# Patient Record
Sex: FEMALE | Race: WHITE | NOT HISPANIC OR LATINO | Employment: UNEMPLOYED | URBAN - METROPOLITAN AREA
[De-identification: names, ages, dates, MRNs, and addresses within clinical notes are randomized per-mention and may not be internally consistent; named-entity substitution may affect disease eponyms.]

---

## 2017-01-13 RX ORDER — PROMETHAZINE HYDROCHLORIDE 25 MG/1
25 TABLET ORAL 2 TIMES DAILY PRN
Qty: 20 TABLET | Refills: 1 | Status: SHIPPED | OUTPATIENT
Start: 2017-01-13 | End: 2017-09-06 | Stop reason: SDUPTHER

## 2017-02-09 RX ORDER — TOPIRAMATE 50 MG/1
50 TABLET, FILM COATED ORAL
Qty: 30 TABLET | Refills: 1 | Status: SHIPPED | OUTPATIENT
Start: 2017-02-09 | End: 2017-04-10 | Stop reason: SDUPTHER

## 2017-02-09 RX ORDER — BUTALBITAL, ACETAMINOPHEN AND CAFFEINE 50; 325; 40 MG/1; MG/1; MG/1
1 TABLET ORAL EVERY 6 HOURS PRN
Qty: 30 TABLET | Refills: 0 | Status: SHIPPED | OUTPATIENT
Start: 2017-02-09 | End: 2017-03-13 | Stop reason: SDUPTHER

## 2017-02-09 RX ORDER — LISINOPRIL AND HYDROCHLOROTHIAZIDE 20; 12.5 MG/1; MG/1
1 TABLET ORAL DAILY
Qty: 30 TABLET | Refills: 3 | Status: SHIPPED | OUTPATIENT
Start: 2017-02-09 | End: 2017-10-28 | Stop reason: SDUPTHER

## 2017-03-13 ENCOUNTER — TELEPHONE (OUTPATIENT)
Dept: INTERNAL MEDICINE | Facility: CLINIC | Age: 42
End: 2017-03-13

## 2017-03-13 RX ORDER — BUTALBITAL, ACETAMINOPHEN AND CAFFEINE 50; 325; 40 MG/1; MG/1; MG/1
1 TABLET ORAL EVERY 6 HOURS PRN
Qty: 20 TABLET | Refills: 0 | OUTPATIENT
Start: 2017-03-13 | End: 2017-05-15 | Stop reason: SDUPTHER

## 2017-03-13 NOTE — TELEPHONE ENCOUNTER
Per Dr. Riddle, we can draw Rheumatoid lab work while she is here with her mother.  Does not need an appt with Dr. Riddle.  Depending on lab results, we may refer to specialist.  Valeria advised and voiced understanding. Lab orders put in.    ----- Message from Linn Ellis MA sent at 3/13/2017  4:18 PM EDT -----  Contact: [phone]      ----- Message -----     From: Linn Ellis MA     Sent: 3/13/2017  11:35 AM       To: Teresa Farrar MA        ----- Message -----     From: Linn Daniels     Sent: 3/13/2017  11:14 AM       To: [facility] Clinical Pool    Patient;s mother is scheduled tomorrow with Dr. Riddle.  She is wanting to know if she can be seen as well.  She said that Dr. Riddle has her coming back every 6 months but her mom is every 3 months but it is easier for her to be seen with her mother due to transportation.

## 2017-03-14 ENCOUNTER — TELEPHONE (OUTPATIENT)
Dept: INTERNAL MEDICINE | Facility: CLINIC | Age: 42
End: 2017-03-14

## 2017-03-14 DIAGNOSIS — M79.641 PAIN IN BOTH HANDS: Primary | ICD-10-CM

## 2017-03-14 DIAGNOSIS — M79.642 PAIN IN BOTH HANDS: Primary | ICD-10-CM

## 2017-03-14 DIAGNOSIS — M25.541 ARTHRALGIA OF BOTH HANDS: ICD-10-CM

## 2017-03-14 DIAGNOSIS — M25.649 JOINT STIFFNESS OF HAND, UNSPECIFIED LATERALITY: ICD-10-CM

## 2017-03-14 DIAGNOSIS — R60.0 HAND EDEMA: ICD-10-CM

## 2017-03-14 DIAGNOSIS — M25.542 ARTHRALGIA OF BOTH HANDS: ICD-10-CM

## 2017-03-15 LAB
ERYTHROCYTE [SEDIMENTATION RATE] IN BLOOD BY WESTERGREN METHOD: 7 MM/HR (ref 0–20)
WRITTEN AUTHORIZATION: NORMAL

## 2017-03-16 ENCOUNTER — TELEPHONE (OUTPATIENT)
Dept: INTERNAL MEDICINE | Facility: CLINIC | Age: 42
End: 2017-03-16

## 2017-03-16 NOTE — TELEPHONE ENCOUNTER
----- Message from Linn Ellis MA sent at 3/16/2017  7:23 PM EDT -----  Patient states that [facility] [place] did not get [facility]/13/17 refill on Fioricet.  I spoke with Riaz @ [facility].  He stated that they did not get the message to refill on 3/13.  I advised this was OK to refill #20 per cosign by Dr. Riddle on 3/15/17.

## 2017-03-17 LAB
ALBUMIN SERPL-MCNC: 4.5 G/DL (ref 3.5–5.2)
ALBUMIN/GLOB SERPL: 1.7 G/DL
ALP SERPL-CCNC: 91 U/L (ref 40–129)
ALT SERPL-CCNC: 7 U/L (ref 5–33)
ANA SER QL: NEGATIVE
AST SERPL-CCNC: 11 U/L (ref 5–32)
BASOPHILS # BLD AUTO: 0.07 10*3/MM3 (ref 0–0.2)
BASOPHILS NFR BLD AUTO: 0.9 % (ref 0–2)
BILIRUB SERPL-MCNC: 0.3 MG/DL (ref 0.2–1.2)
BUN SERPL-MCNC: 8 MG/DL (ref 6–20)
BUN/CREAT SERPL: 8.2 (ref 7–25)
CALCIUM SERPL-MCNC: 9.9 MG/DL (ref 8.6–10.5)
CCP IGA+IGG SERPL IA-ACNC: 2 UNITS (ref 0–19)
CHLORIDE SERPL-SCNC: 98 MMOL/L (ref 98–107)
CO2 SERPL-SCNC: 25.2 MMOL/L (ref 22–29)
CREAT SERPL-MCNC: 0.97 MG/DL (ref 0.57–1)
CRP SERPL-MCNC: 1.4 MG/L (ref 0–4.9)
EOSINOPHIL # BLD AUTO: 0.12 10*3/MM3 (ref 0.1–0.3)
EOSINOPHIL NFR BLD AUTO: 1.6 % (ref 0–4)
ERYTHROCYTE [DISTWIDTH] IN BLOOD BY AUTOMATED COUNT: 13.9 % (ref 11.5–14.5)
ERYTHROCYTE [SEDIMENTATION RATE] IN BLOOD BY WESTERGREN METHOD: NORMAL MM/HR
GLOBULIN SER CALC-MCNC: 2.7 GM/DL
GLUCOSE SERPL-MCNC: 107 MG/DL (ref 65–99)
HCT VFR BLD AUTO: 46.6 % (ref 37–47)
HGB BLD-MCNC: 15.2 G/DL (ref 12–16)
IMM GRANULOCYTES # BLD: 0.03 10*3/MM3 (ref 0–0.03)
IMM GRANULOCYTES NFR BLD: 0.4 % (ref 0–0.5)
LYMPHOCYTES # BLD AUTO: 2.42 10*3/MM3 (ref 0.6–4.8)
LYMPHOCYTES NFR BLD AUTO: 32.6 % (ref 20–45)
Lab: NORMAL
MCH RBC QN AUTO: 32.4 PG (ref 27–31)
MCHC RBC AUTO-ENTMCNC: 32.6 G/DL (ref 31–37)
MCV RBC AUTO: 99.4 FL (ref 81–99)
MONOCYTES # BLD AUTO: 0.37 10*3/MM3 (ref 0–1)
MONOCYTES NFR BLD AUTO: 5 % (ref 3–8)
NEUTROPHILS # BLD AUTO: 4.41 10*3/MM3 (ref 1.5–8.3)
NEUTROPHILS NFR BLD AUTO: 59.5 % (ref 45–70)
NRBC BLD AUTO-RTO: 0 /100 WBC (ref 0–0)
PLATELET # BLD AUTO: 315 10*3/MM3 (ref 140–500)
POTASSIUM SERPL-SCNC: 4.7 MMOL/L (ref 3.5–5.2)
PROT SERPL-MCNC: 7.2 G/DL (ref 6–8.5)
RBC # BLD AUTO: 4.69 10*6/MM3 (ref 4.2–5.4)
REQUEST PROBLEM: NORMAL
RHEUMATOID FACT SERPL-ACNC: <10 IU/ML (ref 0–13.9)
SODIUM SERPL-SCNC: 137 MMOL/L (ref 136–145)
WBC # BLD AUTO: 7.42 10*3/MM3 (ref 4.8–10.8)

## 2017-03-20 ENCOUNTER — TELEPHONE (OUTPATIENT)
Dept: INTERNAL MEDICINE | Facility: CLINIC | Age: 42
End: 2017-03-20

## 2017-03-20 NOTE — TELEPHONE ENCOUNTER
Meds were called in on Thursday night, when patient was notified on Friday, she was concerned that the quantity was changed from #30 to #20. I did explain to patient that this medication was to only be taken on a as needed basis, not every day. She is frustrated and wants to know why it was decreased. I spoke with Dr. Riddle who reiterated that this was a PRN drug and that #20 should be enough to suffice when she needs it. I called patient back today, 3/20/17, she is still very frustrated and doesn't understand why she cannot get #30, she explained that when she was first given this medication by her prior physician, the RX was always for #60, when she started seeing Dr. Riddle, she was decreased down to #30, and now it has been decreased again. I explained to patient that this was a controlled substance, patient understands but is still very frustrated about the whole situation all around, she says this medication works for her and just wants more pills. I told patient that she may make an appointment to be seen to talk to Dr. Riddle about this. She says she has an appointment in April and will wait to talk to Dr. Riddle about it then.   ----- Message from Linn Ellis MA sent at 3/16/2017  2:19 PM EDT -----  Regarding: FW: REFROMIE  Contact: [phone]  Please check with Venkatesh on this tomorrow.  ----- Message -----     From: Ashley Garcia     Sent: 3/16/2017   1:54 PM       To: [facility] Clinical Pool  Subject: REFILL                                           :  71  VENKATESH PATIENT    PATIENT SAID [facility] [place] [facility] NOT [facility] ON REFILL ON FIORCET, -40 MG. COULD IT BE RESENT?

## 2017-03-21 ENCOUNTER — TELEPHONE (OUTPATIENT)
Dept: INTERNAL MEDICINE | Facility: CLINIC | Age: 42
End: 2017-03-21

## 2017-03-21 NOTE — TELEPHONE ENCOUNTER
----- Message from Adrianne Riddle MD sent at 3/17/2017  5:25 PM EDT -----  Call pt about labs.  Rheumatology screening all negative

## 2017-03-21 NOTE — TELEPHONE ENCOUNTER
Pt notified of labs and she wanted to ask what to do about her right hand. The hand locks up and has trouble holding on to things.dg

## 2017-04-10 RX ORDER — TOPIRAMATE 50 MG/1
50 TABLET, FILM COATED ORAL
Qty: 30 TABLET | Refills: 3 | Status: SHIPPED | OUTPATIENT
Start: 2017-04-10 | End: 2017-08-06 | Stop reason: SDUPTHER

## 2017-04-10 RX ORDER — VENLAFAXINE 75 MG/1
75 TABLET ORAL DAILY
Qty: 30 TABLET | Refills: 6 | Status: SHIPPED | OUTPATIENT
Start: 2017-04-10 | End: 2017-10-03

## 2017-04-14 ENCOUNTER — TELEPHONE (OUTPATIENT)
Dept: INTERNAL MEDICINE | Facility: CLINIC | Age: 42
End: 2017-04-14

## 2017-04-14 NOTE — TELEPHONE ENCOUNTER
----- Message from Catherine Medina sent at 4/14/2017 10:41 AM EDT -----  NEEDS REFILL ON FIORCET -40 MG SENT TO [facility] IN [place].  SHE IS OUT OF THIS NOW    [phone]      This is being taken care of and will be faxed to pharmacy today

## 2017-04-25 ENCOUNTER — OFFICE VISIT (OUTPATIENT)
Dept: INTERNAL MEDICINE | Facility: CLINIC | Age: 42
End: 2017-04-25

## 2017-04-25 VITALS
OXYGEN SATURATION: 98 % | DIASTOLIC BLOOD PRESSURE: 90 MMHG | BODY MASS INDEX: 23.11 KG/M2 | HEART RATE: 78 BPM | SYSTOLIC BLOOD PRESSURE: 132 MMHG | HEIGHT: 62 IN | WEIGHT: 125.6 LBS

## 2017-04-25 DIAGNOSIS — Z30.42 SURVEILLANCE FOR DEPO-PROVERA CONTRACEPTION: ICD-10-CM

## 2017-04-25 DIAGNOSIS — I73.9 PERIPHERAL ARTERY DISEASE (HCC): ICD-10-CM

## 2017-04-25 DIAGNOSIS — J43.1 PANLOBULAR EMPHYSEMA (HCC): ICD-10-CM

## 2017-04-25 DIAGNOSIS — R73.9 HYPERGLYCEMIA: ICD-10-CM

## 2017-04-25 DIAGNOSIS — R53.82 CHRONIC FATIGUE: ICD-10-CM

## 2017-04-25 DIAGNOSIS — I10 ESSENTIAL HYPERTENSION: Primary | ICD-10-CM

## 2017-04-25 DIAGNOSIS — M79.641 PAIN OF RIGHT HAND: ICD-10-CM

## 2017-04-25 DIAGNOSIS — R51.9 CHRONIC NONINTRACTABLE HEADACHE, UNSPECIFIED HEADACHE TYPE: ICD-10-CM

## 2017-04-25 DIAGNOSIS — N95.1 MENOPAUSAL SYMPTOMS: ICD-10-CM

## 2017-04-25 DIAGNOSIS — G89.29 CHRONIC NONINTRACTABLE HEADACHE, UNSPECIFIED HEADACHE TYPE: ICD-10-CM

## 2017-04-25 DIAGNOSIS — E78.2 MIXED HYPERLIPIDEMIA: ICD-10-CM

## 2017-04-25 DIAGNOSIS — Z72.0 TOBACCO USE: ICD-10-CM

## 2017-04-25 PROCEDURE — 99214 OFFICE O/P EST MOD 30 MIN: CPT | Performed by: INTERNAL MEDICINE

## 2017-04-25 RX ORDER — RIZATRIPTAN BENZOATE 10 MG/1
10 TABLET ORAL ONCE AS NEEDED
Qty: 9 TABLET | Refills: 0 | Status: SHIPPED | OUTPATIENT
Start: 2017-04-25 | End: 2017-10-03

## 2017-04-25 RX ORDER — ACETAMINOPHEN AND CODEINE PHOSPHATE 300; 30 MG/1; MG/1
TABLET ORAL
Refills: 0 | COMMUNITY
Start: 2017-01-17 | End: 2018-06-07

## 2017-04-25 NOTE — PROGRESS NOTES
"Subjective     Valeria Rubi is a 41 y.o. female, who presents with a chief complaint of   Chief Complaint   Patient presents with   • Follow-up     Would like to talk about medications   • Hyperlipidemia       HPI   The pt is here for follow up    She has copd.  Her insurance switched her from ventolin to proair and she feels like Proair doesn't work as well.  She recently had a URI.  She says she had some leftover \"infection medication\" at home and took this.  She has seen pulmonary and she has poor function on her PFT's.  She says that she had chantix called in but her insurance didn't want to cover the med.      She c/o right finger pain, carpal tunnel (bilat R>L), and left knee pain.  She is taking lots of tylenol.  We discussed a limit of 2-3000mg /day.  She is on plavix bc of an iliac stent so nsaids are not an option.  Long term opiods are not a good option either.  She says a wrist injection from shellie/kleinert didn't help either.  She has had screening labs for rheumatologic disease that are all normal.  She doesn't exercise and doesn't eat a healthy diet.     She takes fioricet for her migraines.  She had been taking 60 per month.  We have had repeated discussions about how fioricet is not a daily medication and is not good for chronic use.  She is now getting 20 pills/mo and is not happy about this.  I have offered her a trial of maxalt.  She thinks imitrex failed in the past.  I have also offered her a neurology consultation.    She has intermittent depression and is on venlafaxine.  She sleeps a lot and doesn't have a lot of motivation.  She is having lots of hot flashes and is menopausal.  She has no sex drive and has some vaginal dryness.  She is not a candidate for hrt bc she is a smoker and has had PAD s/p stenting and on plavix.      The following portions of the patient's history were reviewed and updated as appropriate: allergies, current medications, past family history, past medical history, " past social history, past surgical history and problem list.    Allergies: Review of patient's allergies indicates no known allergies.    Review of Systems   Constitutional: Positive for fatigue.   HENT: Negative.    Eyes: Negative.    Respiratory: Negative.    Cardiovascular: Negative.    Gastrointestinal: Negative.    Endocrine: Negative.    Genitourinary: Negative.    Musculoskeletal: Positive for arthralgias.   Skin: Negative.    Allergic/Immunologic: Negative.    Neurological: Negative.    Hematological: Negative.    Psychiatric/Behavioral: Positive for dysphoric mood.   All other systems reviewed and are negative.      Objective     Wt Readings from Last 3 Encounters:   04/25/17 125 lb 9.6 oz (57 kg)   12/16/16 120 lb (54.4 kg)   09/13/16 124 lb (56.2 kg)     Temp Readings from Last 3 Encounters:   06/24/16 97.3 °F (36.3 °C)   06/17/16 97.6 °F (36.4 °C) (Oral)   06/14/16 98.1 °F (36.7 °C)     BP Readings from Last 3 Encounters:   04/25/17 132/90   12/16/16 138/76   09/13/16 110/80     Pulse Readings from Last 3 Encounters:   04/25/17 78   12/16/16 72   09/13/16 82     Body mass index is 22.97 kg/(m^2).  SpO2 Readings from Last 3 Encounters:   04/25/17 98%   12/16/16 98%   09/13/16 98%       Physical Exam   Constitutional: She is oriented to person, place, and time. She appears well-developed and well-nourished. No distress.   HENT:   Head: Normocephalic and atraumatic.   Right Ear: External ear normal.   Left Ear: External ear normal.   Nose: Nose normal.   Mouth/Throat: Oropharynx is clear and moist.   Eyes: Conjunctivae and EOM are normal. Pupils are equal, round, and reactive to light.   Neck: Normal range of motion. Neck supple.   Cardiovascular: Normal rate, regular rhythm, normal heart sounds and intact distal pulses.    Pulmonary/Chest: Effort normal and breath sounds normal. No respiratory distress. She has no wheezes.   Musculoskeletal: Normal range of motion.   Normal gait   Neurological: She is  alert and oriented to person, place, and time.   Skin: Skin is warm and dry.   Psychiatric: She has a normal mood and affect. Her behavior is normal. Judgment and thought content normal.   Nursing note and vitals reviewed.      Results for orders placed or performed in visit on 03/14/17   Rheumatoid Arthritis Expanded Panel   Result Value Ref Range    RA Latex Turbid <10.0 0.0 - 13.9 IU/mL    C-Reactive Protein 1.4 0.0 - 4.9 mg/L    CCP Antibodies IgG/IgA 2 0 - 19 units    Sed Rate CANCELED mm/hr   Antinuclear Antibody With Reflex Cascade   Result Value Ref Range    COLLEEN Direct Negative Negative    See below: Comment    Comprehensive metabolic panel   Result Value Ref Range    Glucose 107 (H) 65 - 99 mg/dL    BUN 8 6 - 20 mg/dL    Creatinine 0.97 0.57 - 1.00 mg/dL    eGFR Non African Am 63 >60 mL/min/1.73    eGFR African Am 76 >60 mL/min/1.73    BUN/Creatinine Ratio 8.2 7.0 - 25.0    Sodium 137 136 - 145 mmol/L    Potassium 4.7 3.5 - 5.2 mmol/L    Chloride 98 98 - 107 mmol/L    Total CO2 25.2 22.0 - 29.0 mmol/L    Calcium 9.9 8.6 - 10.5 mg/dL    Total Protein 7.2 6.0 - 8.5 g/dL    Albumin 4.50 3.50 - 5.20 g/dL    Globulin 2.7 gm/dL    A/G Ratio 1.7 g/dL    Total Bilirubin 0.3 0.2 - 1.2 mg/dL    Alkaline Phosphatase 91 40 - 129 U/L    AST (SGOT) 11 5 - 32 U/L    ALT (SGPT) 7 5 - 33 U/L   Sedimentation rate, automated   Result Value Ref Range    Sed Rate 7 0 - 20 mm/hr   Written Authorization   Result Value Ref Range    Written Authorization Comment    Request Problem   Result Value Ref Range    Request Problem CANCELED    CBC w AUTO Differential   Result Value Ref Range    WBC 7.42 4.80 - 10.80 10*3/mm3    RBC 4.69 4.20 - 5.40 10*6/mm3    Hemoglobin 15.2 12.0 - 16.0 g/dL    Hematocrit 46.6 37.0 - 47.0 %    MCV 99.4 (H) 81.0 - 99.0 fL    MCH 32.4 (H) 27.0 - 31.0 pg    MCHC 32.6 31.0 - 37.0 g/dL    RDW 13.9 11.5 - 14.5 %    Platelets 315 140 - 500 10*3/mm3    Neutrophil Rel % 59.5 45.0 - 70.0 %    Lymphocyte Rel % 32.6  20.0 - 45.0 %    Monocyte Rel % 5.0 3.0 - 8.0 %    Eosinophil Rel % 1.6 0.0 - 4.0 %    Basophil Rel % 0.9 0.0 - 2.0 %    Neutrophils Absolute 4.41 1.50 - 8.30 10*3/mm3    Lymphocytes Absolute 2.42 0.60 - 4.80 10*3/mm3    Monocytes Absolute 0.37 0.00 - 1.00 10*3/mm3    Eosinophils Absolute 0.12 0.10 - 0.30 10*3/mm3    Basophils Absolute 0.07 0.00 - 0.20 10*3/mm3    Immature Granulocyte Rel % 0.4 0.0 - 0.5 %    Immature Grans Absolute 0.03 0.00 - 0.03 10*3/mm3    nRBC 0.0 0.0 - 0.0 /100 WBC       Assessment/Plan   Valeria was seen today for follow-up and hyperlipidemia.    Diagnoses and all orders for this visit:    Essential hypertension  -     Comprehensive Metabolic Panel; Future  -     CBC & Differential; Future  -     NMR LipoProfile; Future    Peripheral artery disease  -     CBC & Differential; Future  -     NMR LipoProfile; Future    Panlobular emphysema    Surveillance for Depo-Provera contraception  -     DEXA Bone Density Axial; Future    Menopausal symptoms  -     DEXA Bone Density Axial; Future    Pain of right hand    Chronic fatigue  -     Comprehensive Metabolic Panel; Future  -     CBC & Differential; Future  -     TSH; Future  -     T4, Free; Future  -     Vitamin B12; Future  -     Vitamin D 25 Hydroxy; Future    Tobacco use  -     DEXA Bone Density Axial; Future    Hyperglycemia  -     Comprehensive Metabolic Panel; Future  -     Hemoglobin A1c; Future  -     TSH; Future  -     T4, Free; Future    Mixed hyperlipidemia  -     Comprehensive Metabolic Panel; Future  -     NMR LipoProfile; Future    Chronic nonintractable headache, unspecified headache type  -     rizatriptan (MAXALT) 10 MG tablet; Take 1 tablet by mouth 1 (One) Time As Needed for migraine for up to 1 dose. May repeat in 2 hours if needed          Outpatient Medications Prior to Visit   Medication Sig Dispense Refill   • albuterol (PROVENTIL HFA;VENTOLIN HFA) 108 (90 BASE) MCG/ACT inhaler INHALE 2 PUFFS PO EVERY 4 HOURS PRN 18 g 3   •  albuterol (PROVENTIL) (2.5 MG/3ML) 0.083% nebulizer solution 1 unit dose neb. Every 4 hrs. prn 225 mL 3   • aspirin 81 MG EC tablet Take 81 mg by mouth daily.     • benzonatate (TESSALON PERLES) 100 MG capsule Take 1 capsule by mouth 3 (Three) Times a Day As Needed for cough. 20 capsule 0   • butalbital-acetaminophen-caffeine (FIORICET) -40 MG per tablet Take 1 tablet by mouth Every 6 (Six) Hours As Needed for headaches (this is not a daily medication, use only as needed for headaches). 20 tablet 0   • CHANTIX CONTINUING MONTH CIPRIANO 1 MG tablet Take 1 tablet by mouth daily.  4   • CHANTIX STARTING MONTH CIPRIANO 0.5 MG X 11 & 1 MG X 42 tablet TAKE AS DIRECTED  3   • clopidogrel (PLAVIX) 75 MG tablet 150 mg po X1 in RR 30 tablet 0   • DICLOFENAC POTASSIUM PO Take 50 mg by mouth 2 (two) times a day.     • fluticasone (FLONASE) 50 MCG/ACT nasal spray 1 spray into each nostril as needed.     • lisinopril-hydrochlorothiazide (PRINZIDE,ZESTORETIC) 20-12.5 MG per tablet Take 1 tablet by mouth Daily. 30 tablet 3   • medroxyPROGESTERone (DEPO-PROVERA) 150 MG/ML injection Apply  to cheek. Pt takes every 3 months.     • nicotine (EQL NICOTINE) 14 MG/24HR patch Place 1 patch on the skin Daily. 30 patch 0   • promethazine (PHENERGAN) 25 MG tablet Take 1 tablet by mouth 2 (Two) Times a Day As Needed for nausea or vomiting. 20 tablet 1   • simvastatin (ZOCOR) 40 MG tablet Take 40 mg by mouth daily.     • Tiotropium Bromide-Olodaterol (STIOLTO RESPIMAT) 2.5-2.5 MCG/ACT aerosol solution Inhale 2 puffs daily. 4 g 5   • topiramate (TOPAMAX) 50 MG tablet Take 1 tablet by mouth every night at bedtime. 30 tablet 3   • venlafaxine (EFFEXOR) 75 MG tablet Take 1 tablet by mouth Daily. 30 tablet 6     No facility-administered medications prior to visit.      New Medications Ordered This Visit   Medications   • rizatriptan (MAXALT) 10 MG tablet     Sig: Take 1 tablet by mouth 1 (One) Time As Needed for migraine for up to 1 dose. May repeat in  2 hours if needed     Dispense:  9 tablet     Refill:  0     [unfilled]  There are no discontinued medications.      Return in about 5 months (around 9/25/2017) for Recheck.

## 2017-04-26 LAB
25(OH)D3+25(OH)D2 SERPL-MCNC: 20.7 NG/ML
ALBUMIN SERPL-MCNC: 4.4 G/DL (ref 3.5–5.2)
ALBUMIN/GLOB SERPL: 1.5 G/DL
ALP SERPL-CCNC: 92 U/L (ref 40–129)
ALT SERPL-CCNC: 20 U/L (ref 5–33)
AST SERPL-CCNC: 19 U/L (ref 5–32)
BASOPHILS # BLD AUTO: 0.03 10*3/MM3 (ref 0–0.2)
BASOPHILS NFR BLD AUTO: 0.4 % (ref 0–2)
BILIRUB SERPL-MCNC: 0.2 MG/DL (ref 0.2–1.2)
BUN SERPL-MCNC: 8 MG/DL (ref 6–20)
BUN/CREAT SERPL: 9.2 (ref 7–25)
CALCIUM SERPL-MCNC: 9.6 MG/DL (ref 8.6–10.5)
CHLORIDE SERPL-SCNC: 99 MMOL/L (ref 98–107)
CHOLEST SERPL-MCNC: 209 MG/DL (ref 100–199)
CO2 SERPL-SCNC: 24.8 MMOL/L (ref 22–29)
CREAT SERPL-MCNC: 0.87 MG/DL (ref 0.57–1)
EOSINOPHIL # BLD AUTO: 0.1 10*3/MM3 (ref 0.1–0.3)
EOSINOPHIL NFR BLD AUTO: 1.5 % (ref 0–4)
ERYTHROCYTE [DISTWIDTH] IN BLOOD BY AUTOMATED COUNT: 12.2 % (ref 11.5–14.5)
GLOBULIN SER CALC-MCNC: 2.9 GM/DL
GLUCOSE SERPL-MCNC: 90 MG/DL (ref 65–99)
HBA1C MFR BLD: 5.4 % (ref 4.8–5.6)
HCT VFR BLD AUTO: 46.5 % (ref 37–47)
HDL SERPL-SCNC: 39.5 UMOL/L
HDLC SERPL-MCNC: 45 MG/DL
HGB BLD-MCNC: 15.9 G/DL (ref 12–16)
IMM GRANULOCYTES # BLD: 0.05 10*3/MM3 (ref 0–0.03)
IMM GRANULOCYTES NFR BLD: 0.7 % (ref 0–0.5)
LDL SERPL QN: 20 NM
LDL SERPL-SCNC: 2031 NMOL/L
LDL SMALL SERPL-SCNC: 1414 NMOL/L
LDLC SERPL CALC-MCNC: 108 MG/DL (ref 0–99)
LYMPHOCYTES # BLD AUTO: 3.2 10*3/MM3 (ref 0.6–4.8)
LYMPHOCYTES NFR BLD AUTO: 47.3 % (ref 20–45)
MCH RBC QN AUTO: 33 PG (ref 27–31)
MCHC RBC AUTO-ENTMCNC: 34.2 G/DL (ref 31–37)
MCV RBC AUTO: 96.5 FL (ref 81–99)
MONOCYTES # BLD AUTO: 0.38 10*3/MM3 (ref 0–1)
MONOCYTES NFR BLD AUTO: 5.6 % (ref 3–8)
NEUTROPHILS # BLD AUTO: 3 10*3/MM3 (ref 1.5–8.3)
NEUTROPHILS NFR BLD AUTO: 44.5 % (ref 45–70)
NRBC BLD AUTO-RTO: 0 /100 WBC (ref 0–0)
PLATELET # BLD AUTO: 311 10*3/MM3 (ref 140–500)
POTASSIUM SERPL-SCNC: 4.2 MMOL/L (ref 3.5–5.2)
PROT SERPL-MCNC: 7.3 G/DL (ref 6–8.5)
RBC # BLD AUTO: 4.82 10*6/MM3 (ref 4.2–5.4)
SODIUM SERPL-SCNC: 141 MMOL/L (ref 136–145)
T4 FREE SERPL-MCNC: 1.13 NG/DL (ref 0.93–1.7)
TRIGL SERPL-MCNC: 281 MG/DL (ref 0–149)
TSH SERPL DL<=0.005 MIU/L-ACNC: 0.76 MIU/ML (ref 0.27–4.2)
VIT B12 SERPL-MCNC: 277 PG/ML (ref 211–946)
WBC # BLD AUTO: 6.76 10*3/MM3 (ref 4.8–10.8)

## 2017-05-03 ENCOUNTER — TELEPHONE (OUTPATIENT)
Dept: INTERNAL MEDICINE | Facility: CLINIC | Age: 42
End: 2017-05-03

## 2017-05-04 ENCOUNTER — TELEPHONE (OUTPATIENT)
Dept: INTERNAL MEDICINE | Facility: CLINIC | Age: 42
End: 2017-05-04

## 2017-05-15 RX ORDER — BUTALBITAL, ACETAMINOPHEN AND CAFFEINE 50; 325; 40 MG/1; MG/1; MG/1
1 TABLET ORAL EVERY 6 HOURS PRN
Qty: 20 TABLET | Refills: 0 | OUTPATIENT
Start: 2017-05-15 | End: 2017-07-12 | Stop reason: SDUPTHER

## 2017-06-06 RX ORDER — ALBUTEROL SULFATE 2.5 MG/3ML
SOLUTION RESPIRATORY (INHALATION)
Qty: 225 ML | Refills: 3 | Status: SHIPPED | OUTPATIENT
Start: 2017-06-06 | End: 2017-10-03 | Stop reason: SDUPTHER

## 2017-06-13 ENCOUNTER — TELEPHONE (OUTPATIENT)
Dept: INTERNAL MEDICINE | Facility: CLINIC | Age: 42
End: 2017-06-13

## 2017-06-20 RX ORDER — ALBUTEROL SULFATE 90 UG/1
AEROSOL, METERED RESPIRATORY (INHALATION)
Qty: 18 G | Refills: 3 | Status: SHIPPED | OUTPATIENT
Start: 2017-06-20 | End: 2017-10-03 | Stop reason: SDUPTHER

## 2017-07-12 RX ORDER — BUTALBITAL, ACETAMINOPHEN AND CAFFEINE 50; 325; 40 MG/1; MG/1; MG/1
1 TABLET ORAL EVERY 6 HOURS PRN
Qty: 10 TABLET | Refills: 0 | OUTPATIENT
Start: 2017-07-12 | End: 2017-07-30 | Stop reason: SDUPTHER

## 2017-07-14 ENCOUNTER — TELEPHONE (OUTPATIENT)
Dept: INTERNAL MEDICINE | Facility: CLINIC | Age: 42
End: 2017-07-14

## 2017-07-14 NOTE — TELEPHONE ENCOUNTER
----- Message from Catherinepeggy Medina sent at 7/14/2017  9:34 AM EDT -----  PATIENT WENT TO  HER FIORICET -40 MG AND HAS ABOUT 16-18 MIGRAINES A MONTH AND THE DOCTOR ONLY ORDERED 10 PILLS.  SHE WAS SAYING SHE WOKE UP WITH ONE TODAY.  SHE SAID SHE DON'T UNDERSTAND WHY ONLY 10 PILLS TO LAST A WHOLE MONTH OF MIGRAINES.  S[facility] USES  [facility] [place]     [phone]

## 2017-07-31 RX ORDER — BUTALBITAL, ACETAMINOPHEN AND CAFFEINE 50; 325; 40 MG/1; MG/1; MG/1
TABLET ORAL
Qty: 10 TABLET | Refills: 0 | OUTPATIENT
Start: 2017-07-31 | End: 2017-09-08 | Stop reason: SDUPTHER

## 2017-08-07 RX ORDER — TOPIRAMATE 50 MG/1
TABLET, FILM COATED ORAL
Qty: 30 TABLET | Refills: 6 | Status: SHIPPED | OUTPATIENT
Start: 2017-08-07 | End: 2017-10-03 | Stop reason: SDUPTHER

## 2017-09-06 RX ORDER — PROMETHAZINE HYDROCHLORIDE 25 MG/1
TABLET ORAL
Qty: 20 TABLET | Refills: 1 | Status: SHIPPED | OUTPATIENT
Start: 2017-09-06 | End: 2017-11-08 | Stop reason: SDUPTHER

## 2017-09-08 RX ORDER — BUTALBITAL, ACETAMINOPHEN AND CAFFEINE 50; 325; 40 MG/1; MG/1; MG/1
TABLET ORAL
Qty: 10 TABLET | Refills: 0 | OUTPATIENT
Start: 2017-09-08 | End: 2017-10-06 | Stop reason: SDUPTHER

## 2017-09-18 DIAGNOSIS — J43.1 PANLOBULAR EMPHYSEMA (HCC): ICD-10-CM

## 2017-09-18 RX ORDER — TIOTROPIUM BROMIDE AND OLODATEROL 3.124; 2.736 UG/1; UG/1
SPRAY, METERED RESPIRATORY (INHALATION)
Qty: 4 G | Refills: 5 | Status: SHIPPED | OUTPATIENT
Start: 2017-09-18 | End: 2017-10-03 | Stop reason: SDUPTHER

## 2017-10-03 ENCOUNTER — OFFICE VISIT (OUTPATIENT)
Dept: INTERNAL MEDICINE | Facility: CLINIC | Age: 42
End: 2017-10-03

## 2017-10-03 VITALS
WEIGHT: 124.8 LBS | SYSTOLIC BLOOD PRESSURE: 110 MMHG | OXYGEN SATURATION: 98 % | DIASTOLIC BLOOD PRESSURE: 78 MMHG | HEIGHT: 62 IN | BODY MASS INDEX: 22.97 KG/M2 | HEART RATE: 88 BPM

## 2017-10-03 DIAGNOSIS — I73.9 PERIPHERAL ARTERY DISEASE (HCC): ICD-10-CM

## 2017-10-03 DIAGNOSIS — I10 ESSENTIAL HYPERTENSION: ICD-10-CM

## 2017-10-03 DIAGNOSIS — G89.29 CHRONIC NONINTRACTABLE HEADACHE, UNSPECIFIED HEADACHE TYPE: ICD-10-CM

## 2017-10-03 DIAGNOSIS — J43.1 PANLOBULAR EMPHYSEMA (HCC): ICD-10-CM

## 2017-10-03 DIAGNOSIS — R51.9 CHRONIC NONINTRACTABLE HEADACHE, UNSPECIFIED HEADACHE TYPE: ICD-10-CM

## 2017-10-03 DIAGNOSIS — E78.2 MIXED HYPERLIPIDEMIA: Primary | ICD-10-CM

## 2017-10-03 DIAGNOSIS — F41.9 ANXIETY AND DEPRESSION: ICD-10-CM

## 2017-10-03 DIAGNOSIS — F32.A ANXIETY AND DEPRESSION: ICD-10-CM

## 2017-10-03 PROCEDURE — 99214 OFFICE O/P EST MOD 30 MIN: CPT | Performed by: INTERNAL MEDICINE

## 2017-10-03 RX ORDER — ALBUTEROL SULFATE 90 UG/1
AEROSOL, METERED RESPIRATORY (INHALATION)
Qty: 18 G | Refills: 3 | Status: SHIPPED | OUTPATIENT
Start: 2017-10-03 | End: 2018-01-03 | Stop reason: SDUPTHER

## 2017-10-03 RX ORDER — VENLAFAXINE HYDROCHLORIDE 150 MG/1
150 CAPSULE, EXTENDED RELEASE ORAL DAILY
Qty: 90 CAPSULE | Refills: 1 | Status: SHIPPED | OUTPATIENT
Start: 2017-10-03 | End: 2017-10-10 | Stop reason: SDUPTHER

## 2017-10-03 RX ORDER — TOPIRAMATE 50 MG/1
50 TABLET, FILM COATED ORAL 2 TIMES DAILY
Qty: 180 TABLET | Refills: 1 | Status: SHIPPED | OUTPATIENT
Start: 2017-10-03 | End: 2018-10-06 | Stop reason: SDUPTHER

## 2017-10-03 RX ORDER — ALBUTEROL SULFATE 2.5 MG/3ML
SOLUTION RESPIRATORY (INHALATION)
Qty: 225 ML | Refills: 3 | Status: SHIPPED | OUTPATIENT
Start: 2017-10-03 | End: 2018-07-01 | Stop reason: SDUPTHER

## 2017-10-03 NOTE — PROGRESS NOTES
Subjective     Valeria Rubi is a 42 y.o. female, who presents with a chief complaint of   Chief Complaint   Patient presents with   • Follow-up     Needs labs, doesnt feel like the Proair is working for her. She would like to talk to Dr. Riddle about this.   • Hypertension       HPI   The pt I here for follow up.  Her mom  recently.  She has struggled because her mom was her best friend. She isnt sleeping very well.  She is trying to eat a descent diet.  She is on effexor for anxiety and depression.  She feels like this dose could be increased some.  The pt has recently moved.  She had been living with her mom to take care of her.  The pt has now moved back to her own house.    Lately she has had more issues with her breathing.  She likes the stiolto and she feels like it opens her up more.  + wheezing recently.  No fever.  No increased sputum production.  Pt has had her flu shot    Migraines - increased frequency of headaches recently.  Pt thinks its bc of increased stress.    The following portions of the patient's history were reviewed and updated as appropriate: allergies, current medications, past family history, past medical history, past social history, past surgical history and problem list.    Allergies: Review of patient's allergies indicates no known allergies.    Review of Systems   Constitutional: Negative.    HENT: Negative.    Eyes: Negative.    Respiratory: Positive for cough and wheezing.    Cardiovascular: Negative.    Gastrointestinal: Negative.    Endocrine: Negative.    Genitourinary: Negative.    Musculoskeletal: Negative.    Skin: Negative.    Allergic/Immunologic: Negative.    Neurological: Negative.    Hematological: Negative.    Psychiatric/Behavioral: The patient is nervous/anxious.    All other systems reviewed and are negative.      Objective     Wt Readings from Last 3 Encounters:   10/03/17 124 lb 12.8 oz (56.6 kg)   17 125 lb 9.6 oz (57 kg)   16 120 lb (54.4 kg)      Temp Readings from Last 3 Encounters:   06/24/16 97.3 °F (36.3 °C)   06/17/16 97.6 °F (36.4 °C) (Oral)   06/14/16 98.1 °F (36.7 °C)     BP Readings from Last 3 Encounters:   10/03/17 110/78   04/25/17 132/90   12/16/16 138/76     Pulse Readings from Last 3 Encounters:   10/03/17 88   04/25/17 78   12/16/16 72     Body mass index is 22.83 kg/(m^2).  SpO2 Readings from Last 3 Encounters:   10/03/17 98%   04/25/17 98%   12/16/16 98%       Physical Exam   Constitutional: She is oriented to person, place, and time. She appears well-developed and well-nourished. No distress.   HENT:   Head: Normocephalic and atraumatic.   Right Ear: External ear normal.   Left Ear: External ear normal.   Nose: Nose normal.   Mouth/Throat: Oropharynx is clear and moist.   Eyes: Conjunctivae and EOM are normal. Pupils are equal, round, and reactive to light.   Neck: Normal range of motion. Neck supple.   Cardiovascular: Normal rate, regular rhythm, normal heart sounds and intact distal pulses.    Pulmonary/Chest: Effort normal and breath sounds normal. No respiratory distress. She has no wheezes.   Musculoskeletal: Normal range of motion.   Normal gait   Neurological: She is alert and oriented to person, place, and time.   Skin: Skin is warm and dry.   Psychiatric: She has a normal mood and affect. Her behavior is normal. Judgment and thought content normal.   Nursing note and vitals reviewed.      Results for orders placed or performed in visit on 04/25/17   Comprehensive Metabolic Panel   Result Value Ref Range    Glucose 90 65 - 99 mg/dL    BUN 8 6 - 20 mg/dL    Creatinine 0.87 0.57 - 1.00 mg/dL    eGFR Non African Am 71 >60 mL/min/1.73    eGFR African Am 86 >60 mL/min/1.73    BUN/Creatinine Ratio 9.2 7.0 - 25.0    Sodium 141 136 - 145 mmol/L    Potassium 4.2 3.5 - 5.2 mmol/L    Chloride 99 98 - 107 mmol/L    Total CO2 24.8 22.0 - 29.0 mmol/L    Calcium 9.6 8.6 - 10.5 mg/dL    Total Protein 7.3 6.0 - 8.5 g/dL    Albumin 4.40 3.50 -  5.20 g/dL    Globulin 2.9 gm/dL    A/G Ratio 1.5 g/dL    Total Bilirubin 0.2 0.2 - 1.2 mg/dL    Alkaline Phosphatase 92 40 - 129 U/L    AST (SGOT) 19 5 - 32 U/L    ALT (SGPT) 20 5 - 33 U/L   Hemoglobin A1c   Result Value Ref Range    Hemoglobin A1C 5.40 4.80 - 5.60 %   TSH   Result Value Ref Range    TSH 0.758 0.270 - 4.200 mIU/mL   T4, Free   Result Value Ref Range    Free T4 1.13 0.93 - 1.70 ng/dL   Vitamin B12   Result Value Ref Range    Vitamin B-12 277 211 - 946 pg/mL   Vitamin D 25 Hydroxy   Result Value Ref Range    25 Hydroxy, Vitamin D 20.7 ng/mL   NMR LipoProfile   Result Value Ref Range    LDL-P 2031 (H) <1000 nmol/L    LDL-C 108 (H) 0 - 99 mg/dL    HDL-C 45 >39 mg/dL    Triglycerides 281 (H) 0 - 149 mg/dL    Total Cholesterol 209 (H) 100 - 199 mg/dL    HDL-P (Total) 39.5 >=30.5 umol/L    Small LDL-P 1414 (H) <=527 nmol/L    LDL Size 20.0 >20.5 nm   CBC & Differential   Result Value Ref Range    WBC 6.76 4.80 - 10.80 10*3/mm3    RBC 4.82 4.20 - 5.40 10*6/mm3    Hemoglobin 15.9 12.0 - 16.0 g/dL    Hematocrit 46.5 37.0 - 47.0 %    MCV 96.5 81.0 - 99.0 fL    MCH 33.0 (H) 27.0 - 31.0 pg    MCHC 34.2 31.0 - 37.0 g/dL    RDW 12.2 11.5 - 14.5 %    Platelets 311 140 - 500 10*3/mm3    Neutrophil Rel % 44.5 (L) 45.0 - 70.0 %    Lymphocyte Rel % 47.3 (H) 20.0 - 45.0 %    Monocyte Rel % 5.6 3.0 - 8.0 %    Eosinophil Rel % 1.5 0.0 - 4.0 %    Basophil Rel % 0.4 0.0 - 2.0 %    Neutrophils Absolute 3.00 1.50 - 8.30 10*3/mm3    Lymphocytes Absolute 3.20 0.60 - 4.80 10*3/mm3    Monocytes Absolute 0.38 0.00 - 1.00 10*3/mm3    Eosinophils Absolute 0.10 0.10 - 0.30 10*3/mm3    Basophils Absolute 0.03 0.00 - 0.20 10*3/mm3    Immature Granulocyte Rel % 0.7 (H) 0.0 - 0.5 %    Immature Grans Absolute 0.05 (H) 0.00 - 0.03 10*3/mm3    nRBC 0.0 0.0 - 0.0 /100 WBC       Assessment/Plan   Valeria was seen today for follow-up and hypertension.    Diagnoses and all orders for this visit:    Mixed hyperlipidemia    Essential  hypertension    Peripheral artery disease    Panlobular emphysema  -     albuterol (PROVENTIL HFA;VENTOLIN HFA) 108 (90 Base) MCG/ACT inhaler; INHALE 2 PUFFS PO EVERY 4 HOURS PRN  -     albuterol (PROVENTIL) (2.5 MG/3ML) 0.083% nebulizer solution; 1 unit dose neb. Every 4 hrs. prn  -     tiotropium bromide-olodaterol (STIOLTO RESPIMAT) 2.5-2.5 MCG/ACT aerosol solution inhaler; Inhale 2 puffs Daily.    Anxiety and depression  -     venlafaxine XR (EFFEXOR-XR) 150 MG 24 hr capsule; Take 1 capsule by mouth Daily.    Chronic nonintractable headache, unspecified headache type  -     topiramate (TOPAMAX) 50 MG tablet; Take 1 tablet by mouth 2 (Two) Times a Day.          Outpatient Medications Prior to Visit   Medication Sig Dispense Refill   • acetaminophen-codeine (TYLENOL #3) 300-30 MG per tablet TAKE 1 TABLET EVERY 6 HOURS FOR AS NEEDED PAIN  0   • aspirin 81 MG EC tablet Take 81 mg by mouth daily.     • benzonatate (TESSALON PERLES) 100 MG capsule Take 1 capsule by mouth 3 (Three) Times a Day As Needed for cough. 20 capsule 0   • butalbital-acetaminophen-caffeine (FIORICET, ESGIC) -40 MG per tablet TAKE ONE TABLET BY MOUTH EVERY 6 HOURS AS NEEDED. MUST LAST 30 DAYS 10 tablet 0   • clopidogrel (PLAVIX) 75 MG tablet 150 mg po X1 in RR 30 tablet 0   • [facility] NTS STEP 1 21 MG/24HR patch APPLY 1 PATCH ON SKIN DAILY AS NEEDED.  1   • DICLOFENAC POTASSIUM PO Take 50 mg by mouth 2 (two) times a day.     • fluticasone (FLONASE) 50 MCG/ACT nasal spray 1 spray into each nostril as needed.     • lisinopril-hydrochlorothiazide (PRINZIDE,ZESTORETIC) 20-12.5 MG per tablet Take 1 tablet by mouth Daily. 30 tablet 3   • medroxyPROGESTERone (DEPO-PROVERA) 150 MG/ML injection Apply  to cheek. Pt takes every 3 months.     • nicotine (EQL NICOTINE) 14 MG/24HR patch Place 1 patch on the skin Daily. 30 patch 0   • promethazine (PHENERGAN) 25 MG tablet TAKE 1 TABLET BY MOUTH 2 (TWO) TIMES A DAY AS NEEDED FOR NAUSEA OR VOMITING. 20 tablet 1    • simvastatin (ZOCOR) 40 MG tablet Take 40 mg by mouth daily.     • albuterol (PROVENTIL HFA;VENTOLIN HFA) 108 (90 BASE) MCG/ACT inhaler INHALE 2 PUFFS PO EVERY 4 HOURS PRN 18 g 3   • albuterol (PROVENTIL) (2.5 MG/3ML) 0.083% nebulizer solution 1 unit dose neb. Every 4 hrs. prn 225 mL 3   • CHANTIX CONTINUING MONTH CIPRIANO 1 MG tablet Take 1 tablet by mouth daily.  4   • CHANTIX STARTING MONTH CIPRIANO 0.5 MG X 11 & 1 MG X 42 tablet TAKE AS DIRECTED  3   • rizatriptan (MAXALT) 10 MG tablet Take 1 tablet by mouth 1 (One) Time As Needed for migraine for up to 1 dose. May repeat in 2 hours if needed 9 tablet 0   • STIOLTO RESPIMAT 2.5-2.5 MCG/ACT aerosol solution inhaler INHALE 2 PUFFS DAILY. 4 g 5   • topiramate (TOPAMAX) 50 MG tablet TAKE 1 TABLET BY MOUTH EVERY NIGHT AT BEDTIME. 30 tablet 6   • venlafaxine (EFFEXOR) 75 MG tablet Take 1 tablet by mouth Daily. 30 tablet 6     No facility-administered medications prior to visit.      New Medications Ordered This Visit   Medications   • venlafaxine XR (EFFEXOR-XR) 150 MG 24 hr capsule     Sig: Take 1 capsule by mouth Daily.     Dispense:  90 capsule     Refill:  1   • albuterol (PROVENTIL HFA;VENTOLIN HFA) 108 (90 Base) MCG/ACT inhaler     Sig: INHALE 2 PUFFS PO EVERY 4 HOURS PRN     Dispense:  18 g     Refill:  3     Fill proventil   • albuterol (PROVENTIL) (2.5 MG/3ML) 0.083% nebulizer solution     Si unit dose neb. Every 4 hrs. prn     Dispense:  225 mL     Refill:  3   • tiotropium bromide-olodaterol (STIOLTO RESPIMAT) 2.5-2.5 MCG/ACT aerosol solution inhaler     Sig: Inhale 2 puffs Daily.     Dispense:  4 g     Refill:  5   • topiramate (TOPAMAX) 50 MG tablet     Sig: Take 1 tablet by mouth 2 (Two) Times a Day.     Dispense:  180 tablet     Refill:  1     [unfilled]  Medications Discontinued During This Encounter   Medication Reason   • venlafaxine (EFFEXOR) 75 MG tablet    • CHANTIX CONTINUING MONTH CIPRIANO 1 MG tablet    • CHANTIX STARTING MONTH CIPRIANO 0.5 MG X 11 & 1  MG X 42 tablet    • albuterol (PROVENTIL HFA;VENTOLIN HFA) 108 (90 BASE) MCG/ACT inhaler Reorder   • albuterol (PROVENTIL) (2.5 MG/3ML) 0.083% nebulizer solution Reorder   • STIOLTO RESPIMAT 2.5-2.5 MCG/ACT aerosol solution inhaler Reorder   • rizatriptan (MAXALT) 10 MG tablet    • topiramate (TOPAMAX) 50 MG tablet Reorder         Return in about 3 months (around 1/3/2018) for Recheck.

## 2017-10-06 RX ORDER — BUTALBITAL, ACETAMINOPHEN AND CAFFEINE 50; 325; 40 MG/1; MG/1; MG/1
TABLET ORAL
Qty: 10 TABLET | Refills: 0 | OUTPATIENT
Start: 2017-10-06 | End: 2017-11-07 | Stop reason: SDUPTHER

## 2017-10-09 ENCOUNTER — TELEPHONE (OUTPATIENT)
Dept: INTERNAL MEDICINE | Facility: CLINIC | Age: 42
End: 2017-10-09

## 2017-10-09 NOTE — TELEPHONE ENCOUNTER
----- Message from Sushma Bell sent at 10/9/2017 12:18 PM EDT -----  Regarding: REQUESTING SCRIPT  TWYLA    STARTED Friday 10/6 DEEP COUGH IN CHEST, CHEST HURTING ESPECIALLY WHEN COUGHING    PHARMACY [facility] [place]    DRUG ALLERGIES - NONE    Asking if something can be phoned in for URI    [phone]      PER DR WAKEFIELD PT NEEDS  APPT TO BE SEEN, PER FAYE  SCHEDULE FOR  10/10/17

## 2017-10-10 ENCOUNTER — OFFICE VISIT (OUTPATIENT)
Dept: INTERNAL MEDICINE | Facility: CLINIC | Age: 42
End: 2017-10-10

## 2017-10-10 VITALS
BODY MASS INDEX: 22.82 KG/M2 | SYSTOLIC BLOOD PRESSURE: 114 MMHG | WEIGHT: 124 LBS | HEART RATE: 71 BPM | HEIGHT: 62 IN | OXYGEN SATURATION: 97 % | DIASTOLIC BLOOD PRESSURE: 72 MMHG

## 2017-10-10 DIAGNOSIS — I10 ESSENTIAL HYPERTENSION: ICD-10-CM

## 2017-10-10 DIAGNOSIS — F32.A ANXIETY AND DEPRESSION: ICD-10-CM

## 2017-10-10 DIAGNOSIS — R73.9 HYPERGLYCEMIA: ICD-10-CM

## 2017-10-10 DIAGNOSIS — F41.9 ANXIETY AND DEPRESSION: ICD-10-CM

## 2017-10-10 DIAGNOSIS — J44.1 COPD EXACERBATION (HCC): Primary | ICD-10-CM

## 2017-10-10 DIAGNOSIS — E78.2 MIXED HYPERLIPIDEMIA: ICD-10-CM

## 2017-10-10 PROCEDURE — 99214 OFFICE O/P EST MOD 30 MIN: CPT | Performed by: INTERNAL MEDICINE

## 2017-10-10 RX ORDER — CEFUROXIME AXETIL 250 MG/1
250 TABLET ORAL 2 TIMES DAILY
Qty: 20 TABLET | Refills: 0 | Status: SHIPPED | OUTPATIENT
Start: 2017-10-10 | End: 2018-01-03

## 2017-10-10 RX ORDER — VENLAFAXINE HYDROCHLORIDE 150 MG/1
150 CAPSULE, EXTENDED RELEASE ORAL DAILY
Qty: 90 CAPSULE | Refills: 1 | Status: SHIPPED | OUTPATIENT
Start: 2017-10-10 | End: 2018-11-03 | Stop reason: SDUPTHER

## 2017-10-10 RX ORDER — PREDNISONE 20 MG/1
60 TABLET ORAL DAILY
Qty: 15 TABLET | Refills: 0 | Status: SHIPPED | OUTPATIENT
Start: 2017-10-10 | End: 2017-10-15

## 2017-10-10 NOTE — PROGRESS NOTES
"Subjective     Valeria Rubi is a 42 y.o. female, who presents with a chief complaint of   Chief Complaint   Patient presents with   • URI     Coughing up mucus, cough, congestion       HPI   The pt has had cough and congestion for 4-5 days.  Her \"insides hurt\" bc she is coughing so much.  No fever.  + wheezing.  She is still smoking.  + productive cough.  She is using her nebulizer at least 4-6 times per day.  She is still smoking.      Depression - sx increased.  Her effexor was increased but she said she didn't get the increased dose.  She is tired al the time and doesn't have a lot of motivation to get things done.   She has taken OTC energy pills - I discouraged this given her HTN and vascular disease.  bp well controlled at this time.     She cont to have dry mouth.  Trial of anoro to see if this is better than stiolto with dry mouth side effects.          The following portions of the patient's history were reviewed and updated as appropriate: allergies, current medications, past family history, past medical history, past social history, past surgical history and problem list.    Allergies: Review of patient's allergies indicates no known allergies.    Review of Systems   Constitutional: Positive for fatigue. Negative for fever.   HENT: Positive for congestion.    Eyes: Negative.    Respiratory: Positive for cough, shortness of breath and wheezing.    Cardiovascular: Negative.    Gastrointestinal: Negative.    Endocrine: Negative.    Genitourinary: Negative.    Musculoskeletal: Negative.    Skin: Negative.    Allergic/Immunologic: Negative.    Neurological: Negative.    Hematological: Negative.    Psychiatric/Behavioral: Negative.    All other systems reviewed and are negative.      Objective     Wt Readings from Last 3 Encounters:   10/10/17 124 lb (56.2 kg)   10/03/17 124 lb 12.8 oz (56.6 kg)   04/25/17 125 lb 9.6 oz (57 kg)     Temp Readings from Last 3 Encounters:   06/24/16 97.3 °F (36.3 °C)   06/17/16 " 97.6 °F (36.4 °C) (Oral)   06/14/16 98.1 °F (36.7 °C)     BP Readings from Last 3 Encounters:   10/10/17 114/72   10/03/17 110/78   04/25/17 132/90     Pulse Readings from Last 3 Encounters:   10/10/17 71   10/03/17 88   04/25/17 78     Body mass index is 22.68 kg/(m^2).  SpO2 Readings from Last 3 Encounters:   10/10/17 97%   10/03/17 98%   04/25/17 98%       Physical Exam   Constitutional: She is oriented to person, place, and time. She appears well-developed and well-nourished. No distress.   HENT:   Head: Normocephalic and atraumatic.   Right Ear: External ear normal.   Left Ear: External ear normal.   Nose: Nose normal.   Mouth/Throat: Oropharynx is clear and moist.   Eyes: Conjunctivae and EOM are normal. Pupils are equal, round, and reactive to light.   Neck: Normal range of motion. Neck supple.   Cardiovascular: Normal rate, regular rhythm, normal heart sounds and intact distal pulses.    Pulmonary/Chest: Effort normal and breath sounds normal. No respiratory distress. She has no wheezes.   Musculoskeletal: Normal range of motion.   Normal gait   Neurological: She is alert and oriented to person, place, and time.   Skin: Skin is warm and dry.   Psychiatric: She has a normal mood and affect. Her behavior is normal. Judgment and thought content normal.   Nursing note and vitals reviewed.      Results for orders placed or performed in visit on 04/25/17   Comprehensive Metabolic Panel   Result Value Ref Range    Glucose 90 65 - 99 mg/dL    BUN 8 6 - 20 mg/dL    Creatinine 0.87 0.57 - 1.00 mg/dL    eGFR Non African Am 71 >60 mL/min/1.73    eGFR African Am 86 >60 mL/min/1.73    BUN/Creatinine Ratio 9.2 7.0 - 25.0    Sodium 141 136 - 145 mmol/L    Potassium 4.2 3.5 - 5.2 mmol/L    Chloride 99 98 - 107 mmol/L    Total CO2 24.8 22.0 - 29.0 mmol/L    Calcium 9.6 8.6 - 10.5 mg/dL    Total Protein 7.3 6.0 - 8.5 g/dL    Albumin 4.40 3.50 - 5.20 g/dL    Globulin 2.9 gm/dL    A/G Ratio 1.5 g/dL    Total Bilirubin 0.2 0.2 -  1.2 mg/dL    Alkaline Phosphatase 92 40 - 129 U/L    AST (SGOT) 19 5 - 32 U/L    ALT (SGPT) 20 5 - 33 U/L   Hemoglobin A1c   Result Value Ref Range    Hemoglobin A1C 5.40 4.80 - 5.60 %   TSH   Result Value Ref Range    TSH 0.758 0.270 - 4.200 mIU/mL   T4, Free   Result Value Ref Range    Free T4 1.13 0.93 - 1.70 ng/dL   Vitamin B12   Result Value Ref Range    Vitamin B-12 277 211 - 946 pg/mL   Vitamin D 25 Hydroxy   Result Value Ref Range    25 Hydroxy, Vitamin D 20.7 ng/mL   NMR LipoProfile   Result Value Ref Range    LDL-P 2031 (H) <1000 nmol/L    LDL-C 108 (H) 0 - 99 mg/dL    HDL-C 45 >39 mg/dL    Triglycerides 281 (H) 0 - 149 mg/dL    Total Cholesterol 209 (H) 100 - 199 mg/dL    HDL-P (Total) 39.5 >=30.5 umol/L    Small LDL-P 1414 (H) <=527 nmol/L    LDL Size 20.0 >20.5 nm   CBC & Differential   Result Value Ref Range    WBC 6.76 4.80 - 10.80 10*3/mm3    RBC 4.82 4.20 - 5.40 10*6/mm3    Hemoglobin 15.9 12.0 - 16.0 g/dL    Hematocrit 46.5 37.0 - 47.0 %    MCV 96.5 81.0 - 99.0 fL    MCH 33.0 (H) 27.0 - 31.0 pg    MCHC 34.2 31.0 - 37.0 g/dL    RDW 12.2 11.5 - 14.5 %    Platelets 311 140 - 500 10*3/mm3    Neutrophil Rel % 44.5 (L) 45.0 - 70.0 %    Lymphocyte Rel % 47.3 (H) 20.0 - 45.0 %    Monocyte Rel % 5.6 3.0 - 8.0 %    Eosinophil Rel % 1.5 0.0 - 4.0 %    Basophil Rel % 0.4 0.0 - 2.0 %    Neutrophils Absolute 3.00 1.50 - 8.30 10*3/mm3    Lymphocytes Absolute 3.20 0.60 - 4.80 10*3/mm3    Monocytes Absolute 0.38 0.00 - 1.00 10*3/mm3    Eosinophils Absolute 0.10 0.10 - 0.30 10*3/mm3    Basophils Absolute 0.03 0.00 - 0.20 10*3/mm3    Immature Granulocyte Rel % 0.7 (H) 0.0 - 0.5 %    Immature Grans Absolute 0.05 (H) 0.00 - 0.03 10*3/mm3    nRBC 0.0 0.0 - 0.0 /100 WBC       Assessment/Plan   Valeria was seen today for uri.    Diagnoses and all orders for this visit:    COPD exacerbation  -     predniSONE (DELTASONE) 20 MG tablet; Take 3 tablets by mouth Daily for 5 days.  -     cefuroxime (CEFTIN) 250 MG tablet; Take 1  tablet by mouth 2 (Two) Times a Day.    Essential hypertension    Mixed hyperlipidemia    Hyperglycemia    Anxiety and depression  -     venlafaxine XR (EFFEXOR-XR) 150 MG 24 hr capsule; Take 1 capsule by mouth Daily.      Cont inhalers.  Trial of anoro instead of stiolto .      Outpatient Medications Prior to Visit   Medication Sig Dispense Refill   • acetaminophen-codeine (TYLENOL #3) 300-30 MG per tablet TAKE 1 TABLET EVERY 6 HOURS FOR AS NEEDED PAIN  0   • albuterol (PROVENTIL HFA;VENTOLIN HFA) 108 (90 Base) MCG/ACT inhaler INHALE 2 PUFFS PO EVERY 4 HOURS PRN 18 g 3   • albuterol (PROVENTIL) (2.5 MG/3ML) 0.083% nebulizer solution 1 unit dose neb. Every 4 hrs. prn 225 mL 3   • aspirin 81 MG EC tablet Take 81 mg by mouth daily.     • benzonatate (TESSALON PERLES) 100 MG capsule Take 1 capsule by mouth 3 (Three) Times a Day As Needed for cough. 20 capsule 0   • butalbital-acetaminophen-caffeine (FIORICET, ESGIC) -40 MG per tablet TAKE 1 TABLET EVERY 6 HOURS 10 tablet 0   • clopidogrel (PLAVIX) 75 MG tablet 150 mg po X1 in RR 30 tablet 0   • [facility] NTS STEP 1 21 MG/24HR patch APPLY 1 PATCH ON SKIN DAILY AS NEEDED.  1   • DICLOFENAC POTASSIUM PO Take 50 mg by mouth 2 (two) times a day.     • fluticasone (FLONASE) 50 MCG/ACT nasal spray 1 spray into each nostril as needed.     • lisinopril-hydrochlorothiazide (PRINZIDE,ZESTORETIC) 20-12.5 MG per tablet Take 1 tablet by mouth Daily. 30 tablet 3   • medroxyPROGESTERone (DEPO-PROVERA) 150 MG/ML injection Apply  to cheek. Pt takes every 3 months.     • nicotine (EQL NICOTINE) 14 MG/24HR patch Place 1 patch on the skin Daily. 30 patch 0   • promethazine (PHENERGAN) 25 MG tablet TAKE 1 TABLET BY MOUTH 2 (TWO) TIMES A DAY AS NEEDED FOR NAUSEA OR VOMITING. 20 tablet 1   • simvastatin (ZOCOR) 40 MG tablet Take 40 mg by mouth daily.     • tiotropium bromide-olodaterol (STIOLTO RESPIMAT) 2.5-2.5 MCG/ACT aerosol solution inhaler Inhale 2 puffs Daily. 4 g 5   • topiramate  (TOPAMAX) 50 MG tablet Take 1 tablet by mouth 2 (Two) Times a Day. 180 tablet 1   • venlafaxine XR (EFFEXOR-XR) 150 MG 24 hr capsule Take 1 capsule by mouth Daily. 90 capsule 1     No facility-administered medications prior to visit.      New Medications Ordered This Visit   Medications   • venlafaxine XR (EFFEXOR-XR) 150 MG 24 hr capsule     Sig: Take 1 capsule by mouth Daily.     Dispense:  90 capsule     Refill:  1   • predniSONE (DELTASONE) 20 MG tablet     Sig: Take 3 tablets by mouth Daily for 5 days.     Dispense:  15 tablet     Refill:  0   • cefuroxime (CEFTIN) 250 MG tablet     Sig: Take 1 tablet by mouth 2 (Two) Times a Day.     Dispense:  20 tablet     Refill:  0     Medications Discontinued During This Encounter   Medication Reason   • venlafaxine XR (EFFEXOR-XR) 150 MG 24 hr capsule Reorder         Return if symptoms worsen or fail to improve.

## 2017-10-30 RX ORDER — LISINOPRIL AND HYDROCHLOROTHIAZIDE 20; 12.5 MG/1; MG/1
TABLET ORAL
Qty: 30 TABLET | Refills: 3 | Status: SHIPPED | OUTPATIENT
Start: 2017-10-30 | End: 2019-10-02 | Stop reason: SDUPTHER

## 2017-11-07 RX ORDER — PROMETHAZINE HYDROCHLORIDE 25 MG/1
TABLET ORAL
Qty: 20 TABLET | Refills: 1 | Status: CANCELLED | OUTPATIENT
Start: 2017-11-07

## 2017-11-08 RX ORDER — PROMETHAZINE HYDROCHLORIDE 25 MG/1
25 TABLET ORAL 2 TIMES DAILY PRN
Qty: 20 TABLET | Refills: 0 | Status: SHIPPED | OUTPATIENT
Start: 2017-11-08 | End: 2017-12-07 | Stop reason: SDUPTHER

## 2017-11-08 RX ORDER — BUTALBITAL, ACETAMINOPHEN AND CAFFEINE 50; 325; 40 MG/1; MG/1; MG/1
TABLET ORAL
Qty: 10 TABLET | Refills: 2 | OUTPATIENT
Start: 2017-11-08 | End: 2018-01-08 | Stop reason: SDUPTHER

## 2017-12-07 RX ORDER — PROMETHAZINE HYDROCHLORIDE 25 MG/1
TABLET ORAL
Qty: 20 TABLET | Refills: 0 | Status: SHIPPED | OUTPATIENT
Start: 2017-12-07 | End: 2018-01-08 | Stop reason: SDUPTHER

## 2018-01-03 ENCOUNTER — OFFICE VISIT (OUTPATIENT)
Dept: INTERNAL MEDICINE | Facility: CLINIC | Age: 43
End: 2018-01-03

## 2018-01-03 VITALS
DIASTOLIC BLOOD PRESSURE: 84 MMHG | BODY MASS INDEX: 23.92 KG/M2 | OXYGEN SATURATION: 98 % | SYSTOLIC BLOOD PRESSURE: 128 MMHG | HEART RATE: 84 BPM | WEIGHT: 130 LBS | HEIGHT: 62 IN

## 2018-01-03 DIAGNOSIS — I10 ESSENTIAL HYPERTENSION: ICD-10-CM

## 2018-01-03 DIAGNOSIS — F41.9 ANXIETY AND DEPRESSION: ICD-10-CM

## 2018-01-03 DIAGNOSIS — E78.2 MIXED HYPERLIPIDEMIA: Primary | ICD-10-CM

## 2018-01-03 DIAGNOSIS — F32.A ANXIETY AND DEPRESSION: ICD-10-CM

## 2018-01-03 DIAGNOSIS — G47.9 SLEEP DIFFICULTIES: ICD-10-CM

## 2018-01-03 DIAGNOSIS — J43.1 PANLOBULAR EMPHYSEMA (HCC): ICD-10-CM

## 2018-01-03 PROCEDURE — 99214 OFFICE O/P EST MOD 30 MIN: CPT | Performed by: INTERNAL MEDICINE

## 2018-01-03 RX ORDER — TRAZODONE HYDROCHLORIDE 50 MG/1
50 TABLET ORAL NIGHTLY
Qty: 30 TABLET | Refills: 0 | Status: SHIPPED | OUTPATIENT
Start: 2018-01-03 | End: 2018-02-04 | Stop reason: SDUPTHER

## 2018-01-03 RX ORDER — ALBUTEROL SULFATE 90 UG/1
AEROSOL, METERED RESPIRATORY (INHALATION)
Qty: 18 G | Refills: 3 | Status: SHIPPED | OUTPATIENT
Start: 2018-01-03 | End: 2018-01-10 | Stop reason: SDUPTHER

## 2018-01-03 NOTE — PROGRESS NOTES
Subjective     Valeria Rubi is a 42 y.o. female, who presents with a chief complaint of   Chief Complaint   Patient presents with   • Follow-up     Patient is not fasting this AM   • Hyperlipidemia       HPI   The pt is here for follow up    HLD - pt on statin.  No cramps or myalgias.      The pt has COPD.  She has been trying to stay inside.  She was exposed to a niece with URI sx.  She has been coughing a little.  She continues to use her inhalers.    She has been having more sleep issues.  She keeps waking up around 3 am.  She has dreams of her mom, dad, and uncle came back from the dead.  She also had a dream her son .  He dad passed away about 8 years ago but she had a much harder time when her mom passed away 5 months ago. The holidays were hard but things went better than expected.  Her mom was her best friend and she doesn't feel like she has anyone to talk to.  Her family is far away.  She has looked into support groups but hasnt gone to anything.  Headaches have been worse.  She knows she is depressed.      The following portions of the patient's history were reviewed and updated as appropriate: allergies, current medications, past family history, past medical history, past social history, past surgical history and problem list.    Allergies: Review of patient's allergies indicates no known allergies.    Review of Systems   Constitutional: Negative.    HENT: Negative.    Eyes: Negative.    Respiratory: Negative.    Cardiovascular: Negative.    Gastrointestinal: Negative.    Endocrine: Negative.    Genitourinary: Negative.    Musculoskeletal: Negative.    Skin: Negative.    Allergic/Immunologic: Negative.    Neurological: Negative.    Hematological: Negative.    Psychiatric/Behavioral: Negative.    All other systems reviewed and are negative.      Objective     Wt Readings from Last 3 Encounters:   18 59 kg (130 lb)   10/10/17 56.2 kg (124 lb)   10/03/17 56.6 kg (124 lb 12.8 oz)     Temp Readings  from Last 3 Encounters:   06/24/16 97.3 °F (36.3 °C)   06/17/16 97.6 °F (36.4 °C) (Oral)   06/14/16 98.1 °F (36.7 °C)     BP Readings from Last 3 Encounters:   01/03/18 128/84   10/10/17 114/72   10/03/17 110/78     Pulse Readings from Last 3 Encounters:   01/03/18 84   10/10/17 71   10/03/17 88     Body mass index is 23.77 kg/(m^2).  SpO2 Readings from Last 3 Encounters:   01/03/18 98%   10/10/17 97%   10/03/17 98%       Physical Exam   Constitutional: She is oriented to person, place, and time. She appears well-developed and well-nourished. No distress.   HENT:   Head: Normocephalic and atraumatic.   Right Ear: External ear normal.   Left Ear: External ear normal.   Nose: Nose normal.   Mouth/Throat: Oropharynx is clear and moist.   Eyes: Conjunctivae and EOM are normal. Pupils are equal, round, and reactive to light.   Neck: Normal range of motion. Neck supple.   Cardiovascular: Normal rate, regular rhythm, normal heart sounds and intact distal pulses.    Pulmonary/Chest: Effort normal and breath sounds normal. No respiratory distress. She has no wheezes.   Musculoskeletal: Normal range of motion.   Normal gait   Neurological: She is alert and oriented to person, place, and time.   Skin: Skin is warm and dry.   Psychiatric: She has a normal mood and affect. Her behavior is normal. Judgment and thought content normal.   Nursing note and vitals reviewed.      Results for orders placed or performed in visit on 04/25/17   Comprehensive Metabolic Panel   Result Value Ref Range    Glucose 90 65 - 99 mg/dL    BUN 8 6 - 20 mg/dL    Creatinine 0.87 0.57 - 1.00 mg/dL    eGFR Non African Am 71 >60 mL/min/1.73    eGFR African Am 86 >60 mL/min/1.73    BUN/Creatinine Ratio 9.2 7.0 - 25.0    Sodium 141 136 - 145 mmol/L    Potassium 4.2 3.5 - 5.2 mmol/L    Chloride 99 98 - 107 mmol/L    Total CO2 24.8 22.0 - 29.0 mmol/L    Calcium 9.6 8.6 - 10.5 mg/dL    Total Protein 7.3 6.0 - 8.5 g/dL    Albumin 4.40 3.50 - 5.20 g/dL     Globulin 2.9 gm/dL    A/G Ratio 1.5 g/dL    Total Bilirubin 0.2 0.2 - 1.2 mg/dL    Alkaline Phosphatase 92 40 - 129 U/L    AST (SGOT) 19 5 - 32 U/L    ALT (SGPT) 20 5 - 33 U/L   Hemoglobin A1c   Result Value Ref Range    Hemoglobin A1C 5.40 4.80 - 5.60 %   TSH   Result Value Ref Range    TSH 0.758 0.270 - 4.200 mIU/mL   T4, Free   Result Value Ref Range    Free T4 1.13 0.93 - 1.70 ng/dL   Vitamin B12   Result Value Ref Range    Vitamin B-12 277 211 - 946 pg/mL   Vitamin D 25 Hydroxy   Result Value Ref Range    25 Hydroxy, Vitamin D 20.7 ng/mL   NMR LipoProfile   Result Value Ref Range    LDL-P 2031 (H) <1000 nmol/L    LDL-C 108 (H) 0 - 99 mg/dL    HDL-C 45 >39 mg/dL    Triglycerides 281 (H) 0 - 149 mg/dL    Total Cholesterol 209 (H) 100 - 199 mg/dL    HDL-P (Total) 39.5 >=30.5 umol/L    Small LDL-P 1414 (H) <=527 nmol/L    LDL Size 20.0 >20.5 nm   CBC & Differential   Result Value Ref Range    WBC 6.76 4.80 - 10.80 10*3/mm3    RBC 4.82 4.20 - 5.40 10*6/mm3    Hemoglobin 15.9 12.0 - 16.0 g/dL    Hematocrit 46.5 37.0 - 47.0 %    MCV 96.5 81.0 - 99.0 fL    MCH 33.0 (H) 27.0 - 31.0 pg    MCHC 34.2 31.0 - 37.0 g/dL    RDW 12.2 11.5 - 14.5 %    Platelets 311 140 - 500 10*3/mm3    Neutrophil Rel % 44.5 (L) 45.0 - 70.0 %    Lymphocyte Rel % 47.3 (H) 20.0 - 45.0 %    Monocyte Rel % 5.6 3.0 - 8.0 %    Eosinophil Rel % 1.5 0.0 - 4.0 %    Basophil Rel % 0.4 0.0 - 2.0 %    Neutrophils Absolute 3.00 1.50 - 8.30 10*3/mm3    Lymphocytes Absolute 3.20 0.60 - 4.80 10*3/mm3    Monocytes Absolute 0.38 0.00 - 1.00 10*3/mm3    Eosinophils Absolute 0.10 0.10 - 0.30 10*3/mm3    Basophils Absolute 0.03 0.00 - 0.20 10*3/mm3    Immature Granulocyte Rel % 0.7 (H) 0.0 - 0.5 %    Immature Grans Absolute 0.05 (H) 0.00 - 0.03 10*3/mm3    nRBC 0.0 0.0 - 0.0 /100 WBC       Assessment/Plan   Valeria was seen today for follow-up and hyperlipidemia.    Diagnoses and all orders for this visit:    Mixed hyperlipidemia  -     Comprehensive Metabolic  Panel; Future  -     CBC & Differential; Future  -     Lipid Panel With LDL / HDL Ratio; Future    Essential hypertension  -     Comprehensive Metabolic Panel; Future  -     CBC & Differential; Future  -     Lipid Panel With LDL / HDL Ratio; Future    Anxiety and depression - Encouraged pt to follow up with grief support group.    Sleep difficulties  -     traZODone (DESYREL) 50 MG tablet; Take 1 tablet by mouth Every Night.    Panlobular emphysema  -     albuterol (PROVENTIL HFA;VENTOLIN HFA) 108 (90 Base) MCG/ACT inhaler; INHALE 2 PUFFS PO EVERY 4 HOURS PRN      Cont current meds.   recheck in 3mo.        Outpatient Medications Prior to Visit   Medication Sig Dispense Refill   • acetaminophen-codeine (TYLENOL #3) 300-30 MG per tablet TAKE 1 TABLET EVERY 6 HOURS FOR AS NEEDED PAIN  0   • albuterol (PROVENTIL) (2.5 MG/3ML) 0.083% nebulizer solution 1 unit dose neb. Every 4 hrs. prn 225 mL 3   • aspirin 81 MG EC tablet Take 81 mg by mouth daily.     • butalbital-acetaminophen-caffeine (FIORICET, ESGIC) -40 MG per tablet TAKE 1 TABLET EVERY 6 HOURS. MUST LAST 30 DAYS. 10 tablet 2   • clopidogrel (PLAVIX) 75 MG tablet 150 mg po X1 in RR 30 tablet 0   • [facility] NTS STEP 1 21 MG/24HR patch APPLY 1 PATCH ON SKIN DAILY AS NEEDED.  1   • DICLOFENAC POTASSIUM PO Take 50 mg by mouth 2 (two) times a day.     • fluticasone (FLONASE) 50 MCG/ACT nasal spray 1 spray into each nostril as needed.     • lisinopril-hydrochlorothiazide (PRINZIDE,ZESTORETIC) 20-12.5 MG per tablet TAKE 1 TABLET BY MOUTH DAILY. 30 tablet 3   • medroxyPROGESTERone (DEPO-PROVERA) 150 MG/ML injection Apply  to cheek. Pt takes every 3 months.     • nicotine (EQL NICOTINE) 14 MG/24HR patch Place 1 patch on the skin Daily. 30 patch 0   • promethazine (PHENERGAN) 25 MG tablet TAKE 1 TABLET BY MOUTH 2 (TWO) TIMES A DAY AS NEEDED FOR NAUSEA OR VOMITING. 20 tablet 0   • simvastatin (ZOCOR) 40 MG tablet Take 40 mg by mouth daily.     • tiotropium  bromide-olodaterol (STIOLTO RESPIMAT) 2.5-2.5 MCG/ACT aerosol solution inhaler Inhale 2 puffs Daily. 4 g 5   • topiramate (TOPAMAX) 50 MG tablet Take 1 tablet by mouth 2 (Two) Times a Day. 180 tablet 1   • venlafaxine XR (EFFEXOR-XR) 150 MG 24 hr capsule Take 1 capsule by mouth Daily. 90 capsule 1   • albuterol (PROVENTIL HFA;VENTOLIN HFA) 108 (90 Base) MCG/ACT inhaler INHALE 2 PUFFS PO EVERY 4 HOURS PRN 18 g 3   • benzonatate (TESSALON PERLES) 100 MG capsule Take 1 capsule by mouth 3 (Three) Times a Day As Needed for cough. 20 capsule 0   • cefuroxime (CEFTIN) 250 MG tablet Take 1 tablet by mouth 2 (Two) Times a Day. 20 tablet 0     No facility-administered medications prior to visit.      New Medications Ordered This Visit   Medications   • albuterol (PROVENTIL HFA;VENTOLIN HFA) 108 (90 Base) MCG/ACT inhaler     Sig: INHALE 2 PUFFS PO EVERY 4 HOURS PRN     Dispense:  18 g     Refill:  3     Fill ventolin.  No substitutions   • traZODone (DESYREL) 50 MG tablet     Sig: Take 1 tablet by mouth Every Night.     Dispense:  30 tablet     Refill:  0       Medications Discontinued During This Encounter   Medication Reason   • benzonatate (TESSALON PERLES) 100 MG capsule Therapy completed   • cefuroxime (CEFTIN) 250 MG tablet Therapy completed   • albuterol (PROVENTIL HFA;VENTOLIN HFA) 108 (90 Base) MCG/ACT inhaler Reorder         Return in about 3 months (around 4/3/2018) for Recheck, labs.

## 2018-01-08 ENCOUNTER — RESULTS ENCOUNTER (OUTPATIENT)
Dept: INTERNAL MEDICINE | Facility: CLINIC | Age: 43
End: 2018-01-08

## 2018-01-08 DIAGNOSIS — E78.2 MIXED HYPERLIPIDEMIA: ICD-10-CM

## 2018-01-08 DIAGNOSIS — I10 ESSENTIAL HYPERTENSION: ICD-10-CM

## 2018-01-08 RX ORDER — PROMETHAZINE HYDROCHLORIDE 25 MG/1
TABLET ORAL
Qty: 20 TABLET | Refills: 0 | OUTPATIENT
Start: 2018-01-08 | End: 2018-02-07 | Stop reason: SDUPTHER

## 2018-01-08 RX ORDER — BUTALBITAL, ACETAMINOPHEN AND CAFFEINE 50; 325; 40 MG/1; MG/1; MG/1
TABLET ORAL
Qty: 10 TABLET | Refills: 0 | OUTPATIENT
Start: 2018-01-08 | End: 2018-02-08 | Stop reason: SDUPTHER

## 2018-01-10 DIAGNOSIS — J43.1 PANLOBULAR EMPHYSEMA (HCC): ICD-10-CM

## 2018-01-10 RX ORDER — ALBUTEROL SULFATE 90 UG/1
AEROSOL, METERED RESPIRATORY (INHALATION)
Qty: 18 G | Refills: 3 | Status: SHIPPED | OUTPATIENT
Start: 2018-01-10 | End: 2018-11-15 | Stop reason: SDUPTHER

## 2018-02-04 DIAGNOSIS — G47.9 SLEEP DIFFICULTIES: ICD-10-CM

## 2018-02-05 RX ORDER — TRAZODONE HYDROCHLORIDE 50 MG/1
TABLET ORAL
Qty: 30 TABLET | Refills: 5 | Status: SHIPPED | OUTPATIENT
Start: 2018-02-05 | End: 2018-04-10

## 2018-02-07 RX ORDER — PROMETHAZINE HYDROCHLORIDE 25 MG/1
TABLET ORAL
Qty: 20 TABLET | Refills: 0 | Status: SHIPPED | OUTPATIENT
Start: 2018-02-07 | End: 2018-03-08 | Stop reason: SDUPTHER

## 2018-02-09 RX ORDER — BUTALBITAL, ACETAMINOPHEN AND CAFFEINE 50; 325; 40 MG/1; MG/1; MG/1
TABLET ORAL
Qty: 10 TABLET | Refills: 0 | OUTPATIENT
Start: 2018-02-09 | End: 2018-03-08 | Stop reason: SDUPTHER

## 2018-03-08 RX ORDER — PROMETHAZINE HYDROCHLORIDE 25 MG/1
TABLET ORAL
Qty: 20 TABLET | Refills: 0 | Status: SHIPPED | OUTPATIENT
Start: 2018-03-08 | End: 2018-05-04 | Stop reason: SDUPTHER

## 2018-03-09 RX ORDER — BUTALBITAL, ACETAMINOPHEN AND CAFFEINE 50; 325; 40 MG/1; MG/1; MG/1
TABLET ORAL
Qty: 10 TABLET | Refills: 0 | OUTPATIENT
Start: 2018-03-09 | End: 2018-04-10 | Stop reason: SDUPTHER

## 2018-03-30 DIAGNOSIS — R73.9 HYPERGLYCEMIA: ICD-10-CM

## 2018-03-30 DIAGNOSIS — I10 ESSENTIAL HYPERTENSION: ICD-10-CM

## 2018-03-30 DIAGNOSIS — E78.2 MIXED HYPERLIPIDEMIA: Primary | ICD-10-CM

## 2018-04-04 ENCOUNTER — RESULTS ENCOUNTER (OUTPATIENT)
Dept: INTERNAL MEDICINE | Facility: CLINIC | Age: 43
End: 2018-04-04

## 2018-04-04 DIAGNOSIS — E78.2 MIXED HYPERLIPIDEMIA: ICD-10-CM

## 2018-04-04 DIAGNOSIS — I10 ESSENTIAL HYPERTENSION: ICD-10-CM

## 2018-04-04 DIAGNOSIS — R73.9 HYPERGLYCEMIA: ICD-10-CM

## 2018-04-10 ENCOUNTER — OFFICE VISIT (OUTPATIENT)
Dept: INTERNAL MEDICINE | Facility: CLINIC | Age: 43
End: 2018-04-10

## 2018-04-10 VITALS
HEIGHT: 62 IN | HEART RATE: 92 BPM | BODY MASS INDEX: 24.07 KG/M2 | SYSTOLIC BLOOD PRESSURE: 116 MMHG | WEIGHT: 130.8 LBS | DIASTOLIC BLOOD PRESSURE: 82 MMHG | OXYGEN SATURATION: 96 %

## 2018-04-10 DIAGNOSIS — J43.1 PANLOBULAR EMPHYSEMA (HCC): ICD-10-CM

## 2018-04-10 DIAGNOSIS — E78.2 MIXED HYPERLIPIDEMIA: Primary | ICD-10-CM

## 2018-04-10 DIAGNOSIS — F41.9 ANXIETY AND DEPRESSION: ICD-10-CM

## 2018-04-10 DIAGNOSIS — I73.9 PERIPHERAL ARTERY DISEASE (HCC): ICD-10-CM

## 2018-04-10 DIAGNOSIS — I10 ESSENTIAL HYPERTENSION: ICD-10-CM

## 2018-04-10 DIAGNOSIS — Z72.0 TOBACCO USE: ICD-10-CM

## 2018-04-10 DIAGNOSIS — F32.A ANXIETY AND DEPRESSION: ICD-10-CM

## 2018-04-10 DIAGNOSIS — R79.89 LOW VITAMIN D LEVEL: ICD-10-CM

## 2018-04-10 DIAGNOSIS — R20.2 NUMBNESS AND TINGLING OF BOTH FEET: ICD-10-CM

## 2018-04-10 DIAGNOSIS — E53.8 LOW VITAMIN B12 LEVEL: ICD-10-CM

## 2018-04-10 DIAGNOSIS — R73.9 HYPERGLYCEMIA: ICD-10-CM

## 2018-04-10 DIAGNOSIS — R20.0 NUMBNESS AND TINGLING OF BOTH FEET: ICD-10-CM

## 2018-04-10 PROCEDURE — 99214 OFFICE O/P EST MOD 30 MIN: CPT | Performed by: INTERNAL MEDICINE

## 2018-04-10 RX ORDER — BUTALBITAL, ACETAMINOPHEN AND CAFFEINE 50; 325; 40 MG/1; MG/1; MG/1
1 TABLET ORAL EVERY 6 HOURS PRN
Qty: 10 TABLET | Refills: 0 | Status: CANCELLED | OUTPATIENT
Start: 2018-04-10

## 2018-04-10 RX ORDER — BUTALBITAL, ACETAMINOPHEN AND CAFFEINE 50; 325; 40 MG/1; MG/1; MG/1
1 TABLET ORAL EVERY 6 HOURS PRN
Qty: 10 TABLET | Refills: 0 | OUTPATIENT
Start: 2018-04-10 | End: 2018-05-04 | Stop reason: SDUPTHER

## 2018-04-10 NOTE — PROGRESS NOTES
Valeria Rubi is a 42 y.o. female, who presents with a chief complaint of   Chief Complaint   Patient presents with   • Follow-up     Has not had any labs recently, she is not fasting this AM       HPI   The pt is here for follow up.  She has had depression issues since her mom .  She started working again for the first time in 4 years.  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      She has had a burning/needle sensation in both of her feet.  They feel hot but when she touches them they aren't.  She has had peripheral vascular issues and iliac stenting in past.  She hasnt followed up with dr. Flores in a long time.  Her biological mom had diabetes and the pt has had hyperglycemia in the past.     Sleep issues - no real help with trazodone.  She feels tired during the day and doesn't have much energy.  She has been told she talks in her sleep and snores.  She hasnt tried melatonin to help with sleep.      hld - on statin.  No cramps or myalgias.     htn - well controlled.  No ha/dizziness.     She is smoking less bc she is at work and can't smoke there.  Before she went back to work she was smoking about 2 ppd and is now back about 1ppd.  She would like to follow up with pulm.    The following portions of the patient's history were reviewed and updated as appropriate: allergies, current medications, past family history, past medical history, past social history, past surgical history and problem list.    Allergies: Review of patient's allergies indicates no known allergies.    Review of Systems   Constitutional: Negative.    HENT: Negative.    Eyes: Negative.    Respiratory: Negative.    Cardiovascular: Negative.    Gastrointestinal: Negative.    Endocrine: Negative.    Genitourinary: Negative.    Musculoskeletal: Negative.    Skin: Negative.    Allergic/Immunologic: Negative.    Neurological: Negative.         Tingling in bilat feet     Hematological:  Negative.    Psychiatric/Behavioral: Positive for dysphoric mood and sleep disturbance. The patient is nervous/anxious.    All other systems reviewed and are negative.            Wt Readings from Last 3 Encounters:   04/10/18 59.3 kg (130 lb 12.8 oz)   01/03/18 59 kg (130 lb)   10/10/17 56.2 kg (124 lb)     Temp Readings from Last 3 Encounters:   06/24/16 97.3 °F (36.3 °C)   06/17/16 97.6 °F (36.4 °C) (Oral)   06/14/16 98.1 °F (36.7 °C)     BP Readings from Last 3 Encounters:   04/10/18 116/82   01/03/18 128/84   10/10/17 114/72     Pulse Readings from Last 3 Encounters:   04/10/18 92   01/03/18 84   10/10/17 71     Body mass index is 23.92 kg/m².  @LASTSAO2(3)@    Physical Exam   Constitutional: She is oriented to person, place, and time. She appears well-developed and well-nourished. No distress.   HENT:   Head: Normocephalic and atraumatic.   Right Ear: External ear normal.   Left Ear: External ear normal.   Nose: Nose normal.   Mouth/Throat: Oropharynx is clear and moist.   Eyes: Conjunctivae and EOM are normal. Pupils are equal, round, and reactive to light.   Neck: Normal range of motion. Neck supple.   Cardiovascular: Normal rate, regular rhythm, normal heart sounds and intact distal pulses.    Pulmonary/Chest: Effort normal. No respiratory distress. She has no rales. She exhibits no tenderness.   occ scattered wheeze   Musculoskeletal: Normal range of motion.   Normal gait   Neurological: She is alert and oriented to person, place, and time.   Skin: Skin is warm and dry.   Psychiatric: She has a normal mood and affect. Her behavior is normal. Judgment and thought content normal.   Nursing note and vitals reviewed.      Results for orders placed or performed in visit on 04/25/17   Comprehensive Metabolic Panel   Result Value Ref Range    Glucose 90 65 - 99 mg/dL    BUN 8 6 - 20 mg/dL    Creatinine 0.87 0.57 - 1.00 mg/dL    eGFR Non African Am 71 >60 mL/min/1.73    eGFR African Am 86 >60 mL/min/1.73     BUN/Creatinine Ratio 9.2 7.0 - 25.0    Sodium 141 136 - 145 mmol/L    Potassium 4.2 3.5 - 5.2 mmol/L    Chloride 99 98 - 107 mmol/L    Total CO2 24.8 22.0 - 29.0 mmol/L    Calcium 9.6 8.6 - 10.5 mg/dL    Total Protein 7.3 6.0 - 8.5 g/dL    Albumin 4.40 3.50 - 5.20 g/dL    Globulin 2.9 gm/dL    A/G Ratio 1.5 g/dL    Total Bilirubin 0.2 0.2 - 1.2 mg/dL    Alkaline Phosphatase 92 40 - 129 U/L    AST (SGOT) 19 5 - 32 U/L    ALT (SGPT) 20 5 - 33 U/L   Hemoglobin A1c   Result Value Ref Range    Hemoglobin A1C 5.40 4.80 - 5.60 %   TSH   Result Value Ref Range    TSH 0.758 0.270 - 4.200 mIU/mL   T4, Free   Result Value Ref Range    Free T4 1.13 0.93 - 1.70 ng/dL   Vitamin B12   Result Value Ref Range    Vitamin B-12 277 211 - 946 pg/mL   Vitamin D 25 Hydroxy   Result Value Ref Range    25 Hydroxy, Vitamin D 20.7 ng/mL   NMR LipoProfile   Result Value Ref Range    LDL-P 2,031 (H) <1,000 nmol/L    LDL-C 108 (H) 0 - 99 mg/dL    HDL-C 45 >39 mg/dL    Triglycerides 281 (H) 0 - 149 mg/dL    Total Cholesterol 209 (H) 100 - 199 mg/dL    HDL-P (Total) 39.5 >=30.5 umol/L    Small LDL-P 1,414 (H) <=527 nmol/L    LDL Size 20.0 >20.5 nm   CBC & Differential   Result Value Ref Range    WBC 6.76 4.80 - 10.80 10*3/mm3    RBC 4.82 4.20 - 5.40 10*6/mm3    Hemoglobin 15.9 12.0 - 16.0 g/dL    Hematocrit 46.5 37.0 - 47.0 %    MCV 96.5 81.0 - 99.0 fL    MCH 33.0 (H) 27.0 - 31.0 pg    MCHC 34.2 31.0 - 37.0 g/dL    RDW 12.2 11.5 - 14.5 %    Platelets 311 140 - 500 10*3/mm3    Neutrophil Rel % 44.5 (L) 45.0 - 70.0 %    Lymphocyte Rel % 47.3 (H) 20.0 - 45.0 %    Monocyte Rel % 5.6 3.0 - 8.0 %    Eosinophil Rel % 1.5 0.0 - 4.0 %    Basophil Rel % 0.4 0.0 - 2.0 %    Neutrophils Absolute 3.00 1.50 - 8.30 10*3/mm3    Lymphocytes Absolute 3.20 0.60 - 4.80 10*3/mm3    Monocytes Absolute 0.38 0.00 - 1.00 10*3/mm3    Eosinophils Absolute 0.10 0.10 - 0.30 10*3/mm3    Basophils Absolute 0.03 0.00 - 0.20 10*3/mm3    Immature Granulocyte Rel % 0.7 (H) 0.0 - 0.5  %    Immature Grans Absolute 0.05 (H) 0.00 - 0.03 10*3/mm3    nRBC 0.0 0.0 - 0.0 /100 WBC           Valeria was seen today for follow-up.    Diagnoses and all orders for this visit:    Mixed hyperlipidemia  -     Comprehensive Metabolic Panel; Future  -     NMR LipoProfile; Future    Essential hypertension  -     Comprehensive Metabolic Panel; Future  -     CBC & Differential; Future  -     NMR LipoProfile; Future  -     T4, Free; Future  -     TSH; Future    Anxiety and depression  -     Comprehensive Metabolic Panel; Future  -     CBC & Differential; Future  -     T4, Free; Future  -     TSH; Future    Panlobular emphysema  -     Ambulatory Referral to Pulmonology    Peripheral artery disease - needs f/u with dr. Flores.    -     NMR LipoProfile; Future    Numbness and tingling of both feet - needs vascular f/u.  Encouraged good footwear.  Check a1c.  sd  -     Vitamin B12; Future    Low vitamin B12 level  -     Vitamin B12; Future    Low vitamin D level  -     Vitamin D 25 Hydroxy; Future    Tobacco use - encouraged cessation    Sleep difficulties - stop trazodone.  Trial of melatonin.  Try to avoid benzo's bc of lung issues and try to avoid doxepin with pt on effexor.     Hyperglycemia  -     Hemoglobin A1c; Future          Outpatient Medications Prior to Visit   Medication Sig Dispense Refill   • acetaminophen-codeine (TYLENOL #3) 300-30 MG per tablet TAKE 1 TABLET EVERY 6 HOURS FOR AS NEEDED PAIN  0   • albuterol (PROVENTIL HFA;VENTOLIN HFA) 108 (90 Base) MCG/ACT inhaler INHALE 2 PUFFS PO EVERY 4 HOURS PRN 18 g 3   • albuterol (PROVENTIL) (2.5 MG/3ML) 0.083% nebulizer solution 1 unit dose neb. Every 4 hrs. prn 225 mL 3   • aspirin 81 MG EC tablet Take 81 mg by mouth daily.     • butalbital-acetaminophen-caffeine (FIORICET, ESGIC) -40 MG per tablet TAKE 1 TABLET BY MOUTH EVERY 6 HOURS AS NEEDED 10 tablet 0   • clopidogrel (PLAVIX) 75 MG tablet 150 mg po X1 in RR 30 tablet 0   • [facility] NTS STEP 1 21 MG/24HR  patch APPLY 1 PATCH ON SKIN DAILY AS NEEDED.  1   • DICLOFENAC POTASSIUM PO Take 50 mg by mouth 2 (two) times a day.     • fluticasone (FLONASE) 50 MCG/ACT nasal spray 1 spray into each nostril as needed.     • lisinopril-hydrochlorothiazide (PRINZIDE,ZESTORETIC) 20-12.5 MG per tablet TAKE 1 TABLET BY MOUTH DAILY. 30 tablet 3   • medroxyPROGESTERone (DEPO-PROVERA) 150 MG/ML injection Apply  to cheek. Pt takes every 3 months.     • nicotine (EQL NICOTINE) 14 MG/24HR patch Place 1 patch on the skin Daily. 30 patch 0   • promethazine (PHENERGAN) 25 MG tablet TAKE 1 TABLET BY MOUTH TWICE A DAY AS NEEDED FOR NAUSEA AND VOMITING 20 tablet 0   • simvastatin (ZOCOR) 40 MG tablet Take 40 mg by mouth daily.     • tiotropium bromide-olodaterol (STIOLTO RESPIMAT) 2.5-2.5 MCG/ACT aerosol solution inhaler Inhale 2 puffs Daily. 4 g 5   • topiramate (TOPAMAX) 50 MG tablet Take 1 tablet by mouth 2 (Two) Times a Day. 180 tablet 1   • venlafaxine XR (EFFEXOR-XR) 150 MG 24 hr capsule Take 1 capsule by mouth Daily. 90 capsule 1   • traZODone (DESYREL) 50 MG tablet TAKE 1 TABLET BY MOUTH EVERY NIGHT. 30 tablet 5     No facility-administered medications prior to visit.      No orders of the defined types were placed in this encounter.    [unfilled]  Medications Discontinued During This Encounter   Medication Reason   • traZODone (DESYREL) 50 MG tablet          Return in about 3 months (around 7/10/2018) for Recheck, labs.

## 2018-04-12 LAB
25(OH)D3+25(OH)D2 SERPL-MCNC: 11.8 NG/ML
ALBUMIN SERPL-MCNC: 4.5 G/DL (ref 3.5–5.2)
ALBUMIN/GLOB SERPL: 1.8 G/DL
ALP SERPL-CCNC: 84 U/L (ref 40–129)
ALT SERPL-CCNC: 10 U/L (ref 5–33)
AST SERPL-CCNC: 13 U/L (ref 5–32)
BASOPHILS # BLD AUTO: 0.06 10*3/MM3 (ref 0–0.2)
BASOPHILS NFR BLD AUTO: 0.8 % (ref 0–2)
BILIRUB SERPL-MCNC: 0.3 MG/DL (ref 0.2–1.2)
BUN SERPL-MCNC: 9 MG/DL (ref 6–20)
BUN/CREAT SERPL: 10.8 (ref 7–25)
CALCIUM SERPL-MCNC: 9.7 MG/DL (ref 8.6–10.5)
CHLORIDE SERPL-SCNC: 103 MMOL/L (ref 98–107)
CHOLEST SERPL-MCNC: 214 MG/DL (ref 100–199)
CO2 SERPL-SCNC: 28.2 MMOL/L (ref 22–29)
CREAT SERPL-MCNC: 0.83 MG/DL (ref 0.57–1)
EOSINOPHIL # BLD AUTO: 0.09 10*3/MM3 (ref 0.1–0.3)
EOSINOPHIL NFR BLD AUTO: 1.2 % (ref 0–4)
ERYTHROCYTE [DISTWIDTH] IN BLOOD BY AUTOMATED COUNT: 12.1 % (ref 11.5–14.5)
GFR SERPLBLD CREATININE-BSD FMLA CKD-EPI: 75 ML/MIN/1.73
GFR SERPLBLD CREATININE-BSD FMLA CKD-EPI: 91 ML/MIN/1.73
GLOBULIN SER CALC-MCNC: 2.5 GM/DL
GLUCOSE SERPL-MCNC: 111 MG/DL (ref 65–99)
HBA1C MFR BLD: 5.3 % (ref 4.8–5.6)
HCT VFR BLD AUTO: 49.2 % (ref 37–47)
HDL SERPL-SCNC: 35.8 UMOL/L
HDLC SERPL-MCNC: 44 MG/DL
HGB BLD-MCNC: 16.5 G/DL (ref 12–16)
IMM GRANULOCYTES # BLD: 0.03 10*3/MM3 (ref 0–0.03)
IMM GRANULOCYTES NFR BLD: 0.4 % (ref 0–0.5)
LDL SERPL QN: 20.4 NM
LDL SERPL-SCNC: 1951 NMOL/L
LDL SMALL SERPL-SCNC: 1139 NMOL/L
LDLC SERPL CALC-MCNC: 141 MG/DL (ref 0–99)
LYMPHOCYTES # BLD AUTO: 2.19 10*3/MM3 (ref 0.6–4.8)
LYMPHOCYTES NFR BLD AUTO: 29.5 % (ref 20–45)
MCH RBC QN AUTO: 34.8 PG (ref 27–31)
MCHC RBC AUTO-ENTMCNC: 33.5 G/DL (ref 31–37)
MCV RBC AUTO: 103.8 FL (ref 81–99)
MONOCYTES # BLD AUTO: 0.51 10*3/MM3 (ref 0–1)
MONOCYTES NFR BLD AUTO: 6.9 % (ref 3–8)
NEUTROPHILS # BLD AUTO: 4.54 10*3/MM3 (ref 1.5–8.3)
NEUTROPHILS NFR BLD AUTO: 61.2 % (ref 45–70)
NRBC BLD AUTO-RTO: 0 /100 WBC (ref 0–0)
PLATELET # BLD AUTO: 300 10*3/MM3 (ref 140–500)
POTASSIUM SERPL-SCNC: 4.5 MMOL/L (ref 3.5–5.2)
PROT SERPL-MCNC: 7 G/DL (ref 6–8.5)
RBC # BLD AUTO: 4.74 10*6/MM3 (ref 4.2–5.4)
SODIUM SERPL-SCNC: 142 MMOL/L (ref 136–145)
T4 FREE SERPL-MCNC: 1.11 NG/DL (ref 0.93–1.7)
TRIGL SERPL-MCNC: 144 MG/DL (ref 0–149)
TSH SERPL DL<=0.005 MIU/L-ACNC: 0.93 MIU/ML (ref 0.27–4.2)
VIT B12 SERPL-MCNC: 433 PG/ML (ref 232–1245)
WBC # BLD AUTO: 7.42 10*3/MM3 (ref 4.8–10.8)

## 2018-04-16 ENCOUNTER — TELEPHONE (OUTPATIENT)
Dept: INTERNAL MEDICINE | Facility: CLINIC | Age: 43
End: 2018-04-16

## 2018-04-16 RX ORDER — ERGOCALCIFEROL 1.25 MG/1
50000 CAPSULE ORAL
Qty: 6 CAPSULE | Refills: 5 | Status: SHIPPED | OUTPATIENT
Start: 2018-04-16

## 2018-04-16 NOTE — TELEPHONE ENCOUNTER
Patient notified, meds sent to pharmacy.  ----- Message from Adrianne Riddle MD sent at 4/13/2018 11:11 AM EDT -----  Call pt about labs.  Cholesterol still somewhat high. Glucoses ok.  Vit d very low.  rec vit d 50,000 units po q week.  Ov/labs in 6mo

## 2018-05-04 DIAGNOSIS — J43.1 PANLOBULAR EMPHYSEMA (HCC): ICD-10-CM

## 2018-05-07 RX ORDER — SIMVASTATIN 40 MG
TABLET ORAL
Qty: 30 TABLET | Refills: 6 | Status: SHIPPED | OUTPATIENT
Start: 2018-05-07 | End: 2018-12-19 | Stop reason: SDUPTHER

## 2018-05-07 RX ORDER — BUTALBITAL, ACETAMINOPHEN AND CAFFEINE 50; 325; 40 MG/1; MG/1; MG/1
TABLET ORAL
Qty: 10 TABLET | Refills: 2 | Status: SHIPPED | OUTPATIENT
Start: 2018-05-07 | End: 2018-08-08 | Stop reason: SDUPTHER

## 2018-05-07 RX ORDER — TIOTROPIUM BROMIDE AND OLODATEROL 3.124; 2.736 UG/1; UG/1
2 SPRAY, METERED RESPIRATORY (INHALATION) DAILY
Qty: 4 G | Refills: 6 | Status: SHIPPED | OUTPATIENT
Start: 2018-05-07 | End: 2020-01-16

## 2018-05-07 RX ORDER — PROMETHAZINE HYDROCHLORIDE 25 MG/1
TABLET ORAL
Qty: 20 TABLET | Refills: 2 | Status: SHIPPED | OUTPATIENT
Start: 2018-05-07 | End: 2018-08-03 | Stop reason: SDUPTHER

## 2018-06-05 ENCOUNTER — TRANSCRIBE ORDERS (OUTPATIENT)
Dept: ADMINISTRATIVE | Facility: HOSPITAL | Age: 43
End: 2018-06-05

## 2018-06-05 DIAGNOSIS — R06.00 DYSPNEA, UNSPECIFIED TYPE: Primary | ICD-10-CM

## 2018-06-07 ENCOUNTER — APPOINTMENT (OUTPATIENT)
Dept: GENERAL RADIOLOGY | Facility: HOSPITAL | Age: 43
End: 2018-06-07

## 2018-06-07 ENCOUNTER — HOSPITAL ENCOUNTER (OUTPATIENT)
Facility: HOSPITAL | Age: 43
Setting detail: OBSERVATION
Discharge: HOME | End: 2018-06-08
Attending: EMERGENCY MEDICINE | Admitting: INTERNAL MEDICINE

## 2018-06-07 DIAGNOSIS — J18.9 PNEUMONIA OF LEFT LOWER LOBE DUE TO INFECTIOUS ORGANISM: Primary | ICD-10-CM

## 2018-06-07 LAB
ALBUMIN SERPL-MCNC: 4.2 G/DL (ref 3.5–5.2)
ALBUMIN/GLOB SERPL: 1.3 G/DL
ALP SERPL-CCNC: 85 U/L (ref 40–129)
ALT SERPL W P-5'-P-CCNC: 8 U/L (ref 5–33)
ANION GAP SERPL CALCULATED.3IONS-SCNC: 14.6 MMOL/L
AST SERPL-CCNC: 11 U/L (ref 5–32)
BASOPHILS # BLD AUTO: 0.07 10*3/MM3 (ref 0–0.2)
BASOPHILS NFR BLD AUTO: 0.4 % (ref 0–2)
BILIRUB SERPL-MCNC: 0.4 MG/DL (ref 0.2–1.2)
BUN BLD-MCNC: 6 MG/DL (ref 6–20)
BUN/CREAT SERPL: 8.5 (ref 7–25)
CALCIUM SPEC-SCNC: 9.6 MG/DL (ref 8.6–10.5)
CHLORIDE SERPL-SCNC: 104 MMOL/L (ref 98–107)
CO2 SERPL-SCNC: 24.4 MMOL/L (ref 22–29)
CREAT BLD-MCNC: 0.71 MG/DL (ref 0.57–1)
D-LACTATE SERPL-SCNC: 1.8 MMOL/L (ref 0.5–2)
DEPRECATED RDW RBC AUTO: 45.6 FL (ref 37–54)
EOSINOPHIL # BLD AUTO: 0 10*3/MM3 (ref 0.1–0.3)
EOSINOPHIL NFR BLD AUTO: 0 % (ref 0–4)
ERYTHROCYTE [DISTWIDTH] IN BLOOD BY AUTOMATED COUNT: 12.1 % (ref 11.5–14.5)
GFR SERPL CREATININE-BSD FRML MDRD: 89 ML/MIN/1.73
GLOBULIN UR ELPH-MCNC: 3.2 GM/DL
GLUCOSE BLD-MCNC: 149 MG/DL (ref 65–99)
HCT VFR BLD AUTO: 46.2 % (ref 37–47)
HGB BLD-MCNC: 15.1 G/DL (ref 12–16)
IMM GRANULOCYTES # BLD: 0.2 10*3/MM3 (ref 0–0.03)
IMM GRANULOCYTES NFR BLD: 1 % (ref 0–0.5)
LYMPHOCYTES # BLD AUTO: 0.66 10*3/MM3 (ref 0.6–4.8)
LYMPHOCYTES NFR BLD AUTO: 3.4 % (ref 20–45)
MCH RBC QN AUTO: 33.6 PG (ref 27–31)
MCHC RBC AUTO-ENTMCNC: 32.7 G/DL (ref 31–37)
MCV RBC AUTO: 102.7 FL (ref 81–99)
MONOCYTES # BLD AUTO: 1 10*3/MM3 (ref 0–1)
MONOCYTES NFR BLD AUTO: 5.2 % (ref 3–8)
NEUTROPHILS # BLD AUTO: 17.35 10*3/MM3 (ref 1.5–8.3)
NEUTROPHILS NFR BLD AUTO: 90 % (ref 45–70)
NRBC BLD MANUAL-RTO: 0 /100 WBC (ref 0–0)
PLATELET # BLD AUTO: 277 10*3/MM3 (ref 140–500)
PMV BLD AUTO: 9 FL (ref 7.4–10.4)
POTASSIUM BLD-SCNC: 4.2 MMOL/L (ref 3.5–5.2)
PROT SERPL-MCNC: 7.4 G/DL (ref 6–8.5)
RBC # BLD AUTO: 4.5 10*6/MM3 (ref 4.2–5.4)
SODIUM BLD-SCNC: 143 MMOL/L (ref 136–145)
WBC NRBC COR # BLD: 19.28 10*3/MM3 (ref 4.8–10.8)

## 2018-06-07 PROCEDURE — 87040 BLOOD CULTURE FOR BACTERIA: CPT | Performed by: EMERGENCY MEDICINE

## 2018-06-07 PROCEDURE — 25010000002 AZITHROMYCIN: Performed by: EMERGENCY MEDICINE

## 2018-06-07 PROCEDURE — G0378 HOSPITAL OBSERVATION PER HR: HCPCS

## 2018-06-07 PROCEDURE — 94799 UNLISTED PULMONARY SVC/PX: CPT

## 2018-06-07 PROCEDURE — 25010000002 CEFTRIAXONE PER 250 MG: Performed by: EMERGENCY MEDICINE

## 2018-06-07 PROCEDURE — 71046 X-RAY EXAM CHEST 2 VIEWS: CPT

## 2018-06-07 PROCEDURE — 99220 PR INITIAL OBSERVATION CARE/DAY 70 MINUTES: CPT | Performed by: INTERNAL MEDICINE

## 2018-06-07 PROCEDURE — 25010000002 METHYLPREDNISOLONE PER 40 MG: Performed by: EMERGENCY MEDICINE

## 2018-06-07 PROCEDURE — 80053 COMPREHEN METABOLIC PANEL: CPT | Performed by: EMERGENCY MEDICINE

## 2018-06-07 PROCEDURE — 94640 AIRWAY INHALATION TREATMENT: CPT

## 2018-06-07 PROCEDURE — 85025 COMPLETE CBC W/AUTO DIFF WBC: CPT | Performed by: EMERGENCY MEDICINE

## 2018-06-07 PROCEDURE — 96375 TX/PRO/DX INJ NEW DRUG ADDON: CPT

## 2018-06-07 PROCEDURE — 83605 ASSAY OF LACTIC ACID: CPT | Performed by: EMERGENCY MEDICINE

## 2018-06-07 PROCEDURE — 99291 CRITICAL CARE FIRST HOUR: CPT | Performed by: EMERGENCY MEDICINE

## 2018-06-07 PROCEDURE — 99284 EMERGENCY DEPT VISIT MOD MDM: CPT

## 2018-06-07 PROCEDURE — 96365 THER/PROPH/DIAG IV INF INIT: CPT

## 2018-06-07 RX ORDER — SODIUM CHLORIDE 9 MG/ML
125 INJECTION, SOLUTION INTRAVENOUS CONTINUOUS
Status: DISCONTINUED | OUTPATIENT
Start: 2018-06-07 | End: 2018-06-08 | Stop reason: HOSPADM

## 2018-06-07 RX ORDER — IPRATROPIUM BROMIDE AND ALBUTEROL SULFATE 2.5; .5 MG/3ML; MG/3ML
3 SOLUTION RESPIRATORY (INHALATION)
Status: DISCONTINUED | OUTPATIENT
Start: 2018-06-08 | End: 2018-06-08 | Stop reason: HOSPADM

## 2018-06-07 RX ORDER — IPRATROPIUM BROMIDE AND ALBUTEROL SULFATE 2.5; .5 MG/3ML; MG/3ML
3 SOLUTION RESPIRATORY (INHALATION) ONCE
Status: COMPLETED | OUTPATIENT
Start: 2018-06-07 | End: 2018-06-07

## 2018-06-07 RX ORDER — METHYLPREDNISOLONE SODIUM SUCCINATE 40 MG/ML
80 INJECTION, POWDER, LYOPHILIZED, FOR SOLUTION INTRAMUSCULAR; INTRAVENOUS ONCE
Status: COMPLETED | OUTPATIENT
Start: 2018-06-07 | End: 2018-06-07

## 2018-06-07 RX ORDER — IPRATROPIUM BROMIDE AND ALBUTEROL SULFATE 2.5; .5 MG/3ML; MG/3ML
3 SOLUTION RESPIRATORY (INHALATION)
Status: DISCONTINUED | OUTPATIENT
Start: 2018-06-07 | End: 2018-06-07

## 2018-06-07 RX ADMIN — IPRATROPIUM BROMIDE AND ALBUTEROL SULFATE 3 ML: .5; 3 SOLUTION RESPIRATORY (INHALATION) at 16:40

## 2018-06-07 RX ADMIN — METHYLPREDNISOLONE SODIUM SUCCINATE 80 MG: 40 INJECTION, POWDER, FOR SOLUTION INTRAMUSCULAR; INTRAVENOUS at 16:45

## 2018-06-07 RX ADMIN — IPRATROPIUM BROMIDE AND ALBUTEROL SULFATE 3 ML: .5; 3 SOLUTION RESPIRATORY (INHALATION) at 18:14

## 2018-06-07 RX ADMIN — CEFTRIAXONE 1 G: 1 INJECTION, SOLUTION INTRAVENOUS at 19:19

## 2018-06-07 RX ADMIN — IPRATROPIUM BROMIDE AND ALBUTEROL SULFATE 3 ML: .5; 3 SOLUTION RESPIRATORY (INHALATION) at 23:28

## 2018-06-07 NOTE — ED PROVIDER NOTES
Subjective   History of Present Illness  History of Present Illness    Chief complaint: Shortness of breath    Location: Not applicable    Quality/Severity:  Moderate    Timing/Duration: Symptoms began 3 days ago    Modifying Factors: Minimal relief with home nebulized treatments    Associated Symptoms: Cough productive of scant sputum and wheezing    Narrative: The patient is a 42-year-old white female with a known history of COPD who presents as noted above.  The patient does continue to smoke and she does use a home nebulizer.  The patient relates that 3 days ago she inadvertently breathed some weed killer and the next day she began to have shortness of breath and wheezing.  The symptoms have progressed.    Review of Systems   Constitutional: Positive for activity change, fatigue and fever (subjective-patient  States she feels hot at times). Negative for appetite change.   HENT: Negative for congestion.    Respiratory: Positive for cough, chest tightness, shortness of breath and wheezing. Negative for stridor.    Cardiovascular: Negative for chest pain, palpitations and leg swelling.   Gastrointestinal: Negative for abdominal pain, diarrhea, nausea and vomiting.   Endocrine: Negative for polydipsia.   Genitourinary: Negative for difficulty urinating, dysuria, flank pain, frequency and urgency.   Musculoskeletal: Negative for back pain.   Skin: Negative for rash.   Neurological: Negative for dizziness, weakness and headaches.   Psychiatric/Behavioral: Negative for confusion.       Past Medical History:   Diagnosis Date   • Arthritis    • COPD (chronic obstructive pulmonary disease)    • Coronary stent occlusion    • Depression    • Headache    • Hyperlipidemia    • Hypertension    • Migraine        No Known Allergies    Past Surgical History:   Procedure Laterality Date   • ANGIOPLASTY ILIAC ARTERY Left 6/17/2016    Procedure: BILATERAL DUPLEX DIRECTED ACCESS, AIF BILATERAL RUNOFF, SELECTIVE CATHETERIZATION OF  RIGHT EXTERNAL ILIAC WITH ANGIOPLASTY, LEFT COMMON ILIAC STENT PLACEMENT;  Surgeon: Jose Carlos Plascencia MD;  Location: [facility] OR 18/19;  Service:    • BREAST SURGERY      BILAT IMPLANTS   • CAROTID ARTERY ANGIOPLASTY         Family History   Problem Relation Age of Onset   • Heart disease Father    • Hypertension Father    • Diabetes Father    • Stroke Father    • Heart failure Paternal Grandmother    • Heart failure Paternal Grandfather        Social History     Social History   • Marital status:      Social History Main Topics   • Smoking status: Current Every Day Smoker     Packs/day: 1.00     Years: 30.00     Types: Cigarettes   • Smokeless tobacco: Never Used   • Alcohol use No   • Drug use: No   • Sexual activity: Defer     Other Topics Concern   • Not on file           Objective   Physical Exam   Constitutional: She is oriented to person, place, and time.   The patient is a thin, 42-year-old, white female in mild respiratory distress.   HENT:   Head: Normocephalic and atraumatic.   Mouth/Throat: Oropharynx is clear and moist.   Eyes: Conjunctivae and EOM are normal. Pupils are equal, round, and reactive to light.   Neck: Normal range of motion. Neck supple. No thyromegaly present.   Cardiovascular: Regular rhythm and normal heart sounds.    No murmur heard.  Moderate tachycardia.   Pulmonary/Chest:   Patient noted to be breathing heavily and with tachypnea.  Auscultation of the lungs reveals poor exchange of air and mild wheezes in all lung fields.  Patient only able to speak in very short sentences.   Abdominal: Soft. Bowel sounds are normal. She exhibits no distension. There is no tenderness.   Musculoskeletal: Normal range of motion. She exhibits no edema or tenderness.   Lymphadenopathy:     She has no cervical adenopathy.   Neurological: She is alert and oriented to person, place, and time.   Skin: Skin is warm and dry. No rash noted.   Psychiatric: She has a normal mood and affect. Her  behavior is normal.   Nursing note and vitals reviewed.      Procedures    Final diagnoses:   Pneumonia of left lower lobe due to infectious organism              ED Course  ED Course as of Jun 07 1855   Thu Jun 07, 2018   1848 All findings discussed with patient and she is agreeable to admission.  The hospitalist has been paged.  [ML]   1855 Case and findings discussed with the hospitalist, Dr. Henson, who agreed that the patient's condition warranted admission to the hospital for further evaluation/treatment.  [ML]      ED Course User Index  [ML] Facundo Monroy MD                  MDM  Number of Diagnoses or Management Options  Pneumonia of left lower lobe due to infectious organism: new and requires workup     Amount and/or Complexity of Data Reviewed  Clinical lab tests: ordered and reviewed  Tests in the radiology section of CPT®: ordered and reviewed  Review and summarize past medical records: yes  Independent visualization of images, tracings, or specimens: yes    Risk of Complications, Morbidity, and/or Mortality  Presenting problems: high  Diagnostic procedures: high  Management options: high    Critical Care  Total time providing critical care: 30-74 minutes    Patient Progress  Patient progress: stable  My differential diagnosis for dyspnea includes but is not limited to:  Asthma, COPD, pneumonia, pulmonary embolus, acute respiratory distress syndrome, pneumothorax, pleural effusion, pulmonary fibrosis, congestive heart failure, myocardial infarction, DKA, uremia, acidosis, sepsis, anemia, drug related, hyperventilation, CNS disease    Labs this visit  Lab Results (last 24 hours)     Procedure Component Value Units Date/Time    CBC & Differential [[id number]] Collected:  06/07/18 1643    Specimen:  Blood Updated:  06/07/18 1650    Narrative:       The following orders were created for panel order CBC & Differential.  Procedure                               Abnormality         Status                      ---------                               -----------         ------                     CBC Auto Differential[[id number]]        Abnormal            Final result                 Please view results for these tests on the individual orders.    Comprehensive Metabolic Panel [[id number]]  (Abnormal) Collected:  06/07/18 1643    Specimen:  Blood Updated:  06/07/18 1711     Glucose 149 (H) mg/dL      BUN 6 mg/dL      Creatinine 0.71 mg/dL      Sodium 143 mmol/L      Potassium 4.2 mmol/L      Chloride 104 mmol/L      CO2 24.4 mmol/L      Calcium 9.6 mg/dL      Total Protein 7.4 g/dL      Albumin 4.20 g/dL      ALT (SGPT) 8 U/L      AST (SGOT) 11 U/L      Alkaline Phosphatase 85 U/L      Total Bilirubin 0.4 mg/dL      eGFR Non African Amer 89 mL/min/1.73      Globulin 3.2 gm/dL      A/G Ratio 1.3 g/dL      BUN/Creatinine Ratio 8.5     Anion Gap 14.6 mmol/L     Lactic Acid, Plasma [[id number]]  (Normal) Collected:  06/07/18 1643    Specimen:  Blood Updated:  06/07/18 1708     Lactate 1.8 mmol/L     CBC Auto Differential [[id number]]  (Abnormal) Collected:  06/07/18 1643    Specimen:  Blood Updated:  06/07/18 1650     WBC 19.28 (H) 10*3/mm3      RBC 4.50 10*6/mm3      Hemoglobin 15.1 g/dL      Hematocrit 46.2 %      .7 (H) fL      MCH 33.6 (H) pg      MCHC 32.7 g/dL      RDW 12.1 %      RDW-SD 45.6 fl      MPV 9.0 fL      Platelets 277 10*3/mm3      Neutrophil % 90.0 (H) %      Lymphocyte % 3.4 (L) %      Monocyte % 5.2 %      Eosinophil % 0.0 %      Basophil % 0.4 %      Immature Grans % 1.0 (H) %      Neutrophils, Absolute 17.35 (H) 10*3/mm3      Lymphocytes, Absolute 0.66 10*3/mm3      Monocytes, Absolute 1.00 10*3/mm3      Eosinophils, Absolute 0.00 (L) 10*3/mm3      Basophils, Absolute 0.07 10*3/mm3      Immature Grans, Absolute 0.20 (H) 10*3/mm3      nRBC 0.0 /100 WBC         Prescribed on discharge             Medication List      No changes were made to your prescriptions during this visit.       All lab results,  imaging results and other tests were reviewed by Facundo Monroy MD and unless otherwise specified were found to be unremarkable.        Final diagnoses:   Pneumonia of left lower lobe due to infectious organism            Facundo Monroy MD  06/07/18 9170

## 2018-06-07 NOTE — ED NOTES
Report has been called and will send pt once the second abx is started.     Sergio Leon RN  06/07/18 1924

## 2018-06-08 VITALS
SYSTOLIC BLOOD PRESSURE: 119 MMHG | TEMPERATURE: 99 F | RESPIRATION RATE: 16 BRPM | OXYGEN SATURATION: 96 % | HEIGHT: 62 IN | BODY MASS INDEX: 23.39 KG/M2 | HEART RATE: 92 BPM | WEIGHT: 127.1 LBS | DIASTOLIC BLOOD PRESSURE: 70 MMHG

## 2018-06-08 LAB
ANISOCYTOSIS BLD QL: NORMAL
B PERT DNA SPEC QL NAA+PROBE: NOT DETECTED
BASOPHILS # BLD AUTO: 0.03 10*3/MM3 (ref 0–0.2)
BASOPHILS NFR BLD AUTO: 0.2 % (ref 0–2)
C PNEUM DNA NPH QL NAA+NON-PROBE: NOT DETECTED
DEPRECATED RDW RBC AUTO: 46.6 FL (ref 37–54)
EOSINOPHIL # BLD AUTO: 0.01 10*3/MM3 (ref 0.1–0.3)
EOSINOPHIL NFR BLD AUTO: 0.1 % (ref 0–4)
ERYTHROCYTE [DISTWIDTH] IN BLOOD BY AUTOMATED COUNT: 11.9 % (ref 11.5–14.5)
FLUAV H1 2009 PAND RNA NPH QL NAA+PROBE: NOT DETECTED
FLUAV H1 HA GENE NPH QL NAA+PROBE: NOT DETECTED
FLUAV H3 RNA NPH QL NAA+PROBE: NOT DETECTED
FLUAV SUBTYP SPEC NAA+PROBE: NOT DETECTED
FLUBV RNA ISLT QL NAA+PROBE: NOT DETECTED
HADV DNA SPEC NAA+PROBE: NOT DETECTED
HCOV 229E RNA SPEC QL NAA+PROBE: NOT DETECTED
HCOV HKU1 RNA SPEC QL NAA+PROBE: NOT DETECTED
HCOV NL63 RNA SPEC QL NAA+PROBE: NOT DETECTED
HCOV OC43 RNA SPEC QL NAA+PROBE: NOT DETECTED
HCT VFR BLD AUTO: 41.9 % (ref 37–47)
HGB BLD-MCNC: 13.3 G/DL (ref 12–16)
HMPV RNA NPH QL NAA+NON-PROBE: NOT DETECTED
HPIV1 RNA SPEC QL NAA+PROBE: NOT DETECTED
HPIV2 RNA SPEC QL NAA+PROBE: NOT DETECTED
HPIV3 RNA NPH QL NAA+PROBE: NOT DETECTED
HPIV4 P GENE NPH QL NAA+PROBE: NOT DETECTED
IMM GRANULOCYTES # BLD: 0.1 10*3/MM3 (ref 0–0.03)
IMM GRANULOCYTES NFR BLD: 0.7 % (ref 0–0.5)
LYMPHOCYTES # BLD AUTO: 1.76 10*3/MM3 (ref 0.6–4.8)
LYMPHOCYTES NFR BLD AUTO: 12.4 % (ref 20–45)
M PNEUMO IGG SER IA-ACNC: NOT DETECTED
MACROCYTES BLD QL SMEAR: NORMAL
MCH RBC QN AUTO: 33.5 PG (ref 27–31)
MCHC RBC AUTO-ENTMCNC: 31.7 G/DL (ref 31–37)
MCV RBC AUTO: 105.5 FL (ref 81–99)
MONOCYTES # BLD AUTO: 0.94 10*3/MM3 (ref 0–1)
MONOCYTES NFR BLD AUTO: 6.6 % (ref 3–8)
NEUTROPHILS # BLD AUTO: 11.38 10*3/MM3 (ref 1.5–8.3)
NEUTROPHILS NFR BLD AUTO: 80 % (ref 45–70)
NRBC BLD MANUAL-RTO: 0 /100 WBC (ref 0–0)
PLAT MORPH BLD: NORMAL
PLATELET # BLD AUTO: 251 10*3/MM3 (ref 140–500)
PMV BLD AUTO: 9.5 FL (ref 7.4–10.4)
RBC # BLD AUTO: 3.97 10*6/MM3 (ref 4.2–5.4)
RHINOVIRUS RNA SPEC NAA+PROBE: NOT DETECTED
RSV RNA NPH QL NAA+NON-PROBE: NOT DETECTED
S PNEUM AG SPEC QL LA: NEGATIVE
WBC MORPH BLD: NORMAL
WBC NRBC COR # BLD: 14.22 10*3/MM3 (ref 4.8–10.8)

## 2018-06-08 PROCEDURE — 96372 THER/PROPH/DIAG INJ SC/IM: CPT

## 2018-06-08 PROCEDURE — 94618 PULMONARY STRESS TESTING: CPT

## 2018-06-08 PROCEDURE — 85025 COMPLETE CBC W/AUTO DIFF WBC: CPT | Performed by: NURSE PRACTITIONER

## 2018-06-08 PROCEDURE — 87798 DETECT AGENT NOS DNA AMP: CPT | Performed by: INTERNAL MEDICINE

## 2018-06-08 PROCEDURE — 87581 M.PNEUMON DNA AMP PROBE: CPT | Performed by: INTERNAL MEDICINE

## 2018-06-08 PROCEDURE — 63710000001 PREDNISONE PER 1 MG: Performed by: INTERNAL MEDICINE

## 2018-06-08 PROCEDURE — 25010000002 KETOROLAC TROMETHAMINE PER 15 MG: Performed by: INTERNAL MEDICINE

## 2018-06-08 PROCEDURE — 25010000002 ENOXAPARIN PER 10 MG: Performed by: INTERNAL MEDICINE

## 2018-06-08 PROCEDURE — 87899 AGENT NOS ASSAY W/OPTIC: CPT | Performed by: INTERNAL MEDICINE

## 2018-06-08 PROCEDURE — 87633 RESP VIRUS 12-25 TARGETS: CPT | Performed by: INTERNAL MEDICINE

## 2018-06-08 PROCEDURE — G0378 HOSPITAL OBSERVATION PER HR: HCPCS

## 2018-06-08 PROCEDURE — 85007 BL SMEAR W/DIFF WBC COUNT: CPT | Performed by: NURSE PRACTITIONER

## 2018-06-08 PROCEDURE — 96375 TX/PRO/DX INJ NEW DRUG ADDON: CPT

## 2018-06-08 PROCEDURE — 99217 PR OBSERVATION CARE DISCHARGE MANAGEMENT: CPT | Performed by: NURSE PRACTITIONER

## 2018-06-08 PROCEDURE — 94799 UNLISTED PULMONARY SVC/PX: CPT

## 2018-06-08 PROCEDURE — 87486 CHLMYD PNEUM DNA AMP PROBE: CPT | Performed by: INTERNAL MEDICINE

## 2018-06-08 RX ORDER — TOPIRAMATE 25 MG/1
50 TABLET ORAL EVERY 12 HOURS SCHEDULED
Status: DISCONTINUED | OUTPATIENT
Start: 2018-06-08 | End: 2018-06-08 | Stop reason: HOSPADM

## 2018-06-08 RX ORDER — ALBUTEROL SULFATE 2.5 MG/3ML
2.5 SOLUTION RESPIRATORY (INHALATION) EVERY 6 HOURS PRN
Status: DISCONTINUED | OUTPATIENT
Start: 2018-06-08 | End: 2018-06-08 | Stop reason: HOSPADM

## 2018-06-08 RX ORDER — VENLAFAXINE HYDROCHLORIDE 150 MG/1
150 CAPSULE, EXTENDED RELEASE ORAL DAILY
Status: DISCONTINUED | OUTPATIENT
Start: 2018-06-08 | End: 2018-06-08 | Stop reason: HOSPADM

## 2018-06-08 RX ORDER — BENZONATATE 100 MG/1
200 CAPSULE ORAL 3 TIMES DAILY PRN
Status: DISCONTINUED | OUTPATIENT
Start: 2018-06-08 | End: 2018-06-08 | Stop reason: HOSPADM

## 2018-06-08 RX ORDER — SODIUM CHLORIDE 0.9 % (FLUSH) 0.9 %
1-10 SYRINGE (ML) INJECTION AS NEEDED
Status: DISCONTINUED | OUTPATIENT
Start: 2018-06-08 | End: 2018-06-08 | Stop reason: HOSPADM

## 2018-06-08 RX ORDER — CHOLECALCIFEROL (VITAMIN D3) 125 MCG
10 CAPSULE ORAL NIGHTLY PRN
Start: 2018-06-08 | End: 2019-12-27

## 2018-06-08 RX ORDER — BUPROPION HYDROCHLORIDE 150 MG/1
150 TABLET, EXTENDED RELEASE ORAL DAILY
Status: DISCONTINUED | OUTPATIENT
Start: 2018-06-08 | End: 2018-06-08

## 2018-06-08 RX ORDER — ATORVASTATIN CALCIUM 20 MG/1
20 TABLET, FILM COATED ORAL DAILY
Status: DISCONTINUED | OUTPATIENT
Start: 2018-06-08 | End: 2018-06-08 | Stop reason: HOSPADM

## 2018-06-08 RX ORDER — PROMETHAZINE HYDROCHLORIDE 12.5 MG/1
12.5 TABLET ORAL EVERY 6 HOURS PRN
Status: DISCONTINUED | OUTPATIENT
Start: 2018-06-08 | End: 2018-06-08 | Stop reason: HOSPADM

## 2018-06-08 RX ORDER — CHOLECALCIFEROL (VITAMIN D3) 125 MCG
10 CAPSULE ORAL NIGHTLY PRN
Status: DISCONTINUED | OUTPATIENT
Start: 2018-06-08 | End: 2018-06-08 | Stop reason: HOSPADM

## 2018-06-08 RX ORDER — GUAIFENESIN 600 MG/1
1200 TABLET, EXTENDED RELEASE ORAL EVERY 12 HOURS
Start: 2018-06-08 | End: 2019-02-27

## 2018-06-08 RX ORDER — CLOPIDOGREL BISULFATE 75 MG/1
75 TABLET ORAL DAILY
Status: DISCONTINUED | OUTPATIENT
Start: 2018-06-08 | End: 2018-06-08 | Stop reason: HOSPADM

## 2018-06-08 RX ORDER — BUPROPION HYDROCHLORIDE 150 MG/1
150 TABLET, EXTENDED RELEASE ORAL EVERY 12 HOURS SCHEDULED
Status: DISCONTINUED | OUTPATIENT
Start: 2018-06-11 | End: 2018-06-08

## 2018-06-08 RX ORDER — TRAMADOL HYDROCHLORIDE 50 MG/1
50 TABLET ORAL EVERY 6 HOURS PRN
Status: DISCONTINUED | OUTPATIENT
Start: 2018-06-08 | End: 2018-06-08 | Stop reason: HOSPADM

## 2018-06-08 RX ORDER — CLOPIDOGREL BISULFATE 75 MG/1
75 TABLET ORAL DAILY
Start: 2018-06-08 | End: 2021-01-15 | Stop reason: SDUPTHER

## 2018-06-08 RX ORDER — GUAIFENESIN 600 MG/1
1200 TABLET, EXTENDED RELEASE ORAL EVERY 12 HOURS
Status: DISCONTINUED | OUTPATIENT
Start: 2018-06-08 | End: 2018-06-08 | Stop reason: HOSPADM

## 2018-06-08 RX ORDER — KETOROLAC TROMETHAMINE 30 MG/ML
30 INJECTION, SOLUTION INTRAMUSCULAR; INTRAVENOUS ONCE
Status: COMPLETED | OUTPATIENT
Start: 2018-06-08 | End: 2018-06-08

## 2018-06-08 RX ORDER — PREDNISONE 10 MG/1
TABLET ORAL
Qty: 10 TABLET | Refills: 0 | Status: SHIPPED | OUTPATIENT
Start: 2018-06-08 | End: 2018-06-15

## 2018-06-08 RX ORDER — BENZONATATE 100 MG/1
CAPSULE ORAL
Qty: 42 CAPSULE | Refills: 0 | Status: SHIPPED | OUTPATIENT
Start: 2018-06-08 | End: 2018-06-15

## 2018-06-08 RX ORDER — SODIUM CHLORIDE 9 MG/ML
40 INJECTION, SOLUTION INTRAVENOUS AS NEEDED
Status: DISCONTINUED | OUTPATIENT
Start: 2018-06-08 | End: 2018-06-08 | Stop reason: HOSPADM

## 2018-06-08 RX ORDER — ASPIRIN 81 MG/1
81 TABLET ORAL DAILY
Status: DISCONTINUED | OUTPATIENT
Start: 2018-06-08 | End: 2018-06-08 | Stop reason: HOSPADM

## 2018-06-08 RX ORDER — ACETAMINOPHEN 325 MG/1
650 TABLET ORAL EVERY 4 HOURS PRN
Status: DISCONTINUED | OUTPATIENT
Start: 2018-06-08 | End: 2018-06-08 | Stop reason: HOSPADM

## 2018-06-08 RX ORDER — PREDNISONE 20 MG/1
40 TABLET ORAL
Status: DISCONTINUED | OUTPATIENT
Start: 2018-06-08 | End: 2018-06-08 | Stop reason: HOSPADM

## 2018-06-08 RX ORDER — SENNA AND DOCUSATE SODIUM 50; 8.6 MG/1; MG/1
2 TABLET, FILM COATED ORAL NIGHTLY PRN
Status: DISCONTINUED | OUTPATIENT
Start: 2018-06-08 | End: 2018-06-08 | Stop reason: HOSPADM

## 2018-06-08 RX ORDER — CALCIUM CARBONATE 200(500)MG
1 TABLET,CHEWABLE ORAL 2 TIMES DAILY PRN
Status: DISCONTINUED | OUTPATIENT
Start: 2018-06-08 | End: 2018-06-08 | Stop reason: HOSPADM

## 2018-06-08 RX ORDER — ACETAMINOPHEN 325 MG/1
650 TABLET ORAL EVERY 4 HOURS PRN
Start: 2018-06-08 | End: 2021-03-11

## 2018-06-08 RX ORDER — AZITHROMYCIN 250 MG/1
TABLET, FILM COATED ORAL
Qty: 6 TABLET | Refills: 0 | Status: SHIPPED | OUTPATIENT
Start: 2018-06-08 | End: 2018-06-15

## 2018-06-08 RX ADMIN — KETOROLAC TROMETHAMINE 30 MG: 30 INJECTION, SOLUTION INTRAMUSCULAR at 01:28

## 2018-06-08 RX ADMIN — CLOPIDOGREL 75 MG: 75 TABLET, FILM COATED ORAL at 10:45

## 2018-06-08 RX ADMIN — TOPIRAMATE 50 MG: 25 TABLET, FILM COATED ORAL at 10:46

## 2018-06-08 RX ADMIN — VENLAFAXINE HYDROCHLORIDE 150 MG: 150 CAPSULE, EXTENDED RELEASE ORAL at 10:46

## 2018-06-08 RX ADMIN — PREDNISONE 40 MG: 20 TABLET ORAL at 10:45

## 2018-06-08 RX ADMIN — GUAIFENESIN 1200 MG: 600 TABLET, EXTENDED RELEASE ORAL at 01:30

## 2018-06-08 RX ADMIN — BENZONATATE 200 MG: 100 CAPSULE ORAL at 01:47

## 2018-06-08 RX ADMIN — IPRATROPIUM BROMIDE AND ALBUTEROL SULFATE 3 ML: .5; 3 SOLUTION RESPIRATORY (INHALATION) at 03:09

## 2018-06-08 RX ADMIN — ENOXAPARIN SODIUM 40 MG: 100 INJECTION SUBCUTANEOUS at 10:46

## 2018-06-08 RX ADMIN — ASPIRIN 81 MG: 81 TABLET, COATED ORAL at 10:45

## 2018-06-08 RX ADMIN — TOPIRAMATE 50 MG: 25 TABLET, FILM COATED ORAL at 01:30

## 2018-06-08 RX ADMIN — ATORVASTATIN CALCIUM 20 MG: 20 TABLET, FILM COATED ORAL at 10:45

## 2018-06-08 RX ADMIN — IPRATROPIUM BROMIDE AND ALBUTEROL SULFATE 3 ML: .5; 3 SOLUTION RESPIRATORY (INHALATION) at 12:20

## 2018-06-08 RX ADMIN — SODIUM CHLORIDE 125 ML/HR: 9 INJECTION, SOLUTION INTRAVENOUS at 03:27

## 2018-06-08 RX ADMIN — IPRATROPIUM BROMIDE AND ALBUTEROL SULFATE 3 ML: .5; 3 SOLUTION RESPIRATORY (INHALATION) at 06:44

## 2018-06-08 RX ADMIN — LISINOPRIL: 10 TABLET ORAL at 10:44

## 2018-06-08 NOTE — PROGRESS NOTES
Exercise Oximetry    Patient Name:Valeria Rubi   MRN: [id number]   Date: 06/08/18             ROOM AIR BASELINE   SpO2%          97   Heart Rate    104     Blood Pressure     EXERCISE ON ROOM AIR SpO2% EXERCISE ON O2 @       LPM SpO2%   1 MINUTE     98 1 MINUTE    2 MINUTES     95 2 MINUTES    3 MINUTES     97 3 MINUTES    4 MINUTES     95  4 MINUTES    5 MINUTES     96 5 MINUTES    6 MINUTES    96 6 MINUTES               Distance Walked      1500 ft Distance Walked   Dyspnea (Nette Scale)  Pre 2 pos t4 Dyspnea (Nette Scale)   Fatigue (Nette Scale)     Pre  7 post 7 Fatigue (Nette Scale)   SpO2% Post Exercise     97 SpO2% Post Exercise   HR Post Exercise  104 HR Post Exercise   Time to Recovery     1 min. Time to Recovery     Comments:

## 2018-06-08 NOTE — DISCHARGE INSTR - APPOINTMENTS
You have an appointment with Dr. LOJA at [facility] on June 18, 2018 at 8:00 am at the [facility]. [phone]

## 2018-06-08 NOTE — DISCHARGE SUMMARY
Valeria Rubi  1975  [id number]    Hospitalists Discharge Summary    Date of Admission: 6/7/2018  Date of Discharge:  6/8/2018    Primary Discharge Diagnoses:   1.  AECOPD, r/o CAP  Secondary Discharge Diagnoses:   2.  Leukocytosis  3.  Tachycardia  4.  Hyperlipidemia  5.  PAD with h/o iliac stent  6.  Hypertension  7.  Depression  8.  Migraines  9.  Tobacco abuse  10. Macrocytosis   PCP  Patient Care Team:  Adrianne Riddle MD as PCP - General  Adrianne Riddle MD as PCP - Family Medicine    Consults:   Consults     Date and Time Order Name Status Description    6/8/2018 0058 Inpatient Pulmonology Consult          Operations and Procedures Performed:     Xr Chest 2 View    Result Date: 6/8/2018  Narrative: INDICATION: Shortness of air with a chronic cough, worse over the last 3 days.  COMPARISON: 5/10/2016, 5/2/2016.  FINDINGS: PA and lateral views of the chest were obtained. Lungs are emphysematous but clear. Cardiac and mediastinal contours are normal. No pneumothorax is seen. Bilateral breast implants are present. Opacity at the left base is felt to represent the patient's epicardial fat pad, similar to 5/2/2016.      Impression: COPD. No active disease.  This report was finalized on 6/8/2018 8:09 AM by Dr. Galo Jeffery MD.      Allergies:  has No Known Allergies.    Marcelo  Fioricet 5/2018 per report of 6/7/18    Discharge Medications:   GreysonCassidyValeria   Home Medication Instructions MARIANN:[id number]    Printed on:06/08/18 0022   Medication Information                      acetaminophen (TYLENOL) 325 MG tablet  Take 2 tablets by mouth Every 4 (Four) Hours As Needed for Mild Pain , Headache or Fever.             albuterol (PROVENTIL HFA;VENTOLIN HFA) 108 (90 Base) MCG/ACT inhaler  INHALE 2 PUFFS PO EVERY 4 HOURS PRN             albuterol (PROVENTIL) (2.5 MG/3ML) 0.083% nebulizer solution  1 unit dose neb. Every 4 hrs. prn             aspirin 81 MG EC tablet  Take 81 mg by mouth daily.              azithromycin (ZITHROMAX Z-CIPRIANO) 250 MG tablet  Take 2 tablets the first day, then 1 tablet daily for 4 days.             benzonatate (TESSALON PERLES) 100 MG capsule  Take 1-2 caps po every 8 hours as needed for cough             butalbital-acetaminophen-caffeine (FIORICET, ESGIC) -40 MG per tablet  TAKE 1 TABLET BY MOUTH EVERY 6 HOURS AS NEEDED             clopidogrel (PLAVIX) 75 MG tablet  Take 1 tablet by mouth Daily. 150 mg po X1 in RR             fluticasone (FLONASE) 50 MCG/ACT nasal spray  1 spray into each nostril as needed.             guaiFENesin (MUCINEX) 600 MG 12 hr tablet  Take 2 tablets by mouth Every 12 (Twelve) Hours.             lisinopril-hydrochlorothiazide (PRINZIDE,ZESTORETIC) 20-12.5 MG per tablet  TAKE 1 TABLET BY MOUTH DAILY.             medroxyPROGESTERone (DEPO-PROVERA) 150 MG/ML injection  Apply 150 mg to cheek Every 3 (Three) Months. Pt takes every 3 months.march or April 2018 last dose             melatonin 5 MG tablet tablet  Take 2 tablets by mouth At Night As Needed (sleep).             predniSONE (DELTASONE) 10 MG tablet  40 mg x 1 days, 30 mg x 1 days, 20 mg x 1 days, 10 mg x 1 days             promethazine (PHENERGAN) 25 MG tablet  TAKE 1 TABLET BY MOUTH TWICE A DAY AS NEEDED FOR NAUSEA AND VOMITING             simvastatin (ZOCOR) 40 MG tablet  TAKE 1 TABLET BY MOUTH EVERY EVENING             STIOLTO RESPIMAT 2.5-2.5 MCG/ACT aerosol solution inhaler  INHALE 2 PUFFS DAILY.             topiramate (TOPAMAX) 50 MG tablet  Take 1 tablet by mouth 2 (Two) Times a Day.             venlafaxine XR (EFFEXOR-XR) 150 MG 24 hr capsule  Take 1 capsule by mouth Daily.             vitamin D (ERGOCALCIFEROL) 53937 units capsule capsule  Take 1 capsule by mouth Every 7 (Seven) Days.               History of Present Illness: Taken from [facility] on admit:   41 yo WF w/ PMH of COPD w/ panlobular emphysema, Atopic rhinitis, PAD w/ iliac stent, HTN, tobacco abuse, pharyngeal dysphagia,, migraines,   p/w a 4 day worsening of severe cough and sputum. Feels it began with inhaling Mena on 6/4. Had cough and nausea the next day. She developed subjective fevers and chills the day prior to admission, and attempted to call off work today, but was forced to go in by her employer. States a nurse saw her coughing and asked to take her vitals and 'almost called an ambulance to take her in right then.'      She states her ribs and back are 'on fire' and are hurting severely worse every time she coughs. Coughing is almost constant. And has been interfering with sleep.     Reports multiple family members with COPD, but unclear if any developed at an early outside of the context of being a heavy smoker. States saw a pulmonologist last week, but notes not available in the system.      Hospital Course  1.  AECOPD, r/o CAP:   Initially received IV Azithromycin/Rocephin/steroid/duonebs  Home today on quick prednisone taper, home Stiolto with home albuterol neb as needed  Continue Mucinex, acappella, Tessalon Perles, azithromycin ( for antitussive properties)  Walking oximetry showed no need for oxygen at rest or with exertion  Respiratory viral panel negative, strep pneumo antigen negative, blood cultures no growth less than 24 hours, CXR without pneumonia  Had PFTs and alpha 1 testing that was negative in 2/2018  Follow-up pulmonary 2 weeks  Follow up Adrianne Riddle MD 1 week    2.  Leukocytosis: Initially suspected sepsis secondary to pneumonia, however chest x-ray demonstrates no pneumonia at this time  White blood cell count initially 19.28 but had received IV Solu-Medrol at 1645  Afebrile, f/u Adrianne Riddle MD next week    3.  Tachycardia: Sinus rhythm, secondary to illness, resolved    4.  Hyperlipidemia: No acute issues on Lipitor 20 mg daily    5.  PAD with h/o iliac stent: No acute issues on Plavix 75 mg daily    6.  Hypertension: Blood pressure at goal on lisinopril 10 mg/hydrochlorothiazide 12.5 mg  daily    7.  Depression: No acute issues on Effexor  mg daily    8.  Migraines: No acute issues on home Topamax 50 mg every 12 hours    9.  Tobacco abuse: Counseled regarding cessation    10. Macrocytosis: worked up per Adrianne Riddle MD  No acute issues here    Last Lab Results:   Lab Results (most recent)     Procedure Component Value Units Date/Time    Respiratory Panel, PCR - Swab, Nasopharynx [[id number]]  (Normal) Collected:  06/08/18 0134    Specimen:  Swab from Nasopharynx Updated:  06/08/18 0840     ADENOVIRUS, PCR Not Detected     Coronavirus 229E Not Detected     Coronavirus HKU1 Not Detected     Coronavirus NL63 Not Detected     Coronavirus OC43 Not Detected     Human Metapneumovirus Not Detected     Human Rhinovirus/Enterovirus Not Detected     Influenza B PCR Not Detected     Parainfluenza Virus 1 Not Detected     Parainfluenza Virus 2 Not Detected     Parainfluenza Virus 3 Not Detected     Parainfluenza Virus 4 Not Detected     Bordetella pertussis pcr Not Detected     Influenza A H1 2009 PCR Not Detected     Chlamydophila pneumoniae PCR Not Detected     Mycoplasma pneumo by PCR Not Detected     Influenza A PCR Not Detected     Influenza A H3 Not Detected     Influenza A H1 Not Detected     RSV, PCR Not Detected    Blood Culture - Blood, [[id number]]  (Normal) Collected:  06/07/18 1914    Specimen:  Blood from Hand, Right Updated:  06/08/18 0800     Blood Culture No growth at less than 24 hours    Blood Culture - Blood, [[id number]]  (Normal) Collected:  06/07/18 1914    Specimen:  Blood from Arm, Right Updated:  06/08/18 0800     Blood Culture No growth at less than 24 hours    S. Pneumo Ag Urine or CSF - Urine, Urine, Clean Catch [[id number]]  (Normal) Collected:  06/08/18 0328    Specimen:  Urine from Urine, Clean Catch Updated:  06/08/18 0400     Strep Pneumo Ag Negative    Comprehensive Metabolic Panel [[id number]]  (Abnormal) Collected:  06/07/18 1643    Specimen:  Blood Updated:  06/07/18  1711     Glucose 149 (H) mg/dL      BUN 6 mg/dL      Creatinine 0.71 mg/dL      Sodium 143 mmol/L      Potassium 4.2 mmol/L      Chloride 104 mmol/L      CO2 24.4 mmol/L      Calcium 9.6 mg/dL      Total Protein 7.4 g/dL      Albumin 4.20 g/dL      ALT (SGPT) 8 U/L      AST (SGOT) 11 U/L      Alkaline Phosphatase 85 U/L      Total Bilirubin 0.4 mg/dL      eGFR Non African Amer 89 mL/min/1.73      Globulin 3.2 gm/dL      A/G Ratio 1.3 g/dL      BUN/Creatinine Ratio 8.5     Anion Gap 14.6 mmol/L     Lactic Acid, Plasma [[id number]]  (Normal) Collected:  06/07/18 1643    Specimen:  Blood Updated:  06/07/18 1708     Lactate 1.8 mmol/L     CBC & Differential [[id number]] Collected:  06/07/18 1643    Specimen:  Blood Updated:  06/07/18 1650    Narrative:       The following orders were created for panel order CBC & Differential.  Procedure                               Abnormality         Status                     ---------                               -----------         ------                     CBC Auto Differential[[id number]]        Abnormal            Final result                 Please view results for these tests on the individual orders.    CBC Auto Differential [[id number]]  (Abnormal) Collected:  06/07/18 1643    Specimen:  Blood Updated:  06/07/18 1650     WBC 19.28 (H) 10*3/mm3      RBC 4.50 10*6/mm3      Hemoglobin 15.1 g/dL      Hematocrit 46.2 %      .7 (H) fL      MCH 33.6 (H) pg      MCHC 32.7 g/dL      RDW 12.1 %      RDW-SD 45.6 fl      MPV 9.0 fL      Platelets 277 10*3/mm3      Neutrophil % 90.0 (H) %      Lymphocyte % 3.4 (L) %      Monocyte % 5.2 %      Eosinophil % 0.0 %      Basophil % 0.4 %      Immature Grans % 1.0 (H) %      Neutrophils, Absolute 17.35 (H) 10*3/mm3      Lymphocytes, Absolute 0.66 10*3/mm3      Monocytes, Absolute 1.00 10*3/mm3      Eosinophils, Absolute 0.00 (L) 10*3/mm3      Basophils, Absolute 0.07 10*3/mm3      Immature Grans, Absolute 0.20 (H) 10*3/mm3      nRBC 0.0  /100 WBC         Imaging Results (most recent)     Procedure Component Value Units Date/Time    XR Chest 2 View [[id number]] Collected:  06/08/18 0806     Updated:  06/08/18 0811    Narrative:       INDICATION: Shortness of air with a chronic cough, worse over the last 3  days.     COMPARISON: 5/10/2016, 5/2/2016.     FINDINGS: PA and lateral views of the chest were obtained. Lungs are  emphysematous but clear. Cardiac and mediastinal contours are normal. No  pneumothorax is seen. Bilateral breast implants are present. Opacity at  the left base is felt to represent the patient's epicardial fat pad,  similar to 5/2/2016.       Impression:       COPD. No active disease.     This report was finalized on 6/8/2018 8:09 AM by Dr. Galo Jeffery MD.           PROCEDURES: NONE    Condition on Discharge:  stable    Physical Exam at Discharge  Vital Signs  Temp:  [97.5 °F (36.4 °C)-98.1 °F (36.7 °C)] 98.1 °F (36.7 °C)  Heart Rate:  [] 96  Resp:  [16-24] 16  BP: (118-140)/(74-89) 123/77    Physical Exam:  Physical Exam   Constitutional: Patient appears well-developed and well-nourished and in no acute distress   HEENT:   Head: Normocephalic and atraumatic.   Eyes:  Pupils are equal, round, and reactive to light. EOM are intact. Sclera are anicteric and non-injected.  Mouth and Throat: Patient has moist mucous membranes. Oropharynx is clear of any erythema or exudate.     Neck: Neck supple. No JVD present. No thyromegaly present. No lymphadenopathy present.  Cardiovascular: Regular rate, regular rhythm, S1 normal and S2 normal.  Exam reveals no gallop and no friction rub.  No murmur heard.  Pulmonary/Chest: Lungs are diminished with expiratory wheezes scattered   Abdominal: Soft. Bowel sounds are normal. No distension and no mass. There is no hepatosplenomegaly. There is no tenderness.   Musculoskeletal: Normal Muscle tone  Extremities: No edema. Pulses are palpable in all 4 extremities.  Neurological: Patient is alert  and oriented to person, place, and time. Cranial nerves II-XII are grossly intact with no focal deficits.  Skin: Skin is warm. No rash noted. Nails show no clubbing.  No cyanosis or erythema.    Discharge Disposition  Home    Visiting Nurse:    No     Home PT/OT:  No     Home Safety Evaluation:  No     DME  None new    Discharge Diet:    Dietary Orders     Start     Ordered    06/08/18 0054  Diet Regular  Diet Effective Now     Question:  Diet Texture / Consistency  Answer:  Regular    06/08/18 0058        Activity at Discharge:  As tolerated    Pre-discharge education  Smoking, medications, follow up    Follow-up Appointments  Future Appointments  Date Time Provider Department Center   7/10/2018 1:00 PM Adrianne Riddle MD [facility] None   8/13/2018 11:00 AM  [facility] PFT/EEG ROOM  [facility]       Test Results Pending at Discharge: will need f/u Adrianne Riddle MD     Order Current Status    CBC & Differential In process    Scan Slide In process    Blood Culture - Blood, Preliminary result    Blood Culture - Blood, Preliminary result           CATIE Avalos  06/08/18  11:54 AM    Time: Discharge 30 min (if over 30 minutes give explanation as to why it took greater than 30 minutes)

## 2018-06-08 NOTE — H&P
[facility] HOSPITALIST     Adrianne Riddle MD    CHIEF COMPLAINT: SOA    HISTORY OF PRESENT ILLNESS:    43 yo WF w/ PMH of COPD w/ panlobular emphysema, Atopic rhinitis, PAD w/ iliac stent, HTN, tobacco abuse, pharyngeal dysphagia,, migraines,  p/w a 4 day worsening of severe cough and sputum. Feels it began with inhaling [place] on 6/4. Had cough and nausea the next day. She developed subjective fevers and chills the day prior to admission, and attempted to call off work today, but was forced to go in by her employer. States a nurse saw her coughing and asked to take her vitals and 'almost called an ambulance to take her in right then.'     She states her ribs and back are 'on fire' and are hurting severely worse every time she coughs. Coughing is almost constant. And has been interfering with sleep.    Reports multiple family members with COPD, but unclear if any developed at an early outside of the context of being a heavy smoker. States saw a pulmonologist last week, but notes not available in the system.       Past Medical History:   Diagnosis Date   • Arthritis    • COPD (chronic obstructive pulmonary disease)    • Coronary stent occlusion    • Depression    • Headache    • Hyperlipidemia    • Hypertension    • Migraine      Past Surgical History:   Procedure Laterality Date   • ANGIOPLASTY ILIAC ARTERY Left 6/17/2016    Procedure: BILATERAL DUPLEX DIRECTED ACCESS, AIF BILATERAL RUNOFF, SELECTIVE CATHETERIZATION OF RIGHT EXTERNAL ILIAC WITH ANGIOPLASTY, LEFT COMMON ILIAC STENT PLACEMENT;  Surgeon: Jose Carlos Plascencia MD;  Location: [facility] 18/19;  Service:    • BREAST SURGERY      BILAT IMPLANTS   • CAROTID ARTERY ANGIOPLASTY       Family History   Problem Relation Age of Onset   • Heart disease Father    • Hypertension Father    • Diabetes Father    • Stroke Father    • COPD Father    • Heart failure Paternal Grandmother    • Heart failure Paternal Grandfather    • COPD Mother    •  COPD Maternal Aunt    • COPD Maternal Uncle    • COPD Paternal Uncle      Social History   Substance Use Topics   • Smoking status: Current Every Day Smoker     Packs/day: 1.00     Years: 30.00     Types: Cigarettes   • Smokeless tobacco: Never Used   • Alcohol use No     Prescriptions Prior to Admission   Medication Sig Dispense Refill Last Dose   • albuterol (PROVENTIL HFA;VENTOLIN HFA) 108 (90 Base) MCG/ACT inhaler INHALE 2 PUFFS PO EVERY 4 HOURS PRN (Patient taking differently: Inhale 2 puffs Every 4 (Four) Hours As Needed. INHALE 2 PUFFS PO EVERY 4 HOURS PRN) 18 g 3 6/7/2018 at Unknown time   • albuterol (PROVENTIL) (2.5 MG/3ML) 0.083% nebulizer solution 1 unit dose neb. Every 4 hrs. prn 225 mL 3 Taking   • butalbital-acetaminophen-caffeine (FIORICET, ESGIC) -40 MG per tablet TAKE 1 TABLET BY MOUTH EVERY 6 HOURS AS NEEDED 10 tablet 2 6/6/2018 at Unknown time   • promethazine (PHENERGAN) 25 MG tablet TAKE 1 TABLET BY MOUTH TWICE A DAY AS NEEDED FOR NAUSEA AND VOMITING 20 tablet 2 6/6/2018 at Unknown time   • STIOLTO RESPIMAT 2.5-2.5 MCG/ACT aerosol solution inhaler INHALE 2 PUFFS DAILY. 4 g 6 6/7/2018 at Unknown time   • topiramate (TOPAMAX) 50 MG tablet Take 1 tablet by mouth 2 (Two) Times a Day. 180 tablet 1 6/7/2018 at Unknown time   • aspirin 81 MG EC tablet Take 81 mg by mouth daily.   More than a month at Unknown time   • clopidogrel (PLAVIX) 75 MG tablet 150 mg po X1 in RR (Patient taking differently: Take 75 mg by mouth Daily. 150 mg po X1 in RR) 30 tablet 0 More than a month at Unknown time   • DICLOFENAC POTASSIUM PO Take 50 mg by mouth 2 (two) times a day.   More than a month at Unknown time   • fluticasone (FLONASE) 50 MCG/ACT nasal spray 1 spray into each nostril as needed.   Unknown at Unknown time   • lisinopril-hydrochlorothiazide (PRINZIDE,ZESTORETIC) 20-12.5 MG per tablet TAKE 1 TABLET BY MOUTH DAILY. 30 tablet 3 More than a month at Unknown time   • medroxyPROGESTERone (DEPO-PROVERA) 150  "MG/ML injection Apply 150 mg to cheek Every 3 (Three) Months. Pt takes every 3 months.march or April 2018 last dose   More than a month at Unknown time   • simvastatin (ZOCOR) 40 MG tablet TAKE 1 TABLET BY MOUTH EVERY EVENING 30 tablet 6 More than a month at Unknown time   • venlafaxine XR (EFFEXOR-XR) 150 MG 24 hr capsule Take 1 capsule by mouth Daily. 90 capsule 1 More than a month at Unknown time   • vitamin D (ERGOCALCIFEROL) 23728 units capsule capsule Take 1 capsule by mouth Every 7 (Seven) Days. 6 capsule 5 Unknown at Unknown time     Allergies:  Patient has no known allergies.    REVIEW OF SYSTEMS:  Please see the above history of present illness for pertinent positives and negatives.  The remainder of the patient's systems have been reviewed and are negative.     Vital Signs  Temp:  [97.5 °F (36.4 °C)-98 °F (36.7 °C)] 97.5 °F (36.4 °C)  Heart Rate:  [111-128] 111  Resp:  [20-24] 22  BP: (137-140)/(85-89) 137/88    Flowsheet Rows      First Filed Value   Admission Height  157.5 cm (62\") Documented at 06/07/2018 1553   Admission Weight  58.5 kg (129 lb) Documented at 06/07/2018 1553           Physical Exam:  Physical Exam   Constitutional: She is oriented to person, place, and time. She appears well-developed and well-nourished. No distress.   Acutely ill appearing, older than biological age    HENT:   Head: Normocephalic and atraumatic.   Right Ear: External ear normal.   Left Ear: External ear normal.   Nose: Nose normal.   Mouth/Throat: Oropharynx is clear and moist. No oropharyngeal exudate.   Eyes: Conjunctivae and EOM are normal. Pupils are equal, round, and reactive to light. No scleral icterus.   Neck: Normal range of motion. Neck supple. No JVD present. No tracheal deviation present.   Cardiovascular: Normal rate, regular rhythm and normal heart sounds.    No murmur heard.  Pulmonary/Chest: Effort normal. No stridor. No respiratory distress. She has wheezes.   Diminished bs in all fields   Abdominal: " Soft. Bowel sounds are normal. She exhibits no distension. There is no hepatomegaly. There is no tenderness.   Umbilical piercing   Musculoskeletal: Normal range of motion. She exhibits no edema, tenderness or deformity.   Lymphadenopathy:     She has no cervical adenopathy.   Neurological: She is alert and oriented to person, place, and time. No cranial nerve deficit. She exhibits normal muscle tone.   Skin: Skin is warm and dry. No rash noted. She is not diaphoretic. No erythema.   Birthmark on RUQ abdomen   Psychiatric:   Anxious mood, full affect        Results Review:    I reviewed the patient's new clinical results.  Lab Results (most recent)     Procedure Component Value Units Date/Time    Blood Culture - Blood, [[id number]] Collected:  06/07/18 1914    Specimen:  Blood from Hand, Right Updated:  06/07/18 1956    Blood Culture - Blood, [[id number]] Collected:  06/07/18 1914    Specimen:  Blood from Arm, Right Updated:  06/07/18 1956    Comprehensive Metabolic Panel [[id number]]  (Abnormal) Collected:  06/07/18 1643    Specimen:  Blood Updated:  06/07/18 1711     Glucose 149 (H) mg/dL      BUN 6 mg/dL      Creatinine 0.71 mg/dL      Sodium 143 mmol/L      Potassium 4.2 mmol/L      Chloride 104 mmol/L      CO2 24.4 mmol/L      Calcium 9.6 mg/dL      Total Protein 7.4 g/dL      Albumin 4.20 g/dL      ALT (SGPT) 8 U/L      AST (SGOT) 11 U/L      Alkaline Phosphatase 85 U/L      Total Bilirubin 0.4 mg/dL      eGFR Non African Amer 89 mL/min/1.73      Globulin 3.2 gm/dL      A/G Ratio 1.3 g/dL      BUN/Creatinine Ratio 8.5     Anion Gap 14.6 mmol/L     Lactic Acid, Plasma [[id number]]  (Normal) Collected:  06/07/18 1643    Specimen:  Blood Updated:  06/07/18 1708     Lactate 1.8 mmol/L     CBC & Differential [[id number]] Collected:  06/07/18 1643    Specimen:  Blood Updated:  06/07/18 1650    Narrative:       The following orders were created for panel order CBC & Differential.  Procedure                                Abnormality         Status                     ---------                               -----------         ------                     CBC Auto Differential[[id number]]        Abnormal            Final result                 Please view results for these tests on the individual orders.    CBC Auto Differential [[id number]]  (Abnormal) Collected:  06/07/18 1643    Specimen:  Blood Updated:  06/07/18 1650     WBC 19.28 (H) 10*3/mm3      RBC 4.50 10*6/mm3      Hemoglobin 15.1 g/dL      Hematocrit 46.2 %      .7 (H) fL      MCH 33.6 (H) pg      MCHC 32.7 g/dL      RDW 12.1 %      RDW-SD 45.6 fl      MPV 9.0 fL      Platelets 277 10*3/mm3      Neutrophil % 90.0 (H) %      Lymphocyte % 3.4 (L) %      Monocyte % 5.2 %      Eosinophil % 0.0 %      Basophil % 0.4 %      Immature Grans % 1.0 (H) %      Neutrophils, Absolute 17.35 (H) 10*3/mm3      Lymphocytes, Absolute 0.66 10*3/mm3      Monocytes, Absolute 1.00 10*3/mm3      Eosinophils, Absolute 0.00 (L) 10*3/mm3      Basophils, Absolute 0.07 10*3/mm3      Immature Grans, Absolute 0.20 (H) 10*3/mm3      nRBC 0.0 /100 WBC           Imaging Results (most recent)     Procedure Component Value Units Date/Time    XR Chest 2 View [[id number]] Resulted:  06/07/18 1752     Updated:  06/07/18 1753        reviewed    ECG/EMG Results (most recent)     None        reviewed    Assessment/Plan     Sepsis from CAP with COPD in pt with chronic Tobacco Abuse  - WBC 19, RR 24,   - Lactate 1.8, blood pressures 137/88  - Blood cultures gather in ER, getting sputum and respiratory virus panel  - For COPD, dounebs, steroids  - Pulm consult, ?need for work up for early severe COPD despite smoke history, A1AT?  - No Pft's in system  - Smoking cessation, pt stated pulm doc promised her nicotine gum   - Sats 99% on 2l NC, can wean as tolerated for sats 88-92%, doubt need o2 if titrated to those sats    [place] exposure?  - Would be cholinergic toxidrome, and would expect more lacrimation,  and nausea  - Spoke with Poison control, they agree, could have triggered her or made her more susceptible to respiratory infection or copd exacerbation, but her clinical presentation is not consistent with a cholinergic toxidrome, especially from an inhalation and skin exposure, and it being 4 days from exposure.     Sinus Tach  - treat infection    I discussed the patients findings and my recommendations with patient.     Gerardo Henson MD  06/08/18  1:09 AM

## 2018-06-08 NOTE — PLAN OF CARE
Problem: Patient Care Overview  Goal: Plan of Care Review  Outcome: Ongoing (interventions implemented as appropriate)   06/08/18 0400   Coping/Psychosocial   Plan of Care Reviewed With patient   Plan of Care Review   Progress no change   OTHER   Outcome Summary ER admit, continues on 2 liters n/c, receiving duo nebs, awaiting respiratory panel in droplet iso     Goal: Individualization and Mutuality  Outcome: Ongoing (interventions implemented as appropriate)   06/08/18 0400   Individualization   Patient Specific Preferences none voiced       Problem: Pneumonia (Adult)  Goal: Signs and Symptoms of Listed Potential Problems Will be Absent, Minimized or Managed (Pneumonia)  Outcome: Ongoing (interventions implemented as appropriate)   06/08/18 0400   Goal/Outcome Evaluation   Problems Assessed (Pneumonia) all   Problems Present (Pneumonia) respiratory compromise

## 2018-06-08 NOTE — DISCHARGE SUMMARY
Addendum:   D/C f/u with pulmonary 2 weeks, pcp 1 week,  Appointments made, not noted on discharge summary

## 2018-06-08 NOTE — NURSING NOTE
Pt reports that my old man and son should be here soon to take me home, pt teaching to call out when her ride arrives pt voices no concerns related to this

## 2018-06-11 ENCOUNTER — TELEPHONE (OUTPATIENT)
Dept: SOCIAL WORK | Facility: HOSPITAL | Age: 43
End: 2018-06-11

## 2018-06-12 LAB
BACTERIA SPEC AEROBE CULT: NORMAL
BACTERIA SPEC AEROBE CULT: NORMAL

## 2018-06-13 ENCOUNTER — TELEPHONE (OUTPATIENT)
Dept: INTERNAL MEDICINE | Facility: CLINIC | Age: 43
End: 2018-06-13

## 2018-06-13 NOTE — TELEPHONE ENCOUNTER
"Per Dr. Riddle, patient may have Nystatin liquid, swish and swallow 5mL QID for two weeks. Meds sent to pharmacy, I spoke with patient and asked her why she has not been taking her Plavix. Patient states that \"her mouth has been raw from the Prednisone.\" She started taking it on Friday when she was discharged from the hospital. She also says that she has not been able to eat since she has been home, she can only drink fluids. Explained to patient the importance of taking all of her medication that has been prescribed. She has a follow up appt with Dr. Riddle on Friday. She will start taking the Nystatin. Asked patient to start taking her medications including her Plavix. Patient understands, she will be here on Friday to further discuss with Dr. Riddle.   ----- Message from Joaquina Alexander CMA sent at 6/13/2018 11:01 AM EDT -----  Spoke to Emilee (d/c [facility]). Day 10, today is first day patient spoke with RN. Patient d/c 6/8/18. Patient states thursh, asking for nystatin. [facility] [place]. Emilee states patient is not taking RX Plavix. Patient has f/u scheduled with Dr. Riddle on 6/15/18, this Friday.     "

## 2018-06-13 NOTE — TELEPHONE ENCOUNTER
See telephone note from 6/13/18.  ----- Message from Joaquina Alexander CMA sent at 6/13/2018 11:01 AM EDT -----  Spoke to Emilee (d/c RN [facility]). Day 10, today is first day patient spoke with RN. Patient d/c 6/8/18. Patient states thursh, asking for nystatin. [facility] [place]. Emilee states patient is not taking RX Plavix. Patient has f/u scheduled with Dr. Riddle on 6/15/18, this Friday.

## 2018-06-15 ENCOUNTER — OFFICE VISIT (OUTPATIENT)
Dept: INTERNAL MEDICINE | Facility: CLINIC | Age: 43
End: 2018-06-15

## 2018-06-15 VITALS
TEMPERATURE: 97.2 F | SYSTOLIC BLOOD PRESSURE: 148 MMHG | HEIGHT: 62 IN | OXYGEN SATURATION: 98 % | HEART RATE: 98 BPM | DIASTOLIC BLOOD PRESSURE: 80 MMHG | RESPIRATION RATE: 20 BRPM | BODY MASS INDEX: 23.92 KG/M2 | WEIGHT: 130 LBS

## 2018-06-15 DIAGNOSIS — B37.0 THRUSH: ICD-10-CM

## 2018-06-15 DIAGNOSIS — Z09 HOSPITAL DISCHARGE FOLLOW-UP: Primary | ICD-10-CM

## 2018-06-15 DIAGNOSIS — J18.9 PNEUMONIA OF LEFT LOWER LOBE DUE TO INFECTIOUS ORGANISM: ICD-10-CM

## 2018-06-15 DIAGNOSIS — J44.1 COPD EXACERBATION (HCC): ICD-10-CM

## 2018-06-15 DIAGNOSIS — L73.9 FOLLICULITIS OF PERINEUM: ICD-10-CM

## 2018-06-15 PROCEDURE — 99215 OFFICE O/P EST HI 40 MIN: CPT | Performed by: INTERNAL MEDICINE

## 2018-06-15 RX ORDER — SULFAMETHOXAZOLE AND TRIMETHOPRIM 800; 160 MG/1; MG/1
1 TABLET ORAL 2 TIMES DAILY
Qty: 20 TABLET | Refills: 0 | Status: SHIPPED | OUTPATIENT
Start: 2018-06-15 | End: 2019-02-27

## 2018-06-15 NOTE — PROGRESS NOTES
Valeria Rubi is a 42 y.o. female, who presents with a chief complaint of   Chief Complaint   Patient presents with   • Follow-up     Hospital       HPI   The pt is here for f/u from the hospital.  She was admitted with a copd exacerbation on 6/7 and d/c on 6/8.  He is still coughing some.  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  Pt was treated with iv abx, steroids, and nebs.  She follows with pulmonology.  She thinks there trigger was being outside/yard chemicals.  She also had a coworker that had pneumonia taht she was around before she got sick.    After d/c she called bc of pain in her mouth.  It was hard for her to even swallow.  We called in nystatin swish and swallow and her sx are starting to improve.     She has an infected hair on her panty line.        The following portions of the patient's history were reviewed and updated as appropriate: allergies, current medications, past family history, past medical history, past social history, past surgical history and problem list.    Allergies: Patient has no known allergies.    Review of Systems   Constitutional: Positive for fatigue.   HENT: Positive for mouth sores.    Eyes: Negative.    Respiratory: Positive for cough and wheezing.    Cardiovascular: Negative.    Gastrointestinal: Negative.    Endocrine: Negative.    Genitourinary: Negative.    Musculoskeletal: Negative.    Skin: Negative.    Allergic/Immunologic: Negative.    Neurological: Negative.    Hematological: Negative.    Psychiatric/Behavioral: Negative.    All other systems reviewed and are negative.            Wt Readings from Last 3 Encounters:   06/15/18 59 kg (130 lb)   06/07/18 57.7 kg (127 lb 1.6 oz)   04/10/18 59.3 kg (130 lb 12.8 oz)     Temp Readings from Last 3 Encounters:   06/15/18 97.2 °F (36.2 °C)   06/08/18 99 °F (37.2 °C) (Oral)   06/24/16 97.3 °F (36.3 °C)     BP Readings from Last 3 Encounters:   06/15/18 148/80   06/08/18  119/70   04/10/18 116/82     Pulse Readings from Last 3 Encounters:   06/15/18 98   06/08/18 92   04/10/18 92     Body mass index is 23.78 kg/m².  @LASTSAO2(3)@    Physical Exam   Constitutional: She is oriented to person, place, and time. She appears well-developed and well-nourished. No distress.   HENT:   Head: Normocephalic and atraumatic.   Right Ear: External ear normal.   Left Ear: External ear normal.   Nose: Nose normal.   Mouth/Throat: Oropharynx is clear and moist.   Eyes: Conjunctivae and EOM are normal. Pupils are equal, round, and reactive to light.   Neck: Normal range of motion. Neck supple.   Cardiovascular: Normal rate, regular rhythm, normal heart sounds and intact distal pulses.    Pulmonary/Chest: Effort normal and breath sounds normal. No respiratory distress.   Few scattered exp wheezes   Musculoskeletal: Normal range of motion.   Normal gait   Neurological: She is alert and oriented to person, place, and time.   Skin: Skin is warm and dry.   Small pimple in left perineal area    Psychiatric: She has a normal mood and affect. Her behavior is normal. Judgment and thought content normal.   Nursing note and vitals reviewed.      Results for orders placed or performed during the hospital encounter of 06/07/18   Blood Culture - Blood,   Result Value Ref Range    Blood Culture No growth at 5 days    Blood Culture - Blood,   Result Value Ref Range    Blood Culture No growth at 5 days    S. Pneumo Ag Urine or CSF - Urine, Urine, Clean Catch   Result Value Ref Range    Strep Pneumo Ag Negative Negative   Respiratory Panel, PCR - Swab, Nasopharynx   Result Value Ref Range    ADENOVIRUS, PCR Not Detected Not Detected    Coronavirus 229E Not Detected Not Detected    Coronavirus HKU1 Not Detected Not Detected    Coronavirus NL63 Not Detected Not Detected    Coronavirus OC43 Not Detected Not Detected    Human Metapneumovirus Not Detected Not Detected    Human Rhinovirus/Enterovirus Not Detected Not Detected     Influenza B PCR Not Detected Not Detected    Parainfluenza Virus 1 Not Detected Not Detected    Parainfluenza Virus 2 Not Detected Not Detected    Parainfluenza Virus 3 Not Detected Not Detected    Parainfluenza Virus 4 Not Detected Not Detected    Bordetella pertussis pcr Not Detected Not Detected    Influenza A H1 2009 PCR Not Detected Not Detected    Chlamydophila pneumoniae PCR Not Detected Not Detected    Mycoplasma pneumo by PCR Not Detected Not Detected    Influenza A PCR Not Detected Not Detected    Influenza A H3 Not Detected Not Detected    Influenza A H1 Not Detected Not Detected    RSV, PCR Not Detected Not Detected   Comprehensive Metabolic Panel   Result Value Ref Range    Glucose 149 (H) 65 - 99 mg/dL    BUN 6 6 - 20 mg/dL    Creatinine 0.71 0.57 - 1.00 mg/dL    Sodium 143 136 - 145 mmol/L    Potassium 4.2 3.5 - 5.2 mmol/L    Chloride 104 98 - 107 mmol/L    CO2 24.4 22.0 - 29.0 mmol/L    Calcium 9.6 8.6 - 10.5 mg/dL    Total Protein 7.4 6.0 - 8.5 g/dL    Albumin 4.20 3.50 - 5.20 g/dL    ALT (SGPT) 8 5 - 33 U/L    AST (SGOT) 11 5 - 32 U/L    Alkaline Phosphatase 85 40 - 129 U/L    Total Bilirubin 0.4 0.2 - 1.2 mg/dL    eGFR Non African Amer 89 >60 mL/min/1.73    Globulin 3.2 gm/dL    A/G Ratio 1.3 g/dL    BUN/Creatinine Ratio 8.5 7.0 - 25.0    Anion Gap 14.6 mmol/L   Lactic Acid, Plasma   Result Value Ref Range    Lactate 1.8 0.5 - 2.0 mmol/L   CBC Auto Differential   Result Value Ref Range    WBC 19.28 (H) 4.80 - 10.80 10*3/mm3    RBC 4.50 4.20 - 5.40 10*6/mm3    Hemoglobin 15.1 12.0 - 16.0 g/dL    Hematocrit 46.2 37.0 - 47.0 %    .7 (H) 81.0 - 99.0 fL    MCH 33.6 (H) 27.0 - 31.0 pg    MCHC 32.7 31.0 - 37.0 g/dL    RDW 12.1 11.5 - 14.5 %    RDW-SD 45.6 37.0 - 54.0 fl    MPV 9.0 7.4 - 10.4 fL    Platelets 277 140 - 500 10*3/mm3    Neutrophil % 90.0 (H) 45.0 - 70.0 %    Lymphocyte % 3.4 (L) 20.0 - 45.0 %    Monocyte % 5.2 3.0 - 8.0 %    Eosinophil % 0.0 0.0 - 4.0 %    Basophil % 0.4 0.0 - 2.0  %    Immature Grans % 1.0 (H) 0.0 - 0.5 %    Neutrophils, Absolute 17.35 (H) 1.50 - 8.30 10*3/mm3    Lymphocytes, Absolute 0.66 0.60 - 4.80 10*3/mm3    Monocytes, Absolute 1.00 0.00 - 1.00 10*3/mm3    Eosinophils, Absolute 0.00 (L) 0.10 - 0.30 10*3/mm3    Basophils, Absolute 0.07 0.00 - 0.20 10*3/mm3    Immature Grans, Absolute 0.20 (H) 0.00 - 0.03 10*3/mm3    nRBC 0.0 0.0 - 0.0 /100 WBC   CBC Auto Differential   Result Value Ref Range    WBC 14.22 (H) 4.80 - 10.80 10*3/mm3    RBC 3.97 (L) 4.20 - 5.40 10*6/mm3    Hemoglobin 13.3 12.0 - 16.0 g/dL    Hematocrit 41.9 37.0 - 47.0 %    .5 (H) 81.0 - 99.0 fL    MCH 33.5 (H) 27.0 - 31.0 pg    MCHC 31.7 31.0 - 37.0 g/dL    RDW 11.9 11.5 - 14.5 %    RDW-SD 46.6 37.0 - 54.0 fl    MPV 9.5 7.4 - 10.4 fL    Platelets 251 140 - 500 10*3/mm3    Neutrophil % 80.0 (H) 45.0 - 70.0 %    Lymphocyte % 12.4 (L) 20.0 - 45.0 %    Monocyte % 6.6 3.0 - 8.0 %    Eosinophil % 0.1 0.0 - 4.0 %    Basophil % 0.2 0.0 - 2.0 %    Immature Grans % 0.7 (H) 0.0 - 0.5 %    Neutrophils, Absolute 11.38 (H) 1.50 - 8.30 10*3/mm3    Lymphocytes, Absolute 1.76 0.60 - 4.80 10*3/mm3    Monocytes, Absolute 0.94 0.00 - 1.00 10*3/mm3    Eosinophils, Absolute 0.01 (L) 0.10 - 0.30 10*3/mm3    Basophils, Absolute 0.03 0.00 - 0.20 10*3/mm3    Immature Grans, Absolute 0.10 (H) 0.00 - 0.03 10*3/mm3    nRBC 0.0 0.0 - 0.0 /100 WBC   Scan Slide   Result Value Ref Range    Anisocytosis Slight/1+ None Seen    Macrocytes Slight/1+ None Seen    WBC Morphology Normal Normal    Platelet Morphology Normal Normal           Valeria was seen today for follow-up.    Diagnoses and all orders for this visit:    Hospital discharge follow-up    Folliculitis of perineum  -     sulfamethoxazole-trimethoprim (BACTRIM DS,SEPTRA DS) 800-160 MG per tablet; Take 1 tablet by mouth 2 (Two) Times a Day.    Pneumonia of left lower lobe due to infectious organism    COPD exacerbation - cont inhalers.  Discussed smoking cessation.    Thrush -  cont nystatin.    Current outpatient and discharge medications have been reconciled for the patient.  Reviewed by: Adrianne Riddle MD    40 min spent in patient care with >50% spent in counseling about copd, thrush, and tobacco cessation.     Outpatient Medications Prior to Visit   Medication Sig Dispense Refill   • acetaminophen (TYLENOL) 325 MG tablet Take 2 tablets by mouth Every 4 (Four) Hours As Needed for Mild Pain , Headache or Fever.     • albuterol (PROVENTIL HFA;VENTOLIN HFA) 108 (90 Base) MCG/ACT inhaler INHALE 2 PUFFS PO EVERY 4 HOURS PRN (Patient taking differently: Inhale 2 puffs Every 4 (Four) Hours As Needed. INHALE 2 PUFFS PO EVERY 4 HOURS PRN) 18 g 3   • albuterol (PROVENTIL) (2.5 MG/3ML) 0.083% nebulizer solution 1 unit dose neb. Every 4 hrs. prn 225 mL 3   • aspirin 81 MG EC tablet Take 81 mg by mouth daily.     • butalbital-acetaminophen-caffeine (FIORICET, ESGIC) -40 MG per tablet TAKE 1 TABLET BY MOUTH EVERY 6 HOURS AS NEEDED 10 tablet 2   • clopidogrel (PLAVIX) 75 MG tablet Take 1 tablet by mouth Daily. 150 mg po X1 in RR     • fluticasone (FLONASE) 50 MCG/ACT nasal spray 1 spray into each nostril as needed.     • guaiFENesin (MUCINEX) 600 MG 12 hr tablet Take 2 tablets by mouth Every 12 (Twelve) Hours.     • lisinopril-hydrochlorothiazide (PRINZIDE,ZESTORETIC) 20-12.5 MG per tablet TAKE 1 TABLET BY MOUTH DAILY. 30 tablet 3   • medroxyPROGESTERone (DEPO-PROVERA) 150 MG/ML injection Apply 150 mg to cheek Every 3 (Three) Months. Pt takes every 3 months.march or April 2018 last dose     • melatonin 5 MG tablet tablet Take 2 tablets by mouth At Night As Needed (sleep).     • nystatin (MYCOSTATIN) 739697 UNIT/ML suspension Swish and swallow 5 mL 4 (Four) Times a Day. Swish and swallow 5mL four times a day for two weeks. 280 mL 0   • promethazine (PHENERGAN) 25 MG tablet TAKE 1 TABLET BY MOUTH TWICE A DAY AS NEEDED FOR NAUSEA AND VOMITING 20 tablet 2   • simvastatin (ZOCOR) 40 MG  tablet TAKE 1 TABLET BY MOUTH EVERY EVENING 30 tablet 6   • STIOLTO RESPIMAT 2.5-2.5 MCG/ACT aerosol solution inhaler INHALE 2 PUFFS DAILY. 4 g 6   • topiramate (TOPAMAX) 50 MG tablet Take 1 tablet by mouth 2 (Two) Times a Day. 180 tablet 1   • venlafaxine XR (EFFEXOR-XR) 150 MG 24 hr capsule Take 1 capsule by mouth Daily. 90 capsule 1   • vitamin D (ERGOCALCIFEROL) 42446 units capsule capsule Take 1 capsule by mouth Every 7 (Seven) Days. 6 capsule 5   • azithromycin (ZITHROMAX Z-CIPRIANO) 250 MG tablet Take 2 tablets the first day, then 1 tablet daily for 4 days. 6 tablet 0   • benzonatate (TESSALON PERLES) 100 MG capsule Take 1-2 caps po every 8 hours as needed for cough 42 capsule 0   • predniSONE (DELTASONE) 10 MG tablet 40 mg x 1 days, 30 mg x 1 days, 20 mg x 1 days, 10 mg x 1 days 10 tablet 0     No facility-administered medications prior to visit.      New Medications Ordered This Visit   Medications   • sulfamethoxazole-trimethoprim (BACTRIM DS,SEPTRA DS) 800-160 MG per tablet     Sig: Take 1 tablet by mouth 2 (Two) Times a Day.     Dispense:  20 tablet     Refill:  0     [unfilled]  Medications Discontinued During This Encounter   Medication Reason   • azithromycin (ZITHROMAX Z-CIPRIANO) 250 MG tablet *Therapy completed   • benzonatate (TESSALON PERLES) 100 MG capsule *Therapy completed   • predniSONE (DELTASONE) 10 MG tablet *Therapy completed         Return in about 2 months (around 8/15/2018) for Recheck.

## 2018-07-01 DIAGNOSIS — J43.1 PANLOBULAR EMPHYSEMA (HCC): ICD-10-CM

## 2018-07-02 RX ORDER — ALBUTEROL SULFATE 2.5 MG/3ML
SOLUTION RESPIRATORY (INHALATION)
Qty: 225 ML | Refills: 3 | Status: SHIPPED | OUTPATIENT
Start: 2018-07-02 | End: 2018-09-27 | Stop reason: SDUPTHER

## 2018-08-03 RX ORDER — PROMETHAZINE HYDROCHLORIDE 25 MG/1
TABLET ORAL
Qty: 20 TABLET | Refills: 2 | Status: SHIPPED | OUTPATIENT
Start: 2018-08-03 | End: 2019-02-08 | Stop reason: SDUPTHER

## 2018-08-08 RX ORDER — BUTALBITAL, ACETAMINOPHEN AND CAFFEINE 50; 325; 40 MG/1; MG/1; MG/1
TABLET ORAL
Qty: 10 TABLET | Refills: 0 | Status: SHIPPED | OUTPATIENT
Start: 2018-08-08 | End: 2018-09-08 | Stop reason: SDUPTHER

## 2018-08-13 ENCOUNTER — HOSPITAL ENCOUNTER (OUTPATIENT)
Dept: PULMONOLOGY | Facility: HOSPITAL | Age: 43
Discharge: HOME | End: 2018-08-13
Attending: INTERNAL MEDICINE | Admitting: INTERNAL MEDICINE

## 2018-08-13 DIAGNOSIS — R06.00 DYSPNEA, UNSPECIFIED TYPE: ICD-10-CM

## 2018-08-13 PROCEDURE — 94726 PLETHYSMOGRAPHY LUNG VOLUMES: CPT

## 2018-08-13 PROCEDURE — 94060 EVALUATION OF WHEEZING: CPT

## 2018-08-13 PROCEDURE — 94729 DIFFUSING CAPACITY: CPT

## 2018-08-13 RX ORDER — ALBUTEROL SULFATE 2.5 MG/3ML
2.5 SOLUTION RESPIRATORY (INHALATION) ONCE
Status: COMPLETED | OUTPATIENT
Start: 2018-08-13 | End: 2018-08-13

## 2018-08-13 RX ADMIN — ALBUTEROL SULFATE 2.5 MG: 2.5 SOLUTION RESPIRATORY (INHALATION) at 11:47

## 2018-09-10 RX ORDER — BUTALBITAL, ACETAMINOPHEN AND CAFFEINE 50; 325; 40 MG/1; MG/1; MG/1
TABLET ORAL
Qty: 10 TABLET | Refills: 0 | Status: SHIPPED | OUTPATIENT
Start: 2018-09-10 | End: 2018-10-07 | Stop reason: SDUPTHER

## 2018-09-27 DIAGNOSIS — J43.1 PANLOBULAR EMPHYSEMA (HCC): ICD-10-CM

## 2018-09-27 RX ORDER — ALBUTEROL SULFATE 2.5 MG/3ML
SOLUTION RESPIRATORY (INHALATION)
Qty: 225 ML | Refills: 3 | Status: SHIPPED | OUTPATIENT
Start: 2018-09-27 | End: 2023-03-28 | Stop reason: HOSPADM

## 2018-10-06 DIAGNOSIS — G89.29 CHRONIC NONINTRACTABLE HEADACHE, UNSPECIFIED HEADACHE TYPE: ICD-10-CM

## 2018-10-06 DIAGNOSIS — R51.9 CHRONIC NONINTRACTABLE HEADACHE, UNSPECIFIED HEADACHE TYPE: ICD-10-CM

## 2018-10-08 RX ORDER — TOPIRAMATE 50 MG/1
TABLET, FILM COATED ORAL
Qty: 180 TABLET | Refills: 1 | Status: SHIPPED | OUTPATIENT
Start: 2018-10-08 | End: 2019-02-27 | Stop reason: SDUPTHER

## 2018-10-08 RX ORDER — BUTALBITAL, ACETAMINOPHEN AND CAFFEINE 50; 325; 40 MG/1; MG/1; MG/1
TABLET ORAL
Qty: 10 TABLET | Refills: 0 | Status: SHIPPED | OUTPATIENT
Start: 2018-10-08 | End: 2018-11-05 | Stop reason: SDUPTHER

## 2018-11-03 DIAGNOSIS — F41.9 ANXIETY AND DEPRESSION: ICD-10-CM

## 2018-11-03 DIAGNOSIS — F32.A ANXIETY AND DEPRESSION: ICD-10-CM

## 2018-11-05 RX ORDER — VENLAFAXINE HYDROCHLORIDE 150 MG/1
150 CAPSULE, EXTENDED RELEASE ORAL DAILY
Qty: 90 CAPSULE | Refills: 1 | Status: SHIPPED | OUTPATIENT
Start: 2018-11-05 | End: 2021-01-15

## 2018-11-06 RX ORDER — BUTALBITAL, ACETAMINOPHEN AND CAFFEINE 50; 325; 40 MG/1; MG/1; MG/1
TABLET ORAL
Qty: 10 TABLET | Refills: 0 | Status: SHIPPED | OUTPATIENT
Start: 2018-11-06 | End: 2018-12-07 | Stop reason: SDUPTHER

## 2018-11-15 DIAGNOSIS — J43.1 PANLOBULAR EMPHYSEMA (HCC): ICD-10-CM

## 2018-11-16 RX ORDER — ALBUTEROL SULFATE 90 UG/1
AEROSOL, METERED RESPIRATORY (INHALATION)
Qty: 18 INHALER | Refills: 3 | Status: SHIPPED | OUTPATIENT
Start: 2018-11-16 | End: 2019-08-31 | Stop reason: SDUPTHER

## 2018-12-07 RX ORDER — BUTALBITAL, ACETAMINOPHEN AND CAFFEINE 50; 325; 40 MG/1; MG/1; MG/1
TABLET ORAL
Qty: 10 TABLET | Refills: 0 | Status: SHIPPED | OUTPATIENT
Start: 2018-12-07 | End: 2019-01-10 | Stop reason: SDUPTHER

## 2018-12-19 RX ORDER — SIMVASTATIN 40 MG
TABLET ORAL
Qty: 30 TABLET | Refills: 6 | Status: SHIPPED | OUTPATIENT
Start: 2018-12-19 | End: 2021-01-15 | Stop reason: SDUPTHER

## 2019-01-11 RX ORDER — BUTALBITAL, ACETAMINOPHEN AND CAFFEINE 50; 325; 40 MG/1; MG/1; MG/1
TABLET ORAL
Qty: 10 TABLET | Refills: 0 | Status: SHIPPED | OUTPATIENT
Start: 2019-01-11 | End: 2019-02-08 | Stop reason: SDUPTHER

## 2019-02-07 RX ORDER — BUTALBITAL, ACETAMINOPHEN AND CAFFEINE 50; 325; 40 MG/1; MG/1; MG/1
TABLET ORAL
Qty: 10 TABLET | Refills: 0 | OUTPATIENT
Start: 2019-02-07

## 2019-02-08 ENCOUNTER — TELEPHONE (OUTPATIENT)
Dept: INTERNAL MEDICINE | Facility: CLINIC | Age: 44
End: 2019-02-08

## 2019-02-08 LAB
ALBUMIN SERPL-MCNC: 3.8 G/DL (ref 3.5–5.2)
ALBUMIN/GLOB SERPL: 1.6 G/DL
ALP SERPL-CCNC: 76 U/L (ref 40–129)
ALT SERPL-CCNC: 8 U/L (ref 5–33)
AST SERPL-CCNC: 11 U/L (ref 5–32)
BASOPHILS # BLD AUTO: 0.06 10*3/MM3 (ref 0–0.2)
BASOPHILS NFR BLD AUTO: 0.7 % (ref 0–2)
BILIRUB SERPL-MCNC: 0.3 MG/DL (ref 0.2–1.2)
BUN SERPL-MCNC: 12 MG/DL (ref 6–20)
BUN/CREAT SERPL: 16 (ref 7–25)
CALCIUM SERPL-MCNC: 8.6 MG/DL (ref 8.6–10.5)
CHLORIDE SERPL-SCNC: 104 MMOL/L (ref 98–107)
CHOLEST SERPL-MCNC: 178 MG/DL (ref 0–200)
CO2 SERPL-SCNC: 25.8 MMOL/L (ref 22–29)
CREAT SERPL-MCNC: 0.75 MG/DL (ref 0.57–1)
EOSINOPHIL # BLD AUTO: 0.16 10*3/MM3 (ref 0.1–0.3)
EOSINOPHIL NFR BLD AUTO: 1.9 % (ref 0–4)
ERYTHROCYTE [DISTWIDTH] IN BLOOD BY AUTOMATED COUNT: 12.9 % (ref 11.5–14.5)
GLOBULIN SER CALC-MCNC: 2.4 GM/DL
GLUCOSE SERPL-MCNC: 94 MG/DL (ref 65–99)
HBA1C MFR BLD: 5 % (ref 4.8–5.6)
HCT VFR BLD AUTO: 44.1 % (ref 37–47)
HDLC SERPL-MCNC: 43 MG/DL (ref 40–60)
HGB BLD-MCNC: 14.3 G/DL (ref 12–16)
IMM GRANULOCYTES # BLD AUTO: 0.04 10*3/MM3 (ref 0–0.03)
IMM GRANULOCYTES NFR BLD AUTO: 0.5 % (ref 0–0.5)
LDLC SERPL CALC-MCNC: 106 MG/DL (ref 0–100)
LDLC/HDLC SERPL: 2.46 {RATIO}
LYMPHOCYTES # BLD AUTO: 2.96 10*3/MM3 (ref 0.6–4.8)
LYMPHOCYTES NFR BLD AUTO: 34.8 % (ref 20–45)
MCH RBC QN AUTO: 33.3 PG (ref 27–31)
MCHC RBC AUTO-ENTMCNC: 32.4 G/DL (ref 31–37)
MCV RBC AUTO: 102.8 FL (ref 81–99)
MONOCYTES # BLD AUTO: 0.63 10*3/MM3 (ref 0–1)
MONOCYTES NFR BLD AUTO: 7.4 % (ref 3–8)
NEUTROPHILS # BLD AUTO: 4.65 10*3/MM3 (ref 1.5–8.3)
NEUTROPHILS NFR BLD AUTO: 54.7 % (ref 45–70)
NRBC BLD AUTO-RTO: 0 /100 WBC (ref 0–0)
PLATELET # BLD AUTO: 348 10*3/MM3 (ref 140–500)
POTASSIUM SERPL-SCNC: 3.7 MMOL/L (ref 3.5–5.2)
PROT SERPL-MCNC: 6.2 G/DL (ref 6–8.5)
RBC # BLD AUTO: 4.29 10*6/MM3 (ref 4.2–5.4)
SODIUM SERPL-SCNC: 141 MMOL/L (ref 136–145)
TRIGL SERPL-MCNC: 147 MG/DL (ref 0–150)
VLDLC SERPL CALC-MCNC: 29.4 MG/DL (ref 7–27)
WBC # BLD AUTO: 8.5 10*3/MM3 (ref 4.8–10.8)

## 2019-02-08 RX ORDER — BUTALBITAL, ACETAMINOPHEN AND CAFFEINE 50; 325; 40 MG/1; MG/1; MG/1
TABLET ORAL
Qty: 10 TABLET | Refills: 0 | Status: SHIPPED | OUTPATIENT
Start: 2019-02-08 | End: 2019-03-13 | Stop reason: SDUPTHER

## 2019-02-08 RX ORDER — PROMETHAZINE HYDROCHLORIDE 25 MG/1
TABLET ORAL
Qty: 10 TABLET | Refills: 0 | Status: SHIPPED | OUTPATIENT
Start: 2019-02-08 | End: 2019-05-15 | Stop reason: SDUPTHER

## 2019-02-08 NOTE — TELEPHONE ENCOUNTER
----- Message from Adrianne Riddle MD sent at 2/8/2019  3:48 PM EST -----  Labs ok. Will discuss at upcoming appt.

## 2019-02-08 NOTE — TELEPHONE ENCOUNTER
PT  AWARE   SENT TO PHARMACY        ----- Message from Adrianne Foley MD sent at 2/8/2019  1:59 PM EST -----  Regarding: RE: MEDICATION REQUEST  Contact: [phone]  Ok for # 10 of each.      ----- Message -----  From: Toshia Godoy MA  Sent: 2/8/2019  11:14 AM  To: Adrianne Foley MD  Subject: FW: MEDICATION REQUEST                               ----- Message -----  From: Deo Madden  Sent: 2/8/2019  10:51 AM  To: [facility]  Subject: MEDICATION REQUEST                               TW[facility] PT    Patient is out of meds, was in today for labs, and has appointment scheduled to see dr foley on 02-27.  She is asking that we fill     butalbital-acetaminophen-caffeine (FIORICET, ESGIC) -40 MG per tablet    And     promethazine (PHENERGAN) 25 MG tablet    Send to [facility] please.    Thanks!  deo

## 2019-02-27 ENCOUNTER — OFFICE VISIT (OUTPATIENT)
Dept: INTERNAL MEDICINE | Facility: CLINIC | Age: 44
End: 2019-02-27

## 2019-02-27 VITALS
BODY MASS INDEX: 24.11 KG/M2 | SYSTOLIC BLOOD PRESSURE: 140 MMHG | TEMPERATURE: 98.2 F | HEART RATE: 83 BPM | OXYGEN SATURATION: 98 % | HEIGHT: 62 IN | RESPIRATION RATE: 16 BRPM | WEIGHT: 131 LBS | DIASTOLIC BLOOD PRESSURE: 74 MMHG

## 2019-02-27 DIAGNOSIS — G47.9 SLEEP DIFFICULTIES: ICD-10-CM

## 2019-02-27 DIAGNOSIS — J44.1 COPD EXACERBATION (HCC): ICD-10-CM

## 2019-02-27 DIAGNOSIS — R51.9 CHRONIC INTRACTABLE HEADACHE, UNSPECIFIED HEADACHE TYPE: ICD-10-CM

## 2019-02-27 DIAGNOSIS — Z72.0 TOBACCO USE: ICD-10-CM

## 2019-02-27 DIAGNOSIS — R51.9 CHRONIC NONINTRACTABLE HEADACHE, UNSPECIFIED HEADACHE TYPE: Primary | ICD-10-CM

## 2019-02-27 DIAGNOSIS — F41.9 ANXIETY AND DEPRESSION: ICD-10-CM

## 2019-02-27 DIAGNOSIS — G89.29 CHRONIC INTRACTABLE HEADACHE, UNSPECIFIED HEADACHE TYPE: ICD-10-CM

## 2019-02-27 DIAGNOSIS — E78.2 MIXED HYPERLIPIDEMIA: ICD-10-CM

## 2019-02-27 DIAGNOSIS — F32.A ANXIETY AND DEPRESSION: ICD-10-CM

## 2019-02-27 DIAGNOSIS — I73.9 PERIPHERAL ARTERY DISEASE (HCC): ICD-10-CM

## 2019-02-27 DIAGNOSIS — I10 ESSENTIAL HYPERTENSION: ICD-10-CM

## 2019-02-27 DIAGNOSIS — G89.29 CHRONIC NONINTRACTABLE HEADACHE, UNSPECIFIED HEADACHE TYPE: Primary | ICD-10-CM

## 2019-02-27 PROCEDURE — 99214 OFFICE O/P EST MOD 30 MIN: CPT | Performed by: INTERNAL MEDICINE

## 2019-02-27 RX ORDER — TOPIRAMATE 100 MG/1
100 TABLET, FILM COATED ORAL 2 TIMES DAILY
Qty: 180 TABLET | Refills: 1 | Status: SHIPPED | OUTPATIENT
Start: 2019-02-27 | End: 2021-01-15

## 2019-02-27 NOTE — PROGRESS NOTES
"      Valeria Rubi is a 43 y.o. female, who presents with a chief complaint of   Chief Complaint   Patient presents with   • Hyperlipidemia       HPI   Pt here for follow up    She has had hyperglycemia.  She says she lives on [facility].  Suprisingly her fasting glucoses and a1c are normal.    Sleep difficulties - she has tried melatonin and otc meds.  She has been very tired.  She has been sleeping on the couch for almost 3 years.  Her dogs also sleep on the couch with her.      Chronic headaches - she says her headaches have been worse.  She thinks a lot of her headaches are related to stress.    She is breaking up with her boyfriend of many years.  She says he doesn't treat her with respect.  After her mom passed away things have been worse.  She says fioricet helps the most for her headaches.  She says imitrex didn't help.  She has not been to neurology for her headaches. She is on topamax.     Anxiety/depression - she has struggled with depression.  She is already on effexor    Copd - she is still smoking.  She gets short of breath easily and this may have gotten a little worse.  She has not had to use her inhalre more lately.  She is on stiolto.  she denies chest pain, arm pain, or jaw pain.     PAD - arms and legs will \"go to sleep\" at times. On plavix and asa.      HTN- well controlled.      HLD - on statin    The following portions of the patient's history were reviewed and updated as appropriate: allergies, current medications, past family history, past medical history, past social history, past surgical history and problem list.    Allergies: Patient has no known allergies.    Review of Systems   Constitutional: Negative.    Eyes: Negative.    Respiratory: Positive for shortness of breath.    Cardiovascular: Negative.    Gastrointestinal: Negative.    Endocrine: Negative.    Genitourinary: Negative.    Musculoskeletal: Negative.    Skin: Negative.    Allergic/Immunologic: Negative.    Neurological: " Positive for headaches. Negative for dizziness, tremors, syncope, facial asymmetry, speech difficulty, weakness and light-headedness.   Hematological: Negative.    Psychiatric/Behavioral: Negative.    All other systems reviewed and are negative.            Wt Readings from Last 3 Encounters:   02/27/19 59.4 kg (131 lb)   06/15/18 59 kg (130 lb)   06/07/18 57.7 kg (127 lb 1.6 oz)     Temp Readings from Last 3 Encounters:   02/27/19 98.2 °F (36.8 °C)   06/15/18 97.2 °F (36.2 °C)   06/08/18 99 °F (37.2 °C) (Oral)     BP Readings from Last 3 Encounters:   02/27/19 140/74   06/15/18 148/80   06/08/18 119/70     Pulse Readings from Last 3 Encounters:   02/27/19 83   06/15/18 98   06/08/18 92     Body mass index is 23.96 kg/m².  @LASTSAO2(3)@    Physical Exam    Results for orders placed or performed during the hospital encounter of 06/07/18   Blood Culture - Blood,   Result Value Ref Range    Blood Culture No growth at 5 days    Blood Culture - Blood,   Result Value Ref Range    Blood Culture No growth at 5 days    S. Pneumo Ag Urine or CSF - Urine, Urine, Clean Catch   Result Value Ref Range    Strep Pneumo Ag Negative Negative   Respiratory Panel, PCR - Swab, Nasopharynx   Result Value Ref Range    ADENOVIRUS, PCR Not Detected Not Detected    Coronavirus 229E Not Detected Not Detected    Coronavirus HKU1 Not Detected Not Detected    Coronavirus NL63 Not Detected Not Detected    Coronavirus OC43 Not Detected Not Detected    Human Metapneumovirus Not Detected Not Detected    Human Rhinovirus/Enterovirus Not Detected Not Detected    Influenza B PCR Not Detected Not Detected    Parainfluenza Virus 1 Not Detected Not Detected    Parainfluenza Virus 2 Not Detected Not Detected    Parainfluenza Virus 3 Not Detected Not Detected    Parainfluenza Virus 4 Not Detected Not Detected    Bordetella pertussis pcr Not Detected Not Detected    Influenza A H1 2009 PCR Not Detected Not Detected    Chlamydophila pneumoniae PCR Not Detected  Not Detected    Mycoplasma pneumo by PCR Not Detected Not Detected    Influenza A PCR Not Detected Not Detected    Influenza A H3 Not Detected Not Detected    Influenza A H1 Not Detected Not Detected    RSV, PCR Not Detected Not Detected   Comprehensive Metabolic Panel   Result Value Ref Range    Glucose 149 (H) 65 - 99 mg/dL    BUN 6 6 - 20 mg/dL    Creatinine 0.71 0.57 - 1.00 mg/dL    Sodium 143 136 - 145 mmol/L    Potassium 4.2 3.5 - 5.2 mmol/L    Chloride 104 98 - 107 mmol/L    CO2 24.4 22.0 - 29.0 mmol/L    Calcium 9.6 8.6 - 10.5 mg/dL    Total Protein 7.4 6.0 - 8.5 g/dL    Albumin 4.20 3.50 - 5.20 g/dL    ALT (SGPT) 8 5 - 33 U/L    AST (SGOT) 11 5 - 32 U/L    Alkaline Phosphatase 85 40 - 129 U/L    Total Bilirubin 0.4 0.2 - 1.2 mg/dL    eGFR Non African Amer 89 >60 mL/min/1.73    Globulin 3.2 gm/dL    A/G Ratio 1.3 g/dL    BUN/Creatinine Ratio 8.5 7.0 - 25.0    Anion Gap 14.6 mmol/L   Lactic Acid, Plasma   Result Value Ref Range    Lactate 1.8 0.5 - 2.0 mmol/L   CBC Auto Differential   Result Value Ref Range    WBC 19.28 (H) 4.80 - 10.80 10*3/mm3    RBC 4.50 4.20 - 5.40 10*6/mm3    Hemoglobin 15.1 12.0 - 16.0 g/dL    Hematocrit 46.2 37.0 - 47.0 %    .7 (H) 81.0 - 99.0 fL    MCH 33.6 (H) 27.0 - 31.0 pg    MCHC 32.7 31.0 - 37.0 g/dL    RDW 12.1 11.5 - 14.5 %    RDW-SD 45.6 37.0 - 54.0 fl    MPV 9.0 7.4 - 10.4 fL    Platelets 277 140 - 500 10*3/mm3    Neutrophil % 90.0 (H) 45.0 - 70.0 %    Lymphocyte % 3.4 (L) 20.0 - 45.0 %    Monocyte % 5.2 3.0 - 8.0 %    Eosinophil % 0.0 0.0 - 4.0 %    Basophil % 0.4 0.0 - 2.0 %    Immature Grans % 1.0 (H) 0.0 - 0.5 %    Neutrophils, Absolute 17.35 (H) 1.50 - 8.30 10*3/mm3    Lymphocytes, Absolute 0.66 0.60 - 4.80 10*3/mm3    Monocytes, Absolute 1.00 0.00 - 1.00 10*3/mm3    Eosinophils, Absolute 0.00 (L) 0.10 - 0.30 10*3/mm3    Basophils, Absolute 0.07 0.00 - 0.20 10*3/mm3    Immature Grans, Absolute 0.20 (H) 0.00 - 0.03 10*3/mm3    nRBC 0.0 0.0 - 0.0 /100 WBC   CBC Auto  Differential   Result Value Ref Range    WBC 14.22 (H) 4.80 - 10.80 10*3/mm3    RBC 3.97 (L) 4.20 - 5.40 10*6/mm3    Hemoglobin 13.3 12.0 - 16.0 g/dL    Hematocrit 41.9 37.0 - 47.0 %    .5 (H) 81.0 - 99.0 fL    MCH 33.5 (H) 27.0 - 31.0 pg    MCHC 31.7 31.0 - 37.0 g/dL    RDW 11.9 11.5 - 14.5 %    RDW-SD 46.6 37.0 - 54.0 fl    MPV 9.5 7.4 - 10.4 fL    Platelets 251 140 - 500 10*3/mm3    Neutrophil % 80.0 (H) 45.0 - 70.0 %    Lymphocyte % 12.4 (L) 20.0 - 45.0 %    Monocyte % 6.6 3.0 - 8.0 %    Eosinophil % 0.1 0.0 - 4.0 %    Basophil % 0.2 0.0 - 2.0 %    Immature Grans % 0.7 (H) 0.0 - 0.5 %    Neutrophils, Absolute 11.38 (H) 1.50 - 8.30 10*3/mm3    Lymphocytes, Absolute 1.76 0.60 - 4.80 10*3/mm3    Monocytes, Absolute 0.94 0.00 - 1.00 10*3/mm3    Eosinophils, Absolute 0.01 (L) 0.10 - 0.30 10*3/mm3    Basophils, Absolute 0.03 0.00 - 0.20 10*3/mm3    Immature Grans, Absolute 0.10 (H) 0.00 - 0.03 10*3/mm3    nRBC 0.0 0.0 - 0.0 /100 WBC   Scan Slide   Result Value Ref Range    Anisocytosis Slight/1+ None Seen    Macrocytes Slight/1+ None Seen    WBC Morphology Normal Normal    Platelet Morphology Normal Normal           Valeria was seen today for hyperlipidemia.    Diagnoses and all orders for this visit:    Chronic nonintractable headache, unspecified headache type  -     topiramate (TOPAMAX) 100 MG tablet; Take 1 tablet by mouth 2 (Two) Times a Day.    Sleep difficulties    Anxiety and depression    Chronic intractable headache, unspecified headache type  -     Ambulatory Referral to Neurology  -     MRI Brain With & Without Contrast; Future    COPD exacerbation (CMS/HCC)    Mixed hyperlipidemia    Essential hypertension    Peripheral artery disease (CMS/HCC)    Tobacco use      Increase topamax.  Cont other meds.  Refer to neurology.  Will try to check MRI.  Pt has had femoral artery stenting.  If stents not compatible with MRI will check ct head.      Encouraged tobacco cessation.   Cont other chronic meds.      Outpatient Medications Prior to Visit   Medication Sig Dispense Refill   • acetaminophen (TYLENOL) 325 MG tablet Take 2 tablets by mouth Every 4 (Four) Hours As Needed for Mild Pain , Headache or Fever.     • albuterol (PROVENTIL HFA;VENTOLIN HFA) 108 (90 Base) MCG/ACT inhaler INHALE 2 PUFFS BY MOUTH EVERY 4 HOURS AS NEEDED 18 inhaler 3   • albuterol (PROVENTIL) (2.5 MG/3ML) 0.083% nebulizer solution USE 1 VIAL VIA NEBULIZER EVERY 4 HOURS AS NEEDED 225 mL 3   • aspirin 81 MG EC tablet Take 81 mg by mouth daily.     • butalbital-acetaminophen-caffeine (FIORICET, ESGIC) -40 MG per tablet Take  One po every   6 hours prn 10 tablet 0   • clopidogrel (PLAVIX) 75 MG tablet Take 1 tablet by mouth Daily. 150 mg po X1 in RR     • fluticasone (FLONASE) 50 MCG/ACT nasal spray 1 spray into each nostril as needed.     • lisinopril-hydrochlorothiazide (PRINZIDE,ZESTORETIC) 20-12.5 MG per tablet TAKE 1 TABLET BY MOUTH DAILY. 30 tablet 3   • medroxyPROGESTERone (DEPO-PROVERA) 150 MG/ML injection Apply 150 mg to cheek Every 3 (Three) Months. Pt takes every 3 months.march or April 2018 last dose     • melatonin 5 MG tablet tablet Take 2 tablets by mouth At Night As Needed (sleep).     • nystatin (MYCOSTATIN) 883232 UNIT/ML suspension Swish and swallow 5 mL 4 (Four) Times a Day. Swish and swallow 5mL four times a day for two weeks. 280 mL 0   • promethazine (PHENERGAN) 25 MG tablet Take one   Po   Bid   Prn  N/v 10 tablet 0   • simvastatin (ZOCOR) 40 MG tablet TAKE 1 TABLET BY MOUTH EVERY EVENING 30 tablet 6   • STIOLTO RESPIMAT 2.5-2.5 MCG/ACT aerosol solution inhaler INHALE 2 PUFFS DAILY. 4 g 6   • venlafaxine XR (EFFEXOR-XR) 150 MG 24 hr capsule TAKE 1 CAPSULE BY MOUTH DAILY. 90 capsule 1   • vitamin D (ERGOCALCIFEROL) 47725 units capsule capsule Take 1 capsule by mouth Every 7 (Seven) Days. 6 capsule 5   • topiramate (TOPAMAX) 50 MG tablet TAKE 1 TABLET BY MOUTH 2 TIMES A DAY. 180 tablet 1   • guaiFENesin (MUCINEX) 600  MG 12 hr tablet Take 2 tablets by mouth Every 12 (Twelve) Hours.     • sulfamethoxazole-trimethoprim (BACTRIM DS,SEPTRA DS) 800-160 MG per tablet Take 1 tablet by mouth 2 (Two) Times a Day. 20 tablet 0     No facility-administered medications prior to visit.      New Medications Ordered This Visit   Medications   • topiramate (TOPAMAX) 100 MG tablet     Sig: Take 1 tablet by mouth 2 (Two) Times a Day.     Dispense:  180 tablet     Refill:  1     [unfilled]  Medications Discontinued During This Encounter   Medication Reason   • guaiFENesin (MUCINEX) 600 MG 12 hr tablet *Therapy completed   • sulfamethoxazole-trimethoprim (BACTRIM DS,SEPTRA DS) 800-160 MG per tablet *Therapy completed   • topiramate (TOPAMAX) 50 MG tablet Reorder         Return in about 1 month (around 3/27/2019) for Recheck.

## 2019-03-06 ENCOUNTER — HOSPITAL ENCOUNTER (OUTPATIENT)
Dept: MRI IMAGING | Facility: HOSPITAL | Age: 44
Discharge: HOME | End: 2019-03-06
Admitting: INTERNAL MEDICINE

## 2019-03-06 DIAGNOSIS — G89.29 CHRONIC INTRACTABLE HEADACHE, UNSPECIFIED HEADACHE TYPE: ICD-10-CM

## 2019-03-06 DIAGNOSIS — R51.9 CHRONIC INTRACTABLE HEADACHE, UNSPECIFIED HEADACHE TYPE: ICD-10-CM

## 2019-03-06 PROCEDURE — 0 GADOBENATE DIMEGLUMINE 529 MG/ML SOLUTION: Performed by: INTERNAL MEDICINE

## 2019-03-06 PROCEDURE — 70553 MRI BRAIN STEM W/O & W/DYE: CPT

## 2019-03-06 PROCEDURE — A9577 INJ MULTIHANCE: HCPCS | Performed by: INTERNAL MEDICINE

## 2019-03-06 RX ADMIN — GADOBENATE DIMEGLUMINE 12 ML: 529 INJECTION, SOLUTION INTRAVENOUS at 13:43

## 2019-03-13 RX ORDER — BUTALBITAL, ACETAMINOPHEN AND CAFFEINE 50; 325; 40 MG/1; MG/1; MG/1
TABLET ORAL
Qty: 10 TABLET | Refills: 0 | Status: SHIPPED | OUTPATIENT
Start: 2019-03-13 | End: 2019-04-11 | Stop reason: SDUPTHER

## 2019-03-27 ENCOUNTER — OFFICE VISIT (OUTPATIENT)
Dept: INTERNAL MEDICINE | Facility: CLINIC | Age: 44
End: 2019-03-27

## 2019-03-27 VITALS
SYSTOLIC BLOOD PRESSURE: 142 MMHG | BODY MASS INDEX: 24.4 KG/M2 | HEART RATE: 108 BPM | HEIGHT: 62 IN | WEIGHT: 132.6 LBS | OXYGEN SATURATION: 95 % | TEMPERATURE: 97.6 F | RESPIRATION RATE: 16 BRPM | DIASTOLIC BLOOD PRESSURE: 90 MMHG

## 2019-03-27 DIAGNOSIS — F41.9 ANXIETY AND DEPRESSION: ICD-10-CM

## 2019-03-27 DIAGNOSIS — G47.9 SLEEP DIFFICULTIES: ICD-10-CM

## 2019-03-27 DIAGNOSIS — F32.A ANXIETY AND DEPRESSION: ICD-10-CM

## 2019-03-27 DIAGNOSIS — R51.9 CHRONIC NONINTRACTABLE HEADACHE, UNSPECIFIED HEADACHE TYPE: Primary | ICD-10-CM

## 2019-03-27 DIAGNOSIS — Z72.0 TOBACCO USE: ICD-10-CM

## 2019-03-27 DIAGNOSIS — G89.29 CHRONIC NONINTRACTABLE HEADACHE, UNSPECIFIED HEADACHE TYPE: Primary | ICD-10-CM

## 2019-03-27 DIAGNOSIS — J43.1 PANLOBULAR EMPHYSEMA (HCC): ICD-10-CM

## 2019-03-27 PROCEDURE — 99214 OFFICE O/P EST MOD 30 MIN: CPT | Performed by: INTERNAL MEDICINE
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
## 2019-04-12 RX ORDER — BUTALBITAL, ACETAMINOPHEN AND CAFFEINE 50; 325; 40 MG/1; MG/1; MG/1
TABLET ORAL
Qty: 10 TABLET | Refills: 0 | Status: SHIPPED | OUTPATIENT
Start: 2019-04-12 | End: 2019-05-10 | Stop reason: SDUPTHER

## 2019-05-10 RX ORDER — BUTALBITAL, ACETAMINOPHEN AND CAFFEINE 50; 325; 40 MG/1; MG/1; MG/1
TABLET ORAL
Qty: 10 TABLET | Refills: 0 | Status: SHIPPED | OUTPATIENT
Start: 2019-05-10 | End: 2019-06-07 | Stop reason: SDUPTHER

## 2019-05-15 RX ORDER — PROMETHAZINE HYDROCHLORIDE 25 MG/1
TABLET ORAL
Qty: 10 TABLET | Refills: 0 | Status: SHIPPED | OUTPATIENT
Start: 2019-05-15 | End: 2019-06-07 | Stop reason: SDUPTHER

## 2019-06-07 RX ORDER — PROMETHAZINE HYDROCHLORIDE 25 MG/1
TABLET ORAL
Qty: 30 TABLET | Refills: 2 | Status: SHIPPED | OUTPATIENT
Start: 2019-06-07 | End: 2019-09-04 | Stop reason: SDUPTHER

## 2019-06-07 RX ORDER — BUTALBITAL, ACETAMINOPHEN AND CAFFEINE 50; 325; 40 MG/1; MG/1; MG/1
TABLET ORAL
Qty: 10 TABLET | Refills: 0 | Status: SHIPPED | OUTPATIENT
Start: 2019-06-07 | End: 2019-07-09 | Stop reason: SDUPTHER

## 2019-07-09 RX ORDER — BUTALBITAL, ACETAMINOPHEN AND CAFFEINE 50; 325; 40 MG/1; MG/1; MG/1
TABLET ORAL
Qty: 10 TABLET | Refills: 0 | Status: SHIPPED | OUTPATIENT
Start: 2019-07-09 | End: 2019-08-06 | Stop reason: SDUPTHER

## 2019-08-06 RX ORDER — BUTALBITAL, ACETAMINOPHEN AND CAFFEINE 50; 325; 40 MG/1; MG/1; MG/1
TABLET ORAL
Qty: 10 TABLET | Refills: 0 | Status: SHIPPED | OUTPATIENT
Start: 2019-08-06 | End: 2019-09-04 | Stop reason: SDUPTHER

## 2019-08-31 DIAGNOSIS — J43.1 PANLOBULAR EMPHYSEMA (HCC): ICD-10-CM

## 2019-09-03 RX ORDER — ALBUTEROL SULFATE 90 UG/1
AEROSOL, METERED RESPIRATORY (INHALATION)
Qty: 18 INHALER | Refills: 3 | Status: SHIPPED | OUTPATIENT
Start: 2019-09-03 | End: 2020-01-13

## 2019-09-04 RX ORDER — BUTALBITAL, ACETAMINOPHEN AND CAFFEINE 50; 325; 40 MG/1; MG/1; MG/1
TABLET ORAL
Qty: 10 TABLET | Refills: 0 | Status: SHIPPED | OUTPATIENT
Start: 2019-09-04 | End: 2019-10-02 | Stop reason: SDUPTHER

## 2019-09-04 RX ORDER — PROMETHAZINE HYDROCHLORIDE 25 MG/1
TABLET ORAL
Qty: 30 TABLET | Refills: 2 | Status: SHIPPED | OUTPATIENT
Start: 2019-09-04 | End: 2019-12-02 | Stop reason: SDUPTHER

## 2019-09-12 DIAGNOSIS — R73.9 HYPERGLYCEMIA: ICD-10-CM

## 2019-09-12 DIAGNOSIS — E78.2 MIXED HYPERLIPIDEMIA: Primary | ICD-10-CM

## 2019-09-12 DIAGNOSIS — I10 ESSENTIAL HYPERTENSION: ICD-10-CM

## 2019-09-12 DIAGNOSIS — R79.89 LOW VITAMIN D LEVEL: ICD-10-CM

## 2019-09-17 ENCOUNTER — RESULTS ENCOUNTER (OUTPATIENT)
Dept: INTERNAL MEDICINE | Facility: CLINIC | Age: 44
End: 2019-09-17

## 2019-09-17 DIAGNOSIS — R73.9 HYPERGLYCEMIA: ICD-10-CM

## 2019-09-17 DIAGNOSIS — I10 ESSENTIAL HYPERTENSION: ICD-10-CM

## 2019-09-17 DIAGNOSIS — R79.89 LOW VITAMIN D LEVEL: ICD-10-CM

## 2019-09-17 DIAGNOSIS — E78.2 MIXED HYPERLIPIDEMIA: ICD-10-CM

## 2019-09-21 LAB
25(OH)D3+25(OH)D2 SERPL-MCNC: 18.3 NG/ML (ref 30–100)
ALBUMIN SERPL-MCNC: 4.2 G/DL (ref 3.5–5.2)
ALBUMIN/GLOB SERPL: 1.6 G/DL
ALP SERPL-CCNC: 87 U/L (ref 39–117)
ALT SERPL-CCNC: 7 U/L (ref 1–33)
AST SERPL-CCNC: 11 U/L (ref 1–32)
BASOPHILS # BLD AUTO: 0.08 10*3/MM3 (ref 0–0.2)
BASOPHILS NFR BLD AUTO: 1.1 % (ref 0–1.5)
BILIRUB SERPL-MCNC: 0.2 MG/DL (ref 0.2–1.2)
BUN SERPL-MCNC: 6 MG/DL (ref 6–20)
BUN/CREAT SERPL: 7.8 (ref 7–25)
CALCIUM SERPL-MCNC: 9.3 MG/DL (ref 8.6–10.5)
CHLORIDE SERPL-SCNC: 103 MMOL/L (ref 98–107)
CHOLEST SERPL-MCNC: 198 MG/DL (ref 0–200)
CO2 SERPL-SCNC: 22.3 MMOL/L (ref 22–29)
CREAT SERPL-MCNC: 0.77 MG/DL (ref 0.57–1)
EOSINOPHIL # BLD AUTO: 0.17 10*3/MM3 (ref 0–0.4)
EOSINOPHIL NFR BLD AUTO: 2.3 % (ref 0.3–6.2)
ERYTHROCYTE [DISTWIDTH] IN BLOOD BY AUTOMATED COUNT: 11.2 % (ref 12.3–15.4)
GLOBULIN SER CALC-MCNC: 2.6 GM/DL
GLUCOSE SERPL-MCNC: 121 MG/DL (ref 65–99)
HBA1C MFR BLD: 5.3 % (ref 4.8–5.6)
HCT VFR BLD AUTO: 46.7 % (ref 34–46.6)
HDLC SERPL-MCNC: 40 MG/DL (ref 40–60)
HGB BLD-MCNC: 15.2 G/DL (ref 12–15.9)
IMM GRANULOCYTES # BLD AUTO: 0.02 10*3/MM3 (ref 0–0.05)
IMM GRANULOCYTES NFR BLD AUTO: 0.3 % (ref 0–0.5)
LDLC SERPL CALC-MCNC: 116 MG/DL (ref 0–100)
LDLC/HDLC SERPL: 2.91 {RATIO}
LYMPHOCYTES # BLD AUTO: 3.1 10*3/MM3 (ref 0.7–3.1)
LYMPHOCYTES NFR BLD AUTO: 41.3 % (ref 19.6–45.3)
MCH RBC QN AUTO: 32.6 PG (ref 26.6–33)
MCHC RBC AUTO-ENTMCNC: 32.5 G/DL (ref 31.5–35.7)
MCV RBC AUTO: 100.2 FL (ref 79–97)
MONOCYTES # BLD AUTO: 0.46 10*3/MM3 (ref 0.1–0.9)
MONOCYTES NFR BLD AUTO: 6.1 % (ref 5–12)
NEUTROPHILS # BLD AUTO: 3.68 10*3/MM3 (ref 1.7–7)
NEUTROPHILS NFR BLD AUTO: 48.9 % (ref 42.7–76)
NRBC BLD AUTO-RTO: 0 /100 WBC (ref 0–0.2)
PLATELET # BLD AUTO: 397 10*3/MM3 (ref 140–450)
POTASSIUM SERPL-SCNC: 4 MMOL/L (ref 3.5–5.2)
PROT SERPL-MCNC: 6.8 G/DL (ref 6–8.5)
RBC # BLD AUTO: 4.66 10*6/MM3 (ref 3.77–5.28)
SODIUM SERPL-SCNC: 144 MMOL/L (ref 136–145)
TRIGL SERPL-MCNC: 208 MG/DL (ref 0–150)
TSH SERPL DL<=0.005 MIU/L-ACNC: 1.57 UIU/ML (ref 0.27–4.2)
VLDLC SERPL CALC-MCNC: 41.6 MG/DL
WBC # BLD AUTO: 7.51 10*3/MM3 (ref 3.4–10.8)

## 2019-10-02 ENCOUNTER — OFFICE VISIT (OUTPATIENT)
Dept: INTERNAL MEDICINE | Facility: CLINIC | Age: 44
End: 2019-10-02

## 2019-10-02 VITALS
DIASTOLIC BLOOD PRESSURE: 100 MMHG | HEART RATE: 101 BPM | OXYGEN SATURATION: 98 % | WEIGHT: 126 LBS | SYSTOLIC BLOOD PRESSURE: 168 MMHG | HEIGHT: 62 IN | RESPIRATION RATE: 18 BRPM | BODY MASS INDEX: 23.19 KG/M2

## 2019-10-02 DIAGNOSIS — Z23 FLU VACCINE NEED: ICD-10-CM

## 2019-10-02 DIAGNOSIS — J43.1 PANLOBULAR EMPHYSEMA (HCC): ICD-10-CM

## 2019-10-02 DIAGNOSIS — M70.62 GREATER TROCHANTERIC BURSITIS OF LEFT HIP: ICD-10-CM

## 2019-10-02 DIAGNOSIS — I10 ESSENTIAL HYPERTENSION: ICD-10-CM

## 2019-10-02 DIAGNOSIS — G47.9 SLEEP DIFFICULTIES: ICD-10-CM

## 2019-10-02 DIAGNOSIS — I73.9 PERIPHERAL ARTERY DISEASE (HCC): ICD-10-CM

## 2019-10-02 DIAGNOSIS — R79.89 LOW VITAMIN D LEVEL: ICD-10-CM

## 2019-10-02 DIAGNOSIS — F32.A ANXIETY AND DEPRESSION: ICD-10-CM

## 2019-10-02 DIAGNOSIS — F41.9 ANXIETY AND DEPRESSION: ICD-10-CM

## 2019-10-02 DIAGNOSIS — E78.2 MIXED HYPERLIPIDEMIA: Primary | ICD-10-CM

## 2019-10-02 DIAGNOSIS — Z72.0 TOBACCO USE: ICD-10-CM

## 2019-10-02 PROCEDURE — 99214 OFFICE O/P EST MOD 30 MIN: CPT | Performed by: INTERNAL MEDICINE

## 2019-10-02 PROCEDURE — 90471 IMMUNIZATION ADMIN: CPT | Performed by: INTERNAL MEDICINE

## 2019-10-02 PROCEDURE — 90674 CCIIV4 VAC NO PRSV 0.5 ML IM: CPT | Performed by: INTERNAL MEDICINE

## 2019-10-02 RX ORDER — LISINOPRIL AND HYDROCHLOROTHIAZIDE 20; 12.5 MG/1; MG/1
2 TABLET ORAL DAILY
Qty: 60 TABLET | Refills: 2 | Status: SHIPPED | OUTPATIENT
Start: 2019-10-02 | End: 2021-01-15 | Stop reason: SDUPTHER

## 2019-10-02 RX ORDER — BUTALBITAL, ACETAMINOPHEN AND CAFFEINE 50; 325; 40 MG/1; MG/1; MG/1
TABLET ORAL
Qty: 10 TABLET | Refills: 0 | Status: SHIPPED | OUTPATIENT
Start: 2019-10-02 | End: 2019-11-01 | Stop reason: SDUPTHER

## 2019-10-02 NOTE — PROGRESS NOTES
Valeria Rubi is a 44 y.o. female, who presents with a chief complaint of   Chief Complaint   Patient presents with   • Hyperlipidemia   • Migraine       HPI   Pt here for follow up    She feels like she has been bruising more easily.  hgb and platelets were normal on recent cbc.  She is on aspirin and plaix bc of PAD.      COPD - she feels like her breathing is getting worse.  She has more congestion and seems to get choked up more easily.  She is on stiolto daily and this does help her breathing.  Since it has been so hot she is using her albuterol daily.  She tries to wait until evening to go out bc of the heat.        Depression - she cont to have issues with this.  She says she is at her wits end with her significant other and he has agreed to go to counseling.  She would like some name of counselors to call.   She feels like her issues are currently more situational and the med can't fix it.      Impaired fasting glucose - fasting glucose high but a1c normal.     HLD - on statin.  No myalgias    Vit d deficiency - levels still low on supplementation.  She is already on vit d 50,000units weekly.    htn- on lisinopril-hctz and bp very high today.  She doesn't check bp at home.     Left hip pain - she had a bad cramp on the left side the other night.     The following portions of the patient's history were reviewed and updated as appropriate: allergies, current medications, past family history, past medical history, past social history, past surgical history and problem list.    Allergies: Patient has no known allergies.    Review of Systems   Constitutional: Negative.    HENT: Negative.    Eyes: Negative.    Respiratory: Negative.    Cardiovascular: Negative.    Gastrointestinal: Negative.    Endocrine: Negative.    Genitourinary: Negative.    Musculoskeletal: Negative.    Skin: Negative.    Allergic/Immunologic: Negative.    Neurological: Negative.    Hematological: Negative.    Psychiatric/Behavioral:  Positive for sleep disturbance. Negative for self-injury and suicidal ideas. The patient is nervous/anxious.    All other systems reviewed and are negative.            Wt Readings from Last 3 Encounters:   10/02/19 57.2 kg (126 lb)   03/27/19 60.1 kg (132 lb 9.6 oz)   03/06/19 59 kg (130 lb)     Temp Readings from Last 3 Encounters:   03/27/19 97.6 °F (36.4 °C) (Oral)   02/27/19 98.2 °F (36.8 °C)   06/15/18 97.2 °F (36.2 °C)     BP Readings from Last 3 Encounters:   10/02/19 168/100   03/27/19 142/90   02/27/19 140/74     Pulse Readings from Last 3 Encounters:   10/02/19 101   03/27/19 108   02/27/19 83     Body mass index is 23.05 kg/m².  @LASTSAO2(3)@    Physical Exam   Constitutional: She is oriented to person, place, and time. She appears well-developed and well-nourished. No distress.   HENT:   Head: Normocephalic and atraumatic.   Right Ear: External ear normal.   Left Ear: External ear normal.   Nose: Nose normal.   Mouth/Throat: Oropharynx is clear and moist.   Eyes: Conjunctivae and EOM are normal. Pupils are equal, round, and reactive to light.   Neck: Normal range of motion. Neck supple.   Cardiovascular: Normal rate, regular rhythm, normal heart sounds and intact distal pulses.   Pulmonary/Chest: Effort normal and breath sounds normal. No respiratory distress. She has no wheezes.   Musculoskeletal: Normal range of motion.   Normal gait  ttp over left greater trochanteric bursa.     Neurological: She is alert and oriented to person, place, and time.   Skin: Skin is warm and dry.   Psychiatric: She has a normal mood and affect. Her behavior is normal. Judgment and thought content normal.   Nursing note and vitals reviewed.      Results for orders placed or performed in visit on 09/17/19   Lipid Panel With LDL / HDL Ratio   Result Value Ref Range    Total Cholesterol 198 0 - 200 mg/dL    Triglycerides 208 (H) 0 - 150 mg/dL    HDL Cholesterol 40 40 - 60 mg/dL    VLDL Cholesterol 41.6 mg/dL    LDL Cholesterol   116 (H) 0 - 100 mg/dL    LDL/HDL Ratio 2.91    Hemoglobin A1c   Result Value Ref Range    Hemoglobin A1C 5.30 4.80 - 5.60 %   Comprehensive Metabolic Panel   Result Value Ref Range    Glucose 121 (H) 65 - 99 mg/dL    BUN 6 6 - 20 mg/dL    Creatinine 0.77 0.57 - 1.00 mg/dL    eGFR Non African Am 81 >60 mL/min/1.73    eGFR African Am 98 >60 mL/min/1.73    BUN/Creatinine Ratio 7.8 7.0 - 25.0    Sodium 144 136 - 145 mmol/L    Potassium 4.0 3.5 - 5.2 mmol/L    Chloride 103 98 - 107 mmol/L    Total CO2 22.3 22.0 - 29.0 mmol/L    Calcium 9.3 8.6 - 10.5 mg/dL    Total Protein 6.8 6.0 - 8.5 g/dL    Albumin 4.20 3.50 - 5.20 g/dL    Globulin 2.6 gm/dL    A/G Ratio 1.6 g/dL    Total Bilirubin 0.2 0.2 - 1.2 mg/dL    Alkaline Phosphatase 87 39 - 117 U/L    AST (SGOT) 11 1 - 32 U/L    ALT (SGPT) 7 1 - 33 U/L   TSH Rfx On Abnormal To Free T4   Result Value Ref Range    TSH 1.570 0.270 - 4.200 uIU/mL   Vitamin D 25 Hydroxy   Result Value Ref Range    25 Hydroxy, Vitamin D 18.3 (L) 30.0 - 100.0 ng/ml   CBC & Differential   Result Value Ref Range    WBC 7.51 3.40 - 10.80 10*3/mm3    RBC 4.66 3.77 - 5.28 10*6/mm3    Hemoglobin 15.2 12.0 - 15.9 g/dL    Hematocrit 46.7 (H) 34.0 - 46.6 %    .2 (H) 79.0 - 97.0 fL    MCH 32.6 26.6 - 33.0 pg    MCHC 32.5 31.5 - 35.7 g/dL    RDW 11.2 (L) 12.3 - 15.4 %    Platelets 397 140 - 450 10*3/mm3    Neutrophil Rel % 48.9 42.7 - 76.0 %    Lymphocyte Rel % 41.3 19.6 - 45.3 %    Monocyte Rel % 6.1 5.0 - 12.0 %    Eosinophil Rel % 2.3 0.3 - 6.2 %    Basophil Rel % 1.1 0.0 - 1.5 %    Neutrophils Absolute 3.68 1.70 - 7.00 10*3/mm3    Lymphocytes Absolute 3.10 0.70 - 3.10 10*3/mm3    Monocytes Absolute 0.46 0.10 - 0.90 10*3/mm3    Eosinophils Absolute 0.17 0.00 - 0.40 10*3/mm3    Basophils Absolute 0.08 0.00 - 0.20 10*3/mm3    Immature Granulocyte Rel % 0.3 0.0 - 0.5 %    Immature Grans Absolute 0.02 0.00 - 0.05 10*3/mm3    nRBC 0.0 0.0 - 0.2 /100 WBC           Valeria was seen today for  hyperlipidemia and migraine.    Diagnoses and all orders for this visit:    Mixed hyperlipidemia    Essential hypertension - increase lisinopril to 2 tabs daily    Low vitamin D level    Panlobular emphysema (CMS/HCC)    Anxiety and depression - Encouraged counseling to help with relationship issues    Sleep difficulties    Tobacco use - Encouraged tobacco cessation    Peripheral artery disease (CMS/HCC)    Greater trochanteric bursitis of left hip  Encouraged good footwear, tylenol, ice/heat/rest    Recheck in 3 mo.     Outpatient Medications Prior to Visit   Medication Sig Dispense Refill   • acetaminophen (TYLENOL) 325 MG tablet Take 2 tablets by mouth Every 4 (Four) Hours As Needed for Mild Pain , Headache or Fever.     • albuterol (PROVENTIL) (2.5 MG/3ML) 0.083% nebulizer solution USE 1 VIAL VIA NEBULIZER EVERY 4 HOURS AS NEEDED 225 mL 3   • albuterol sulfate  (90 Base) MCG/ACT inhaler TAKE 2 PUFFS BY MOUTH EVERY 4 HOURS AS NEEDED 18 inhaler 3   • aspirin 81 MG EC tablet Take 81 mg by mouth daily.     • butalbital-acetaminophen-caffeine (FIORICET, ESGIC) -40 MG per tablet TAKE 1 TABLET BY MOUTH EVERY 6 HOURS AS NEEDED 10 tablet 0   • clopidogrel (PLAVIX) 75 MG tablet Take 1 tablet by mouth Daily. 150 mg po X1 in RR     • fluticasone (FLONASE) 50 MCG/ACT nasal spray 1 spray into each nostril as needed.     • medroxyPROGESTERone (DEPO-PROVERA) 150 MG/ML injection Apply 150 mg to cheek Every 3 (Three) Months. Pt takes every 3 months.march or April 2018 last dose     • melatonin 5 MG tablet tablet Take 2 tablets by mouth At Night As Needed (sleep).     • nystatin (MYCOSTATIN) 152144 UNIT/ML suspension Swish and swallow 5 mL 4 (Four) Times a Day. Swish and swallow 5mL four times a day for two weeks. 280 mL 0   • promethazine (PHENERGAN) 25 MG tablet TAKE 1 TABLET BY MOUTH EVERY 4 HOURS AS NEEDED 30 tablet 2   • simvastatin (ZOCOR) 40 MG tablet TAKE 1 TABLET BY MOUTH EVERY EVENING 30 tablet 6   • STIOLTO  RESPIMAT 2.5-2.5 MCG/ACT aerosol solution inhaler INHALE 2 PUFFS DAILY. 4 g 6   • topiramate (TOPAMAX) 100 MG tablet Take 1 tablet by mouth 2 (Two) Times a Day. 180 tablet 1   • venlafaxine XR (EFFEXOR-XR) 150 MG 24 hr capsule TAKE 1 CAPSULE BY MOUTH DAILY. 90 capsule 1   • vitamin D (ERGOCALCIFEROL) 90770 units capsule capsule Take 1 capsule by mouth Every 7 (Seven) Days. 6 capsule 5   • lisinopril-hydrochlorothiazide (PRINZIDE,ZESTORETIC) 20-12.5 MG per tablet TAKE 1 TABLET BY MOUTH DAILY. 30 tablet 3     No facility-administered medications prior to visit.      New Medications Ordered This Visit   Medications   • lisinopril-hydrochlorothiazide (PRINZIDE,ZESTORETIC) 20-12.5 MG per tablet     Sig: Take 2 tablets by mouth Daily.     Dispense:  60 tablet     Refill:  2     [unfilled]  Medications Discontinued During This Encounter   Medication Reason   • lisinopril-hydrochlorothiazide (PRINZIDE,ZESTORETIC) 20-12.5 MG per tablet Reorder         Return in about 3 months (around 1/2/2020) for Recheck.

## 2019-11-01 RX ORDER — BUTALBITAL, ACETAMINOPHEN AND CAFFEINE 50; 325; 40 MG/1; MG/1; MG/1
TABLET ORAL
Qty: 10 TABLET | Refills: 0 | Status: SHIPPED | OUTPATIENT
Start: 2019-11-01 | End: 2019-12-02 | Stop reason: SDUPTHER

## 2019-12-03 RX ORDER — BUTALBITAL, ACETAMINOPHEN AND CAFFEINE 50; 325; 40 MG/1; MG/1; MG/1
TABLET ORAL
Qty: 10 TABLET | Refills: 0 | OUTPATIENT
Start: 2019-12-03

## 2019-12-03 RX ORDER — PROMETHAZINE HYDROCHLORIDE 25 MG/1
TABLET ORAL
Qty: 30 TABLET | Refills: 2 | OUTPATIENT
Start: 2019-12-03

## 2019-12-03 RX ORDER — BUTALBITAL, ACETAMINOPHEN AND CAFFEINE 50; 325; 40 MG/1; MG/1; MG/1
TABLET ORAL
Qty: 10 TABLET | Refills: 0 | Status: SHIPPED | OUTPATIENT
Start: 2019-12-03 | End: 2020-01-13

## 2019-12-03 RX ORDER — PROMETHAZINE HYDROCHLORIDE 25 MG/1
TABLET ORAL
Qty: 30 TABLET | Refills: 2 | Status: SHIPPED | OUTPATIENT
Start: 2019-12-03 | End: 2020-05-12

## 2019-12-17 DIAGNOSIS — J43.1 PANLOBULAR EMPHYSEMA (HCC): ICD-10-CM

## 2019-12-17 RX ORDER — ALBUTEROL SULFATE 90 UG/1
AEROSOL, METERED RESPIRATORY (INHALATION)
Qty: 18 INHALER | Refills: 3 | OUTPATIENT
Start: 2019-12-17

## 2019-12-17 RX ORDER — LISINOPRIL AND HYDROCHLOROTHIAZIDE 20; 12.5 MG/1; MG/1
TABLET ORAL
Qty: 60 TABLET | Refills: 2 | OUTPATIENT
Start: 2019-12-17

## 2020-01-13 DIAGNOSIS — J43.1 PANLOBULAR EMPHYSEMA (HCC): ICD-10-CM

## 2020-01-13 DIAGNOSIS — R51.9 CHRONIC NONINTRACTABLE HEADACHE, UNSPECIFIED HEADACHE TYPE: Primary | ICD-10-CM

## 2020-01-13 DIAGNOSIS — G89.29 CHRONIC NONINTRACTABLE HEADACHE, UNSPECIFIED HEADACHE TYPE: Primary | ICD-10-CM

## 2020-01-13 RX ORDER — BUTALBITAL, ACETAMINOPHEN AND CAFFEINE 50; 325; 40 MG/1; MG/1; MG/1
TABLET ORAL
Qty: 10 TABLET | Refills: 0 | Status: SHIPPED | OUTPATIENT
Start: 2020-01-13 | End: 2020-02-11

## 2020-01-13 RX ORDER — ALBUTEROL SULFATE 90 UG/1
AEROSOL, METERED RESPIRATORY (INHALATION)
Qty: 18 INHALER | Refills: 3 | Status: ON HOLD | OUTPATIENT
Start: 2020-01-13 | End: 2023-03-27

## 2020-01-13 NOTE — TELEPHONE ENCOUNTER
PATIENT SAYS SHE IS COMPLETELY OUT OF HER INHALER. PHARMACY SAID IT WOULD NOT BE REFILLED UNTIL SHE SEES THE DOCTOR. SHE HAS AN APPOINTMENT SET UP ON 1/22 WITH DR. WAKEFIELD. CAN THIS BE CALLED IN FOR HER?

## 2020-01-16 DIAGNOSIS — J43.1 PANLOBULAR EMPHYSEMA (HCC): ICD-10-CM

## 2020-01-16 RX ORDER — TIOTROPIUM BROMIDE AND OLODATEROL 3.124; 2.736 UG/1; UG/1
2 SPRAY, METERED RESPIRATORY (INHALATION) DAILY
Qty: 1 INHALER | Refills: 0 | Status: SHIPPED | OUTPATIENT
Start: 2020-01-16 | End: 2020-12-30 | Stop reason: SDUPTHER

## 2020-01-22 ENCOUNTER — OFFICE VISIT (OUTPATIENT)
Dept: INTERNAL MEDICINE | Facility: CLINIC | Age: 45
End: 2020-01-22

## 2020-01-22 VITALS
TEMPERATURE: 97.7 F | DIASTOLIC BLOOD PRESSURE: 82 MMHG | OXYGEN SATURATION: 96 % | SYSTOLIC BLOOD PRESSURE: 130 MMHG | BODY MASS INDEX: 23.77 KG/M2 | HEIGHT: 62 IN | HEART RATE: 117 BPM | RESPIRATION RATE: 16 BRPM | WEIGHT: 129.2 LBS

## 2020-01-22 DIAGNOSIS — I73.9 PERIPHERAL ARTERY DISEASE (HCC): ICD-10-CM

## 2020-01-22 DIAGNOSIS — G89.29 CHRONIC NONINTRACTABLE HEADACHE, UNSPECIFIED HEADACHE TYPE: ICD-10-CM

## 2020-01-22 DIAGNOSIS — F41.9 ANXIETY AND DEPRESSION: ICD-10-CM

## 2020-01-22 DIAGNOSIS — E78.2 MIXED HYPERLIPIDEMIA: ICD-10-CM

## 2020-01-22 DIAGNOSIS — F32.A ANXIETY AND DEPRESSION: ICD-10-CM

## 2020-01-22 DIAGNOSIS — N76.4 VULVAR ABSCESS: ICD-10-CM

## 2020-01-22 DIAGNOSIS — G47.9 SLEEP DIFFICULTIES: ICD-10-CM

## 2020-01-22 DIAGNOSIS — R79.89 LOW VITAMIN D LEVEL: ICD-10-CM

## 2020-01-22 DIAGNOSIS — Z72.0 TOBACCO USE: ICD-10-CM

## 2020-01-22 DIAGNOSIS — R51.9 CHRONIC NONINTRACTABLE HEADACHE, UNSPECIFIED HEADACHE TYPE: ICD-10-CM

## 2020-01-22 DIAGNOSIS — Z30.42 SURVEILLANCE FOR DEPO-PROVERA CONTRACEPTION: ICD-10-CM

## 2020-01-22 DIAGNOSIS — I10 ESSENTIAL HYPERTENSION: Primary | ICD-10-CM

## 2020-01-22 DIAGNOSIS — J43.1 PANLOBULAR EMPHYSEMA (HCC): ICD-10-CM

## 2020-01-22 PROCEDURE — 99214 OFFICE O/P EST MOD 30 MIN: CPT | Performed by: INTERNAL MEDICINE

## 2020-01-22 NOTE — PROGRESS NOTES
Valeria Rubi is a 44 y.o. female, who presents with a chief complaint of   Chief Complaint   Patient presents with   • Follow-up   copd, depression    HPI   Pt here for follow up     She is on aspirin and plaix bc of PAD.  no claudication.      COPD - she feels like her breathing is up and down.  She tries to stay inside a lot during the cold weather.  Lots of activity makes her soa.  she thinks the stiolto helps her quite a bit.  She thinks the albuterol doesn't help as much as it did in the past.  She is still smoking.       Depression - she cont to have issues with this.  She says things at home are fair.  Her significant other didn't end up going to counseling.  She feels like her issues are currently more situational and the med can't fix it.  she only gets out about 1 time per week.      Sleep issues - she is tired often.  She sleeps during the day and is still tired at night.       Impaired fasting glucose - fasting glucose high but a1c normal.      HLD - on statin for quite some time.  she has recently started having cramps.  She has been inactive during the winter.      Vit d deficiency - levels still low on supplementation.  She is already on vit d 50,000units weekly.     htn- on lisinopril-hctz.  Fairly well controlled.      Pt with vulvar abscess in December.  She was treated with bactrim.  2 lesions went away.  One spot is still there but is just a bump.  [facility]  Due for pap in March.  She has been on depo x 26 years per the health department.  She has never had a dexa scan.      The following portions of the patient's history were reviewed and updated as appropriate: allergies, current medications, past family history, past medical history, past social history, past surgical history and problem list.    Allergies: Patient has no known allergies.    Review of Systems   Constitutional: Negative.    HENT: Negative.    Eyes: Negative.    Respiratory: Negative.     Cardiovascular: Negative.    Gastrointestinal: Negative.    Endocrine: Negative.    Genitourinary: Negative.    Musculoskeletal: Negative.    Skin: Negative.    Allergic/Immunologic: Negative.    Neurological: Negative.    Hematological: Negative.    Psychiatric/Behavioral: Negative.    All other systems reviewed and are negative.            Wt Readings from Last 3 Encounters:   01/22/20 58.6 kg (129 lb 3.2 oz)   12/27/19 56.7 kg (125 lb)   10/02/19 57.2 kg (126 lb)     Temp Readings from Last 3 Encounters:   01/22/20 97.7 °F (36.5 °C) (Oral)   12/27/19 97.5 °F (36.4 °C) (Temporal)   03/27/19 97.6 °F (36.4 °C) (Oral)     BP Readings from Last 3 Encounters:   01/22/20 130/82   12/27/19 130/84   10/02/19 168/100     Pulse Readings from Last 3 Encounters:   01/22/20 117   12/27/19 80   10/02/19 101     Body mass index is 23.63 kg/m².  @LASTSAO2(3)@    Physical Exam   Constitutional: She is oriented to person, place, and time. She appears well-developed and well-nourished. No distress.   HENT:   Head: Normocephalic and atraumatic.   Right Ear: External ear normal.   Left Ear: External ear normal.   Nose: Nose normal.   Mouth/Throat: Oropharynx is clear and moist.   Eyes: Pupils are equal, round, and reactive to light. Conjunctivae and EOM are normal.   Neck: Normal range of motion. Neck supple.   Cardiovascular: Normal rate, regular rhythm, normal heart sounds and intact distal pulses.   Pulmonary/Chest: Effort normal and breath sounds normal. No respiratory distress. She has no wheezes.   Musculoskeletal: Normal range of motion.   Normal gait   Neurological: She is alert and oriented to person, place, and time.   Skin: Skin is warm and dry.   Psychiatric: She has a normal mood and affect. Her behavior is normal. Judgment and thought content normal.   Nursing note and vitals reviewed.      Results for orders placed or performed in visit on 09/17/19   Lipid Panel With LDL / HDL Ratio   Result Value Ref Range    Total  Cholesterol 198 0 - 200 mg/dL    Triglycerides 208 (H) 0 - 150 mg/dL    HDL Cholesterol 40 40 - 60 mg/dL    VLDL Cholesterol 41.6 mg/dL    LDL Cholesterol  116 (H) 0 - 100 mg/dL    LDL/HDL Ratio 2.91    Hemoglobin A1c   Result Value Ref Range    Hemoglobin A1C 5.30 4.80 - 5.60 %   Comprehensive Metabolic Panel   Result Value Ref Range    Glucose 121 (H) 65 - 99 mg/dL    BUN 6 6 - 20 mg/dL    Creatinine 0.77 0.57 - 1.00 mg/dL    eGFR Non African Am 81 >60 mL/min/1.73    eGFR African Am 98 >60 mL/min/1.73    BUN/Creatinine Ratio 7.8 7.0 - 25.0    Sodium 144 136 - 145 mmol/L    Potassium 4.0 3.5 - 5.2 mmol/L    Chloride 103 98 - 107 mmol/L    Total CO2 22.3 22.0 - 29.0 mmol/L    Calcium 9.3 8.6 - 10.5 mg/dL    Total Protein 6.8 6.0 - 8.5 g/dL    Albumin 4.20 3.50 - 5.20 g/dL    Globulin 2.6 gm/dL    A/G Ratio 1.6 g/dL    Total Bilirubin 0.2 0.2 - 1.2 mg/dL    Alkaline Phosphatase 87 39 - 117 U/L    AST (SGOT) 11 1 - 32 U/L    ALT (SGPT) 7 1 - 33 U/L   TSH Rfx On Abnormal To Free T4   Result Value Ref Range    TSH 1.570 0.270 - 4.200 uIU/mL   Vitamin D 25 Hydroxy   Result Value Ref Range    25 Hydroxy, Vitamin D 18.3 (L) 30.0 - 100.0 ng/ml   CBC & Differential   Result Value Ref Range    WBC 7.51 3.40 - 10.80 10*3/mm3    RBC 4.66 3.77 - 5.28 10*6/mm3    Hemoglobin 15.2 12.0 - 15.9 g/dL    Hematocrit 46.7 (H) 34.0 - 46.6 %    .2 (H) 79.0 - 97.0 fL    MCH 32.6 26.6 - 33.0 pg    MCHC 32.5 31.5 - 35.7 g/dL    RDW 11.2 (L) 12.3 - 15.4 %    Platelets 397 140 - 450 10*3/mm3    Neutrophil Rel % 48.9 42.7 - 76.0 %    Lymphocyte Rel % 41.3 19.6 - 45.3 %    Monocyte Rel % 6.1 5.0 - 12.0 %    Eosinophil Rel % 2.3 0.3 - 6.2 %    Basophil Rel % 1.1 0.0 - 1.5 %    Neutrophils Absolute 3.68 1.70 - 7.00 10*3/mm3    Lymphocytes Absolute 3.10 0.70 - 3.10 10*3/mm3    Monocytes Absolute 0.46 0.10 - 0.90 10*3/mm3    Eosinophils Absolute 0.17 0.00 - 0.40 10*3/mm3    Basophils Absolute 0.08 0.00 - 0.20 10*3/mm3    Immature Granulocyte  Rel % 0.3 0.0 - 0.5 %    Immature Grans Absolute 0.02 0.00 - 0.05 10*3/mm3    nRBC 0.0 0.0 - 0.2 /100 WBC           Valeria was seen today for follow-up.    Diagnoses and all orders for this visit:    Essential hypertension    Surveillance for Depo-Provera contraception  -     Ambulatory Referral to Gynecology  -     DEXA Bone Density Axial; Future    Vulvar abscess  -     Ambulatory Referral to Gynecology    Panlobular emphysema (CMS/HCC)    Chronic nonintractable headache, unspecified headache type    Mixed hyperlipidemia    Low vitamin D level    Anxiety and depression    Sleep difficulties    Tobacco use    Peripheral artery disease (CMS/HCC)      Cont current meds. Encouraged pt to go see gyn and get a dexa scan bc she has been on prolonged depo-provera.      Encouraged pt to get out more and encouraged her to see counselor/psychiatry for her depression.     Ov/labs in 3 mo.     Outpatient Medications Prior to Visit   Medication Sig Dispense Refill   • acetaminophen (TYLENOL) 325 MG tablet Take 2 tablets by mouth Every 4 (Four) Hours As Needed for Mild Pain , Headache or Fever.     • albuterol (PROVENTIL) (2.5 MG/3ML) 0.083% nebulizer solution USE 1 VIAL VIA NEBULIZER EVERY 4 HOURS AS NEEDED 225 mL 3   • ALBUTEROL SULFATE  (90 Base) MCG/ACT inhaler TAKE 2 PUFFS BY MOUTH EVERY 4 HOURS AS NEEDED 18 inhaler 3   • aspirin 81 MG EC tablet Take 81 mg by mouth daily.     • butalbital-acetaminophen-caffeine (FIORICET, ESGIC) -40 MG per tablet TAKE 1 TABLET BY MOUTH EVERY 6 HOURS AS NEEDED 10 tablet 0   • clopidogrel (PLAVIX) 75 MG tablet Take 1 tablet by mouth Daily. 150 mg po X1 in RR     • fluticasone (FLONASE) 50 MCG/ACT nasal spray 1 spray into each nostril as needed.     • lisinopril-hydrochlorothiazide (PRINZIDE,ZESTORETIC) 20-12.5 MG per tablet Take 2 tablets by mouth Daily. 60 tablet 2   • medroxyPROGESTERone (DEPO-PROVERA) 150 MG/ML injection Apply 150 mg to cheek Every 3 (Three) Months. Pt takes  every 3 months.march or April 2018 last dose     • nystatin (MYCOSTATIN) 711379 UNIT/ML suspension Swish and swallow 5 mL 4 (Four) Times a Day. Swish and swallow 5mL four times a day for two weeks. 280 mL 0   • promethazine (PHENERGAN) 25 MG tablet TAKE 1 TABLET BY MOUTH EVERY 4 HOURS AS NEEDED 30 tablet 2   • simvastatin (ZOCOR) 40 MG tablet TAKE 1 TABLET BY MOUTH EVERY EVENING 30 tablet 6   • STIOLTO RESPIMAT 2.5-2.5 MCG/ACT aerosol solution inhaler INHALE 2 PUFFS DAILY 1 inhaler 0   • topiramate (TOPAMAX) 100 MG tablet Take 1 tablet by mouth 2 (Two) Times a Day. 180 tablet 1   • venlafaxine XR (EFFEXOR-XR) 150 MG 24 hr capsule TAKE 1 CAPSULE BY MOUTH DAILY. 90 capsule 1   • vitamin D (ERGOCALCIFEROL) 27007 units capsule capsule Take 1 capsule by mouth Every 7 (Seven) Days. 6 capsule 5     No facility-administered medications prior to visit.      No orders of the defined types were placed in this encounter.    [unfilled]  There are no discontinued medications.      Return in about 6 months (around 7/22/2020) for Recheck, labs.

## 2020-01-30 ENCOUNTER — APPOINTMENT (OUTPATIENT)
Dept: BONE DENSITY | Facility: HOSPITAL | Age: 45
End: 2020-01-30

## 2020-01-30 DIAGNOSIS — Z30.42 SURVEILLANCE FOR DEPO-PROVERA CONTRACEPTION: ICD-10-CM

## 2020-01-30 PROCEDURE — 77080 DXA BONE DENSITY AXIAL: CPT

## 2020-01-31 ENCOUNTER — HOSPITAL ENCOUNTER (OUTPATIENT)
Dept: GENERAL RADIOLOGY | Facility: HOSPITAL | Age: 45
Discharge: HOME | End: 2020-01-31
Admitting: NURSE PRACTITIONER

## 2020-01-31 ENCOUNTER — TRANSCRIBE ORDERS (OUTPATIENT)
Dept: ADMINISTRATIVE | Facility: HOSPITAL | Age: 45
End: 2020-01-31

## 2020-01-31 DIAGNOSIS — J20.9 ACUTE BRONCHITIS, UNSPECIFIED ORGANISM: ICD-10-CM

## 2020-01-31 DIAGNOSIS — J20.9 ACUTE BRONCHITIS, UNSPECIFIED ORGANISM: Primary | ICD-10-CM

## 2020-01-31 PROCEDURE — 71046 X-RAY EXAM CHEST 2 VIEWS: CPT

## 2020-02-07 DIAGNOSIS — G89.29 CHRONIC NONINTRACTABLE HEADACHE, UNSPECIFIED HEADACHE TYPE: ICD-10-CM

## 2020-02-07 DIAGNOSIS — R51.9 CHRONIC NONINTRACTABLE HEADACHE, UNSPECIFIED HEADACHE TYPE: ICD-10-CM

## 2020-02-07 RX ORDER — BUTALBITAL, ACETAMINOPHEN AND CAFFEINE 50; 325; 40 MG/1; MG/1; MG/1
TABLET ORAL
Qty: 10 TABLET | Refills: 0 | OUTPATIENT
Start: 2020-02-07

## 2020-02-11 DIAGNOSIS — R51.9 CHRONIC NONINTRACTABLE HEADACHE, UNSPECIFIED HEADACHE TYPE: ICD-10-CM

## 2020-02-11 DIAGNOSIS — G89.29 CHRONIC NONINTRACTABLE HEADACHE, UNSPECIFIED HEADACHE TYPE: ICD-10-CM

## 2020-02-11 RX ORDER — BUTALBITAL, ACETAMINOPHEN AND CAFFEINE 50; 325; 40 MG/1; MG/1; MG/1
TABLET ORAL
Qty: 10 TABLET | Refills: 0 | Status: SHIPPED | OUTPATIENT
Start: 2020-02-11 | End: 2020-03-17

## 2020-03-13 DIAGNOSIS — R51.9 CHRONIC NONINTRACTABLE HEADACHE, UNSPECIFIED HEADACHE TYPE: ICD-10-CM

## 2020-03-13 DIAGNOSIS — G89.29 CHRONIC NONINTRACTABLE HEADACHE, UNSPECIFIED HEADACHE TYPE: ICD-10-CM

## 2020-03-17 RX ORDER — BUTALBITAL, ACETAMINOPHEN AND CAFFEINE 50; 325; 40 MG/1; MG/1; MG/1
TABLET ORAL
Qty: 10 TABLET | Refills: 0 | Status: SHIPPED | OUTPATIENT
Start: 2020-03-17 | End: 2020-04-20

## 2020-04-17 DIAGNOSIS — R51.9 CHRONIC NONINTRACTABLE HEADACHE, UNSPECIFIED HEADACHE TYPE: ICD-10-CM

## 2020-04-17 DIAGNOSIS — G89.29 CHRONIC NONINTRACTABLE HEADACHE, UNSPECIFIED HEADACHE TYPE: ICD-10-CM

## 2020-04-20 RX ORDER — BUTALBITAL, ACETAMINOPHEN AND CAFFEINE 50; 325; 40 MG/1; MG/1; MG/1
TABLET ORAL
Qty: 10 TABLET | Refills: 0 | Status: SHIPPED | OUTPATIENT
Start: 2020-04-20 | End: 2020-05-12

## 2020-05-12 DIAGNOSIS — R51.9 CHRONIC NONINTRACTABLE HEADACHE, UNSPECIFIED HEADACHE TYPE: ICD-10-CM

## 2020-05-12 DIAGNOSIS — G89.29 CHRONIC NONINTRACTABLE HEADACHE, UNSPECIFIED HEADACHE TYPE: ICD-10-CM

## 2020-05-12 RX ORDER — BUTALBITAL, ACETAMINOPHEN AND CAFFEINE 50; 325; 40 MG/1; MG/1; MG/1
TABLET ORAL
Qty: 10 TABLET | Refills: 0 | Status: SHIPPED | OUTPATIENT
Start: 2020-05-12 | End: 2020-06-16

## 2020-05-12 RX ORDER — IPRATROPIUM BROMIDE AND ALBUTEROL SULFATE 2.5; .5 MG/3ML; MG/3ML
SOLUTION RESPIRATORY (INHALATION)
Qty: 270 ML | Refills: 0 | Status: SHIPPED | OUTPATIENT
Start: 2020-05-12 | End: 2020-08-14 | Stop reason: SDUPTHER

## 2020-05-12 RX ORDER — PROMETHAZINE HYDROCHLORIDE 25 MG/1
TABLET ORAL
Qty: 20 TABLET | Refills: 1 | Status: SHIPPED | OUTPATIENT
Start: 2020-05-12 | End: 2020-07-22 | Stop reason: SDUPTHER

## 2020-05-14 DIAGNOSIS — R51.9 CHRONIC NONINTRACTABLE HEADACHE, UNSPECIFIED HEADACHE TYPE: ICD-10-CM

## 2020-05-14 DIAGNOSIS — G89.29 CHRONIC NONINTRACTABLE HEADACHE, UNSPECIFIED HEADACHE TYPE: ICD-10-CM

## 2020-05-15 RX ORDER — BUTALBITAL, ACETAMINOPHEN AND CAFFEINE 50; 325; 40 MG/1; MG/1; MG/1
TABLET ORAL
Qty: 10 TABLET | Refills: 0 | OUTPATIENT
Start: 2020-05-15

## 2020-05-15 NOTE — TELEPHONE ENCOUNTER
PATIENT WAS CALLING TO SEE WHY THE DOCTOR DENIED FILLING HER  butalbital-acetaminophen-caffeine (FIORICET, ESGIC) -40 MG per tablet     PATIENT C/B # [phone] SHE WOULD LIKE AN EXPLANATION AND A CALL BACK

## 2020-05-20 DIAGNOSIS — G89.29 CHRONIC NONINTRACTABLE HEADACHE, UNSPECIFIED HEADACHE TYPE: ICD-10-CM

## 2020-05-20 DIAGNOSIS — R51.9 CHRONIC NONINTRACTABLE HEADACHE, UNSPECIFIED HEADACHE TYPE: ICD-10-CM

## 2020-05-20 RX ORDER — BUTALBITAL, ACETAMINOPHEN AND CAFFEINE 50; 325; 40 MG/1; MG/1; MG/1
1 TABLET ORAL EVERY 6 HOURS PRN
Qty: 10 TABLET | Refills: 0 | OUTPATIENT
Start: 2020-05-20

## 2020-05-20 NOTE — TELEPHONE ENCOUNTER
Pt completely out of Fioricet.  Pharmacy tells pt it was denied.  However, script was filled, but sent to [facility] rather than electronic.  Please send script to patient's pharmacy on file.

## 2020-05-21 DIAGNOSIS — G89.29 CHRONIC NONINTRACTABLE HEADACHE, UNSPECIFIED HEADACHE TYPE: ICD-10-CM

## 2020-05-21 DIAGNOSIS — R51.9 CHRONIC NONINTRACTABLE HEADACHE, UNSPECIFIED HEADACHE TYPE: ICD-10-CM

## 2020-05-21 NOTE — TELEPHONE ENCOUNTER
Pharmacy states they still have not received medication request. Patient is upset that she has been waiting for a few days. Can someone call this in for her?

## 2020-06-16 DIAGNOSIS — G89.29 CHRONIC NONINTRACTABLE HEADACHE, UNSPECIFIED HEADACHE TYPE: ICD-10-CM

## 2020-06-16 DIAGNOSIS — R51.9 CHRONIC NONINTRACTABLE HEADACHE, UNSPECIFIED HEADACHE TYPE: ICD-10-CM

## 2020-06-16 RX ORDER — BUTALBITAL, ACETAMINOPHEN AND CAFFEINE 50; 325; 40 MG/1; MG/1; MG/1
TABLET ORAL
Qty: 10 TABLET | Refills: 0 | Status: SHIPPED | OUTPATIENT
Start: 2020-06-16 | End: 2020-07-14

## 2020-07-13 DIAGNOSIS — R51.9 CHRONIC NONINTRACTABLE HEADACHE, UNSPECIFIED HEADACHE TYPE: ICD-10-CM

## 2020-07-13 DIAGNOSIS — G89.29 CHRONIC NONINTRACTABLE HEADACHE, UNSPECIFIED HEADACHE TYPE: ICD-10-CM

## 2020-07-13 RX ORDER — PROMETHAZINE HYDROCHLORIDE 25 MG/1
TABLET ORAL
Qty: 20 TABLET | Refills: 1 | OUTPATIENT
Start: 2020-07-13

## 2020-07-14 RX ORDER — BUTALBITAL, ACETAMINOPHEN AND CAFFEINE 50; 325; 40 MG/1; MG/1; MG/1
TABLET ORAL
Qty: 10 TABLET | Refills: 0 | Status: SHIPPED | OUTPATIENT
Start: 2020-07-14 | End: 2020-08-21

## 2020-07-22 ENCOUNTER — OFFICE VISIT (OUTPATIENT)
Dept: INTERNAL MEDICINE | Facility: CLINIC | Age: 45
End: 2020-07-22

## 2020-07-22 VITALS
DIASTOLIC BLOOD PRESSURE: 80 MMHG | BODY MASS INDEX: 22.23 KG/M2 | RESPIRATION RATE: 16 BRPM | SYSTOLIC BLOOD PRESSURE: 138 MMHG | HEIGHT: 62 IN | OXYGEN SATURATION: 95 % | TEMPERATURE: 97.8 F | WEIGHT: 120.8 LBS | HEART RATE: 108 BPM

## 2020-07-22 DIAGNOSIS — G89.29 CHRONIC NONINTRACTABLE HEADACHE, UNSPECIFIED HEADACHE TYPE: Primary | ICD-10-CM

## 2020-07-22 DIAGNOSIS — F32.A ANXIETY AND DEPRESSION: ICD-10-CM

## 2020-07-22 DIAGNOSIS — F41.9 ANXIETY AND DEPRESSION: ICD-10-CM

## 2020-07-22 DIAGNOSIS — M79.644 PAIN OF RIGHT THUMB: ICD-10-CM

## 2020-07-22 DIAGNOSIS — R51.9 CHRONIC NONINTRACTABLE HEADACHE, UNSPECIFIED HEADACHE TYPE: Primary | ICD-10-CM

## 2020-07-22 DIAGNOSIS — I10 ESSENTIAL HYPERTENSION: ICD-10-CM

## 2020-07-22 DIAGNOSIS — E78.2 MIXED HYPERLIPIDEMIA: ICD-10-CM

## 2020-07-22 DIAGNOSIS — R73.01 IFG (IMPAIRED FASTING GLUCOSE): ICD-10-CM

## 2020-07-22 DIAGNOSIS — I73.9 PERIPHERAL ARTERY DISEASE (HCC): ICD-10-CM

## 2020-07-22 DIAGNOSIS — J43.1 PANLOBULAR EMPHYSEMA (HCC): ICD-10-CM

## 2020-07-22 PROCEDURE — 99214 OFFICE O/P EST MOD 30 MIN: CPT | Performed by: INTERNAL MEDICINE

## 2020-07-22 RX ORDER — PROMETHAZINE HYDROCHLORIDE 25 MG/1
25 TABLET ORAL EVERY 8 HOURS PRN
Qty: 20 TABLET | Refills: 1 | Status: SHIPPED | OUTPATIENT
Start: 2020-07-22 | End: 2020-10-16

## 2020-07-22 RX ORDER — BUTALBITAL, ACETAMINOPHEN AND CAFFEINE 50; 325; 40 MG/1; MG/1; MG/1
1 TABLET ORAL EVERY 6 HOURS PRN
Qty: 10 TABLET | Refills: 0 | Status: CANCELLED | OUTPATIENT
Start: 2020-07-22

## 2020-07-22 NOTE — PROGRESS NOTES
Valeria Rubi is a 44 y.o. female, who presents with a chief complaint of   Chief Complaint   Patient presents with   • Hypertension   • Hyperlipidemia   • Follow-up       HPI   Pt here for follow up     She is on aspirin and plaix bc of PAD.  no claudication.      COPD - she feels like her breathing is up and down.  She tries to stay inside a lot during the hot weather.  Lots of activity makes her soa.  she thinks the stiolto helps her quite a bit.  She thinks the albuterol doesn't help as much as it did in the past.  She is still smoking.  she says she wheezes all the time.     Depression - she feels like she is doing better from this stand point. On effexor.     Sleep issues - she is tired often.  She sleeps during the day and is still tired at night.       Impaired fasting glucose - fasting glucose high but a1c normal.      HLD - on statin for quite some time.  she has recently started having cramps.  She has been inactive during the winter.      Vit d deficiency - levels still low on supplementation.  She is already on vit d 50,000units weekly.     htn- on lisinopril-hctz.  Fairly well controlled.       Chronic headaches - pt takes fioricet and phenergran with headaches.  We have discussed that these meds are PRN and not to be taken on a regular basis.  She used to take the fioricet almost every day.  She takes tons of tylenol.  She is on topamax for prophylaxis.  She doesn't know if this helps a lot.  She says she has 15-20 headaches per month.      He had her teeth pulled in Feb.  She has lost some weight.     Pt with vulvar abscess in December.  She was treated with bactrim.  2 lesions went away.  One spot is still there but is just a bump.  [facility]  Due for pap in March.  She has been on depo x 26 years per the health department. Dexa scan Jan 2020 normal.    The following portions of the patient's history were reviewed and updated as appropriate: allergies, current  medications, past family history, past medical history, past social history, past surgical history and problem list.    Allergies: Patient has no known allergies.    Review of Systems   Constitutional: Negative.    Eyes: Negative.    Respiratory: Negative.    Cardiovascular: Negative.    Gastrointestinal: Negative.    Endocrine: Negative.    Genitourinary: Negative.    Musculoskeletal:        Thumb pain   Skin: Negative.    Allergic/Immunologic: Negative.    Neurological: Positive for headaches.   Hematological: Negative.    Psychiatric/Behavioral: Negative.    All other systems reviewed and are negative.            Wt Readings from Last 3 Encounters:   07/22/20 54.8 kg (120 lb 12.8 oz)   01/22/20 58.6 kg (129 lb 3.2 oz)   12/27/19 56.7 kg (125 lb)     Temp Readings from Last 3 Encounters:   07/22/20 97.8 °F (36.6 °C) (Temporal)   01/22/20 97.7 °F (36.5 °C) (Oral)   12/27/19 97.5 °F (36.4 °C) (Temporal)     BP Readings from Last 3 Encounters:   07/22/20 138/80   01/22/20 130/82   12/27/19 130/84     Pulse Readings from Last 3 Encounters:   07/22/20 108   01/22/20 117   12/27/19 80     Body mass index is 22.09 kg/m².  @LASTSAO2(3)@    Physical Exam   Constitutional: She is oriented to person, place, and time. She appears well-developed and well-nourished. No distress.   HENT:   Head: Normocephalic and atraumatic.   Right Ear: External ear normal.   Left Ear: External ear normal.   Nose: Nose normal.   Mouth/Throat: Oropharynx is clear and moist.   Eyes: Pupils are equal, round, and reactive to light. Conjunctivae and EOM are normal.   Neck: Normal range of motion. Neck supple.   Cardiovascular: Normal rate, regular rhythm, normal heart sounds and intact distal pulses.   Pulmonary/Chest: Effort normal. No respiratory distress. She has wheezes.   Musculoskeletal: Normal range of motion.   Normal gait   Neurological: She is alert and oriented to person, place, and time.   Skin: Skin is warm and dry.   Psychiatric: She  has a normal mood and affect. Her behavior is normal. Judgment and thought content normal.   Nursing note and vitals reviewed.      Results for orders placed or performed in visit on 09/17/19   Lipid Panel With LDL / HDL Ratio   Result Value Ref Range    Total Cholesterol 198 0 - 200 mg/dL    Triglycerides 208 (H) 0 - 150 mg/dL    HDL Cholesterol 40 40 - 60 mg/dL    VLDL Cholesterol 41.6 mg/dL    LDL Cholesterol  116 (H) 0 - 100 mg/dL    LDL/HDL Ratio 2.91    Hemoglobin A1c   Result Value Ref Range    Hemoglobin A1C 5.30 4.80 - 5.60 %   Comprehensive Metabolic Panel   Result Value Ref Range    Glucose 121 (H) 65 - 99 mg/dL    BUN 6 6 - 20 mg/dL    Creatinine 0.77 0.57 - 1.00 mg/dL    eGFR Non African Am 81 >60 mL/min/1.73    eGFR African Am 98 >60 mL/min/1.73    BUN/Creatinine Ratio 7.8 7.0 - 25.0    Sodium 144 136 - 145 mmol/L    Potassium 4.0 3.5 - 5.2 mmol/L    Chloride 103 98 - 107 mmol/L    Total CO2 22.3 22.0 - 29.0 mmol/L    Calcium 9.3 8.6 - 10.5 mg/dL    Total Protein 6.8 6.0 - 8.5 g/dL    Albumin 4.20 3.50 - 5.20 g/dL    Globulin 2.6 gm/dL    A/G Ratio 1.6 g/dL    Total Bilirubin 0.2 0.2 - 1.2 mg/dL    Alkaline Phosphatase 87 39 - 117 U/L    AST (SGOT) 11 1 - 32 U/L    ALT (SGPT) 7 1 - 33 U/L   TSH Rfx On Abnormal To Free T4   Result Value Ref Range    TSH 1.570 0.270 - 4.200 uIU/mL   Vitamin D 25 Hydroxy   Result Value Ref Range    25 Hydroxy, Vitamin D 18.3 (L) 30.0 - 100.0 ng/ml   CBC & Differential   Result Value Ref Range    WBC 7.51 3.40 - 10.80 10*3/mm3    RBC 4.66 3.77 - 5.28 10*6/mm3    Hemoglobin 15.2 12.0 - 15.9 g/dL    Hematocrit 46.7 (H) 34.0 - 46.6 %    .2 (H) 79.0 - 97.0 fL    MCH 32.6 26.6 - 33.0 pg    MCHC 32.5 31.5 - 35.7 g/dL    RDW 11.2 (L) 12.3 - 15.4 %    Platelets 397 140 - 450 10*3/mm3    Neutrophil Rel % 48.9 42.7 - 76.0 %    Lymphocyte Rel % 41.3 19.6 - 45.3 %    Monocyte Rel % 6.1 5.0 - 12.0 %    Eosinophil Rel % 2.3 0.3 - 6.2 %    Basophil Rel % 1.1 0.0 - 1.5 %     Neutrophils Absolute 3.68 1.70 - 7.00 10*3/mm3    Lymphocytes Absolute 3.10 0.70 - 3.10 10*3/mm3    Monocytes Absolute 0.46 0.10 - 0.90 10*3/mm3    Eosinophils Absolute 0.17 0.00 - 0.40 10*3/mm3    Basophils Absolute 0.08 0.00 - 0.20 10*3/mm3    Immature Granulocyte Rel % 0.3 0.0 - 0.5 %    Immature Grans Absolute 0.02 0.00 - 0.05 10*3/mm3    nRBC 0.0 0.0 - 0.2 /100 WBC           Valeria was seen today for hypertension, hyperlipidemia and follow-up.    Diagnoses and all orders for this visit:    Chronic nonintractable headache, unspecified headache type  -     Ambulatory Referral to Neurology    Pain of right thumb  -     Ambulatory Referral to Hand Surgery    Panlobular emphysema (CMS/HCC)    Mixed hyperlipidemia  -     Comprehensive Metabolic Panel  -     Lipid Panel With LDL / HDL Ratio    Anxiety and depression  -     Comprehensive Metabolic Panel  -     CBC & Differential  -     T4, Free  -     TSH    Essential hypertension  -     Comprehensive Metabolic Panel  -     CBC & Differential  -     T4, Free  -     TSH    Peripheral artery disease (CMS/HCC)  -     Comprehensive Metabolic Panel  -     CBC & Differential  -     Lipid Panel With LDL / HDL Ratio    IFG (impaired fasting glucose)  -     Comprehensive Metabolic Panel  -     CBC & Differential  -     T4, Free  -     TSH  -     Hemoglobin A1c    Other orders  -     promethazine (PHENERGAN) 25 MG tablet; Take 1 tablet by mouth Every 8 (Eight) Hours As Needed for Nausea (headache).          Outpatient Medications Prior to Visit   Medication Sig Dispense Refill   • acetaminophen (TYLENOL) 325 MG tablet Take 2 tablets by mouth Every 4 (Four) Hours As Needed for Mild Pain , Headache or Fever.     • albuterol (PROVENTIL) (2.5 MG/3ML) 0.083% nebulizer solution USE 1 VIAL VIA NEBULIZER EVERY 4 HOURS AS NEEDED 225 mL 3   • ALBUTEROL SULFATE  (90 Base) MCG/ACT inhaler TAKE 2 PUFFS BY MOUTH EVERY 4 HOURS AS NEEDED 18 inhaler 3   • aspirin 81 MG EC tablet Take 81  mg by mouth daily.     • butalbital-acetaminophen-caffeine (FIORICET, ESGIC) -40 MG per tablet TAKE 1 TABLET BY MOUTH EVERY 6 HOURS AS NEEDED 10 tablet 0   • clopidogrel (PLAVIX) 75 MG tablet Take 1 tablet by mouth Daily. 150 mg po X1 in RR     • fluticasone (FLONASE) 50 MCG/ACT nasal spray 1 spray into each nostril as needed.     • ipratropium-albuterol (DUO-NEB) 0.5-2.5 mg/3 ml nebulizer USE 3 ML (1VIAL) IN NEBULIZER THREE TIMES DAILY 270 mL 0   • lisinopril-hydrochlorothiazide (PRINZIDE,ZESTORETIC) 20-12.5 MG per tablet Take 2 tablets by mouth Daily. 60 tablet 2   • medroxyPROGESTERone (DEPO-PROVERA) 150 MG/ML injection Apply 150 mg to cheek Every 3 (Three) Months. Pt takes every 3 months.march or April 2018 last dose     • nystatin (MYCOSTATIN) 296475 UNIT/ML suspension Swish and swallow 5 mL 4 (Four) Times a Day. Swish and swallow 5mL four times a day for two weeks. 280 mL 0   • simvastatin (ZOCOR) 40 MG tablet TAKE 1 TABLET BY MOUTH EVERY EVENING 30 tablet 6   • STIOLTO RESPIMAT 2.5-2.5 MCG/ACT aerosol solution inhaler INHALE 2 PUFFS DAILY 1 inhaler 0   • topiramate (TOPAMAX) 100 MG tablet Take 1 tablet by mouth 2 (Two) Times a Day. 180 tablet 1   • venlafaxine XR (EFFEXOR-XR) 150 MG 24 hr capsule TAKE 1 CAPSULE BY MOUTH DAILY. 90 capsule 1   • vitamin D (ERGOCALCIFEROL) 91231 units capsule capsule Take 1 capsule by mouth Every 7 (Seven) Days. 6 capsule 5   • promethazine (PHENERGAN) 25 MG tablet TAKE 1 TABLET BY MOUTH EVERY 4 HOURS AS NEEDED 20 tablet 1     No facility-administered medications prior to visit.      New Medications Ordered This Visit   Medications   • promethazine (PHENERGAN) 25 MG tablet     Sig: Take 1 tablet by mouth Every 8 (Eight) Hours As Needed for Nausea (headache).     Dispense:  20 tablet     Refill:  1     [unfilled]  Medications Discontinued During This Encounter   Medication Reason   • promethazine (PHENERGAN) 25 MG tablet Reorder         Return in about 6 months (around  1/22/2021) for Recheck, labs.

## 2020-07-23 LAB
ALBUMIN SERPL-MCNC: 4.4 G/DL (ref 3.5–5.2)
ALBUMIN/GLOB SERPL: 1.5 G/DL
ALP SERPL-CCNC: 96 U/L (ref 39–117)
ALT SERPL-CCNC: 12 U/L (ref 1–33)
AST SERPL-CCNC: 12 U/L (ref 1–32)
BASOPHILS # BLD AUTO: 0.05 10*3/MM3 (ref 0–0.2)
BASOPHILS NFR BLD AUTO: 0.6 % (ref 0–1.5)
BILIRUB SERPL-MCNC: 0.3 MG/DL (ref 0–1.2)
BUN SERPL-MCNC: 10 MG/DL (ref 6–20)
BUN/CREAT SERPL: 12.8 (ref 7–25)
CALCIUM SERPL-MCNC: 10 MG/DL (ref 8.6–10.5)
CHLORIDE SERPL-SCNC: 101 MMOL/L (ref 98–107)
CHOLEST SERPL-MCNC: 188 MG/DL (ref 0–200)
CO2 SERPL-SCNC: 28 MMOL/L (ref 22–29)
CREAT SERPL-MCNC: 0.78 MG/DL (ref 0.57–1)
EOSINOPHIL # BLD AUTO: 0.1 10*3/MM3 (ref 0–0.4)
EOSINOPHIL NFR BLD AUTO: 1.1 % (ref 0.3–6.2)
ERYTHROCYTE [DISTWIDTH] IN BLOOD BY AUTOMATED COUNT: 11.9 % (ref 12.3–15.4)
GLOBULIN SER CALC-MCNC: 3 GM/DL
GLUCOSE SERPL-MCNC: 98 MG/DL (ref 65–99)
HBA1C MFR BLD: 5.6 % (ref 4.8–5.6)
HCT VFR BLD AUTO: 46.5 % (ref 34–46.6)
HDLC SERPL-MCNC: 46 MG/DL (ref 40–60)
HGB BLD-MCNC: 15.4 G/DL (ref 12–15.9)
IMM GRANULOCYTES # BLD AUTO: 0.04 10*3/MM3 (ref 0–0.05)
IMM GRANULOCYTES NFR BLD AUTO: 0.5 % (ref 0–0.5)
LDLC SERPL CALC-MCNC: 102 MG/DL (ref 0–100)
LDLC/HDLC SERPL: 2.22 {RATIO}
LYMPHOCYTES # BLD AUTO: 2.63 10*3/MM3 (ref 0.7–3.1)
LYMPHOCYTES NFR BLD AUTO: 29.7 % (ref 19.6–45.3)
MCH RBC QN AUTO: 33.8 PG (ref 26.6–33)
MCHC RBC AUTO-ENTMCNC: 33.1 G/DL (ref 31.5–35.7)
MCV RBC AUTO: 102.2 FL (ref 79–97)
MONOCYTES # BLD AUTO: 0.51 10*3/MM3 (ref 0.1–0.9)
MONOCYTES NFR BLD AUTO: 5.8 % (ref 5–12)
NEUTROPHILS # BLD AUTO: 5.53 10*3/MM3 (ref 1.7–7)
NEUTROPHILS NFR BLD AUTO: 62.3 % (ref 42.7–76)
NRBC BLD AUTO-RTO: 0 /100 WBC (ref 0–0.2)
PLATELET # BLD AUTO: 360 10*3/MM3 (ref 140–450)
POTASSIUM SERPL-SCNC: 5 MMOL/L (ref 3.5–5.2)
PROT SERPL-MCNC: 7.4 G/DL (ref 6–8.5)
RBC # BLD AUTO: 4.55 10*6/MM3 (ref 3.77–5.28)
SODIUM SERPL-SCNC: 142 MMOL/L (ref 136–145)
T4 FREE SERPL-MCNC: 1.05 NG/DL (ref 0.93–1.7)
TRIGL SERPL-MCNC: 200 MG/DL (ref 0–150)
TSH SERPL DL<=0.005 MIU/L-ACNC: 0.88 UIU/ML (ref 0.27–4.2)
VLDLC SERPL CALC-MCNC: 40 MG/DL
WBC # BLD AUTO: 8.86 10*3/MM3 (ref 3.4–10.8)

## 2020-08-13 DIAGNOSIS — R51.9 CHRONIC NONINTRACTABLE HEADACHE, UNSPECIFIED HEADACHE TYPE: ICD-10-CM

## 2020-08-13 DIAGNOSIS — G89.29 CHRONIC NONINTRACTABLE HEADACHE, UNSPECIFIED HEADACHE TYPE: ICD-10-CM

## 2020-08-14 RX ORDER — IPRATROPIUM BROMIDE AND ALBUTEROL SULFATE 2.5; .5 MG/3ML; MG/3ML
SOLUTION RESPIRATORY (INHALATION)
Qty: 270 ML | Refills: 0 | Status: SHIPPED | OUTPATIENT
Start: 2020-08-14 | End: 2020-09-09

## 2020-08-20 NOTE — TELEPHONE ENCOUNTER
Patient called for status of butalbital-acetaminophen-caffeine (FIORICET, ESGIC) -40 MG per tablet    Patient is out of meds now and has been trying to get refilled for a couple days. Please call back and advise at [phone]    Verified pharmacy-[facility]/pharmacy #[id number] - [place], [place] - [address] - [phone]  - [phone]   [phone]

## 2020-08-21 RX ORDER — BUTALBITAL, ACETAMINOPHEN AND CAFFEINE 50; 325; 40 MG/1; MG/1; MG/1
TABLET ORAL
Qty: 10 TABLET | Refills: 2 | Status: SHIPPED | OUTPATIENT
Start: 2020-08-21 | End: 2020-11-13

## 2020-09-09 RX ORDER — IPRATROPIUM BROMIDE AND ALBUTEROL SULFATE 2.5; .5 MG/3ML; MG/3ML
SOLUTION RESPIRATORY (INHALATION)
Qty: 270 ML | Refills: 0 | Status: SHIPPED | OUTPATIENT
Start: 2020-09-09 | End: 2020-10-12

## 2020-09-21 ENCOUNTER — TRANSCRIBE ORDERS (OUTPATIENT)
Dept: ADMINISTRATIVE | Facility: HOSPITAL | Age: 45
End: 2020-09-21

## 2020-09-21 DIAGNOSIS — Z12.31 VISIT FOR SCREENING MAMMOGRAM: Primary | ICD-10-CM

## 2020-10-12 ENCOUNTER — HOSPITAL ENCOUNTER (OUTPATIENT)
Dept: MAMMOGRAPHY | Facility: HOSPITAL | Age: 45
Discharge: HOME | End: 2020-10-12
Admitting: NURSE PRACTITIONER

## 2020-10-12 DIAGNOSIS — Z12.31 VISIT FOR SCREENING MAMMOGRAM: ICD-10-CM

## 2020-10-12 PROCEDURE — 77063 BREAST TOMOSYNTHESIS BI: CPT

## 2020-10-12 PROCEDURE — 77067 SCR MAMMO BI INCL CAD: CPT

## 2020-10-12 RX ORDER — IPRATROPIUM BROMIDE AND ALBUTEROL SULFATE 2.5; .5 MG/3ML; MG/3ML
SOLUTION RESPIRATORY (INHALATION)
Qty: 270 ML | Refills: 0 | Status: SHIPPED | OUTPATIENT
Start: 2020-10-12 | End: 2020-11-09

## 2020-10-16 RX ORDER — PROMETHAZINE HYDROCHLORIDE 25 MG/1
25 TABLET ORAL EVERY 8 HOURS PRN
Qty: 20 TABLET | Refills: 1 | Status: SHIPPED | OUTPATIENT
Start: 2020-10-16 | End: 2020-12-14

## 2020-11-09 RX ORDER — IPRATROPIUM BROMIDE AND ALBUTEROL SULFATE 2.5; .5 MG/3ML; MG/3ML
SOLUTION RESPIRATORY (INHALATION)
Qty: 270 ML | Refills: 0 | Status: ON HOLD | OUTPATIENT
Start: 2020-11-09 | End: 2022-07-22

## 2020-11-13 DIAGNOSIS — G89.29 CHRONIC NONINTRACTABLE HEADACHE, UNSPECIFIED HEADACHE TYPE: ICD-10-CM

## 2020-11-13 DIAGNOSIS — R51.9 CHRONIC NONINTRACTABLE HEADACHE, UNSPECIFIED HEADACHE TYPE: ICD-10-CM

## 2020-11-13 RX ORDER — BUTALBITAL, ACETAMINOPHEN AND CAFFEINE 50; 325; 40 MG/1; MG/1; MG/1
TABLET ORAL
Qty: 10 TABLET | Refills: 2 | Status: SHIPPED | OUTPATIENT
Start: 2020-11-13 | End: 2021-02-17 | Stop reason: SDUPTHER

## 2020-12-14 RX ORDER — PROMETHAZINE HYDROCHLORIDE 25 MG/1
25 TABLET ORAL EVERY 8 HOURS PRN
Qty: 10 TABLET | Refills: 0 | Status: SHIPPED | OUTPATIENT
Start: 2020-12-14 | End: 2021-03-08

## 2020-12-30 DIAGNOSIS — J43.1 PANLOBULAR EMPHYSEMA (HCC): ICD-10-CM

## 2020-12-30 RX ORDER — TIOTROPIUM BROMIDE AND OLODATEROL 3.124; 2.736 UG/1; UG/1
2 SPRAY, METERED RESPIRATORY (INHALATION) DAILY
Qty: 1 INHALER | Refills: 0 | Status: SHIPPED | OUTPATIENT
Start: 2020-12-30 | End: 2021-01-15 | Stop reason: SDUPTHER

## 2020-12-30 NOTE — TELEPHONE ENCOUNTER
Patient called in stating the pharmacy has requested a re-fill for STIOLTO RESPIMAT 2.5-2.5 MCG/ACT aerosol solution inhaler multiple times, they have not heard back from the office. Patient said she only has two puffs left, she would like a re-fill today.    [facility] [address] [facility] call back # [phone]

## 2021-01-15 ENCOUNTER — OFFICE VISIT (OUTPATIENT)
Dept: INTERNAL MEDICINE | Facility: CLINIC | Age: 46
End: 2021-01-15

## 2021-01-15 VITALS
DIASTOLIC BLOOD PRESSURE: 74 MMHG | TEMPERATURE: 97.8 F | BODY MASS INDEX: 22.89 KG/M2 | HEIGHT: 62 IN | RESPIRATION RATE: 16 BRPM | WEIGHT: 124.4 LBS | SYSTOLIC BLOOD PRESSURE: 132 MMHG | HEART RATE: 108 BPM | OXYGEN SATURATION: 96 %

## 2021-01-15 DIAGNOSIS — I10 ESSENTIAL HYPERTENSION: ICD-10-CM

## 2021-01-15 DIAGNOSIS — I73.9 PERIPHERAL ARTERY DISEASE (HCC): ICD-10-CM

## 2021-01-15 DIAGNOSIS — E53.8 LOW VITAMIN B12 LEVEL: ICD-10-CM

## 2021-01-15 DIAGNOSIS — F41.9 ANXIETY AND DEPRESSION: ICD-10-CM

## 2021-01-15 DIAGNOSIS — R20.2 NUMBNESS AND TINGLING OF BOTH FEET: Primary | ICD-10-CM

## 2021-01-15 DIAGNOSIS — R73.9 HYPERGLYCEMIA: ICD-10-CM

## 2021-01-15 DIAGNOSIS — F33.1 MODERATE EPISODE OF RECURRENT MAJOR DEPRESSIVE DISORDER (HCC): ICD-10-CM

## 2021-01-15 DIAGNOSIS — F32.A ANXIETY AND DEPRESSION: ICD-10-CM

## 2021-01-15 DIAGNOSIS — E78.2 MIXED HYPERLIPIDEMIA: ICD-10-CM

## 2021-01-15 DIAGNOSIS — M79.641 PAIN OF RIGHT HAND: ICD-10-CM

## 2021-01-15 DIAGNOSIS — J43.1 PANLOBULAR EMPHYSEMA (HCC): ICD-10-CM

## 2021-01-15 DIAGNOSIS — R79.89 LOW VITAMIN D LEVEL: ICD-10-CM

## 2021-01-15 DIAGNOSIS — R20.0 NUMBNESS AND TINGLING OF BOTH FEET: Primary | ICD-10-CM

## 2021-01-15 PROCEDURE — 99215 OFFICE O/P EST HI 40 MIN: CPT | Performed by: INTERNAL MEDICINE

## 2021-01-15 RX ORDER — ASPIRIN 81 MG/1
81 TABLET ORAL DAILY
Qty: 90 TABLET | Refills: 3 | Status: SHIPPED | OUTPATIENT
Start: 2021-01-15

## 2021-01-15 RX ORDER — TIOTROPIUM BROMIDE AND OLODATEROL 3.124; 2.736 UG/1; UG/1
2 SPRAY, METERED RESPIRATORY (INHALATION) DAILY
Qty: 1 INHALER | Refills: 5 | Status: ON HOLD | OUTPATIENT
Start: 2021-01-15 | End: 2022-12-05

## 2021-01-15 RX ORDER — SIMVASTATIN 40 MG
40 TABLET ORAL EVERY EVENING
Qty: 90 TABLET | Refills: 1 | Status: SHIPPED | OUTPATIENT
Start: 2021-01-15

## 2021-01-15 RX ORDER — CLOPIDOGREL BISULFATE 75 MG/1
75 TABLET ORAL DAILY
Qty: 90 TABLET | Refills: 1 | Status: SHIPPED | OUTPATIENT
Start: 2021-01-15

## 2021-01-15 RX ORDER — LISINOPRIL AND HYDROCHLOROTHIAZIDE 20; 12.5 MG/1; MG/1
1 TABLET ORAL DAILY
Qty: 90 TABLET | Refills: 0 | Status: SHIPPED | OUTPATIENT
Start: 2021-01-15 | End: 2021-02-19

## 2021-01-15 RX ORDER — VENLAFAXINE HYDROCHLORIDE 75 MG/1
75 CAPSULE, EXTENDED RELEASE ORAL DAILY
Qty: 90 CAPSULE | Refills: 0 | Status: SHIPPED | OUTPATIENT
Start: 2021-01-15 | End: 2021-02-19
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
## 2021-02-17 ENCOUNTER — OFFICE VISIT (OUTPATIENT)
Dept: INTERNAL MEDICINE | Facility: CLINIC | Age: 46
End: 2021-02-17

## 2021-02-17 ENCOUNTER — HOSPITAL ENCOUNTER (OUTPATIENT)
Dept: GENERAL RADIOLOGY | Facility: HOSPITAL | Age: 46
Discharge: HOME | End: 2021-02-17
Admitting: INTERNAL MEDICINE

## 2021-02-17 VITALS
BODY MASS INDEX: 23.04 KG/M2 | OXYGEN SATURATION: 100 % | DIASTOLIC BLOOD PRESSURE: 80 MMHG | HEIGHT: 62 IN | TEMPERATURE: 97.1 F | RESPIRATION RATE: 16 BRPM | WEIGHT: 125.2 LBS | SYSTOLIC BLOOD PRESSURE: 140 MMHG | HEART RATE: 97 BPM

## 2021-02-17 DIAGNOSIS — F32.A ANXIETY AND DEPRESSION: Primary | ICD-10-CM

## 2021-02-17 DIAGNOSIS — R07.81 RIB PAIN ON RIGHT SIDE: ICD-10-CM

## 2021-02-17 DIAGNOSIS — R51.9 CHRONIC NONINTRACTABLE HEADACHE, UNSPECIFIED HEADACHE TYPE: ICD-10-CM

## 2021-02-17 DIAGNOSIS — J43.1 PANLOBULAR EMPHYSEMA (HCC): ICD-10-CM

## 2021-02-17 DIAGNOSIS — I10 ESSENTIAL HYPERTENSION: ICD-10-CM

## 2021-02-17 DIAGNOSIS — I73.9 PERIPHERAL ARTERY DISEASE (HCC): ICD-10-CM

## 2021-02-17 DIAGNOSIS — Z72.0 TOBACCO USE: ICD-10-CM

## 2021-02-17 DIAGNOSIS — R20.0 NUMBNESS AND TINGLING OF BOTH FEET: ICD-10-CM

## 2021-02-17 DIAGNOSIS — G89.29 CHRONIC NONINTRACTABLE HEADACHE, UNSPECIFIED HEADACHE TYPE: ICD-10-CM

## 2021-02-17 DIAGNOSIS — R73.01 IFG (IMPAIRED FASTING GLUCOSE): ICD-10-CM

## 2021-02-17 DIAGNOSIS — F41.9 ANXIETY AND DEPRESSION: Primary | ICD-10-CM

## 2021-02-17 DIAGNOSIS — R20.2 NUMBNESS AND TINGLING OF BOTH FEET: ICD-10-CM

## 2021-02-17 DIAGNOSIS — E78.2 MIXED HYPERLIPIDEMIA: ICD-10-CM

## 2021-02-17 PROCEDURE — 71101 X-RAY EXAM UNILAT RIBS/CHEST: CPT

## 2021-02-17 PROCEDURE — 99214 OFFICE O/P EST MOD 30 MIN: CPT | Performed by: INTERNAL MEDICINE

## 2021-02-17 RX ORDER — VARENICLINE TARTRATE 1 MG/1
1 TABLET, FILM COATED ORAL 2 TIMES DAILY
Qty: 56 TABLET | Refills: 4 | Status: SHIPPED | OUTPATIENT
Start: 2021-03-17 | End: 2021-08-04

## 2021-02-17 RX ORDER — BUTALBITAL, ACETAMINOPHEN AND CAFFEINE 50; 325; 40 MG/1; MG/1; MG/1
1 TABLET ORAL EVERY 6 HOURS PRN
Qty: 10 TABLET | Refills: 2 | OUTPATIENT
Start: 2021-02-17 | End: 2021-03-11
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
## 2021-02-19 DIAGNOSIS — F41.9 ANXIETY AND DEPRESSION: ICD-10-CM

## 2021-02-19 DIAGNOSIS — I10 ESSENTIAL HYPERTENSION: ICD-10-CM

## 2021-02-19 DIAGNOSIS — F32.A ANXIETY AND DEPRESSION: ICD-10-CM

## 2021-02-19 DIAGNOSIS — F33.1 MODERATE EPISODE OF RECURRENT MAJOR DEPRESSIVE DISORDER (HCC): ICD-10-CM

## 2021-02-19 RX ORDER — VENLAFAXINE HYDROCHLORIDE 75 MG/1
CAPSULE, EXTENDED RELEASE ORAL
Qty: 90 CAPSULE | Refills: 0 | Status: SHIPPED | OUTPATIENT
Start: 2021-02-19 | End: 2021-06-25

## 2021-02-19 RX ORDER — LISINOPRIL AND HYDROCHLOROTHIAZIDE 20; 12.5 MG/1; MG/1
TABLET ORAL
Qty: 90 TABLET | Refills: 0 | Status: SHIPPED | OUTPATIENT
Start: 2021-02-19 | End: 2021-06-25

## 2021-03-08 RX ORDER — PROMETHAZINE HYDROCHLORIDE 25 MG/1
25 TABLET ORAL EVERY 8 HOURS PRN
Qty: 10 TABLET | Refills: 0 | Status: SHIPPED | OUTPATIENT
Start: 2021-03-08 | End: 2021-05-12

## 2021-03-09 ENCOUNTER — OFFICE VISIT (OUTPATIENT)
Dept: NEUROLOGY | Facility: CLINIC | Age: 46
End: 2021-03-09

## 2021-03-09 VITALS
BODY MASS INDEX: 22.63 KG/M2 | HEART RATE: 101 BPM | OXYGEN SATURATION: 96 % | SYSTOLIC BLOOD PRESSURE: 140 MMHG | HEIGHT: 62 IN | DIASTOLIC BLOOD PRESSURE: 80 MMHG | WEIGHT: 123 LBS

## 2021-03-09 DIAGNOSIS — R20.2 NUMBNESS AND TINGLING OF BOTH FEET: ICD-10-CM

## 2021-03-09 DIAGNOSIS — R20.0 NUMBNESS AND TINGLING OF BOTH FEET: ICD-10-CM

## 2021-03-09 DIAGNOSIS — G43.719 INTRACTABLE CHRONIC MIGRAINE WITHOUT AURA AND WITHOUT STATUS MIGRAINOSUS: Primary | ICD-10-CM

## 2021-03-09 PROBLEM — G43.909 HEADACHE, MIGRAINE: Status: ACTIVE | Noted: 2021-03-09

## 2021-03-09 PROCEDURE — 99204 OFFICE O/P NEW MOD 45 MIN: CPT | Performed by: PSYCHIATRY & NEUROLOGY

## 2021-03-09 RX ORDER — GALCANEZUMAB 120 MG/ML
240 INJECTION, SOLUTION SUBCUTANEOUS ONCE
Qty: 2 ML | Refills: 0 | COMMUNITY
Start: 2021-03-09 | End: 2021-03-09

## 2021-03-09 RX ORDER — RIMEGEPANT SULFATE 75 MG/75MG
75 TABLET, ORALLY DISINTEGRATING ORAL ONCE AS NEEDED
Qty: 4 TABLET | Refills: 0 | COMMUNITY
Start: 2021-03-09 | End: 2021-06-17

## 2021-03-09 NOTE — ASSESSMENT & PLAN NOTE
She also has numbness and tingling in both feet.  She has had lab work including normal HgbA1c, TSH and  serum protein ELP.  I will also check Vit b12, folate and thiamine levels today.  I will also order an EMG/NCS of bilateral lower extremities for further evaluation.

## 2021-03-09 NOTE — PROGRESS NOTES
Chief Complaint  Migraine (having migraines since age 12, stress induced migraines, light sensitivity, on fioricet, MRI 2019, never seen a neurologist) and Numbness (began 2mo ago, had stents placed and ever since )    Subjective          Valeria Rubi presents to [facility] for   HISTORY OF PRESENT ILLNESS:    Valeria Rubi is a 45 year old right handed woman who presents to neurology clinic for initial evaluation and treatment of migraines and numbness in both feet.  She reports migraines starting when she was 12 years old.  The headaches are located in the back of her head and at times in the front of her head.  She describes a throbbing pain which she rates as 10/10 on pain scale 1-10 when most severe with associated light and sound sensitivity with some nausea and vomiting.  She has noticed stress triggers them and she has been having increased stress.  She is getting the mores ever migraines at least 5 times per month and they can wake her up from sleep.  They last 24 hours up to 4 days in duration.  She has tried ibuprofen, Fioricet which works if she feels it coming on and topiramate which she is not sure if it was helpful or not. She has not tried triptans due to history of HTN and she has had stents placed in her legs for which she is on aspirin and Plavix along with statin.  She is also on Venlafaxine which she reports taking for several years.  She has COPD/emphysema from smoking.  She is smoking 1 pack per day.  She reports lack of sleep.  She reports the numbness in her feet started about 2 months ago and she tells me her feet are always freezing.  She has had lab work including TSH, HgbA1c and serum protein ELP which were normal.  She has had a MRI scan of her lumbar spine which demonstrated DDD without neural foraminal or central canal stenosis.  She denies family history of neuropathy.  She does not drink alcohol and she denies ever being a heavy alcohol drinker.  She  has had a brain MRI scan in 2019 which did not demonstrate any acute intracranial findings.      Past Medical History:   Diagnosis Date   • Anxiety    • Arthritis    • COPD (chronic obstructive pulmonary disease) (CMS/HCC)    • Coronary stent occlusion    • CTS (carpal tunnel syndrome)    • Depression    • Dizziness    • Headache    • Hyperlipidemia    • Hypertension    • Migraine    • Numbness and tingling    • Pneumonia         Family History   Problem Relation Age of Onset   • Heart disease Father    • Hypertension Father    • Diabetes Father    • Stroke Father    • COPD Father    • Heart failure Paternal Grandmother    • Heart failure Paternal Grandfather    • COPD Mother    • COPD Maternal Aunt    • COPD Maternal Uncle    • COPD Paternal Uncle         Social History     Socioeconomic History   • Marital status:      Spouse name: Not on file   • Number of children: Not on file   • Years of education: Not on file   • Highest education level: Not on file   Tobacco Use   • Smoking status: Current Every Day Smoker     Packs/day: 1.00     Years: 30.00     Pack years: 30.00     Types: Cigarettes   • Smokeless tobacco: Never Used   Substance and Sexual Activity   • Alcohol use: No   • Drug use: No   • Sexual activity: Defer        I have personally reviewed the ROS as stated below.     Review of Systems   Constitutional: Positive for activity change. Negative for appetite change and fatigue.   HENT: Negative for mouth sores, sinus pressure, sore throat and tinnitus.    Eyes: Positive for pain. Negative for blurred vision, double vision and discharge.   Respiratory: Positive for cough, choking, chest tightness and wheezing.    Cardiovascular: Negative for chest pain, palpitations and leg swelling.   Gastrointestinal: Negative for abdominal pain, nausea and vomiting.   Endocrine: Negative for cold intolerance and heat intolerance.   Genitourinary: Negative for decreased urine volume, urgency and urinary  "incontinence.   Musculoskeletal: Positive for back pain, neck pain and neck stiffness.   Skin: Negative for dry skin, pallor and skin lesions.   Allergic/Immunologic: Positive for environmental allergies. Negative for food allergies.   Neurological: Positive for dizziness, weakness, light-headedness and headache. Negative for tremors, seizures, syncope, facial asymmetry, speech difficulty, numbness, memory problem and confusion.   Hematological: Bruises/bleeds easily.   Psychiatric/Behavioral: Positive for sleep disturbance. Negative for agitation, behavioral problems, decreased concentration, dysphoric mood, hallucinations, self-injury, suicidal ideas, negative for hyperactivity, depressed mood and stress. The patient is not nervous/anxious.         Objective   Vital Signs:   /80 (BP Location: Right arm, Patient Position: Sitting)   Pulse 101   Ht 157.5 cm (62\")   Wt 55.8 kg (123 lb)   SpO2 96%   BMI 22.50 kg/m²       PHYSICAL EXAM:    General   Mental Status - Alert. General Appearance - Well developed, Well groomed, Oriented and Cooperative. Orientation - Oriented X3.       Head and Neck  Head - normocephalic, atraumatic with no lesions or palpable masses.  Neck    Global Assessment - supple.       Eye   Sclera/Conjunctiva - Bilateral - Normal.    ENMT  Mouth and Throat   Oral Cavity/Oropharynx: Oropharynx - the soft palate,uvula and tongue are normal in appearance.    Chest and Lung Exam   Chest - lung clear to auscultation bilaterally.    Cardiovascular   Cardiovascular examination reveals  - normal heart sounds, regular rate and rhythm.    Neurologic   Mental Status: Speech - Normal. Cognitive function - appropriate fund of knowledge. No impairment of attention, Impairment of concentration, impairment of long term memory or impairment of short term memory.  Cranial Nerves:   II Optic: Visual acuity - Left - Normal. Right - Normal. Visual fields - Normal (to confrontation).  III Oculomotor: Pupillary " constriction - Left - Normal. Right - Normal.  VII Facial: - Normal Bilaterally.  VIII Acoustic - Bilateral - Hearing normal and (Hearing tested by finger rub).   IX Glossopharyngeal / X Vagus - Normal.  XI Accessory: Trapezius - Bilateral - Normal. Sternocleidomastoid - Bilateral - Normal.  XII Hypoglossal - Bilateral - Normal.  Eye Movements: - Normal Bilaterally.  Sensory:   Light Touch: Intact - Globally.  Motor:   Bulk and Contour: - Normal.  Tone: - Normal.  Tremor: Not present.  Strength: 5/5 normal muscle strength - All Muscles.   General Assessment of Reflexes: - deep tendon reflexes are normal. Coordination - No Impairment of finger-to-nose or Impairment of rapid alternating movements. Gait - Normal.       Result Review :                 Assessment and Plan    Problem List Items Addressed This Visit        Neuro    Headache, migraine - Primary    Current Assessment & Plan     45 year old woman with long history of migraine headaches.  She is getting the more severe migraines 5 times per month which can last from 24 hours up to 4 days in duration with 15+ migraine days per month.  Normal neurological exam today other than numbness in both feet, I reviewed her brain MRI scan images independently today.  She has been tried on topiramate which she did not think was helpful.  She is already on Venlafaxine and has been on this medicine for sometime and this is not helping with her migraines.  She cannot be tried on propranolol/beta blockers due to COPD, I would not try amitriptyline as she is on Venlafaxine and increased risk for serotonin syndrome and cannot use triptans due to vascular disease and HTN.  I will start her on Emgality today which I gave her samples to start for migraine prevention and also use Nurtec ODT which I provided samples for as well for acute treatment of her migraines, as this is safe given her multitude of comorbidities.  Discussed potential side effects.  Discussed migraine triggers  and lifestyle modifications and provided patient education information today.  I advised her to quit smoking.             Relevant Medications    Rimegepant Sulfate (Nurtec) 75 MG tablet dispersible tablet    galcanezumab-gnlm (Emgality) 120 MG/ML prefilled syringe    galcanezumab-gnlm (EMGALITY) 120 MG/ML prefilled syringe       Symptoms and Signs    Numbness and tingling of both feet    Current Assessment & Plan     She also has numbness and tingling in both feet.  She has had lab work including normal HgbA1c, TSH and  serum protein ELP.  I will also check Vit b12, folate and thiamine levels today.  I will also order an EMG/NCS of bilateral lower extremities for further evaluation.           Relevant Orders    EMG & Nerve Conduction Test          I spent 52 minutes caring for Valeria on this date of service. This time includes time spent by me in the following activities:preparing for the visit, reviewing tests, obtaining and/or reviewing a separately obtained history, performing a medically appropriate examination and/or evaluation , counseling and educating the patient/family/caregiver, ordering medications, tests, or procedures, documenting information in the medical record, independently interpreting results and communicating that information with the patient/family/caregiver and care coordination    Follow Up   Return in about 3 months (around 6/9/2021).  Patient was given instructions and counseling regarding her condition or for health maintenance advice. Please see specific information pulled into the AVS if appropriate.

## 2021-03-09 NOTE — ASSESSMENT & PLAN NOTE
45 year old woman with long history of migraine headaches.  She is getting the more severe migraines 5 times per month which can last from 24 hours up to 4 days in duration with 15+ migraine days per month.  Normal neurological exam today other than numbness in both feet, I reviewed her brain MRI scan images independently today.  She has been tried on topiramate which she did not think was helpful.  She is already on Venlafaxine and has been on this medicine for sometime and this is not helping with her migraines.  She cannot be tried on propranolol/beta blockers due to COPD, I would not try amitriptyline as she is on Venlafaxine and increased risk for serotonin syndrome and cannot use triptans due to vascular disease and HTN.  I will start her on Emgality today which I gave her samples to start for migraine prevention and also use Nurtec ODT which I provided samples for as well for acute treatment of her migraines, as this is safe given her multitude of comorbidities.  Discussed potential side effects.  Discussed migraine triggers and lifestyle modifications and provided patient education information today.  I advised her to quit smoking.

## 2021-03-09 NOTE — PATIENT INSTRUCTIONS
Migraine Headache  A migraine headache is an intense, throbbing pain on one side or both sides of the head. Migraine headaches may also cause other symptoms, such as nausea, vomiting, and sensitivity to light and noise. A migraine headache can last from 4 hours to 3 days. Talk with your doctor about what things may bring on (trigger) your migraine headaches.  What are the causes?  The exact cause of this condition is not known. However, a migraine may be caused when nerves in the brain become irritated and release chemicals that cause inflammation of blood vessels. This inflammation causes pain. This condition may be triggered or caused by:  · Drinking alcohol.  · Smoking.  · Taking medicines, such as:  ? Medicine used to treat chest pain (nitroglycerin).  ? Birth control pills.  ? Estrogen.  ? Certain blood pressure medicines.  · Eating or drinking products that contain nitrates, glutamate, aspartame, or tyramine. Aged cheeses, chocolate, or caffeine may also be triggers.  · Doing physical activity.  Other things that may trigger a migraine headache include:  · Menstruation.  · Pregnancy.  · Hunger.  · Stress.  · Lack of sleep or too much sleep.  · Weather changes.  · Fatigue.  What increases the risk?  The following factors may make you more likely to experience migraine headaches:  · Being a certain age. This condition is more common in people who are 25-55 years old.  · Being female.  · Having a family history of migraine headaches.  · Being .  · Having a mental health condition, such as depression or anxiety.  · Being obese.  What are the signs or symptoms?  The main symptom of this condition is pulsating or throbbing pain. This pain may:  · Happen in any area of the head, such as on one side or both sides.  · Interfere with daily activities.  · Get worse with physical activity.  · Get worse with exposure to bright lights or loud noises.  Other symptoms may  include:  · Nausea.  · Vomiting.  · Dizziness.  · General sensitivity to bright lights, loud noises, or smells.  Before you get a migraine headache, you may get warning signs (an aura). An aura may include:  · Seeing flashing lights or having blind spots.  · Seeing bright spots, halos, or zigzag lines.  · Having tunnel vision or blurred vision.  · Having numbness or a tingling feeling.  · Having trouble talking.  · Having muscle weakness.  Some people have symptoms after a migraine headache (postdromal phase), such as:  · Feeling tired.  · Difficulty concentrating.  How is this diagnosed?  A migraine headache can be diagnosed based on:  · Your symptoms.  · A physical exam.  · Tests, such as:  ? CT scan or an MRI of the head. These imaging tests can help rule out other causes of headaches.  ? Taking fluid from the spine (lumbar puncture) and analyzing it (cerebrospinal fluid analysis, or CSF analysis).  How is this treated?  This condition may be treated with medicines that:  · Relieve pain.  · Relieve nausea.  · Prevent migraine headaches.  Treatment for this condition may also include:  · Acupuncture.  · Lifestyle changes like avoiding foods that trigger migraine headaches.  · Biofeedback.  · Cognitive behavioral therapy.  Follow these instructions at home:  Medicines  · Take over-the-counter and prescription medicines only as told by your health care provider.  · Ask your health care provider if the medicine prescribed to you:  ? Requires you to avoid driving or using heavy machinery.  ? Can cause constipation. You may need to take these actions to prevent or treat constipation:  § Drink enough fluid to keep your urine pale yellow.  § Take over-the-counter or prescription medicines.  § Eat foods that are high in fiber, such as beans, whole grains, and fresh fruits and vegetables.  § Limit foods that are high in fat and processed sugars, such as fried or sweet foods.  Lifestyle  · Do not drink alcohol.  · Do not  use any products that contain nicotine or tobacco, such as cigarettes, e-cigarettes, and chewing tobacco. If you need help quitting, ask your health care provider.  · Get at least 8 hours of sleep every night.  · Find ways to manage stress, such as meditation, deep breathing, or yoga.  General instructions               · Keep a journal to find out what may trigger your migraine headaches. For example, write down:  ? What you eat and drink.  ? How much sleep you get.  ? Any change to your diet or medicines.  · If you have a migraine headache:  ? Avoid things that make your symptoms worse, such as bright lights.  ? It may help to lie down in a dark, quiet room.  ? Do not drive or use heavy machinery.  ? Ask your health care provider what activities are safe for you while you are experiencing symptoms.  · Keep all follow-up visits as told by your health care provider. This is important.  Contact a health care provider if:  · You develop symptoms that are different or more severe than your usual migraine headache symptoms.  · You have more than 15 headache days in one month.  Get help right away if:  · Your migraine headache becomes severe.  · Your migraine headache lasts longer than 72 hours.  · You have a fever.  · You have a stiff neck.  · You have vision loss.  · Your muscles feel weak or like you cannot control them.  · You start to lose your balance often.  · You have trouble walking.  · You faint.  · You have a seizure.  Summary  · A migraine headache is an intense, throbbing pain on one side or both sides of the head. Migraines may also cause other symptoms, such as nausea, vomiting, and sensitivity to light and noise.  · This condition may be treated with medicines and lifestyle changes. You may also need to avoid certain things that trigger a migraine headache.  · Keep a journal to find out what may trigger your migraine headaches.  · Contact your health care provider if you have more than 15 headache days in  a month or you develop symptoms that are different or more severe than your usual migraine headache symptoms.  This information is not intended to replace advice given to you by your health care provider. Make sure you discuss any questions you have with your health care provider.  Document Revised: 04/10/2020 Document Reviewed: 01/30/2020  Elsevier Patient Education © 2020 [employer]       Paresthesia  Paresthesia is an abnormal burning or prickling sensation. It is usually felt in the hands, arms, legs, or feet. However, it may occur in any part of the body. Usually, paresthesia is not painful. It may feel like:  · Tingling or numbness.  · Buzzing.  · Itching.  Paresthesia may occur without any clear cause, or it may be caused by:  · Breathing too quickly (hyperventilation).  · Pressure on a nerve.  · An underlying medical condition.  · Side effects of a medication.  · Nutritional deficiencies.  · Exposure to toxic chemicals.  Most people experience temporary (transient) paresthesia at some time in their lives. For some people, it may be long-lasting (chronic) because of an underlying medical condition. If you have paresthesia that lasts a long time, you may need to be evaluated by your health care provider.  Follow these instructions at home:  Alcohol use    · Do not drink alcohol if:  ? Your health care provider tells you not to drink.  ? You are pregnant, may be pregnant, or are planning to become pregnant.  · If you drink alcohol:  ? Limit how much you use to:  § 0-1 drink a day for women.  § 0-2 drinks a day for men.  ? Be aware of how much alcohol is in your drink. In the [place] one drink equals one 12 oz bottle of beer (355 mL), one 5 oz glass of wine (148 mL), or one 1½ oz glass of hard liquor (44 mL).  Nutrition    · Eat a healthy diet. This includes:  ? Eating foods that are high in fiber, such as fresh fruits and vegetables, whole grains, and beans.  ? Limiting foods that are high in fat and processed  sugars, such as fried or sweet foods.  General instructions  · Take over-the-counter and prescription medicines only as told by your health care provider.  · Do not use any products that contain nicotine or tobacco, such as cigarettes and e-cigarettes. These can keep blood from reaching damaged nerves. If you need help quitting, ask your health care provider.  · If you have diabetes, work closely with your health care provider to keep your blood sugar under control.  · If you have numbness in your feet:  ? Check every day for signs of injury or infection. Watch for redness, warmth, and swelling.  ? Wear padded socks and comfortable shoes. These help protect your feet.  · Keep all follow-up visits as told by your health care provider. This is important.  Contact a health care provider if you:  · Have paresthesia that gets worse or does not go away.  · Have a burning or prickling feeling that gets worse when you walk.  · Have pain, cramps, or dizziness.  · Develop a rash.  Get help right away if you:  · Feel weak.  · Have trouble walking or moving.  · Have problems with speech, understanding, or vision.  · Feel confused.  · Cannot control your bladder or bowel movements.  · Have numbness after an injury.  · Develop new weakness in an arm or leg.  · Faint.  Summary  · Paresthesia is an abnormal burning or prickling sensation that is usually felt in the hands, arms, legs, or feet. It may also occur in other parts of the body.  · Paresthesia may occur without any clear cause, or it may be caused by breathing too quickly (hyperventilation), pressure on a nerve, an underlying medical condition, side effects of a medication, nutritional deficiencies, or exposure to toxic chemicals.  · If you have paresthesia that lasts a long time, you may need to be evaluated by your health care provider.  This information is not intended to replace advice given to you by your health care provider. Make sure you discuss any questions you  have with your health care provider.  Document Revised: 01/13/2020 Document Reviewed: 12/27/2018  Elsevier Patient Education © 2020 [employer]

## 2021-03-10 ENCOUNTER — TELEPHONE (OUTPATIENT)
Dept: NEUROLOGY | Facility: CLINIC | Age: 46
End: 2021-03-10

## 2021-03-11 ENCOUNTER — HOSPITAL ENCOUNTER (EMERGENCY)
Facility: HOSPITAL | Age: 46
Discharge: HOME | End: 2021-03-11
Attending: EMERGENCY MEDICINE | Admitting: EMERGENCY MEDICINE

## 2021-03-11 ENCOUNTER — APPOINTMENT (OUTPATIENT)
Dept: GENERAL RADIOLOGY | Facility: HOSPITAL | Age: 46
End: 2021-03-11

## 2021-03-11 VITALS
WEIGHT: 123 LBS | OXYGEN SATURATION: 96 % | RESPIRATION RATE: 20 BRPM | HEART RATE: 81 BPM | TEMPERATURE: 97.8 F | SYSTOLIC BLOOD PRESSURE: 153 MMHG | BODY MASS INDEX: 22.63 KG/M2 | HEIGHT: 62 IN | DIASTOLIC BLOOD PRESSURE: 98 MMHG

## 2021-03-11 DIAGNOSIS — S22.41XA CLOSED FRACTURE OF MULTIPLE RIBS OF RIGHT SIDE, INITIAL ENCOUNTER: Primary | ICD-10-CM

## 2021-03-11 PROCEDURE — 99282 EMERGENCY DEPT VISIT SF MDM: CPT

## 2021-03-11 PROCEDURE — 99283 EMERGENCY DEPT VISIT LOW MDM: CPT | Performed by: EMERGENCY MEDICINE

## 2021-03-11 PROCEDURE — 71101 X-RAY EXAM UNILAT RIBS/CHEST: CPT

## 2021-03-11 RX ORDER — OXYCODONE HYDROCHLORIDE AND ACETAMINOPHEN 5; 325 MG/1; MG/1
1 TABLET ORAL EVERY 6 HOURS PRN
Qty: 25 TABLET | Refills: 0 | Status: SHIPPED | OUTPATIENT
Start: 2021-03-11 | End: 2022-07-20

## 2021-03-11 NOTE — ED NOTES
Pt c/o rib pain from old injury states its not any better, states hard time to sleep without being in pain.      Lupe Lora, RN  03/11/21 0505

## 2021-03-11 NOTE — DISCHARGE INSTRUCTIONS
Medication as directed.  Do not take any Tylenol-containing products while taking Percocet.  You may take ibuprofen or naproxen if additional pain relief is needed.  Apply ice as needed for pain.  Follow-up with your PCP as above.  Return to ED for worsening symptoms, medical emergencies.

## 2021-03-11 NOTE — ED PROVIDER NOTES
Subjective   Valeria Rubi is a 45-year-old white female who presents secondary to right rib pain.  Patient has a chronic cough secondary to COPD.  On February 16 patient was coughing and felt a rib pop.  She was seen by her PCP on 2/17/2021.  X-ray showed a single rib fracture.  Patient reports her symptoms had been improving until 1 week ago.  The pain has worsened since that time.  Symptoms are worsened by deep breath and movement of the torso.  Overlying skin is mottled.  Patient elected to come to the ER for evaluation.      History provided by:  Patient      Review of Systems   Constitutional: Negative.  Negative for fever.   HENT: Negative.  Negative for rhinorrhea.    Eyes: Negative.  Negative for redness.   Respiratory: Positive for cough (Chronic secondary to COPD).    Cardiovascular: Positive for chest pain (Secondary to known rib fracture.).   Gastrointestinal: Negative for abdominal pain.   Endocrine: Negative.    Genitourinary: Negative.  Negative for difficulty urinating.   Musculoskeletal: Negative.  Negative for back pain.   Skin: Negative.  Negative for color change.   Neurological: Negative.  Negative for syncope.   Hematological: Negative.    Psychiatric/Behavioral: Negative.    All other systems reviewed and are negative.      Past Medical History:   Diagnosis Date   • Anxiety    • Arthritis    • COPD (chronic obstructive pulmonary disease) (CMS/HCC)    • Coronary stent occlusion    • CTS (carpal tunnel syndrome)    • Depression    • Dizziness    • Headache    • Hyperlipidemia    • Hypertension    • Migraine    • Numbness and tingling    • Pneumonia        Allergies   Allergen Reactions   • Flonase [Fluticasone] Unknown - Low Severity       Past Surgical History:   Procedure Laterality Date   • ANGIOPLASTY ILIAC ARTERY Left 6/17/2016    Procedure: BILATERAL DUPLEX DIRECTED ACCESS, AIF BILATERAL RUNOFF, SELECTIVE CATHETERIZATION OF RIGHT EXTERNAL ILIAC WITH ANGIOPLASTY, LEFT COMMON ILIAC STENT  PLACEMENT;  Surgeon: Jose Carlos Plascencia MD;  [facility]  Service:    • BREAST SURGERY      BILAT IMPLANTS   • CAROTID ARTERY ANGIOPLASTY         Family History   Problem Relation Age of Onset   • Heart disease Father    • Hypertension Father    • Diabetes Father    • Stroke Father    • COPD Father    • Heart failure Paternal Grandmother    • Heart failure Paternal Grandfather    • COPD Mother    • COPD Maternal Aunt    • COPD Maternal Uncle    • COPD Paternal Uncle        Social History     Socioeconomic History   • Marital status: Single     Spouse name: Not on file   • Number of children: Not on file   • Years of education: Not on file   • Highest education level: Not on file   Tobacco Use   • Smoking status: Current Every Day Smoker     Packs/day: 1.00     Years: 30.00     Pack years: 30.00     Types: Cigarettes   • Smokeless tobacco: Never Used   Substance and Sexual Activity   • Alcohol use: No   • Drug use: No   • Sexual activity: Defer           Objective   Physical Exam  Vitals and nursing note reviewed.   Constitutional:       General: She is not in acute distress.     Appearance: Normal appearance. She is well-developed and normal weight. She is not diaphoretic.      Comments: 45-year-old white female sitting in bed.  Patient appears in good overall health.  Vital signs only notable for BP of 148/67.  Otherwise unremarkable.  Patient is friendly and cooperative.   HENT:      Head: Normocephalic and atraumatic.      Right Ear: Tympanic membrane, ear canal and external ear normal.      Left Ear: Tympanic membrane, ear canal and external ear normal.      Nose: Nose normal.      Mouth/Throat:      Mouth: Mucous membranes are moist.      Pharynx: Oropharynx is clear.   Eyes:      Extraocular Movements: Extraocular movements intact.      Conjunctiva/sclera: Conjunctivae normal.      Pupils: Pupils are equal, round, and reactive to light.   Cardiovascular:      Rate and Rhythm: Normal rate and  regular rhythm.      Pulses: Normal pulses.      Heart sounds: Normal heart sounds. No murmur. No friction rub. No gallop.    Pulmonary:      Effort: Pulmonary effort is normal.      Breath sounds: Normal breath sounds.       Chest:      Chest wall: Tenderness present.       Abdominal:      General: Bowel sounds are normal. There is no distension.      Palpations: Abdomen is soft.      Tenderness: There is no abdominal tenderness.   Musculoskeletal:         General: Normal range of motion.      Cervical back: Normal range of motion and neck supple.   Skin:     General: Skin is warm and dry.      Coloration: Skin is mottled.          Neurological:      General: No focal deficit present.      Mental Status: She is alert and oriented to person, place, and time.      Deep Tendon Reflexes: Reflexes are normal and symmetric.   Psychiatric:         Mood and Affect: Mood normal.         Behavior: Behavior normal.         Procedures           ED Course  ED Course as of Mar 11 1622   Thu Mar 11, 2021   1513 No new injury.  Obtaining rib series.    [SS]   1552 XR Ribs Right With PA Chest [SS]   1617 X-rays show rib fractures x2.  A slightly displaced sixth rib fracture.  Nondisplaced seventh rib fracture.  No callus formation present.  No evidence of pneumothorax or pulmonary contusion.  Will prescribe pain medicine for home.I have discussed at length with patient (including family if appropriate) all results, diagnoses, treatment, indications to return to emergency room and follow-up.  Will d/c home.    Prescriptions1-Percocet 5/325    [SS]      ED Course User Index  [SS] Byron Ceron MD      XR Ribs Right With PA Chest    Result Date: 3/11/2021  Narrative: CHEST AND RIGHT RIB SERIES, 3/11/2021  HISTORY:  45-year-old female in the ED noting persistent right side pain from rib fractures 2/17/2021. Coughing.  TECHNIQUE: PA chest and four view right rib series.  FINDINGS: Slightly displaced right 6th rib fracture and  adjacent nondisplaced right 7th rib fracture. The 6th rib fracture was not visible on the previous study. Pulmonary hyperinflation. No visible pneumothorax or pleural effusion. Heart size and pulmonary vascularity are normal.     Impression: 1. Fractures of the right 6th and 7th ribs. 2. No pneumothorax. Signer Name: Danny Davis MD  Signed: 3/11/2021 4:08 PM  Workstation Name: [id number]  [facility]    XR Ribs Right With PA Chest    Result Date: 2/17/2021  Narrative: XR RIBS RIGHT W PA CHEST-: 2/17/2021 2:51 PM  INDICATION: 45-year-old female who has right rib pain. Felt a pop in the right lower rib area while coughing 10 days ago. Pain when taking a deep breath. Streaky of emphysema and tobacco abuse.  COMPARISON: 01/31/2020.  FINDINGS: PA view of the chest and oblique views of the right ribs. There is a subtle nondisplaced fracture of the posterior lateral right seventh rib. No associated pneumothorax. The lungs are emphysematous and otherwise clear. Old healed granulomatous disease.      Impression:  1. Subtle nondisplaced posterior lateral right seventh rib fracture. No pneumothorax. Background emphysema and old healed granulomatous disease.  Stat final copy of this report was sent to the ordering physician's office immediately following this dictation with telephone notification and documentation.  This report was finalized on 2/17/2021 3:15 PM by Dr. Huy Hilario MD.      My differential diagnosis for chest pain includes but is not limited to:  Muscle strain, costochondritis, myositis, pleurisy, rib fracture, intercostal neuritis, herpes zoster, tumor, myocardial infarction, coronary syndrome, unstable angina, angina, aortic dissection, mitral valve prolapse, pericarditis, palpitations, pulmonary embolus, pneumonia, pneumothorax, lung cancer, GERD, esophagitis, esophageal spasm                                       MDM    Final diagnoses:   Closed fracture of multiple ribs of right  side, initial encounter            Byron Ceron MD  03/11/21 6820

## 2021-03-29 ENCOUNTER — APPOINTMENT (OUTPATIENT)
Dept: GENERAL RADIOLOGY | Facility: HOSPITAL | Age: 46
End: 2021-03-29

## 2021-03-29 ENCOUNTER — APPOINTMENT (OUTPATIENT)
Dept: CT IMAGING | Facility: HOSPITAL | Age: 46
End: 2021-03-29

## 2021-03-29 ENCOUNTER — HOSPITAL ENCOUNTER (INPATIENT)
Facility: HOSPITAL | Age: 46
LOS: 5 days | Discharge: HOME | End: 2021-04-03
Attending: EMERGENCY MEDICINE | Admitting: FAMILY MEDICINE

## 2021-03-29 DIAGNOSIS — G43.809 OTHER MIGRAINE WITHOUT STATUS MIGRAINOSUS, NOT INTRACTABLE: ICD-10-CM

## 2021-03-29 DIAGNOSIS — J18.9 PNEUMONIA OF BOTH LOWER LOBES DUE TO INFECTIOUS ORGANISM: Primary | ICD-10-CM

## 2021-03-29 PROBLEM — J96.21 ACUTE ON CHRONIC RESPIRATORY FAILURE WITH HYPOXIA (HCC): Status: ACTIVE | Noted: 2021-03-29

## 2021-03-29 LAB
ALBUMIN SERPL-MCNC: 3.6 G/DL (ref 3.5–5.2)
ALBUMIN/GLOB SERPL: 1.2 G/DL
ALP SERPL-CCNC: 72 U/L (ref 39–117)
ALT SERPL W P-5'-P-CCNC: 12 U/L (ref 1–33)
AMORPH URATE CRY URNS QL MICRO: ABNORMAL /HPF
AMPHET+METHAMPHET UR QL: NEGATIVE
AMPHETAMINES UR QL: NEGATIVE
ANION GAP SERPL CALCULATED.3IONS-SCNC: 21.2 MMOL/L (ref 5–15)
AST SERPL-CCNC: 16 U/L (ref 1–32)
BACTERIA UR QL AUTO: ABNORMAL /HPF
BARBITURATES UR QL SCN: POSITIVE
BASOPHILS # BLD AUTO: 0.05 10*3/MM3 (ref 0–0.2)
BASOPHILS NFR BLD AUTO: 0.5 % (ref 0–1.5)
BENZODIAZ UR QL SCN: POSITIVE
BILIRUB SERPL-MCNC: 0.4 MG/DL (ref 0–1.2)
BILIRUB UR QL STRIP: NEGATIVE
BUN SERPL-MCNC: 26 MG/DL (ref 6–20)
BUN/CREAT SERPL: 15.4 (ref 7–25)
BUPRENORPHINE SERPL-MCNC: NEGATIVE NG/ML
CALCIUM SPEC-SCNC: 9.2 MG/DL (ref 8.6–10.5)
CANNABINOIDS SERPL QL: NEGATIVE
CHLORIDE SERPL-SCNC: 96 MMOL/L (ref 98–107)
CLARITY UR: ABNORMAL
CO2 SERPL-SCNC: 14.8 MMOL/L (ref 22–29)
COCAINE UR QL: NEGATIVE
COLOR UR: YELLOW
CREAT SERPL-MCNC: 1.69 MG/DL (ref 0.57–1)
DEPRECATED RDW RBC AUTO: 47.6 FL (ref 37–54)
EOSINOPHIL # BLD AUTO: 0.01 10*3/MM3 (ref 0–0.4)
EOSINOPHIL NFR BLD AUTO: 0.1 % (ref 0.3–6.2)
ERYTHROCYTE [DISTWIDTH] IN BLOOD BY AUTOMATED COUNT: 11.9 % (ref 12.3–15.4)
FLUAV RNA RESP QL NAA+PROBE: NOT DETECTED
FLUBV RNA RESP QL NAA+PROBE: NOT DETECTED
GFR SERPL CREATININE-BSD FRML MDRD: 32 ML/MIN/1.73
GLOBULIN UR ELPH-MCNC: 3.1 GM/DL
GLUCOSE SERPL-MCNC: 120 MG/DL (ref 65–99)
GLUCOSE UR STRIP-MCNC: NEGATIVE MG/DL
HCT VFR BLD AUTO: 44.2 % (ref 34–46.6)
HGB BLD-MCNC: 14.2 G/DL (ref 12–15.9)
HGB UR QL STRIP.AUTO: NEGATIVE
HYALINE CASTS UR QL AUTO: ABNORMAL /LPF
IMM GRANULOCYTES # BLD AUTO: 0.09 10*3/MM3 (ref 0–0.05)
IMM GRANULOCYTES NFR BLD AUTO: 1 % (ref 0–0.5)
KETONES UR QL STRIP: NEGATIVE
L PNEUMO1 AG UR QL IA: NEGATIVE
LEUKOCYTE ESTERASE UR QL STRIP.AUTO: NEGATIVE
LIPASE SERPL-CCNC: 9 U/L (ref 13–60)
LYMPHOCYTES # BLD AUTO: 0.66 10*3/MM3 (ref 0.7–3.1)
LYMPHOCYTES # BLD MANUAL: 0.74 10*3/MM3 (ref 0.7–3.1)
LYMPHOCYTES NFR BLD AUTO: 7.1 % (ref 19.6–45.3)
LYMPHOCYTES NFR BLD MANUAL: 5 % (ref 5–12)
LYMPHOCYTES NFR BLD MANUAL: 8 % (ref 19.6–45.3)
MACROCYTES BLD QL SMEAR: ABNORMAL
MCH RBC QN AUTO: 34.7 PG (ref 26.6–33)
MCHC RBC AUTO-ENTMCNC: 32.1 G/DL (ref 31.5–35.7)
MCV RBC AUTO: 108.1 FL (ref 79–97)
METAMYELOCYTES NFR BLD MANUAL: 3 % (ref 0–0)
METHADONE UR QL SCN: NEGATIVE
MONOCYTES # BLD AUTO: 0.46 10*3/MM3 (ref 0.1–0.9)
MONOCYTES # BLD AUTO: 0.51 10*3/MM3 (ref 0.1–0.9)
MONOCYTES NFR BLD AUTO: 5.5 % (ref 5–12)
NEUTROPHILS # BLD AUTO: 7.76 10*3/MM3 (ref 1.7–7)
NEUTROPHILS NFR BLD AUTO: 7.92 10*3/MM3 (ref 1.7–7)
NEUTROPHILS NFR BLD AUTO: 85.8 % (ref 42.7–76)
NEUTROPHILS NFR BLD MANUAL: 72 % (ref 42.7–76)
NEUTS BAND NFR BLD MANUAL: 12 % (ref 0–5)
NITRITE UR QL STRIP: NEGATIVE
NRBC BLD AUTO-RTO: 0 /100 WBC (ref 0–0.2)
OPIATES UR QL: POSITIVE
OXYCODONE UR QL SCN: POSITIVE
PCP UR QL SCN: NEGATIVE
PH UR STRIP.AUTO: <=5 [PH] (ref 4.5–8)
PLAT MORPH BLD: NORMAL
PLATELET # BLD AUTO: 242 10*3/MM3 (ref 140–450)
PMV BLD AUTO: 9.2 FL (ref 6–12)
POTASSIUM SERPL-SCNC: 4.9 MMOL/L (ref 3.5–5.2)
PROCALCITONIN SERPL-MCNC: 38.18 NG/ML (ref 0–0.25)
PROCALCITONIN SERPL-MCNC: 44.7 NG/ML (ref 0–0.25)
PROPOXYPH UR QL: NEGATIVE
PROT SERPL-MCNC: 6.7 G/DL (ref 6–8.5)
PROT UR QL STRIP: ABNORMAL
QT INTERVAL: 288 MS
RBC # BLD AUTO: 4.09 10*6/MM3 (ref 3.77–5.28)
RBC # UR: ABNORMAL /HPF
REF LAB TEST METHOD: ABNORMAL
S PNEUM AG SPEC QL LA: POSITIVE
SARS-COV-2 RNA RESP QL NAA+PROBE: NOT DETECTED
SODIUM SERPL-SCNC: 132 MMOL/L (ref 136–145)
SP GR UR STRIP: 1.02 (ref 1–1.03)
SQUAMOUS #/AREA URNS HPF: ABNORMAL /HPF
TRICYCLICS UR QL SCN: NEGATIVE
TROPONIN T SERPL-MCNC: <0.01 NG/ML (ref 0–0.03)
UROBILINOGEN UR QL STRIP: ABNORMAL
WBC # BLD AUTO: 9.24 10*3/MM3 (ref 3.4–10.8)
WBC MORPH BLD: NORMAL
WBC UR QL AUTO: ABNORMAL /HPF
YEAST URNS QL MICRO: ABNORMAL /HPF

## 2021-03-29 PROCEDURE — 84145 PROCALCITONIN (PCT): CPT | Performed by: FAMILY MEDICINE

## 2021-03-29 PROCEDURE — 99222 1ST HOSP IP/OBS MODERATE 55: CPT | Performed by: FAMILY MEDICINE

## 2021-03-29 PROCEDURE — 87899 AGENT NOS ASSAY W/OPTIC: CPT | Performed by: HOSPITALIST

## 2021-03-29 PROCEDURE — 25010000002 CEFEPIME-DEXTROSE 2-5 GM-%(50ML) RECONSTITUTED SOLUTION: Performed by: FAMILY MEDICINE

## 2021-03-29 PROCEDURE — 94760 N-INVAS EAR/PLS OXIMETRY 1: CPT

## 2021-03-29 PROCEDURE — 25010000002 DIPHENHYDRAMINE PER 50 MG: Performed by: EMERGENCY MEDICINE

## 2021-03-29 PROCEDURE — 25010000002 CEFEPIME-DEXTROSE 2-5 GM-%(50ML) RECONSTITUTED SOLUTION: Performed by: HOSPITALIST

## 2021-03-29 PROCEDURE — 25010000002 VANCOMYCIN 1 G RECONSTITUTED SOLUTION: Performed by: HOSPITALIST

## 2021-03-29 PROCEDURE — 87636 SARSCOV2 & INF A&B AMP PRB: CPT | Performed by: EMERGENCY MEDICINE

## 2021-03-29 PROCEDURE — 25010000002 KETOROLAC TROMETHAMINE PER 15 MG: Performed by: EMERGENCY MEDICINE

## 2021-03-29 PROCEDURE — 87040 BLOOD CULTURE FOR BACTERIA: CPT | Performed by: EMERGENCY MEDICINE

## 2021-03-29 PROCEDURE — 94799 UNLISTED PULMONARY SVC/PX: CPT

## 2021-03-29 PROCEDURE — 83690 ASSAY OF LIPASE: CPT | Performed by: EMERGENCY MEDICINE

## 2021-03-29 PROCEDURE — 99284 EMERGENCY DEPT VISIT MOD MDM: CPT

## 2021-03-29 PROCEDURE — 25010000002 PROCHLORPERAZINE 10 MG/2ML SOLUTION: Performed by: EMERGENCY MEDICINE

## 2021-03-29 PROCEDURE — 25010000002 ENOXAPARIN PER 10 MG: Performed by: FAMILY MEDICINE

## 2021-03-29 PROCEDURE — 84484 ASSAY OF TROPONIN QUANT: CPT | Performed by: EMERGENCY MEDICINE

## 2021-03-29 PROCEDURE — 99283 EMERGENCY DEPT VISIT LOW MDM: CPT | Performed by: EMERGENCY MEDICINE

## 2021-03-29 PROCEDURE — 25010000002 METHYLPREDNISOLONE PER 125 MG: Performed by: HOSPITALIST

## 2021-03-29 PROCEDURE — 70450 CT HEAD/BRAIN W/O DYE: CPT

## 2021-03-29 PROCEDURE — 93005 ELECTROCARDIOGRAM TRACING: CPT | Performed by: EMERGENCY MEDICINE

## 2021-03-29 PROCEDURE — 71045 X-RAY EXAM CHEST 1 VIEW: CPT

## 2021-03-29 PROCEDURE — 80306 DRUG TEST PRSMV INSTRMNT: CPT | Performed by: EMERGENCY MEDICINE

## 2021-03-29 PROCEDURE — 84145 PROCALCITONIN (PCT): CPT | Performed by: EMERGENCY MEDICINE

## 2021-03-29 PROCEDURE — 25010000002 METHYLPREDNISOLONE PER 40 MG: Performed by: FAMILY MEDICINE

## 2021-03-29 PROCEDURE — 94640 AIRWAY INHALATION TREATMENT: CPT

## 2021-03-29 PROCEDURE — 25010000002 CEFTRIAXONE SODIUM-DEXTROSE 1-3.74 GM-%(50ML) RECONSTITUTED SOLUTION: Performed by: EMERGENCY MEDICINE

## 2021-03-29 PROCEDURE — 85025 COMPLETE CBC W/AUTO DIFF WBC: CPT | Performed by: EMERGENCY MEDICINE

## 2021-03-29 PROCEDURE — 25010000002 DEXAMETHASONE PER 1 MG: Performed by: EMERGENCY MEDICINE

## 2021-03-29 PROCEDURE — 81001 URINALYSIS AUTO W/SCOPE: CPT | Performed by: EMERGENCY MEDICINE

## 2021-03-29 PROCEDURE — 25010000002 AZITHROMYCIN PER 500 MG: Performed by: EMERGENCY MEDICINE

## 2021-03-29 PROCEDURE — 80053 COMPREHEN METABOLIC PANEL: CPT | Performed by: EMERGENCY MEDICINE

## 2021-03-29 PROCEDURE — 85007 BL SMEAR W/DIFF WBC COUNT: CPT | Performed by: EMERGENCY MEDICINE

## 2021-03-29 PROCEDURE — 93010 ELECTROCARDIOGRAM REPORT: CPT | Performed by: INTERNAL MEDICINE

## 2021-03-29 RX ORDER — PROCHLORPERAZINE EDISYLATE 5 MG/ML
10 INJECTION INTRAMUSCULAR; INTRAVENOUS ONCE
Status: COMPLETED | OUTPATIENT
Start: 2021-03-29 | End: 2021-03-29

## 2021-03-29 RX ORDER — SODIUM CHLORIDE 9 MG/ML
100 INJECTION, SOLUTION INTRAVENOUS CONTINUOUS
Status: DISCONTINUED | OUTPATIENT
Start: 2021-03-29 | End: 2021-03-31

## 2021-03-29 RX ORDER — FAMOTIDINE 10 MG/ML
20 INJECTION, SOLUTION INTRAVENOUS ONCE
Status: COMPLETED | OUTPATIENT
Start: 2021-03-29 | End: 2021-03-29

## 2021-03-29 RX ORDER — CEFTRIAXONE 1 G/50ML
1 INJECTION, SOLUTION INTRAVENOUS DAILY
Status: DISCONTINUED | OUTPATIENT
Start: 2021-03-29 | End: 2021-03-29

## 2021-03-29 RX ORDER — CEFTRIAXONE 1 G/50ML
1 INJECTION, SOLUTION INTRAVENOUS DAILY
Status: DISCONTINUED | OUTPATIENT
Start: 2021-03-30 | End: 2021-03-29

## 2021-03-29 RX ORDER — SODIUM CHLORIDE 0.9 % (FLUSH) 0.9 %
10 SYRINGE (ML) INJECTION EVERY 12 HOURS SCHEDULED
Status: DISCONTINUED | OUTPATIENT
Start: 2021-03-29 | End: 2021-04-03 | Stop reason: HOSPADM

## 2021-03-29 RX ORDER — PROMETHAZINE HYDROCHLORIDE 25 MG/1
25 TABLET ORAL EVERY 8 HOURS PRN
Status: DISCONTINUED | OUTPATIENT
Start: 2021-03-29 | End: 2021-04-03 | Stop reason: HOSPADM

## 2021-03-29 RX ORDER — METHYLPREDNISOLONE SODIUM SUCCINATE 40 MG/ML
40 INJECTION, POWDER, LYOPHILIZED, FOR SOLUTION INTRAMUSCULAR; INTRAVENOUS EVERY 8 HOURS
Status: DISCONTINUED | OUTPATIENT
Start: 2021-03-29 | End: 2021-03-29

## 2021-03-29 RX ORDER — IPRATROPIUM BROMIDE AND ALBUTEROL SULFATE 2.5; .5 MG/3ML; MG/3ML
3 SOLUTION RESPIRATORY (INHALATION)
Status: DISCONTINUED | OUTPATIENT
Start: 2021-03-29 | End: 2021-04-03 | Stop reason: HOSPADM

## 2021-03-29 RX ORDER — OXYCODONE HYDROCHLORIDE AND ACETAMINOPHEN 5; 325 MG/1; MG/1
1 TABLET ORAL EVERY 6 HOURS PRN
Status: DISCONTINUED | OUTPATIENT
Start: 2021-03-29 | End: 2021-03-31

## 2021-03-29 RX ORDER — KETOROLAC TROMETHAMINE 30 MG/ML
15 INJECTION, SOLUTION INTRAMUSCULAR; INTRAVENOUS ONCE
Status: COMPLETED | OUTPATIENT
Start: 2021-03-29 | End: 2021-03-29

## 2021-03-29 RX ORDER — NITROGLYCERIN 0.4 MG/1
0.4 TABLET SUBLINGUAL
Status: DISCONTINUED | OUTPATIENT
Start: 2021-03-29 | End: 2021-04-03 | Stop reason: HOSPADM

## 2021-03-29 RX ORDER — GUAIFENESIN 600 MG/1
600 TABLET, EXTENDED RELEASE ORAL EVERY 12 HOURS SCHEDULED
Status: DISCONTINUED | OUTPATIENT
Start: 2021-03-29 | End: 2021-04-03 | Stop reason: HOSPADM

## 2021-03-29 RX ORDER — CEFEPIME HYDROCHLORIDE 2 G/50ML
2 INJECTION, SOLUTION INTRAVENOUS EVERY 12 HOURS SCHEDULED
Status: DISCONTINUED | OUTPATIENT
Start: 2021-03-29 | End: 2021-03-31

## 2021-03-29 RX ORDER — GUAIFENESIN/DEXTROMETHORPHAN 100-10MG/5
10 SYRUP ORAL EVERY 6 HOURS PRN
Status: DISCONTINUED | OUTPATIENT
Start: 2021-03-29 | End: 2021-04-03 | Stop reason: HOSPADM

## 2021-03-29 RX ORDER — IPRATROPIUM BROMIDE AND ALBUTEROL SULFATE 2.5; .5 MG/3ML; MG/3ML
3 SOLUTION RESPIRATORY (INHALATION) ONCE
Status: COMPLETED | OUTPATIENT
Start: 2021-03-29 | End: 2021-03-29

## 2021-03-29 RX ORDER — SODIUM CHLORIDE 9 MG/ML
40 INJECTION, SOLUTION INTRAVENOUS AS NEEDED
Status: DISCONTINUED | OUTPATIENT
Start: 2021-03-29 | End: 2021-04-03 | Stop reason: HOSPADM

## 2021-03-29 RX ORDER — CEFTRIAXONE 1 G/50ML
1 INJECTION, SOLUTION INTRAVENOUS ONCE
Status: COMPLETED | OUTPATIENT
Start: 2021-03-29 | End: 2021-03-29

## 2021-03-29 RX ORDER — CHOLECALCIFEROL (VITAMIN D3) 125 MCG
5 CAPSULE ORAL NIGHTLY PRN
Status: DISCONTINUED | OUTPATIENT
Start: 2021-03-29 | End: 2021-04-03 | Stop reason: HOSPADM

## 2021-03-29 RX ORDER — METHYLPREDNISOLONE SODIUM SUCCINATE 125 MG/2ML
60 INJECTION, POWDER, LYOPHILIZED, FOR SOLUTION INTRAMUSCULAR; INTRAVENOUS EVERY 8 HOURS
Status: DISCONTINUED | OUTPATIENT
Start: 2021-03-29 | End: 2021-03-30

## 2021-03-29 RX ORDER — DEXAMETHASONE SODIUM PHOSPHATE 10 MG/ML
10 INJECTION INTRAMUSCULAR; INTRAVENOUS ONCE
Status: COMPLETED | OUTPATIENT
Start: 2021-03-29 | End: 2021-03-29

## 2021-03-29 RX ORDER — CLOPIDOGREL BISULFATE 75 MG/1
75 TABLET ORAL DAILY
Status: DISCONTINUED | OUTPATIENT
Start: 2021-03-29 | End: 2021-04-03 | Stop reason: HOSPADM

## 2021-03-29 RX ORDER — SODIUM CHLORIDE 0.9 % (FLUSH) 0.9 %
10 SYRINGE (ML) INJECTION AS NEEDED
Status: DISCONTINUED | OUTPATIENT
Start: 2021-03-29 | End: 2021-04-03 | Stop reason: HOSPADM

## 2021-03-29 RX ORDER — DIPHENHYDRAMINE HYDROCHLORIDE 50 MG/ML
25 INJECTION INTRAMUSCULAR; INTRAVENOUS ONCE
Status: COMPLETED | OUTPATIENT
Start: 2021-03-29 | End: 2021-03-29

## 2021-03-29 RX ORDER — ALBUTEROL SULFATE 2.5 MG/3ML
2.5 SOLUTION RESPIRATORY (INHALATION) EVERY 6 HOURS PRN
Status: DISCONTINUED | OUTPATIENT
Start: 2021-03-29 | End: 2021-04-03 | Stop reason: HOSPADM

## 2021-03-29 RX ORDER — ATORVASTATIN CALCIUM 20 MG/1
20 TABLET, FILM COATED ORAL DAILY
Status: DISCONTINUED | OUTPATIENT
Start: 2021-03-29 | End: 2021-04-03 | Stop reason: HOSPADM

## 2021-03-29 RX ORDER — VENLAFAXINE HYDROCHLORIDE 37.5 MG/1
75 CAPSULE, EXTENDED RELEASE ORAL DAILY
Status: DISCONTINUED | OUTPATIENT
Start: 2021-03-29 | End: 2021-04-03 | Stop reason: HOSPADM

## 2021-03-29 RX ORDER — ASPIRIN 81 MG/1
81 TABLET ORAL DAILY
Status: DISCONTINUED | OUTPATIENT
Start: 2021-03-29 | End: 2021-04-03 | Stop reason: HOSPADM

## 2021-03-29 RX ADMIN — GUAIFENESIN AND DEXTROMETHORPHAN 10 ML: 100; 10 SYRUP ORAL at 23:22

## 2021-03-29 RX ADMIN — ASPIRIN 81 MG: 81 TABLET, COATED ORAL at 08:36

## 2021-03-29 RX ADMIN — IPRATROPIUM BROMIDE AND ALBUTEROL SULFATE 3 ML: .5; 3 SOLUTION RESPIRATORY (INHALATION) at 15:46

## 2021-03-29 RX ADMIN — GUAIFENESIN 600 MG: 600 TABLET, EXTENDED RELEASE ORAL at 08:36

## 2021-03-29 RX ADMIN — CEFTRIAXONE 1 G: 1 INJECTION, SOLUTION INTRAVENOUS at 03:37

## 2021-03-29 RX ADMIN — GUAIFENESIN 600 MG: 600 TABLET, EXTENDED RELEASE ORAL at 20:49

## 2021-03-29 RX ADMIN — METHYLPREDNISOLONE SODIUM SUCCINATE 40 MG: 40 INJECTION, POWDER, FOR SOLUTION INTRAMUSCULAR; INTRAVENOUS at 05:09

## 2021-03-29 RX ADMIN — ATORVASTATIN CALCIUM 20 MG: 20 TABLET, FILM COATED ORAL at 08:36

## 2021-03-29 RX ADMIN — METHYLPREDNISOLONE SODIUM SUCCINATE 60 MG: 125 INJECTION, POWDER, FOR SOLUTION INTRAMUSCULAR; INTRAVENOUS at 20:48

## 2021-03-29 RX ADMIN — SODIUM CHLORIDE 100 ML/HR: 9 INJECTION, SOLUTION INTRAVENOUS at 05:09

## 2021-03-29 RX ADMIN — SODIUM CHLORIDE 1000 ML: 9 INJECTION, SOLUTION INTRAVENOUS at 03:10

## 2021-03-29 RX ADMIN — FAMOTIDINE 20 MG: 10 INJECTION INTRAVENOUS at 01:27

## 2021-03-29 RX ADMIN — SODIUM CHLORIDE 500 MG: 900 INJECTION, SOLUTION INTRAVENOUS at 09:54

## 2021-03-29 RX ADMIN — CEFEPIME HYDROCHLORIDE 2 G: 2 INJECTION, SOLUTION INTRAVENOUS at 20:48

## 2021-03-29 RX ADMIN — DEXAMETHASONE SODIUM PHOSPHATE 10 MG: 10 INJECTION INTRAMUSCULAR; INTRAVENOUS at 01:33

## 2021-03-29 RX ADMIN — METHYLPREDNISOLONE SODIUM SUCCINATE 40 MG: 40 INJECTION, POWDER, FOR SOLUTION INTRAMUSCULAR; INTRAVENOUS at 16:01

## 2021-03-29 RX ADMIN — IPRATROPIUM BROMIDE AND ALBUTEROL SULFATE 3 ML: .5; 3 SOLUTION RESPIRATORY (INHALATION) at 07:20

## 2021-03-29 RX ADMIN — IPRATROPIUM BROMIDE AND ALBUTEROL SULFATE 3 ML: .5; 3 SOLUTION RESPIRATORY (INHALATION) at 11:25

## 2021-03-29 RX ADMIN — VENLAFAXINE HYDROCHLORIDE 75 MG: 37.5 CAPSULE, EXTENDED RELEASE ORAL at 08:36

## 2021-03-29 RX ADMIN — SODIUM CHLORIDE, PRESERVATIVE FREE 10 ML: 5 INJECTION INTRAVENOUS at 20:58

## 2021-03-29 RX ADMIN — Medication 5 MG: at 23:22

## 2021-03-29 RX ADMIN — OXYCODONE HYDROCHLORIDE AND ACETAMINOPHEN 1 TABLET: 5; 325 TABLET ORAL at 16:50

## 2021-03-29 RX ADMIN — KETOROLAC TROMETHAMINE 15 MG: 30 INJECTION, SOLUTION INTRAMUSCULAR; INTRAVENOUS at 01:35

## 2021-03-29 RX ADMIN — CLOPIDOGREL BISULFATE 75 MG: 75 TABLET ORAL at 08:37

## 2021-03-29 RX ADMIN — ENOXAPARIN SODIUM 40 MG: 40 INJECTION SUBCUTANEOUS at 08:36

## 2021-03-29 RX ADMIN — IPRATROPIUM BROMIDE AND ALBUTEROL SULFATE 3 ML: .5; 3 SOLUTION RESPIRATORY (INHALATION) at 19:49

## 2021-03-29 RX ADMIN — PROCHLORPERAZINE EDISYLATE 10 MG: 5 INJECTION INTRAMUSCULAR; INTRAVENOUS at 01:32

## 2021-03-29 RX ADMIN — SODIUM CHLORIDE, PRESERVATIVE FREE 10 ML: 5 INJECTION INTRAVENOUS at 08:41

## 2021-03-29 RX ADMIN — DIPHENHYDRAMINE HYDROCHLORIDE 25 MG: 50 INJECTION INTRAMUSCULAR; INTRAVENOUS at 01:35

## 2021-03-29 RX ADMIN — SODIUM CHLORIDE 1000 ML: 9 INJECTION, SOLUTION INTRAVENOUS at 01:39

## 2021-03-29 RX ADMIN — IPRATROPIUM BROMIDE AND ALBUTEROL SULFATE 3 ML: .5; 3 SOLUTION RESPIRATORY (INHALATION) at 04:15

## 2021-03-29 RX ADMIN — SODIUM CHLORIDE 1000 MG: 900 INJECTION, SOLUTION INTRAVENOUS at 19:05

## 2021-03-29 RX ADMIN — CEFEPIME HYDROCHLORIDE 2 G: 2 INJECTION, SOLUTION INTRAVENOUS at 09:54

## 2021-03-30 ENCOUNTER — APPOINTMENT (OUTPATIENT)
Dept: GENERAL RADIOLOGY | Facility: HOSPITAL | Age: 46
End: 2021-03-30

## 2021-03-30 LAB
ALBUMIN SERPL-MCNC: 2.8 G/DL (ref 3.5–5.2)
ALBUMIN/GLOB SERPL: 0.8 G/DL
ALP SERPL-CCNC: 60 U/L (ref 39–117)
ALT SERPL W P-5'-P-CCNC: 49 U/L (ref 1–33)
ANION GAP SERPL CALCULATED.3IONS-SCNC: 13.1 MMOL/L (ref 5–15)
AST SERPL-CCNC: 68 U/L (ref 1–32)
BASOPHILS # BLD AUTO: 0 10*3/MM3 (ref 0–0.2)
BASOPHILS NFR BLD AUTO: 0 % (ref 0–1.5)
BILIRUB SERPL-MCNC: 0.2 MG/DL (ref 0–1.2)
BUN SERPL-MCNC: 29 MG/DL (ref 6–20)
BUN/CREAT SERPL: 32.2 (ref 7–25)
CALCIUM SPEC-SCNC: 8 MG/DL (ref 8.6–10.5)
CHLORIDE SERPL-SCNC: 109 MMOL/L (ref 98–107)
CO2 SERPL-SCNC: 16.9 MMOL/L (ref 22–29)
CREAT SERPL-MCNC: 0.9 MG/DL (ref 0.57–1)
DEPRECATED RDW RBC AUTO: 50.1 FL (ref 37–54)
EOSINOPHIL # BLD AUTO: 0 10*3/MM3 (ref 0–0.4)
EOSINOPHIL NFR BLD AUTO: 0 % (ref 0.3–6.2)
ERYTHROCYTE [DISTWIDTH] IN BLOOD BY AUTOMATED COUNT: 12.3 % (ref 12.3–15.4)
GFR SERPL CREATININE-BSD FRML MDRD: 67 ML/MIN/1.73
GLOBULIN UR ELPH-MCNC: 3.3 GM/DL
GLUCOSE SERPL-MCNC: 154 MG/DL (ref 65–99)
HCT VFR BLD AUTO: 34 % (ref 34–46.6)
HGB BLD-MCNC: 10.6 G/DL (ref 12–15.9)
IMM GRANULOCYTES # BLD AUTO: 0.05 10*3/MM3 (ref 0–0.05)
IMM GRANULOCYTES NFR BLD AUTO: 0.3 % (ref 0–0.5)
LYMPHOCYTES # BLD AUTO: 0.46 10*3/MM3 (ref 0.7–3.1)
LYMPHOCYTES NFR BLD AUTO: 2.8 % (ref 19.6–45.3)
MCH RBC QN AUTO: 34.5 PG (ref 26.6–33)
MCHC RBC AUTO-ENTMCNC: 31.2 G/DL (ref 31.5–35.7)
MCV RBC AUTO: 110.7 FL (ref 79–97)
MONOCYTES # BLD AUTO: 0.7 10*3/MM3 (ref 0.1–0.9)
MONOCYTES NFR BLD AUTO: 4.3 % (ref 5–12)
NEUTROPHILS NFR BLD AUTO: 15 10*3/MM3 (ref 1.7–7)
NEUTROPHILS NFR BLD AUTO: 92.6 % (ref 42.7–76)
PLATELET # BLD AUTO: 203 10*3/MM3 (ref 140–450)
PMV BLD AUTO: 9.3 FL (ref 6–12)
POTASSIUM SERPL-SCNC: 4.6 MMOL/L (ref 3.5–5.2)
PROCALCITONIN SERPL-MCNC: 34.17 NG/ML (ref 0–0.25)
PROT SERPL-MCNC: 6.1 G/DL (ref 6–8.5)
RBC # BLD AUTO: 3.07 10*6/MM3 (ref 3.77–5.28)
SODIUM SERPL-SCNC: 139 MMOL/L (ref 136–145)
WBC # BLD AUTO: 16.21 10*3/MM3 (ref 3.4–10.8)

## 2021-03-30 PROCEDURE — 25010000002 METHYLPREDNISOLONE PER 125 MG: Performed by: HOSPITALIST

## 2021-03-30 PROCEDURE — 94799 UNLISTED PULMONARY SVC/PX: CPT

## 2021-03-30 PROCEDURE — 74018 RADEX ABDOMEN 1 VIEW: CPT

## 2021-03-30 PROCEDURE — 25010000002 CEFEPIME-DEXTROSE 2-5 GM-%(50ML) RECONSTITUTED SOLUTION: Performed by: HOSPITALIST

## 2021-03-30 PROCEDURE — 80053 COMPREHEN METABOLIC PANEL: CPT | Performed by: FAMILY MEDICINE

## 2021-03-30 PROCEDURE — 99232 SBSQ HOSP IP/OBS MODERATE 35: CPT | Performed by: FAMILY MEDICINE

## 2021-03-30 PROCEDURE — 25010000002 VANCOMYCIN 1 G RECONSTITUTED SOLUTION: Performed by: HOSPITALIST

## 2021-03-30 PROCEDURE — 84145 PROCALCITONIN (PCT): CPT | Performed by: HOSPITALIST

## 2021-03-30 PROCEDURE — 85025 COMPLETE CBC W/AUTO DIFF WBC: CPT | Performed by: FAMILY MEDICINE

## 2021-03-30 PROCEDURE — 63710000001 PREDNISONE PER 1 MG: Performed by: HOSPITALIST

## 2021-03-30 PROCEDURE — 25010000002 ENOXAPARIN PER 10 MG: Performed by: FAMILY MEDICINE

## 2021-03-30 PROCEDURE — 94640 AIRWAY INHALATION TREATMENT: CPT

## 2021-03-30 RX ORDER — PREDNISONE 20 MG/1
40 TABLET ORAL 2 TIMES DAILY WITH MEALS
Status: DISCONTINUED | OUTPATIENT
Start: 2021-03-30 | End: 2021-04-02

## 2021-03-30 RX ADMIN — IPRATROPIUM BROMIDE AND ALBUTEROL SULFATE 3 ML: .5; 3 SOLUTION RESPIRATORY (INHALATION) at 19:37

## 2021-03-30 RX ADMIN — METHYLPREDNISOLONE SODIUM SUCCINATE 60 MG: 125 INJECTION, POWDER, FOR SOLUTION INTRAMUSCULAR; INTRAVENOUS at 04:26

## 2021-03-30 RX ADMIN — Medication 5 MG: at 20:16

## 2021-03-30 RX ADMIN — CEFEPIME HYDROCHLORIDE 2 G: 2 INJECTION, SOLUTION INTRAVENOUS at 20:15

## 2021-03-30 RX ADMIN — ALBUTEROL SULFATE 2.5 MG: 2.5 SOLUTION RESPIRATORY (INHALATION) at 07:32

## 2021-03-30 RX ADMIN — CEFEPIME HYDROCHLORIDE 2 G: 2 INJECTION, SOLUTION INTRAVENOUS at 08:37

## 2021-03-30 RX ADMIN — IPRATROPIUM BROMIDE AND ALBUTEROL SULFATE 3 ML: .5; 3 SOLUTION RESPIRATORY (INHALATION) at 14:43

## 2021-03-30 RX ADMIN — ENOXAPARIN SODIUM 40 MG: 40 INJECTION SUBCUTANEOUS at 08:39

## 2021-03-30 RX ADMIN — OXYCODONE HYDROCHLORIDE AND ACETAMINOPHEN 1 TABLET: 5; 325 TABLET ORAL at 11:09

## 2021-03-30 RX ADMIN — PREDNISONE 40 MG: 20 TABLET ORAL at 18:30

## 2021-03-30 RX ADMIN — ATORVASTATIN CALCIUM 20 MG: 20 TABLET, FILM COATED ORAL at 08:39

## 2021-03-30 RX ADMIN — SODIUM CHLORIDE 100 ML/HR: 9 INJECTION, SOLUTION INTRAVENOUS at 00:51

## 2021-03-30 RX ADMIN — SODIUM CHLORIDE, PRESERVATIVE FREE 10 ML: 5 INJECTION INTRAVENOUS at 20:15

## 2021-03-30 RX ADMIN — ASPIRIN 81 MG: 81 TABLET, COATED ORAL at 08:39

## 2021-03-30 RX ADMIN — SODIUM CHLORIDE 100 ML/HR: 9 INJECTION, SOLUTION INTRAVENOUS at 14:48

## 2021-03-30 RX ADMIN — IPRATROPIUM BROMIDE AND ALBUTEROL SULFATE 3 ML: .5; 3 SOLUTION RESPIRATORY (INHALATION) at 17:00

## 2021-03-30 RX ADMIN — VENLAFAXINE HYDROCHLORIDE 75 MG: 37.5 CAPSULE, EXTENDED RELEASE ORAL at 08:39

## 2021-03-30 RX ADMIN — SODIUM CHLORIDE 1000 MG: 900 INJECTION, SOLUTION INTRAVENOUS at 18:30

## 2021-03-30 RX ADMIN — GUAIFENESIN 600 MG: 600 TABLET, EXTENDED RELEASE ORAL at 20:16

## 2021-03-30 RX ADMIN — GUAIFENESIN 600 MG: 600 TABLET, EXTENDED RELEASE ORAL at 08:39

## 2021-03-30 RX ADMIN — SODIUM CHLORIDE, PRESERVATIVE FREE 10 ML: 5 INJECTION INTRAVENOUS at 08:39

## 2021-03-30 RX ADMIN — CLOPIDOGREL BISULFATE 75 MG: 75 TABLET ORAL at 08:39

## 2021-03-30 RX ADMIN — OXYCODONE HYDROCHLORIDE AND ACETAMINOPHEN 1 TABLET: 5; 325 TABLET ORAL at 18:36

## 2021-03-31 ENCOUNTER — APPOINTMENT (OUTPATIENT)
Dept: CT IMAGING | Facility: HOSPITAL | Age: 46
End: 2021-03-31

## 2021-03-31 LAB
ALBUMIN SERPL-MCNC: 2.8 G/DL (ref 3.5–5.2)
ALBUMIN SERPL-MCNC: 2.8 G/DL (ref 3.5–5.2)
ALP SERPL-CCNC: 85 U/L (ref 39–117)
ALT SERPL W P-5'-P-CCNC: 66 U/L (ref 1–33)
ANION GAP SERPL CALCULATED.3IONS-SCNC: 11.1 MMOL/L (ref 5–15)
AST SERPL-CCNC: 65 U/L (ref 1–32)
BASOPHILS # BLD AUTO: 0.08 10*3/MM3 (ref 0–0.2)
BASOPHILS NFR BLD AUTO: 0.3 % (ref 0–1.5)
BILIRUB CONJ SERPL-MCNC: <0.2 MG/DL (ref 0–0.3)
BILIRUB INDIRECT SERPL-MCNC: ABNORMAL MG/DL
BILIRUB SERPL-MCNC: <0.2 MG/DL (ref 0–1.2)
BUN SERPL-MCNC: 25 MG/DL (ref 6–20)
BUN/CREAT SERPL: 35.2 (ref 7–25)
CALCIUM SPEC-SCNC: 7.9 MG/DL (ref 8.6–10.5)
CHLORIDE SERPL-SCNC: 108 MMOL/L (ref 98–107)
CO2 SERPL-SCNC: 17.9 MMOL/L (ref 22–29)
CREAT SERPL-MCNC: 0.71 MG/DL (ref 0.57–1)
DEPRECATED RDW RBC AUTO: 50.5 FL (ref 37–54)
EOSINOPHIL # BLD AUTO: 0 10*3/MM3 (ref 0–0.4)
EOSINOPHIL NFR BLD AUTO: 0 % (ref 0.3–6.2)
ERYTHROCYTE [DISTWIDTH] IN BLOOD BY AUTOMATED COUNT: 12.6 % (ref 12.3–15.4)
GFR SERPL CREATININE-BSD FRML MDRD: 88 ML/MIN/1.73
GLUCOSE SERPL-MCNC: 163 MG/DL (ref 65–99)
HAV IGM SERPL QL IA: NORMAL
HBV CORE IGM SERPL QL IA: NORMAL
HBV SURFACE AG SERPL QL IA: NORMAL
HCT VFR BLD AUTO: 33.3 % (ref 34–46.6)
HCV AB SER DONR QL: NORMAL
HGB BLD-MCNC: 10.5 G/DL (ref 12–15.9)
IMM GRANULOCYTES # BLD AUTO: 0.61 10*3/MM3 (ref 0–0.05)
IMM GRANULOCYTES NFR BLD AUTO: 2.6 % (ref 0–0.5)
LIPASE SERPL-CCNC: 17 U/L (ref 13–60)
LYMPHOCYTES # BLD AUTO: 0.9 10*3/MM3 (ref 0.7–3.1)
LYMPHOCYTES NFR BLD AUTO: 3.8 % (ref 19.6–45.3)
MAGNESIUM SERPL-MCNC: 2.2 MG/DL (ref 1.6–2.6)
MCH RBC QN AUTO: 34.5 PG (ref 26.6–33)
MCHC RBC AUTO-ENTMCNC: 31.5 G/DL (ref 31.5–35.7)
MCV RBC AUTO: 109.5 FL (ref 79–97)
MONOCYTES # BLD AUTO: 1.45 10*3/MM3 (ref 0.1–0.9)
MONOCYTES NFR BLD AUTO: 6.2 % (ref 5–12)
NEUTROPHILS NFR BLD AUTO: 20.39 10*3/MM3 (ref 1.7–7)
NEUTROPHILS NFR BLD AUTO: 87.1 % (ref 42.7–76)
NRBC BLD AUTO-RTO: 0.5 /100 WBC (ref 0–0.2)
PHOSPHATE SERPL-MCNC: 1.7 MG/DL (ref 2.5–4.5)
PLATELET # BLD AUTO: 187 10*3/MM3 (ref 140–450)
PMV BLD AUTO: 9.9 FL (ref 6–12)
POTASSIUM SERPL-SCNC: 4.4 MMOL/L (ref 3.5–5.2)
PROCALCITONIN SERPL-MCNC: 21.1 NG/ML (ref 0–0.25)
PROT SERPL-MCNC: 6.4 G/DL (ref 6–8.5)
RBC # BLD AUTO: 3.04 10*6/MM3 (ref 3.77–5.28)
SODIUM SERPL-SCNC: 137 MMOL/L (ref 136–145)
WBC # BLD AUTO: 23.43 10*3/MM3 (ref 3.4–10.8)

## 2021-03-31 PROCEDURE — 63710000001 PREDNISONE PER 1 MG: Performed by: HOSPITALIST

## 2021-03-31 PROCEDURE — 84484 ASSAY OF TROPONIN QUANT: CPT | Performed by: NURSE PRACTITIONER

## 2021-03-31 PROCEDURE — 0 IOPAMIDOL PER 1 ML: Performed by: FAMILY MEDICINE

## 2021-03-31 PROCEDURE — 83690 ASSAY OF LIPASE: CPT | Performed by: HOSPITALIST

## 2021-03-31 PROCEDURE — 94799 UNLISTED PULMONARY SVC/PX: CPT

## 2021-03-31 PROCEDURE — 80074 ACUTE HEPATITIS PANEL: CPT | Performed by: HOSPITALIST

## 2021-03-31 PROCEDURE — 80069 RENAL FUNCTION PANEL: CPT | Performed by: HOSPITALIST

## 2021-03-31 PROCEDURE — 0 DIATRIZOATE MEGLUMINE & SODIUM PER 1 ML: Performed by: FAMILY MEDICINE

## 2021-03-31 PROCEDURE — 83615 LACTATE (LD) (LDH) ENZYME: CPT | Performed by: NURSE PRACTITIONER

## 2021-03-31 PROCEDURE — 25010000002 ENOXAPARIN PER 10 MG: Performed by: FAMILY MEDICINE

## 2021-03-31 PROCEDURE — 25010000002 CEFEPIME-DEXTROSE 2-5 GM-%(50ML) RECONSTITUTED SOLUTION: Performed by: HOSPITALIST

## 2021-03-31 PROCEDURE — 85025 COMPLETE CBC W/AUTO DIFF WBC: CPT | Performed by: HOSPITALIST

## 2021-03-31 PROCEDURE — 84145 PROCALCITONIN (PCT): CPT | Performed by: HOSPITALIST

## 2021-03-31 PROCEDURE — 74177 CT ABD & PELVIS W/CONTRAST: CPT

## 2021-03-31 PROCEDURE — 99232 SBSQ HOSP IP/OBS MODERATE 35: CPT | Performed by: FAMILY MEDICINE

## 2021-03-31 PROCEDURE — 83735 ASSAY OF MAGNESIUM: CPT | Performed by: HOSPITALIST

## 2021-03-31 PROCEDURE — 80076 HEPATIC FUNCTION PANEL: CPT | Performed by: HOSPITALIST

## 2021-03-31 PROCEDURE — 82550 ASSAY OF CK (CPK): CPT | Performed by: NURSE PRACTITIONER

## 2021-03-31 RX ORDER — CEFDINIR 300 MG/1
300 CAPSULE ORAL EVERY 12 HOURS SCHEDULED
Status: DISCONTINUED | OUTPATIENT
Start: 2021-03-31 | End: 2021-04-03

## 2021-03-31 RX ORDER — SODIUM CHLORIDE, SODIUM LACTATE, POTASSIUM CHLORIDE, CALCIUM CHLORIDE 600; 310; 30; 20 MG/100ML; MG/100ML; MG/100ML; MG/100ML
125 INJECTION, SOLUTION INTRAVENOUS CONTINUOUS
Status: DISCONTINUED | OUTPATIENT
Start: 2021-03-31 | End: 2021-04-02

## 2021-03-31 RX ORDER — OXYCODONE HYDROCHLORIDE AND ACETAMINOPHEN 5; 325 MG/1; MG/1
1 TABLET ORAL EVERY 8 HOURS PRN
Status: DISCONTINUED | OUTPATIENT
Start: 2021-03-31 | End: 2021-04-01

## 2021-03-31 RX ADMIN — GUAIFENESIN 600 MG: 600 TABLET, EXTENDED RELEASE ORAL at 08:21

## 2021-03-31 RX ADMIN — OXYCODONE HYDROCHLORIDE AND ACETAMINOPHEN 1 TABLET: 5; 325 TABLET ORAL at 08:31

## 2021-03-31 RX ADMIN — GUAIFENESIN 600 MG: 600 TABLET, EXTENDED RELEASE ORAL at 21:25

## 2021-03-31 RX ADMIN — IPRATROPIUM BROMIDE AND ALBUTEROL SULFATE 3 ML: .5; 3 SOLUTION RESPIRATORY (INHALATION) at 07:21

## 2021-03-31 RX ADMIN — OXYCODONE HYDROCHLORIDE AND ACETAMINOPHEN 1 TABLET: 5; 325 TABLET ORAL at 02:13

## 2021-03-31 RX ADMIN — CEFDINIR 300 MG: 300 CAPSULE ORAL at 21:25

## 2021-03-31 RX ADMIN — POTASSIUM PHOSPHATE, MONOBASIC 1000 MG: 500 TABLET, SOLUBLE ORAL at 08:21

## 2021-03-31 RX ADMIN — OXYCODONE HYDROCHLORIDE AND ACETAMINOPHEN 1 TABLET: 5; 325 TABLET ORAL at 16:07

## 2021-03-31 RX ADMIN — ATORVASTATIN CALCIUM 20 MG: 20 TABLET, FILM COATED ORAL at 08:21

## 2021-03-31 RX ADMIN — DIATRIZOATE MEGLUMINE AND DIATRIZOATE SODIUM 30 ML: 600; 100 SOLUTION ORAL; RECTAL at 16:06

## 2021-03-31 RX ADMIN — IOPAMIDOL 100 ML: 755 INJECTION, SOLUTION INTRAVENOUS at 16:06

## 2021-03-31 RX ADMIN — VENLAFAXINE HYDROCHLORIDE 75 MG: 37.5 CAPSULE, EXTENDED RELEASE ORAL at 08:21

## 2021-03-31 RX ADMIN — IPRATROPIUM BROMIDE AND ALBUTEROL SULFATE 3 ML: .5; 3 SOLUTION RESPIRATORY (INHALATION) at 11:49

## 2021-03-31 RX ADMIN — SODIUM CHLORIDE 100 ML/HR: 9 INJECTION, SOLUTION INTRAVENOUS at 02:52

## 2021-03-31 RX ADMIN — IPRATROPIUM BROMIDE AND ALBUTEROL SULFATE 3 ML: .5; 3 SOLUTION RESPIRATORY (INHALATION) at 15:07

## 2021-03-31 RX ADMIN — CLOPIDOGREL BISULFATE 75 MG: 75 TABLET ORAL at 08:21

## 2021-03-31 RX ADMIN — PREDNISONE 40 MG: 20 TABLET ORAL at 14:17

## 2021-03-31 RX ADMIN — Medication 5 MG: at 21:31

## 2021-03-31 RX ADMIN — SODIUM CHLORIDE, POTASSIUM CHLORIDE, SODIUM LACTATE AND CALCIUM CHLORIDE 125 ML/HR: 600; 310; 30; 20 INJECTION, SOLUTION INTRAVENOUS at 23:07

## 2021-03-31 RX ADMIN — NYSTATIN 500000 UNITS: 500000 SUSPENSION ORAL at 14:17

## 2021-03-31 RX ADMIN — ENOXAPARIN SODIUM 40 MG: 40 INJECTION SUBCUTANEOUS at 08:21

## 2021-03-31 RX ADMIN — POTASSIUM PHOSPHATE, MONOBASIC 1000 MG: 500 TABLET, SOLUBLE ORAL at 14:17

## 2021-03-31 RX ADMIN — NYSTATIN 500000 UNITS: 500000 SUSPENSION ORAL at 18:09

## 2021-03-31 RX ADMIN — CEFEPIME HYDROCHLORIDE 2 G: 2 INJECTION, SOLUTION INTRAVENOUS at 08:23

## 2021-03-31 RX ADMIN — SODIUM CHLORIDE, PRESERVATIVE FREE 10 ML: 5 INJECTION INTRAVENOUS at 21:26

## 2021-03-31 RX ADMIN — ASPIRIN 81 MG: 81 TABLET, COATED ORAL at 08:21

## 2021-03-31 RX ADMIN — POTASSIUM PHOSPHATE, MONOBASIC 1000 MG: 500 TABLET, SOLUBLE ORAL at 21:31

## 2021-03-31 RX ADMIN — SODIUM CHLORIDE, PRESERVATIVE FREE 10 ML: 5 INJECTION INTRAVENOUS at 08:21

## 2021-03-31 RX ADMIN — POTASSIUM PHOSPHATE, MONOBASIC 1000 MG: 500 TABLET, SOLUBLE ORAL at 18:09

## 2021-03-31 RX ADMIN — CEFDINIR 300 MG: 300 CAPSULE ORAL at 14:17

## 2021-03-31 RX ADMIN — SODIUM CHLORIDE, POTASSIUM CHLORIDE, SODIUM LACTATE AND CALCIUM CHLORIDE 125 ML/HR: 600; 310; 30; 20 INJECTION, SOLUTION INTRAVENOUS at 14:16

## 2021-03-31 RX ADMIN — PREDNISONE 40 MG: 20 TABLET ORAL at 18:09

## 2021-03-31 RX ADMIN — PREDNISONE 40 MG: 20 TABLET ORAL at 08:21

## 2021-03-31 RX ADMIN — IPRATROPIUM BROMIDE AND ALBUTEROL SULFATE 3 ML: .5; 3 SOLUTION RESPIRATORY (INHALATION) at 19:50

## 2021-03-31 RX ADMIN — NYSTATIN 500000 UNITS: 500000 SUSPENSION ORAL at 21:31

## 2021-04-01 ENCOUNTER — APPOINTMENT (OUTPATIENT)
Dept: ULTRASOUND IMAGING | Facility: HOSPITAL | Age: 46
End: 2021-04-01

## 2021-04-01 LAB
ADV 40+41 DNA STL QL NAA+NON-PROBE: NOT DETECTED
ALBUMIN SERPL-MCNC: 2.9 G/DL (ref 3.5–5.2)
ALBUMIN SERPL-MCNC: 3.1 G/DL (ref 3.5–5.2)
ALBUMIN/GLOB SERPL: 0.9 G/DL
ALBUMIN/GLOB SERPL: 0.9 G/DL
ALP SERPL-CCNC: 122 U/L (ref 39–117)
ALP SERPL-CCNC: 93 U/L (ref 39–117)
ALT SERPL W P-5'-P-CCNC: 119 U/L (ref 1–33)
ALT SERPL W P-5'-P-CCNC: 145 U/L (ref 1–33)
ANION GAP SERPL CALCULATED.3IONS-SCNC: 10.7 MMOL/L (ref 5–15)
ANION GAP SERPL CALCULATED.3IONS-SCNC: 14.9 MMOL/L (ref 5–15)
AST SERPL-CCNC: 101 U/L (ref 1–32)
AST SERPL-CCNC: 101 U/L (ref 1–32)
ASTRO TYP 1-8 RNA STL QL NAA+NON-PROBE: NOT DETECTED
BASOPHILS # BLD AUTO: 0.16 10*3/MM3 (ref 0–0.2)
BASOPHILS NFR BLD AUTO: 0.8 % (ref 0–1.5)
BILIRUB SERPL-MCNC: 0.2 MG/DL (ref 0–1.2)
BILIRUB SERPL-MCNC: 0.3 MG/DL (ref 0–1.2)
BUN SERPL-MCNC: 16 MG/DL (ref 6–20)
BUN SERPL-MCNC: 18 MG/DL (ref 6–20)
BUN/CREAT SERPL: 21.9 (ref 7–25)
BUN/CREAT SERPL: 28.6 (ref 7–25)
C CAYETANENSIS DNA STL QL NAA+NON-PROBE: NOT DETECTED
C COLI+JEJ+UPSA DNA STL QL NAA+NON-PROBE: NOT DETECTED
C DIFF GDH STL QL: NEGATIVE
CALCIUM SPEC-SCNC: 8.3 MG/DL (ref 8.6–10.5)
CALCIUM SPEC-SCNC: 9 MG/DL (ref 8.6–10.5)
CHLORIDE SERPL-SCNC: 104 MMOL/L (ref 98–107)
CHLORIDE SERPL-SCNC: 104 MMOL/L (ref 98–107)
CK SERPL-CCNC: 497 U/L (ref 20–180)
CO2 SERPL-SCNC: 16.1 MMOL/L (ref 22–29)
CO2 SERPL-SCNC: 22.3 MMOL/L (ref 22–29)
CREAT SERPL-MCNC: 0.63 MG/DL (ref 0.57–1)
CREAT SERPL-MCNC: 0.73 MG/DL (ref 0.57–1)
CRYPTOSP DNA STL QL NAA+NON-PROBE: NOT DETECTED
D DIMER PPP FEU-MCNC: 3.37 MCGFEU/ML (ref 0–0.46)
D-LACTATE SERPL-SCNC: 1.7 MMOL/L (ref 0.5–2)
D-LACTATE SERPL-SCNC: 2.2 MMOL/L (ref 0.5–2)
DEPRECATED RDW RBC AUTO: 51.8 FL (ref 37–54)
E HISTOLYT DNA STL QL NAA+NON-PROBE: NOT DETECTED
EAEC PAA PLAS AGGR+AATA ST NAA+NON-PRB: NOT DETECTED
EC STX1+STX2 GENES STL QL NAA+NON-PROBE: NOT DETECTED
EOSINOPHIL # BLD AUTO: 0.02 10*3/MM3 (ref 0–0.4)
EOSINOPHIL NFR BLD AUTO: 0.1 % (ref 0.3–6.2)
EPEC EAE GENE STL QL NAA+NON-PROBE: NOT DETECTED
ERYTHROCYTE [DISTWIDTH] IN BLOOD BY AUTOMATED COUNT: 12.8 % (ref 12.3–15.4)
ETEC LTA+ST1A+ST1B TOX ST NAA+NON-PROBE: NOT DETECTED
G LAMBLIA DNA STL QL NAA+NON-PROBE: NOT DETECTED
GFR SERPL CREATININE-BSD FRML MDRD: 101 ML/MIN/1.73
GFR SERPL CREATININE-BSD FRML MDRD: 85 ML/MIN/1.73
GLOBULIN UR ELPH-MCNC: 3.3 GM/DL
GLOBULIN UR ELPH-MCNC: 3.6 GM/DL
GLUCOSE SERPL-MCNC: 126 MG/DL (ref 65–99)
GLUCOSE SERPL-MCNC: 156 MG/DL (ref 65–99)
HCT VFR BLD AUTO: 35.2 % (ref 34–46.6)
HGB BLD-MCNC: 11 G/DL (ref 12–15.9)
IMM GRANULOCYTES # BLD AUTO: 1.23 10*3/MM3 (ref 0–0.05)
IMM GRANULOCYTES NFR BLD AUTO: 6.4 % (ref 0–0.5)
INR PPP: 1.25 (ref 0.9–1.1)
LDH SERPL-CCNC: 353 U/L (ref 135–214)
LIPASE SERPL-CCNC: 25 U/L (ref 13–60)
LYMPHOCYTES # BLD AUTO: 1.19 10*3/MM3 (ref 0.7–3.1)
LYMPHOCYTES NFR BLD AUTO: 6.2 % (ref 19.6–45.3)
MCH RBC QN AUTO: 34.1 PG (ref 26.6–33)
MCHC RBC AUTO-ENTMCNC: 31.3 G/DL (ref 31.5–35.7)
MCV RBC AUTO: 109 FL (ref 79–97)
MONOCYTES # BLD AUTO: 2.06 10*3/MM3 (ref 0.1–0.9)
MONOCYTES NFR BLD AUTO: 10.7 % (ref 5–12)
NEUTROPHILS NFR BLD AUTO: 14.53 10*3/MM3 (ref 1.7–7)
NEUTROPHILS NFR BLD AUTO: 75.8 % (ref 42.7–76)
NOROVIRUS GI+II RNA STL QL NAA+NON-PROBE: NOT DETECTED
NRBC BLD AUTO-RTO: 0.8 /100 WBC (ref 0–0.2)
P SHIGELLOIDES DNA STL QL NAA+NON-PROBE: NOT DETECTED
PLATELET # BLD AUTO: 191 10*3/MM3 (ref 140–450)
PMV BLD AUTO: 10.2 FL (ref 6–12)
POTASSIUM SERPL-SCNC: 4.7 MMOL/L (ref 3.5–5.2)
POTASSIUM SERPL-SCNC: 4.7 MMOL/L (ref 3.5–5.2)
PROCALCITONIN SERPL-MCNC: 9.37 NG/ML (ref 0–0.25)
PROT SERPL-MCNC: 6.2 G/DL (ref 6–8.5)
PROT SERPL-MCNC: 6.7 G/DL (ref 6–8.5)
PROTHROMBIN TIME: 15.3 SECONDS (ref 12.1–15)
RBC # BLD AUTO: 3.23 10*6/MM3 (ref 3.77–5.28)
RVA RNA STL QL NAA+NON-PROBE: NOT DETECTED
S ENT+BONG DNA STL QL NAA+NON-PROBE: NOT DETECTED
SAPO I+II+IV+V RNA STL QL NAA+NON-PROBE: NOT DETECTED
SHIGELLA SP+EIEC IPAH ST NAA+NON-PROBE: NOT DETECTED
SODIUM SERPL-SCNC: 135 MMOL/L (ref 136–145)
SODIUM SERPL-SCNC: 137 MMOL/L (ref 136–145)
TROPONIN T SERPL-MCNC: <0.01 NG/ML (ref 0–0.03)
V CHOL+PARA+VUL DNA STL QL NAA+NON-PROBE: NOT DETECTED
V CHOLERAE DNA STL QL NAA+NON-PROBE: NOT DETECTED
WBC # BLD AUTO: 19.19 10*3/MM3 (ref 3.4–10.8)
Y ENTEROCOL DNA STL QL NAA+NON-PROBE: NOT DETECTED

## 2021-04-01 PROCEDURE — 85610 PROTHROMBIN TIME: CPT | Performed by: NURSE PRACTITIONER

## 2021-04-01 PROCEDURE — 83605 ASSAY OF LACTIC ACID: CPT | Performed by: NURSE PRACTITIONER

## 2021-04-01 PROCEDURE — 85025 COMPLETE CBC W/AUTO DIFF WBC: CPT | Performed by: HOSPITALIST

## 2021-04-01 PROCEDURE — 83690 ASSAY OF LIPASE: CPT | Performed by: HOSPITALIST

## 2021-04-01 PROCEDURE — 0097U HC BIOFIRE FILMARRAY GI PANEL: CPT | Performed by: NURSE PRACTITIONER

## 2021-04-01 PROCEDURE — 80053 COMPREHEN METABOLIC PANEL: CPT | Performed by: HOSPITALIST

## 2021-04-01 PROCEDURE — 25010000002 FUROSEMIDE PER 20 MG: Performed by: HOSPITALIST

## 2021-04-01 PROCEDURE — 94799 UNLISTED PULMONARY SVC/PX: CPT

## 2021-04-01 PROCEDURE — 85379 FIBRIN DEGRADATION QUANT: CPT | Performed by: NURSE PRACTITIONER

## 2021-04-01 PROCEDURE — 99232 SBSQ HOSP IP/OBS MODERATE 35: CPT | Performed by: FAMILY MEDICINE

## 2021-04-01 PROCEDURE — 94664 DEMO&/EVAL PT USE INHALER: CPT

## 2021-04-01 PROCEDURE — 87324 CLOSTRIDIUM AG IA: CPT | Performed by: NURSE PRACTITIONER

## 2021-04-01 PROCEDURE — 63710000001 PREDNISONE PER 1 MG: Performed by: HOSPITALIST

## 2021-04-01 PROCEDURE — 84145 PROCALCITONIN (PCT): CPT | Performed by: HOSPITALIST

## 2021-04-01 PROCEDURE — 87449 NOS EACH ORGANISM AG IA: CPT | Performed by: NURSE PRACTITIONER

## 2021-04-01 PROCEDURE — 76705 ECHO EXAM OF ABDOMEN: CPT

## 2021-04-01 RX ORDER — FUROSEMIDE 10 MG/ML
40 INJECTION INTRAMUSCULAR; INTRAVENOUS ONCE
Status: COMPLETED | OUTPATIENT
Start: 2021-04-01 | End: 2021-04-01

## 2021-04-01 RX ORDER — L.ACID,PARA/B.BIFIDUM/S.THERM 8B CELL
1 CAPSULE ORAL DAILY
Status: DISCONTINUED | OUTPATIENT
Start: 2021-04-01 | End: 2021-04-03 | Stop reason: HOSPADM

## 2021-04-01 RX ORDER — LORAZEPAM 2 MG/ML
1 INJECTION INTRAMUSCULAR EVERY 4 HOURS PRN
Status: DISCONTINUED | OUTPATIENT
Start: 2021-04-01 | End: 2021-04-02

## 2021-04-01 RX ORDER — OXYCODONE HYDROCHLORIDE AND ACETAMINOPHEN 5; 325 MG/1; MG/1
1 TABLET ORAL EVERY 6 HOURS PRN
Status: DISCONTINUED | OUTPATIENT
Start: 2021-04-01 | End: 2021-04-03 | Stop reason: HOSPADM

## 2021-04-01 RX ADMIN — IPRATROPIUM BROMIDE AND ALBUTEROL SULFATE 3 ML: .5; 3 SOLUTION RESPIRATORY (INHALATION) at 20:05

## 2021-04-01 RX ADMIN — NYSTATIN 500000 UNITS: 500000 SUSPENSION ORAL at 20:00

## 2021-04-01 RX ADMIN — ATORVASTATIN CALCIUM 20 MG: 20 TABLET, FILM COATED ORAL at 11:38

## 2021-04-01 RX ADMIN — CEFDINIR 300 MG: 300 CAPSULE ORAL at 11:38

## 2021-04-01 RX ADMIN — IPRATROPIUM BROMIDE AND ALBUTEROL SULFATE 3 ML: .5; 3 SOLUTION RESPIRATORY (INHALATION) at 15:27

## 2021-04-01 RX ADMIN — OXYCODONE HYDROCHLORIDE AND ACETAMINOPHEN 1 TABLET: 5; 325 TABLET ORAL at 17:56

## 2021-04-01 RX ADMIN — POTASSIUM PHOSPHATE, MONOBASIC 1000 MG: 500 TABLET, SOLUBLE ORAL at 11:37

## 2021-04-01 RX ADMIN — IPRATROPIUM BROMIDE AND ALBUTEROL SULFATE 3 ML: .5; 3 SOLUTION RESPIRATORY (INHALATION) at 07:46

## 2021-04-01 RX ADMIN — CLOPIDOGREL BISULFATE 75 MG: 75 TABLET ORAL at 11:39

## 2021-04-01 RX ADMIN — CEFDINIR 300 MG: 300 CAPSULE ORAL at 18:21

## 2021-04-01 RX ADMIN — PREDNISONE 40 MG: 20 TABLET ORAL at 11:37

## 2021-04-01 RX ADMIN — ASPIRIN 81 MG: 81 TABLET, COATED ORAL at 11:38

## 2021-04-01 RX ADMIN — SODIUM CHLORIDE, PRESERVATIVE FREE 10 ML: 5 INJECTION INTRAVENOUS at 11:41

## 2021-04-01 RX ADMIN — PREDNISONE 40 MG: 20 TABLET ORAL at 18:21

## 2021-04-01 RX ADMIN — GUAIFENESIN 600 MG: 600 TABLET, EXTENDED RELEASE ORAL at 11:38

## 2021-04-01 RX ADMIN — FUROSEMIDE 40 MG: 10 INJECTION, SOLUTION INTRAMUSCULAR; INTRAVENOUS at 18:21

## 2021-04-01 RX ADMIN — Medication 1 CAPSULE: at 11:38

## 2021-04-01 RX ADMIN — SODIUM CHLORIDE, PRESERVATIVE FREE 10 ML: 5 INJECTION INTRAVENOUS at 20:03

## 2021-04-01 RX ADMIN — POTASSIUM PHOSPHATE, MONOBASIC 1000 MG: 500 TABLET, SOLUBLE ORAL at 20:00

## 2021-04-01 RX ADMIN — VENLAFAXINE HYDROCHLORIDE 75 MG: 37.5 CAPSULE, EXTENDED RELEASE ORAL at 11:38

## 2021-04-01 RX ADMIN — POTASSIUM PHOSPHATE, MONOBASIC 1000 MG: 500 TABLET, SOLUBLE ORAL at 18:21

## 2021-04-01 RX ADMIN — SODIUM CHLORIDE, POTASSIUM CHLORIDE, SODIUM LACTATE AND CALCIUM CHLORIDE 125 ML/HR: 600; 310; 30; 20 INJECTION, SOLUTION INTRAVENOUS at 07:20

## 2021-04-01 RX ADMIN — GUAIFENESIN 600 MG: 600 TABLET, EXTENDED RELEASE ORAL at 18:20

## 2021-04-01 RX ADMIN — IPRATROPIUM BROMIDE AND ALBUTEROL SULFATE 3 ML: .5; 3 SOLUTION RESPIRATORY (INHALATION) at 11:38

## 2021-04-01 RX ADMIN — NYSTATIN 500000 UNITS: 500000 SUSPENSION ORAL at 18:21

## 2021-04-01 RX ADMIN — NYSTATIN 500000 UNITS: 500000 SUSPENSION ORAL at 11:39

## 2021-04-01 RX ADMIN — Medication 5 MG: at 20:00

## 2021-04-01 RX ADMIN — OXYCODONE HYDROCHLORIDE AND ACETAMINOPHEN 1 TABLET: 5; 325 TABLET ORAL at 00:50

## 2021-04-01 NOTE — PLAN OF CARE
Goal Outcome Evaluation:  Plan of Care Reviewed With: patient  Progress: no change  Outcome Summary: Heart rate 120's and sats 90 % on room air Dr. Chilel aware.Continue to monitor.

## 2021-04-01 NOTE — PLAN OF CARE
Goal Outcome Evaluation:  Plan of Care Reviewed With: patient  Progress: improving  Outcome Summary: VSS, fluids cont, oral nystatin given, pt resting well throughout the night, GI panel ordered and MD aware of pt having 7 loose stools during the day, sample sent to lab this AM, pending GI panel and c-diff results, will continue to monitor

## 2021-04-01 NOTE — PROGRESS NOTES
"Hospitalist Team      Patient Care Team:  Adrianne Riddle MD as PCP - General  Adrianne Riddle MD as PCP - Family Medicine        Chief Complaint: Follow-up Pneumococcal Pneumonia; Abdominal Pain    Subjective    Events noted overnight.  Ms. Rubi reports continued pain this morning.  Some exertional dyspnea noted, but she is only getting up to the bathroom.  She denies chest pain.  No much of an appetite, but she is tolerating apple jiuce and water.  She had a Cafe Mocha from [employer] yesterday that she reports always gives her diarrhea.    Objective    Vital Signs  Temp:  [97.4 °F (36.3 °C)-97.7 °F (36.5 °C)] 97.7 °F (36.5 °C)  Heart Rate:  [110-121] 120  Resp:  [18-20] 20  BP: (136-173)/() 160/98  Oxygen Therapy  SpO2: 95 %  Pulse Oximetry Type: Continuous  Device (Oxygen Therapy): room air  Flow (L/min): 2  Oxygen Concentration (%): 28  Probe Placed On (Pulse Ox): finger}  Flowsheet Rows      First Filed Value   Admission Height  157.5 cm (62\") Documented at 03/29/2021 0100   Admission Weight  56.7 kg (125 lb) Documented at 03/29/2021 0100            Physical Exam:    General: Appears older than stated age in NAD.  Lungs: Breath sounds are diminished.  I appreciate no wheeze or rhonchi  CV: Regular rate and rhythm.  No murmur appreciated.  Abdomen: Soft, but TTP RUQ.  No peritoneal signs.  Bowel sounds are diminished.  MSK: No C/C/E.  No asymmetry.  Neuro: CN II-XII grossly intact.  Psych: Flat affect.    Results Review:     I reviewed the patient's new clinical results.    Lab Results (last 24 hours)     Procedure Component Value Units Date/Time    Blood Culture - Blood, Arm, Left [[id number]] Collected: 03/29/21 0950    Specimen: Blood from Arm, Left Updated: 04/01/21 1000     Blood Culture No growth at 3 days    Lipase [[id number]]  (Normal) Collected: 04/01/21 0421    Specimen: Blood Updated: 04/01/21 0840     Lipase 25 U/L     D-dimer, Quantitative [[id number]]  (Abnormal) " Collected: 04/01/21 0421    Specimen: Blood Updated: 04/01/21 0544     D-Dimer, Quantitative 3.37 MCGFEU/mL     Narrative:      Can be elevated in, but is not diagnostic for deep vein thrombosis (DVT) or pulmonary embolis (PE).  It is also elevated in other medical conditions.  Clinical correlation is required.  The negative cut-off value for the D-Dimer is 0.50 mcg FEU/mL for DVT and PE.      Protime-INR [[id number]]  (Abnormal) Collected: 04/01/21 0421    Specimen: Blood Updated: 04/01/21 0520     Protime 15.3 Seconds      INR 1.25    Narrative:      Therapeutic Ranges for INR: 2.0-3.0 (PT 20-30)                              2.5-3.5 (PT 25-34)    Comprehensive Metabolic Panel [[id number]]  (Abnormal) Collected: 04/01/21 0421    Specimen: Blood Updated: 04/01/21 0520     Glucose 126 mg/dL      BUN 18 mg/dL      Creatinine 0.63 mg/dL      Sodium 135 mmol/L      Potassium 4.7 mmol/L      Comment: Slight hemolysis detected by analyzer. Results may be affected.        Chloride 104 mmol/L      CO2 16.1 mmol/L      Calcium 8.3 mg/dL      Total Protein 6.2 g/dL      Albumin 2.90 g/dL      ALT (SGPT) 119 U/L      AST (SGOT) 101 U/L      Comment: Slight hemolysis detected by analyzer. Results may be affected.        Alkaline Phosphatase 122 U/L      Total Bilirubin 0.2 mg/dL      eGFR Non African Amer 101 mL/min/1.73      Globulin 3.3 gm/dL      A/G Ratio 0.9 g/dL      BUN/Creatinine Ratio 28.6     Anion Gap 14.9 mmol/L     Narrative:      GFR Normal >60  Chronic Kidney Disease <60  Kidney Failure <15      Clostridium Difficile Toxin - Stool, Per Rectum [[id number]]  (Normal) Collected: 04/01/21 0410    Specimen: Stool from Per Rectum Updated: 04/01/21 0519    Narrative:      The following orders were created for panel order Clostridium Difficile Toxin - Stool, Per Rectum.  Procedure                               Abnormality         Status                     ---------                               -----------         ------                      Clostridium Difficile EI...[[id number]]  Normal              Final result                 Please view results for these tests on the individual orders.    Clostridium Difficile EIA - Stool, Per Rectum [[id number]]  (Normal) Collected: 04/01/21 0410    Specimen: Stool from Per Rectum Updated: 04/01/21 0519     C Diff GDH / Toxin Negative    CBC & Differential [[id number]]  (Abnormal) Collected: 04/01/21 0421    Specimen: Blood Updated: 04/01/21 0505    Narrative:      The following orders were created for panel order CBC & Differential.  Procedure                               Abnormality         Status                     ---------                               -----------         ------                     Scan Slide[[id number]]                                                                  CBC Auto Differential[[id number]]        Abnormal            Final result                 Please view results for these tests on the individual orders.    CBC Auto Differential [[id number]]  (Abnormal) Collected: 04/01/21 0421    Specimen: Blood Updated: 04/01/21 0505     WBC 19.19 10*3/mm3      RBC 3.23 10*6/mm3      Hemoglobin 11.0 g/dL      Hematocrit 35.2 %      .0 fL      MCH 34.1 pg      MCHC 31.3 g/dL      RDW 12.8 %      RDW-SD 51.8 fl      MPV 10.2 fL      Platelets 191 10*3/mm3      Neutrophil % 75.8 %      Lymphocyte % 6.2 %      Monocyte % 10.7 %      Eosinophil % 0.1 %      Basophil % 0.8 %      Immature Grans % 6.4 %      Neutrophils, Absolute 14.53 10*3/mm3      Lymphocytes, Absolute 1.19 10*3/mm3      Monocytes, Absolute 2.06 10*3/mm3      Eosinophils, Absolute 0.02 10*3/mm3      Basophils, Absolute 0.16 10*3/mm3      Immature Grans, Absolute 1.23 10*3/mm3      nRBC 0.8 /100 WBC     Procalcitonin [[id number]]  (Abnormal) Collected: 04/01/21 0421    Specimen: Blood Updated: 04/01/21 0459     Procalcitonin 9.37 ng/mL     Narrative:      Results may be falsely decreased if patient taking Biotin.      Timed Lactic Acid, Reflex [[id number]]  (Normal) Collected: 04/01/21 0421    Specimen: Blood Updated: 04/01/21 0450     Lactate 1.7 mmol/L     Gastrointestinal Panel, PCR - Stool, Per Rectum [[id number]] Collected: 04/01/21 0410    Specimen: Stool from Per Rectum Updated: 04/01/21 0429    Troponin [[id number]]  (Normal) Collected: 03/31/21 0404    Specimen: Blood Updated: 04/01/21 0215     Troponin T <0.010 ng/mL     Narrative:      Troponin T Reference Range:  <= 0.03 ng/mL-   Negative for AMI  >0.03 ng/mL-     Abnormal for myocardial necrosis.  Clinicians would have to utilize clinical acumen, EKG, Troponin and serial changes to determine if it is an Acute Myocardial Infarction or myocardial injury due to an underlying chronic condition.       Results may be falsely decreased if patient taking Biotin.      Lactic Acid, Plasma [[id number]]  (Abnormal) Collected: 04/01/21 0132    Specimen: Blood Updated: 04/01/21 0159     Lactate 2.2 mmol/L     Lactate Dehydrogenase [[id number]]  (Abnormal) Collected: 03/31/21 0404    Specimen: Blood Updated: 04/01/21 0158      U/L     CK [[id number]]  (Abnormal) Collected: 03/31/21 0404    Specimen: Blood Updated: 04/01/21 0158     Creatine Kinase 497 U/L     Hepatitis Panel, Acute [[id number]]  (Normal) Collected: 03/31/21 1705    Specimen: Blood Updated: 03/31/21 2040     Hepatitis B Surface Ag Non-Reactive     Hep A IgM Non-Reactive     Hep B C IgM Non-Reactive     Hepatitis C Ab Non-Reactive    Narrative:      Results may be falsely decreased if patient taking Biotin.           Imaging Results (Last 24 Hours)     Procedure Component Value Units Date/Time    US Gallbladder [[id number]] Collected: 04/01/21 1037     Updated: 04/01/21 1039    Narrative:      US Abdomen Ltd    INDICATION:   45-year-old female with abdominal pain for 4 days.    COMPARISON:   None available.    FINDINGS:  PANCREAS: Visualized portions of the pancreas are within normal limits.     LIVER: Increased  echogenicity of the hepatic parenchyma is indicative of hepatic steatosis. The liver is enlarged, measuring 18.8 cm in craniocaudad length. No hepatic mass. The intrahepatic bile ducts are normal in caliber. The common duct is normal in  size at the sandra hepatis measuring 1-2 mm.    GALLBLADDER: Gallbladder wall is borderline in thickness, measuring 3 mm. No evidence of cholelithiasis. There is a small amount of nonspecific pericholecystic fluid.    RIGHT KIDNEY: The right kidney measures 10 cm. Renal cortical thickness and echogenicity is normal. No hydronephrosis.    OTHER: Small amount of perihepatic ascites.      Impression:        1. Mild hepatomegaly with hepatic steatosis.  2. Small amount of perihepatic ascites and pericholecystic fluid. Nonspecific borderline gallbladder wall thickening, measuring 3 mm, which may be slightly accentuated by the pericholecystic fluid. No evidence of cholelithiasis or common bile duct  dilatation.    Signer Name: Blaise Vaughn MD   Signed: 4/1/2021 10:37 AM   Workstation Name: [id number]    [facility]    CT Abdomen Pelvis With Contrast [[id number]] Collected: 03/31/21 1611     Updated: 03/31/21 1617    Narrative:      CT abdomen and pelvis with contrast   03/31/2021     HISTORY:  Epigastric pain and bloating starting today     COMPARISON:  06/14/2016     TECHNIQUE:    CT of Abdomen and Pelvis with contrast performed.  Sagittal and Coronal  reconstructions performed. Radiation dose reduction techniques included  automated exposure control or exposure modulation based on body size.  Radiation audit for CT and nuclear cardiology exams in the last 12  months: 0.      FINDINGS:    Abdomen:  There is dense consolidation throughout almost all the right middle lobe  and lingula with air bronchograms consistent with pneumonia. There are  no noncalcified lung nodules. Small amount of fluid around the liver.  Liver slightly enlarged decreased in density. There is  fluid around the  gallbladder. The gallbladder wall is normal. There is no biliary  distention. The spleen, pancreas, adrenal glands and kidneys are normal.  Aorta is normal in size. There is no adenopathy. Oral contrast was  administered. The bowel appears normal.       Pelvis:  There is small or free fluid in the pelvis. The uterus and adnexal  regions and bladder are normal. The small amount of air in the anterior  abdominal wall probably from previous injection. Bones are unremarkable.  There is a vascular stent in the left common iliac artery         Impression:      The liver appears slightly enlarged and slightly decreased  in density. There is a small amount ascites around the liver and in the  pelvis. Correlation with hepatic enzymes and possibility of hepatitis is  recommended.     Dense consolidation in right middle lobe and lingula consistent with  pneumonia     Otherwise negative study     This report was finalized on 3/31/2021 4:15 PM by Dr. Harpreet Castellon MD.             Medication Review:   I have reviewed the patient's current medication list    Current Facility-Administered Medications:   •  albuterol (PROVENTIL) nebulizer solution 0.083% 2.5 mg/3mL, 2.5 mg, Nebulization, Q6H PRN, Facundo Nichols MD, 2.5 mg at 03/30/21 0732  •  aspirin EC tablet 81 mg, 81 mg, Oral, Daily, Facundo Nichols MD, 81 mg at 03/31/21 0821  •  atorvastatin (LIPITOR) tablet 20 mg, 20 mg, Oral, Daily, Facundo Nichols MD, 20 mg at 03/31/21 0821  •  cefdinir (OMNICEF) capsule 300 mg, 300 mg, Oral, Q12H, Dagoberto Chilel MD, 300 mg at 03/31/21 2125  •  clopidogrel (PLAVIX) tablet 75 mg, 75 mg, Oral, Daily, Facundo Nichols MD, 75 mg at 03/31/21 0821  •  guaiFENesin (MUCINEX) 12 hr tablet 600 mg, 600 mg, Oral, Q12H, Facundo Nichols MD, 600 mg at 03/31/21 2125  •  guaiFENesin-dextromethorphan (ROBITUSSIN DM) 100-10 MG/5ML syrup 10 mL, 10 mL, Oral, Q6H PRN, Facundo Nichols MD, 10 mL at 03/29/21 2322  •   ipratropium-albuterol (DUO-NEB) nebulizer solution 3 mL, 3 mL, Nebulization, 4x Daily - RT, Facundo Nichols MD, 3 mL at 04/01/21 0746  •  lactated ringers infusion, 125 mL/hr, Intravenous, Continuous, Dagoberto Chilel MD, Last Rate: 125 mL/hr at 04/01/21 0720, 125 mL/hr at 04/01/21 0720  •  lactobacillus acidophilus (RISAQUAD) capsule 1 capsule, 1 capsule, Oral, Daily, Zita Potter, APRN  •  melatonin tablet 5 mg, 5 mg, Oral, Nightly PRN, GloriaJamieristerlinga, DO, 5 mg at 03/31/21 2131  •  nitroglycerin (NITROSTAT) SL tablet 0.4 mg, 0.4 mg, Sublingual, Q5 Min PRN, Facundo Nichols MD  •  nystatin (MYCOSTATIN) 150237 UNIT/ML suspension 500,000 Units, 5 mL, Swish & Swallow, 4x Daily, Dagoberto Chilel MD, 500,000 Units at 03/31/21 2131  •  potassium phosphate (monobasic) (K-PHOS) tablet 1,000 mg, 1,000 mg, Oral, 4x Daily With Meals & Nightly, Dagoberto Chilel MD, 1,000 mg at 03/31/21 2131  •  predniSONE (DELTASONE) tablet 40 mg, 40 mg, Oral, BID With Meals, Dagoberto Chilel MD, 40 mg at 03/31/21 1809  •  promethazine (PHENERGAN) tablet 25 mg, 25 mg, Oral, Q8H PRN, Facundo Nichols MD  •  sodium chloride 0.9 % bolus 1,773 mL, 30 mL/kg, Intravenous, PRN, Facundo Nichols MD  •  sodium chloride 0.9 % flush 10 mL, 10 mL, Intravenous, PRN, Facundo Nichols MD  •  sodium chloride 0.9 % flush 10 mL, 10 mL, Intravenous, Q12H, Facundo Nichols MD, 10 mL at 03/31/21 2126  •  sodium chloride 0.9 % infusion 40 mL, 40 mL, Intravenous, PRN, Facundo Nichols MD  •  venlafaxine XR (EFFEXOR-XR) 24 hr capsule 75 mg, 75 mg, Oral, Daily, Facundo Nichols MD, 75 mg at 03/31/21 0821      Assessment/Plan     1.  Abdominal Pain: Note reviewed from last night.  I reviewed the contrasted CT images w/ radiologist this morning and vasculature appears patent and bowl walls are normal.  She does have some fatty infiltration in the liver which could explain the mildly elevated lactate.  LDH can be elevated from the  pneumonia.LFT's have trended up in a cholestatic pattern so I did check a RUQ ultrasound that was unremarkable.  I think her diarrhea overnight was explained by oral contrast moving through and clearing stool seen on CT.  Culture was negative.  Two things come to mind:  1) being referred pain from rib fractures or 2) distension from ascities.  I've turned IVF off.  Discussed w/ patient.    2.  Anion Gap Metabolic Acidosis: Could be driven by lactate.  Will repeat level and monitor/    3.  Transaminitis: As discussed above.    4.  Pneumococcal pneumonia: On Cefdinir and Prednisone.    5.  PVD: As discussed in #1.    6.  Right 6th and 7th Rib Fractures: Conservative care.      Plan for disposition: Home when able.    Dagoberto Chilel MD  04/01/21  11:33 EDT

## 2021-04-01 NOTE — PROGRESS NOTES
Contacted by staff regarding patient having multiple episodes of diarrhea, approximately 7 times today with continued abdominal discomfort.  Reviewed CT abdomen pelvis of today.  ALT 66, AST 65, hepatitis panel negative.  Will check GI panel and C. difficile since patient has been and is currently on antibiotic therapy.  Add probiotic, await results to determine further course of action.  May need to consider CTA abdomen with runoff as patient has history of PAD with iliac stent, to rule out mesenteric pathology (abdominal pain, leukocytosis, metabolic acidosis). Check lactic acid now and LDH, CK, troponin on blood in lab. Will discuss with daytime attending in a.m. as there were no peritoneal signs documented earlier.    0202 am:  Lactic 2.2 current  , , troponin negative (done from blood in lab of earlier)  All non-specific markers but cannot exclude possible ischemia as cause of abdominal pain/diarrhea  Await GI panel/c-dif  Check INR/d-dimer in am with am labs  Patient already hydrating with IVFs and treated with antibiotics  As above, will d/w daytime attending as CTA with runoff not available at night

## 2021-04-02 ENCOUNTER — APPOINTMENT (OUTPATIENT)
Dept: CT IMAGING | Facility: HOSPITAL | Age: 46
End: 2021-04-02

## 2021-04-02 LAB
ALBUMIN SERPL-MCNC: 3.2 G/DL (ref 3.5–5.2)
ALBUMIN/GLOB SERPL: 1 G/DL
ALP SERPL-CCNC: 87 U/L (ref 39–117)
ALT SERPL W P-5'-P-CCNC: 113 U/L (ref 1–33)
AMPHET+METHAMPHET UR QL: NEGATIVE
AMPHETAMINES UR QL: NEGATIVE
ANION GAP SERPL CALCULATED.3IONS-SCNC: 11.3 MMOL/L (ref 5–15)
AST SERPL-CCNC: 50 U/L (ref 1–32)
BARBITURATES UR QL SCN: POSITIVE
BASOPHILS # BLD AUTO: 0.01 10*3/MM3 (ref 0–0.2)
BASOPHILS NFR BLD AUTO: 0 % (ref 0–1.5)
BENZODIAZ UR QL SCN: POSITIVE
BILIRUB SERPL-MCNC: 0.2 MG/DL (ref 0–1.2)
BUN SERPL-MCNC: 16 MG/DL (ref 6–20)
BUN/CREAT SERPL: 23.9 (ref 7–25)
BUPRENORPHINE SERPL-MCNC: NEGATIVE NG/ML
CALCIUM SPEC-SCNC: 8.7 MG/DL (ref 8.6–10.5)
CANNABINOIDS SERPL QL: NEGATIVE
CHLORIDE SERPL-SCNC: 101 MMOL/L (ref 98–107)
CK SERPL-CCNC: 333 U/L (ref 20–180)
CO2 SERPL-SCNC: 26.7 MMOL/L (ref 22–29)
COCAINE UR QL: NEGATIVE
CREAT SERPL-MCNC: 0.67 MG/DL (ref 0.57–1)
D DIMER PPP FEU-MCNC: 4.67 MCGFEU/ML (ref 0–0.46)
DEPRECATED RDW RBC AUTO: 49.8 FL (ref 37–54)
EOSINOPHIL # BLD AUTO: 0.01 10*3/MM3 (ref 0–0.4)
EOSINOPHIL NFR BLD AUTO: 0 % (ref 0.3–6.2)
ERYTHROCYTE [DISTWIDTH] IN BLOOD BY AUTOMATED COUNT: 12.4 % (ref 12.3–15.4)
ETHANOL BLD-MCNC: <10 MG/DL (ref 0–10)
ETHANOL UR QL: <0.01 %
GFR SERPL CREATININE-BSD FRML MDRD: 94 ML/MIN/1.73
GLOBULIN UR ELPH-MCNC: 3.2 GM/DL
GLUCOSE SERPL-MCNC: 166 MG/DL (ref 65–99)
HCT VFR BLD AUTO: 38.1 % (ref 34–46.6)
HGB BLD-MCNC: 12.1 G/DL (ref 12–15.9)
IMM GRANULOCYTES # BLD AUTO: 1.55 10*3/MM3 (ref 0–0.05)
IMM GRANULOCYTES NFR BLD AUTO: 7.5 % (ref 0–0.5)
LYMPHOCYTES # BLD AUTO: 1.72 10*3/MM3 (ref 0.7–3.1)
LYMPHOCYTES NFR BLD AUTO: 8.3 % (ref 19.6–45.3)
MCH RBC QN AUTO: 34.1 PG (ref 26.6–33)
MCHC RBC AUTO-ENTMCNC: 31.8 G/DL (ref 31.5–35.7)
MCV RBC AUTO: 107.3 FL (ref 79–97)
METHADONE UR QL SCN: NEGATIVE
MONOCYTES # BLD AUTO: 2.09 10*3/MM3 (ref 0.1–0.9)
MONOCYTES NFR BLD AUTO: 10.1 % (ref 5–12)
NEUTROPHILS NFR BLD AUTO: 15.38 10*3/MM3 (ref 1.7–7)
NEUTROPHILS NFR BLD AUTO: 74.1 % (ref 42.7–76)
OPIATES UR QL: NEGATIVE
OXYCODONE UR QL SCN: POSITIVE
PCP UR QL SCN: NEGATIVE
PLATELET # BLD AUTO: 233 10*3/MM3 (ref 140–450)
PMV BLD AUTO: 9.8 FL (ref 6–12)
POTASSIUM SERPL-SCNC: 4.6 MMOL/L (ref 3.5–5.2)
PROPOXYPH UR QL: NEGATIVE
PROT SERPL-MCNC: 6.4 G/DL (ref 6–8.5)
QT INTERVAL: 291 MS
RBC # BLD AUTO: 3.55 10*6/MM3 (ref 3.77–5.28)
SODIUM SERPL-SCNC: 139 MMOL/L (ref 136–145)
TRICYCLICS UR QL SCN: NEGATIVE
TROPONIN T SERPL-MCNC: <0.01 NG/ML (ref 0–0.03)
WBC # BLD AUTO: 20.76 10*3/MM3 (ref 3.4–10.8)

## 2021-04-02 PROCEDURE — 0 IOPAMIDOL PER 1 ML: Performed by: FAMILY MEDICINE

## 2021-04-02 PROCEDURE — 63710000001 PREDNISONE PER 1 MG: Performed by: HOSPITALIST

## 2021-04-02 PROCEDURE — 80306 DRUG TEST PRSMV INSTRMNT: CPT | Performed by: HOSPITALIST

## 2021-04-02 PROCEDURE — 25010000002 FUROSEMIDE PER 20 MG: Performed by: HOSPITALIST

## 2021-04-02 PROCEDURE — 25010000002 HYDRALAZINE PER 20 MG: Performed by: HOSPITALIST

## 2021-04-02 PROCEDURE — 99233 SBSQ HOSP IP/OBS HIGH 50: CPT | Performed by: FAMILY MEDICINE

## 2021-04-02 PROCEDURE — 63710000001 PROMETHAZINE PER 25 MG: Performed by: FAMILY MEDICINE

## 2021-04-02 PROCEDURE — 82077 ASSAY SPEC XCP UR&BREATH IA: CPT | Performed by: HOSPITALIST

## 2021-04-02 PROCEDURE — 71275 CT ANGIOGRAPHY CHEST: CPT

## 2021-04-02 PROCEDURE — 85025 COMPLETE CBC W/AUTO DIFF WBC: CPT | Performed by: HOSPITALIST

## 2021-04-02 PROCEDURE — 93010 ELECTROCARDIOGRAM REPORT: CPT | Performed by: INTERNAL MEDICINE

## 2021-04-02 PROCEDURE — 84484 ASSAY OF TROPONIN QUANT: CPT | Performed by: HOSPITALIST

## 2021-04-02 PROCEDURE — 94799 UNLISTED PULMONARY SVC/PX: CPT

## 2021-04-02 PROCEDURE — 82550 ASSAY OF CK (CPK): CPT | Performed by: HOSPITALIST

## 2021-04-02 PROCEDURE — 25010000002 LORAZEPAM PER 2 MG: Performed by: NURSE PRACTITIONER

## 2021-04-02 PROCEDURE — 93005 ELECTROCARDIOGRAM TRACING: CPT | Performed by: FAMILY MEDICINE

## 2021-04-02 PROCEDURE — 80053 COMPREHEN METABOLIC PANEL: CPT | Performed by: HOSPITALIST

## 2021-04-02 PROCEDURE — 85379 FIBRIN DEGRADATION QUANT: CPT | Performed by: NURSE PRACTITIONER

## 2021-04-02 PROCEDURE — 63710000001 ONDANSETRON PER 8 MG: Performed by: NURSE PRACTITIONER

## 2021-04-02 RX ORDER — FUROSEMIDE 10 MG/ML
40 INJECTION INTRAMUSCULAR; INTRAVENOUS ONCE
Status: COMPLETED | OUTPATIENT
Start: 2021-04-02 | End: 2021-04-02

## 2021-04-02 RX ORDER — HYDRALAZINE HYDROCHLORIDE 10 MG/1
10 TABLET, FILM COATED ORAL EVERY 6 HOURS PRN
Status: DISCONTINUED | OUTPATIENT
Start: 2021-04-02 | End: 2021-04-03 | Stop reason: HOSPADM

## 2021-04-02 RX ORDER — HYDRALAZINE HYDROCHLORIDE 20 MG/ML
20 INJECTION INTRAMUSCULAR; INTRAVENOUS EVERY 6 HOURS PRN
Status: DISCONTINUED | OUTPATIENT
Start: 2021-04-02 | End: 2021-04-02

## 2021-04-02 RX ORDER — ALPRAZOLAM 0.25 MG/1
0.5 TABLET ORAL ONCE
Status: COMPLETED | OUTPATIENT
Start: 2021-04-02 | End: 2021-04-02

## 2021-04-02 RX ORDER — ALPRAZOLAM 0.25 MG/1
0.5 TABLET ORAL 3 TIMES DAILY PRN
Status: DISCONTINUED | OUTPATIENT
Start: 2021-04-02 | End: 2021-04-03 | Stop reason: HOSPADM

## 2021-04-02 RX ORDER — LABETALOL HYDROCHLORIDE 5 MG/ML
10 INJECTION, SOLUTION INTRAVENOUS ONCE
Status: DISCONTINUED | OUTPATIENT
Start: 2021-04-02 | End: 2021-04-02

## 2021-04-02 RX ORDER — ONDANSETRON 4 MG/1
4 TABLET, FILM COATED ORAL EVERY 6 HOURS PRN
Status: DISCONTINUED | OUTPATIENT
Start: 2021-04-02 | End: 2021-04-03 | Stop reason: HOSPADM

## 2021-04-02 RX ORDER — PREDNISONE 20 MG/1
40 TABLET ORAL
Status: DISCONTINUED | OUTPATIENT
Start: 2021-04-03 | End: 2021-04-03 | Stop reason: DRUGHIGH

## 2021-04-02 RX ADMIN — SODIUM CHLORIDE, PRESERVATIVE FREE 10 ML: 5 INJECTION INTRAVENOUS at 21:06

## 2021-04-02 RX ADMIN — ONDANSETRON HYDROCHLORIDE 4 MG: 4 TABLET, FILM COATED ORAL at 03:58

## 2021-04-02 RX ADMIN — POTASSIUM PHOSPHATE, MONOBASIC 1000 MG: 500 TABLET, SOLUBLE ORAL at 08:28

## 2021-04-02 RX ADMIN — LORAZEPAM 1 MG: 2 INJECTION INTRAMUSCULAR; INTRAVENOUS at 09:49

## 2021-04-02 RX ADMIN — PREDNISONE 40 MG: 20 TABLET ORAL at 08:20

## 2021-04-02 RX ADMIN — Medication 1 CAPSULE: at 08:13

## 2021-04-02 RX ADMIN — HYDRALAZINE HYDROCHLORIDE 20 MG: 20 INJECTION INTRAMUSCULAR; INTRAVENOUS at 08:13

## 2021-04-02 RX ADMIN — CEFDINIR 300 MG: 300 CAPSULE ORAL at 08:28

## 2021-04-02 RX ADMIN — NYSTATIN 500000 UNITS: 500000 SUSPENSION ORAL at 21:14

## 2021-04-02 RX ADMIN — POTASSIUM PHOSPHATE, MONOBASIC 1000 MG: 500 TABLET, SOLUBLE ORAL at 21:05

## 2021-04-02 RX ADMIN — IPRATROPIUM BROMIDE AND ALBUTEROL SULFATE 3 ML: .5; 3 SOLUTION RESPIRATORY (INHALATION) at 15:58

## 2021-04-02 RX ADMIN — VENLAFAXINE HYDROCHLORIDE 75 MG: 37.5 CAPSULE, EXTENDED RELEASE ORAL at 08:13

## 2021-04-02 RX ADMIN — IPRATROPIUM BROMIDE AND ALBUTEROL SULFATE 3 ML: .5; 3 SOLUTION RESPIRATORY (INHALATION) at 07:47

## 2021-04-02 RX ADMIN — NYSTATIN 500000 UNITS: 500000 SUSPENSION ORAL at 12:57

## 2021-04-02 RX ADMIN — ATORVASTATIN CALCIUM 20 MG: 20 TABLET, FILM COATED ORAL at 08:13

## 2021-04-02 RX ADMIN — CLOPIDOGREL BISULFATE 75 MG: 75 TABLET ORAL at 08:13

## 2021-04-02 RX ADMIN — CEFDINIR 300 MG: 300 CAPSULE ORAL at 21:05

## 2021-04-02 RX ADMIN — IPRATROPIUM BROMIDE AND ALBUTEROL SULFATE 3 ML: .5; 3 SOLUTION RESPIRATORY (INHALATION) at 19:13

## 2021-04-02 RX ADMIN — ASPIRIN 81 MG: 81 TABLET, COATED ORAL at 08:14

## 2021-04-02 RX ADMIN — PROMETHAZINE HYDROCHLORIDE 25 MG: 25 TABLET ORAL at 00:28

## 2021-04-02 RX ADMIN — POTASSIUM PHOSPHATE, MONOBASIC 1000 MG: 500 TABLET, SOLUBLE ORAL at 12:57

## 2021-04-02 RX ADMIN — NYSTATIN 500000 UNITS: 500000 SUSPENSION ORAL at 17:33

## 2021-04-02 RX ADMIN — FUROSEMIDE 40 MG: 10 INJECTION, SOLUTION INTRAMUSCULAR; INTRAVENOUS at 08:28

## 2021-04-02 RX ADMIN — NITROGLYCERIN 0.4 MG: 0.4 TABLET SUBLINGUAL at 08:12

## 2021-04-02 RX ADMIN — GUAIFENESIN 600 MG: 600 TABLET, EXTENDED RELEASE ORAL at 08:13

## 2021-04-02 RX ADMIN — IPRATROPIUM BROMIDE AND ALBUTEROL SULFATE 3 ML: .5; 3 SOLUTION RESPIRATORY (INHALATION) at 11:36

## 2021-04-02 RX ADMIN — POTASSIUM PHOSPHATE, MONOBASIC 1000 MG: 500 TABLET, SOLUBLE ORAL at 17:33

## 2021-04-02 RX ADMIN — OXYCODONE HYDROCHLORIDE AND ACETAMINOPHEN 1 TABLET: 5; 325 TABLET ORAL at 07:12

## 2021-04-02 RX ADMIN — IOPAMIDOL 100 ML: 755 INJECTION, SOLUTION INTRAVENOUS at 09:31

## 2021-04-02 RX ADMIN — ALPRAZOLAM 0.5 MG: 0.25 TABLET ORAL at 13:53

## 2021-04-02 RX ADMIN — SODIUM CHLORIDE, PRESERVATIVE FREE 10 ML: 5 INJECTION INTRAVENOUS at 08:29

## 2021-04-02 RX ADMIN — NYSTATIN 500000 UNITS: 500000 SUSPENSION ORAL at 08:28

## 2021-04-02 RX ADMIN — GUAIFENESIN 600 MG: 600 TABLET, EXTENDED RELEASE ORAL at 21:06

## 2021-04-02 NOTE — PLAN OF CARE
Problem: Adult Inpatient Plan of Care  Goal: Plan of Care Review  Outcome: Ongoing, Progressing  Flowsheets (Taken 4/1/2021 2012)  Progress: improving  Plan of Care Reviewed With: patient     Problem: Respiratory Compromise (Pneumonia)  Goal: Effective Oxygenation and Ventilation  Intervention: Optimize Oxygenation and Ventilation  Recent Flowsheet Documentation  Taken 4/1/2021 2005 by Rylan Kaiser RRT  Airway/Ventilation Management:   airway patency maintained   pulmonary hygiene promoted   Goal Outcome Evaluation:  Plan of Care Reviewed With: patient  Progress: improving

## 2021-04-02 NOTE — PLAN OF CARE
Goal Outcome Evaluation:     Progress: improving  Outcome Summary: patient had an eventful day. HR monitored closely and medication given. states pain is now not as bad as this AM. antibiotic continued. imaging and labs completed.

## 2021-04-02 NOTE — PLAN OF CARE
Goal Outcome Evaluation:  Plan of Care Reviewed With: patient  Progress: improving  Outcome Summary: pt resting better throughout the night tonight, c/o nausea intermittently, zofran ordered, no complaints of pain, room air, ambulating to the bathroom, adequate urine output, will continue to monitor

## 2021-04-02 NOTE — PROGRESS NOTES
"Hospitalist Team      Patient Care Team:  Adrianne Riddle MD as PCP - General  Adrianne Riddle MD as PCP - Family Medicine        Chief Complaint:  Follow-up Chest Pain, Tachycardia    Subjective    Experiencing sharp, left upper chest pain this morning.  She denies indigestion.  She still feels bloated, but has been urinating frequently.  She denies headache.  Still not taking in anything solid.      Objective    Vital Signs  Temp:  [97 °F (36.1 °C)-97.8 °F (36.6 °C)] 97 °F (36.1 °C)  Heart Rate:  [] 133  Resp:  [16-26] 22  BP: (142-175)/() 175/120  Oxygen Therapy  SpO2: 93 %  Pulse Oximetry Type: Intermittent  Device (Oxygen Therapy): room air  Flow (L/min): 2  Oxygen Concentration (%): 28  Probe Placed On (Pulse Ox): finger, Left:  Probe Removed From (Pulse Ox): Left:, finger}    Flowsheet Rows      First Filed Value   Admission Height  157.5 cm (62\") Documented at 03/29/2021 0100   Admission Weight  56.7 kg (125 lb) Documented at 03/29/2021 0100        Physical Exam:    General: Appears much older than stated age in moderate distress.  Lungs: Chest wall is tender throughout, but pain is not reproducible.  Breath sounds are diminished, but I appreciate no rhonchi.  Breathing is non-labored.  CV: Regular rate and rhythm.  I appreciate no murmurs.  Radial pulses are 2+ and symmetric.  Abdomen: Mildly distended, but soft.  RUQ not as tender.  No peritoneal signs.  Bowel sounds are diminished.  MSK: No C/C/E.  No asymmetry.  Neuro: CN II-XII grossly intact.    Results Review:     I reviewed the patient's new clinical results.    Lab Results (last 24 hours)     Procedure Component Value Units Date/Time    Troponin [[id number]]  (Normal) Collected: 04/02/21 0416    Specimen: Blood Updated: 04/02/21 0820     Troponin T <0.010 ng/mL     Narrative:      Troponin T Reference Range:  <= 0.03 ng/mL-   Negative for AMI  >0.03 ng/mL-     Abnormal for myocardial necrosis.  Clinicians would have to " utilize clinical acumen, EKG, Troponin and serial changes to determine if it is an Acute Myocardial Infarction or myocardial injury due to an underlying chronic condition.       Results may be falsely decreased if patient taking Biotin.      Comprehensive Metabolic Panel [[id number]]  (Abnormal) Collected: 04/02/21 0416    Specimen: Blood Updated: 04/02/21 0819     Glucose 166 mg/dL      BUN 16 mg/dL      Creatinine 0.67 mg/dL      Sodium 139 mmol/L      Potassium 4.6 mmol/L      Chloride 101 mmol/L      CO2 26.7 mmol/L      Calcium 8.7 mg/dL      Total Protein 6.4 g/dL      Albumin 3.20 g/dL      ALT (SGPT) 113 U/L      AST (SGOT) 50 U/L      Alkaline Phosphatase 87 U/L      Total Bilirubin 0.2 mg/dL      eGFR Non African Amer 94 mL/min/1.73      Globulin 3.2 gm/dL      A/G Ratio 1.0 g/dL      BUN/Creatinine Ratio 23.9     Anion Gap 11.3 mmol/L     Narrative:      GFR Normal >60  Chronic Kidney Disease <60  Kidney Failure <15      D-dimer, Quantitative [[id number]]  (Abnormal) Collected: 04/02/21 0416    Specimen: Blood Updated: 04/02/21 0525     D-Dimer, Quantitative 4.67 MCGFEU/mL     Narrative:      Can be elevated in, but is not diagnostic for deep vein thrombosis (DVT) or pulmonary embolis (PE).  It is also elevated in other medical conditions.  Clinical correlation is required.  The negative cut-off value for the D-Dimer is 0.50 mcg FEU/mL for DVT and PE.      Comprehensive Metabolic Panel [[id number]]  (Abnormal) Collected: 04/01/21 1713    Specimen: Blood Updated: 04/01/21 1745     Glucose 156 mg/dL      BUN 16 mg/dL      Creatinine 0.73 mg/dL      Sodium 137 mmol/L      Potassium 4.7 mmol/L      Comment: Slight hemolysis detected by analyzer. Results may be affected.        Chloride 104 mmol/L      CO2 22.3 mmol/L      Calcium 9.0 mg/dL      Total Protein 6.7 g/dL      Albumin 3.10 g/dL      ALT (SGPT) 145 U/L      AST (SGOT) 101 U/L      Alkaline Phosphatase 93 U/L      Total Bilirubin 0.3 mg/dL      eGFR  Non  Amer 85 mL/min/1.73      Globulin 3.6 gm/dL      A/G Ratio 0.9 g/dL      BUN/Creatinine Ratio 21.9     Anion Gap 10.7 mmol/L     Narrative:      GFR Normal >60  Chronic Kidney Disease <60  Kidney Failure <15            Imaging Results (Last 24 Hours)     ** No results found for the last 24 hours. **            Medication Review:   I have reviewed the patient's current medication list    Current Facility-Administered Medications:   •  albuterol (PROVENTIL) nebulizer solution 0.083% 2.5 mg/3mL, 2.5 mg, Nebulization, Q6H PRN, Facundo Nichols MD, 2.5 mg at 03/30/21 0732  •  aspirin EC tablet 81 mg, 81 mg, Oral, Daily, Facundo Nichols MD, 81 mg at 04/02/21 0814  •  atorvastatin (LIPITOR) tablet 20 mg, 20 mg, Oral, Daily, Facundo Nichols MD, 20 mg at 04/02/21 0813  •  cefdinir (OMNICEF) capsule 300 mg, 300 mg, Oral, Q12H, Dagoberto Chilel MD, 300 mg at 04/01/21 1821  •  clopidogrel (PLAVIX) tablet 75 mg, 75 mg, Oral, Daily, Facundo Nichols MD, 75 mg at 04/02/21 0813  •  furosemide (LASIX) injection 40 mg, 40 mg, Intravenous, Once, Dagoberto Chilel MD  •  guaiFENesin (MUCINEX) 12 hr tablet 600 mg, 600 mg, Oral, Q12H, Facundo Nichols MD, 600 mg at 04/02/21 0813  •  guaiFENesin-dextromethorphan (ROBITUSSIN DM) 100-10 MG/5ML syrup 10 mL, 10 mL, Oral, Q6H PRN, Facundo Nichols MD, 10 mL at 03/29/21 2322  •  hydrALAZINE (APRESOLINE) injection 20 mg, 20 mg, Intravenous, Q6H PRN, Dagoberto Chilel MD, 20 mg at 04/02/21 0813  •  ipratropium-albuterol (DUO-NEB) nebulizer solution 3 mL, 3 mL, Nebulization, 4x Daily - RT, Facundo Nichols MD, 3 mL at 04/02/21 0747  •  lactobacillus acidophilus (RISAQUAD) capsule 1 capsule, 1 capsule, Oral, Daily, Zita Potter APRN, 1 capsule at 04/02/21 0813  •  LORazepam (ATIVAN) injection 1 mg, 1 mg, Intravenous, Q4H PRN, Zita Potter, APRN  •  melatonin tablet 5 mg, 5 mg, Oral, Nightly PRN, Yazmin Gloria DO, 5 mg at 04/01/21 2000  •  nitroglycerin  (NITROSTAT) SL tablet 0.4 mg, 0.4 mg, Sublingual, Q5 Min PRN, Facundo Nichols MD, 0.4 mg at 04/02/21 0812  •  nystatin (MYCOSTATIN) [id number] UNIT/ML suspension 500,000 Units, 5 mL, Swish & Swallow, 4x Daily, Dagoberto Chilel MD, 500,000 Units at 04/01/21 2000  •  ondansetron (ZOFRAN) tablet 4 mg, 4 mg, Oral, Q6H PRN, Zita Potter, APRN, 4 mg at 04/02/21 0358  •  oxyCODONE-acetaminophen (PERCOCET) 5-325 MG per tablet 1 tablet, 1 tablet, Oral, Q6H PRN, Dagoberto Chilel MD, 1 tablet at 04/02/21 0712  •  potassium phosphate (monobasic) (K-PHOS) tablet 1,000 mg, 1,000 mg, Oral, 4x Daily With Meals & Nightly, Dagoberto Chilel MD, 1,000 mg at 04/01/21 2000  •  [START ON 4/3/2021] predniSONE (DELTASONE) tablet 40 mg, 40 mg, Oral, Daily With Breakfast, Dagoberto Chilel MD  •  promethazine (PHENERGAN) tablet 25 mg, 25 mg, Oral, Q8H PRN, Facundo Nichols MD, 25 mg at 04/02/21 0028  •  sodium chloride 0.9 % bolus 1,773 mL, 30 mL/kg, Intravenous, PRN, Facundo Nichols MD  •  sodium chloride 0.9 % flush 10 mL, 10 mL, Intravenous, PRN, Facundo Nichols MD  •  sodium chloride 0.9 % flush 10 mL, 10 mL, Intravenous, Q12H, Facundo Nichols MD, 10 mL at 04/01/21 2003  •  sodium chloride 0.9 % infusion 40 mL, 40 mL, Intravenous, PRN, Facundo Nichols MD  •  venlafaxine XR (EFFEXOR-XR) 24 hr capsule 75 mg, 75 mg, Oral, Daily, Facundo Nichols MD, 75 mg at 04/02/21 0813      Assessment/Plan     1.  Atypical Chest Pain: Low suspicion for coronary etiology.  However, given tachycardia, and increased D-dimer, I will check a CT angio.    2.  Essential HTN: Not at goal.  Discussed w/ APRN on-call after starting Lasix.  Add prn Hydralazine.  Will resume home ACEI when renal stability assured.    3.  Tachycardia: Etiology unclear, but my instincts make me feel like she is withdrawing from something.  Her CT liver findings demonstrated fatty liver and her MCV is elevated suggesting EtOH which she denies.  Reviewing her  Marcelo and ANGEL, she has not been prescribed benzos.  I do not believe she is septic, nor do I believe she is volume deplete. I had initially thought maybe narcotic withdrawal, but this seems unlikely.  Will try Ativan/Xanax and follow.  Certainly can add Labetalol, but I feel like this will mask the problem.    4.  Abdominal Pain: Not the primary concern this morning.  See my discussion from yesterday.    5.  Pneumococcal Pneumonia: Continue Cefdinir and Prednisone.    6.  Transaminitis: Trending down.  Work-up negative except for fatty liver.    7.  Anion Gap Metabolic Acidosis: Resolved.    8.  PVD: Continue on home therapy.    9.  Right 6th and 7th Rib Fractures: Continue conservative care.    Plan for disposition: Home tomorrow if renal function intact after contrast loads and tachycardia better.    Dagoberto Chilel MD  04/02/21  08:24 EDT

## 2021-04-02 NOTE — NURSING NOTE
Continued Stay Note  SARAH Garcia     Patient Name: Valeria Rubi  MRN: [id number]  Today's Date: 4/2/2021    Admit Date: 3/29/2021    Discharge Plan     Row Name 04/02/21 1119       Plan    Plan  Home with family    Patient/Family in Agreement with Plan  yes    Plan Comments  Spoke with patient today to review discharge plans. Patient is up walking independently in room with out difficulty. She states that she had some CP this morning and they are doing more test today. She also states that her discharge plans remain the same home with family and she voiced no discharge needs at this time. Patient had no other questions or concerns regarding discharge plans. CM will continue to follow for needs.        Discharge Codes    No documentation.             Meme Lara RN

## 2021-04-02 NOTE — NURSING NOTE
Patient complaining of left sided chest pain- ecg ordered. Cardiology attempting to obtain at this time. Vitals obtained- blood pressure is 175/120. MD notified at this time.

## 2021-04-02 NOTE — PROGRESS NOTES
Reviewed CT chest findings w/ patient.  Needs repeat scan in 3 months.  She also responded well to initiation of Xanax.  I asked the patient why her initial UDS demonstrated benzos, but her Marcelo was negative for such.  She did not know, and reports she doesn't take anything extra.  EtOH was negative.  Plan for discharge in AM.

## 2021-04-03 ENCOUNTER — READMISSION MANAGEMENT (OUTPATIENT)
Dept: CALL CENTER | Facility: HOSPITAL | Age: 46
End: 2021-04-03

## 2021-04-03 VITALS
OXYGEN SATURATION: 91 % | TEMPERATURE: 97.6 F | WEIGHT: 139.4 LBS | HEIGHT: 62 IN | RESPIRATION RATE: 16 BRPM | DIASTOLIC BLOOD PRESSURE: 72 MMHG | SYSTOLIC BLOOD PRESSURE: 119 MMHG | HEART RATE: 112 BPM | BODY MASS INDEX: 25.65 KG/M2

## 2021-04-03 LAB
ALBUMIN SERPL-MCNC: 2.9 G/DL (ref 3.5–5.2)
ALBUMIN/GLOB SERPL: 1 G/DL
ALP SERPL-CCNC: 82 U/L (ref 39–117)
ALT SERPL W P-5'-P-CCNC: 77 U/L (ref 1–33)
ANION GAP SERPL CALCULATED.3IONS-SCNC: 9.5 MMOL/L (ref 5–15)
AST SERPL-CCNC: 26 U/L (ref 1–32)
BACTERIA SPEC AEROBE CULT: NORMAL
BASOPHILS # BLD AUTO: 0.15 10*3/MM3 (ref 0–0.2)
BASOPHILS NFR BLD AUTO: 0.7 % (ref 0–1.5)
BILIRUB SERPL-MCNC: 0.4 MG/DL (ref 0–1.2)
BUN SERPL-MCNC: 15 MG/DL (ref 6–20)
BUN/CREAT SERPL: 22.1 (ref 7–25)
CALCIUM SPEC-SCNC: 8 MG/DL (ref 8.6–10.5)
CHLORIDE SERPL-SCNC: 102 MMOL/L (ref 98–107)
CO2 SERPL-SCNC: 30.5 MMOL/L (ref 22–29)
CREAT SERPL-MCNC: 0.68 MG/DL (ref 0.57–1)
DEPRECATED RDW RBC AUTO: 49.4 FL (ref 37–54)
EOSINOPHIL # BLD AUTO: 0.06 10*3/MM3 (ref 0–0.4)
EOSINOPHIL NFR BLD AUTO: 0.3 % (ref 0.3–6.2)
ERYTHROCYTE [DISTWIDTH] IN BLOOD BY AUTOMATED COUNT: 12.6 % (ref 12.3–15.4)
GFR SERPL CREATININE-BSD FRML MDRD: 93 ML/MIN/1.73
GLOBULIN UR ELPH-MCNC: 3 GM/DL
GLUCOSE SERPL-MCNC: 89 MG/DL (ref 65–99)
HCT VFR BLD AUTO: 37.5 % (ref 34–46.6)
HGB BLD-MCNC: 11.8 G/DL (ref 12–15.9)
IMM GRANULOCYTES # BLD AUTO: 1.92 10*3/MM3 (ref 0–0.05)
IMM GRANULOCYTES NFR BLD AUTO: 9.1 % (ref 0–0.5)
LYMPHOCYTES # BLD AUTO: 3.61 10*3/MM3 (ref 0.7–3.1)
LYMPHOCYTES NFR BLD AUTO: 17.2 % (ref 19.6–45.3)
MACROCYTES BLD QL SMEAR: NORMAL
MCH RBC QN AUTO: 33.9 PG (ref 26.6–33)
MCHC RBC AUTO-ENTMCNC: 31.5 G/DL (ref 31.5–35.7)
MCV RBC AUTO: 107.8 FL (ref 79–97)
MONOCYTES # BLD AUTO: 1.67 10*3/MM3 (ref 0.1–0.9)
MONOCYTES NFR BLD AUTO: 7.9 % (ref 5–12)
NEUTROPHILS NFR BLD AUTO: 13.6 10*3/MM3 (ref 1.7–7)
NEUTROPHILS NFR BLD AUTO: 64.8 % (ref 42.7–76)
NRBC BLD AUTO-RTO: 0.7 /100 WBC (ref 0–0.2)
PLAT MORPH BLD: NORMAL
PLATELET # BLD AUTO: 280 10*3/MM3 (ref 140–450)
PMV BLD AUTO: 9.9 FL (ref 6–12)
POTASSIUM SERPL-SCNC: 4.1 MMOL/L (ref 3.5–5.2)
PROCALCITONIN SERPL-MCNC: 2.69 NG/ML (ref 0–0.25)
PROT SERPL-MCNC: 5.9 G/DL (ref 6–8.5)
RBC # BLD AUTO: 3.48 10*6/MM3 (ref 3.77–5.28)
SODIUM SERPL-SCNC: 142 MMOL/L (ref 136–145)
WBC # BLD AUTO: 21.01 10*3/MM3 (ref 3.4–10.8)
WBC MORPH BLD: NORMAL

## 2021-04-03 PROCEDURE — 99238 HOSP IP/OBS DSCHRG MGMT 30/<: CPT | Performed by: FAMILY MEDICINE

## 2021-04-03 PROCEDURE — 85025 COMPLETE CBC W/AUTO DIFF WBC: CPT | Performed by: HOSPITALIST

## 2021-04-03 PROCEDURE — 84145 PROCALCITONIN (PCT): CPT | Performed by: HOSPITALIST

## 2021-04-03 PROCEDURE — 63710000001 PREDNISONE PER 1 MG: Performed by: HOSPITALIST

## 2021-04-03 PROCEDURE — 94799 UNLISTED PULMONARY SVC/PX: CPT

## 2021-04-03 PROCEDURE — 85007 BL SMEAR W/DIFF WBC COUNT: CPT | Performed by: HOSPITALIST

## 2021-04-03 PROCEDURE — 80053 COMPREHEN METABOLIC PANEL: CPT | Performed by: HOSPITALIST

## 2021-04-03 RX ORDER — CEFDINIR 300 MG/1
300 CAPSULE ORAL EVERY 12 HOURS SCHEDULED
Qty: 14 CAPSULE | Refills: 0 | Status: SHIPPED | OUTPATIENT
Start: 2021-04-03 | End: 2021-04-09

## 2021-04-03 RX ORDER — PREDNISONE 1 MG/1
5 TABLET ORAL
Status: DISCONTINUED | OUTPATIENT
Start: 2021-04-12 | End: 2021-04-03 | Stop reason: HOSPADM

## 2021-04-03 RX ORDER — PREDNISONE 20 MG/1
40 TABLET ORAL
Status: DISCONTINUED | OUTPATIENT
Start: 2021-04-04 | End: 2021-04-03 | Stop reason: HOSPADM

## 2021-04-03 RX ORDER — PREDNISONE 10 MG/1
40 TABLET ORAL
Status: DISCONTINUED | OUTPATIENT
Start: 2021-04-04 | End: 2021-04-03 | Stop reason: HOSPADM

## 2021-04-03 RX ORDER — PREDNISONE 20 MG/1
20 TABLET ORAL
Status: DISCONTINUED | OUTPATIENT
Start: 2021-04-08 | End: 2021-04-03 | Stop reason: HOSPADM

## 2021-04-03 RX ORDER — PREDNISONE 10 MG/1
10 TABLET ORAL
Status: DISCONTINUED | OUTPATIENT
Start: 2021-04-10 | End: 2021-04-03 | Stop reason: HOSPADM

## 2021-04-03 RX ORDER — GUAIFENESIN 600 MG/1
600 TABLET, EXTENDED RELEASE ORAL EVERY 12 HOURS SCHEDULED
Qty: 40 TABLET | Refills: 1 | Status: SHIPPED | OUTPATIENT
Start: 2021-04-03 | End: 2022-07-20

## 2021-04-03 RX ORDER — CEFDINIR 300 MG/1
300 CAPSULE ORAL EVERY 12 HOURS SCHEDULED
Status: DISCONTINUED | OUTPATIENT
Start: 2021-04-03 | End: 2021-04-03 | Stop reason: HOSPADM

## 2021-04-03 RX ADMIN — CEFDINIR 300 MG: 300 CAPSULE ORAL at 08:51

## 2021-04-03 RX ADMIN — SODIUM CHLORIDE, PRESERVATIVE FREE 10 ML: 5 INJECTION INTRAVENOUS at 08:51

## 2021-04-03 RX ADMIN — PREDNISONE 40 MG: 20 TABLET ORAL at 08:50

## 2021-04-03 RX ADMIN — VENLAFAXINE HYDROCHLORIDE 75 MG: 37.5 CAPSULE, EXTENDED RELEASE ORAL at 08:50

## 2021-04-03 RX ADMIN — GUAIFENESIN 600 MG: 600 TABLET, EXTENDED RELEASE ORAL at 08:50

## 2021-04-03 RX ADMIN — POTASSIUM PHOSPHATE, MONOBASIC 1000 MG: 500 TABLET, SOLUBLE ORAL at 08:49

## 2021-04-03 RX ADMIN — IPRATROPIUM BROMIDE AND ALBUTEROL SULFATE 3 ML: .5; 3 SOLUTION RESPIRATORY (INHALATION) at 11:43

## 2021-04-03 RX ADMIN — ASPIRIN 81 MG: 81 TABLET, COATED ORAL at 08:51

## 2021-04-03 RX ADMIN — CLOPIDOGREL BISULFATE 75 MG: 75 TABLET ORAL at 08:50

## 2021-04-03 RX ADMIN — Medication 1 CAPSULE: at 08:51

## 2021-04-03 RX ADMIN — IPRATROPIUM BROMIDE AND ALBUTEROL SULFATE 3 ML: .5; 3 SOLUTION RESPIRATORY (INHALATION) at 07:57

## 2021-04-03 RX ADMIN — NYSTATIN 500000 UNITS: 500000 SUSPENSION ORAL at 08:51

## 2021-04-03 RX ADMIN — OXYCODONE HYDROCHLORIDE AND ACETAMINOPHEN 1 TABLET: 5; 325 TABLET ORAL at 08:59

## 2021-04-03 RX ADMIN — OXYCODONE HYDROCHLORIDE AND ACETAMINOPHEN 1 TABLET: 5; 325 TABLET ORAL at 00:36

## 2021-04-03 RX ADMIN — IPRATROPIUM BROMIDE AND ALBUTEROL SULFATE 3 ML: .5; 3 SOLUTION RESPIRATORY (INHALATION) at 15:45

## 2021-04-03 RX ADMIN — ALPRAZOLAM 0.5 MG: 0.25 TABLET ORAL at 00:36

## 2021-04-03 RX ADMIN — ATORVASTATIN CALCIUM 20 MG: 20 TABLET, FILM COATED ORAL at 08:51

## 2021-04-03 NOTE — DISCHARGE SUMMARY
HOSPITALIST SERVICES  @ [facility] Hospitalist Team    DISCHARGE SUMMARY        Valeria Rubi  1975  [id number]        Hospitalists Discharge Summary    PATIENT EXAMINED ON BEDSIDE    Date of Admission: 3/29/2021  Date of Discharge:  4/3/2021            DIAGNOSES:      Primary Discharge Diagnoses:     1.  Atypical Chest Pain:    2.  Essential HTN:   3.  Tachycardia:    4.  Abdominal Pain:    5.  Pneumococcal Pneumonia:   6.  Transaminitis:    7.  Anion Gap Metabolic Acidosis:   8.  PVD:   9.  Right 6th and 7th Rib Fractures:   10. Bilateral Lung nodules on chest CT scan      Secondary Discharge Diagnoses:     As per Problem List      Surgical Discharge Diagnoses:     As per Problem List        PCP  Patient Care Team:  Adrianne Riddle MD as PCP - General  Adrianne Riddle MD as PCP - Family Medicine    Consults:   Consults     No orders found from 2/28/2021 to 3/30/2021.          Operations and Procedures Performed:       XR Ribs Right With PA Chest    Result Date: 3/11/2021  Narrative: CHEST AND RIGHT RIB SERIES, 3/11/2021  HISTORY:  45-year-old female in the ED noting persistent right side pain from rib fractures 2/17/2021. Coughing.  TECHNIQUE: PA chest and four view right rib series.  FINDINGS: Slightly displaced right 6th rib fracture and adjacent nondisplaced right 7th rib fracture. The 6th rib fracture was not visible on the previous study. Pulmonary hyperinflation. No visible pneumothorax or pleural effusion. Heart size and pulmonary vascularity are normal.     Impression: 1. Fractures of the right 6th and 7th ribs. 2. No pneumothorax. Signer Name: Danny Davis MD  Signed: 3/11/2021 4:08 PM  Workstation Name: [id number]  [facility]    CT Head Without Contrast    Result Date: 3/29/2021  Narrative: CT Head WO HISTORY: Headache nausea and vomiting that began tonight. Cough and chest pain 1 month. COPD  hypertension and breast implant placement. TECHNIQUE: Axial unenhanced head CT. Radiation dose reduction techniques included automated exposure control or exposure modulation based on body size. Count of known CT and cardiac nuc med studies performed in previous 12 months: 0. Time of scan: 0223 hours COMPARISON: None. FINDINGS: No intracranial hemorrhage, mass, or infarct. No hydrocephalus or extra-axial fluid collection. Brain parenchymal density is normal. The skull base, calvarium, and extracranial soft tissues are normal.     Impression: Normal, negative unenhanced head CT. Signer Name: Huy Hilario MD  Signed: 3/29/2021 2:42 AM  Workstation [facility]: [facility]  [facility] [place]    CT Angiogram Chest With & Without Contrast    Result Date: 4/2/2021  Narrative: CT ANGIOGRAM CHEST W WO CONTRAST INDICATION: Chest pain with elevated d-dimer on arrival TECHNIQUE: CT angiogram of the chest with 100 cc of Omnipaque 300 IV contrast. 3-D reconstructions were obtained and reviewed.   Radiation dose reduction techniques included automated exposure control or exposure modulation based on body size. Count of known CT and cardiac nuc med studies performed in previous 12 months: 0. COMPARISON: None available. FINDINGS: Chest images at mediastinal window show no adenopathy. Small bilateral pleural effusions are noted. There are no pulmonary artery filling defects to suggest emboli. The CT angiographic images also show no evidence of emboli. Chest images at lung window demonstrate dense alveolar consolidation in the right middle lobe and lingula. There are smaller infiltrates anteriorly in the right upper lobe and left upper lobe. Underlying emphysema is noted. The infiltrates are strongly suspicious for acute pneumonia. There is no evidence of endobronchial obstruction on either side. Consolidation is greater on the right than on the left. Imaging features can be seen with COVID-19 pneumonia, although are  nonspecific and can can occur with a variety of infectious and noninfectious processes. In addition, there is a left apical lung nodule that is noncalcified. It measures 7 mm in diameter and 12 mm in length. It is suspicious for malignancy. There is also a right lower lobe nodule below the hilum measuring 8 mm. This could represent either a true nodule or infiltrate. A small right lower lobe infiltrate is favored. Recommend correlation with any previous chest CTs if available. If no prior chest CTs are available, follow-up chest CT is recommended in 3 months to evaluate for enlargement over time. PET/CT scanning could also be considered as a next step in evaluation but the left apical lesion is borderline in terms of increased chance for a false negative.     Impression: No evidence of pulmonary embolism. There is extensive bilateral pneumonia predominantly in the right middle lobe and lingula and to a lesser extent in both upper lobes. This is accompanied by small bilateral pleural effusions. Additionally there is a suspicious indeterminate left apical lung nodule that needs follow-up as described above. There is also a density in the right lower lobe that could represent either infiltrate or nodule measuring 8 mm in diameter. Malignancy is not excluded. Signer Name: Galo Hernandes MD  Signed: 4/2/2021 9:51 AM  Workstation Name: [id number]  [facility] [place]    CT Abdomen Pelvis With Contrast    Result Date: 3/31/2021  Narrative: CT abdomen and pelvis with contrast   03/31/2021  HISTORY: Epigastric pain and bloating starting today  COMPARISON: 06/14/2016  TECHNIQUE:  CT of Abdomen and Pelvis with contrast performed.  Sagittal and Coronal reconstructions performed. Radiation dose reduction techniques included automated exposure control or exposure modulation based on body size. Radiation audit for CT and nuclear cardiology exams in the last 12 months: 0.  FINDINGS:  Abdomen: There is dense  consolidation throughout almost all the right middle lobe and lingula with air bronchograms consistent with pneumonia. There are no noncalcified lung nodules. Small amount of fluid around the liver. Liver slightly enlarged decreased in density. There is fluid around the gallbladder. The gallbladder wall is normal. There is no biliary distention. The spleen, pancreas, adrenal glands and kidneys are normal. Aorta is normal in size. There is no adenopathy. Oral contrast was administered. The bowel appears normal.   Pelvis: There is small or free fluid in the pelvis. The uterus and adnexal regions and bladder are normal. The small amount of air in the anterior abdominal wall probably from previous injection. Bones are unremarkable. There is a vascular stent in the left common iliac artery       Impression: The liver appears slightly enlarged and slightly decreased in density. There is a small amount ascites around the liver and in the pelvis. Correlation with hepatic enzymes and possibility of hepatitis is recommended.  Dense consolidation in right middle lobe and lingula consistent with pneumonia  Otherwise negative study  This report was finalized on 3/31/2021 4:15 PM by Dr. Harpreet Castellon MD.      US Gallbladder    Result Date: 4/1/2021  Narrative: US Abdomen Ltd INDICATION: 45-year-old female with abdominal pain for 4 days. COMPARISON: None available. FINDINGS: PANCREAS: Visualized portions of the pancreas are within normal limits. LIVER: Increased echogenicity of the hepatic parenchyma is indicative of hepatic steatosis. The liver is enlarged, measuring 18.8 cm in craniocaudad length. No hepatic mass. The intrahepatic bile ducts are normal in caliber. The common duct is normal in size at the sandra hepatis measuring 1-2 mm. GALLBLADDER: Gallbladder wall is borderline in thickness, measuring 3 mm. No evidence of cholelithiasis. There is a small amount of nonspecific pericholecystic fluid. RIGHT KIDNEY: The right kidney  measures 10 cm. Renal cortical thickness and echogenicity is normal. No hydronephrosis. OTHER: Small amount of perihepatic ascites.     Impression: 1. Mild hepatomegaly with hepatic steatosis. 2. Small amount of perihepatic ascites and pericholecystic fluid. Nonspecific borderline gallbladder wall thickening, measuring 3 mm, which may be slightly accentuated by the pericholecystic fluid. No evidence of cholelithiasis or common bile duct dilatation. Signer Name: Blaise Vaughn MD  Signed: 4/1/2021 10:37 AM  Workstation Name: [id number]  [facility]    XR Chest 1 View    Result Date: 3/29/2021  Narrative: CR Chest 1 Vw INDICATION: Cough and chest pain 1 month. Nausea vomiting and headache in the emergency Department. Bilateral rib fractures. COMPARISON:  3/11/2021 FINDINGS: Single portable AP view(s) of the chest.  Cardiac silhouette within normal limits. Unremarkable vascularity. The lungs are hyperinflated and there are new lower lung zone opacities bilaterally obscuring the right and left heart borders, most characteristic of pneumonia. No effusion. No pneumothorax. Previously documented right rib fractures difficult to visualize.     Impression: 1. Interval development of bilateral pneumonia appearing to involve the lingula and right middle lobe. Follow-up to clearing recommended. Signer Name: Huy Hilario MD  Signed: 3/29/2021 2:37 AM  Workstation Name: [email or web address]  [facility]    XR Abdomen KUB    Result Date: 3/30/2021  Narrative: XR ABDOMEN KUB-: 3/30/2021 11:53 AM  INDICATION: Abdominal pain today  COMPARISON: None available.  FINDINGS: AP radiographs of the abdomen. The renal shadows are symmetric. No renal calculi. No bladder calculi.  The bowel gas pattern is nonobstructive. No acute osseous abnormalities. No radiopaque foreign body.      Impression: Negative KUB  This report was finalized on 3/30/2021 11:57 AM by Dr. Harpreet Castellon MD.        Allergies:  is  allergic to flonase [fluticasone].    Marcelo  not reviewed    Discharge Medications:     Discharge Medications      ASK your doctor about these medications      Instructions Start Date   albuterol (2.5 MG/3ML) 0.083% nebulizer solution  Commonly known as: PROVENTIL   USE 1 VIAL VIA NEBULIZER EVERY 4 HOURS AS NEEDED      albuterol sulfate  (90 Base) MCG/ACT inhaler  Commonly known as: PROVENTIL HFA;VENTOLIN HFA;PROAIR HFA   TAKE 2 PUFFS BY MOUTH EVERY 4 HOURS AS NEEDED      aspirin 81 MG EC tablet   81 mg, Oral, Daily      clopidogrel 75 MG tablet  Commonly known as: Plavix   75 mg, Oral, Daily      fluticasone 50 MCG/ACT nasal spray  Commonly known as: FLONASE   1 spray, Nasal, As Needed      ipratropium-albuterol 0.5-2.5 mg/3 ml nebulizer  Commonly known as: DUO-NEB   INHALE 1 VIAL VIA NEBULIZER 3 TIMES A DAY      lisinopril-hydrochlorothiazide 20-12.5 MG per tablet  Commonly known as: PRINZIDE,ZESTORETIC   TAKE 1 TABLET BY MOUTH EVERY DAY      medroxyPROGESTERone 150 MG/ML injection  Commonly known as: DEPO-PROVERA   150 mg, Buccal, Every 3 Months, Pt takes every 3 months.march or April 2018 last dose      Nurtec 75 MG tablet dispersible tablet  Generic drug: Rimegepant Sulfate   75 mg, Oral, Once As Needed      nystatin 829240 UNIT/ML suspension  Commonly known as: MYCOSTATIN   500,000 Units, Swish & Swallow, 4 Times Daily, Swish and swallow 5mL four times a day for two weeks.      oxyCODONE-acetaminophen 5-325 MG per tablet  Commonly known as: PERCOCET   1 tablet, Oral, Every 6 Hours PRN      promethazine 25 MG tablet  Commonly known as: PHENERGAN   25 mg, Oral, Every 8 Hours PRN      simvastatin 40 MG tablet  Commonly known as: ZOCOR   40 mg, Oral, Every Evening      Stiolto Respimat 2.5-2.5 MCG/ACT aerosol solution inhaler  Generic drug: tiotropium bromide-olodaterol   2 puffs, Inhalation, Daily      varenicline 1 MG tablet  Commonly known as: CHANTIX   1 mg, Oral, 2 Times Daily      venlafaxine XR 75  MG 24 hr capsule  Commonly known as: EFFEXOR-XR   TAKE 1 CAPSULE BY MOUTH EVERY DAY      vitamin D 1.25 MG (65737 UT) capsule capsule  Commonly known as: ERGOCALCIFEROL   50,000 Units, Oral, Every 7 Days             History of Present Illness:    Ms. Rubi 45-year-old female with COPD.  Presents to ER with 1 week worth of worsening shortness of breath productive cough subjective fever and chills.  Also has several fractured ribs several weeks ago due to severe COPD cough.  She was seen in the ER for that as well.  Still smokes heavily.  Been taking her home nebulizers without improvement.  On chest x-ray she has bilateral lower lobe pneumonia.  Covid's and flu swab are negative.  Present with with tachycardia 136 and low blood pressure of 91/56.  Sepsis protocol was started.    Hospital Course       Assessment and Plan:       1.  45-year-old female with acute exacerbation of COPD.  Complicated by multiple rib fractures.     2.  Bilateral lower lobe pneumonias.  Covid and flu swab negative.  Suspect bacterial.     3.  Sepsis upon presentation with a heart rate 136; O2 sat i87 percent on room air blood pressure 91/ 56.  And elevated inflammatory markers.     4.  Patient was [facility] to [facility] with telemetry oxygen, duo nebs, IV steroids, IV Cepimine  Continued IV fluid management.            Facundo Nichols MD  03/29/21  07:17 EDT      Assessment/Plan         1.  Abdominal Pain: Note reviewed from last night.  I reviewed the contrasted CT images w/ radiologist this morning and vasculature appears patent and bowl walls are normal.  She does have some fatty infiltration in the liver which could explain the mildly elevated lactate.  LDH can be elevated from the pneumonia.LFT's have trended up in a cholestatic pattern so I did check a RUQ ultrasound that was unremarkable.  I think her diarrhea overnight was explained by oral contrast moving through and clearing stool seen on CT.  Culture was negative.  Two things  come to mind:  1) being referred pain from rib fractures or 2) distension from ascities.  I've turned IVF off.  Discussed w/ patient.     2.  Anion Gap Metabolic Acidosis: Could be driven by lactate.  Will repeat level and monitor/     3.  Transaminitis: As discussed above.     4.  Pneumococcal pneumonia: On Cefdinir and Prednisone.     5.  PVD: As discussed in #1.     6.  Right 6th and 7th Rib Fractures: Conservative care.      Plan for disposition: Home when able.     Dagoberto Chilel MD  04/01/21  11:33 EDT       1.  Atypical Chest Pain: Low suspicion for coronary etiology.  However, given tachycardia, and increased D-dimer, I will check a CT angio.     2.  Essential HTN: Not at goal.  Discussed w/ APRN on-call after starting Lasix.  Add prn Hydralazine.  Will resume home ACEI when renal stability assured.     3.  Tachycardia: Etiology unclear, but my instincts make me feel like she is withdrawing from something.  Her CT liver findings demonstrated fatty liver and her MCV is elevated suggesting EtOH which she denies.  Reviewing her Marcelo and UDS, she has not been prescribed benzos.  I do not believe she is septic, nor do I believe she is volume deplete. I had initially thought maybe narcotic withdrawal, but this seems unlikely.  Will try Ativan/Xanax and follow.  Certainly can add Labetalol, but I feel like this will mask the problem.     4.  Abdominal Pain: Not the primary concern this morning.  See my discussion from yesterday.     5.  Pneumococcal Pneumonia: Continue Cefdinir and Prednisone.     6.  Transaminitis: Trending down.  Work-up negative except for fatty liver.     7.  Anion Gap Metabolic Acidosis: Resolved.     8.  PVD: Continue on home therapy.     9.  Right 6th and 7th Rib Fractures: Continue conservative care.     Plan for disposition: Home tomorrow if renal function intact after contrast loads and tachycardia better.     Dagoberto Chilel MD  04/02/21  08:24 EDT       Dagoberto Chilel MD   Physician    Medicine   Progress Notes   Signed   Date of Service:  04/02/21 1931   Creation Time:  04/02/21 1931            Signed             Show:Clear all  []Manual[]Template[]Copied    Added by:  [x]Dagoberto Chilel MD    []Silas for details  Reviewed CT chest findings w/ patient.  Needs repeat scan in 3 months.  She also responded well to initiation of Xanax.  I asked the patient why her initial UDS demonstrated benzos, but her Marcelo was negative for such.  She did not know, and reports she doesn't take anything extra.  EtOH was negative.  Plan for discharge in AM.                      Hospital Course  Last Lab Results:   Lab Results (most recent)     Procedure Component Value Units Date/Time    Blood Culture - Blood, Arm, Left [[id number]] Collected: 03/29/21 0950    Specimen: Blood from Arm, Left Updated: 04/03/21 1000     Blood Culture No growth at 5 days    Procalcitonin [[id number]]  (Abnormal) Collected: 04/03/21 0502    Specimen: Blood Updated: 04/03/21 0623     Procalcitonin 2.69 ng/mL     Narrative:      Results may be falsely decreased if patient taking Biotin.     CBC & Differential [[id number]]  (Abnormal) Collected: 04/03/21 0502    Specimen: Blood Updated: 04/03/21 0612    Narrative:      The following orders were created for panel order CBC & Differential.  Procedure                               Abnormality         Status                     ---------                               -----------         ------                     Scan Slide[[id number]]                                       Final result               CBC Auto Differential[[id number]]        Abnormal            Final result                 Please view results for these tests on the individual orders.    Scan Slide [[id number]] Collected: 04/03/21 0502    Specimen: Blood Updated: 04/03/21 0612     Macrocytes Slight/1+     WBC Morphology Normal     Platelet Morphology Normal    Comprehensive Metabolic Panel [[id number]]  (Abnormal) Collected: 04/03/21  0502    Specimen: Blood Updated: 04/03/21 0557     Glucose 89 mg/dL      BUN 15 mg/dL      Creatinine 0.68 mg/dL      Sodium 142 mmol/L      Potassium 4.1 mmol/L      Chloride 102 mmol/L      CO2 30.5 mmol/L      Calcium 8.0 mg/dL      Total Protein 5.9 g/dL      Albumin 2.90 g/dL      ALT (SGPT) 77 U/L      AST (SGOT) 26 U/L      Alkaline Phosphatase 82 U/L      Total Bilirubin 0.4 mg/dL      eGFR Non African Amer 93 mL/min/1.73      Globulin 3.0 gm/dL      A/G Ratio 1.0 g/dL      BUN/Creatinine Ratio 22.1     Anion Gap 9.5 mmol/L     Narrative:      GFR Normal >60  Chronic Kidney Disease <60  Kidney Failure <15      CBC Auto Differential [[id number]]  (Abnormal) Collected: 04/03/21 0502    Specimen: Blood Updated: 04/03/21 0548     WBC 21.01 10*3/mm3      RBC 3.48 10*6/mm3      Hemoglobin 11.8 g/dL      Hematocrit 37.5 %      .8 fL      MCH 33.9 pg      MCHC 31.5 g/dL      RDW 12.6 %      RDW-SD 49.4 fl      MPV 9.9 fL      Platelets 280 10*3/mm3      Neutrophil % 64.8 %      Lymphocyte % 17.2 %      Monocyte % 7.9 %      Eosinophil % 0.3 %      Basophil % 0.7 %      Immature Grans % 9.1 %      Neutrophils, Absolute 13.60 10*3/mm3      Lymphocytes, Absolute 3.61 10*3/mm3      Monocytes, Absolute 1.67 10*3/mm3      Eosinophils, Absolute 0.06 10*3/mm3      Basophils, Absolute 0.15 10*3/mm3      Immature Grans, Absolute 1.92 10*3/mm3      nRBC 0.7 /100 WBC     Ethanol [[id number]] Collected: 04/02/21 0416    Specimen: Blood Updated: 04/02/21 1434     Ethanol <10 mg/dL      Ethanol % <0.010 %     Urine Drug Screen - Urine, Clean Catch [[id number]]  (Abnormal) Collected: 04/02/21 1342    Specimen: Urine, Clean Catch Updated: 04/02/21 1357     THC, Screen, Urine Negative     Phencyclidine (PCP), Urine Negative     Cocaine Screen, Urine Negative     Methamphetamine, Ur Negative     Opiate Screen Negative     Amphetamine Screen, Urine Negative     Benzodiazepine Screen, Urine Positive     Tricyclic Antidepressants  Screen Negative     Methadone Screen, Urine Negative     Barbiturates Screen, Urine Positive     Oxycodone Screen, Urine Positive     Propoxyphene Screen Negative     Buprenorphine, Screen, Urine Negative    Narrative:      Urine drug screen results are to be used for medical purposes only.  They are not to be used for legal purposes such as employment testing.  Negative results do not necessarily mean the complete absence of a subtance, but rather that the result is less than the cutoff for that substance.  Positive results are unconfirmed and considered Preliminary Positive.  [facility] does not automatically confirm Postitive Unconfirmed results.  The physician may request (order) an Unconfirmed Positive result to be sent out for confirmation.      Negative Thresholds for Drugs Screened:    THC screen, urine                          50 ng/ml  Phenycyclidine (PCP), urine                25 ng/ml  Cocaine screen, urine                     150 ng/ml  Methamphetamine, urine                    500 ng/ml  Opiate screen, urine                      100 ng/ml  Amphetamine screen, urine                 500 ng/ml  Benzodiazepine screen, urine              150 ng/ml  Tricyclic Antidepressants screen, urine   300 ng/ml  Methadone screen, urine                   200 ng/ml  Barbiturates screen, urine                200 ng/ml  Oxycodone screen, urine                   100 ng/ml  Propoxyphene screen, urine                300 ng/ml  Buprenorphine screen, urine                10 ng/ml    CBC & Differential [[id number]]  (Abnormal) Collected: 04/02/21 1210    Specimen: Blood Updated: 04/02/21 1228    Narrative:      The following orders were created for panel order CBC & Differential.  Procedure                               Abnormality         Status                     ---------                               -----------         ------                     Scan Slide[[id number]]                                                                   CBC Auto Differential[[id number]]        Abnormal            Final result                 Please view results for these tests on the individual orders.    CBC Auto Differential [[id number]]  (Abnormal) Collected: 04/02/21 1210    Specimen: Blood Updated: 04/02/21 1221     WBC 20.76 10*3/mm3      RBC 3.55 10*6/mm3      Hemoglobin 12.1 g/dL      Hematocrit 38.1 %      .3 fL      MCH 34.1 pg      MCHC 31.8 g/dL      RDW 12.4 %      RDW-SD 49.8 fl      MPV 9.8 fL      Platelets 233 10*3/mm3      Neutrophil % 74.1 %      Lymphocyte % 8.3 %      Monocyte % 10.1 %      Eosinophil % 0.0 %      Basophil % 0.0 %      Immature Grans % 7.5 %      Neutrophils, Absolute 15.38 10*3/mm3      Lymphocytes, Absolute 1.72 10*3/mm3      Monocytes, Absolute 2.09 10*3/mm3      Eosinophils, Absolute 0.01 10*3/mm3      Basophils, Absolute 0.01 10*3/mm3      Immature Grans, Absolute 1.55 10*3/mm3     CK [[id number]]  (Abnormal) Collected: 04/02/21 0416    Specimen: Blood Updated: 04/02/21 0940     Creatine Kinase 333 U/L     Troponin [[id number]]  (Normal) Collected: 04/02/21 0416    Specimen: Blood Updated: 04/02/21 0820     Troponin T <0.010 ng/mL     Narrative:      Troponin T Reference Range:  <= 0.03 ng/mL-   Negative for AMI  >0.03 ng/mL-     Abnormal for myocardial necrosis.  Clinicians would have to utilize clinical acumen, EKG, Troponin and serial changes to determine if it is an Acute Myocardial Infarction or myocardial injury due to an underlying chronic condition.       Results may be falsely decreased if patient taking Biotin.      Comprehensive Metabolic Panel [[id number]]  (Abnormal) Collected: 04/02/21 0416    Specimen: Blood Updated: 04/02/21 0819     Glucose 166 mg/dL      BUN 16 mg/dL      Creatinine 0.67 mg/dL      Sodium 139 mmol/L      Potassium 4.6 mmol/L      Chloride 101 mmol/L      CO2 26.7 mmol/L      Calcium 8.7 mg/dL      Total Protein 6.4 g/dL      Albumin 3.20 g/dL      ALT (SGPT) 113  U/L      AST (SGOT) 50 U/L      Alkaline Phosphatase 87 U/L      Total Bilirubin 0.2 mg/dL      eGFR Non African Amer 94 mL/min/1.73      Globulin 3.2 gm/dL      A/G Ratio 1.0 g/dL      BUN/Creatinine Ratio 23.9     Anion Gap 11.3 mmol/L     Narrative:      GFR Normal >60  Chronic Kidney Disease <60  Kidney Failure <15      D-dimer, Quantitative [[id number]]  (Abnormal) Collected: 04/02/21 0416    Specimen: Blood Updated: 04/02/21 0525     D-Dimer, Quantitative 4.67 MCGFEU/mL     Narrative:      Can be elevated in, but is not diagnostic for deep vein thrombosis (DVT) or pulmonary embolis (PE).  It is also elevated in other medical conditions.  Clinical correlation is required.  The negative cut-off value for the D-Dimer is 0.50 mcg FEU/mL for DVT and PE.      Gastrointestinal Panel, PCR - Stool, Per Rectum [[id number]]  (Normal) Collected: 04/01/21 0410    Specimen: Stool from Per Rectum Updated: 04/01/21 1145     Campylobacter Not Detected     Plesiomonas shigelloides Not Detected     Salmonella Not Detected     Vibrio Not Detected     Vibrio cholerae Not Detected     Yersinia enterocolitica Not Detected     Enteroaggregative E. coli (EAEC) Not Detected     Enteropathogenic E. coli (EPEC) Not Detected     Enterotoxigenic E. coli (ETEC) lt/st Not Detected     Shiga-like toxin-producing E. coli (STEC) stx1/stx2 Not Detected     Shigella/Enteroinvasive E. coli (EIEC) Not Detected     Cryptosporidium Not Detected     Cyclospora cayetanensis Not Detected     Entamoeba histolytica Not Detected     Giardia lamblia Not Detected     Adenovirus F40/41 Not Detected     Astrovirus Not Detected     Norovirus GI/GII Not Detected     Rotavirus A Not Detected     Sapovirus (I, II, IV or V) Not Detected    Narrative:      If Aeromonas, Staphylococcus aureus or Bacillus cereus are suspected, please order [id number]: Stool Culture, Aeromonas, S aureus, B Cereus.    Lipase [[id number]]  (Normal) Collected: 04/01/21 0421    Specimen: Blood  Updated: 04/01/21 0840     Lipase 25 U/L     D-dimer, Quantitative [[id number]]  (Abnormal) Collected: 04/01/21 0421    Specimen: Blood Updated: 04/01/21 0544     D-Dimer, Quantitative 3.37 MCGFEU/mL     Narrative:      Can be elevated in, but is not diagnostic for deep vein thrombosis (DVT) or pulmonary embolis (PE).  It is also elevated in other medical conditions.  Clinical correlation is required.  The negative cut-off value for the D-Dimer is 0.50 mcg FEU/mL for DVT and PE.      Protime-INR [[id number]]  (Abnormal) Collected: 04/01/21 0421    Specimen: Blood Updated: 04/01/21 0520     Protime 15.3 Seconds      INR 1.25    Narrative:      Therapeutic Ranges for INR: 2.0-3.0 (PT 20-30)                              2.5-3.5 (PT 25-34)    Clostridium Difficile Toxin - Stool, Per Rectum [[id number]]  (Normal) Collected: 04/01/21 0410    Specimen: Stool from Per Rectum Updated: 04/01/21 0519    Narrative:      The following orders were created for panel order Clostridium Difficile Toxin - Stool, Per Rectum.  Procedure                               Abnormality         Status                     ---------                               -----------         ------                     Clostridium Difficile EI...[[id number]]  Normal              Final result                 Please view results for these tests on the individual orders.    Clostridium Difficile EIA - Stool, Per Rectum [[id number]]  (Normal) Collected: 04/01/21 0410    Specimen: Stool from Per Rectum Updated: 04/01/21 0519     C Diff GDH / Toxin Negative    Procalcitonin [[id number]]  (Abnormal) Collected: 04/01/21 0421    Specimen: Blood Updated: 04/01/21 0459     Procalcitonin 9.37 ng/mL     Narrative:      Results may be falsely decreased if patient taking Biotin.     Timed Lactic Acid, Reflex [[id number]]  (Normal) Collected: 04/01/21 0421    Specimen: Blood Updated: 04/01/21 0450     Lactate 1.7 mmol/L     Troponin [[id number]]  (Normal) Collected: 03/31/21 0404     Specimen: Blood Updated: 04/01/21 0215     Troponin T <0.010 ng/mL     Narrative:      Troponin T Reference Range:  <= 0.03 ng/mL-   Negative for AMI  >0.03 ng/mL-     Abnormal for myocardial necrosis.  Clinicians would have to utilize clinical acumen, EKG, Troponin and serial changes to determine if it is an Acute Myocardial Infarction or myocardial injury due to an underlying chronic condition.       Results may be falsely decreased if patient taking Biotin.      Lactic Acid, Plasma [[id number]]  (Abnormal) Collected: 04/01/21 0132    Specimen: Blood Updated: 04/01/21 0159     Lactate 2.2 mmol/L     Lactate Dehydrogenase [[id number]]  (Abnormal) Collected: 03/31/21 0404    Specimen: Blood Updated: 04/01/21 0158      U/L     CK [[id number]]  (Abnormal) Collected: 03/31/21 0404    Specimen: Blood Updated: 04/01/21 0158     Creatine Kinase 497 U/L     Hepatitis Panel, Acute [[id number]]  (Normal) Collected: 03/31/21 1705    Specimen: Blood Updated: 03/31/21 2040     Hepatitis B Surface Ag Non-Reactive     Hep A IgM Non-Reactive     Hep B C IgM Non-Reactive     Hepatitis C Ab Non-Reactive    Narrative:      Results may be falsely decreased if patient taking Biotin.     Lipase [[id number]]  (Normal) Collected: 03/31/21 0404    Specimen: Blood Updated: 03/31/21 1216     Lipase 17 U/L     Hepatic Function Panel [[id number]]  (Abnormal) Collected: 03/31/21 0404    Specimen: Blood Updated: 03/31/21 1216     Total Protein 6.4 g/dL      Albumin 2.80 g/dL      ALT (SGPT) 66 U/L      AST (SGOT) 65 U/L      Alkaline Phosphatase 85 U/L      Total Bilirubin <0.2 mg/dL      Bilirubin, Direct <0.2 mg/dL      Bilirubin, Indirect --     Comment: Unable to calculate       Renal Function Panel [[id number]]  (Abnormal) Collected: 03/31/21 0404    Specimen: Blood Updated: 03/31/21 0605     Glucose 163 mg/dL      BUN 25 mg/dL      Creatinine 0.71 mg/dL      Sodium 137 mmol/L      Potassium 4.4 mmol/L      Chloride 108 mmol/L      CO2  17.9 mmol/L      Calcium 7.9 mg/dL      Albumin 2.80 g/dL      Phosphorus 1.7 mg/dL      Anion Gap 11.1 mmol/L      BUN/Creatinine Ratio 35.2     eGFR Non African Amer 88 mL/min/1.73     Narrative:      GFR Normal >60  Chronic Kidney Disease <60  Kidney Failure <15      Magnesium [[id number]]  (Normal) Collected: 03/31/21 0404    Specimen: Blood Updated: 03/31/21 0554     Magnesium 2.2 mg/dL     Legionella Antigen, Urine - Urine, Urine, Clean Catch [[id number]]  (Normal) Collected: 03/29/21 0410    Specimen: Urine, Clean Catch Updated: 03/29/21 1801     LEGIONELLA ANTIGEN, URINE Negative    S. Pneumo Ag Urine or CSF - Urine, Urine, Clean Catch [[id number]]  (Abnormal) Collected: 03/29/21 0410    Specimen: Urine, Clean Catch Updated: 03/29/21 1801     Strep Pneumo Ag Positive    Blood Culture - Blood, Blood, Venous Line [[id number]] Collected: 03/29/21 0916    Specimen: Blood, Venous Line Updated: 03/29/21 0916    Urine Drug Screen - Urine, Clean Catch [[id number]]  (Abnormal) Collected: 03/29/21 0410    Specimen: Urine, Clean Catch Updated: 03/29/21 0508     THC, Screen, Urine Negative     Phencyclidine (PCP), Urine Negative     Cocaine Screen, Urine Negative     Methamphetamine, Ur Negative     Opiate Screen Positive     Amphetamine Screen, Urine Negative     Benzodiazepine Screen, Urine Positive     Tricyclic Antidepressants Screen Negative     Methadone Screen, Urine Negative     Barbiturates Screen, Urine Positive     Oxycodone Screen, Urine Positive     Propoxyphene Screen Negative     Buprenorphine, Screen, Urine Negative    Narrative:      Urine drug screen results are to be used for medical purposes only.  They are not to be used for legal purposes such as employment testing.  Negative results do not necessarily mean the complete absence of a subtance, but rather that the result is less than the cutoff for that substance.  Positive results are unconfirmed and considered Preliminary Positive.  [facility] does not automatically confirm Postitive Unconfirmed results.  The physician may request (order) an Unconfirmed Positive result to be sent out for confirmation.      Negative Thresholds for Drugs Screened:    THC screen, urine                          50 ng/ml  Phenycyclidine (PCP), urine                25 ng/ml  Cocaine screen, urine                     150 ng/ml  Methamphetamine, urine                    500 ng/ml  Opiate screen, urine                      100 ng/ml  Amphetamine screen, urine                 500 ng/ml  Benzodiazepine screen, urine              150 ng/ml  Tricyclic Antidepressants screen, urine   300 ng/ml  Methadone screen, urine                   200 ng/ml  Barbiturates screen, urine                200 ng/ml  Oxycodone screen, urine                   100 ng/ml  Propoxyphene screen, urine                300 ng/ml  Buprenorphine screen, urine                10 ng/ml    Urinalysis, Microscopic Only - Urine, Clean Catch [[id number]]  (Abnormal) Collected: 03/29/21 0410    Specimen: Urine, Clean Catch Updated: 03/29/21 0440     RBC, UA 3-5 /HPF      WBC, UA None Seen /HPF      Bacteria, UA 1+ /HPF      Squamous Epithelial Cells, UA 3-6 /HPF      Yeast, UA Small/1+ Yeast /HPF      Hyaline Casts, UA None Seen /LPF      Amorphous Crystals, UA Small/1+ /HPF      Methodology Manual Light Microscopy    Urinalysis With Culture If Indicated - Urine, Clean Catch [[id number]]  (Abnormal) Collected: 03/29/21 0410    Specimen: Urine, Clean Catch Updated: 03/29/21 0425     Color, UA Yellow     Appearance, UA Cloudy     pH, UA <=5.0     Specific Gravity, UA 1.020     Glucose, UA Negative     Ketones, UA Negative     Bilirubin, UA Negative     Blood, UA Negative     Protein, UA 30 mg/dL (1+)     Leuk Esterase, UA Negative     Nitrite, UA Negative     Urobilinogen, UA 0.2 E.U./dL    Manual Differential [[id number]]  (Abnormal) Collected: 03/29/21 0150    Specimen: Blood Updated: 03/29/21 0232     Neutrophil %  72.0 %      Lymphocyte % 8.0 %      Monocyte % 5.0 %      Bands %  12.0 %      Metamyelocyte % 3.0 %      Neutrophils Absolute 7.76 10*3/mm3      Lymphocytes Absolute 0.74 10*3/mm3      Monocytes Absolute 0.46 10*3/mm3      Macrocytes Slight/1+     WBC Morphology Normal     Platelet Morphology Normal    COVID-19 and FLU A/B PCR - Swab, Nasopharynx [[id number]]  (Normal) Collected: 03/29/21 0120    Specimen: Swab from Nasopharynx Updated: 03/29/21 0156     COVID19 Not Detected     Influenza A PCR Not Detected     Influenza B PCR Not Detected    Narrative:      Fact sheet for providers: https://www.fda.gov/media/448970/download    Fact sheet for patients: https://www.fda.gov/media/350413/download    Test performed by PCR.        Imaging Results (Most Recent)     Procedure Component Value Units Date/Time    CT Angiogram Chest With & Without Contrast [[id number]] Collected: 04/02/21 0951     Updated: 04/02/21 0953    Narrative:      CT ANGIOGRAM CHEST W WO CONTRAST    INDICATION:   Chest pain with elevated d-dimer on arrival    TECHNIQUE:   CT angiogram of the chest with 100 cc of Omnipaque 300 IV contrast. 3-D reconstructions were obtained and reviewed.   Radiation dose reduction techniques included automated exposure control or exposure modulation based on body size. Count of known CT and  cardiac nuc med studies performed in previous 12 months: 0.     COMPARISON:   None available.    FINDINGS:   Chest images at mediastinal window show no adenopathy. Small bilateral pleural effusions are noted. There are no pulmonary artery filling defects to suggest emboli. The CT angiographic images also show no evidence of emboli.    Chest images at lung window demonstrate dense alveolar consolidation in the right middle lobe and lingula. There are smaller infiltrates anteriorly in the right upper lobe and left upper lobe. Underlying emphysema is noted. The infiltrates are strongly  suspicious for acute pneumonia. There is no  evidence of endobronchial obstruction on either side. Consolidation is greater on the right than on the left. Imaging features can be seen with COVID-19 pneumonia, although are nonspecific and can can occur  with a variety of infectious and noninfectious processes.    In addition, there is a left apical lung nodule that is noncalcified. It measures 7 mm in diameter and 12 mm in length. It is suspicious for malignancy. There is also a right lower lobe nodule below the hilum measuring 8 mm. This could represent either a  true nodule or infiltrate. A small right lower lobe infiltrate is favored. Recommend correlation with any previous chest CTs if available. If no prior chest CTs are available, follow-up chest CT is recommended in 3 months to evaluate for enlargement over  time. PET/CT scanning could also be considered as a next step in evaluation but the left apical lesion is borderline in terms of increased chance for a false negative.      Impression:      No evidence of pulmonary embolism. There is extensive bilateral pneumonia predominantly in the right middle lobe and lingula and to a lesser extent in both upper lobes. This is accompanied by small bilateral pleural effusions. Additionally there is a  suspicious indeterminate left apical lung nodule that needs follow-up as described above. There is also a density in the right lower lobe that could represent either infiltrate or nodule measuring 8 mm in diameter. Malignancy is not excluded.    Signer Name: Galo Hernandes MD   Signed: 4/2/2021 9:51 AM   Workstation Name: [id number]    [facility]    US Gallbladder [[id number]] Collected: 04/01/21 1037     Updated: 04/01/21 1039    Narrative:       Abdomen Ltd    INDICATION:   45-year-old female with abdominal pain for 4 days.    COMPARISON:   None available.    FINDINGS:  PANCREAS: Visualized portions of the pancreas are within normal limits.     LIVER: Increased echogenicity of the hepatic  parenchyma is indicative of hepatic steatosis. The liver is enlarged, measuring 18.8 cm in craniocaudad length. No hepatic mass. The intrahepatic bile ducts are normal in caliber. The common duct is normal in  size at the sandra hepatis measuring 1-2 mm.    GALLBLADDER: Gallbladder wall is borderline in thickness, measuring 3 mm. No evidence of cholelithiasis. There is a small amount of nonspecific pericholecystic fluid.    RIGHT KIDNEY: The right kidney measures 10 cm. Renal cortical thickness and echogenicity is normal. No hydronephrosis.    OTHER: Small amount of perihepatic ascites.      Impression:        1. Mild hepatomegaly with hepatic steatosis.  2. Small amount of perihepatic ascites and pericholecystic fluid. Nonspecific borderline gallbladder wall thickening, measuring 3 mm, which may be slightly accentuated by the pericholecystic fluid. No evidence of cholelithiasis or common bile duct  dilatation.    Signer Name: Blaise Vaughn MD   Signed: 4/1/2021 10:37 AM   Workstation Name: [id number]    [facility]    CT Abdomen Pelvis With Contrast [[id number]] Collected: 03/31/21 1611     Updated: 03/31/21 1617    Narrative:      CT abdomen and pelvis with contrast   03/31/2021     HISTORY:  Epigastric pain and bloating starting today     COMPARISON:  06/14/2016     TECHNIQUE:    CT of Abdomen and Pelvis with contrast performed.  Sagittal and Coronal  reconstructions performed. Radiation dose reduction techniques included  automated exposure control or exposure modulation based on body size.  Radiation audit for CT and nuclear cardiology exams in the last 12  months: 0.      FINDINGS:    Abdomen:  There is dense consolidation throughout almost all the right middle lobe  and lingula with air bronchograms consistent with pneumonia. There are  no noncalcified lung nodules. Small amount of fluid around the liver.  Liver slightly enlarged decreased in density. There is fluid around  the  gallbladder. The gallbladder wall is normal. There is no biliary  distention. The spleen, pancreas, adrenal glands and kidneys are normal.  Aorta is normal in size. There is no adenopathy. Oral contrast was  administered. The bowel appears normal.       Pelvis:  There is small or free fluid in the pelvis. The uterus and adnexal  regions and bladder are normal. The small amount of air in the anterior  abdominal wall probably from previous injection. Bones are unremarkable.  There is a vascular stent in the left common iliac artery         Impression:      The liver appears slightly enlarged and slightly decreased  in density. There is a small amount ascites around the liver and in the  pelvis. Correlation with hepatic enzymes and possibility of hepatitis is  recommended.     Dense consolidation in right middle lobe and lingula consistent with  pneumonia     Otherwise negative study     This report was finalized on 3/31/2021 4:15 PM by Dr. Harpreet Castellon MD.       XR Abdomen KUB [[id number]] Collected: 03/30/21 1157     Updated: 03/30/21 1159    Narrative:      XR ABDOMEN KUB-: 3/30/2021 11:53 AM     INDICATION:   Abdominal pain today     COMPARISON:   None available.     FINDINGS:  AP radiographs of the abdomen. The renal shadows are symmetric. No renal  calculi. No bladder calculi.     The bowel gas pattern is nonobstructive. No acute osseous abnormalities.  No radiopaque foreign body.       Impression:      Negative KUB     This report was finalized on 3/30/2021 11:57 AM by Dr. Harpreet Castellon MD.       XR Chest 1 View [[id number]] Collected: 03/29/21 0237     Updated: 03/29/21 0247    Narrative:      CR Chest 1 Vw    INDICATION:   Cough and chest pain 1 month. Nausea vomiting and headache in the emergency Department. Bilateral rib fractures.     COMPARISON:    3/11/2021    FINDINGS:  Single portable AP view(s) of the chest.  Cardiac silhouette within normal limits. Unremarkable vascularity. The lungs are hyperinflated  and there are new lower lung zone opacities bilaterally obscuring the right and left heart borders, most  characteristic of pneumonia. No effusion. No pneumothorax. Previously documented right rib fractures difficult to visualize.      Impression:        1. Interval development of bilateral pneumonia appearing to involve the lingula and right middle lobe. Follow-up to clearing recommended.    Signer Name: Huy Hilario MD   Signed: 3/29/2021 2:37 AM   Workstation Name: [facility]    [facility]    CT Head Without Contrast [[id number]] Collected: 03/29/21 0242     Updated: 03/29/21 0246    Narrative:      CT Head WO    HISTORY:   Headache nausea and vomiting that began tonight. Cough and chest pain 1 month. COPD hypertension and breast implant placement.    TECHNIQUE:   Axial unenhanced head CT. Radiation dose reduction techniques included automated exposure control or exposure modulation based on body size. Count of known CT and cardiac nuc med studies performed in previous 12 months: 0.     Time of scan: 0223 hours    COMPARISON:   None.    FINDINGS:   No intracranial hemorrhage, mass, or infarct. No hydrocephalus or extra-axial fluid collection. Brain parenchymal density is normal. The skull base, calvarium, and extracranial soft tissues are normal.      Impression:      Normal, negative unenhanced head CT.          Signer Name: Huy Hilario MD   Signed: 3/29/2021 2:42 AM   Workstation Name: [facility]          PROCEDURES      Condition on Discharge:  Clinically and hemodynamically stable    Physical Exam at Discharge  Vital Signs  Temp:  [96.3 °F (35.7 °C)-98.5 °F (36.9 °C)] 97.6 °F (36.4 °C)  Heart Rate:  [] 114  Resp:  [16-20] 16  BP: (114-140)/(62-93) 119/72    Physical Exam   Constitutional: Patient appears well-developed and well-nourished and in no acute distress   HEENT:   Head: Normocephalic and atraumatic.   Eyes:  Pupils are equal,  round, and reactive to light. EOM are intact. Sclera are anicteric and non-injected.    Neck: Neck supple. No JVD present. No thyromegaly present. No lymphadenopathy present.  Cardiovascular: Regular rate, regular rhythm, S1 normal and S2 normal.  Exam reveals no gallop and no friction rub.  No murmur heard.  Pulmonary/Chest: Pain chest wall due to rib fractures. Lungs are clear to auscultation bilaterally. No respiratory distress. No wheezes. No rhonchi. No rales.   Abdominal: Soft. Bowel sounds are normal. No distension and no mass. There is no hepatosplenomegaly. There is no tenderness.   Musculoskeletal: Normal Muscle tone  Extremities: No edema. Pulses are palpable in all 4 extremities.  Neurological: Patient is alert and oriented to person, place, and time. Cranial nerves II-XII are grossly intact with no focal deficits.  Skin: Skin is warm. No rash noted. Nails show no clubbing.  No cyanosis or erythema.        Discharge Disposition  Patient will be discharged home    Visiting Nurse:    N/A    Home PT/OT:  N/A    Home Safety Evaluation:  N/A    DME  N/A    Discharge Diet:      Dietary Orders (From admission, onward)     Start     Ordered    04/01/21 1149  Diet Regular; Low Fat  Diet Effective Now     Question Answer Comment   Diet Texture / Consistency Regular    Common Modifiers Low Fat        04/01/21 1148                Activity at Discharge:  Ad Lula    Pre-discharge education  Smoking      Follow-up Appointments  Future Appointments   Date Time Provider Department Center   6/8/2021 11:20 AM Gatito Short MD [facility]   Follow up with PCP in one week  Reviewed CT chest findings w/ patient. There is a  suspicious indeterminate left apical lung nodule that needs follow-up as described above. There is also a density in the right lower lobe that could represent either infiltrate or nodule measuring 8 mm in diameter. Malignancy is not excluded.  Needs repeat scan in 3 months.     [unfilled]    Test  Results Pending at Discharge  [unfilled]     Jesse Colby MD  04/03/21  15:12 EDT    Time: 30 min (if over 30 minutes give explanation as to why it took greater than 30 minutes)  Discharge over 30 min (if over 30 minutes give explanation as to why it took greater than 30 minutes)  Secondary to:  Coordination of home care  D/W case management  Medication reconciliation

## 2021-04-03 NOTE — PLAN OF CARE
Goal Outcome Evaluation:  Plan of Care Reviewed With: patient     Outcome Summary: pt continues to have productive cough with blood tinged sputum

## 2021-04-03 NOTE — NURSING NOTE
Continued Stay Note  SARAH Garcia     Patient Name: Valeria Rubi  MRN: [id number]  Today's Date: 4/3/2021    Admit Date: 3/29/2021    Discharge Plan     Row Name 04/03/21 1011       Plan    Plan Comments  Ambulating in roman on oxygen.  Continue to assess for oxygen needs upon discharge.  Will continue to follow        Discharge Codes    No documentation.             Arminda Alfred RN

## 2021-04-03 NOTE — PLAN OF CARE
Problem: Adult Inpatient Plan of Care  Goal: Plan of Care Review  Flowsheets (Taken 4/3/2021 0243)  Plan of Care Reviewed With: patient     Problem: Respiratory Compromise (Pneumonia)  Goal: Effective Oxygenation and Ventilation  Intervention: Optimize Oxygenation and Ventilation  Flowsheets (Taken 4/3/2021 0243)  Airway/Ventilation Management:   airway patency maintained   pulmonary hygiene promoted   Goal Outcome Evaluation:  Plan of Care Reviewed With: patient

## 2021-04-03 NOTE — OUTREACH NOTE
Prep Survey      Responses   [facility] patient discharged from?  [place]   Is LACE score < 7 ?  No   Emergency Room discharge w/ pulse ox?  No   Eligibility  [facility]   Date of Admission  03/29/21   Date of Discharge  04/03/21   Discharge Disposition  Home or Self Care   Discharge diagnosis  Atypical Chest Pain   Does the patient have one of the following disease processes/diagnoses(primary or secondary)?  Other   Does the patient have Home health ordered?  No   Is there a DME ordered?  No   Prep survey completed?  Yes          Sandra Carrillo RN

## 2021-04-05 ENCOUNTER — TRANSITIONAL CARE MANAGEMENT TELEPHONE ENCOUNTER (OUTPATIENT)
Dept: CALL CENTER | Facility: HOSPITAL | Age: 46
End: 2021-04-05

## 2021-04-05 NOTE — OUTREACH NOTE
Call Center TCM Note      Responses   [facility] patient discharged from?  [place]   Does the patient have one of the following disease processes/diagnoses(primary or secondary)?  Other   TCM attempt successful?  Yes   Call start time  1718   Call end time  1725   Discharge diagnosis  Atypical Chest Pain   Meds reviewed with patient/caregiver?  Yes   Is the patient having any side effects they believe may be caused by any medication additions or changes?  No   Does the patient have all medications ordered at discharge?  Yes   Is the patient taking all medications as directed (includes completed medication regime)?  Yes   Comments regarding appointments  Hospital follow up appt. with PCP Dr. Riddle 4/9/21   Does the patient have a primary care provider?   Yes   Does the patient have an appointment with their PCP within 7 days of discharge?  Yes   Has the patient kept scheduled appointments due by today?  N/A   Has home health visited the patient within 72 hours of discharge?  N/A   Psychosocial issues?  No   Did the patient receive a copy of their discharge instructions?  Yes   Nursing interventions  Reviewed instructions with patient   What is the patient's perception of their health status since discharge?  Improving   Is the patient/caregiver able to teach back signs and symptoms related to disease process for when to call PCP?  Yes   Is the patient/caregiver able to teach back signs and symptoms related to disease process for when to call 911?  Yes   Is the patient/caregiver able to teach back the hierarchy of who to call/visit for symptoms/problems? PCP, Specialist, Home health nurse, Urgent Care, ED, 911  Yes   If the patient is a current smoker, are they able to teach back resources for cessation?  Not a smoker, [id number], Smoking cessation medications   TCM call completed?  Yes   Wrap up additional comments  Pt. states she is doing a lot better. Still has O2 levels 88% on RA same as in hospital.  Encouraged continued use of IS and cough/deep breathing exercises. Not much appetite, enc. protein enriched foods/shakes and fluids. Told pt. any worsening symptoms to return to ED.          Ruth Veloz RN    4/5/2021, 17:28 EDT

## 2021-04-06 NOTE — PAYOR COMM NOTE
"Greyson Valeria JAIN (45 y.o. Female)   ATTN: NURSE REVIEWER   RE: DISCHARGE NOTIFICATION   REF# [id number]  PLS REPLY TO GERTRUDIS RODRIGUEZ  [phone]  [phone]        Date of Birth Social Security Number Address Home Phone MRN    1975  [address] [place] [phone] [id number]    Faith Marital Status          None Single       Admission Date Admission Type Admitting Provider Attending Provider Department, Room/Bed    3/29/21 Emergency Facundo Nichols MD  [facility] MED SURG, 1412/1    Discharge Date Discharge Disposition Discharge Destination        4/3/2021 Home or Self Care              Attending Provider: (none)   Allergies: Flonase [Fluticasone]    Isolation: None   Infection: None   Code Status: Prior    Ht: 157.5 cm (62\")   Wt: 63.2 kg (139 lb 6.4 oz)    Admission Cmt: None   Principal Problem: None                Active Insurance as of 3/29/2021     Primary Coverage     Payor Plan Insurance Group Employer/Plan Group    [employer]HEM BLUE CROSS ANTHEM BLUE CROSS BLUE SHIELD PPO [id number]     Payor Plan Address Payor Plan Phone Number Payor Plan Fax Number Effective Dates    [address] [phone]  1/1/2021 - None Entered    [place] [address]       Subscriber Name Subscriber Birth Date Member ID       VALERIA GARCIA 1975 [id number]                 Emergency Contacts      (Rel.) Home Phone Work Phone Mobile Phone    Harpreet Ruiz (Spouse) -- -- [phone]    Arjun Ruiz (Son) -- -- [phone]               Discharge Summary      Jesse Colby MD at 04/03/21 1512              HOSPITALIST SERVICES  @ [facility], [facility] Hospitalist Team    DISCHARGE SUMMARY        Valeria JAIN Greyson  1975  [id number]        Hospitalists Discharge Summary    PATIENT EXAMINED ON BEDSIDE    Date of Admission: 3/29/2021  Date of Discharge:  4/3/2021            DIAGNOSES:      Primary Discharge Diagnoses: "     1.  Atypical Chest Pain:    2.  Essential HTN:   3.  Tachycardia:    4.  Abdominal Pain:    5.  Pneumococcal Pneumonia:   6.  Transaminitis:    7.  Anion Gap Metabolic Acidosis:   8.  PVD:   9.  Right 6th and 7th Rib Fractures:   10. Bilateral Lung nodules on chest CT scan      Secondary Discharge Diagnoses:     As per Problem List      Surgical Discharge Diagnoses:     As per Problem List        PCP  Patient Care Team:  Adrianne Riddle MD as PCP - General  Adrianne Riddle MD as PCP - Family Medicine    Consults:   Consults     No orders found from 2/28/2021 to 3/30/2021.          Operations and Procedures Performed:       XR Ribs Right With PA Chest    Result Date: 3/11/2021  Narrative: CHEST AND RIGHT RIB SERIES, 3/11/2021  HISTORY:  45-year-old female in the ED noting persistent right side pain from rib fractures 2/17/2021. Coughing.  TECHNIQUE: PA chest and four view right rib series.  FINDINGS: Slightly displaced right 6th rib fracture and adjacent nondisplaced right 7th rib fracture. The 6th rib fracture was not visible on the previous study. Pulmonary hyperinflation. No visible pneumothorax or pleural effusion. Heart size and pulmonary vascularity are normal.     Impression: 1. Fractures of the right 6th and 7th ribs. 2. No pneumothorax. Signer Name: Danny Davis MD  Signed: 3/11/2021 4:08 PM  Workstation Name: [id number]  [facility]    CT Head Without Contrast    Result Date: 3/29/2021  Narrative: CT Head WO HISTORY: Headache nausea and vomiting that began tonight. Cough and chest pain 1 month. COPD hypertension and breast implant placement. TECHNIQUE: Axial unenhanced head CT. Radiation dose reduction techniques included automated exposure control or exposure modulation based on body size. Count of known CT and cardiac nuc med studies performed in previous 12 months: 0. Time of scan: 0223 hours COMPARISON: None. FINDINGS: No intracranial hemorrhage, mass, or  infarct. No hydrocephalus or extra-axial fluid collection. Brain parenchymal density is normal. The skull base, calvarium, and extracranial soft tissues are normal.     Impression: Normal, negative unenhanced head CT. Signer Name: Huy Hilario MD  Signed: 3/29/2021 2:42 AM  Workstation Name: [id number]  [facility]    CT Angiogram Chest With & Without Contrast    Result Date: 4/2/2021  Narrative: CT ANGIOGRAM CHEST W WO CONTRAST INDICATION: Chest pain with elevated d-dimer on arrival TECHNIQUE: CT angiogram of the chest with 100 cc of Omnipaque 300 IV contrast. 3-D reconstructions were obtained and reviewed.   Radiation dose reduction techniques included automated exposure control or exposure modulation based on body size. Count of known CT and cardiac nuc med studies performed in previous 12 months: 0. COMPARISON: None available. FINDINGS: Chest images at mediastinal window show no adenopathy. Small bilateral pleural effusions are noted. There are no pulmonary artery filling defects to suggest emboli. The CT angiographic images also show no evidence of emboli. Chest images at lung window demonstrate dense alveolar consolidation in the right middle lobe and lingula. There are smaller infiltrates anteriorly in the right upper lobe and left upper lobe. Underlying emphysema is noted. The infiltrates are strongly suspicious for acute pneumonia. There is no evidence of endobronchial obstruction on either side. Consolidation is greater on the right than on the left. Imaging features can be seen with COVID-19 pneumonia, although are nonspecific and can can occur with a variety of infectious and noninfectious processes. In addition, there is a left apical lung nodule that is noncalcified. It measures 7 mm in diameter and 12 mm in length. It is suspicious for malignancy. There is also a right lower lobe nodule below the hilum measuring 8 mm. This could represent either a true nodule or infiltrate.  A small right lower lobe infiltrate is favored. Recommend correlation with any previous chest CTs if available. If no prior chest CTs are available, follow-up chest CT is recommended in 3 months to evaluate for enlargement over time. PET/CT scanning could also be considered as a next step in evaluation but the left apical lesion is borderline in terms of increased chance for a false negative.     Impression: No evidence of pulmonary embolism. There is extensive bilateral pneumonia predominantly in the right middle lobe and lingula and to a lesser extent in both upper lobes. This is accompanied by small bilateral pleural effusions. Additionally there is a suspicious indeterminate left apical lung nodule that needs follow-up as described above. There is also a density in the right lower lobe that could represent either infiltrate or nodule measuring 8 mm in diameter. Malignancy is not excluded. Signer Name: Galo Hernandes MD  Signed: 4/2/2021 9:51 AM  Workstation Name: [id number]  [facility]    CT Abdomen Pelvis With Contrast    Result Date: 3/31/2021  Narrative: CT abdomen and pelvis with contrast   03/31/2021  HISTORY: Epigastric pain and bloating starting today  COMPARISON: 06/14/2016  TECHNIQUE:  CT of Abdomen and Pelvis with contrast performed.  Sagittal and Coronal reconstructions performed. Radiation dose reduction techniques included automated exposure control or exposure modulation based on body size. Radiation audit for CT and nuclear cardiology exams in the last 12 months: 0.  FINDINGS:  Abdomen: There is dense consolidation throughout almost all the right middle lobe and lingula with air bronchograms consistent with pneumonia. There are no noncalcified lung nodules. Small amount of fluid around the liver. Liver slightly enlarged decreased in density. There is fluid around the gallbladder. The gallbladder wall is normal. There is no biliary distention. The spleen, pancreas, adrenal  glands and kidneys are normal. Aorta is normal in size. There is no adenopathy. Oral contrast was administered. The bowel appears normal.   Pelvis: There is small or free fluid in the pelvis. The uterus and adnexal regions and bladder are normal. The small amount of air in the anterior abdominal wall probably from previous injection. Bones are unremarkable. There is a vascular stent in the left common iliac artery       Impression: The liver appears slightly enlarged and slightly decreased in density. There is a small amount ascites around the liver and in the pelvis. Correlation with hepatic enzymes and possibility of hepatitis is recommended.  Dense consolidation in right middle lobe and lingula consistent with pneumonia  Otherwise negative study  This report was finalized on 3/31/2021 4:15 PM by Dr. Harpreet Castellon MD.      US Gallbladder    Result Date: 4/1/2021  Narrative: US Abdomen Ltd INDICATION: 45-year-old female with abdominal pain for 4 days. COMPARISON: None available. FINDINGS: PANCREAS: Visualized portions of the pancreas are within normal limits. LIVER: Increased echogenicity of the hepatic parenchyma is indicative of hepatic steatosis. The liver is enlarged, measuring 18.8 cm in craniocaudad length. No hepatic mass. The intrahepatic bile ducts are normal in caliber. The common duct is normal in size at the sandra hepatis measuring 1-2 mm. GALLBLADDER: Gallbladder wall is borderline in thickness, measuring 3 mm. No evidence of cholelithiasis. There is a small amount of nonspecific pericholecystic fluid. RIGHT KIDNEY: The right kidney measures 10 cm. Renal cortical thickness and echogenicity is normal. No hydronephrosis. OTHER: Small amount of perihepatic ascites.     Impression: 1. Mild hepatomegaly with hepatic steatosis. 2. Small amount of perihepatic ascites and pericholecystic fluid. Nonspecific borderline gallbladder wall thickening, measuring 3 mm, which may be slightly accentuated by the  pericholecystic fluid. No evidence of cholelithiasis or common bile duct dilatation. Signer Name: Blaise Vaughn MD  Signed: 4/1/2021 10:37 AM  Workstation Name: [id number]  [facility]    XR Chest 1 View    Result Date: 3/29/2021  Narrative: CR Chest 1 Vw INDICATION: Cough and chest pain 1 month. Nausea vomiting and headache in the emergency Department. Bilateral rib fractures. COMPARISON:  3/11/2021 FINDINGS: Single portable AP view(s) of the chest.  Cardiac silhouette within normal limits. Unremarkable vascularity. The lungs are hyperinflated and there are new lower lung zone opacities bilaterally obscuring the right and left heart borders, most characteristic of pneumonia. No effusion. No pneumothorax. Previously documented right rib fractures difficult to visualize.     Impression: 1. Interval development of bilateral pneumonia appearing to involve the lingula and right middle lobe. Follow-up to clearing recommended. Signer Name: Huy Hilario MD  Signed: 3/29/2021 2:37 AM  Workstation Name: [email or web address]  [facility]    XR Abdomen KUB    Result Date: 3/30/2021  Narrative: XR ABDOMEN KUB-: 3/30/2021 11:53 AM  INDICATION: Abdominal pain today  COMPARISON: None available.  FINDINGS: AP radiographs of the abdomen. The renal shadows are symmetric. No renal calculi. No bladder calculi.  The bowel gas pattern is nonobstructive. No acute osseous abnormalities. No radiopaque foreign body.      Impression: Negative KUB  This report was finalized on 3/30/2021 11:57 AM by Dr. Harpreet Castellon MD.        Allergies:  is allergic to flonase [fluticasone].    Marcelo  not reviewed    Discharge Medications:     Discharge Medications      ASK your doctor about these medications      Instructions Start Date   albuterol (2.5 MG/3ML) 0.083% nebulizer solution  Commonly known as: PROVENTIL   USE 1 VIAL VIA NEBULIZER EVERY 4 HOURS AS NEEDED      albuterol sulfate  (90 Base) MCG/ACT  inhaler  Commonly known as: PROVENTIL HFA;VENTOLIN HFA;PROAIR HFA   TAKE 2 PUFFS BY MOUTH EVERY 4 HOURS AS NEEDED      aspirin 81 MG EC tablet   81 mg, Oral, Daily      clopidogrel 75 MG tablet  Commonly known as: Plavix   75 mg, Oral, Daily      fluticasone 50 MCG/ACT nasal spray  Commonly known as: FLONASE   1 spray, Nasal, As Needed      ipratropium-albuterol 0.5-2.5 mg/3 ml nebulizer  Commonly known as: DUO-NEB   INHALE 1 VIAL VIA NEBULIZER 3 TIMES A DAY      lisinopril-hydrochlorothiazide 20-12.5 MG per tablet  Commonly known as: PRINZIDE,ZESTORETIC   TAKE 1 TABLET BY MOUTH EVERY DAY      medroxyPROGESTERone 150 MG/ML injection  Commonly known as: DEPO-PROVERA   150 mg, Buccal, Every 3 Months, Pt takes every 3 months.march or April 2018 last dose      Nurtec 75 MG tablet dispersible tablet  Generic drug: Rimegepant Sulfate   75 mg, Oral, Once As Needed      nystatin 030409 UNIT/ML suspension  Commonly known as: MYCOSTATIN   500,000 Units, Swish & Swallow, 4 Times Daily, Swish and swallow 5mL four times a day for two weeks.      oxyCODONE-acetaminophen 5-325 MG per tablet  Commonly known as: PERCOCET   1 tablet, Oral, Every 6 Hours PRN      promethazine 25 MG tablet  Commonly known as: PHENERGAN   25 mg, Oral, Every 8 Hours PRN      simvastatin 40 MG tablet  Commonly known as: ZOCOR   40 mg, Oral, Every Evening      Stiolto Respimat 2.5-2.5 MCG/ACT aerosol solution inhaler  Generic drug: tiotropium bromide-olodaterol   2 puffs, Inhalation, Daily      varenicline 1 MG tablet  Commonly known as: CHANTIX   1 mg, Oral, 2 Times Daily      venlafaxine XR 75 MG 24 hr capsule  Commonly known as: EFFEXOR-XR   TAKE 1 CAPSULE BY MOUTH EVERY DAY      vitamin D 1.25 MG (76373 UT) capsule capsule  Commonly known as: ERGOCALCIFEROL   50,000 Units, Oral, Every 7 Days             History of Present Illness:    Ms. Rubi 45-year-old female with COPD.  Presents to ER with 1 week worth of worsening shortness of breath productive  cough subjective fever and chills.  Also has several fractured ribs several weeks ago due to severe COPD cough.  She was seen in the ER for that as well.  Still smokes heavily.  Been taking her home nebulizers without improvement.  On chest x-ray she has bilateral lower lobe pneumonia.  Covid's and flu swab are negative.  Present with with tachycardia 136 and low blood pressure of 91/56.  Sepsis protocol was started.    Hospital Course       Assessment and Plan:       1.  45-year-old female with acute exacerbation of COPD.  Complicated by multiple rib fractures.     2.  Bilateral lower lobe pneumonias.  Covid and flu swab negative.  Suspect bacterial.     3.  Sepsis upon presentation with a heart rate 136; O2 sat i87 percent on room air blood pressure 91/ 56.  And elevated inflammatory markers.     4.  Patient was [facility] to [facility] with telemetry oxygen, duo nebs, IV steroids, IV Cepimine  Continued IV fluid management.            Facundo Nichols MD  03/29/21  07:17 EDT      Assessment/Plan         1.  Abdominal Pain: Note reviewed from last night.  I reviewed the contrasted CT images w/ radiologist this morning and vasculature appears patent and bowl walls are normal.  She does have some fatty infiltration in the liver which could explain the mildly elevated lactate.  LDH can be elevated from the pneumonia.LFT's have trended up in a cholestatic pattern so I did check a RUQ ultrasound that was unremarkable.  I think her diarrhea overnight was explained by oral contrast moving through and clearing stool seen on CT.  Culture was negative.  Two things come to mind:  1) being referred pain from rib fractures or 2) distension from ascities.  I've turned IVF off.  Discussed w/ patient.     2.  Anion Gap Metabolic Acidosis: Could be driven by lactate.  Will repeat level and monitor/     3.  Transaminitis: As discussed above.     4.  Pneumococcal pneumonia: On Cefdinir and Prednisone.     5.  PVD: As discussed in  #1.     6.  Right 6th and 7th Rib Fractures: Conservative care.      Plan for disposition: Home when able.     Dagoberto Chilel MD  04/01/21  11:33 EDT       1.  Atypical Chest Pain: Low suspicion for coronary etiology.  However, given tachycardia, and increased D-dimer, I will check a CT angio.     2.  Essential HTN: Not at goal.  Discussed w/ APRN on-call after starting Lasix.  Add prn Hydralazine.  Will resume home ACEI when renal stability assured.     3.  Tachycardia: Etiology unclear, but my instincts make me feel like she is withdrawing from something.  Her CT liver findings demonstrated fatty liver and her MCV is elevated suggesting EtOH which she denies.  Reviewing her Marcelo and UDS, she has not been prescribed benzos.  I do not believe she is septic, nor do I believe she is volume deplete. I had initially thought maybe narcotic withdrawal, but this seems unlikely.  Will try Ativan/Xanax and follow.  Certainly can add Labetalol, but I feel like this will mask the problem.     4.  Abdominal Pain: Not the primary concern this morning.  See my discussion from yesterday.     5.  Pneumococcal Pneumonia: Continue Cefdinir and Prednisone.     6.  Transaminitis: Trending down.  Work-up negative except for fatty liver.     7.  Anion Gap Metabolic Acidosis: Resolved.     8.  PVD: Continue on home therapy.     9.  Right 6th and 7th Rib Fractures: Continue conservative care.     Plan for disposition: Home tomorrow if renal function intact after contrast loads and tachycardia better.     Dagoberto Chilel MD  04/02/21  08:24 EDT       Dagoberto Chilel MD   Physician   Medicine   Progress Notes   Signed   Date of Service:  04/02/21 1931   Creation Time:  04/02/21 1931            Signed             Show:Clear all  []Manual[]Template[]Copied    Added by:  [x]Dagoberto Chilel MD    []Silas for details  Reviewed CT chest findings w/ patient.  Needs repeat scan in 3 months.  She also responded well to initiation of Xanax.  I  asked the patient why her initial UDS demonstrated benzos, but her Marcelo was negative for such.  She did not know, and reports she doesn't take anything extra.  EtOH was negative.  Plan for discharge in AM.                      Hospital Course  Last Lab Results:   Lab Results (most recent)     Procedure Component Value Units Date/Time    Blood Culture - Blood, Arm, Left [[id number]] Collected: 03/29/21 0950    Specimen: Blood from Arm, Left Updated: 04/03/21 1000     Blood Culture No growth at 5 days    Procalcitonin [[id number]]  (Abnormal) Collected: 04/03/21 0502    Specimen: Blood Updated: 04/03/21 0623     Procalcitonin 2.69 ng/mL     Narrative:      Results may be falsely decreased if patient taking Biotin.     CBC & Differential [[id number]]  (Abnormal) Collected: 04/03/21 0502    Specimen: Blood Updated: 04/03/21 0612    Narrative:      The following orders were created for panel order CBC & Differential.  Procedure                               Abnormality         Status                     ---------                               -----------         ------                     Scan Slide[[id number]]                                       Final result               CBC Auto Differential[[id number]]        Abnormal            Final result                 Please view results for these tests on the individual orders.    Scan Slide [[id number]] Collected: 04/03/21 0502    Specimen: Blood Updated: 04/03/21 0612     Macrocytes Slight/1+     WBC Morphology Normal     Platelet Morphology Normal    Comprehensive Metabolic Panel [[id number]]  (Abnormal) Collected: 04/03/21 0502    Specimen: Blood Updated: 04/03/21 0557     Glucose 89 mg/dL      BUN 15 mg/dL      Creatinine 0.68 mg/dL      Sodium 142 mmol/L      Potassium 4.1 mmol/L      Chloride 102 mmol/L      CO2 30.5 mmol/L      Calcium 8.0 mg/dL      Total Protein 5.9 g/dL      Albumin 2.90 g/dL      ALT (SGPT) 77 U/L      AST (SGOT) 26 U/L      Alkaline Phosphatase 82  U/L      Total Bilirubin 0.4 mg/dL      eGFR Non African Amer 93 mL/min/1.73      Globulin 3.0 gm/dL      A/G Ratio 1.0 g/dL      BUN/Creatinine Ratio 22.1     Anion Gap 9.5 mmol/L     Narrative:      GFR Normal >60  Chronic Kidney Disease <60  Kidney Failure <15      CBC Auto Differential [[id number]]  (Abnormal) Collected: 04/03/21 0502    Specimen: Blood Updated: 04/03/21 0548     WBC 21.01 10*3/mm3      RBC 3.48 10*6/mm3      Hemoglobin 11.8 g/dL      Hematocrit 37.5 %      .8 fL      MCH 33.9 pg      MCHC 31.5 g/dL      RDW 12.6 %      RDW-SD 49.4 fl      MPV 9.9 fL      Platelets 280 10*3/mm3      Neutrophil % 64.8 %      Lymphocyte % 17.2 %      Monocyte % 7.9 %      Eosinophil % 0.3 %      Basophil % 0.7 %      Immature Grans % 9.1 %      Neutrophils, Absolute 13.60 10*3/mm3      Lymphocytes, Absolute 3.61 10*3/mm3      Monocytes, Absolute 1.67 10*3/mm3      Eosinophils, Absolute 0.06 10*3/mm3      Basophils, Absolute 0.15 10*3/mm3      Immature Grans, Absolute 1.92 10*3/mm3      nRBC 0.7 /100 WBC     Ethanol [[id number]] Collected: 04/02/21 0416    Specimen: Blood Updated: 04/02/21 1434     Ethanol <10 mg/dL      Ethanol % <0.010 %     Urine Drug Screen - Urine, Clean Catch [[id number]]  (Abnormal) Collected: 04/02/21 1342    Specimen: Urine, Clean Catch Updated: 04/02/21 1357     THC, Screen, Urine Negative     Phencyclidine (PCP), Urine Negative     Cocaine Screen, Urine Negative     Methamphetamine, Ur Negative     Opiate Screen Negative     Amphetamine Screen, Urine Negative     Benzodiazepine Screen, Urine Positive     Tricyclic Antidepressants Screen Negative     Methadone Screen, Urine Negative     Barbiturates Screen, Urine Positive     Oxycodone Screen, Urine Positive     Propoxyphene Screen Negative     Buprenorphine, Screen, Urine Negative    Narrative:      Urine drug screen results are to be used for medical purposes only.  They are not to be used for legal purposes such as employment  testing.  Negative results do not necessarily mean the complete absence of a subtance, but rather that the result is less than the cutoff for that substance.  Positive results are unconfirmed and considered Preliminary Positive.  [facility] does not automatically confirm Postitive Unconfirmed results.  The physician may request (order) an Unconfirmed Positive result to be sent out for confirmation.      Negative Thresholds for Drugs Screened:    THC screen, urine                          50 ng/ml  Phenycyclidine (PCP), urine                25 ng/ml  Cocaine screen, urine                     150 ng/ml  Methamphetamine, urine                    500 ng/ml  Opiate screen, urine                      100 ng/ml  Amphetamine screen, urine                 500 ng/ml  Benzodiazepine screen, urine              150 ng/ml  Tricyclic Antidepressants screen, urine   300 ng/ml  Methadone screen, urine                   200 ng/ml  Barbiturates screen, urine                200 ng/ml  Oxycodone screen, urine                   100 ng/ml  Propoxyphene screen, urine                300 ng/ml  Buprenorphine screen, urine                10 ng/ml    CBC & Differential [[id number]]  (Abnormal) Collected: 04/02/21 1210    Specimen: Blood Updated: 04/02/21 1228    Narrative:      The following orders were created for panel order CBC & Differential.  Procedure                               Abnormality         Status                     ---------                               -----------         ------                     Scan Slide[[id number]]                                                                  CBC Auto Differential[[id number]]        Abnormal            Final result                 Please view results for these tests on the individual orders.    CBC Auto Differential [[id number]]  (Abnormal) Collected: 04/02/21 1210    Specimen: Blood Updated: 04/02/21 1221     WBC 20.76 10*3/mm3      RBC 3.55 10*6/mm3      Hemoglobin  12.1 g/dL      Hematocrit 38.1 %      .3 fL      MCH 34.1 pg      MCHC 31.8 g/dL      RDW 12.4 %      RDW-SD 49.8 fl      MPV 9.8 fL      Platelets 233 10*3/mm3      Neutrophil % 74.1 %      Lymphocyte % 8.3 %      Monocyte % 10.1 %      Eosinophil % 0.0 %      Basophil % 0.0 %      Immature Grans % 7.5 %      Neutrophils, Absolute 15.38 10*3/mm3      Lymphocytes, Absolute 1.72 10*3/mm3      Monocytes, Absolute 2.09 10*3/mm3      Eosinophils, Absolute 0.01 10*3/mm3      Basophils, Absolute 0.01 10*3/mm3      Immature Grans, Absolute 1.55 10*3/mm3     CK [[id number]]  (Abnormal) Collected: 04/02/21 0416    Specimen: Blood Updated: 04/02/21 0940     Creatine Kinase 333 U/L     Troponin [[id number]]  (Normal) Collected: 04/02/21 0416    Specimen: Blood Updated: 04/02/21 0820     Troponin T <0.010 ng/mL     Narrative:      Troponin T Reference Range:  <= 0.03 ng/mL-   Negative for AMI  >0.03 ng/mL-     Abnormal for myocardial necrosis.  Clinicians would have to utilize clinical acumen, EKG, Troponin and serial changes to determine if it is an Acute Myocardial Infarction or myocardial injury due to an underlying chronic condition.       Results may be falsely decreased if patient taking Biotin.      Comprehensive Metabolic Panel [[id number]]  (Abnormal) Collected: 04/02/21 0416    Specimen: Blood Updated: 04/02/21 0819     Glucose 166 mg/dL      BUN 16 mg/dL      Creatinine 0.67 mg/dL      Sodium 139 mmol/L      Potassium 4.6 mmol/L      Chloride 101 mmol/L      CO2 26.7 mmol/L      Calcium 8.7 mg/dL      Total Protein 6.4 g/dL      Albumin 3.20 g/dL      ALT (SGPT) 113 U/L      AST (SGOT) 50 U/L      Alkaline Phosphatase 87 U/L      Total Bilirubin 0.2 mg/dL      eGFR Non African Amer 94 mL/min/1.73      Globulin 3.2 gm/dL      A/G Ratio 1.0 g/dL      BUN/Creatinine Ratio 23.9     Anion Gap 11.3 mmol/L     Narrative:      GFR Normal >60  Chronic Kidney Disease <60  Kidney Failure <15      D-dimer, Quantitative  [[id number]]  (Abnormal) Collected: 04/02/21 0416    Specimen: Blood Updated: 04/02/21 0525     D-Dimer, Quantitative 4.67 MCGFEU/mL     Narrative:      Can be elevated in, but is not diagnostic for deep vein thrombosis (DVT) or pulmonary embolis (PE).  It is also elevated in other medical conditions.  Clinical correlation is required.  The negative cut-off value for the D-Dimer is 0.50 mcg FEU/mL for DVT and PE.      Gastrointestinal Panel, PCR - Stool, Per Rectum [[id number]]  (Normal) Collected: 04/01/21 0410    Specimen: Stool from Per Rectum Updated: 04/01/21 1145     Campylobacter Not Detected     Plesiomonas shigelloides Not Detected     Salmonella Not Detected     Vibrio Not Detected     Vibrio cholerae Not Detected     Yersinia enterocolitica Not Detected     Enteroaggregative E. coli (EAEC) Not Detected     Enteropathogenic E. coli (EPEC) Not Detected     Enterotoxigenic E. coli (ETEC) lt/st Not Detected     Shiga-like toxin-producing E. coli (STEC) stx1/stx2 Not Detected     Shigella/Enteroinvasive E. coli (EIEC) Not Detected     Cryptosporidium Not Detected     Cyclospora cayetanensis Not Detected     Entamoeba histolytica Not Detected     Giardia lamblia Not Detected     Adenovirus F40/41 Not Detected     Astrovirus Not Detected     Norovirus GI/GII Not Detected     Rotavirus A Not Detected     Sapovirus (I, II, IV or V) Not Detected    Narrative:      If Aeromonas, Staphylococcus aureus or Bacillus cereus are suspected, please order [id number]: Stool Culture, Aeromonas, S aureus, B Cereus.    Lipase [[id number]]  (Normal) Collected: 04/01/21 0421    Specimen: Blood Updated: 04/01/21 0840     Lipase 25 U/L     D-dimer, Quantitative [[id number]]  (Abnormal) Collected: 04/01/21 0421    Specimen: Blood Updated: 04/01/21 0544     D-Dimer, Quantitative 3.37 MCGFEU/mL     Narrative:      Can be elevated in, but is not diagnostic for deep vein thrombosis (DVT) or pulmonary embolis (PE).  It is also elevated in other  medical conditions.  Clinical correlation is required.  The negative cut-off value for the D-Dimer is 0.50 mcg FEU/mL for DVT and PE.      Protime-INR [[id number]]  (Abnormal) Collected: 04/01/21 0421    Specimen: Blood Updated: 04/01/21 0520     Protime 15.3 Seconds      INR 1.25    Narrative:      Therapeutic Ranges for INR: 2.0-3.0 (PT 20-30)                              2.5-3.5 (PT 25-34)    Clostridium Difficile Toxin - Stool, Per Rectum [[id number]]  (Normal) Collected: 04/01/21 0410    Specimen: Stool from Per Rectum Updated: 04/01/21 0519    Narrative:      The following orders were created for panel order Clostridium Difficile Toxin - Stool, Per Rectum.  Procedure                               Abnormality         Status                     ---------                               -----------         ------                     Clostridium Difficile EI...[[id number]]  Normal              Final result                 Please view results for these tests on the individual orders.    Clostridium Difficile EIA - Stool, Per Rectum [[id number]]  (Normal) Collected: 04/01/21 0410    Specimen: Stool from Per Rectum Updated: 04/01/21 0519     C Diff GDH / Toxin Negative    Procalcitonin [[id number]]  (Abnormal) Collected: 04/01/21 0421    Specimen: Blood Updated: 04/01/21 0459     Procalcitonin 9.37 ng/mL     Narrative:      Results may be falsely decreased if patient taking Biotin.     Timed Lactic Acid, Reflex [[id number]]  (Normal) Collected: 04/01/21 0421    Specimen: Blood Updated: 04/01/21 0450     Lactate 1.7 mmol/L     Troponin [[id number]]  (Normal) Collected: 03/31/21 0404    Specimen: Blood Updated: 04/01/21 0215     Troponin T <0.010 ng/mL     Narrative:      Troponin T Reference Range:  <= 0.03 ng/mL-   Negative for AMI  >0.03 ng/mL-     Abnormal for myocardial necrosis.  Clinicians would have to utilize clinical acumen, EKG, Troponin and serial changes to determine if it is an Acute Myocardial Infarction or  myocardial injury due to an underlying chronic condition.       Results may be falsely decreased if patient taking Biotin.      Lactic Acid, Plasma [[id number]]  (Abnormal) Collected: 04/01/21 0132    Specimen: Blood Updated: 04/01/21 0159     Lactate 2.2 mmol/L     Lactate Dehydrogenase [[id number]]  (Abnormal) Collected: 03/31/21 0404    Specimen: Blood Updated: 04/01/21 0158      U/L     CK [[id number]]  (Abnormal) Collected: 03/31/21 0404    Specimen: Blood Updated: 04/01/21 0158     Creatine Kinase 497 U/L     Hepatitis Panel, Acute [[id number]]  (Normal) Collected: 03/31/21 1705    Specimen: Blood Updated: 03/31/21 2040     Hepatitis B Surface Ag Non-Reactive     Hep A IgM Non-Reactive     Hep B C IgM Non-Reactive     Hepatitis C Ab Non-Reactive    Narrative:      Results may be falsely decreased if patient taking Biotin.     Lipase [[id number]]  (Normal) Collected: 03/31/21 0404    Specimen: Blood Updated: 03/31/21 1216     Lipase 17 U/L     Hepatic Function Panel [[id number]]  (Abnormal) Collected: 03/31/21 0404    Specimen: Blood Updated: 03/31/21 1216     Total Protein 6.4 g/dL      Albumin 2.80 g/dL      ALT (SGPT) 66 U/L      AST (SGOT) 65 U/L      Alkaline Phosphatase 85 U/L      Total Bilirubin <0.2 mg/dL      Bilirubin, Direct <0.2 mg/dL      Bilirubin, Indirect --     Comment: Unable to calculate       Renal Function Panel [[id number]]  (Abnormal) Collected: 03/31/21 0404    Specimen: Blood Updated: 03/31/21 0605     Glucose 163 mg/dL      BUN 25 mg/dL      Creatinine 0.71 mg/dL      Sodium 137 mmol/L      Potassium 4.4 mmol/L      Chloride 108 mmol/L      CO2 17.9 mmol/L      Calcium 7.9 mg/dL      Albumin 2.80 g/dL      Phosphorus 1.7 mg/dL      Anion Gap 11.1 mmol/L      BUN/Creatinine Ratio 35.2     eGFR Non African Amer 88 mL/min/1.73     Narrative:      GFR Normal >60  Chronic Kidney Disease <60  Kidney Failure <15      Magnesium [[id number]]  (Normal) Collected: 03/31/21 0404    Specimen: Blood  Updated: 03/31/21 0554     Magnesium 2.2 mg/dL     Legionella Antigen, Urine - Urine, Urine, Clean Catch [[id number]]  (Normal) Collected: 03/29/21 0410    Specimen: Urine, Clean Catch Updated: 03/29/21 1801     LEGIONELLA ANTIGEN, URINE Negative    S. Pneumo Ag Urine or CSF - Urine, Urine, Clean Catch [[id number]]  (Abnormal) Collected: 03/29/21 0410    Specimen: Urine, Clean Catch Updated: 03/29/21 1801     Strep Pneumo Ag Positive    Blood Culture - Blood, Blood, Venous Line [[id number]] Collected: 03/29/21 0916    Specimen: Blood, Venous Line Updated: 03/29/21 0916    Urine Drug Screen - Urine, Clean Catch [[id number]]  (Abnormal) Collected: 03/29/21 0410    Specimen: Urine, Clean Catch Updated: 03/29/21 0508     THC, Screen, Urine Negative     Phencyclidine (PCP), Urine Negative     Cocaine Screen, Urine Negative     Methamphetamine, Ur Negative     Opiate Screen Positive     Amphetamine Screen, Urine Negative     Benzodiazepine Screen, Urine Positive     Tricyclic Antidepressants Screen Negative     Methadone Screen, Urine Negative     Barbiturates Screen, Urine Positive     Oxycodone Screen, Urine Positive     Propoxyphene Screen Negative     Buprenorphine, Screen, Urine Negative    Narrative:      Urine drug screen results are to be used for medical purposes only.  They are not to be used for legal purposes such as employment testing.  Negative results do not necessarily mean the complete absence of a subtance, but rather that the result is less than the cutoff for that substance.  Positive results are unconfirmed and considered Preliminary Positive.  [facility] does not automatically confirm Postitive Unconfirmed results.  The physician may request (order) an Unconfirmed Positive result to be sent out for confirmation.      Negative Thresholds for Drugs Screened:    THC screen, urine                          50 ng/ml  Phenycyclidine (PCP), urine                25 ng/ml  Cocaine screen, urine                      150 ng/ml  Methamphetamine, urine                    500 ng/ml  Opiate screen, urine                      100 ng/ml  Amphetamine screen, urine                 500 ng/ml  Benzodiazepine screen, urine              150 ng/ml  Tricyclic Antidepressants screen, urine   300 ng/ml  Methadone screen, urine                   200 ng/ml  Barbiturates screen, urine                200 ng/ml  Oxycodone screen, urine                   100 ng/ml  Propoxyphene screen, urine                300 ng/ml  Buprenorphine screen, urine                10 ng/ml    Urinalysis, Microscopic Only - Urine, Clean Catch [[id number]]  (Abnormal) Collected: 03/29/21 0410    Specimen: Urine, Clean Catch Updated: 03/29/21 0440     RBC, UA 3-5 /HPF      WBC, UA None Seen /HPF      Bacteria, UA 1+ /HPF      Squamous Epithelial Cells, UA 3-6 /HPF      Yeast, UA Small/1+ Yeast /HPF      Hyaline Casts, UA None Seen /LPF      Amorphous Crystals, UA Small/1+ /HPF      Methodology Manual Light Microscopy    Urinalysis With Culture If Indicated - Urine, Clean Catch [[id number]]  (Abnormal) Collected: 03/29/21 0410    Specimen: Urine, Clean Catch Updated: 03/29/21 0425     Color, UA Yellow     Appearance, UA Cloudy     pH, UA <=5.0     Specific Gravity, UA 1.020     Glucose, UA Negative     Ketones, UA Negative     Bilirubin, UA Negative     Blood, UA Negative     Protein, UA 30 mg/dL (1+)     Leuk Esterase, UA Negative     Nitrite, UA Negative     Urobilinogen, UA 0.2 E.U./dL    Manual Differential [[id number]]  (Abnormal) Collected: 03/29/21 0150    Specimen: Blood Updated: 03/29/21 0232     Neutrophil % 72.0 %      Lymphocyte % 8.0 %      Monocyte % 5.0 %      Bands %  12.0 %      Metamyelocyte % 3.0 %      Neutrophils Absolute 7.76 10*3/mm3      Lymphocytes Absolute 0.74 10*3/mm3      Monocytes Absolute 0.46 10*3/mm3      Macrocytes Slight/1+     WBC Morphology Normal     Platelet Morphology Normal    COVID-19 and FLU A/B PCR - Swab, Nasopharynx  [[id number]]  (Normal) Collected: 03/29/21 0120    Specimen: Swab from Nasopharynx Updated: 03/29/21 0156     COVID19 Not Detected     Influenza A PCR Not Detected     Influenza B PCR Not Detected    Narrative:      Fact sheet for providers: https://www.fda.gov/media/162663/download    Fact sheet for patients: https://www.fda.gov/media/936073/download    Test performed by PCR.        Imaging Results (Most Recent)     Procedure Component Value Units Date/Time    CT Angiogram Chest With & Without Contrast [[id number]] Collected: 04/02/21 0951     Updated: 04/02/21 0953    Narrative:      CT ANGIOGRAM CHEST W WO CONTRAST    INDICATION:   Chest pain with elevated d-dimer on arrival    TECHNIQUE:   CT angiogram of the chest with 100 cc of Omnipaque 300 IV contrast. 3-D reconstructions were obtained and reviewed.   Radiation dose reduction techniques included automated exposure control or exposure modulation based on body size. Count of known CT and  cardiac nuc med studies performed in previous 12 months: 0.     COMPARISON:   None available.    FINDINGS:   Chest images at mediastinal window show no adenopathy. Small bilateral pleural effusions are noted. There are no pulmonary artery filling defects to suggest emboli. The CT angiographic images also show no evidence of emboli.    Chest images at lung window demonstrate dense alveolar consolidation in the right middle lobe and lingula. There are smaller infiltrates anteriorly in the right upper lobe and left upper lobe. Underlying emphysema is noted. The infiltrates are strongly  suspicious for acute pneumonia. There is no evidence of endobronchial obstruction on either side. Consolidation is greater on the right than on the left. Imaging features can be seen with COVID-19 pneumonia, although are nonspecific and can can occur  with a variety of infectious and noninfectious processes.    In addition, there is a left apical lung nodule that is noncalcified. It measures 7 mm  in diameter and 12 mm in length. It is suspicious for malignancy. There is also a right lower lobe nodule below the hilum measuring 8 mm. This could represent either a  true nodule or infiltrate. A small right lower lobe infiltrate is favored. Recommend correlation with any previous chest CTs if available. If no prior chest CTs are available, follow-up chest CT is recommended in 3 months to evaluate for enlargement over  time. PET/CT scanning could also be considered as a next step in evaluation but the left apical lesion is borderline in terms of increased chance for a false negative.      Impression:      No evidence of pulmonary embolism. There is extensive bilateral pneumonia predominantly in the right middle lobe and lingula and to a lesser extent in both upper lobes. This is accompanied by small bilateral pleural effusions. Additionally there is a  suspicious indeterminate left apical lung nodule that needs follow-up as described above. There is also a density in the right lower lobe that could represent either infiltrate or nodule measuring 8 mm in diameter. Malignancy is not excluded.    Signer Name: Galo Hernandes MD   Signed: 4/2/2021 9:51 AM   Workstation Name: [id number]    [facility] [[id number]] Collected: 04/01/21 1037     Updated: 04/01/21 1039    Narrative:      US Abdomen Ltd    INDICATION:   45-year-old female with abdominal pain for 4 days.    COMPARISON:   None available.    FINDINGS:  PANCREAS: Visualized portions of the pancreas are within normal limits.     LIVER: Increased echogenicity of the hepatic parenchyma is indicative of hepatic steatosis. The liver is enlarged, measuring 18.8 cm in craniocaudad length. No hepatic mass. The intrahepatic bile ducts are normal in caliber. The common duct is normal in  size at the sandra hepatis measuring 1-2 mm.    GALLBLADDER: Gallbladder wall is borderline in thickness, measuring 3 mm. No evidence of  cholelithiasis. There is a small amount of nonspecific pericholecystic fluid.    RIGHT KIDNEY: The right kidney measures 10 cm. Renal cortical thickness and echogenicity is normal. No hydronephrosis.    OTHER: Small amount of perihepatic ascites.      Impression:        1. Mild hepatomegaly with hepatic steatosis.  2. Small amount of perihepatic ascites and pericholecystic fluid. Nonspecific borderline gallbladder wall thickening, measuring 3 mm, which may be slightly accentuated by the pericholecystic fluid. No evidence of cholelithiasis or common bile duct  dilatation.    Signer Name: Blaise Vaughn MD   Signed: 4/1/2021 10:37 AM   Workstation Name: [id number]    [facility]    CT Abdomen Pelvis With Contrast [[id number]] Collected: 03/31/21 1611     Updated: 03/31/21 1617    Narrative:      CT abdomen and pelvis with contrast   03/31/2021     HISTORY:  Epigastric pain and bloating starting today     COMPARISON:  06/14/2016     TECHNIQUE:    CT of Abdomen and Pelvis with contrast performed.  Sagittal and Coronal  reconstructions performed. Radiation dose reduction techniques included  automated exposure control or exposure modulation based on body size.  Radiation audit for CT and nuclear cardiology exams in the last 12  months: 0.      FINDINGS:    Abdomen:  There is dense consolidation throughout almost all the right middle lobe  and lingula with air bronchograms consistent with pneumonia. There are  no noncalcified lung nodules. Small amount of fluid around the liver.  Liver slightly enlarged decreased in density. There is fluid around the  gallbladder. The gallbladder wall is normal. There is no biliary  distention. The spleen, pancreas, adrenal glands and kidneys are normal.  Aorta is normal in size. There is no adenopathy. Oral contrast was  administered. The bowel appears normal.       Pelvis:  There is small or free fluid in the pelvis. The uterus and adnexal  regions and bladder are  normal. The small amount of air in the anterior  abdominal wall probably from previous injection. Bones are unremarkable.  There is a vascular stent in the left common iliac artery         Impression:      The liver appears slightly enlarged and slightly decreased  in density. There is a small amount ascites around the liver and in the  pelvis. Correlation with hepatic enzymes and possibility of hepatitis is  recommended.     Dense consolidation in right middle lobe and lingula consistent with  pneumonia     Otherwise negative study     This report was finalized on 3/31/2021 4:15 PM by Dr. Harpreet Castellon MD.       XR Abdomen KUB [[id number]] Collected: 03/30/21 1157     Updated: 03/30/21 1159    Narrative:      XR ABDOMEN KUB-: 3/30/2021 11:53 AM     INDICATION:   Abdominal pain today     COMPARISON:   None available.     FINDINGS:  AP radiographs of the abdomen. The renal shadows are symmetric. No renal  calculi. No bladder calculi.     The bowel gas pattern is nonobstructive. No acute osseous abnormalities.  No radiopaque foreign body.       Impression:      Negative KUB     This report was finalized on 3/30/2021 11:57 AM by Dr. Harpreet Castellon MD.       XR Chest 1 View [[id number]] Collected: 03/29/21 0237     Updated: 03/29/21 0247    Narrative:      CR Chest 1 Vw    INDICATION:   Cough and chest pain 1 month. Nausea vomiting and headache in the emergency Department. Bilateral rib fractures.     COMPARISON:    3/11/2021    FINDINGS:  Single portable AP view(s) of the chest.  Cardiac silhouette within normal limits. Unremarkable vascularity. The lungs are hyperinflated and there are new lower lung zone opacities bilaterally obscuring the right and left heart borders, most  characteristic of pneumonia. No effusion. No pneumothorax. Previously documented right rib fractures difficult to visualize.      Impression:        1. Interval development of bilateral pneumonia appearing to involve the lingula and right middle lobe.  Follow-up to clearing recommended.    Signer Name: Huy Hilario MD   Signed: 3/29/2021 2:37 AM   Workstation Name: [facility]    CT Head Without Contrast [[id number]] Collected: 03/29/21 0242     Updated: 03/29/21 0246    Narrative:      CT Head WO    HISTORY:   Headache nausea and vomiting that began tonight. Cough and chest pain 1 month. COPD hypertension and breast implant placement.    TECHNIQUE:   Axial unenhanced head CT. Radiation dose reduction techniques included automated exposure control or exposure modulation based on body size. Count of known CT and cardiac nuc med studies performed in previous 12 months: 0.     Time of scan: 0223 hours    COMPARISON:   None.    FINDINGS:   No intracranial hemorrhage, mass, or infarct. No hydrocephalus or extra-axial fluid collection. Brain parenchymal density is normal. The skull base, calvarium, and extracranial soft tissues are normal.      Impression:      Normal, negative unenhanced head CT.          Signer Name: Huy Hilario MD   Signed: 3/29/2021 2:42 AM   Workstation Name: [facility]          PROCEDURES      Condition on Discharge:  Clinically and hemodynamically stable    Physical Exam at Discharge  Vital Signs  Temp:  [96.3 °F (35.7 °C)-98.5 °F (36.9 °C)] 97.6 °F (36.4 °C)  Heart Rate:  [] 114  Resp:  [16-20] 16  BP: (114-140)/(62-93) 119/72    Physical Exam   Constitutional: Patient appears well-developed and well-nourished and in no acute distress   HEENT:   Head: Normocephalic and atraumatic.   Eyes:  Pupils are equal, round, and reactive to light. EOM are intact. Sclera are anicteric and non-injected.    Neck: Neck supple. No JVD present. No thyromegaly present. No lymphadenopathy present.  Cardiovascular: Regular rate, regular rhythm, S1 normal and S2 normal.  Exam reveals no gallop and no friction rub.  No murmur heard.  Pulmonary/Chest: Pain chest wall due to rib  fractures. Lungs are clear to auscultation bilaterally. No respiratory distress. No wheezes. No rhonchi. No rales.   Abdominal: Soft. Bowel sounds are normal. No distension and no mass. There is no hepatosplenomegaly. There is no tenderness.   Musculoskeletal: Normal Muscle tone  Extremities: No edema. Pulses are palpable in all 4 extremities.  Neurological: Patient is alert and oriented to person, place, and time. Cranial nerves II-XII are grossly intact with no focal deficits.  Skin: Skin is warm. No rash noted. Nails show no clubbing.  No cyanosis or erythema.        Discharge Disposition  Patient will be discharged home    Visiting Nurse:    N/A    Home PT/OT:  N/A    Home Safety Evaluation:  N/A    DME  N/A    Discharge Diet:      Dietary Orders (From admission, onward)     Start     Ordered    04/01/21 1149  Diet Regular; Low Fat  Diet Effective Now     Question Answer Comment   Diet Texture / Consistency Regular    Common Modifiers Low Fat        04/01/21 1148                Activity at Discharge:  Ad Lula    Pre-discharge education  Smoking      Follow-up Appointments  Future Appointments   Date Time Provider Department Center   6/8/2021 11:20 AM Gatito Short MD [facility]   Follow up with PCP in one week  Reviewed CT chest findings w/ patient. There is a  suspicious indeterminate left apical lung nodule that needs follow-up as described above. There is also a density in the right lower lobe that could represent either infiltrate or nodule measuring 8 mm in diameter. Malignancy is not excluded.  Needs repeat scan in 3 months.     [unfilled]    Test Results Pending at Discharge  [unfilled]     Jesse Colby MD  04/03/21  15:12 EDT    Time: 30 min (if over 30 minutes give explanation as to why it took greater than 30 minutes)  Discharge over 30 min (if over 30 minutes give explanation as to why it took greater than 30 minutes)  Secondary to:  Coordination of home care  D/W case  management  Medication reconciliation      Electronically signed by Jesse Colby MD at 04/03/21 2830

## 2021-04-06 NOTE — NURSING NOTE
Case Management Discharge Note      Final Note: Discharged home.    Provided Post Acute Provider List?: Refused  Refused Provider List Comment: Offered community resources but pt declines the need for them at this time.    Selected Continued Care - Discharged on 4/3/2021 Admission date: 3/29/2021 - Discharge disposition: Home or Self Care    Destination    No services have been selected for the patient.              Durable Medical Equipment    No services have been selected for the patient.              Dialysis/Infusion    No services have been selected for the patient.              Home Medical Care    No services have been selected for the patient.              Therapy    No services have been selected for the patient.              Community Resources    No services have been selected for the patient.                       Final Discharge Disposition Code: 01 - home or self-care

## 2021-04-08 NOTE — PAYOR COMM NOTE
"Valeria Rubi (45 y.o. Female)     ATTN: NURSE REVIEWER   RE: DISCHARGE SUMMARY   REF# [id number]  PLS REPLY TO GERTRUDIS RODRIGUEZ  [phone]  [phone]      Date of Birth Social Security Number Address Home Phone MRN    1975  [address] [place] [id number] [phone] [id number]    Orthodoxy Marital Status          None Single       Admission Date Admission Type Admitting Provider Attending Provider Department, Room/Bed    3/29/21 Emergency Facundo Nichols MD  [facility] MED SURG, 1412/1    Discharge Date Discharge Disposition Discharge Destination        4/3/2021 Home or Self Care              Attending Provider: (none)   Allergies: Flonase [Fluticasone]    Isolation: None   Infection: None   Code Status: Prior    Ht: 157.5 cm (62\")   Wt: 63.2 kg (139 lb 6.4 oz)    Admission Cmt: None   Principal Problem: None                Active Insurance as of 3/29/2021     Primary Coverage     Payor Plan Insurance Group Employer/Plan Group    [employer]HEM BLUE CROSS ANTHEM BLUE CROSS BLUE SHIELD PPO [id number]     Payor Plan Address Payor Plan Phone Number Payor Plan Fax Number Effective Dates    [address] [phone]  1/1/2021 - None Entered    [place] [address]       Subscriber Name Subscriber Birth Date Member ID       VALERIA RUBI 1975 [id number]                 Emergency Contacts      (Rel.) Home Phone Work Phone Mobile Phone    Harpreet Ruiz (Spouse) -- -- [phone]    Arjun Ruiz (Son) -- -- [phone]               Discharge Summary      Jesse Colby MD at 04/03/21 1512              HOSPITALIST SERVICES  @ [facility], [facility] Hospitalist Team    DISCHARGE SUMMARY        Valeria JAIN Greyson  1975  [id number]        Hospitalists Discharge Summary    PATIENT EXAMINED ON BEDSIDE    Date of Admission: 3/29/2021  Date of Discharge:  4/3/2021            DIAGNOSES:      Primary Discharge Diagnoses:     1.  " Atypical Chest Pain:    2.  Essential HTN:   3.  Tachycardia:    4.  Abdominal Pain:    5.  Pneumococcal Pneumonia:   6.  Transaminitis:    7.  Anion Gap Metabolic Acidosis:   8.  PVD:   9.  Right 6th and 7th Rib Fractures:   10. Bilateral Lung nodules on chest CT scan      Secondary Discharge Diagnoses:     As per Problem List      Surgical Discharge Diagnoses:     As per Problem List        PCP  Patient Care Team:  Adrianne Riddle MD as PCP - General  Adrianne Riddle MD as PCP - Family Medicine    Consults:   Consults     No orders found from 2/28/2021 to 3/30/2021.          Operations and Procedures Performed:       XR Ribs Right With PA Chest    Result Date: 3/11/2021  Narrative: CHEST AND RIGHT RIB SERIES, 3/11/2021  HISTORY:  45-year-old female in the ED noting persistent right side pain from rib fractures 2/17/2021. Coughing.  TECHNIQUE: PA chest and four view right rib series.  FINDINGS: Slightly displaced right 6th rib fracture and adjacent nondisplaced right 7th rib fracture. The 6th rib fracture was not visible on the previous study. Pulmonary hyperinflation. No visible pneumothorax or pleural effusion. Heart size and pulmonary vascularity are normal.     Impression: 1. Fractures of the right 6th and 7th ribs. 2. No pneumothorax. Signer Name: Danny Davis MD  Signed: 3/11/2021 4:08 PM  Workstation Name: [id number]  [facility]    CT Head Without Contrast    Result Date: 3/29/2021  Narrative: CT Head WO HISTORY: Headache nausea and vomiting that began tonight. Cough and chest pain 1 month. COPD hypertension and breast implant placement. TECHNIQUE: Axial unenhanced head CT. Radiation dose reduction techniques included automated exposure control or exposure modulation based on body size. Count of known CT and cardiac nuc med studies performed in previous 12 months: 0. Time of scan: 0223 hours COMPARISON: None. FINDINGS: No intracranial hemorrhage, mass, or  infarct. No hydrocephalus or extra-axial fluid collection. Brain parenchymal density is normal. The skull base, calvarium, and extracranial soft tissues are normal.     Impression: Normal, negative unenhanced head CT. Signer Name: Huy Hilaroi MD  Signed: 3/29/2021 2:42 AM  Workstation Name: [id number]  [facility]    CT Angiogram Chest With & Without Contrast    Result Date: 4/2/2021  Narrative: CT ANGIOGRAM CHEST W WO CONTRAST INDICATION: Chest pain with elevated d-dimer on arrival TECHNIQUE: CT angiogram of the chest with 100 cc of Omnipaque 300 IV contrast. 3-D reconstructions were obtained and reviewed.   Radiation dose reduction techniques included automated exposure control or exposure modulation based on body size. Count of known CT and cardiac nuc med studies performed in previous 12 months: 0. COMPARISON: None available. FINDINGS: Chest images at mediastinal window show no adenopathy. Small bilateral pleural effusions are noted. There are no pulmonary artery filling defects to suggest emboli. The CT angiographic images also show no evidence of emboli. Chest images at lung window demonstrate dense alveolar consolidation in the right middle lobe and lingula. There are smaller infiltrates anteriorly in the right upper lobe and left upper lobe. Underlying emphysema is noted. The infiltrates are strongly suspicious for acute pneumonia. There is no evidence of endobronchial obstruction on either side. Consolidation is greater on the right than on the left. Imaging features can be seen with COVID-19 pneumonia, although are nonspecific and can can occur with a variety of infectious and noninfectious processes. In addition, there is a left apical lung nodule that is noncalcified. It measures 7 mm in diameter and 12 mm in length. It is suspicious for malignancy. There is also a right lower lobe nodule below the hilum measuring 8 mm. This could represent either a true nodule or infiltrate.  A small right lower lobe infiltrate is favored. Recommend correlation with any previous chest CTs if available. If no prior chest CTs are available, follow-up chest CT is recommended in 3 months to evaluate for enlargement over time. PET/CT scanning could also be considered as a next step in evaluation but the left apical lesion is borderline in terms of increased chance for a false negative.     Impression: No evidence of pulmonary embolism. There is extensive bilateral pneumonia predominantly in the right middle lobe and lingula and to a lesser extent in both upper lobes. This is accompanied by small bilateral pleural effusions. Additionally there is a suspicious indeterminate left apical lung nodule that needs follow-up as described above. There is also a density in the right lower lobe that could represent either infiltrate or nodule measuring 8 mm in diameter. Malignancy is not excluded. Signer Name: Galo Hernandes MD  Signed: 4/2/2021 9:51 AM  Workstation Name: [id number]  [facility]    CT Abdomen Pelvis With Contrast    Result Date: 3/31/2021  Narrative: CT abdomen and pelvis with contrast   03/31/2021  HISTORY: Epigastric pain and bloating starting today  COMPARISON: 06/14/2016  TECHNIQUE:  CT of Abdomen and Pelvis with contrast performed.  Sagittal and Coronal reconstructions performed. Radiation dose reduction techniques included automated exposure control or exposure modulation based on body size. Radiation audit for CT and nuclear cardiology exams in the last 12 months: 0.  FINDINGS:  Abdomen: There is dense consolidation throughout almost all the right middle lobe and lingula with air bronchograms consistent with pneumonia. There are no noncalcified lung nodules. Small amount of fluid around the liver. Liver slightly enlarged decreased in density. There is fluid around the gallbladder. The gallbladder wall is normal. There is no biliary distention. The spleen, pancreas, adrenal  glands and kidneys are normal. Aorta is normal in size. There is no adenopathy. Oral contrast was administered. The bowel appears normal.   Pelvis: There is small or free fluid in the pelvis. The uterus and adnexal regions and bladder are normal. The small amount of air in the anterior abdominal wall probably from previous injection. Bones are unremarkable. There is a vascular stent in the left common iliac artery       Impression: The liver appears slightly enlarged and slightly decreased in density. There is a small amount ascites around the liver and in the pelvis. Correlation with hepatic enzymes and possibility of hepatitis is recommended.  Dense consolidation in right middle lobe and lingula consistent with pneumonia  Otherwise negative study  This report was finalized on 3/31/2021 4:15 PM by Dr. Harpreet Castellon MD.      US Gallbladder    Result Date: 4/1/2021  Narrative: US Abdomen Ltd INDICATION: 45-year-old female with abdominal pain for 4 days. COMPARISON: None available. FINDINGS: PANCREAS: Visualized portions of the pancreas are within normal limits. LIVER: Increased echogenicity of the hepatic parenchyma is indicative of hepatic steatosis. The liver is enlarged, measuring 18.8 cm in craniocaudad length. No hepatic mass. The intrahepatic bile ducts are normal in caliber. The common duct is normal in size at the sandra hepatis measuring 1-2 mm. GALLBLADDER: Gallbladder wall is borderline in thickness, measuring 3 mm. No evidence of cholelithiasis. There is a small amount of nonspecific pericholecystic fluid. RIGHT KIDNEY: The right kidney measures 10 cm. Renal cortical thickness and echogenicity is normal. No hydronephrosis. OTHER: Small amount of perihepatic ascites.     Impression: 1. Mild hepatomegaly with hepatic steatosis. 2. Small amount of perihepatic ascites and pericholecystic fluid. Nonspecific borderline gallbladder wall thickening, measuring 3 mm, which may be slightly accentuated by the  pericholecystic fluid. No evidence of cholelithiasis or common bile duct dilatation. Signer Name: Blaise Vaughn MD  Signed: 4/1/2021 10:37 AM  Workstation Name: [id number]  [facility]    XR Chest 1 View    Result Date: 3/29/2021  Narrative: CR Chest 1 Vw INDICATION: Cough and chest pain 1 month. Nausea vomiting and headache in the emergency Department. Bilateral rib fractures. COMPARISON:  3/11/2021 FINDINGS: Single portable AP view(s) of the chest.  Cardiac silhouette within normal limits. Unremarkable vascularity. The lungs are hyperinflated and there are new lower lung zone opacities bilaterally obscuring the right and left heart borders, most characteristic of pneumonia. No effusion. No pneumothorax. Previously documented right rib fractures difficult to visualize.     Impression: 1. Interval development of bilateral pneumonia appearing to involve the lingula and right middle lobe. Follow-up to clearing recommended. Signer Name: Huy Hilario MD  Signed: 3/29/2021 2:37 AM  Workstation Name: [id number]  [facility]    XR Abdomen KUB    Result Date: 3/30/2021  Narrative: XR ABDOMEN KUB-: 3/30/2021 11:53 AM  INDICATION: Abdominal pain today  COMPARISON: None available.  FINDINGS: AP radiographs of the abdomen. The renal shadows are symmetric. No renal calculi. No bladder calculi.  The bowel gas pattern is nonobstructive. No acute osseous abnormalities. No radiopaque foreign body.      Impression: Negative KUB  This report was finalized on 3/30/2021 11:57 AM by Dr. Harpreet Castellon MD.        Allergies:  is allergic to flonase [fluticasone].    Marcelo  not reviewed    Discharge Medications:     Discharge Medications      ASK your doctor about these medications      Instructions Start Date   albuterol (2.5 MG/3ML) 0.083% nebulizer solution  Commonly known as: PROVENTIL   USE 1 VIAL VIA NEBULIZER EVERY 4 HOURS AS NEEDED      albuterol sulfate  (90 Base) MCG/ACT  inhaler  Commonly known as: PROVENTIL HFA;VENTOLIN HFA;PROAIR HFA   TAKE 2 PUFFS BY MOUTH EVERY 4 HOURS AS NEEDED      aspirin 81 MG EC tablet   81 mg, Oral, Daily      clopidogrel 75 MG tablet  Commonly known as: Plavix   75 mg, Oral, Daily      fluticasone 50 MCG/ACT nasal spray  Commonly known as: FLONASE   1 spray, Nasal, As Needed      ipratropium-albuterol 0.5-2.5 mg/3 ml nebulizer  Commonly known as: DUO-NEB   INHALE 1 VIAL VIA NEBULIZER 3 TIMES A DAY      lisinopril-hydrochlorothiazide 20-12.5 MG per tablet  Commonly known as: PRINZIDE,ZESTORETIC   TAKE 1 TABLET BY MOUTH EVERY DAY      medroxyPROGESTERone 150 MG/ML injection  Commonly known as: DEPO-PROVERA   150 mg, Buccal, Every 3 Months, Pt takes every 3 months.march or April 2018 last dose      Nurtec 75 MG tablet dispersible tablet  Generic drug: Rimegepant Sulfate   75 mg, Oral, Once As Needed      nystatin 356701 UNIT/ML suspension  Commonly known as: MYCOSTATIN   500,000 Units, Swish & Swallow, 4 Times Daily, Swish and swallow 5mL four times a day for two weeks.      oxyCODONE-acetaminophen 5-325 MG per tablet  Commonly known as: PERCOCET   1 tablet, Oral, Every 6 Hours PRN      promethazine 25 MG tablet  Commonly known as: PHENERGAN   25 mg, Oral, Every 8 Hours PRN      simvastatin 40 MG tablet  Commonly known as: ZOCOR   40 mg, Oral, Every Evening      Stiolto Respimat 2.5-2.5 MCG/ACT aerosol solution inhaler  Generic drug: tiotropium bromide-olodaterol   2 puffs, Inhalation, Daily      varenicline 1 MG tablet  Commonly known as: CHANTIX   1 mg, Oral, 2 Times Daily      venlafaxine XR 75 MG 24 hr capsule  Commonly known as: EFFEXOR-XR   TAKE 1 CAPSULE BY MOUTH EVERY DAY      vitamin D 1.25 MG (30598 UT) capsule capsule  Commonly known as: ERGOCALCIFEROL   50,000 Units, Oral, Every 7 Days             History of Present Illness:    Ms. Rubi 45-year-old female with COPD.  Presents to ER with 1 week worth of worsening shortness of breath productive  cough subjective fever and chills.  Also has several fractured ribs several weeks ago due to severe COPD cough.  She was seen in the ER for that as well.  Still smokes heavily.  Been taking her home nebulizers without improvement.  On chest x-ray she has bilateral lower lobe pneumonia.  Covid's and flu swab are negative.  Present with with tachycardia 136 and low blood pressure of 91/56.  Sepsis protocol was started.    Hospital Course       Assessment and Plan:       1.  45-year-old female with acute exacerbation of COPD.  Complicated by multiple rib fractures.     2.  Bilateral lower lobe pneumonias.  Covid and flu swab negative.  Suspect bacterial.     3.  Sepsis upon presentation with a heart rate 136; O2 sat i87 percent on room air blood pressure 91/ 56.  And elevated inflammatory markers.     4.  Patient was admitted to [facility] with telemetry oxygen, duo nebs, IV steroids, IV Cepimine  Continued IV fluid management.            Facundo Nichols MD  03/29/21  07:17 EDT      Assessment/Plan         1.  Abdominal Pain: Note reviewed from last night.  I reviewed the contrasted CT images w/ radiologist this morning and vasculature appears patent and bowl walls are normal.  She does have some fatty infiltration in the liver which could explain the mildly elevated lactate.  LDH can be elevated from the pneumonia.LFT's have trended up in a cholestatic pattern so I did check a RUQ ultrasound that was unremarkable.  I think her diarrhea overnight was explained by oral contrast moving through and clearing stool seen on CT.  Culture was negative.  Two things come to mind:  1) being referred pain from rib fractures or 2) distension from ascities.  I've turned IVF off.  Discussed w/ patient.     2.  Anion Gap Metabolic Acidosis: Could be driven by lactate.  Will repeat level and monitor/     3.  Transaminitis: As discussed above.     4.  Pneumococcal pneumonia: On Cefdinir and Prednisone.     5.  PVD: As discussed in  #1.     6.  Right 6th and 7th Rib Fractures: Conservative care.      Plan for disposition: Home when able.     Dagoberto Chilel MD  04/01/21  11:33 EDT       1.  Atypical Chest Pain: Low suspicion for coronary etiology.  However, given tachycardia, and increased D-dimer, I will check a CT angio.     2.  Essential HTN: Not at goal.  Discussed w/ APRN on-call after starting Lasix.  Add prn Hydralazine.  Will resume home ACEI when renal stability assured.     3.  Tachycardia: Etiology unclear, but my instincts make me feel like she is withdrawing from something.  Her CT liver findings demonstrated fatty liver and her MCV is elevated suggesting EtOH which she denies.  Reviewing her Marcelo and UDS, she has not been prescribed benzos.  I do not believe she is septic, nor do I believe she is volume deplete. I had initially thought maybe narcotic withdrawal, but this seems unlikely.  Will try Ativan/Xanax and follow.  Certainly can add Labetalol, but I feel like this will mask the problem.     4.  Abdominal Pain: Not the primary concern this morning.  See my discussion from yesterday.     5.  Pneumococcal Pneumonia: Continue Cefdinir and Prednisone.     6.  Transaminitis: Trending down.  Work-up negative except for fatty liver.     7.  Anion Gap Metabolic Acidosis: Resolved.     8.  PVD: Continue on home therapy.     9.  Right 6th and 7th Rib Fractures: Continue conservative care.     Plan for disposition: Home tomorrow if renal function intact after contrast loads and tachycardia better.     Dagoberto Chilel MD  04/02/21  08:24 EDT       Dagoberto Chilel MD   Physician   Medicine   Progress Notes   Signed   Date of Service:  04/02/21 1931   Creation Time:  04/02/21 1931            Signed             Show:Clear all  []Manual[]Template[]Copied    Added by:  [x]Dagoberto Chilel MD    []Silas for details  Reviewed CT chest findings w/ patient.  Needs repeat scan in 3 months.  She also responded well to initiation of Xanax.  I  asked the patient why her initial UDS demonstrated benzos, but her Marcelo was negative for such.  She did not know, and reports she doesn't take anything extra.  EtOH was negative.  Plan for discharge in AM.                      Hospital Course  Last Lab Results:   Lab Results (most recent)     Procedure Component Value Units Date/Time    Blood Culture - Blood, Arm, Left [[id number]] Collected: 03/29/21 0950    Specimen: Blood from Arm, Left Updated: 04/03/21 1000     Blood Culture No growth at 5 days    Procalcitonin [[id number]]  (Abnormal) Collected: 04/03/21 0502    Specimen: Blood Updated: 04/03/21 0623     Procalcitonin 2.69 ng/mL     Narrative:      Results may be falsely decreased if patient taking Biotin.     CBC & Differential [[id number]]  (Abnormal) Collected: 04/03/21 0502    Specimen: Blood Updated: 04/03/21 0612    Narrative:      The following orders were created for panel order CBC & Differential.  Procedure                               Abnormality         Status                     ---------                               -----------         ------                     Scan Slide[[id number]]                                       Final result               CBC Auto Differential[[id number]]        Abnormal            Final result                 Please view results for these tests on the individual orders.    Scan Slide [[id number]] Collected: 04/03/21 0502    Specimen: Blood Updated: 04/03/21 0612     Macrocytes Slight/1+     WBC Morphology Normal     Platelet Morphology Normal    Comprehensive Metabolic Panel [[id number]]  (Abnormal) Collected: 04/03/21 0502    Specimen: Blood Updated: 04/03/21 0557     Glucose 89 mg/dL      BUN 15 mg/dL      Creatinine 0.68 mg/dL      Sodium 142 mmol/L      Potassium 4.1 mmol/L      Chloride 102 mmol/L      CO2 30.5 mmol/L      Calcium 8.0 mg/dL      Total Protein 5.9 g/dL      Albumin 2.90 g/dL      ALT (SGPT) 77 U/L      AST (SGOT) 26 U/L      Alkaline Phosphatase 82  U/L      Total Bilirubin 0.4 mg/dL      eGFR Non African Amer 93 mL/min/1.73      Globulin 3.0 gm/dL      A/G Ratio 1.0 g/dL      BUN/Creatinine Ratio 22.1     Anion Gap 9.5 mmol/L     Narrative:      GFR Normal >60  Chronic Kidney Disease <60  Kidney Failure <15      CBC Auto Differential [[id number]]  (Abnormal) Collected: 04/03/21 0502    Specimen: Blood Updated: 04/03/21 0548     WBC 21.01 10*3/mm3      RBC 3.48 10*6/mm3      Hemoglobin 11.8 g/dL      Hematocrit 37.5 %      .8 fL      MCH 33.9 pg      MCHC 31.5 g/dL      RDW 12.6 %      RDW-SD 49.4 fl      MPV 9.9 fL      Platelets 280 10*3/mm3      Neutrophil % 64.8 %      Lymphocyte % 17.2 %      Monocyte % 7.9 %      Eosinophil % 0.3 %      Basophil % 0.7 %      Immature Grans % 9.1 %      Neutrophils, Absolute 13.60 10*3/mm3      Lymphocytes, Absolute 3.61 10*3/mm3      Monocytes, Absolute 1.67 10*3/mm3      Eosinophils, Absolute 0.06 10*3/mm3      Basophils, Absolute 0.15 10*3/mm3      Immature Grans, Absolute 1.92 10*3/mm3      nRBC 0.7 /100 WBC     Ethanol [[id number]] Collected: 04/02/21 0416    Specimen: Blood Updated: 04/02/21 1434     Ethanol <10 mg/dL      Ethanol % <0.010 %     Urine Drug Screen - Urine, Clean Catch [[id number]]  (Abnormal) Collected: 04/02/21 1342    Specimen: Urine, Clean Catch Updated: 04/02/21 1357     THC, Screen, Urine Negative     Phencyclidine (PCP), Urine Negative     Cocaine Screen, Urine Negative     Methamphetamine, Ur Negative     Opiate Screen Negative     Amphetamine Screen, Urine Negative     Benzodiazepine Screen, Urine Positive     Tricyclic Antidepressants Screen Negative     Methadone Screen, Urine Negative     Barbiturates Screen, Urine Positive     Oxycodone Screen, Urine Positive     Propoxyphene Screen Negative     Buprenorphine, Screen, Urine Negative    Narrative:      Urine drug screen results are to be used for medical purposes only.  They are not to be used for legal purposes such as employment  testing.  Negative results do not necessarily mean the complete absence of a subtance, but rather that the result is less than the cutoff for that substance.  Positive results are unconfirmed and considered Preliminary Positive.  [facility] does not automatically confirm Postitive Unconfirmed results.  The physician may request (order) an Unconfirmed Positive result to be sent out for confirmation.      Negative Thresholds for Drugs Screened:    THC screen, urine                          50 ng/ml  Phenycyclidine (PCP), urine                25 ng/ml  Cocaine screen, urine                     150 ng/ml  Methamphetamine, urine                    500 ng/ml  Opiate screen, urine                      100 ng/ml  Amphetamine screen, urine                 500 ng/ml  Benzodiazepine screen, urine              150 ng/ml  Tricyclic Antidepressants screen, urine   300 ng/ml  Methadone screen, urine                   200 ng/ml  Barbiturates screen, urine                200 ng/ml  Oxycodone screen, urine                   100 ng/ml  Propoxyphene screen, urine                300 ng/ml  Buprenorphine screen, urine                10 ng/ml    CBC & Differential [[id number]]  (Abnormal) Collected: 04/02/21 1210    Specimen: Blood Updated: 04/02/21 1228    Narrative:      The following orders were created for panel order CBC & Differential.  Procedure                               Abnormality         Status                     ---------                               -----------         ------                     Scan Slide[[id number]]                                                                  CBC Auto Differential[[id number]]        Abnormal            Final result                 Please view results for these tests on the individual orders.    CBC Auto Differential [[id number]]  (Abnormal) Collected: 04/02/21 1210    Specimen: Blood Updated: 04/02/21 1221     WBC 20.76 10*3/mm3      RBC 3.55 10*6/mm3      Hemoglobin  12.1 g/dL      Hematocrit 38.1 %      .3 fL      MCH 34.1 pg      MCHC 31.8 g/dL      RDW 12.4 %      RDW-SD 49.8 fl      MPV 9.8 fL      Platelets 233 10*3/mm3      Neutrophil % 74.1 %      Lymphocyte % 8.3 %      Monocyte % 10.1 %      Eosinophil % 0.0 %      Basophil % 0.0 %      Immature Grans % 7.5 %      Neutrophils, Absolute 15.38 10*3/mm3      Lymphocytes, Absolute 1.72 10*3/mm3      Monocytes, Absolute 2.09 10*3/mm3      Eosinophils, Absolute 0.01 10*3/mm3      Basophils, Absolute 0.01 10*3/mm3      Immature Grans, Absolute 1.55 10*3/mm3     CK [[id number]]  (Abnormal) Collected: 04/02/21 0416    Specimen: Blood Updated: 04/02/21 0940     Creatine Kinase 333 U/L     Troponin [[id number]]  (Normal) Collected: 04/02/21 0416    Specimen: Blood Updated: 04/02/21 0820     Troponin T <0.010 ng/mL     Narrative:      Troponin T Reference Range:  <= 0.03 ng/mL-   Negative for AMI  >0.03 ng/mL-     Abnormal for myocardial necrosis.  Clinicians would have to utilize clinical acumen, EKG, Troponin and serial changes to determine if it is an Acute Myocardial Infarction or myocardial injury due to an underlying chronic condition.       Results may be falsely decreased if patient taking Biotin.      Comprehensive Metabolic Panel [[id number]]  (Abnormal) Collected: 04/02/21 0416    Specimen: Blood Updated: 04/02/21 0819     Glucose 166 mg/dL      BUN 16 mg/dL      Creatinine 0.67 mg/dL      Sodium 139 mmol/L      Potassium 4.6 mmol/L      Chloride 101 mmol/L      CO2 26.7 mmol/L      Calcium 8.7 mg/dL      Total Protein 6.4 g/dL      Albumin 3.20 g/dL      ALT (SGPT) 113 U/L      AST (SGOT) 50 U/L      Alkaline Phosphatase 87 U/L      Total Bilirubin 0.2 mg/dL      eGFR Non African Amer 94 mL/min/1.73      Globulin 3.2 gm/dL      A/G Ratio 1.0 g/dL      BUN/Creatinine Ratio 23.9     Anion Gap 11.3 mmol/L     Narrative:      GFR Normal >60  Chronic Kidney Disease <60  Kidney Failure <15      D-dimer, Quantitative  [[id number]]  (Abnormal) Collected: 04/02/21 0416    Specimen: Blood Updated: 04/02/21 0525     D-Dimer, Quantitative 4.67 MCGFEU/mL     Narrative:      Can be elevated in, but is not diagnostic for deep vein thrombosis (DVT) or pulmonary embolis (PE).  It is also elevated in other medical conditions.  Clinical correlation is required.  The negative cut-off value for the D-Dimer is 0.50 mcg FEU/mL for DVT and PE.      Gastrointestinal Panel, PCR - Stool, Per Rectum [[id number]]  (Normal) Collected: 04/01/21 0410    Specimen: Stool from Per Rectum Updated: 04/01/21 1145     Campylobacter Not Detected     Plesiomonas shigelloides Not Detected     Salmonella Not Detected     Vibrio Not Detected     Vibrio cholerae Not Detected     Yersinia enterocolitica Not Detected     Enteroaggregative E. coli (EAEC) Not Detected     Enteropathogenic E. coli (EPEC) Not Detected     Enterotoxigenic E. coli (ETEC) lt/st Not Detected     Shiga-like toxin-producing E. coli (STEC) stx1/stx2 Not Detected     Shigella/Enteroinvasive E. coli (EIEC) Not Detected     Cryptosporidium Not Detected     Cyclospora cayetanensis Not Detected     Entamoeba histolytica Not Detected     Giardia lamblia Not Detected     Adenovirus F40/41 Not Detected     Astrovirus Not Detected     Norovirus GI/GII Not Detected     Rotavirus A Not Detected     Sapovirus (I, II, IV or V) Not Detected    Narrative:      If Aeromonas, Staphylococcus aureus or Bacillus cereus are suspected, please order [id number]: Stool Culture, Aeromonas, S aureus, B Cereus.    Lipase [[id number]]  (Normal) Collected: 04/01/21 0421    Specimen: Blood Updated: 04/01/21 0840     Lipase 25 U/L     D-dimer, Quantitative [[id number]]  (Abnormal) Collected: 04/01/21 0421    Specimen: Blood Updated: 04/01/21 0544     D-Dimer, Quantitative 3.37 MCGFEU/mL     Narrative:      Can be elevated in, but is not diagnostic for deep vein thrombosis (DVT) or pulmonary embolis (PE).  It is also elevated in other  medical conditions.  Clinical correlation is required.  The negative cut-off value for the D-Dimer is 0.50 mcg FEU/mL for DVT and PE.      Protime-INR [[id number]]  (Abnormal) Collected: 04/01/21 0421    Specimen: Blood Updated: 04/01/21 0520     Protime 15.3 Seconds      INR 1.25    Narrative:      Therapeutic Ranges for INR: 2.0-3.0 (PT 20-30)                              2.5-3.5 (PT 25-34)    Clostridium Difficile Toxin - Stool, Per Rectum [[id number]]  (Normal) Collected: 04/01/21 0410    Specimen: Stool from Per Rectum Updated: 04/01/21 0519    Narrative:      The following orders were created for panel order Clostridium Difficile Toxin - Stool, Per Rectum.  Procedure                               Abnormality         Status                     ---------                               -----------         ------                     Clostridium Difficile EI...[[id number]]  Normal              Final result                 Please view results for these tests on the individual orders.    Clostridium Difficile EIA - Stool, Per Rectum [[id number]]  (Normal) Collected: 04/01/21 0410    Specimen: Stool from Per Rectum Updated: 04/01/21 0519     C Diff GDH / Toxin Negative    Procalcitonin [[id number]]  (Abnormal) Collected: 04/01/21 0421    Specimen: Blood Updated: 04/01/21 0459     Procalcitonin 9.37 ng/mL     Narrative:      Results may be falsely decreased if patient taking Biotin.     Timed Lactic Acid, Reflex [[id number]]  (Normal) Collected: 04/01/21 0421    Specimen: Blood Updated: 04/01/21 0450     Lactate 1.7 mmol/L     Troponin [[id number]]  (Normal) Collected: 03/31/21 0404    Specimen: Blood Updated: 04/01/21 0215     Troponin T <0.010 ng/mL     Narrative:      Troponin T Reference Range:  <= 0.03 ng/mL-   Negative for AMI  >0.03 ng/mL-     Abnormal for myocardial necrosis.  Clinicians would have to utilize clinical acumen, EKG, Troponin and serial changes to determine if it is an Acute Myocardial Infarction or  myocardial injury due to an underlying chronic condition.       Results may be falsely decreased if patient taking Biotin.      Lactic Acid, Plasma [[id number]]  (Abnormal) Collected: 04/01/21 0132    Specimen: Blood Updated: 04/01/21 0159     Lactate 2.2 mmol/L     Lactate Dehydrogenase [[id number]]  (Abnormal) Collected: 03/31/21 0404    Specimen: Blood Updated: 04/01/21 0158      U/L     CK [[id number]]  (Abnormal) Collected: 03/31/21 0404    Specimen: Blood Updated: 04/01/21 0158     Creatine Kinase 497 U/L     Hepatitis Panel, Acute [[id number]]  (Normal) Collected: 03/31/21 1705    Specimen: Blood Updated: 03/31/21 2040     Hepatitis B Surface Ag Non-Reactive     Hep A IgM Non-Reactive     Hep B C IgM Non-Reactive     Hepatitis C Ab Non-Reactive    Narrative:      Results may be falsely decreased if patient taking Biotin.     Lipase [[id number]]  (Normal) Collected: 03/31/21 0404    Specimen: Blood Updated: 03/31/21 1216     Lipase 17 U/L     Hepatic Function Panel [[id number]]  (Abnormal) Collected: 03/31/21 0404    Specimen: Blood Updated: 03/31/21 1216     Total Protein 6.4 g/dL      Albumin 2.80 g/dL      ALT (SGPT) 66 U/L      AST (SGOT) 65 U/L      Alkaline Phosphatase 85 U/L      Total Bilirubin <0.2 mg/dL      Bilirubin, Direct <0.2 mg/dL      Bilirubin, Indirect --     Comment: Unable to calculate       Renal Function Panel [[id number]]  (Abnormal) Collected: 03/31/21 0404    Specimen: Blood Updated: 03/31/21 0605     Glucose 163 mg/dL      BUN 25 mg/dL      Creatinine 0.71 mg/dL      Sodium 137 mmol/L      Potassium 4.4 mmol/L      Chloride 108 mmol/L      CO2 17.9 mmol/L      Calcium 7.9 mg/dL      Albumin 2.80 g/dL      Phosphorus 1.7 mg/dL      Anion Gap 11.1 mmol/L      BUN/Creatinine Ratio 35.2     eGFR Non African Amer 88 mL/min/1.73     Narrative:      GFR Normal >60  Chronic Kidney Disease <60  Kidney Failure <15      Magnesium [[id number]]  (Normal) Collected: 03/31/21 0404    Specimen: Blood  Updated: 03/31/21 0554     Magnesium 2.2 mg/dL     Legionella Antigen, Urine - Urine, Urine, Clean Catch [[id number]]  (Normal) Collected: 03/29/21 0410    Specimen: Urine, Clean Catch Updated: 03/29/21 1801     LEGIONELLA ANTIGEN, URINE Negative    S. Pneumo Ag Urine or CSF - Urine, Urine, Clean Catch [[id number]]  (Abnormal) Collected: 03/29/21 0410    Specimen: Urine, Clean Catch Updated: 03/29/21 1801     Strep Pneumo Ag Positive    Blood Culture - Blood, Blood, Venous Line [[id number]] Collected: 03/29/21 0916    Specimen: Blood, Venous Line Updated: 03/29/21 0916    Urine Drug Screen - Urine, Clean Catch [[id number]]  (Abnormal) Collected: 03/29/21 0410    Specimen: Urine, Clean Catch Updated: 03/29/21 0508     THC, Screen, Urine Negative     Phencyclidine (PCP), Urine Negative     Cocaine Screen, Urine Negative     Methamphetamine, Ur Negative     Opiate Screen Positive     Amphetamine Screen, Urine Negative     Benzodiazepine Screen, Urine Positive     Tricyclic Antidepressants Screen Negative     Methadone Screen, Urine Negative     Barbiturates Screen, Urine Positive     Oxycodone Screen, Urine Positive     Propoxyphene Screen Negative     Buprenorphine, Screen, Urine Negative    Narrative:      Urine drug screen results are to be used for medical purposes only.  They are not to be used for legal purposes such as employment testing.  Negative results do not necessarily mean the complete absence of a subtance, but rather that the result is less than the cutoff for that substance.  Positive results are unconfirmed and considered Preliminary Positive.  [facility] does not automatically confirm Postitive Unconfirmed results.  The physician may request (order) an Unconfirmed Positive result to be sent out for confirmation.      Negative Thresholds for Drugs Screened:    THC screen, urine                          50 ng/ml  Phenycyclidine (PCP), urine                25 ng/ml  Cocaine screen, urine                      150 ng/ml  Methamphetamine, urine                    500 ng/ml  Opiate screen, urine                      100 ng/ml  Amphetamine screen, urine                 500 ng/ml  Benzodiazepine screen, urine              150 ng/ml  Tricyclic Antidepressants screen, urine   300 ng/ml  Methadone screen, urine                   200 ng/ml  Barbiturates screen, urine                200 ng/ml  Oxycodone screen, urine                   100 ng/ml  Propoxyphene screen, urine                300 ng/ml  Buprenorphine screen, urine                10 ng/ml    Urinalysis, Microscopic Only - Urine, Clean Catch [[id number]]  (Abnormal) Collected: 03/29/21 0410    Specimen: Urine, Clean Catch Updated: 03/29/21 0440     RBC, UA 3-5 /HPF      WBC, UA None Seen /HPF      Bacteria, UA 1+ /HPF      Squamous Epithelial Cells, UA 3-6 /HPF      Yeast, UA Small/1+ Yeast /HPF      Hyaline Casts, UA None Seen /LPF      Amorphous Crystals, UA Small/1+ /HPF      Methodology Manual Light Microscopy    Urinalysis With Culture If Indicated - Urine, Clean Catch [[id number]]  (Abnormal) Collected: 03/29/21 0410    Specimen: Urine, Clean Catch Updated: 03/29/21 0425     Color, UA Yellow     Appearance, UA Cloudy     pH, UA <=5.0     Specific Gravity, UA 1.020     Glucose, UA Negative     Ketones, UA Negative     Bilirubin, UA Negative     Blood, UA Negative     Protein, UA 30 mg/dL (1+)     Leuk Esterase, UA Negative     Nitrite, UA Negative     Urobilinogen, UA 0.2 E.U./dL    Manual Differential [[id number]]  (Abnormal) Collected: 03/29/21 0150    Specimen: Blood Updated: 03/29/21 0232     Neutrophil % 72.0 %      Lymphocyte % 8.0 %      Monocyte % 5.0 %      Bands %  12.0 %      Metamyelocyte % 3.0 %      Neutrophils Absolute 7.76 10*3/mm3      Lymphocytes Absolute 0.74 10*3/mm3      Monocytes Absolute 0.46 10*3/mm3      Macrocytes Slight/1+     WBC Morphology Normal     Platelet Morphology Normal    COVID-19 and FLU A/B PCR - Swab, Nasopharynx  [[id number]]  (Normal) Collected: 03/29/21 0120    Specimen: Swab from Nasopharynx Updated: 03/29/21 0156     COVID19 Not Detected     Influenza A PCR Not Detected     Influenza B PCR Not Detected    Narrative:      Fact sheet for providers: https://www.fda.gov/media/638074/download    Fact sheet for patients: https://www.fda.gov/media/452878/download    Test performed by PCR.        Imaging Results (Most Recent)     Procedure Component Value Units Date/Time    CT Angiogram Chest With & Without Contrast [[id number]] Collected: 04/02/21 0951     Updated: 04/02/21 0953    Narrative:      CT ANGIOGRAM CHEST W WO CONTRAST    INDICATION:   Chest pain with elevated d-dimer on arrival    TECHNIQUE:   CT angiogram of the chest with 100 cc of Omnipaque 300 IV contrast. 3-D reconstructions were obtained and reviewed.   Radiation dose reduction techniques included automated exposure control or exposure modulation based on body size. Count of known CT and  cardiac nuc med studies performed in previous 12 months: 0.     COMPARISON:   None available.    FINDINGS:   Chest images at mediastinal window show no adenopathy. Small bilateral pleural effusions are noted. There are no pulmonary artery filling defects to suggest emboli. The CT angiographic images also show no evidence of emboli.    Chest images at lung window demonstrate dense alveolar consolidation in the right middle lobe and lingula. There are smaller infiltrates anteriorly in the right upper lobe and left upper lobe. Underlying emphysema is noted. The infiltrates are strongly  suspicious for acute pneumonia. There is no evidence of endobronchial obstruction on either side. Consolidation is greater on the right than on the left. Imaging features can be seen with COVID-19 pneumonia, although are nonspecific and can can occur  with a variety of infectious and noninfectious processes.    In addition, there is a left apical lung nodule that is noncalcified. It measures 7 mm  in diameter and 12 mm in length. It is suspicious for malignancy. There is also a right lower lobe nodule below the hilum measuring 8 mm. This could represent either a  true nodule or infiltrate. A small right lower lobe infiltrate is favored. Recommend correlation with any previous chest CTs if available. If no prior chest CTs are available, follow-up chest CT is recommended in 3 months to evaluate for enlargement over  time. PET/CT scanning could also be considered as a next step in evaluation but the left apical lesion is borderline in terms of increased chance for a false negative.      Impression:      No evidence of pulmonary embolism. There is extensive bilateral pneumonia predominantly in the right middle lobe and lingula and to a lesser extent in both upper lobes. This is accompanied by small bilateral pleural effusions. Additionally there is a  suspicious indeterminate left apical lung nodule that needs follow-up as described above. There is also a density in the right lower lobe that could represent either infiltrate or nodule measuring 8 mm in diameter. Malignancy is not excluded.    Signer Name: Galo Hernandes MD   Signed: 4/2/2021 9:51 AM   Workstation Name: [id number]    [facility] Gallbladder [[id number]] Collected: 04/01/21 1037     Updated: 04/01/21 1039    Narrative:      US Abdomen Ltd    INDICATION:   45-year-old female with abdominal pain for 4 days.    COMPARISON:   None available.    FINDINGS:  PANCREAS: Visualized portions of the pancreas are within normal limits.     LIVER: Increased echogenicity of the hepatic parenchyma is indicative of hepatic steatosis. The liver is enlarged, measuring 18.8 cm in craniocaudad length. No hepatic mass. The intrahepatic bile ducts are normal in caliber. The common duct is normal in  size at the sandra hepatis measuring 1-2 mm.    GALLBLADDER: Gallbladder wall is borderline in thickness, measuring 3 mm. No evidence of  cholelithiasis. There is a small amount of nonspecific pericholecystic fluid.    RIGHT KIDNEY: The right kidney measures 10 cm. Renal cortical thickness and echogenicity is normal. No hydronephrosis.    OTHER: Small amount of perihepatic ascites.      Impression:        1. Mild hepatomegaly with hepatic steatosis.  2. Small amount of perihepatic ascites and pericholecystic fluid. Nonspecific borderline gallbladder wall thickening, measuring 3 mm, which may be slightly accentuated by the pericholecystic fluid. No evidence of cholelithiasis or common bile duct  dilatation.    Signer Name: Blaise Vaughn MD   Signed: 4/1/2021 10:37 AM   Workstation Name: [id number]    [facility]    CT Abdomen Pelvis With Contrast [[id number]] Collected: 03/31/21 1611     Updated: 03/31/21 1617    Narrative:      CT abdomen and pelvis with contrast   03/31/2021     HISTORY:  Epigastric pain and bloating starting today     COMPARISON:  06/14/2016     TECHNIQUE:    CT of Abdomen and Pelvis with contrast performed.  Sagittal and Coronal  reconstructions performed. Radiation dose reduction techniques included  automated exposure control or exposure modulation based on body size.  Radiation audit for CT and nuclear cardiology exams in the last 12  months: 0.      FINDINGS:    Abdomen:  There is dense consolidation throughout almost all the right middle lobe  and lingula with air bronchograms consistent with pneumonia. There are  no noncalcified lung nodules. Small amount of fluid around the liver.  Liver slightly enlarged decreased in density. There is fluid around the  gallbladder. The gallbladder wall is normal. There is no biliary  distention. The spleen, pancreas, adrenal glands and kidneys are normal.  Aorta is normal in size. There is no adenopathy. Oral contrast was  administered. The bowel appears normal.       Pelvis:  There is small or free fluid in the pelvis. The uterus and adnexal  regions and bladder are  normal. The small amount of air in the anterior  abdominal wall probably from previous injection. Bones are unremarkable.  There is a vascular stent in the left common iliac artery         Impression:      The liver appears slightly enlarged and slightly decreased  in density. There is a small amount ascites around the liver and in the  pelvis. Correlation with hepatic enzymes and possibility of hepatitis is  recommended.     Dense consolidation in right middle lobe and lingula consistent with  pneumonia     Otherwise negative study     This report was finalized on 3/31/2021 4:15 PM by Dr. Harpreet Castellon MD.       XR Abdomen KUB [[id number]] Collected: 03/30/21 1157     Updated: 03/30/21 1159    Narrative:      XR ABDOMEN KUB-: 3/30/2021 11:53 AM     INDICATION:   Abdominal pain today     COMPARISON:   None available.     FINDINGS:  AP radiographs of the abdomen. The renal shadows are symmetric. No renal  calculi. No bladder calculi.     The bowel gas pattern is nonobstructive. No acute osseous abnormalities.  No radiopaque foreign body.       Impression:      Negative KUB     This report was finalized on 3/30/2021 11:57 AM by Dr. Harpreet Castellon MD.       XR Chest 1 View [[id number]] Collected: 03/29/21 0237     Updated: 03/29/21 0247    Narrative:      CR Chest 1 Vw    INDICATION:   Cough and chest pain 1 month. Nausea vomiting and headache in the emergency Department. Bilateral rib fractures.     COMPARISON:    3/11/2021    FINDINGS:  Single portable AP view(s) of the chest.  Cardiac silhouette within normal limits. Unremarkable vascularity. The lungs are hyperinflated and there are new lower lung zone opacities bilaterally obscuring the right and left heart borders, most  characteristic of pneumonia. No effusion. No pneumothorax. Previously documented right rib fractures difficult to visualize.      Impression:        1. Interval development of bilateral pneumonia appearing to involve the lingula and right middle lobe.  Follow-up to clearing recommended.    Signer Name: Huy Hilario MD   Signed: 3/29/2021 2:37 AM   Workstation Name: [facility]    CT Head Without Contrast [[id number]] Collected: 03/29/21 0242     Updated: 03/29/21 0246    Narrative:      CT Head WO    HISTORY:   Headache nausea and vomiting that began tonight. Cough and chest pain 1 month. COPD hypertension and breast implant placement.    TECHNIQUE:   Axial unenhanced head CT. Radiation dose reduction techniques included automated exposure control or exposure modulation based on body size. Count of known CT and cardiac nuc med studies performed in previous 12 months: 0.     Time of scan: 0223 hours    COMPARISON:   None.    FINDINGS:   No intracranial hemorrhage, mass, or infarct. No hydrocephalus or extra-axial fluid collection. Brain parenchymal density is normal. The skull base, calvarium, and extracranial soft tissues are normal.      Impression:      Normal, negative unenhanced head CT.          Signer Name: Huy Hilario MD   Signed: 3/29/2021 2:42 AM   Workstation Name: [facility]          PROCEDURES      Condition on Discharge:  Clinically and hemodynamically stable    Physical Exam at Discharge  Vital Signs  Temp:  [96.3 °F (35.7 °C)-98.5 °F (36.9 °C)] 97.6 °F (36.4 °C)  Heart Rate:  [] 114  Resp:  [16-20] 16  BP: (114-140)/(62-93) 119/72    Physical Exam   Constitutional: Patient appears well-developed and well-nourished and in no acute distress   HEENT:   Head: Normocephalic and atraumatic.   Eyes:  Pupils are equal, round, and reactive to light. EOM are intact. Sclera are anicteric and non-injected.    Neck: Neck supple. No JVD present. No thyromegaly present. No lymphadenopathy present.  Cardiovascular: Regular rate, regular rhythm, S1 normal and S2 normal.  Exam reveals no gallop and no friction rub.  No murmur heard.  Pulmonary/Chest: Pain chest wall due to rib  fractures. Lungs are clear to auscultation bilaterally. No respiratory distress. No wheezes. No rhonchi. No rales.   Abdominal: Soft. Bowel sounds are normal. No distension and no mass. There is no hepatosplenomegaly. There is no tenderness.   Musculoskeletal: Normal Muscle tone  Extremities: No edema. Pulses are palpable in all 4 extremities.  Neurological: Patient is alert and oriented to person, place, and time. Cranial nerves II-XII are grossly intact with no focal deficits.  Skin: Skin is warm. No rash noted. Nails show no clubbing.  No cyanosis or erythema.        Discharge Disposition  Patient will be discharged home    Visiting Nurse:    N/A    Home PT/OT:  N/A    Home Safety Evaluation:  N/A    DME  N/A    Discharge Diet:      Dietary Orders (From admission, onward)     Start     Ordered    04/01/21 1149  Diet Regular; Low Fat  Diet Effective Now     Question Answer Comment   Diet Texture / Consistency Regular    Common Modifiers Low Fat        04/01/21 1148                Activity at Discharge:  Ad Lula    Pre-discharge education  Smoking      Follow-up Appointments  Future Appointments   Date Time Provider Department Center   6/8/2021 11:20 AM Gatito Short MD [facility]   Follow up with PCP in one week  Reviewed CT chest findings w/ patient. There is a  suspicious indeterminate left apical lung nodule that needs follow-up as described above. There is also a density in the right lower lobe that could represent either infiltrate or nodule measuring 8 mm in diameter. Malignancy is not excluded.  Needs repeat scan in 3 months.     [unfilled]    Test Results Pending at Discharge  [unfilled]     Jesse Colby MD  04/03/21  15:12 EDT    Time: 30 min (if over 30 minutes give explanation as to why it took greater than 30 minutes)  Discharge over 30 min (if over 30 minutes give explanation as to why it took greater than 30 minutes)  Secondary to:  Coordination of home care  D/W case  management  Medication reconciliation      Electronically signed by Jesse Colby MD at 04/03/21 2994

## 2021-04-09 ENCOUNTER — OFFICE VISIT (OUTPATIENT)
Dept: INTERNAL MEDICINE | Facility: CLINIC | Age: 46
End: 2021-04-09

## 2021-04-09 VITALS
RESPIRATION RATE: 16 BRPM | TEMPERATURE: 98 F | SYSTOLIC BLOOD PRESSURE: 100 MMHG | HEART RATE: 85 BPM | DIASTOLIC BLOOD PRESSURE: 64 MMHG | WEIGHT: 122.8 LBS | OXYGEN SATURATION: 98 % | BODY MASS INDEX: 22.6 KG/M2 | HEIGHT: 62 IN

## 2021-04-09 DIAGNOSIS — R19.7 DIARRHEA, UNSPECIFIED TYPE: ICD-10-CM

## 2021-04-09 DIAGNOSIS — R74.01 TRANSAMINITIS: ICD-10-CM

## 2021-04-09 DIAGNOSIS — R07.89 ATYPICAL CHEST PAIN: ICD-10-CM

## 2021-04-09 DIAGNOSIS — J13 PNEUMONIA OF BOTH LUNGS DUE TO STREPTOCOCCUS PNEUMONIAE, UNSPECIFIED PART OF LUNG (HCC): ICD-10-CM

## 2021-04-09 DIAGNOSIS — Z09 HOSPITAL DISCHARGE FOLLOW-UP: Primary | ICD-10-CM

## 2021-04-09 DIAGNOSIS — S22.49XD CLOSED FRACTURE OF MULTIPLE RIBS WITH ROUTINE HEALING, UNSPECIFIED LATERALITY, SUBSEQUENT ENCOUNTER: ICD-10-CM

## 2021-04-09 DIAGNOSIS — R91.1 SOLID NODULE OF LUNG GREATER THAN 8 MM IN DIAMETER: ICD-10-CM

## 2021-04-09 DIAGNOSIS — D72.829 LEUKOCYTOSIS, UNSPECIFIED TYPE: ICD-10-CM

## 2021-04-09 DIAGNOSIS — T85.9XXA COMPLICATION OF BREAST IMPLANT: ICD-10-CM

## 2021-04-09 DIAGNOSIS — R00.0 TACHYCARDIA: ICD-10-CM

## 2021-04-09 PROCEDURE — 99496 TRANSJ CARE MGMT HIGH F2F 7D: CPT | Performed by: INTERNAL MEDICINE

## 2021-04-09 RX ORDER — GALCANEZUMAB 120 MG/ML
INJECTION, SOLUTION SUBCUTANEOUS
COMMUNITY
Start: 2021-03-16 | End: 2022-07-20

## 2021-04-09 NOTE — PAYOR COMM NOTE
"Valeria Rubi (45 y.o. Female)     ATTN: NURSE REVIEWER   RE: DISCHARGE NOTIFICATION   REF# [id number]  PLS REPLY TO GERTRUDIS RODRIGUEZ [phone]  [phone]      Date of Birth Social Security Number Address Home Phone MRN    1975  [address] [place] [phone] [id number]    Caodaism Marital Status          None Single       Admission Date Admission Type Admitting Provider Attending Provider Department, Room/Bed    3/29/21 Emergency Facundo Nichols MD  [facility] MED SURG, 1412/1    Discharge Date Discharge Disposition Discharge Destination        4/3/2021 Home or Self Care              Attending Provider: (none)   Allergies: Flonase [Fluticasone]    Isolation: None   Infection: None   Code Status: Prior    Ht: 157.5 cm (62\")   Wt: 63.2 kg (139 lb 6.4 oz)    Admission Cmt: None   Principal Problem: None                Active Insurance as of 3/29/2021     Primary Coverage     Payor Plan Insurance Group Employer/Plan Group    [employer]HEM BLUE CROSS ANTH BLUE CROSS BLUE SHIELD PPO [id number]     Payor Plan Address Payor Plan Phone Number Payor Plan Fax Number Effective Dates    [address] [phone]  1/1/2021 - None Entered    [place] [address]       Subscriber Name Subscriber Birth Date Member ID       VALERIA RUBI 1975 [id number]                 Emergency Contacts      (Rel.) Home Phone Work Phone Mobile Phone    Harpreet Ruiz (Spouse) -- -- [phone]    Arjun Ruiz (Son) -- -- [phone]               Discharge Summary      Jesse Colby MD at 04/03/21 1512              HOSPITALIST SERVICES  @ [facility], [facility] Hospitalist Team    DISCHARGE SUMMARY        Valeria Rubi  1975  [id number]        Hospitalists Discharge Summary    PATIENT EXAMINED ON BEDSIDE    Date of Admission: 3/29/2021  Date of Discharge:  4/3/2021            DIAGNOSES:      Primary Discharge Diagnoses: "     1.  Atypical Chest Pain:    2.  Essential HTN:   3.  Tachycardia:    4.  Abdominal Pain:    5.  Pneumococcal Pneumonia:   6.  Transaminitis:    7.  Anion Gap Metabolic Acidosis:   8.  PVD:   9.  Right 6th and 7th Rib Fractures:   10. Bilateral Lung nodules on chest CT scan      Secondary Discharge Diagnoses:     As per Problem List      Surgical Discharge Diagnoses:     As per Problem List        PCP  Patient Care Team:  Adrianne Riddle MD as PCP - General  Adrianne Riddle MD as PCP - Family Medicine    Consults:   Consults     No orders found from 2/28/2021 to 3/30/2021.          Operations and Procedures Performed:       XR Ribs Right With PA Chest    Result Date: 3/11/2021  Narrative: CHEST AND RIGHT RIB SERIES, 3/11/2021  HISTORY:  45-year-old female in the ED noting persistent right side pain from rib fractures 2/17/2021. Coughing.  TECHNIQUE: PA chest and four view right rib series.  FINDINGS: Slightly displaced right 6th rib fracture and adjacent nondisplaced right 7th rib fracture. The 6th rib fracture was not visible on the previous study. Pulmonary hyperinflation. No visible pneumothorax or pleural effusion. Heart size and pulmonary vascularity are normal.     Impression: 1. Fractures of the right 6th and 7th ribs. 2. No pneumothorax. Signer Name: Danny Davis MD  Signed: 3/11/2021 4:08 PM  Workstation Name: [id number]  [facility]    CT Head Without Contrast    Result Date: 3/29/2021  Narrative: CT Head WO HISTORY: Headache nausea and vomiting that began tonight. Cough and chest pain 1 month. COPD hypertension and breast implant placement. TECHNIQUE: Axial unenhanced head CT. Radiation dose reduction techniques included automated exposure control or exposure modulation based on body size. Count of known CT and cardiac nuc med studies performed in previous 12 months: 0. Time of scan: 0223 hours COMPARISON: None. FINDINGS: No intracranial hemorrhage, mass, or  infarct. No hydrocephalus or extra-axial fluid collection. Brain parenchymal density is normal. The skull base, calvarium, and extracranial soft tissues are normal.     Impression: Normal, negative unenhanced head CT. Signer Name: Huy Hilario MD  Signed: 3/29/2021 2:42 AM  Workstation Name: [email or web address]  [facility]    CT Angiogram Chest With & Without Contrast    Result Date: 4/2/2021  Narrative: CT ANGIOGRAM CHEST W WO CONTRAST INDICATION: Chest pain with elevated d-dimer on arrival TECHNIQUE: CT angiogram of the chest with 100 cc of Omnipaque 300 IV contrast. 3-D reconstructions were obtained and reviewed.   Radiation dose reduction techniques included automated exposure control or exposure modulation based on body size. Count of known CT and cardiac nuc med studies performed in previous 12 months: 0. COMPARISON: None available. FINDINGS: Chest images at mediastinal window show no adenopathy. Small bilateral pleural effusions are noted. There are no pulmonary artery filling defects to suggest emboli. The CT angiographic images also show no evidence of emboli. Chest images at lung window demonstrate dense alveolar consolidation in the right middle lobe and lingula. There are smaller infiltrates anteriorly in the right upper lobe and left upper lobe. Underlying emphysema is noted. The infiltrates are strongly suspicious for acute pneumonia. There is no evidence of endobronchial obstruction on either side. Consolidation is greater on the right than on the left. Imaging features can be seen with COVID-19 pneumonia, although are nonspecific and can can occur with a variety of infectious and noninfectious processes. In addition, there is a left apical lung nodule that is noncalcified. It measures 7 mm in diameter and 12 mm in length. It is suspicious for malignancy. There is also a right lower lobe nodule below the hilum measuring 8 mm. This could represent either a true nodule or infiltrate.  A small right lower lobe infiltrate is favored. Recommend correlation with any previous chest CTs if available. If no prior chest CTs are available, follow-up chest CT is recommended in 3 months to evaluate for enlargement over time. PET/CT scanning could also be considered as a next step in evaluation but the left apical lesion is borderline in terms of increased chance for a false negative.     Impression: No evidence of pulmonary embolism. There is extensive bilateral pneumonia predominantly in the right middle lobe and lingula and to a lesser extent in both upper lobes. This is accompanied by small bilateral pleural effusions. Additionally there is a suspicious indeterminate left apical lung nodule that needs follow-up as described above. There is also a density in the right lower lobe that could represent either infiltrate or nodule measuring 8 mm in diameter. Malignancy is not excluded. Signer Name: Galo Hernandes MD  Signed: 4/2/2021 9:51 AM  Workstation Name: [id number]  [facility]    CT Abdomen Pelvis With Contrast    Result Date: 3/31/2021  Narrative: CT abdomen and pelvis with contrast   03/31/2021  HISTORY: Epigastric pain and bloating starting today  COMPARISON: 06/14/2016  TECHNIQUE:  CT of Abdomen and Pelvis with contrast performed.  Sagittal and Coronal reconstructions performed. Radiation dose reduction techniques included automated exposure control or exposure modulation based on body size. Radiation audit for CT and nuclear cardiology exams in the last 12 months: 0.  FINDINGS:  Abdomen: There is dense consolidation throughout almost all the right middle lobe and lingula with air bronchograms consistent with pneumonia. There are no noncalcified lung nodules. Small amount of fluid around the liver. Liver slightly enlarged decreased in density. There is fluid around the gallbladder. The gallbladder wall is normal. There is no biliary distention. The spleen, pancreas, adrenal  glands and kidneys are normal. Aorta is normal in size. There is no adenopathy. Oral contrast was administered. The bowel appears normal.   Pelvis: There is small or free fluid in the pelvis. The uterus and adnexal regions and bladder are normal. The small amount of air in the anterior abdominal wall probably from previous injection. Bones are unremarkable. There is a vascular stent in the left common iliac artery       Impression: The liver appears slightly enlarged and slightly decreased in density. There is a small amount ascites around the liver and in the pelvis. Correlation with hepatic enzymes and possibility of hepatitis is recommended.  Dense consolidation in right middle lobe and lingula consistent with pneumonia  Otherwise negative study  This report was finalized on 3/31/2021 4:15 PM by Dr. Harpreet Castellon MD.      US Gallbladder    Result Date: 4/1/2021  Narrative: US Abdomen Ltd INDICATION: 45-year-old female with abdominal pain for 4 days. COMPARISON: None available. FINDINGS: PANCREAS: Visualized portions of the pancreas are within normal limits. LIVER: Increased echogenicity of the hepatic parenchyma is indicative of hepatic steatosis. The liver is enlarged, measuring 18.8 cm in craniocaudad length. No hepatic mass. The intrahepatic bile ducts are normal in caliber. The common duct is normal in size at the sandra hepatis measuring 1-2 mm. GALLBLADDER: Gallbladder wall is borderline in thickness, measuring 3 mm. No evidence of cholelithiasis. There is a small amount of nonspecific pericholecystic fluid. RIGHT KIDNEY: The right kidney measures 10 cm. Renal cortical thickness and echogenicity is normal. No hydronephrosis. OTHER: Small amount of perihepatic ascites.     Impression: 1. Mild hepatomegaly with hepatic steatosis. 2. Small amount of perihepatic ascites and pericholecystic fluid. Nonspecific borderline gallbladder wall thickening, measuring 3 mm, which may be slightly accentuated by the  pericholecystic fluid. No evidence of cholelithiasis or common bile duct dilatation. Signer Name: Blaise Vaughn MD  Signed: 4/1/2021 10:37 AM  Workstation Name: [id number]  [facility]    XR Chest 1 View    Result Date: 3/29/2021  Narrative: CR Chest 1 Vw INDICATION: Cough and chest pain 1 month. Nausea vomiting and headache in the emergency Department. Bilateral rib fractures. COMPARISON:  3/11/2021 FINDINGS: Single portable AP view(s) of the chest.  Cardiac silhouette within normal limits. Unremarkable vascularity. The lungs are hyperinflated and there are new lower lung zone opacities bilaterally obscuring the right and left heart borders, most characteristic of pneumonia. No effusion. No pneumothorax. Previously documented right rib fractures difficult to visualize.     Impression: 1. Interval development of bilateral pneumonia appearing to involve the lingula and right middle lobe. Follow-up to clearing recommended. Signer Name: Huy Hilario MD  Signed: 3/29/2021 2:37 AM  Workstation Name: [email or web address]  [facility]    XR Abdomen KUB    Result Date: 3/30/2021  Narrative: XR ABDOMEN KUB-: 3/30/2021 11:53 AM  INDICATION: Abdominal pain today  COMPARISON: None available.  FINDINGS: AP radiographs of the abdomen. The renal shadows are symmetric. No renal calculi. No bladder calculi.  The bowel gas pattern is nonobstructive. No acute osseous abnormalities. No radiopaque foreign body.      Impression: Negative KUB  This report was finalized on 3/30/2021 11:57 AM by Dr. Harpreet Castellon MD.        Allergies:  is allergic to flonase [fluticasone].    Marcelo  not reviewed    Discharge Medications:     Discharge Medications      ASK your doctor about these medications      Instructions Start Date   albuterol (2.5 MG/3ML) 0.083% nebulizer solution  Commonly known as: PROVENTIL   USE 1 VIAL VIA NEBULIZER EVERY 4 HOURS AS NEEDED      albuterol sulfate  (90 Base) MCG/ACT  inhaler  Commonly known as: PROVENTIL HFA;VENTOLIN HFA;PROAIR HFA   TAKE 2 PUFFS BY MOUTH EVERY 4 HOURS AS NEEDED      aspirin 81 MG EC tablet   81 mg, Oral, Daily      clopidogrel 75 MG tablet  Commonly known as: Plavix   75 mg, Oral, Daily      fluticasone 50 MCG/ACT nasal spray  Commonly known as: FLONASE   1 spray, Nasal, As Needed      ipratropium-albuterol 0.5-2.5 mg/3 ml nebulizer  Commonly known as: DUO-NEB   INHALE 1 VIAL VIA NEBULIZER 3 TIMES A DAY      lisinopril-hydrochlorothiazide 20-12.5 MG per tablet  Commonly known as: PRINZIDE,ZESTORETIC   TAKE 1 TABLET BY MOUTH EVERY DAY      medroxyPROGESTERone 150 MG/ML injection  Commonly known as: DEPO-PROVERA   150 mg, Buccal, Every 3 Months, Pt takes every 3 months.march or April 2018 last dose      Nurtec 75 MG tablet dispersible tablet  Generic drug: Rimegepant Sulfate   75 mg, Oral, Once As Needed      nystatin 024365 UNIT/ML suspension  Commonly known as: MYCOSTATIN   500,000 Units, Swish & Swallow, 4 Times Daily, Swish and swallow 5mL four times a day for two weeks.      oxyCODONE-acetaminophen 5-325 MG per tablet  Commonly known as: PERCOCET   1 tablet, Oral, Every 6 Hours PRN      promethazine 25 MG tablet  Commonly known as: PHENERGAN   25 mg, Oral, Every 8 Hours PRN      simvastatin 40 MG tablet  Commonly known as: ZOCOR   40 mg, Oral, Every Evening      Stiolto Respimat 2.5-2.5 MCG/ACT aerosol solution inhaler  Generic drug: tiotropium bromide-olodaterol   2 puffs, Inhalation, Daily      varenicline 1 MG tablet  Commonly known as: CHANTIX   1 mg, Oral, 2 Times Daily      venlafaxine XR 75 MG 24 hr capsule  Commonly known as: EFFEXOR-XR   TAKE 1 CAPSULE BY MOUTH EVERY DAY      vitamin D 1.25 MG (77919 UT) capsule capsule  Commonly known as: ERGOCALCIFEROL   50,000 Units, Oral, Every 7 Days             History of Present Illness:    Ms. Rubi 45-year-old female with COPD.  Presents to ER with 1 week worth of worsening shortness of breath productive  cough subjective fever and chills.  Also has several fractured ribs several weeks ago due to severe COPD cough.  She was seen in the ER for that as well.  Still smokes heavily.  Been taking her home nebulizers without improvement.  On chest x-ray she has bilateral lower lobe pneumonia.  Covid's and flu swab are negative.  Present with with tachycardia 136 and low blood pressure of 91/56.  Sepsis protocol was started.    Hospital Course       Assessment and Plan:       1.  45-year-old female with acute exacerbation of COPD.  Complicated by multiple rib fractures.     2.  Bilateral lower lobe pneumonias.  Covid and flu swab negative.  Suspect bacterial.     3.  Sepsis upon presentation with a heart rate 136; O2 sat i87 percent on room air blood pressure 91/ 56.  And elevated inflammatory markers.     4.  Patient was admitted to [facility] with telemetry oxygen, duo nebs, IV steroids, IV Cepimine  Continued IV fluid management.            Facundo Nichols MD  03/29/21  07:17 EDT      Assessment/Plan         1.  Abdominal Pain: Note reviewed from last night.  I reviewed the contrasted CT images w/ radiologist this morning and vasculature appears patent and bowl walls are normal.  She does have some fatty infiltration in the liver which could explain the mildly elevated lactate.  LDH can be elevated from the pneumonia.LFT's have trended up in a cholestatic pattern so I did check a RUQ ultrasound that was unremarkable.  I think her diarrhea overnight was explained by oral contrast moving through and clearing stool seen on CT.  Culture was negative.  Two things come to mind:  1) being referred pain from rib fractures or 2) distension from ascities.  I've turned IVF off.  Discussed w/ patient.     2.  Anion Gap Metabolic Acidosis: Could be driven by lactate.  Will repeat level and monitor/     3.  Transaminitis: As discussed above.     4.  Pneumococcal pneumonia: On Cefdinir and Prednisone.     5.  PVD: As discussed in  #1.     6.  Right 6th and 7th Rib Fractures: Conservative care.      Plan for disposition: Home when able.     Dagoberto Chilel MD  04/01/21  11:33 EDT       1.  Atypical Chest Pain: Low suspicion for coronary etiology.  However, given tachycardia, and increased D-dimer, I will check a CT angio.     2.  Essential HTN: Not at goal.  Discussed w/ APRN on-call after starting Lasix.  Add prn Hydralazine.  Will resume home ACEI when renal stability assured.     3.  Tachycardia: Etiology unclear, but my instincts make me feel like she is withdrawing from something.  Her CT liver findings demonstrated fatty liver and her MCV is elevated suggesting EtOH which she denies.  Reviewing her Marcelo and UDS, she has not been prescribed benzos.  I do not believe she is septic, nor do I believe she is volume deplete. I had initially thought maybe narcotic withdrawal, but this seems unlikely.  Will try Ativan/Xanax and follow.  Certainly can add Labetalol, but I feel like this will mask the problem.     4.  Abdominal Pain: Not the primary concern this morning.  See my discussion from yesterday.     5.  Pneumococcal Pneumonia: Continue Cefdinir and Prednisone.     6.  Transaminitis: Trending down.  Work-up negative except for fatty liver.     7.  Anion Gap Metabolic Acidosis: Resolved.     8.  PVD: Continue on home therapy.     9.  Right 6th and 7th Rib Fractures: Continue conservative care.     Plan for disposition: Home tomorrow if renal function intact after contrast loads and tachycardia better.     Dagoberto Chilel MD  04/02/21  08:24 EDT       Dagoberto Chilel MD   Physician   Medicine   Progress Notes   Signed   Date of Service:  04/02/21 1931   Creation Time:  04/02/21 1931            Signed             Show:Clear all  []Manual[]Template[]Copied    Added by:  [x]Dagoberto Chilel MD    []Silas for details  Reviewed CT chest findings w/ patient.  Needs repeat scan in 3 months.  She also responded well to initiation of Xanax.  I  asked the patient why her initial UDS demonstrated benzos, but her Marcelo was negative for such.  She did not know, and reports she doesn't take anything extra.  EtOH was negative.  Plan for discharge in AM.                      Hospital Course  Last Lab Results:   Lab Results (most recent)     Procedure Component Value Units Date/Time    Blood Culture - Blood, Arm, Left [[id number]] Collected: 03/29/21 0950    Specimen: Blood from Arm, Left Updated: 04/03/21 1000     Blood Culture No growth at 5 days    Procalcitonin [[id number]]  (Abnormal) Collected: 04/03/21 0502    Specimen: Blood Updated: 04/03/21 0623     Procalcitonin 2.69 ng/mL     Narrative:      Results may be falsely decreased if patient taking Biotin.     CBC & Differential [[id number]]  (Abnormal) Collected: 04/03/21 0502    Specimen: Blood Updated: 04/03/21 0612    Narrative:      The following orders were created for panel order CBC & Differential.  Procedure                               Abnormality         Status                     ---------                               -----------         ------                     Scan Slide[[id number]]                                       Final result               CBC Auto Differential[[id number]]        Abnormal            Final result                 Please view results for these tests on the individual orders.    Scan Slide [[id number]] Collected: 04/03/21 0502    Specimen: Blood Updated: 04/03/21 0612     Macrocytes Slight/1+     WBC Morphology Normal     Platelet Morphology Normal    Comprehensive Metabolic Panel [[id number]]  (Abnormal) Collected: 04/03/21 0502    Specimen: Blood Updated: 04/03/21 0557     Glucose 89 mg/dL      BUN 15 mg/dL      Creatinine 0.68 mg/dL      Sodium 142 mmol/L      Potassium 4.1 mmol/L      Chloride 102 mmol/L      CO2 30.5 mmol/L      Calcium 8.0 mg/dL      Total Protein 5.9 g/dL      Albumin 2.90 g/dL      ALT (SGPT) 77 U/L      AST (SGOT) 26 U/L      Alkaline Phosphatase 82  U/L      Total Bilirubin 0.4 mg/dL      eGFR Non African Amer 93 mL/min/1.73      Globulin 3.0 gm/dL      A/G Ratio 1.0 g/dL      BUN/Creatinine Ratio 22.1     Anion Gap 9.5 mmol/L     Narrative:      GFR Normal >60  Chronic Kidney Disease <60  Kidney Failure <15      CBC Auto Differential [[id number]]  (Abnormal) Collected: 04/03/21 0502    Specimen: Blood Updated: 04/03/21 0548     WBC 21.01 10*3/mm3      RBC 3.48 10*6/mm3      Hemoglobin 11.8 g/dL      Hematocrit 37.5 %      .8 fL      MCH 33.9 pg      MCHC 31.5 g/dL      RDW 12.6 %      RDW-SD 49.4 fl      MPV 9.9 fL      Platelets 280 10*3/mm3      Neutrophil % 64.8 %      Lymphocyte % 17.2 %      Monocyte % 7.9 %      Eosinophil % 0.3 %      Basophil % 0.7 %      Immature Grans % 9.1 %      Neutrophils, Absolute 13.60 10*3/mm3      Lymphocytes, Absolute 3.61 10*3/mm3      Monocytes, Absolute 1.67 10*3/mm3      Eosinophils, Absolute 0.06 10*3/mm3      Basophils, Absolute 0.15 10*3/mm3      Immature Grans, Absolute 1.92 10*3/mm3      nRBC 0.7 /100 WBC     Ethanol [[id number]] Collected: 04/02/21 0416    Specimen: Blood Updated: 04/02/21 1434     Ethanol <10 mg/dL      Ethanol % <0.010 %     Urine Drug Screen - Urine, Clean Catch [[id number]]  (Abnormal) Collected: 04/02/21 1342    Specimen: Urine, Clean Catch Updated: 04/02/21 1357     THC, Screen, Urine Negative     Phencyclidine (PCP), Urine Negative     Cocaine Screen, Urine Negative     Methamphetamine, Ur Negative     Opiate Screen Negative     Amphetamine Screen, Urine Negative     Benzodiazepine Screen, Urine Positive     Tricyclic Antidepressants Screen Negative     Methadone Screen, Urine Negative     Barbiturates Screen, Urine Positive     Oxycodone Screen, Urine Positive     Propoxyphene Screen Negative     Buprenorphine, Screen, Urine Negative    Narrative:      Urine drug screen results are to be used for medical purposes only.  They are not to be used for legal purposes such as employment  testing.  Negative results do not necessarily mean the complete absence of a subtance, but rather that the result is less than the cutoff for that substance.  Positive results are unconfirmed and considered Preliminary Positive.  [facility] does not automatically confirm Postitive Unconfirmed results.  The physician may request (order) an Unconfirmed Positive result to be sent out for confirmation.      Negative Thresholds for Drugs Screened:    THC screen, urine                          50 ng/ml  Phenycyclidine (PCP), urine                25 ng/ml  Cocaine screen, urine                     150 ng/ml  Methamphetamine, urine                    500 ng/ml  Opiate screen, urine                      100 ng/ml  Amphetamine screen, urine                 500 ng/ml  Benzodiazepine screen, urine              150 ng/ml  Tricyclic Antidepressants screen, urine   300 ng/ml  Methadone screen, urine                   200 ng/ml  Barbiturates screen, urine                200 ng/ml  Oxycodone screen, urine                   100 ng/ml  Propoxyphene screen, urine                300 ng/ml  Buprenorphine screen, urine                10 ng/ml    CBC & Differential [[id number]]  (Abnormal) Collected: 04/02/21 1210    Specimen: Blood Updated: 04/02/21 1228    Narrative:      The following orders were created for panel order CBC & Differential.  Procedure                               Abnormality         Status                     ---------                               -----------         ------                     Scan Slide[[id number]]                                                                  CBC Auto Differential[[id number]]        Abnormal            Final result                 Please view results for these tests on the individual orders.    CBC Auto Differential [[id number]]  (Abnormal) Collected: 04/02/21 1210    Specimen: Blood Updated: 04/02/21 1221     WBC 20.76 10*3/mm3      RBC 3.55 10*6/mm3      Hemoglobin  12.1 g/dL      Hematocrit 38.1 %      .3 fL      MCH 34.1 pg      MCHC 31.8 g/dL      RDW 12.4 %      RDW-SD 49.8 fl      MPV 9.8 fL      Platelets 233 10*3/mm3      Neutrophil % 74.1 %      Lymphocyte % 8.3 %      Monocyte % 10.1 %      Eosinophil % 0.0 %      Basophil % 0.0 %      Immature Grans % 7.5 %      Neutrophils, Absolute 15.38 10*3/mm3      Lymphocytes, Absolute 1.72 10*3/mm3      Monocytes, Absolute 2.09 10*3/mm3      Eosinophils, Absolute 0.01 10*3/mm3      Basophils, Absolute 0.01 10*3/mm3      Immature Grans, Absolute 1.55 10*3/mm3     CK [[id number]]  (Abnormal) Collected: 04/02/21 0416    Specimen: Blood Updated: 04/02/21 0940     Creatine Kinase 333 U/L     Troponin [[id number]]  (Normal) Collected: 04/02/21 0416    Specimen: Blood Updated: 04/02/21 0820     Troponin T <0.010 ng/mL     Narrative:      Troponin T Reference Range:  <= 0.03 ng/mL-   Negative for AMI  >0.03 ng/mL-     Abnormal for myocardial necrosis.  Clinicians would have to utilize clinical acumen, EKG, Troponin and serial changes to determine if it is an Acute Myocardial Infarction or myocardial injury due to an underlying chronic condition.       Results may be falsely decreased if patient taking Biotin.      Comprehensive Metabolic Panel [[id number]]  (Abnormal) Collected: 04/02/21 0416    Specimen: Blood Updated: 04/02/21 0819     Glucose 166 mg/dL      BUN 16 mg/dL      Creatinine 0.67 mg/dL      Sodium 139 mmol/L      Potassium 4.6 mmol/L      Chloride 101 mmol/L      CO2 26.7 mmol/L      Calcium 8.7 mg/dL      Total Protein 6.4 g/dL      Albumin 3.20 g/dL      ALT (SGPT) 113 U/L      AST (SGOT) 50 U/L      Alkaline Phosphatase 87 U/L      Total Bilirubin 0.2 mg/dL      eGFR Non African Amer 94 mL/min/1.73      Globulin 3.2 gm/dL      A/G Ratio 1.0 g/dL      BUN/Creatinine Ratio 23.9     Anion Gap 11.3 mmol/L     Narrative:      GFR Normal >60  Chronic Kidney Disease <60  Kidney Failure <15      D-dimer, Quantitative  [[id number]]  (Abnormal) Collected: 04/02/21 0416    Specimen: Blood Updated: 04/02/21 0525     D-Dimer, Quantitative 4.67 MCGFEU/mL     Narrative:      Can be elevated in, but is not diagnostic for deep vein thrombosis (DVT) or pulmonary embolis (PE).  It is also elevated in other medical conditions.  Clinical correlation is required.  The negative cut-off value for the D-Dimer is 0.50 mcg FEU/mL for DVT and PE.      Gastrointestinal Panel, PCR - Stool, Per Rectum [[id number]]  (Normal) Collected: 04/01/21 0410    Specimen: Stool from Per Rectum Updated: 04/01/21 1145     Campylobacter Not Detected     Plesiomonas shigelloides Not Detected     Salmonella Not Detected     Vibrio Not Detected     Vibrio cholerae Not Detected     Yersinia enterocolitica Not Detected     Enteroaggregative E. coli (EAEC) Not Detected     Enteropathogenic E. coli (EPEC) Not Detected     Enterotoxigenic E. coli (ETEC) lt/st Not Detected     Shiga-like toxin-producing E. coli (STEC) stx1/stx2 Not Detected     Shigella/Enteroinvasive E. coli (EIEC) Not Detected     Cryptosporidium Not Detected     Cyclospora cayetanensis Not Detected     Entamoeba histolytica Not Detected     Giardia lamblia Not Detected     Adenovirus F40/41 Not Detected     Astrovirus Not Detected     Norovirus GI/GII Not Detected     Rotavirus A Not Detected     Sapovirus (I, II, IV or V) Not Detected    Narrative:      If Aeromonas, Staphylococcus aureus or Bacillus cereus are suspected, please order [id number]: Stool Culture, Aeromonas, S aureus, B Cereus.    Lipase [[id number]]  (Normal) Collected: 04/01/21 0421    Specimen: Blood Updated: 04/01/21 0840     Lipase 25 U/L     D-dimer, Quantitative [[id number]]  (Abnormal) Collected: 04/01/21 0421    Specimen: Blood Updated: 04/01/21 0544     D-Dimer, Quantitative 3.37 MCGFEU/mL     Narrative:      Can be elevated in, but is not diagnostic for deep vein thrombosis (DVT) or pulmonary embolis (PE).  It is also elevated in other  medical conditions.  Clinical correlation is required.  The negative cut-off value for the D-Dimer is 0.50 mcg FEU/mL for DVT and PE.      Protime-INR [[id number]]  (Abnormal) Collected: 04/01/21 0421    Specimen: Blood Updated: 04/01/21 0520     Protime 15.3 Seconds      INR 1.25    Narrative:      Therapeutic Ranges for INR: 2.0-3.0 (PT 20-30)                              2.5-3.5 (PT 25-34)    Clostridium Difficile Toxin - Stool, Per Rectum [[id number]]  (Normal) Collected: 04/01/21 0410    Specimen: Stool from Per Rectum Updated: 04/01/21 0519    Narrative:      The following orders were created for panel order Clostridium Difficile Toxin - Stool, Per Rectum.  Procedure                               Abnormality         Status                     ---------                               -----------         ------                     Clostridium Difficile EI...[[id number]]  Normal              Final result                 Please view results for these tests on the individual orders.    Clostridium Difficile EIA - Stool, Per Rectum [[id number]]  (Normal) Collected: 04/01/21 0410    Specimen: Stool from Per Rectum Updated: 04/01/21 0519     C Diff GDH / Toxin Negative    Procalcitonin [[id number]]  (Abnormal) Collected: 04/01/21 0421    Specimen: Blood Updated: 04/01/21 0459     Procalcitonin 9.37 ng/mL     Narrative:      Results may be falsely decreased if patient taking Biotin.     Timed Lactic Acid, Reflex [[id number]]  (Normal) Collected: 04/01/21 0421    Specimen: Blood Updated: 04/01/21 0450     Lactate 1.7 mmol/L     Troponin [[id number]]  (Normal) Collected: 03/31/21 0404    Specimen: Blood Updated: 04/01/21 0215     Troponin T <0.010 ng/mL     Narrative:      Troponin T Reference Range:  <= 0.03 ng/mL-   Negative for AMI  >0.03 ng/mL-     Abnormal for myocardial necrosis.  Clinicians would have to utilize clinical acumen, EKG, Troponin and serial changes to determine if it is an Acute Myocardial Infarction or  myocardial injury due to an underlying chronic condition.       Results may be falsely decreased if patient taking Biotin.      Lactic Acid, Plasma [[id number]]  (Abnormal) Collected: 04/01/21 0132    Specimen: Blood Updated: 04/01/21 0159     Lactate 2.2 mmol/L     Lactate Dehydrogenase [[id number]]  (Abnormal) Collected: 03/31/21 0404    Specimen: Blood Updated: 04/01/21 0158      U/L     CK [[id number]]  (Abnormal) Collected: 03/31/21 0404    Specimen: Blood Updated: 04/01/21 0158     Creatine Kinase 497 U/L     Hepatitis Panel, Acute [[id number]]  (Normal) Collected: 03/31/21 1705    Specimen: Blood Updated: 03/31/21 2040     Hepatitis B Surface Ag Non-Reactive     Hep A IgM Non-Reactive     Hep B C IgM Non-Reactive     Hepatitis C Ab Non-Reactive    Narrative:      Results may be falsely decreased if patient taking Biotin.     Lipase [[id number]]  (Normal) Collected: 03/31/21 0404    Specimen: Blood Updated: 03/31/21 1216     Lipase 17 U/L     Hepatic Function Panel [[id number]]  (Abnormal) Collected: 03/31/21 0404    Specimen: Blood Updated: 03/31/21 1216     Total Protein 6.4 g/dL      Albumin 2.80 g/dL      ALT (SGPT) 66 U/L      AST (SGOT) 65 U/L      Alkaline Phosphatase 85 U/L      Total Bilirubin <0.2 mg/dL      Bilirubin, Direct <0.2 mg/dL      Bilirubin, Indirect --     Comment: Unable to calculate       Renal Function Panel [[id number]]  (Abnormal) Collected: 03/31/21 0404    Specimen: Blood Updated: 03/31/21 0605     Glucose 163 mg/dL      BUN 25 mg/dL      Creatinine 0.71 mg/dL      Sodium 137 mmol/L      Potassium 4.4 mmol/L      Chloride 108 mmol/L      CO2 17.9 mmol/L      Calcium 7.9 mg/dL      Albumin 2.80 g/dL      Phosphorus 1.7 mg/dL      Anion Gap 11.1 mmol/L      BUN/Creatinine Ratio 35.2     eGFR Non African Amer 88 mL/min/1.73     Narrative:      GFR Normal >60  Chronic Kidney Disease <60  Kidney Failure <15      Magnesium [[id number]]  (Normal) Collected: 03/31/21 0404    Specimen: Blood  Updated: 03/31/21 0554     Magnesium 2.2 mg/dL     Legionella Antigen, Urine - Urine, Urine, Clean Catch [[id number]]  (Normal) Collected: 03/29/21 0410    Specimen: Urine, Clean Catch Updated: 03/29/21 1801     LEGIONELLA ANTIGEN, URINE Negative    S. Pneumo Ag Urine or CSF - Urine, Urine, Clean Catch [[id number]]  (Abnormal) Collected: 03/29/21 0410    Specimen: Urine, Clean Catch Updated: 03/29/21 1801     Strep Pneumo Ag Positive    Blood Culture - Blood, Blood, Venous Line [[id number]] Collected: 03/29/21 0916    Specimen: Blood, Venous Line Updated: 03/29/21 0916    Urine Drug Screen - Urine, Clean Catch [[id number]]  (Abnormal) Collected: 03/29/21 0410    Specimen: Urine, Clean Catch Updated: 03/29/21 0508     THC, Screen, Urine Negative     Phencyclidine (PCP), Urine Negative     Cocaine Screen, Urine Negative     Methamphetamine, Ur Negative     Opiate Screen Positive     Amphetamine Screen, Urine Negative     Benzodiazepine Screen, Urine Positive     Tricyclic Antidepressants Screen Negative     Methadone Screen, Urine Negative     Barbiturates Screen, Urine Positive     Oxycodone Screen, Urine Positive     Propoxyphene Screen Negative     Buprenorphine, Screen, Urine Negative    Narrative:      Urine drug screen results are to be used for medical purposes only.  They are not to be used for legal purposes such as employment testing.  Negative results do not necessarily mean the complete absence of a subtance, but rather that the result is less than the cutoff for that substance.  Positive results are unconfirmed and considered Preliminary Positive.  [facility] does not automatically confirm Postitive Unconfirmed results.  The physician may request (order) an Unconfirmed Positive result to be sent out for confirmation.      Negative Thresholds for Drugs Screened:    THC screen, urine                          50 ng/ml  Phenycyclidine (PCP), urine                25 ng/ml  Cocaine screen, urine                      150 ng/ml  Methamphetamine, urine                    500 ng/ml  Opiate screen, urine                      100 ng/ml  Amphetamine screen, urine                 500 ng/ml  Benzodiazepine screen, urine              150 ng/ml  Tricyclic Antidepressants screen, urine   300 ng/ml  Methadone screen, urine                   200 ng/ml  Barbiturates screen, urine                200 ng/ml  Oxycodone screen, urine                   100 ng/ml  Propoxyphene screen, urine                300 ng/ml  Buprenorphine screen, urine                10 ng/ml    Urinalysis, Microscopic Only - Urine, Clean Catch [[id number]]  (Abnormal) Collected: 03/29/21 0410    Specimen: Urine, Clean Catch Updated: 03/29/21 0440     RBC, UA 3-5 /HPF      WBC, UA None Seen /HPF      Bacteria, UA 1+ /HPF      Squamous Epithelial Cells, UA 3-6 /HPF      Yeast, UA Small/1+ Yeast /HPF      Hyaline Casts, UA None Seen /LPF      Amorphous Crystals, UA Small/1+ /HPF      Methodology Manual Light Microscopy    Urinalysis With Culture If Indicated - Urine, Clean Catch [[id number]]  (Abnormal) Collected: 03/29/21 0410    Specimen: Urine, Clean Catch Updated: 03/29/21 0425     Color, UA Yellow     Appearance, UA Cloudy     pH, UA <=5.0     Specific Gravity, UA 1.020     Glucose, UA Negative     Ketones, UA Negative     Bilirubin, UA Negative     Blood, UA Negative     Protein, UA 30 mg/dL (1+)     Leuk Esterase, UA Negative     Nitrite, UA Negative     Urobilinogen, UA 0.2 E.U./dL    Manual Differential [[id number]]  (Abnormal) Collected: 03/29/21 0150    Specimen: Blood Updated: 03/29/21 0232     Neutrophil % 72.0 %      Lymphocyte % 8.0 %      Monocyte % 5.0 %      Bands %  12.0 %      Metamyelocyte % 3.0 %      Neutrophils Absolute 7.76 10*3/mm3      Lymphocytes Absolute 0.74 10*3/mm3      Monocytes Absolute 0.46 10*3/mm3      Macrocytes Slight/1+     WBC Morphology Normal     Platelet Morphology Normal    COVID-19 and FLU A/B PCR - Swab, Nasopharynx  [[id number]]  (Normal) Collected: 03/29/21 0120    Specimen: Swab from Nasopharynx Updated: 03/29/21 0156     COVID19 Not Detected     Influenza A PCR Not Detected     Influenza B PCR Not Detected    Narrative:      Fact sheet for providers: https://www.fda.gov/media/376825/download    Fact sheet for patients: https://www.fda.gov/media/816414/download    Test performed by PCR.        Imaging Results (Most Recent)     Procedure Component Value Units Date/Time    CT Angiogram Chest With & Without Contrast [[id number]] Collected: 04/02/21 0951     Updated: 04/02/21 0953    Narrative:      CT ANGIOGRAM CHEST W WO CONTRAST    INDICATION:   Chest pain with elevated d-dimer on arrival    TECHNIQUE:   CT angiogram of the chest with 100 cc of Omnipaque 300 IV contrast. 3-D reconstructions were obtained and reviewed.   Radiation dose reduction techniques included automated exposure control or exposure modulation based on body size. Count of known CT and  cardiac nuc med studies performed in previous 12 months: 0.     COMPARISON:   None available.    FINDINGS:   Chest images at mediastinal window show no adenopathy. Small bilateral pleural effusions are noted. There are no pulmonary artery filling defects to suggest emboli. The CT angiographic images also show no evidence of emboli.    Chest images at lung window demonstrate dense alveolar consolidation in the right middle lobe and lingula. There are smaller infiltrates anteriorly in the right upper lobe and left upper lobe. Underlying emphysema is noted. The infiltrates are strongly  suspicious for acute pneumonia. There is no evidence of endobronchial obstruction on either side. Consolidation is greater on the right than on the left. Imaging features can be seen with COVID-19 pneumonia, although are nonspecific and can can occur  with a variety of infectious and noninfectious processes.    In addition, there is a left apical lung nodule that is noncalcified. It measures 7 mm  in diameter and 12 mm in length. It is suspicious for malignancy. There is also a right lower lobe nodule below the hilum measuring 8 mm. This could represent either a  true nodule or infiltrate. A small right lower lobe infiltrate is favored. Recommend correlation with any previous chest CTs if available. If no prior chest CTs are available, follow-up chest CT is recommended in 3 months to evaluate for enlargement over  time. PET/CT scanning could also be considered as a next step in evaluation but the left apical lesion is borderline in terms of increased chance for a false negative.      Impression:      No evidence of pulmonary embolism. There is extensive bilateral pneumonia predominantly in the right middle lobe and lingula and to a lesser extent in both upper lobes. This is accompanied by small bilateral pleural effusions. Additionally there is a  suspicious indeterminate left apical lung nodule that needs follow-up as described above. There is also a density in the right lower lobe that could represent either infiltrate or nodule measuring 8 mm in diameter. Malignancy is not excluded.    Signer Name: Galo Hernandes MD   Signed: 4/2/2021 9:51 AM   Workstation Name: [id number]    [facility] Gallbladder [[id number]] Collected: 04/01/21 1037     Updated: 04/01/21 1039    Narrative:      US Abdomen Ltd    INDICATION:   45-year-old female with abdominal pain for 4 days.    COMPARISON:   None available.    FINDINGS:  PANCREAS: Visualized portions of the pancreas are within normal limits.     LIVER: Increased echogenicity of the hepatic parenchyma is indicative of hepatic steatosis. The liver is enlarged, measuring 18.8 cm in craniocaudad length. No hepatic mass. The intrahepatic bile ducts are normal in caliber. The common duct is normal in  size at the sandra hepatis measuring 1-2 mm.    GALLBLADDER: Gallbladder wall is borderline in thickness, measuring 3 mm. No evidence of  cholelithiasis. There is a small amount of nonspecific pericholecystic fluid.    RIGHT KIDNEY: The right kidney measures 10 cm. Renal cortical thickness and echogenicity is normal. No hydronephrosis.    OTHER: Small amount of perihepatic ascites.      Impression:        1. Mild hepatomegaly with hepatic steatosis.  2. Small amount of perihepatic ascites and pericholecystic fluid. Nonspecific borderline gallbladder wall thickening, measuring 3 mm, which may be slightly accentuated by the pericholecystic fluid. No evidence of cholelithiasis or common bile duct  dilatation.    Signer Name: Bliase Vaughn MD   Signed: 4/1/2021 10:37 AM   Workstation Name: [id number]    [facility]    CT Abdomen Pelvis With Contrast [[id number]] Collected: 03/31/21 1611     Updated: 03/31/21 1617    Narrative:      CT abdomen and pelvis with contrast   03/31/2021     HISTORY:  Epigastric pain and bloating starting today     COMPARISON:  06/14/2016     TECHNIQUE:    CT of Abdomen and Pelvis with contrast performed.  Sagittal and Coronal  reconstructions performed. Radiation dose reduction techniques included  automated exposure control or exposure modulation based on body size.  Radiation audit for CT and nuclear cardiology exams in the last 12  months: 0.      FINDINGS:    Abdomen:  There is dense consolidation throughout almost all the right middle lobe  and lingula with air bronchograms consistent with pneumonia. There are  no noncalcified lung nodules. Small amount of fluid around the liver.  Liver slightly enlarged decreased in density. There is fluid around the  gallbladder. The gallbladder wall is normal. There is no biliary  distention. The spleen, pancreas, adrenal glands and kidneys are normal.  Aorta is normal in size. There is no adenopathy. Oral contrast was  administered. The bowel appears normal.       Pelvis:  There is small or free fluid in the pelvis. The uterus and adnexal  regions and bladder are  normal. The small amount of air in the anterior  abdominal wall probably from previous injection. Bones are unremarkable.  There is a vascular stent in the left common iliac artery         Impression:      The liver appears slightly enlarged and slightly decreased  in density. There is a small amount ascites around the liver and in the  pelvis. Correlation with hepatic enzymes and possibility of hepatitis is  recommended.     Dense consolidation in right middle lobe and lingula consistent with  pneumonia     Otherwise negative study     This report was finalized on 3/31/2021 4:15 PM by Dr. Harpreet Castellon MD.       XR Abdomen KUB [[id number]] Collected: 03/30/21 1157     Updated: 03/30/21 1159    Narrative:      XR ABDOMEN KUB-: 3/30/2021 11:53 AM     INDICATION:   Abdominal pain today     COMPARISON:   None available.     FINDINGS:  AP radiographs of the abdomen. The renal shadows are symmetric. No renal  calculi. No bladder calculi.     The bowel gas pattern is nonobstructive. No acute osseous abnormalities.  No radiopaque foreign body.       Impression:      Negative KUB     This report was finalized on 3/30/2021 11:57 AM by Dr. Harpreet Castellon MD.       XR Chest 1 View [[id number]] Collected: 03/29/21 0237     Updated: 03/29/21 0247    Narrative:      CR Chest 1 Vw    INDICATION:   Cough and chest pain 1 month. Nausea vomiting and headache in the emergency Department. Bilateral rib fractures.     COMPARISON:    3/11/2021    FINDINGS:  Single portable AP view(s) of the chest.  Cardiac silhouette within normal limits. Unremarkable vascularity. The lungs are hyperinflated and there are new lower lung zone opacities bilaterally obscuring the right and left heart borders, most  characteristic of pneumonia. No effusion. No pneumothorax. Previously documented right rib fractures difficult to visualize.      Impression:        1. Interval development of bilateral pneumonia appearing to involve the lingula and right middle lobe.  Follow-up to clearing recommended.    Signer Name: Huy Hilario MD   Signed: 3/29/2021 2:37 AM   Workstation Name: [facility]    CT Head Without Contrast [[id number]] Collected: 03/29/21 0242     Updated: 03/29/21 0246    Narrative:      CT Head WO    HISTORY:   Headache nausea and vomiting that began tonight. Cough and chest pain 1 month. COPD hypertension and breast implant placement.    TECHNIQUE:   Axial unenhanced head CT. Radiation dose reduction techniques included automated exposure control or exposure modulation based on body size. Count of known CT and cardiac nuc med studies performed in previous 12 months: 0.     Time of scan: 0223 hours    COMPARISON:   None.    FINDINGS:   No intracranial hemorrhage, mass, or infarct. No hydrocephalus or extra-axial fluid collection. Brain parenchymal density is normal. The skull base, calvarium, and extracranial soft tissues are normal.      Impression:      Normal, negative unenhanced head CT.          Signer Name: Huy Hilario MD   Signed: 3/29/2021 2:42 AM   Workstation Name: [email or web address]          PROCEDURES      Condition on Discharge:  Clinically and hemodynamically stable    Physical Exam at Discharge  Vital Signs  Temp:  [96.3 °F (35.7 °C)-98.5 °F (36.9 °C)] 97.6 °F (36.4 °C)  Heart Rate:  [] 114  Resp:  [16-20] 16  BP: (114-140)/(62-93) 119/72    Physical Exam   Constitutional: Patient appears well-developed and well-nourished and in no acute distress   HEENT:   Head: Normocephalic and atraumatic.   Eyes:  Pupils are equal, round, and reactive to light. EOM are intact. Sclera are anicteric and non-injected.    Neck: Neck supple. No JVD present. No thyromegaly present. No lymphadenopathy present.  Cardiovascular: Regular rate, regular rhythm, S1 normal and S2 normal.  Exam reveals no gallop and no friction rub.  No murmur heard.  Pulmonary/Chest: Pain chest wall due to rib  fractures. Lungs are clear to auscultation bilaterally. No respiratory distress. No wheezes. No rhonchi. No rales.   Abdominal: Soft. Bowel sounds are normal. No distension and no mass. There is no hepatosplenomegaly. There is no tenderness.   Musculoskeletal: Normal Muscle tone  Extremities: No edema. Pulses are palpable in all 4 extremities.  Neurological: Patient is alert and oriented to person, place, and time. Cranial nerves II-XII are grossly intact with no focal deficits.  Skin: Skin is warm. No rash noted. Nails show no clubbing.  No cyanosis or erythema.        Discharge Disposition  Patient will be discharged home    Visiting Nurse:    N/A    Home PT/OT:  N/A    Home Safety Evaluation:  N/A    DME  N/A    Discharge Diet:      Dietary Orders (From admission, onward)     Start     Ordered    04/01/21 1149  Diet Regular; Low Fat  Diet Effective Now     Question Answer Comment   Diet Texture / Consistency Regular    Common Modifiers Low Fat        04/01/21 1148                Activity at Discharge:  Ad Lula    Pre-discharge education  Smoking      Follow-up Appointments  Future Appointments   Date Time Provider Department Center   6/8/2021 11:20 AM Gatito Short MD [facility]   Follow up with PCP in one week  Reviewed CT chest findings w/ patient. There is a  suspicious indeterminate left apical lung nodule that needs follow-up as described above. There is also a density in the right lower lobe that could represent either infiltrate or nodule measuring 8 mm in diameter. Malignancy is not excluded.  Needs repeat scan in 3 months.     [unfilled]    Test Results Pending at Discharge  [unfilled]     Jesse Colby MD  04/03/21  15:12 EDT    Time: 30 min (if over 30 minutes give explanation as to why it took greater than 30 minutes)  Discharge over 30 min (if over 30 minutes give explanation as to why it took greater than 30 minutes)  Secondary to:  Coordination of home care  D/W case  management  Medication reconciliation      Electronically signed by Jesse Colby MD at 04/03/21 8578
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
## 2021-04-12 RX ORDER — PROMETHAZINE HYDROCHLORIDE 25 MG/1
25 TABLET ORAL EVERY 8 HOURS PRN
Qty: 10 TABLET | Refills: 0 | OUTPATIENT
Start: 2021-04-12

## 2021-04-12 NOTE — TELEPHONE ENCOUNTER
I refused this due to a prn medication. Not sure if you would like to approve, not mentioned in TCM note.

## 2021-04-13 ENCOUNTER — READMISSION MANAGEMENT (OUTPATIENT)
Dept: CALL CENTER | Facility: HOSPITAL | Age: 46
End: 2021-04-13

## 2021-04-13 LAB — C DIFF TOX GENS STL QL NAA+PROBE: NEGATIVE

## 2021-04-13 NOTE — OUTREACH NOTE
Medical Week 2 Survey      Responses   [facility] patient discharged from?  [place]   Does the patient have one of the following disease processes/diagnoses(primary or secondary)?  Other   Week 2 attempt successful?  No   Unsuccessful attempts  Attempt 1          Seema Phan RN

## 2021-04-15 ENCOUNTER — READMISSION MANAGEMENT (OUTPATIENT)
Dept: CALL CENTER | Facility: HOSPITAL | Age: 46
End: 2021-04-15

## 2021-04-15 NOTE — OUTREACH NOTE
Medical Week 2 Survey      Responses   [facility] patient discharged from?  [place]   Does the patient have one of the following disease processes/diagnoses(primary or secondary)?  Other   Week 2 attempt successful?  No          Marlin Saravia RN

## 2021-04-21 ENCOUNTER — READMISSION MANAGEMENT (OUTPATIENT)
Dept: CALL CENTER | Facility: HOSPITAL | Age: 46
End: 2021-04-21

## 2021-04-21 NOTE — OUTREACH NOTE
Medical Week 3 Survey      Responses   [facility] patient discharged from?  [place]   Does the patient have one of the following disease processes/diagnoses(primary or secondary)?  Other   Week 3 attempt successful?  Yes   Call start time  1426   Call end time  1431   Discharge diagnosis  Atypical Chest Pain   Meds reviewed with patient/caregiver?  Yes   Is the patient taking all medications as directed (includes completed medication regime)?  Yes   Has the patient kept scheduled appointments due by today?  Yes   What is the patient's perception of their health status since discharge?  Improving   Additional teach back comments  Still having rib pain.   Week 3 Call Completed?  Yes          Magi Dunbar RN

## 2021-04-22 ENCOUNTER — APPOINTMENT (OUTPATIENT)
Dept: CT IMAGING | Facility: HOSPITAL | Age: 46
End: 2021-04-22

## 2021-04-23 DIAGNOSIS — Z72.0 TOBACCO USE: ICD-10-CM

## 2021-04-28 ENCOUNTER — READMISSION MANAGEMENT (OUTPATIENT)
Dept: CALL CENTER | Facility: HOSPITAL | Age: 46
End: 2021-04-28

## 2021-04-28 NOTE — OUTREACH NOTE
Medical Week 4 Survey      Responses   [facility] patient discharged from?  [place]   Does the patient have one of the following disease processes/diagnoses(primary or secondary)?  Other   Week 4 attempt successful?  Yes   Call start time  0714   Rescheduled  Rescheduled-pt requested [Got  he is at work. ]   Call end time  0714   Discharge diagnosis  Atypical Chest Pain          Kesha Lemus RN

## 2021-04-30 ENCOUNTER — READMISSION MANAGEMENT (OUTPATIENT)
Dept: CALL CENTER | Facility: HOSPITAL | Age: 46
End: 2021-04-30

## 2021-04-30 NOTE — OUTREACH NOTE
Medical Week 4 Survey      Responses   [facility] patient discharged from?  [place]   Does the patient have one of the following disease processes/diagnoses(primary or secondary)?  Other   Week 4 attempt successful?  Yes   Call start time  1405   Call end time  1413   Discharge diagnosis  Atypical Chest Pain   Meds reviewed with patient/caregiver?  Yes   Is the patient having any side effects they believe may be caused by any medication additions or changes?  No   Is the patient taking all medications as directed (includes completed medication regime)?  Yes   Has the patient kept scheduled appointments due by today?  Yes   Is the patient still receiving Home Health Services?  N/A   Psychosocial issues?  No   What is the patient's perception of their health status since discharge?  Improving   Is the patient/caregiver able to teach back signs and symptoms related to disease process for when to call PCP?  Yes   Is the patient/caregiver able to teach back signs and symptoms related to disease process for when to call 911?  Yes   Is the patient/caregiver able to teach back the hierarchy of who to call/visit for symptoms/problems? PCP, Specialist, Home health nurse, Urgent Care, ED, 911  Yes   If the patient is a current smoker, are they able to teach back resources for cessation?  -- [has been smoke-free x 32days]   Additional teach back comments  states has rib pain, suggested pt to try Tylenol as ordered for 24hrs instead of 1x/day, for more effective pain relief, pt agreed, respiratory status WNL's, is pleased that has not smoked in 32 days   Week 4 Call Completed?  Yes   Would the patient like one additional call?  No   Graduated  Yes   Is the patient interested in additional calls from an ambulatory ?  NOTE:  applies to high risk patients requiring additional follow-up.  No   Did the patient feel the follow up calls were helpful during their recovery period?  Yes   Was the number of calls  appropriate?  Yes          Danita Tolbert RN

## 2021-05-12 RX ORDER — PROMETHAZINE HYDROCHLORIDE 25 MG/1
25 TABLET ORAL EVERY 8 HOURS PRN
Qty: 10 TABLET | Refills: 0 | Status: ON HOLD | OUTPATIENT
Start: 2021-05-12 | End: 2022-12-05

## 2021-05-24 ENCOUNTER — TELEPHONE (OUTPATIENT)
Dept: NEUROLOGY | Facility: CLINIC | Age: 46
End: 2021-05-24

## 2021-05-24 NOTE — TELEPHONE ENCOUNTER
I called to reschedule patients appointment due to Dr. Short being out of the office, while I was on the phone with her.  She wanted to let Dr. Short know that Emgality isnt helping her.  She was wondering if he would call in her Fiorcet since her PCP has limited her quantity and she takes quite a awhile to get medications refilled.

## 2021-05-24 NOTE — TELEPHONE ENCOUNTER
Patient is aware, she will take the third injection then see us on 6/17/2021 and she can re-evaluated then.

## 2021-06-17 ENCOUNTER — OFFICE VISIT (OUTPATIENT)
Dept: NEUROLOGY | Facility: CLINIC | Age: 46
End: 2021-06-17

## 2021-06-17 VITALS
BODY MASS INDEX: 24.29 KG/M2 | WEIGHT: 132 LBS | HEART RATE: 89 BPM | SYSTOLIC BLOOD PRESSURE: 116 MMHG | DIASTOLIC BLOOD PRESSURE: 70 MMHG | HEIGHT: 62 IN | OXYGEN SATURATION: 96 %

## 2021-06-17 DIAGNOSIS — R20.2 NUMBNESS AND TINGLING OF BOTH FEET: ICD-10-CM

## 2021-06-17 DIAGNOSIS — R20.0 NUMBNESS AND TINGLING OF BOTH FEET: ICD-10-CM

## 2021-06-17 DIAGNOSIS — G43.719 INTRACTABLE CHRONIC MIGRAINE WITHOUT AURA AND WITHOUT STATUS MIGRAINOSUS: Primary | ICD-10-CM

## 2021-06-17 PROCEDURE — 99214 OFFICE O/P EST MOD 30 MIN: CPT | Performed by: PSYCHIATRY & NEUROLOGY

## 2021-06-17 NOTE — ASSESSMENT & PLAN NOTE
She is going to schedule her EMG/NCS appointment.  I will order labs including Vit B12, folate and thiamine levels today.

## 2021-06-17 NOTE — ASSESSMENT & PLAN NOTE
45 year old woman with long history of migraine headaches with occipital neuralgia component.  She is getting the more severe migraines 5 times per month which can last 1 day up to 4 days in duration with 15+ migraine days per month.  Normal neurological examination other than numbness in both feet.  Brain MRI looks good.  She has been tried on topiramate which she did not think was helpful.  She is on Venlafaxine and cannot be tried on propranolol/beta blockers due to COPD.  She should not be tried on triptans due to vascular risk factors and HTN.  Emgality was not helpful.  I will set her up for occipital nerve blocks.  She could not tolerate Nurtec ODT due to bad taste.  I will instead try her on Ubrelvy 100mg as needed for her migraines.  In the future pending results from the ONB we can consider Botox injections for chronic medically refractory migraines.  Again discussed migraine triggers and lifestyle modifications and advised her to quit smoking.

## 2021-06-17 NOTE — PROGRESS NOTES
Chief Complaint  Headache (emgality no help- [id number] )    Subjective          Valeria Rubi presents to [facility] for   HISTORY OF PRESENT ILLNESS:    Valeria Rubi is a 45 year old right handed woman who returns to neurology clinic for follow up evaluation and treatment of chronic medically refractory migraines and numbness in both feet.  She reports migraines starting when she was 12 years old.  The headaches are located in the back of her head and at times in the front of her head.  She describes a throbbing pain which she rates as 10/10 on pain scale 1-10 when most severe with associated light and sound sensitivity with some nausea and vomiting.  She has noticed stress triggers them and she has been having increased stress.  She is getting the mores ever migraines at least 5 times per month and they can wake her up from sleep.  They last 24 hours up to 4 days in duration.  She has tried ibuprofen, Fioricet which works if she feels it coming on and topiramate which she is not sure if it was helpful or not. She has not tried triptans due to history of HTN and she has had stents placed in her legs for which she is on aspirin and Plavix along with statin.  She is also on Venlafaxine which she reports taking for several years.  She has COPD/emphysema from smoking and recently had PNA.  She is smoking 3 cigarettes per day.  She reports lack of sleep.  She reports the numbness in her feet started several months ago and she tells me her feet are always freezing.  She has had lab work including TSH, HgbA1c and serum protein ELP which were normal.  She has had a MRI scan of her lumbar spine which demonstrated DDD without neural foraminal or central canal stenosis.  She denies family history of neuropathy.  She does not drink alcohol and she denies ever being a heavy alcohol drinker.  She has had a brain MRI scan in 2019 which did not demonstrate any acute intracranial findings.  She tried  Emgality after her last visit which was not helpful after 4 months with 4 injections.  She tried Nurtec ODT which she could not tolerate due to the taste.  She would like further assistance with her headaches.      Past Medical History:   Diagnosis Date   • Anxiety    • Arthritis    • COPD (chronic obstructive pulmonary disease) (CMS/HCC)    • Coronary stent occlusion    • CTS (carpal tunnel syndrome)    • Depression    • Dizziness    • Headache    • Hyperlipidemia    • Hypertension    • Migraine    • Numbness and tingling    • Pneumonia         Family History   Problem Relation Age of Onset   • Heart disease Father    • Hypertension Father    • Diabetes Father    • Stroke Father    • COPD Father    • Heart failure Paternal Grandmother    • Heart failure Paternal Grandfather    • COPD Mother    • COPD Maternal Aunt    • COPD Maternal Uncle    • COPD Paternal Uncle         Social History     Socioeconomic History   • Marital status: Single     Spouse name: Not on file   • Number of children: Not on file   • Years of education: Not on file   • Highest education level: Not on file   Tobacco Use   • Smoking status: Current Every Day Smoker     Packs/day: 1.00     Years: 30.00     Pack years: 30.00     Types: Cigarettes   • Smokeless tobacco: Never Used   Substance and Sexual Activity   • Alcohol use: No   • Drug use: No   • Sexual activity: Defer        I have personally reviewed the ROS as stated below.     Review of Systems   Eyes: Negative for blurred vision, double vision and pain.   Musculoskeletal: Negative for back pain and gait problem.   Neurological: Positive for light-headedness and headache (last one last night). Negative for dizziness, tremors, seizures, syncope, facial asymmetry, speech difficulty, weakness, numbness, memory problem and confusion.   Psychiatric/Behavioral: Positive for sleep disturbance, depressed mood and stress. Negative for agitation, behavioral problems, decreased concentration,  "dysphoric mood, hallucinations, self-injury, suicidal ideas and negative for hyperactivity. The patient is nervous/anxious.         Objective   Vital Signs:   /70   Pulse 89   Ht 157 cm (61.81\")   Wt 59.9 kg (132 lb)   SpO2 96%   BMI 24.29 kg/m²       PHYSICAL EXAM:    General   Mental Status - Alert. General Appearance - Well developed, Well groomed, Oriented and Cooperative. Orientation - Oriented X3.       Head and Neck  Head - normocephalic, atraumatic with no lesions or palpable masses. Tenderness to palpation over the bilateral occipital nerves.    Neck    Global Assessment - supple.       Eye   Sclera/Conjunctiva - Bilateral - Normal.    ENMT  Mouth and Throat   Oral Cavity/Oropharynx: Oropharynx - the soft palate,uvula and tongue are normal in appearance.    Chest and Lung Exam   Chest - lung clear to auscultation bilaterally.    Cardiovascular   Cardiovascular examination reveals  - normal heart sounds, regular rate and rhythm.    Neurologic   Mental Status: Speech - Normal. Cognitive function - appropriate fund of knowledge. No impairment of attention, Impairment of concentration, impairment of long term memory or impairment of short term memory.  Cranial Nerves:   II Optic: Visual acuity - Left - Normal. Right - Normal. Visual fields - Normal (to confrontation).  III Oculomotor: Pupillary constriction - Left - Normal. Right - Normal.  VII Facial: - Normal Bilaterally.  VIII Acoustic - Bilateral - Hearing normal and (Hearing tested by finger rub).   IX Glossopharyngeal / X Vagus - Normal.  XI Accessory: Trapezius - Bilateral - Normal. Sternocleidomastoid - Bilateral - Normal.  XII Hypoglossal - Bilateral - Normal.  Eye Movements: - Normal Bilaterally.  Sensory:   Light Touch: Intact - Globally.  Motor:   Bulk and Contour: - Normal.  Tone: - Normal.  Tremor: Not present.  Strength: 5/5 normal muscle strength - All Muscles.   General Assessment of Reflexes: - deep tendon reflexes are normal. " Coordination - No Impairment of finger-to-nose or Impairment of rapid alternating movements. Gait - Normal.       Result Review :                 Assessment and Plan    Problem List Items Addressed This Visit        Neuro    Headache, migraine - Primary    Current Assessment & Plan     45 year old woman with long history of migraine headaches with occipital neuralgia component.  She is getting the more severe migraines 5 times per month which can last 1 day up to 4 days in duration with 15+ migraine days per month.  Normal neurological examination other than numbness in both feet.  Brain MRI looks good.  She has been tried on topiramate which she did not think was helpful.  She is on Venlafaxine and cannot be tried on propranolol/beta blockers due to COPD.  She should not be tried on triptans due to vascular risk factors and HTN.  Emgality was not helpful.  I will set her up for occipital nerve blocks.  She could not tolerate Nurtec ODT due to bad taste.  I will instead try her on Ubrelvy 100mg as needed for her migraines.  In the future pending results from the ONB we can consider Botox injections for chronic medically refractory migraines.  Again discussed migraine triggers and lifestyle modifications and advised her to quit smoking.               Relevant Medications    ubrogepant (ubrogepant) 100 MG tablet       Symptoms and Signs    Numbness and tingling of both feet    Current Assessment & Plan     She is going to schedule her EMG/NCS appointment.  I will order labs including Vit B12, folate and thiamine levels today.           Relevant Orders    Vitamin B12    Folate    Vitamin B1, Whole Blood          I spent 30 minutes caring for Valeria on this date of service. This time includes time spent by me in the following activities:preparing for the visit, reviewing tests, obtaining and/or reviewing a separately obtained history, performing a medically appropriate examination and/or evaluation , counseling and  educating the patient/family/caregiver, ordering medications, tests, or procedures, documenting information in the medical record and care coordination    Follow Up   Return in about 2 months (around 8/17/2021).  Patient was given instructions and counseling regarding her condition or for health maintenance advice. Please see specific information pulled into the AVS if appropriate.

## 2021-06-21 ENCOUNTER — TELEPHONE (OUTPATIENT)
Dept: NEUROLOGY | Facility: CLINIC | Age: 46
End: 2021-06-21

## 2021-06-21 NOTE — TELEPHONE ENCOUNTER
PA has been approved  PA Case: [id number], Status: Approved, Coverage Starts on: 6/21/2021 12:00:00 AM, Coverage Ends on: 6/21/2022 12:00:00 AM.

## 2021-06-25 DIAGNOSIS — F33.1 MODERATE EPISODE OF RECURRENT MAJOR DEPRESSIVE DISORDER (HCC): ICD-10-CM

## 2021-06-25 DIAGNOSIS — F41.9 ANXIETY AND DEPRESSION: ICD-10-CM

## 2021-06-25 DIAGNOSIS — I10 ESSENTIAL HYPERTENSION: ICD-10-CM

## 2021-06-25 DIAGNOSIS — F32.A ANXIETY AND DEPRESSION: ICD-10-CM

## 2021-06-25 RX ORDER — VENLAFAXINE HYDROCHLORIDE 75 MG/1
CAPSULE, EXTENDED RELEASE ORAL
Qty: 90 CAPSULE | Refills: 0 | Status: SHIPPED | OUTPATIENT
Start: 2021-06-25 | End: 2021-09-21

## 2021-06-25 RX ORDER — LISINOPRIL AND HYDROCHLOROTHIAZIDE 20; 12.5 MG/1; MG/1
TABLET ORAL
Qty: 90 TABLET | Refills: 0 | Status: SHIPPED | OUTPATIENT
Start: 2021-06-25 | End: 2021-09-21

## 2021-07-06 ENCOUNTER — TELEPHONE (OUTPATIENT)
Dept: INTERNAL MEDICINE | Facility: CLINIC | Age: 46
End: 2021-07-06

## 2021-07-06 ENCOUNTER — HOSPITAL ENCOUNTER (OUTPATIENT)
Dept: CT IMAGING | Facility: HOSPITAL | Age: 46
Discharge: HOME | End: 2021-07-06
Admitting: INTERNAL MEDICINE

## 2021-07-06 DIAGNOSIS — R91.1 SOLID NODULE OF LUNG GREATER THAN 8 MM IN DIAMETER: ICD-10-CM

## 2021-07-06 DIAGNOSIS — R91.1 LUNG NODULE, SOLITARY: Primary | ICD-10-CM

## 2021-07-06 PROCEDURE — 0 IOPAMIDOL PER 1 ML: Performed by: INTERNAL MEDICINE

## 2021-07-06 PROCEDURE — 71260 CT THORAX DX C+: CPT

## 2021-07-06 RX ADMIN — IOPAMIDOL 100 ML: 755 INJECTION, SOLUTION INTRAVENOUS at 10:40

## 2021-07-06 NOTE — TELEPHONE ENCOUNTER
----- Message from Adrianne Riddle MD sent at 7/6/2021  4:02 PM EDT -----  Hub please inform patients, the results of Call pt about ct scan - lung nodule stable and pneumonia improved.  Rec repeat ct scan in 6 mo to follow the nodule and make sure no further changes.

## 2021-07-07 ENCOUNTER — TELEPHONE (OUTPATIENT)
Dept: INTERNAL MEDICINE | Facility: CLINIC | Age: 46
End: 2021-07-07

## 2021-07-07 ENCOUNTER — PROCEDURE VISIT (OUTPATIENT)
Dept: NEUROLOGY | Facility: CLINIC | Age: 46
End: 2021-07-07

## 2021-07-07 DIAGNOSIS — G43.719 INTRACTABLE CHRONIC MIGRAINE WITHOUT AURA AND WITHOUT STATUS MIGRAINOSUS: Primary | ICD-10-CM

## 2021-07-07 PROCEDURE — 64405 NJX AA&/STRD GR OCPL NRV: CPT | Performed by: PSYCHIATRY & NEUROLOGY

## 2021-07-07 RX ORDER — METHYLPREDNISOLONE ACETATE 40 MG/ML
40 INJECTION, SUSPENSION INTRA-ARTICULAR; INTRALESIONAL; INTRAMUSCULAR; SOFT TISSUE ONCE
Status: COMPLETED | OUTPATIENT
Start: 2021-07-07 | End: 2021-07-07

## 2021-07-07 RX ORDER — LIDOCAINE HYDROCHLORIDE 20 MG/ML
5 INJECTION, SOLUTION INFILTRATION; PERINEURAL ONCE
Status: COMPLETED | OUTPATIENT
Start: 2021-07-07 | End: 2021-07-07

## 2021-07-07 RX ADMIN — METHYLPREDNISOLONE ACETATE 40 MG: 40 INJECTION, SUSPENSION INTRA-ARTICULAR; INTRALESIONAL; INTRAMUSCULAR; SOFT TISSUE at 11:45

## 2021-07-07 RX ADMIN — LIDOCAINE HYDROCHLORIDE 5 ML: 20 INJECTION, SOLUTION INFILTRATION; PERINEURAL at 11:44

## 2021-07-07 NOTE — TELEPHONE ENCOUNTER
PATIENT CALLED TO RETURN MESSAGE LEFT YESTERDAY. SHE IS CONCERNED ABOUT A LEFT RIB RIGHT BELOW HER BREAST. CAN DR. WAKEFIELD HAVE THE RADIOLOGIST LOOK AT THE RESULTS AGAIN?

## 2021-07-07 NOTE — TELEPHONE ENCOUNTER
Spoke to pt and she wanted the radiologist to look at x-ray again due to having left rib pain. Pt states a few months ago she broke it and it still hurts. Please advise

## 2021-07-28 DIAGNOSIS — I73.9 PERIPHERAL ARTERY DISEASE (HCC): ICD-10-CM

## 2021-07-28 DIAGNOSIS — E78.2 MIXED HYPERLIPIDEMIA: ICD-10-CM

## 2021-07-28 RX ORDER — CLOPIDOGREL BISULFATE 75 MG/1
TABLET ORAL
Qty: 90 TABLET | Refills: 1 | OUTPATIENT
Start: 2021-07-28

## 2021-07-28 RX ORDER — SIMVASTATIN 40 MG
TABLET ORAL
Qty: 90 TABLET | Refills: 1 | OUTPATIENT
Start: 2021-07-28

## 2021-09-21 DIAGNOSIS — I10 ESSENTIAL HYPERTENSION: ICD-10-CM

## 2021-09-21 DIAGNOSIS — F32.A ANXIETY AND DEPRESSION: ICD-10-CM

## 2021-09-21 DIAGNOSIS — F41.9 ANXIETY AND DEPRESSION: ICD-10-CM

## 2021-09-21 DIAGNOSIS — F33.1 MODERATE EPISODE OF RECURRENT MAJOR DEPRESSIVE DISORDER (HCC): ICD-10-CM

## 2021-09-21 RX ORDER — LISINOPRIL AND HYDROCHLOROTHIAZIDE 20; 12.5 MG/1; MG/1
TABLET ORAL
Qty: 90 TABLET | Refills: 0 | Status: SHIPPED | OUTPATIENT
Start: 2021-09-21 | End: 2021-12-14

## 2021-09-21 RX ORDER — VENLAFAXINE HYDROCHLORIDE 75 MG/1
CAPSULE, EXTENDED RELEASE ORAL
Qty: 90 CAPSULE | Refills: 0 | Status: SHIPPED | OUTPATIENT
Start: 2021-09-21 | End: 2021-12-14

## 2021-09-21 NOTE — TELEPHONE ENCOUNTER
Rx Refill Note  Requested Prescriptions     Pending Prescriptions Disp Refills    lisinopril-hydrochlorothiazide (PRINZIDE,ZESTORETIC) 20-12.5 MG per tablet [Pharmacy Med Name: LISINOPRIL-HCTZ 20-12.5 MG TAB] 90 tablet 0     Sig: TAKE 1 TABLET BY MOUTH EVERY DAY    venlafaxine XR (EFFEXOR-XR) 75 MG 24 hr capsule [Pharmacy Med Name: VENLAFAXINE HCL ER 75 MG CAP] 90 capsule 0     Sig: TAKE 1 CAPSULE BY MOUTH EVERY DAY      Last office visit with prescribing clinician: 4/9/2021      Next office visit with prescribing clinician: Visit date not found            Gifty Hodges MA  09/21/21, 08:12 EDT

## 2021-11-04 ENCOUNTER — TRANSCRIBE ORDERS (OUTPATIENT)
Dept: ADMINISTRATIVE | Facility: HOSPITAL | Age: 46
End: 2021-11-04

## 2021-11-04 DIAGNOSIS — Z12.31 SCREENING MAMMOGRAM, ENCOUNTER FOR: Primary | ICD-10-CM

## 2021-12-14 DIAGNOSIS — F41.9 ANXIETY AND DEPRESSION: ICD-10-CM

## 2021-12-14 DIAGNOSIS — F32.A ANXIETY AND DEPRESSION: ICD-10-CM

## 2021-12-14 DIAGNOSIS — F33.1 MODERATE EPISODE OF RECURRENT MAJOR DEPRESSIVE DISORDER (HCC): ICD-10-CM

## 2021-12-14 DIAGNOSIS — I10 ESSENTIAL HYPERTENSION: ICD-10-CM

## 2021-12-14 RX ORDER — LISINOPRIL AND HYDROCHLOROTHIAZIDE 20; 12.5 MG/1; MG/1
TABLET ORAL
Qty: 90 TABLET | Refills: 0 | Status: SHIPPED | OUTPATIENT
Start: 2021-12-14 | End: 2022-02-07

## 2021-12-14 RX ORDER — VENLAFAXINE HYDROCHLORIDE 75 MG/1
CAPSULE, EXTENDED RELEASE ORAL
Qty: 90 CAPSULE | Refills: 0 | Status: SHIPPED | OUTPATIENT
Start: 2021-12-14 | End: 2022-01-11

## 2022-01-10 ENCOUNTER — HOSPITAL ENCOUNTER (OUTPATIENT)
Dept: CT IMAGING | Facility: HOSPITAL | Age: 47
Discharge: HOME | End: 2022-01-10
Admitting: INTERNAL MEDICINE

## 2022-01-10 DIAGNOSIS — F41.9 ANXIETY AND DEPRESSION: ICD-10-CM

## 2022-01-10 DIAGNOSIS — F33.1 MODERATE EPISODE OF RECURRENT MAJOR DEPRESSIVE DISORDER: ICD-10-CM

## 2022-01-10 DIAGNOSIS — R91.1 LUNG NODULE, SOLITARY: ICD-10-CM

## 2022-01-10 DIAGNOSIS — F32.A ANXIETY AND DEPRESSION: ICD-10-CM

## 2022-01-10 PROCEDURE — 0 IOPAMIDOL PER 1 ML: Performed by: INTERNAL MEDICINE

## 2022-01-10 PROCEDURE — 71260 CT THORAX DX C+: CPT

## 2022-01-10 RX ADMIN — IOPAMIDOL 100 ML: 755 INJECTION, SOLUTION INTRAVENOUS at 18:24

## 2022-01-11 RX ORDER — VENLAFAXINE HYDROCHLORIDE 75 MG/1
CAPSULE, EXTENDED RELEASE ORAL
Qty: 90 CAPSULE | Refills: 0 | Status: SHIPPED | OUTPATIENT
Start: 2022-01-11

## 2022-01-31 DIAGNOSIS — R91.1 LUNG NODULE, SOLITARY: Primary | ICD-10-CM

## 2022-02-04 DIAGNOSIS — I10 ESSENTIAL HYPERTENSION: ICD-10-CM

## 2022-02-07 RX ORDER — LISINOPRIL AND HYDROCHLOROTHIAZIDE 20; 12.5 MG/1; MG/1
TABLET ORAL
Qty: 90 TABLET | Refills: 0 | Status: SHIPPED | OUTPATIENT
Start: 2022-02-07

## 2022-04-01 ENCOUNTER — TELEPHONE (OUTPATIENT)
Dept: INTERNAL MEDICINE | Facility: CLINIC | Age: 47
End: 2022-04-01

## 2022-04-01 NOTE — TELEPHONE ENCOUNTER
Venkatesh, MD Leland Sapp [place]  I put in orders for f/u ct but pt not coming here any more.  Not sure who pt seeing now but she has a lung nodule on CT and this needs to be followed up to make sure it isnt cancer.          Call and spoke with patient.  She confirmed she is no longer seeing Dr. Ridlde. She is going to her local health department.  She has not established with another PCP as of yet. Advised that I was calling because Dr. Riddle wanted to make sure she has someone following up on her lung CT. She stated that radiology called and she informed them she does not see Dr. Riddle. And that the health department got a hold of radiology.     Patient is aware that Dr. Riddle was wants to make sure she has someone following up with this.

## 2022-07-20 ENCOUNTER — HOSPITAL ENCOUNTER (EMERGENCY)
Facility: HOSPITAL | Age: 47
Discharge: HOME | End: 2022-07-21
Attending: EMERGENCY MEDICINE | Admitting: EMERGENCY MEDICINE

## 2022-07-20 ENCOUNTER — APPOINTMENT (OUTPATIENT)
Dept: GENERAL RADIOLOGY | Facility: HOSPITAL | Age: 47
End: 2022-07-20

## 2022-07-20 ENCOUNTER — APPOINTMENT (OUTPATIENT)
Dept: CT IMAGING | Facility: HOSPITAL | Age: 47
End: 2022-07-20

## 2022-07-20 DIAGNOSIS — J44.1 COPD WITH ACUTE EXACERBATION: ICD-10-CM

## 2022-07-20 DIAGNOSIS — Z72.0 TOBACCO ABUSE: ICD-10-CM

## 2022-07-20 DIAGNOSIS — J18.9 PNEUMONIA OF LEFT UPPER LOBE DUE TO INFECTIOUS ORGANISM: ICD-10-CM

## 2022-07-20 DIAGNOSIS — J18.9 PNEUMONIA OF LEFT LOWER LOBE DUE TO INFECTIOUS ORGANISM: Primary | ICD-10-CM

## 2022-07-20 LAB
ALBUMIN SERPL-MCNC: 4.4 G/DL (ref 3.5–5.2)
ALBUMIN/GLOB SERPL: 1.2 G/DL
ALP SERPL-CCNC: 102 U/L (ref 39–117)
ALT SERPL W P-5'-P-CCNC: 9 U/L (ref 1–33)
ANION GAP SERPL CALCULATED.3IONS-SCNC: 13.9 MMOL/L (ref 5–15)
ARTERIAL PATENCY WRIST A: POSITIVE
AST SERPL-CCNC: 14 U/L (ref 1–32)
ATMOSPHERIC PRESS: 731 MMHG
BASE EXCESS BLDA CALC-SCNC: 1.6 MMOL/L (ref 0–2)
BASOPHILS # BLD AUTO: 0.07 10*3/MM3 (ref 0–0.2)
BASOPHILS NFR BLD AUTO: 0.8 % (ref 0–1.5)
BDY SITE: ABNORMAL
BILIRUB SERPL-MCNC: 0.2 MG/DL (ref 0–1.2)
BODY TEMPERATURE: 37 C
BUN SERPL-MCNC: 6 MG/DL (ref 6–20)
BUN/CREAT SERPL: 8.1 (ref 7–25)
CALCIUM SPEC-SCNC: 9.2 MG/DL (ref 8.6–10.5)
CHLORIDE SERPL-SCNC: 101 MMOL/L (ref 98–107)
CO2 SERPL-SCNC: 23.1 MMOL/L (ref 22–29)
CREAT SERPL-MCNC: 0.74 MG/DL (ref 0.57–1)
DEPRECATED RDW RBC AUTO: 48 FL (ref 37–54)
EGFRCR SERPLBLD CKD-EPI 2021: 99.9 ML/MIN/1.73
EOSINOPHIL # BLD AUTO: 0.07 10*3/MM3 (ref 0–0.4)
EOSINOPHIL NFR BLD AUTO: 0.8 % (ref 0.3–6.2)
ERYTHROCYTE [DISTWIDTH] IN BLOOD BY AUTOMATED COUNT: 12 % (ref 12.3–15.4)
GLOBULIN UR ELPH-MCNC: 3.8 GM/DL
GLUCOSE SERPL-MCNC: 114 MG/DL (ref 65–99)
HCO3 BLDA-SCNC: 27.1 MMOL/L (ref 20–26)
HCT VFR BLD AUTO: 52.7 % (ref 34–46.6)
HGB BLD-MCNC: 16.8 G/DL (ref 12–15.9)
HGB BLDA-MCNC: 14.5 G/DL (ref 13.5–17.5)
HOLD SPECIMEN: NORMAL
HOLD SPECIMEN: NORMAL
IMM GRANULOCYTES # BLD AUTO: 0.03 10*3/MM3 (ref 0–0.05)
IMM GRANULOCYTES NFR BLD AUTO: 0.3 % (ref 0–0.5)
LYMPHOCYTES # BLD AUTO: 0.85 10*3/MM3 (ref 0.7–3.1)
LYMPHOCYTES NFR BLD AUTO: 9.5 % (ref 19.6–45.3)
Lab: ABNORMAL
Lab: ABNORMAL
MACROCYTES BLD QL SMEAR: NORMAL
MCH RBC QN AUTO: 34.4 PG (ref 26.6–33)
MCHC RBC AUTO-ENTMCNC: 31.9 G/DL (ref 31.5–35.7)
MCV RBC AUTO: 107.8 FL (ref 79–97)
MODALITY: ABNORMAL
MONOCYTES # BLD AUTO: 0.6 10*3/MM3 (ref 0.1–0.9)
MONOCYTES NFR BLD AUTO: 6.7 % (ref 5–12)
NEUTROPHILS NFR BLD AUTO: 7.34 10*3/MM3 (ref 1.7–7)
NEUTROPHILS NFR BLD AUTO: 81.9 % (ref 42.7–76)
NOTIFIED BY: ABNORMAL
NOTIFIED WHO: ABNORMAL
NRBC BLD AUTO-RTO: 0 /100 WBC (ref 0–0.2)
NT-PROBNP SERPL-MCNC: 131.6 PG/ML (ref 0–450)
PCO2 BLDA: 44.8 MM HG (ref 35–45)
PCO2 TEMP ADJ BLD: 44.8 MM HG (ref 35–45)
PH BLDA: 7.39 PH UNITS (ref 7.35–7.45)
PH, TEMP CORRECTED: 7.39 PH UNITS (ref 7.35–7.45)
PLATELET # BLD AUTO: 251 10*3/MM3 (ref 140–450)
PMV BLD AUTO: 8.8 FL (ref 6–12)
PO2 BLDA: 49.8 MM HG (ref 83–108)
PO2 TEMP ADJ BLD: 49.8 MM HG (ref 83–108)
POTASSIUM SERPL-SCNC: 3.4 MMOL/L (ref 3.5–5.2)
PROT SERPL-MCNC: 8.2 G/DL (ref 6–8.5)
QT INTERVAL: 350 MS
RBC # BLD AUTO: 4.89 10*6/MM3 (ref 3.77–5.28)
SAO2 % BLDCOA: 89.1 % (ref 94–99)
SMALL PLATELETS BLD QL SMEAR: ADEQUATE
SODIUM SERPL-SCNC: 138 MMOL/L (ref 136–145)
TROPONIN T SERPL-MCNC: <0.01 NG/ML (ref 0–0.03)
VENTILATOR MODE: ABNORMAL
WBC MORPH BLD: NORMAL
WBC NRBC COR # BLD: 8.96 10*3/MM3 (ref 3.4–10.8)
WHOLE BLOOD HOLD COAG: NORMAL
WHOLE BLOOD HOLD SPECIMEN: NORMAL

## 2022-07-20 PROCEDURE — 80053 COMPREHEN METABOLIC PANEL: CPT | Performed by: EMERGENCY MEDICINE

## 2022-07-20 PROCEDURE — 96365 THER/PROPH/DIAG IV INF INIT: CPT

## 2022-07-20 PROCEDURE — 82803 BLOOD GASES ANY COMBINATION: CPT

## 2022-07-20 PROCEDURE — 85007 BL SMEAR W/DIFF WBC COUNT: CPT | Performed by: EMERGENCY MEDICINE

## 2022-07-20 PROCEDURE — 85025 COMPLETE CBC W/AUTO DIFF WBC: CPT | Performed by: EMERGENCY MEDICINE

## 2022-07-20 PROCEDURE — 93010 ELECTROCARDIOGRAM REPORT: CPT | Performed by: INTERNAL MEDICINE

## 2022-07-20 PROCEDURE — 25010000002 CEFTRIAXONE SODIUM-DEXTROSE 1-3.74 GM-%(50ML) RECONSTITUTED SOLUTION: Performed by: EMERGENCY MEDICINE

## 2022-07-20 PROCEDURE — 25010000002 IOPAMIDOL 61 % SOLUTION: Performed by: EMERGENCY MEDICINE

## 2022-07-20 PROCEDURE — 94799 UNLISTED PULMONARY SVC/PX: CPT

## 2022-07-20 PROCEDURE — 94640 AIRWAY INHALATION TREATMENT: CPT

## 2022-07-20 PROCEDURE — 83880 ASSAY OF NATRIURETIC PEPTIDE: CPT | Performed by: EMERGENCY MEDICINE

## 2022-07-20 PROCEDURE — 25010000002 AZITHROMYCIN PER 500 MG: Performed by: EMERGENCY MEDICINE

## 2022-07-20 PROCEDURE — 84484 ASSAY OF TROPONIN QUANT: CPT | Performed by: EMERGENCY MEDICINE

## 2022-07-20 PROCEDURE — 93005 ELECTROCARDIOGRAM TRACING: CPT | Performed by: EMERGENCY MEDICINE

## 2022-07-20 PROCEDURE — 71275 CT ANGIOGRAPHY CHEST: CPT

## 2022-07-20 PROCEDURE — 36600 WITHDRAWAL OF ARTERIAL BLOOD: CPT

## 2022-07-20 PROCEDURE — 96367 TX/PROPH/DG ADDL SEQ IV INF: CPT

## 2022-07-20 PROCEDURE — 94664 DEMO&/EVAL PT USE INHALER: CPT

## 2022-07-20 PROCEDURE — 96375 TX/PRO/DX INJ NEW DRUG ADDON: CPT

## 2022-07-20 PROCEDURE — 25010000002 METHYLPREDNISOLONE PER 125 MG: Performed by: EMERGENCY MEDICINE

## 2022-07-20 PROCEDURE — 71046 X-RAY EXAM CHEST 2 VIEWS: CPT

## 2022-07-20 PROCEDURE — 99284 EMERGENCY DEPT VISIT MOD MDM: CPT

## 2022-07-20 RX ORDER — IPRATROPIUM BROMIDE AND ALBUTEROL SULFATE 2.5; .5 MG/3ML; MG/3ML
3 SOLUTION RESPIRATORY (INHALATION) ONCE
Status: COMPLETED | OUTPATIENT
Start: 2022-07-20 | End: 2022-07-20

## 2022-07-20 RX ORDER — ALBUTEROL SULFATE 2.5 MG/3ML
2.5 SOLUTION RESPIRATORY (INHALATION) ONCE
Status: COMPLETED | OUTPATIENT
Start: 2022-07-20 | End: 2022-07-20

## 2022-07-20 RX ORDER — AZITHROMYCIN 250 MG/1
TABLET, FILM COATED ORAL
Qty: 4 TABLET | Refills: 0 | Status: SHIPPED | OUTPATIENT
Start: 2022-07-20 | End: 2022-07-27 | Stop reason: HOSPADM

## 2022-07-20 RX ORDER — CEFUROXIME AXETIL 500 MG/1
500 TABLET ORAL 2 TIMES DAILY
Qty: 14 TABLET | Refills: 0 | Status: SHIPPED | OUTPATIENT
Start: 2022-07-20 | End: 2022-07-27 | Stop reason: HOSPADM

## 2022-07-20 RX ORDER — CEFTRIAXONE 1 G/50ML
1 INJECTION, SOLUTION INTRAVENOUS ONCE
Status: COMPLETED | OUTPATIENT
Start: 2022-07-20 | End: 2022-07-20

## 2022-07-20 RX ORDER — SODIUM CHLORIDE 0.9 % (FLUSH) 0.9 %
10 SYRINGE (ML) INJECTION AS NEEDED
Status: DISCONTINUED | OUTPATIENT
Start: 2022-07-20 | End: 2022-07-21 | Stop reason: HOSPADM

## 2022-07-20 RX ORDER — METHYLPREDNISOLONE SODIUM SUCCINATE 125 MG/2ML
125 INJECTION, POWDER, LYOPHILIZED, FOR SOLUTION INTRAMUSCULAR; INTRAVENOUS ONCE
Status: COMPLETED | OUTPATIENT
Start: 2022-07-20 | End: 2022-07-20

## 2022-07-20 RX ORDER — ACETAMINOPHEN 500 MG
500 TABLET ORAL ONCE
Status: COMPLETED | OUTPATIENT
Start: 2022-07-20 | End: 2022-07-20

## 2022-07-20 RX ORDER — IPRATROPIUM BROMIDE AND ALBUTEROL SULFATE 2.5; .5 MG/3ML; MG/3ML
SOLUTION RESPIRATORY (INHALATION)
Status: DISCONTINUED
Start: 2022-07-20 | End: 2022-07-21 | Stop reason: HOSPADM

## 2022-07-20 RX ORDER — METHYLPREDNISOLONE 4 MG/1
TABLET ORAL
Qty: 21 TABLET | Refills: 0 | Status: SHIPPED | OUTPATIENT
Start: 2022-07-20 | End: 2022-07-27 | Stop reason: HOSPADM

## 2022-07-20 RX ADMIN — CEFTRIAXONE 1 G: 1 INJECTION, SOLUTION INTRAVENOUS at 23:04

## 2022-07-20 RX ADMIN — ALBUTEROL SULFATE 2.5 MG: 2.5 SOLUTION RESPIRATORY (INHALATION) at 23:53

## 2022-07-20 RX ADMIN — METHYLPREDNISOLONE SODIUM SUCCINATE 125 MG: 125 INJECTION, POWDER, FOR SOLUTION INTRAMUSCULAR; INTRAVENOUS at 21:33

## 2022-07-20 RX ADMIN — SODIUM CHLORIDE 500 MG: 900 INJECTION, SOLUTION INTRAVENOUS at 23:38

## 2022-07-20 RX ADMIN — IPRATROPIUM BROMIDE AND ALBUTEROL SULFATE 3 ML: .5; 3 SOLUTION RESPIRATORY (INHALATION) at 21:34

## 2022-07-20 RX ADMIN — IOPAMIDOL 100 ML: 612 INJECTION, SOLUTION INTRAVENOUS at 22:32

## 2022-07-20 RX ADMIN — ACETAMINOPHEN 500 MG: 500 TABLET ORAL at 23:08

## 2022-07-21 VITALS
DIASTOLIC BLOOD PRESSURE: 82 MMHG | SYSTOLIC BLOOD PRESSURE: 154 MMHG | OXYGEN SATURATION: 90 % | HEIGHT: 62 IN | BODY MASS INDEX: 23 KG/M2 | WEIGHT: 125 LBS | RESPIRATION RATE: 20 BRPM | TEMPERATURE: 98.1 F | HEART RATE: 115 BPM

## 2022-07-21 NOTE — ED PROVIDER NOTES
EMERGENCY DEPARTMENT ENCOUNTER    Room Number:  09/09  Date seen:  7/20/2022  PCP: Provider, No Known  Historian: Patient      HPI:  Chief Complaint: Shortness of breath, cough and congestion  A complete HPI/ROS/PMH/PSH/SH/FH are unobtainable due to: N/A  Context: Valeira Rubi is a 46 y.o. female who presents to the ED c/o worsening shortness of breath with cough and congestion symptoms.  Patient has a history of COPD.  She is a current smoker.  She has been feeling bad with these respiratory symptoms since yesterday.  She has used her home nebulizer many times today but does not seem to be getting any better.  She says the last time she felt this when she had pneumonia.  She denies any fevers.  She denies any vomiting or diarrhea.  There have been no known sick contacts.        PAST MEDICAL HISTORY  Active Ambulatory Problems     Diagnosis Date Noted   • Abnormal mammogram 04/15/2016   • Atopic rhinitis 04/15/2016   • Bronchitis 04/15/2016   • Chronic headache 04/15/2016   • Pain of hand 04/15/2016   • Hyperlipidemia 04/15/2016   • Essential hypertension 04/15/2016   • Sprain of ankle 04/15/2016   • Nausea 04/15/2016   • Pain in wrist 04/15/2016   • Wheezing 04/15/2016   • Panlobular emphysema (HCC) 04/15/2016   • Tobacco use 04/15/2016   • Pharyngeal dysphagia 05/16/2016   • Radiculopathy 05/26/2016   • Lumbar degenerative disc disease 05/26/2016   • Peripheral artery disease (HCC) 06/14/2016   • Pain of toe of left foot 06/14/2016   • Abnormal femoral pulse 06/14/2016   • Coronary stent occlusion    • COPD exacerbation ([facility]) 12/16/2016   • Hyperglycemia 04/25/2017   • Menopausal symptoms 04/25/2017   • Surveillance for Depo-Provera contraception 04/25/2017   • Anxiety and depression 10/10/2017   • Numbness and tingling of both feet 04/10/2018   • Low vitamin D level 04/10/2018   • Low vitamin B12 level 04/10/2018   • Pneumonia of left lower lobe due to infectious organism 06/07/2018   • Headache, migraine  03/09/2021   • Pneumonia of both lower lobes due to infectious organism 03/29/2021   • Acute on chronic respiratory failure with hypoxia (HCC) 03/29/2021     Resolved Ambulatory Problems     Diagnosis Date Noted   • Numbness in feet 03/09/2021     Past Medical History:   Diagnosis Date   • Anxiety    • Arthritis    • COPD (chronic obstructive pulmonary disease) (HCC)    • CTS (carpal tunnel syndrome)    • Depression    • Dizziness    • Headache    • Hypertension    • Migraine    • Numbness and tingling    • Pneumonia          PAST SURGICAL HISTORY  Past Surgical History:   Procedure Laterality Date   • ANGIOPLASTY ILIAC ARTERY Left 6/17/2016    Procedure: BILATERAL DUPLEX DIRECTED ACCESS, AIF BILATERAL RUNOFF, SELECTIVE CATHETERIZATION OF RIGHT EXTERNAL ILIAC WITH ANGIOPLASTY, LEFT COMMON ILIAC STENT PLACEMENT;  Surgeon: Jose Carlos Plascencia MD;  Location: [facility] 18/19;  Service:    • BREAST SURGERY      BILAT IMPLANTS   • CAROTID ARTERY ANGIOPLASTY           FAMILY HISTORY  Family History   Problem Relation Age of Onset   • Heart disease Father    • Hypertension Father    • Diabetes Father    • Stroke Father    • COPD Father    • Heart failure Paternal Grandmother    • Heart failure Paternal Grandfather    • COPD Mother    • COPD Maternal Aunt    • COPD Maternal Uncle    • COPD Paternal Uncle          SOCIAL HISTORY  Social History     Socioeconomic History   • Marital status: Single   Tobacco Use   • Smoking status: Current Every Day Smoker     Packs/day: 1.00     Years: 30.00     Pack years: 30.00     Types: Cigarettes   • Smokeless tobacco: Never Used   Substance and Sexual Activity   • Alcohol use: No   • Drug use: No   • Sexual activity: Defer         ALLERGIES  Flonase [fluticasone]        REVIEW OF SYSTEMS  Review of Systems   Constitutional: Positive for activity change and fatigue. Negative for fever.   HENT: Positive for congestion.    Eyes: Negative for pain and visual disturbance.   Respiratory:  Positive for cough, shortness of breath and wheezing.    Cardiovascular: Negative for chest pain.   Gastrointestinal: Negative for abdominal pain, diarrhea and vomiting.   Genitourinary: Negative for dysuria and flank pain.   Skin: Negative for color change and rash.   Neurological: Negative for syncope and headaches.   All other systems reviewed and are negative.           PHYSICAL EXAM  ED Triage Vitals [07/20/22 1951]   Temp Heart Rate Resp BP SpO2   98.1 °F (36.7 °C) 108 18 173/93 96 %      Temp src Heart Rate Source Patient Position BP Location FiO2 (%)   -- -- -- -- --         GENERAL: Sitting up in bed, appears uncomfortable but no acute distress  HENT: nares patent, normocephalic and atraumatic.  Moist mucous membranes.  EYES: no scleral icterus, PERRL  CV: regular rhythm, heart rate around 100 bpm, no murmurs.  RESPIRATORY: Mild tachypnea and mild accessory muscle use noted.  Bilateral wheezing and some scattered rhonchi are also noted.  No coughing during my examination.  ABDOMEN: soft, nontender throughout.  MUSCULOSKELETAL: no deformity, no edema or asymmetry.  NEURO: alert, moves all extremities, follows commands, no focal deficits.  PSYCH:  calm, cooperative  SKIN: warm, dry    Vital signs and nursing notes reviewed.          LAB RESULTS  Recent Results (from the past 24 hour(s))   Comprehensive Metabolic Panel    Collection Time: 07/20/22  8:09 PM    Specimen: Blood   Result Value Ref Range    Glucose 114 (H) 65 - 99 mg/dL    BUN 6 6 - 20 mg/dL    Creatinine 0.74 0.57 - 1.00 mg/dL    Sodium 138 136 - 145 mmol/L    Potassium 3.4 (L) 3.5 - 5.2 mmol/L    Chloride 101 98 - 107 mmol/L    CO2 23.1 22.0 - 29.0 mmol/L    Calcium 9.2 8.6 - 10.5 mg/dL    Total Protein 8.2 6.0 - 8.5 g/dL    Albumin 4.40 3.50 - 5.20 g/dL    ALT (SGPT) 9 1 - 33 U/L    AST (SGOT) 14 1 - 32 U/L    Alkaline Phosphatase 102 39 - 117 U/L    Total Bilirubin 0.2 0.0 - 1.2 mg/dL    Globulin 3.8 gm/dL    A/G Ratio 1.2 g/dL     BUN/Creatinine Ratio 8.1 7.0 - 25.0    Anion Gap 13.9 5.0 - 15.0 mmol/L    eGFR 99.9 >60.0 mL/min/1.73   Troponin    Collection Time: 07/20/22  8:09 PM    Specimen: Blood   Result Value Ref Range    Troponin T <0.010 0.000 - 0.030 ng/mL   Green Top (Gel)    Collection Time: 07/20/22  8:09 PM   Result Value Ref Range    Extra Tube Hold for add-ons.    Lavender Top    Collection Time: 07/20/22  8:09 PM   Result Value Ref Range    Extra Tube hold for add-on    Gold Top - SST    Collection Time: 07/20/22  8:09 PM   Result Value Ref Range    Extra Tube Hold for add-ons.    Light Blue Top    Collection Time: 07/20/22  8:09 PM   Result Value Ref Range    Extra Tube Hold for add-ons.    CBC Auto Differential    Collection Time: 07/20/22  8:09 PM    Specimen: Blood   Result Value Ref Range    WBC 8.96 3.40 - 10.80 10*3/mm3    RBC 4.89 3.77 - 5.28 10*6/mm3    Hemoglobin 16.8 (H) 12.0 - 15.9 g/dL    Hematocrit 52.7 (H) 34.0 - 46.6 %    .8 (H) 79.0 - 97.0 fL    MCH 34.4 (H) 26.6 - 33.0 pg    MCHC 31.9 31.5 - 35.7 g/dL    RDW 12.0 (L) 12.3 - 15.4 %    RDW-SD 48.0 37.0 - 54.0 fl    MPV 8.8 6.0 - 12.0 fL    Platelets 251 140 - 450 10*3/mm3    Neutrophil % 81.9 (H) 42.7 - 76.0 %    Lymphocyte % 9.5 (L) 19.6 - 45.3 %    Monocyte % 6.7 5.0 - 12.0 %    Eosinophil % 0.8 0.3 - 6.2 %    Basophil % 0.8 0.0 - 1.5 %    Immature Grans % 0.3 0.0 - 0.5 %    Neutrophils, Absolute 7.34 (H) 1.70 - 7.00 10*3/mm3    Lymphocytes, Absolute 0.85 0.70 - 3.10 10*3/mm3    Monocytes, Absolute 0.60 0.10 - 0.90 10*3/mm3    Eosinophils, Absolute 0.07 0.00 - 0.40 10*3/mm3    Basophils, Absolute 0.07 0.00 - 0.20 10*3/mm3    Immature Grans, Absolute 0.03 0.00 - 0.05 10*3/mm3    nRBC 0.0 0.0 - 0.2 /100 WBC   Scan Slide    Collection Time: 07/20/22  8:09 PM    Specimen: Blood   Result Value Ref Range    Macrocytes Slight/1+ None Seen    WBC Morphology Normal Normal    Platelet Estimate Adequate Normal   BNP    Collection Time: 07/20/22  8:09 PM     Specimen: Blood   Result Value Ref Range    proBNP 131.6 0.0 - 450.0 pg/mL   ECG 12 Lead    Collection Time: 07/20/22  8:16 PM   Result Value Ref Range    QT Interval 350 ms   Blood Gas, Arterial -    Collection Time: 07/20/22  9:57 PM    Specimen: Arterial Blood   Result Value Ref Range    Site Right Radial     Marquis's Test Positive     pH, Arterial 7.390 7.350 - 7.450 pH units    pCO2, Arterial 44.8 35.0 - 45.0 mm Hg    pO2, Arterial 49.8 (C) 83.0 - 108.0 mm Hg    HCO3, Arterial 27.1 (H) 20.0 - 26.0 mmol/L    Base Excess, Arterial 1.6 0.0 - 2.0 mmol/L    O2 Saturation, Arterial 89.1 (L) 94.0 - 99.0 %    Hemoglobin, Blood Gas 14.5 13.5 - 17.5 g/dL    Temperature 37.0 C    Barometric Pressure for Blood Gas 731 mmHg    Modality Room Air     Ventilator Mode NA     Notified Who rb and v dr trent, results are a mixed sample     Notified By [id number]     Notified Time 07/20/2022 22:11     Collected by [id number]     pCO2, Temperature Corrected 44.8 35 - 45 mm Hg    pH, Temp Corrected 7.390 7.350 - 7.450 pH Units    pO2, Temperature Corrected 49.8 (C) 83 - 108 mm Hg       Ordered the above labs and reviewed the results.        RADIOLOGY  XR Chest 2 View    Result Date: 7/20/2022  CR Chest 2 Vws INDICATION:  Shortness of air that began yesterday. COPD COMPARISON:  3/29/2021 FINDINGS: PA and lateral views of the chest.  The cardiac silhouette is within normal limits. The vascularity is unremarkable. The lungs are emphysematous. There is no pneumothorax or effusion. There are calcified granulomatous changes. Improved appearance compared to the prior study. Osteopenia and mild thoracic spondylosis.      1. Chronic emphysematous changes with old healed granulomatous disease. Improved appearance compared to prior. No pneumothorax. Signer Name: Huy Hilario MD  Signed: 7/20/2022 8:57 PM  Workstation Name: [id number]  [facility]    CT Angiogram Chest    Result Date: 7/20/2022  CTA Chest INDICATION: Shortness  of air and back pain for 5 days. TECHNIQUE: CT angiogram of the chest with IV contrast. 3-D reconstructions were obtained and reviewed.   Radiation dose reduction techniques included automated exposure control or exposure modulation based on body size. Count of known CT and cardiac nuc med studies performed in previous 12 months: 1. COMPARISON: CT chest 1/10/2022 FINDINGS: Adequate opacification of the pulmonary arteries with no filling defects. Thoracic aorta normal in course and caliber without dissection. Heart size is normal. No pericardial effusion. No pleural effusion. No pneumothorax. Background emphysema. There are patchy nodular infiltrates throughout the left upper lobe, most prominently in the lingula. Minimal peripheral infiltrates in the left lower lobe. Stable 8 x 6 mm noncalcified pulmonary nodule in the medial left upper lobe (image 34). Visualized upper abdomen is unremarkable. Stable bilateral adrenal hyperplasia. No acute osseous abnormality.     1. Negative for pulmonary embolus. 2. Negative for thoracic aortic aneurysm/dissection. 3. Patchy nodular infiltrates throughout the left upper lobe, most prominently in the lingula. Minimal peripheral infiltrates in the left lower lobe. Findings appear most consistent with pneumonia and follow-up to resolution is recommended. 4. Stable 8 x 6 mm noncalcified pulmonary nodule in the medial left upper lobe. Management recommendation: Current published guidelines recommend initial follow-up CT in 3-6 months, and again in 18-24 months for multiple uncomplicated pulmonary nodules measuring 6 to 8 mm in high-risk patients (Fleischner Society guidelines, 2017). 5. Emphysema. Signer Name: Blaise Vaughn MD  Signed: 7/20/2022 10:46 PM  Workstation Name: [email or web address]      Ordered the above noted radiological studies. Reviewed by me in PACS.            PROCEDURES  Procedures    EKG           EKG time/Interp time:  2016/2020  Rhythm/Rate: Sinus rhythm, 103 bpm  P waves and ME: P waves are present, 105 ms  QRS, axis: 79 ms, normal axis  ST and T waves: No ST segment elevations present    Independently interpreted by me contemporaneously with treatment            MEDICATIONS GIVEN IN ER  Medications   sodium chloride 0.9 % flush 10 mL (has no administration in time range)   ipratropium-albuterol (DUO-NEB) 0.5-2.5 mg/3 ml nebulizer solution  - ADS Override Pull (has no administration in time range)   azithromycin (ZITHROMAX) 500 mg 0.9% NaCl (Add-vantage) 250 mL (500 mg Intravenous New Bag 7/20/22 2338)   albuterol (PROVENTIL) nebulizer solution 0.083% 2.5 mg/3mL (has no administration in time range)   methylPREDNISolone sodium succinate (SOLU-Medrol) injection 125 mg (125 mg Intravenous Given 7/20/22 2133)   ipratropium-albuterol (DUO-NEB) nebulizer solution 3 mL (3 mL Nebulization Given 7/20/22 2134)   iopamidol (ISOVUE-300) 61 % injection 100 mL (100 mL Intravenous Given 7/20/22 2232)   cefTRIAXone (ROCEPHIN) IVPB 1 g/50ml dextrose (premix) (0 g Intravenous Stopped 7/20/22 2338)   acetaminophen (TYLENOL) tablet 500 mg (500 mg Oral Given 7/20/22 2308)                   MEDICAL DECISION MAKING, PROGRESS, and CONSULTS    All labs have been independently reviewed by me.  All radiology studies have been reviewed by me and discussed with radiologist dictating the report.   EKG's independently viewed and interpreted by me.  Discussion below represents my analysis of pertinent findings related to patient's condition, differential diagnosis, treatment plan and final disposition.          ED Course as of 07/20/22 2339 Wed Jul 20, 2022 2338 I have reviewed the labs and radiology reports from today's visit.  Patient presents with findings typical for COPD exacerbation and imaging also indicates features of pneumonia present here.  She is doing a little bit better after some steroids and a DuoNeb now.  I am giving her some IV  "antibiotics at this time and will give her 1 more albuterol treatment.  Her oxygenation is normal on room air.  And her work of breathing is acceptable.  Blood gas is not worrisome as her pH is well-balanced and CO2 is not elevated.  I think the sample is venous because it does not correlate with her pulse oximetry readings here.  I spent some time encouraging her and advising her to quit smoking as soon as possible.  I will prescribe antibiotics and steroids for her to continue taking at home this week.  I will urge her to follow-up with her PCP for repeat evaluation.  And I will review with her the usual \"return to ER\" instructions prior to discharge. [SAURAV]      ED Course User Index  [SAURAV] Huy Love MD                  DIAGNOSIS  Final diagnoses:   Pneumonia of left lower lobe due to infectious organism   Pneumonia of left upper lobe due to infectious organism   COPD with acute exacerbation (HCC)   Tobacco abuse         DISPOSITION  Home            Latest Documented Vital Signs:  As of 23:39 EDT  BP- (!) 146/116 HR- 112 Temp- 98.1 °F (36.7 °C) O2 sat- 94%        --    Please note that portions of this were completed with a voice recognition program.          Huy Love MD  07/20/22 [id number]    "

## 2022-07-21 NOTE — DISCHARGE INSTRUCTIONS
Take antibiotics and steroid medications as directed.  Continue using your home nebulizer as directed.  Must follow-up with your PCP for further evaluation.  Please stop smoking as we discussed.  Please return to the emergency room for any worsening symptoms or concerns.

## 2022-07-21 NOTE — ED NOTES
SOA and wheezing since yesterday. Pt states she has emphysema and does not wear oxygen at home. Pt denies any pain. She states she feels soa when she lays down

## 2022-07-22 ENCOUNTER — APPOINTMENT (OUTPATIENT)
Dept: GENERAL RADIOLOGY | Facility: HOSPITAL | Age: 47
End: 2022-07-22

## 2022-07-22 ENCOUNTER — HOSPITAL ENCOUNTER (INPATIENT)
Facility: HOSPITAL | Age: 47
LOS: 5 days | Discharge: HOME | End: 2022-07-27
Attending: EMERGENCY MEDICINE | Admitting: HOSPITALIST

## 2022-07-22 DIAGNOSIS — J44.1 COPD EXACERBATION: Primary | ICD-10-CM

## 2022-07-22 DIAGNOSIS — J96.21 ACUTE ON CHRONIC RESPIRATORY FAILURE WITH HYPOXIA: ICD-10-CM

## 2022-07-22 LAB
ALBUMIN SERPL-MCNC: 4.8 G/DL (ref 3.5–5.2)
ALBUMIN/GLOB SERPL: 1.5 G/DL
ALP SERPL-CCNC: 106 U/L (ref 39–117)
ALT SERPL W P-5'-P-CCNC: 12 U/L (ref 1–33)
ANION GAP SERPL CALCULATED.3IONS-SCNC: 10.4 MMOL/L (ref 5–15)
ARTERIAL PATENCY WRIST A: POSITIVE
AST SERPL-CCNC: 19 U/L (ref 1–32)
ATMOSPHERIC PRESS: 739 MMHG
B PARAPERT DNA SPEC QL NAA+PROBE: NOT DETECTED
B PERT DNA SPEC QL NAA+PROBE: NOT DETECTED
BACTERIA UR QL AUTO: ABNORMAL /HPF
BASE EXCESS BLDA CALC-SCNC: 1.2 MMOL/L (ref 0–2)
BASOPHILS # BLD AUTO: 0.02 10*3/MM3 (ref 0–0.2)
BASOPHILS NFR BLD AUTO: 0.2 % (ref 0–1.5)
BDY SITE: ABNORMAL
BILIRUB SERPL-MCNC: 0.2 MG/DL (ref 0–1.2)
BILIRUB UR QL STRIP: NEGATIVE
BODY TEMPERATURE: 37 C
BUN SERPL-MCNC: 9 MG/DL (ref 6–20)
BUN/CREAT SERPL: 12.3 (ref 7–25)
C PNEUM DNA NPH QL NAA+NON-PROBE: NOT DETECTED
CALCIUM SPEC-SCNC: 9.9 MG/DL (ref 8.6–10.5)
CHLORIDE SERPL-SCNC: 101 MMOL/L (ref 98–107)
CLARITY UR: CLEAR
CO2 SERPL-SCNC: 28.6 MMOL/L (ref 22–29)
COLOR UR: YELLOW
CREAT SERPL-MCNC: 0.73 MG/DL (ref 0.57–1)
DEPRECATED RDW RBC AUTO: 47.9 FL (ref 37–54)
EGFRCR SERPLBLD CKD-EPI 2021: 101.6 ML/MIN/1.73
EOSINOPHIL # BLD AUTO: 0 10*3/MM3 (ref 0–0.4)
EOSINOPHIL NFR BLD AUTO: 0 % (ref 0.3–6.2)
ERYTHROCYTE [DISTWIDTH] IN BLOOD BY AUTOMATED COUNT: 11.9 % (ref 12.3–15.4)
FLUAV SUBTYP SPEC NAA+PROBE: NOT DETECTED
FLUBV RNA ISLT QL NAA+PROBE: NOT DETECTED
GAS FLOW AIRWAY: 4 LPM
GLOBULIN UR ELPH-MCNC: 3.2 GM/DL
GLUCOSE SERPL-MCNC: 159 MG/DL (ref 65–99)
GLUCOSE UR STRIP-MCNC: NEGATIVE MG/DL
HADV DNA SPEC NAA+PROBE: NOT DETECTED
HCO3 BLDA-SCNC: 29.3 MMOL/L (ref 20–26)
HCOV 229E RNA SPEC QL NAA+PROBE: NOT DETECTED
HCOV HKU1 RNA SPEC QL NAA+PROBE: NOT DETECTED
HCOV NL63 RNA SPEC QL NAA+PROBE: NOT DETECTED
HCOV OC43 RNA SPEC QL NAA+PROBE: NOT DETECTED
HCT VFR BLD AUTO: 51.7 % (ref 34–46.6)
HGB BLD-MCNC: 16.1 G/DL (ref 12–15.9)
HGB BLDA-MCNC: 15.7 G/DL (ref 13.5–17.5)
HGB UR QL STRIP.AUTO: ABNORMAL
HMPV RNA NPH QL NAA+NON-PROBE: NOT DETECTED
HPIV1 RNA ISLT QL NAA+PROBE: NOT DETECTED
HPIV2 RNA SPEC QL NAA+PROBE: NOT DETECTED
HPIV3 RNA NPH QL NAA+PROBE: NOT DETECTED
HPIV4 P GENE NPH QL NAA+PROBE: DETECTED
HYALINE CASTS UR QL AUTO: ABNORMAL /LPF
IMM GRANULOCYTES # BLD AUTO: 0.02 10*3/MM3 (ref 0–0.05)
IMM GRANULOCYTES NFR BLD AUTO: 0.2 % (ref 0–0.5)
KETONES UR QL STRIP: NEGATIVE
LEUKOCYTE ESTERASE UR QL STRIP.AUTO: NEGATIVE
LYMPHOCYTES # BLD AUTO: 1.15 10*3/MM3 (ref 0.7–3.1)
LYMPHOCYTES NFR BLD AUTO: 13.1 % (ref 19.6–45.3)
Lab: ABNORMAL
M PNEUMO IGG SER IA-ACNC: NOT DETECTED
MACROCYTES BLD QL SMEAR: NORMAL
MAGNESIUM SERPL-MCNC: 2.1 MG/DL (ref 1.6–2.6)
MCH RBC QN AUTO: 33 PG (ref 26.6–33)
MCHC RBC AUTO-ENTMCNC: 31.1 G/DL (ref 31.5–35.7)
MCV RBC AUTO: 105.9 FL (ref 79–97)
MODALITY: ABNORMAL
MONOCYTES # BLD AUTO: 0.24 10*3/MM3 (ref 0.1–0.9)
MONOCYTES NFR BLD AUTO: 2.7 % (ref 5–12)
NEUTROPHILS NFR BLD AUTO: 7.33 10*3/MM3 (ref 1.7–7)
NEUTROPHILS NFR BLD AUTO: 83.8 % (ref 42.7–76)
NITRITE UR QL STRIP: NEGATIVE
PCO2 BLDA: 59 MM HG (ref 35–45)
PCO2 TEMP ADJ BLD: 59 MM HG (ref 35–45)
PH BLDA: 7.3 PH UNITS (ref 7.35–7.45)
PH UR STRIP.AUTO: 6 [PH] (ref 4.5–8)
PH, TEMP CORRECTED: 7.3 PH UNITS (ref 7.35–7.45)
PLATELET # BLD AUTO: 333 10*3/MM3 (ref 140–450)
PMV BLD AUTO: 8.7 FL (ref 6–12)
PO2 BLDA: 199 MM HG (ref 83–108)
PO2 TEMP ADJ BLD: 199 MM HG (ref 83–108)
POTASSIUM SERPL-SCNC: 4 MMOL/L (ref 3.5–5.2)
PROCALCITONIN SERPL-MCNC: 0.75 NG/ML (ref 0–0.25)
PROT SERPL-MCNC: 8 G/DL (ref 6–8.5)
PROT UR QL STRIP: NEGATIVE
QT INTERVAL: 331 MS
RBC # BLD AUTO: 4.88 10*6/MM3 (ref 3.77–5.28)
RBC # UR STRIP: ABNORMAL /HPF
REF LAB TEST METHOD: ABNORMAL
RHINOVIRUS RNA SPEC NAA+PROBE: NOT DETECTED
RSV RNA NPH QL NAA+NON-PROBE: NOT DETECTED
SAO2 % BLDCOA: 99.7 % (ref 94–99)
SARS-COV-2 RNA NPH QL NAA+NON-PROBE: NOT DETECTED
SARS-COV-2 RNA PNL SPEC NAA+PROBE: NOT DETECTED
SMALL PLATELETS BLD QL SMEAR: ADEQUATE
SODIUM SERPL-SCNC: 140 MMOL/L (ref 136–145)
SP GR UR STRIP: 1.01 (ref 1–1.03)
SQUAMOUS #/AREA URNS HPF: ABNORMAL /HPF
UROBILINOGEN UR QL STRIP: ABNORMAL
VENTILATOR MODE: ABNORMAL
WBC # UR STRIP: ABNORMAL /HPF
WBC MORPH BLD: NORMAL
WBC NRBC COR # BLD: 8.76 10*3/MM3 (ref 3.4–10.8)

## 2022-07-22 PROCEDURE — 85007 BL SMEAR W/DIFF WBC COUNT: CPT | Performed by: EMERGENCY MEDICINE

## 2022-07-22 PROCEDURE — 93005 ELECTROCARDIOGRAM TRACING: CPT | Performed by: HOSPITALIST

## 2022-07-22 PROCEDURE — 94799 UNLISTED PULMONARY SVC/PX: CPT

## 2022-07-22 PROCEDURE — 93010 ELECTROCARDIOGRAM REPORT: CPT | Performed by: INTERNAL MEDICINE

## 2022-07-22 PROCEDURE — 94664 DEMO&/EVAL PT USE INHALER: CPT

## 2022-07-22 PROCEDURE — 99284 EMERGENCY DEPT VISIT MOD MDM: CPT

## 2022-07-22 PROCEDURE — 94761 N-INVAS EAR/PLS OXIMETRY MLT: CPT

## 2022-07-22 PROCEDURE — 25010000002 DIAZEPAM PER 5 MG: Performed by: EMERGENCY MEDICINE

## 2022-07-22 PROCEDURE — 85025 COMPLETE CBC W/AUTO DIFF WBC: CPT | Performed by: EMERGENCY MEDICINE

## 2022-07-22 PROCEDURE — 87635 SARS-COV-2 COVID-19 AMP PRB: CPT | Performed by: EMERGENCY MEDICINE

## 2022-07-22 PROCEDURE — 99223 1ST HOSP IP/OBS HIGH 75: CPT | Performed by: HOSPITALIST

## 2022-07-22 PROCEDURE — 80053 COMPREHEN METABOLIC PANEL: CPT | Performed by: EMERGENCY MEDICINE

## 2022-07-22 PROCEDURE — 94660 CPAP INITIATION&MGMT: CPT

## 2022-07-22 PROCEDURE — 36600 WITHDRAWAL OF ARTERIAL BLOOD: CPT

## 2022-07-22 PROCEDURE — 82803 BLOOD GASES ANY COMBINATION: CPT

## 2022-07-22 PROCEDURE — 25010000002 CEFEPIME-DEXTROSE 1-5 GM-%(50ML) RECONSTITUTED SOLUTION: Performed by: HOSPITALIST

## 2022-07-22 PROCEDURE — 71045 X-RAY EXAM CHEST 1 VIEW: CPT

## 2022-07-22 PROCEDURE — 84145 PROCALCITONIN (PCT): CPT | Performed by: EMERGENCY MEDICINE

## 2022-07-22 PROCEDURE — 25010000002 CEFEPIME-DEXTROSE 2-5 GM-%(50ML) RECONSTITUTED SOLUTION: Performed by: HOSPITALIST

## 2022-07-22 PROCEDURE — 25010000002 ENOXAPARIN PER 10 MG: Performed by: HOSPITALIST

## 2022-07-22 PROCEDURE — 25010000002 DIAZEPAM PER 5 MG: Performed by: HOSPITALIST

## 2022-07-22 PROCEDURE — 25010000002 METHYLPREDNISOLONE PER 125 MG: Performed by: HOSPITALIST

## 2022-07-22 PROCEDURE — 94640 AIRWAY INHALATION TREATMENT: CPT

## 2022-07-22 PROCEDURE — 25010000002 METHYLPREDNISOLONE PER 125 MG: Performed by: EMERGENCY MEDICINE

## 2022-07-22 PROCEDURE — 81001 URINALYSIS AUTO W/SCOPE: CPT | Performed by: EMERGENCY MEDICINE

## 2022-07-22 PROCEDURE — 93005 ELECTROCARDIOGRAM TRACING: CPT | Performed by: EMERGENCY MEDICINE

## 2022-07-22 PROCEDURE — 83735 ASSAY OF MAGNESIUM: CPT | Performed by: HOSPITALIST

## 2022-07-22 PROCEDURE — 0202U NFCT DS 22 TRGT SARS-COV-2: CPT | Performed by: HOSPITALIST

## 2022-07-22 RX ORDER — LORAZEPAM 2 MG/ML
1 INJECTION INTRAMUSCULAR ONCE
Status: DISCONTINUED | OUTPATIENT
Start: 2022-07-22 | End: 2022-07-22

## 2022-07-22 RX ORDER — METHYLPREDNISOLONE SODIUM SUCCINATE 125 MG/2ML
60 INJECTION, POWDER, LYOPHILIZED, FOR SOLUTION INTRAMUSCULAR; INTRAVENOUS EVERY 6 HOURS
Status: DISCONTINUED | OUTPATIENT
Start: 2022-07-22 | End: 2022-07-27 | Stop reason: HOSPADM

## 2022-07-22 RX ORDER — CEFEPIME HYDROCHLORIDE 2 G/50ML
2 INJECTION, SOLUTION INTRAVENOUS ONCE
Status: COMPLETED | OUTPATIENT
Start: 2022-07-22 | End: 2022-07-22

## 2022-07-22 RX ORDER — ALBUTEROL SULFATE 2.5 MG/3ML
2.5 SOLUTION RESPIRATORY (INHALATION) ONCE
Status: COMPLETED | OUTPATIENT
Start: 2022-07-22 | End: 2022-07-22

## 2022-07-22 RX ORDER — DIAZEPAM 5 MG/ML
5 INJECTION, SOLUTION INTRAMUSCULAR; INTRAVENOUS EVERY 4 HOURS PRN
Status: DISCONTINUED | OUTPATIENT
Start: 2022-07-22 | End: 2022-07-25

## 2022-07-22 RX ORDER — CEFEPIME HYDROCHLORIDE 1 G/50ML
1 INJECTION, SOLUTION INTRAVENOUS EVERY 12 HOURS
Status: DISCONTINUED | OUTPATIENT
Start: 2022-07-22 | End: 2022-07-22

## 2022-07-22 RX ORDER — FLUTICASONE PROPIONATE 50 MCG
1 SPRAY, SUSPENSION (ML) NASAL DAILY
Status: DISCONTINUED | OUTPATIENT
Start: 2022-07-23 | End: 2022-07-27 | Stop reason: HOSPADM

## 2022-07-22 RX ORDER — IPRATROPIUM BROMIDE AND ALBUTEROL SULFATE 2.5; .5 MG/3ML; MG/3ML
3 SOLUTION RESPIRATORY (INHALATION) ONCE
Status: COMPLETED | OUTPATIENT
Start: 2022-07-22 | End: 2022-07-22

## 2022-07-22 RX ORDER — CLOPIDOGREL BISULFATE 75 MG/1
75 TABLET ORAL DAILY
Status: DISCONTINUED | OUTPATIENT
Start: 2022-07-22 | End: 2022-07-27 | Stop reason: HOSPADM

## 2022-07-22 RX ORDER — VENLAFAXINE HYDROCHLORIDE 37.5 MG/1
75 CAPSULE, EXTENDED RELEASE ORAL DAILY
Status: DISCONTINUED | OUTPATIENT
Start: 2022-07-22 | End: 2022-07-27 | Stop reason: HOSPADM

## 2022-07-22 RX ORDER — SODIUM CHLORIDE 0.9 % (FLUSH) 0.9 %
10 SYRINGE (ML) INJECTION AS NEEDED
Status: DISCONTINUED | OUTPATIENT
Start: 2022-07-22 | End: 2022-07-27 | Stop reason: HOSPADM

## 2022-07-22 RX ORDER — SODIUM CHLORIDE 0.9 % (FLUSH) 0.9 %
10 SYRINGE (ML) INJECTION EVERY 12 HOURS SCHEDULED
Status: DISCONTINUED | OUTPATIENT
Start: 2022-07-22 | End: 2022-07-27 | Stop reason: HOSPADM

## 2022-07-22 RX ORDER — METHYLPREDNISOLONE SODIUM SUCCINATE 125 MG/2ML
125 INJECTION, POWDER, LYOPHILIZED, FOR SOLUTION INTRAMUSCULAR; INTRAVENOUS ONCE
Status: COMPLETED | OUTPATIENT
Start: 2022-07-22 | End: 2022-07-22

## 2022-07-22 RX ORDER — ENOXAPARIN SODIUM 100 MG/ML
40 INJECTION SUBCUTANEOUS DAILY
Status: DISCONTINUED | OUTPATIENT
Start: 2022-07-22 | End: 2022-07-27 | Stop reason: HOSPADM

## 2022-07-22 RX ORDER — SODIUM CHLORIDE 9 MG/ML
40 INJECTION, SOLUTION INTRAVENOUS AS NEEDED
Status: DISCONTINUED | OUTPATIENT
Start: 2022-07-22 | End: 2022-07-27 | Stop reason: HOSPADM

## 2022-07-22 RX ORDER — ATORVASTATIN CALCIUM 20 MG/1
20 TABLET, FILM COATED ORAL NIGHTLY
Refills: 1 | Status: DISCONTINUED | OUTPATIENT
Start: 2022-07-22 | End: 2022-07-25

## 2022-07-22 RX ORDER — CEFEPIME HYDROCHLORIDE 1 G/50ML
1 INJECTION, SOLUTION INTRAVENOUS EVERY 8 HOURS
Status: DISCONTINUED | OUTPATIENT
Start: 2022-07-22 | End: 2022-07-24

## 2022-07-22 RX ORDER — IPRATROPIUM BROMIDE AND ALBUTEROL SULFATE 2.5; .5 MG/3ML; MG/3ML
3 SOLUTION RESPIRATORY (INHALATION)
Status: DISCONTINUED | OUTPATIENT
Start: 2022-07-22 | End: 2022-07-27 | Stop reason: HOSPADM

## 2022-07-22 RX ORDER — ASPIRIN 81 MG/1
81 TABLET ORAL DAILY
Status: DISCONTINUED | OUTPATIENT
Start: 2022-07-22 | End: 2022-07-27 | Stop reason: HOSPADM

## 2022-07-22 RX ORDER — DIAZEPAM 5 MG/ML
5 INJECTION, SOLUTION INTRAMUSCULAR; INTRAVENOUS ONCE
Status: COMPLETED | OUTPATIENT
Start: 2022-07-22 | End: 2022-07-22

## 2022-07-22 RX ADMIN — DIAZEPAM 5 MG: 5 INJECTION, SOLUTION INTRAMUSCULAR; INTRAVENOUS at 11:49

## 2022-07-22 RX ADMIN — IPRATROPIUM BROMIDE AND ALBUTEROL SULFATE 3 ML: 2.5; .5 SOLUTION RESPIRATORY (INHALATION) at 23:09

## 2022-07-22 RX ADMIN — Medication 10 ML: at 14:49

## 2022-07-22 RX ADMIN — SODIUM CHLORIDE 1000 ML: 9 INJECTION, SOLUTION INTRAVENOUS at 17:48

## 2022-07-22 RX ADMIN — NYSTATIN 500000 UNITS: 100000 SUSPENSION ORAL at 20:23

## 2022-07-22 RX ADMIN — METHYLPREDNISOLONE SODIUM SUCCINATE 60 MG: 125 INJECTION, POWDER, FOR SOLUTION INTRAMUSCULAR; INTRAVENOUS at 17:40

## 2022-07-22 RX ADMIN — NYSTATIN 500000 UNITS: 100000 SUSPENSION ORAL at 17:47

## 2022-07-22 RX ADMIN — DIAZEPAM 5 MG: 5 INJECTION, SOLUTION INTRAMUSCULAR; INTRAVENOUS at 20:09

## 2022-07-22 RX ADMIN — METHYLPREDNISOLONE SODIUM SUCCINATE 125 MG: 125 INJECTION, POWDER, FOR SOLUTION INTRAMUSCULAR; INTRAVENOUS at 10:54

## 2022-07-22 RX ADMIN — IPRATROPIUM BROMIDE AND ALBUTEROL SULFATE 3 ML: 2.5; .5 SOLUTION RESPIRATORY (INHALATION) at 15:00

## 2022-07-22 RX ADMIN — ALBUTEROL SULFATE 2.5 MG: 2.5 SOLUTION RESPIRATORY (INHALATION) at 11:59

## 2022-07-22 RX ADMIN — CEFEPIME HYDROCHLORIDE 2 G: 2 INJECTION, SOLUTION INTRAVENOUS at 14:47

## 2022-07-22 RX ADMIN — CEFEPIME HYDROCHLORIDE 1 G: 1 INJECTION, SOLUTION INTRAVENOUS at 22:29

## 2022-07-22 RX ADMIN — DIAZEPAM 5 MG: 5 INJECTION, SOLUTION INTRAMUSCULAR; INTRAVENOUS at 15:54

## 2022-07-22 RX ADMIN — IPRATROPIUM BROMIDE AND ALBUTEROL SULFATE 3 ML: 2.5; .5 SOLUTION RESPIRATORY (INHALATION) at 15:45

## 2022-07-22 RX ADMIN — IPRATROPIUM BROMIDE AND ALBUTEROL SULFATE 3 ML: .5; 3 SOLUTION RESPIRATORY (INHALATION) at 11:04

## 2022-07-22 RX ADMIN — ENOXAPARIN SODIUM 40 MG: 40 INJECTION SUBCUTANEOUS at 14:48

## 2022-07-22 RX ADMIN — ALBUTEROL SULFATE 2.5 MG: 2.5 SOLUTION RESPIRATORY (INHALATION) at 11:12

## 2022-07-22 RX ADMIN — Medication 10 ML: at 20:10

## 2022-07-22 RX ADMIN — IPRATROPIUM BROMIDE AND ALBUTEROL SULFATE 3 ML: 2.5; .5 SOLUTION RESPIRATORY (INHALATION) at 19:38

## 2022-07-22 RX ADMIN — DOXYCYCLINE 100 MG: 100 INJECTION, POWDER, LYOPHILIZED, FOR SOLUTION INTRAVENOUS at 15:34

## 2022-07-22 RX ADMIN — METHYLPREDNISOLONE SODIUM SUCCINATE 60 MG: 125 INJECTION, POWDER, FOR SOLUTION INTRAMUSCULAR; INTRAVENOUS at 22:29

## 2022-07-23 ENCOUNTER — ANESTHESIA EVENT (OUTPATIENT)
Dept: ICU | Facility: HOSPITAL | Age: 47
End: 2022-07-23

## 2022-07-23 ENCOUNTER — ANESTHESIA (OUTPATIENT)
Dept: ICU | Facility: HOSPITAL | Age: 47
End: 2022-07-23

## 2022-07-23 LAB
ALBUMIN SERPL-MCNC: 4.3 G/DL (ref 3.5–5.2)
ALBUMIN/GLOB SERPL: 1.4 G/DL
ALP SERPL-CCNC: 81 U/L (ref 39–117)
ALT SERPL W P-5'-P-CCNC: 11 U/L (ref 1–33)
ANION GAP SERPL CALCULATED.3IONS-SCNC: 10.2 MMOL/L (ref 5–15)
AST SERPL-CCNC: 14 U/L (ref 1–32)
ATMOSPHERIC PRESS: 739 MMHG
BASE EXCESS BLDV CALC-SCNC: 4.2 MMOL/L (ref 0–2)
BASOPHILS # BLD AUTO: 0.01 10*3/MM3 (ref 0–0.2)
BASOPHILS NFR BLD AUTO: 0.2 % (ref 0–1.5)
BDY SITE: ABNORMAL
BILIRUB SERPL-MCNC: 0.2 MG/DL (ref 0–1.2)
BODY TEMPERATURE: 37 C
BUN SERPL-MCNC: 11 MG/DL (ref 6–20)
BUN/CREAT SERPL: 14.5 (ref 7–25)
CALCIUM SPEC-SCNC: 8.7 MG/DL (ref 8.6–10.5)
CHLORIDE SERPL-SCNC: 104 MMOL/L (ref 98–107)
CO2 SERPL-SCNC: 29.8 MMOL/L (ref 22–29)
CREAT SERPL-MCNC: 0.76 MG/DL (ref 0.57–1)
DEPRECATED RDW RBC AUTO: 48.4 FL (ref 37–54)
EGFRCR SERPLBLD CKD-EPI 2021: 96.8 ML/MIN/1.73
EOSINOPHIL # BLD AUTO: 0 10*3/MM3 (ref 0–0.4)
EOSINOPHIL NFR BLD AUTO: 0 % (ref 0.3–6.2)
EPAP: 6
ERYTHROCYTE [DISTWIDTH] IN BLOOD BY AUTOMATED COUNT: 12.1 % (ref 12.3–15.4)
GLOBULIN UR ELPH-MCNC: 3.1 GM/DL
GLUCOSE SERPL-MCNC: 167 MG/DL (ref 65–99)
HCO3 BLDV-SCNC: 33 MMOL/L (ref 22–28)
HCT VFR BLD AUTO: 46 % (ref 34–46.6)
HGB BLD-MCNC: 14.3 G/DL (ref 12–15.9)
HGB BLDA-MCNC: 14.7 G/DL (ref 13.5–17.5)
IMM GRANULOCYTES # BLD AUTO: 0.02 10*3/MM3 (ref 0–0.05)
IMM GRANULOCYTES NFR BLD AUTO: 0.3 % (ref 0–0.5)
INHALED O2 CONCENTRATION: 28 %
IPAP: 12
LYMPHOCYTES # BLD AUTO: 0.77 10*3/MM3 (ref 0.7–3.1)
LYMPHOCYTES NFR BLD AUTO: 12.2 % (ref 19.6–45.3)
Lab: ABNORMAL
MCH RBC QN AUTO: 33.6 PG (ref 26.6–33)
MCHC RBC AUTO-ENTMCNC: 31.1 G/DL (ref 31.5–35.7)
MCV RBC AUTO: 108 FL (ref 79–97)
MODALITY: ABNORMAL
MONOCYTES # BLD AUTO: 0.17 10*3/MM3 (ref 0.1–0.9)
MONOCYTES NFR BLD AUTO: 2.7 % (ref 5–12)
NEUTROPHILS NFR BLD AUTO: 5.32 10*3/MM3 (ref 1.7–7)
NEUTROPHILS NFR BLD AUTO: 84.6 % (ref 42.7–76)
NRBC BLD AUTO-RTO: 0 /100 WBC (ref 0–0.2)
PAW @ PEAK INSP FLOW SETTING VENT: 13 CMH2O
PCO2 BLDV: 67.3 MM HG (ref 41–51)
PH BLDV: 7.3 PH UNITS (ref 7.32–7.42)
PLATELET # BLD AUTO: 262 10*3/MM3 (ref 140–450)
PMV BLD AUTO: 9.1 FL (ref 6–12)
PO2 BLDV: 32.2 MM HG (ref 27–53)
POTASSIUM SERPL-SCNC: 4.4 MMOL/L (ref 3.5–5.2)
PROCALCITONIN SERPL-MCNC: 0.41 NG/ML (ref 0–0.25)
PROT SERPL-MCNC: 7.4 G/DL (ref 6–8.5)
QT INTERVAL: 334 MS
RBC # BLD AUTO: 4.26 10*6/MM3 (ref 3.77–5.28)
SAO2 % BLDCOV: 60.3 % (ref 45–75)
SET MECH RESP RATE: 8
SODIUM SERPL-SCNC: 144 MMOL/L (ref 136–145)
TROPONIN T SERPL-MCNC: <0.01 NG/ML (ref 0–0.03)
VENTILATOR MODE: ABNORMAL
WBC NRBC COR # BLD: 6.29 10*3/MM3 (ref 3.4–10.8)

## 2022-07-23 PROCEDURE — 25010000002 DIAZEPAM PER 5 MG: Performed by: HOSPITALIST

## 2022-07-23 PROCEDURE — 94664 DEMO&/EVAL PT USE INHALER: CPT

## 2022-07-23 PROCEDURE — 94799 UNLISTED PULMONARY SVC/PX: CPT

## 2022-07-23 PROCEDURE — 94660 CPAP INITIATION&MGMT: CPT

## 2022-07-23 PROCEDURE — 25010000002 METHYLPREDNISOLONE PER 125 MG: Performed by: HOSPITALIST

## 2022-07-23 PROCEDURE — 80053 COMPREHEN METABOLIC PANEL: CPT | Performed by: HOSPITALIST

## 2022-07-23 PROCEDURE — 84484 ASSAY OF TROPONIN QUANT: CPT | Performed by: HOSPITALIST

## 2022-07-23 PROCEDURE — 84145 PROCALCITONIN (PCT): CPT | Performed by: HOSPITALIST

## 2022-07-23 PROCEDURE — 85025 COMPLETE CBC W/AUTO DIFF WBC: CPT | Performed by: HOSPITALIST

## 2022-07-23 PROCEDURE — 25010000002 ENOXAPARIN PER 10 MG: Performed by: HOSPITALIST

## 2022-07-23 PROCEDURE — 25010000002 HYDRALAZINE PER 20 MG: Performed by: HOSPITALIST

## 2022-07-23 PROCEDURE — 94761 N-INVAS EAR/PLS OXIMETRY MLT: CPT

## 2022-07-23 PROCEDURE — 99232 SBSQ HOSP IP/OBS MODERATE 35: CPT | Performed by: HOSPITALIST

## 2022-07-23 PROCEDURE — 82803 BLOOD GASES ANY COMBINATION: CPT

## 2022-07-23 PROCEDURE — 25010000002 CEFEPIME-DEXTROSE 1-5 GM-%(50ML) RECONSTITUTED SOLUTION: Performed by: HOSPITALIST

## 2022-07-23 RX ORDER — HYDRALAZINE HYDROCHLORIDE 20 MG/ML
20 INJECTION INTRAMUSCULAR; INTRAVENOUS EVERY 6 HOURS PRN
Status: DISCONTINUED | OUTPATIENT
Start: 2022-07-23 | End: 2022-07-25

## 2022-07-23 RX ORDER — DIAZEPAM 5 MG/1
5 TABLET ORAL ONCE
Status: COMPLETED | OUTPATIENT
Start: 2022-07-23 | End: 2022-07-23

## 2022-07-23 RX ORDER — LISINOPRIL 20 MG/1
20 TABLET ORAL
Status: DISCONTINUED | OUTPATIENT
Start: 2022-07-23 | End: 2022-07-27 | Stop reason: HOSPADM

## 2022-07-23 RX ORDER — NICOTINE 21 MG/24HR
1 PATCH, TRANSDERMAL 24 HOURS TRANSDERMAL
Status: DISCONTINUED | OUTPATIENT
Start: 2022-07-23 | End: 2022-07-27 | Stop reason: HOSPADM

## 2022-07-23 RX ORDER — ACETAMINOPHEN 325 MG/1
650 TABLET ORAL EVERY 6 HOURS PRN
Status: DISCONTINUED | OUTPATIENT
Start: 2022-07-23 | End: 2022-07-27 | Stop reason: HOSPADM

## 2022-07-23 RX ORDER — LIDOCAINE HYDROCHLORIDE 10 MG/ML
INJECTION, SOLUTION EPIDURAL; INFILTRATION; INTRACAUDAL; PERINEURAL
Status: COMPLETED
Start: 2022-07-23 | End: 2022-07-23

## 2022-07-23 RX ORDER — DIAZEPAM 5 MG/1
TABLET ORAL
Status: COMPLETED
Start: 2022-07-23 | End: 2022-07-23

## 2022-07-23 RX ORDER — SODIUM CHLORIDE 9 MG/ML
75 INJECTION, SOLUTION INTRAVENOUS CONTINUOUS
Status: DISCONTINUED | OUTPATIENT
Start: 2022-07-23 | End: 2022-07-24

## 2022-07-23 RX ADMIN — Medication 10 ML: at 09:09

## 2022-07-23 RX ADMIN — METHYLPREDNISOLONE SODIUM SUCCINATE 60 MG: 125 INJECTION, POWDER, FOR SOLUTION INTRAMUSCULAR; INTRAVENOUS at 11:00

## 2022-07-23 RX ADMIN — METHYLPREDNISOLONE SODIUM SUCCINATE 60 MG: 125 INJECTION, POWDER, FOR SOLUTION INTRAMUSCULAR; INTRAVENOUS at 22:05

## 2022-07-23 RX ADMIN — FLUTICASONE PROPIONATE 1 SPRAY: 50 SPRAY, METERED NASAL at 09:08

## 2022-07-23 RX ADMIN — IPRATROPIUM BROMIDE AND ALBUTEROL SULFATE 3 ML: 2.5; .5 SOLUTION RESPIRATORY (INHALATION) at 11:25

## 2022-07-23 RX ADMIN — IPRATROPIUM BROMIDE AND ALBUTEROL SULFATE 3 ML: 2.5; .5 SOLUTION RESPIRATORY (INHALATION) at 03:04

## 2022-07-23 RX ADMIN — ACETAMINOPHEN 650 MG: 325 TABLET ORAL at 19:51

## 2022-07-23 RX ADMIN — VENLAFAXINE HYDROCHLORIDE 75 MG: 37.5 CAPSULE, EXTENDED RELEASE ORAL at 09:09

## 2022-07-23 RX ADMIN — ACETAMINOPHEN 650 MG: 325 TABLET ORAL at 11:00

## 2022-07-23 RX ADMIN — DOXYCYCLINE 100 MG: 100 INJECTION, POWDER, LYOPHILIZED, FOR SOLUTION INTRAVENOUS at 03:02

## 2022-07-23 RX ADMIN — SODIUM CHLORIDE 75 ML/HR: 9 INJECTION, SOLUTION INTRAVENOUS at 09:20

## 2022-07-23 RX ADMIN — NYSTATIN 500000 UNITS: 100000 SUSPENSION ORAL at 19:49

## 2022-07-23 RX ADMIN — IPRATROPIUM BROMIDE AND ALBUTEROL SULFATE 3 ML: 2.5; .5 SOLUTION RESPIRATORY (INHALATION) at 09:36

## 2022-07-23 RX ADMIN — ASPIRIN 81 MG: 81 TABLET, COATED ORAL at 09:08

## 2022-07-23 RX ADMIN — HYDRALAZINE HYDROCHLORIDE 20 MG: 20 INJECTION INTRAMUSCULAR; INTRAVENOUS at 22:24

## 2022-07-23 RX ADMIN — DIAZEPAM 5 MG: 5 INJECTION, SOLUTION INTRAMUSCULAR; INTRAVENOUS at 23:33

## 2022-07-23 RX ADMIN — IPRATROPIUM BROMIDE AND ALBUTEROL SULFATE 3 ML: 2.5; .5 SOLUTION RESPIRATORY (INHALATION) at 15:19

## 2022-07-23 RX ADMIN — HYDRALAZINE HYDROCHLORIDE 20 MG: 20 INJECTION INTRAMUSCULAR; INTRAVENOUS at 12:42

## 2022-07-23 RX ADMIN — NYSTATIN 500000 UNITS: 100000 SUSPENSION ORAL at 12:42

## 2022-07-23 RX ADMIN — CEFEPIME HYDROCHLORIDE 1 G: 1 INJECTION, SOLUTION INTRAVENOUS at 22:05

## 2022-07-23 RX ADMIN — IPRATROPIUM BROMIDE AND ALBUTEROL SULFATE 3 ML: 2.5; .5 SOLUTION RESPIRATORY (INHALATION) at 07:24

## 2022-07-23 RX ADMIN — IPRATROPIUM BROMIDE AND ALBUTEROL SULFATE 3 ML: 2.5; .5 SOLUTION RESPIRATORY (INHALATION) at 23:11

## 2022-07-23 RX ADMIN — NYSTATIN 500000 UNITS: 100000 SUSPENSION ORAL at 09:08

## 2022-07-23 RX ADMIN — Medication 1 PATCH: at 11:13

## 2022-07-23 RX ADMIN — DIAZEPAM 5 MG: 5 TABLET ORAL at 17:47

## 2022-07-23 RX ADMIN — CLOPIDOGREL BISULFATE 75 MG: 75 TABLET ORAL at 09:09

## 2022-07-23 RX ADMIN — Medication 10 ML: at 21:13

## 2022-07-23 RX ADMIN — ENOXAPARIN SODIUM 40 MG: 40 INJECTION SUBCUTANEOUS at 09:08

## 2022-07-23 RX ADMIN — DIAZEPAM 5 MG: 5 INJECTION, SOLUTION INTRAMUSCULAR; INTRAVENOUS at 00:10

## 2022-07-23 RX ADMIN — ATORVASTATIN CALCIUM 20 MG: 20 TABLET, FILM COATED ORAL at 21:12

## 2022-07-23 RX ADMIN — LISINOPRIL 20 MG: 20 TABLET ORAL at 09:08

## 2022-07-23 RX ADMIN — CEFEPIME HYDROCHLORIDE 1 G: 1 INJECTION, SOLUTION INTRAVENOUS at 06:04

## 2022-07-23 RX ADMIN — IPRATROPIUM BROMIDE AND ALBUTEROL SULFATE 3 ML: 2.5; .5 SOLUTION RESPIRATORY (INHALATION) at 19:32

## 2022-07-23 RX ADMIN — METHYLPREDNISOLONE SODIUM SUCCINATE 60 MG: 125 INJECTION, POWDER, FOR SOLUTION INTRAMUSCULAR; INTRAVENOUS at 05:15

## 2022-07-23 RX ADMIN — LIDOCAINE HYDROCHLORIDE: 10 INJECTION, SOLUTION EPIDURAL; INFILTRATION; INTRACAUDAL; PERINEURAL at 20:30

## 2022-07-24 ENCOUNTER — ANESTHESIA EVENT (OUTPATIENT)
Dept: ICU | Facility: HOSPITAL | Age: 47
End: 2022-07-24

## 2022-07-24 ENCOUNTER — ANESTHESIA (OUTPATIENT)
Dept: ICU | Facility: HOSPITAL | Age: 47
End: 2022-07-24

## 2022-07-24 LAB
ATMOSPHERIC PRESS: 739 MMHG
BASE EXCESS BLDV CALC-SCNC: 6.6 MMOL/L (ref 0–2)
BDY SITE: ABNORMAL
BODY TEMPERATURE: 37 C
EPAP: 6
HCO3 BLDV-SCNC: 32.3 MMOL/L (ref 22–28)
HGB BLDA-MCNC: 14.4 G/DL (ref 13.5–17.5)
INHALED O2 CONCENTRATION: 28 %
IPAP: 12
Lab: ABNORMAL
MODALITY: ABNORMAL
PAW @ PEAK INSP FLOW SETTING VENT: 12 CMH2O
PCO2 BLDV: 49.1 MM HG (ref 41–51)
PH BLDV: 7.43 PH UNITS (ref 7.32–7.42)
PO2 BLDV: 72.6 MM HG (ref 27–53)
PROCALCITONIN SERPL-MCNC: 0.16 NG/ML (ref 0–0.25)
SAO2 % BLDCOV: 96 % (ref 45–75)
SET MECH RESP RATE: 8
VENTILATOR MODE: ABNORMAL

## 2022-07-24 PROCEDURE — 94799 UNLISTED PULMONARY SVC/PX: CPT

## 2022-07-24 PROCEDURE — 25010000002 METHYLPREDNISOLONE PER 125 MG: Performed by: HOSPITALIST

## 2022-07-24 PROCEDURE — 25010000002 ENOXAPARIN PER 10 MG: Performed by: HOSPITALIST

## 2022-07-24 PROCEDURE — 25010000002 DIAZEPAM PER 5 MG: Performed by: HOSPITALIST

## 2022-07-24 PROCEDURE — 94660 CPAP INITIATION&MGMT: CPT

## 2022-07-24 PROCEDURE — 84145 PROCALCITONIN (PCT): CPT | Performed by: HOSPITALIST

## 2022-07-24 PROCEDURE — 94664 DEMO&/EVAL PT USE INHALER: CPT

## 2022-07-24 PROCEDURE — 82803 BLOOD GASES ANY COMBINATION: CPT

## 2022-07-24 PROCEDURE — 94761 N-INVAS EAR/PLS OXIMETRY MLT: CPT

## 2022-07-24 PROCEDURE — 25010000002 CEFEPIME-DEXTROSE 1-5 GM-%(50ML) RECONSTITUTED SOLUTION: Performed by: HOSPITALIST

## 2022-07-24 PROCEDURE — 25010000002 HYDRALAZINE PER 20 MG: Performed by: HOSPITALIST

## 2022-07-24 PROCEDURE — 99232 SBSQ HOSP IP/OBS MODERATE 35: CPT | Performed by: HOSPITALIST

## 2022-07-24 RX ORDER — HYDROCODONE BITARTRATE AND ACETAMINOPHEN 5; 325 MG/1; MG/1
1 TABLET ORAL ONCE AS NEEDED
Status: COMPLETED | OUTPATIENT
Start: 2022-07-24 | End: 2022-07-24

## 2022-07-24 RX ORDER — IBUPROFEN 200 MG
400 TABLET ORAL ONCE
Status: DISCONTINUED | OUTPATIENT
Start: 2022-07-24 | End: 2022-07-24

## 2022-07-24 RX ORDER — CEFDINIR 300 MG/1
300 CAPSULE ORAL EVERY 12 HOURS SCHEDULED
Status: COMPLETED | OUTPATIENT
Start: 2022-07-24 | End: 2022-07-26

## 2022-07-24 RX ADMIN — METHYLPREDNISOLONE SODIUM SUCCINATE 60 MG: 125 INJECTION, POWDER, FOR SOLUTION INTRAMUSCULAR; INTRAVENOUS at 17:28

## 2022-07-24 RX ADMIN — LISINOPRIL 20 MG: 20 TABLET ORAL at 08:20

## 2022-07-24 RX ADMIN — HYDRALAZINE HYDROCHLORIDE 20 MG: 20 INJECTION INTRAMUSCULAR; INTRAVENOUS at 17:43

## 2022-07-24 RX ADMIN — ACETAMINOPHEN 650 MG: 325 TABLET ORAL at 05:14

## 2022-07-24 RX ADMIN — CEFDINIR 300 MG: 300 CAPSULE ORAL at 15:37

## 2022-07-24 RX ADMIN — IPRATROPIUM BROMIDE AND ALBUTEROL SULFATE 3 ML: 2.5; .5 SOLUTION RESPIRATORY (INHALATION) at 20:26

## 2022-07-24 RX ADMIN — Medication 10 ML: at 08:20

## 2022-07-24 RX ADMIN — CEFEPIME HYDROCHLORIDE 1 G: 1 INJECTION, SOLUTION INTRAVENOUS at 06:18

## 2022-07-24 RX ADMIN — IPRATROPIUM BROMIDE AND ALBUTEROL SULFATE 3 ML: 2.5; .5 SOLUTION RESPIRATORY (INHALATION) at 11:32

## 2022-07-24 RX ADMIN — Medication 10 ML: at 20:12

## 2022-07-24 RX ADMIN — NYSTATIN 500000 UNITS: 100000 SUSPENSION ORAL at 20:22

## 2022-07-24 RX ADMIN — NYSTATIN 500000 UNITS: 100000 SUSPENSION ORAL at 17:27

## 2022-07-24 RX ADMIN — ASPIRIN 81 MG: 81 TABLET, COATED ORAL at 08:20

## 2022-07-24 RX ADMIN — Medication 1 PATCH: at 08:20

## 2022-07-24 RX ADMIN — METHYLPREDNISOLONE SODIUM SUCCINATE 60 MG: 125 INJECTION, POWDER, FOR SOLUTION INTRAMUSCULAR; INTRAVENOUS at 22:31

## 2022-07-24 RX ADMIN — NYSTATIN 500000 UNITS: 100000 SUSPENSION ORAL at 11:55

## 2022-07-24 RX ADMIN — METHYLPREDNISOLONE SODIUM SUCCINATE 60 MG: 125 INJECTION, POWDER, FOR SOLUTION INTRAMUSCULAR; INTRAVENOUS at 11:10

## 2022-07-24 RX ADMIN — CLOPIDOGREL BISULFATE 75 MG: 75 TABLET ORAL at 08:20

## 2022-07-24 RX ADMIN — FLUTICASONE PROPIONATE 1 SPRAY: 50 SPRAY, METERED NASAL at 08:21

## 2022-07-24 RX ADMIN — IPRATROPIUM BROMIDE AND ALBUTEROL SULFATE 3 ML: 2.5; .5 SOLUTION RESPIRATORY (INHALATION) at 03:14

## 2022-07-24 RX ADMIN — ENOXAPARIN SODIUM 40 MG: 40 INJECTION SUBCUTANEOUS at 08:21

## 2022-07-24 RX ADMIN — HYDROCODONE BITARTRATE AND ACETAMINOPHEN 1 TABLET: 5; 325 TABLET ORAL at 20:41

## 2022-07-24 RX ADMIN — NYSTATIN 500000 UNITS: 100000 SUSPENSION ORAL at 08:19

## 2022-07-24 RX ADMIN — HYDROCODONE BITARTRATE AND ACETAMINOPHEN 1 TABLET: 5; 325 TABLET ORAL at 11:55

## 2022-07-24 RX ADMIN — ATORVASTATIN CALCIUM 20 MG: 20 TABLET, FILM COATED ORAL at 20:22

## 2022-07-24 RX ADMIN — DIAZEPAM 5 MG: 5 INJECTION, SOLUTION INTRAMUSCULAR; INTRAVENOUS at 11:10

## 2022-07-24 RX ADMIN — VENLAFAXINE HYDROCHLORIDE 75 MG: 37.5 CAPSULE, EXTENDED RELEASE ORAL at 08:20

## 2022-07-24 RX ADMIN — IPRATROPIUM BROMIDE AND ALBUTEROL SULFATE 3 ML: 2.5; .5 SOLUTION RESPIRATORY (INHALATION) at 15:40

## 2022-07-24 RX ADMIN — METHYLPREDNISOLONE SODIUM SUCCINATE 60 MG: 125 INJECTION, POWDER, FOR SOLUTION INTRAMUSCULAR; INTRAVENOUS at 04:56

## 2022-07-24 RX ADMIN — IPRATROPIUM BROMIDE AND ALBUTEROL SULFATE 3 ML: 2.5; .5 SOLUTION RESPIRATORY (INHALATION) at 07:45

## 2022-07-24 RX ADMIN — HYDRALAZINE HYDROCHLORIDE 20 MG: 20 INJECTION INTRAMUSCULAR; INTRAVENOUS at 06:34

## 2022-07-24 NOTE — ANESTHESIA PROCEDURE NOTES
Peripheral IV    Patient location during procedure: ICU  Start time: 7/24/2022 10:34 AM  End time: 7/24/2022 11:01 AM  Line placed for Difficult Access and Fluids/Medication Admin.  Performed By   CRNA/CAA: Emilia Stewart CRNA  Preanesthetic Checklist  Completed: patient identified, IV checked, site marked, risks and benefits discussed, surgical consent, monitors and equipment checked, pre-op evaluation and timeout performed  Peripheral IV Prep   Patient position: sitting   Prep: alcohol swabs  Patient monitoring: heart rate, cardiac monitor and continuous pulse ox  Peripheral IV Procedure   Laterality:right  Location:  Forearm  Catheter size: 22 G         Post Assessment   Dressing Type: transparent.    IV Dressing/Site: clean, dry and intact  Additional Notes  Attempt x 3

## 2022-07-25 LAB
ALBUMIN SERPL-MCNC: 3.7 G/DL (ref 3.5–5.2)
ALBUMIN/GLOB SERPL: 1.8 G/DL
ALP SERPL-CCNC: 62 U/L (ref 39–117)
ALT SERPL W P-5'-P-CCNC: 18 U/L (ref 1–33)
ANION GAP SERPL CALCULATED.3IONS-SCNC: 4.9 MMOL/L (ref 5–15)
AST SERPL-CCNC: 15 U/L (ref 1–32)
BASOPHILS # BLD AUTO: 0.01 10*3/MM3 (ref 0–0.2)
BASOPHILS NFR BLD AUTO: 0.1 % (ref 0–1.5)
BILIRUB SERPL-MCNC: 0.3 MG/DL (ref 0–1.2)
BUN SERPL-MCNC: 15 MG/DL (ref 6–20)
BUN/CREAT SERPL: 25.4 (ref 7–25)
CALCIUM SPEC-SCNC: 8.8 MG/DL (ref 8.6–10.5)
CHLORIDE SERPL-SCNC: 100 MMOL/L (ref 98–107)
CO2 SERPL-SCNC: 35.1 MMOL/L (ref 22–29)
CREAT SERPL-MCNC: 0.59 MG/DL (ref 0.57–1)
DEPRECATED RDW RBC AUTO: 45.6 FL (ref 37–54)
EGFRCR SERPLBLD CKD-EPI 2021: 111.3 ML/MIN/1.73
EOSINOPHIL # BLD AUTO: 0 10*3/MM3 (ref 0–0.4)
EOSINOPHIL NFR BLD AUTO: 0 % (ref 0.3–6.2)
ERYTHROCYTE [DISTWIDTH] IN BLOOD BY AUTOMATED COUNT: 11.7 % (ref 12.3–15.4)
GLOBULIN UR ELPH-MCNC: 2.1 GM/DL
GLUCOSE SERPL-MCNC: 170 MG/DL (ref 65–99)
HCT VFR BLD AUTO: 42.1 % (ref 34–46.6)
HGB BLD-MCNC: 13.6 G/DL (ref 12–15.9)
IMM GRANULOCYTES # BLD AUTO: 0.1 10*3/MM3 (ref 0–0.05)
IMM GRANULOCYTES NFR BLD AUTO: 1.3 % (ref 0–0.5)
LYMPHOCYTES # BLD AUTO: 0.62 10*3/MM3 (ref 0.7–3.1)
LYMPHOCYTES NFR BLD AUTO: 7.8 % (ref 19.6–45.3)
MAGNESIUM SERPL-MCNC: 2.3 MG/DL (ref 1.6–2.6)
MCH RBC QN AUTO: 33.9 PG (ref 26.6–33)
MCHC RBC AUTO-ENTMCNC: 32.3 G/DL (ref 31.5–35.7)
MCV RBC AUTO: 105 FL (ref 79–97)
MONOCYTES # BLD AUTO: 0.22 10*3/MM3 (ref 0.1–0.9)
MONOCYTES NFR BLD AUTO: 2.8 % (ref 5–12)
NEUTROPHILS NFR BLD AUTO: 7.05 10*3/MM3 (ref 1.7–7)
NEUTROPHILS NFR BLD AUTO: 88 % (ref 42.7–76)
NRBC BLD AUTO-RTO: 0 /100 WBC (ref 0–0.2)
PLATELET # BLD AUTO: 248 10*3/MM3 (ref 140–450)
PMV BLD AUTO: 9.5 FL (ref 6–12)
POTASSIUM SERPL-SCNC: 3.9 MMOL/L (ref 3.5–5.2)
PROT SERPL-MCNC: 5.8 G/DL (ref 6–8.5)
RBC # BLD AUTO: 4.01 10*6/MM3 (ref 3.77–5.28)
SODIUM SERPL-SCNC: 140 MMOL/L (ref 136–145)
WBC NRBC COR # BLD: 8 10*3/MM3 (ref 3.4–10.8)

## 2022-07-25 PROCEDURE — 94761 N-INVAS EAR/PLS OXIMETRY MLT: CPT

## 2022-07-25 PROCEDURE — 25010000002 DIAZEPAM PER 5 MG: Performed by: INTERNAL MEDICINE

## 2022-07-25 PROCEDURE — 25010000002 KETOROLAC TROMETHAMINE PER 15 MG: Performed by: INTERNAL MEDICINE

## 2022-07-25 PROCEDURE — 25010000002 METHYLPREDNISOLONE PER 125 MG: Performed by: INTERNAL MEDICINE

## 2022-07-25 PROCEDURE — 83735 ASSAY OF MAGNESIUM: CPT | Performed by: HOSPITALIST

## 2022-07-25 PROCEDURE — 99232 SBSQ HOSP IP/OBS MODERATE 35: CPT | Performed by: INTERNAL MEDICINE

## 2022-07-25 PROCEDURE — 94660 CPAP INITIATION&MGMT: CPT

## 2022-07-25 PROCEDURE — 25010000002 METHYLPREDNISOLONE PER 125 MG: Performed by: HOSPITALIST

## 2022-07-25 PROCEDURE — 25010000002 ENOXAPARIN PER 10 MG: Performed by: INTERNAL MEDICINE

## 2022-07-25 PROCEDURE — 80053 COMPREHEN METABOLIC PANEL: CPT | Performed by: HOSPITALIST

## 2022-07-25 PROCEDURE — 94799 UNLISTED PULMONARY SVC/PX: CPT

## 2022-07-25 PROCEDURE — 94664 DEMO&/EVAL PT USE INHALER: CPT

## 2022-07-25 PROCEDURE — 85025 COMPLETE CBC W/AUTO DIFF WBC: CPT | Performed by: HOSPITALIST

## 2022-07-25 RX ORDER — VERAPAMIL HYDROCHLORIDE 80 MG/1
80 TABLET ORAL EVERY 8 HOURS SCHEDULED
Status: DISCONTINUED | OUTPATIENT
Start: 2022-07-25 | End: 2022-07-26

## 2022-07-25 RX ORDER — ATORVASTATIN CALCIUM 10 MG/1
10 TABLET, FILM COATED ORAL NIGHTLY
Status: DISCONTINUED | OUTPATIENT
Start: 2022-07-25 | End: 2022-07-27 | Stop reason: HOSPADM

## 2022-07-25 RX ORDER — KETOROLAC TROMETHAMINE 30 MG/ML
30 INJECTION, SOLUTION INTRAMUSCULAR; INTRAVENOUS ONCE
Status: COMPLETED | OUTPATIENT
Start: 2022-07-25 | End: 2022-07-25

## 2022-07-25 RX ORDER — HYDROCHLOROTHIAZIDE 12.5 MG/1
12.5 TABLET ORAL DAILY
Status: DISCONTINUED | OUTPATIENT
Start: 2022-07-25 | End: 2022-07-25

## 2022-07-25 RX ADMIN — NYSTATIN 500000 UNITS: 100000 SUSPENSION ORAL at 08:32

## 2022-07-25 RX ADMIN — DIAZEPAM 5 MG: 5 INJECTION, SOLUTION INTRAMUSCULAR; INTRAVENOUS at 10:19

## 2022-07-25 RX ADMIN — VENLAFAXINE HYDROCHLORIDE 75 MG: 37.5 CAPSULE, EXTENDED RELEASE ORAL at 08:36

## 2022-07-25 RX ADMIN — CEFDINIR 300 MG: 300 CAPSULE ORAL at 20:53

## 2022-07-25 RX ADMIN — METHYLPREDNISOLONE SODIUM SUCCINATE 60 MG: 125 INJECTION, POWDER, FOR SOLUTION INTRAMUSCULAR; INTRAVENOUS at 10:18

## 2022-07-25 RX ADMIN — HYDROCHLOROTHIAZIDE 12.5 MG: 12.5 TABLET ORAL at 08:37

## 2022-07-25 RX ADMIN — ASPIRIN 81 MG: 81 TABLET, COATED ORAL at 08:36

## 2022-07-25 RX ADMIN — FLUTICASONE PROPIONATE 1 SPRAY: 50 SPRAY, METERED NASAL at 08:38

## 2022-07-25 RX ADMIN — IPRATROPIUM BROMIDE AND ALBUTEROL SULFATE 3 ML: 2.5; .5 SOLUTION RESPIRATORY (INHALATION) at 10:59

## 2022-07-25 RX ADMIN — ATORVASTATIN CALCIUM 10 MG: 10 TABLET, FILM COATED ORAL at 20:53

## 2022-07-25 RX ADMIN — NYSTATIN 500000 UNITS: 100000 SUSPENSION ORAL at 17:30

## 2022-07-25 RX ADMIN — Medication 10 ML: at 08:32

## 2022-07-25 RX ADMIN — IPRATROPIUM BROMIDE AND ALBUTEROL SULFATE 3 ML: 2.5; .5 SOLUTION RESPIRATORY (INHALATION) at 06:57

## 2022-07-25 RX ADMIN — Medication 1 PATCH: at 08:37

## 2022-07-25 RX ADMIN — METHYLPREDNISOLONE SODIUM SUCCINATE 60 MG: 125 INJECTION, POWDER, FOR SOLUTION INTRAMUSCULAR; INTRAVENOUS at 04:14

## 2022-07-25 RX ADMIN — NYSTATIN 500000 UNITS: 100000 SUSPENSION ORAL at 20:53

## 2022-07-25 RX ADMIN — ACETAMINOPHEN 650 MG: 325 TABLET ORAL at 20:55

## 2022-07-25 RX ADMIN — VERAPAMIL HYDROCHLORIDE 80 MG: 80 TABLET ORAL at 17:26

## 2022-07-25 RX ADMIN — VERAPAMIL HYDROCHLORIDE 80 MG: 80 TABLET ORAL at 22:27

## 2022-07-25 RX ADMIN — CEFDINIR 300 MG: 300 CAPSULE ORAL at 08:37

## 2022-07-25 RX ADMIN — Medication 10 ML: at 20:54

## 2022-07-25 RX ADMIN — KETOROLAC TROMETHAMINE 30 MG: 30 INJECTION, SOLUTION INTRAMUSCULAR; INTRAVENOUS at 05:15

## 2022-07-25 RX ADMIN — CLOPIDOGREL BISULFATE 75 MG: 75 TABLET ORAL at 08:37

## 2022-07-25 RX ADMIN — ACETAMINOPHEN 650 MG: 325 TABLET ORAL at 10:17

## 2022-07-25 RX ADMIN — NYSTATIN 500000 UNITS: 100000 SUSPENSION ORAL at 13:59

## 2022-07-25 RX ADMIN — ACETAMINOPHEN 650 MG: 325 TABLET ORAL at 01:45

## 2022-07-25 RX ADMIN — METHYLPREDNISOLONE SODIUM SUCCINATE 60 MG: 125 INJECTION, POWDER, FOR SOLUTION INTRAMUSCULAR; INTRAVENOUS at 17:30

## 2022-07-25 RX ADMIN — IPRATROPIUM BROMIDE AND ALBUTEROL SULFATE 3 ML: 2.5; .5 SOLUTION RESPIRATORY (INHALATION) at 20:05

## 2022-07-25 RX ADMIN — LISINOPRIL 20 MG: 20 TABLET ORAL at 08:36

## 2022-07-25 RX ADMIN — ENOXAPARIN SODIUM 40 MG: 40 INJECTION SUBCUTANEOUS at 08:37

## 2022-07-25 RX ADMIN — METHYLPREDNISOLONE SODIUM SUCCINATE 60 MG: 125 INJECTION, POWDER, FOR SOLUTION INTRAMUSCULAR; INTRAVENOUS at 22:28

## 2022-07-25 RX ADMIN — IPRATROPIUM BROMIDE AND ALBUTEROL SULFATE 3 ML: 2.5; .5 SOLUTION RESPIRATORY (INHALATION) at 15:28

## 2022-07-26 PROCEDURE — 25010000002 KETOROLAC TROMETHAMINE PER 15 MG: Performed by: INTERNAL MEDICINE

## 2022-07-26 PROCEDURE — 94618 PULMONARY STRESS TESTING: CPT

## 2022-07-26 PROCEDURE — 94664 DEMO&/EVAL PT USE INHALER: CPT

## 2022-07-26 PROCEDURE — 25010000002 ENOXAPARIN PER 10 MG: Performed by: INTERNAL MEDICINE

## 2022-07-26 PROCEDURE — 99232 SBSQ HOSP IP/OBS MODERATE 35: CPT | Performed by: INTERNAL MEDICINE

## 2022-07-26 PROCEDURE — 25010000002 METHYLPREDNISOLONE PER 125 MG: Performed by: INTERNAL MEDICINE

## 2022-07-26 PROCEDURE — 94799 UNLISTED PULMONARY SVC/PX: CPT

## 2022-07-26 PROCEDURE — 94761 N-INVAS EAR/PLS OXIMETRY MLT: CPT

## 2022-07-26 RX ORDER — KETOROLAC TROMETHAMINE 30 MG/ML
15 INJECTION, SOLUTION INTRAMUSCULAR; INTRAVENOUS EVERY 6 HOURS PRN
Status: DISCONTINUED | OUTPATIENT
Start: 2022-07-26 | End: 2022-07-27 | Stop reason: HOSPADM

## 2022-07-26 RX ORDER — VERAPAMIL HYDROCHLORIDE 80 MG/1
120 TABLET ORAL EVERY 8 HOURS SCHEDULED
Status: DISCONTINUED | OUTPATIENT
Start: 2022-07-26 | End: 2022-07-26

## 2022-07-26 RX ADMIN — IPRATROPIUM BROMIDE AND ALBUTEROL SULFATE 3 ML: 2.5; .5 SOLUTION RESPIRATORY (INHALATION) at 20:02

## 2022-07-26 RX ADMIN — NYSTATIN 500000 UNITS: 100000 SUSPENSION ORAL at 20:21

## 2022-07-26 RX ADMIN — FLUTICASONE PROPIONATE 1 SPRAY: 50 SPRAY, METERED NASAL at 08:29

## 2022-07-26 RX ADMIN — METOPROLOL TARTRATE 25 MG: 25 TABLET, FILM COATED ORAL at 20:21

## 2022-07-26 RX ADMIN — CEFDINIR 300 MG: 300 CAPSULE ORAL at 08:29

## 2022-07-26 RX ADMIN — NYSTATIN 500000 UNITS: 100000 SUSPENSION ORAL at 08:28

## 2022-07-26 RX ADMIN — Medication 10 ML: at 08:28

## 2022-07-26 RX ADMIN — IPRATROPIUM BROMIDE AND ALBUTEROL SULFATE 3 ML: 2.5; .5 SOLUTION RESPIRATORY (INHALATION) at 15:33

## 2022-07-26 RX ADMIN — ENOXAPARIN SODIUM 40 MG: 40 INJECTION SUBCUTANEOUS at 08:28

## 2022-07-26 RX ADMIN — VENLAFAXINE HYDROCHLORIDE 75 MG: 37.5 CAPSULE, EXTENDED RELEASE ORAL at 08:28

## 2022-07-26 RX ADMIN — LISINOPRIL 20 MG: 20 TABLET ORAL at 08:29

## 2022-07-26 RX ADMIN — KETOROLAC TROMETHAMINE 15 MG: 30 INJECTION, SOLUTION INTRAMUSCULAR; INTRAVENOUS at 15:10

## 2022-07-26 RX ADMIN — KETOROLAC TROMETHAMINE 15 MG: 30 INJECTION, SOLUTION INTRAMUSCULAR; INTRAVENOUS at 23:45

## 2022-07-26 RX ADMIN — ASPIRIN 81 MG: 81 TABLET, COATED ORAL at 08:28

## 2022-07-26 RX ADMIN — Medication 10 ML: at 23:47

## 2022-07-26 RX ADMIN — ATORVASTATIN CALCIUM 10 MG: 10 TABLET, FILM COATED ORAL at 20:21

## 2022-07-26 RX ADMIN — CLOPIDOGREL BISULFATE 75 MG: 75 TABLET ORAL at 08:28

## 2022-07-26 RX ADMIN — VERAPAMIL HYDROCHLORIDE 120 MG: 80 TABLET ORAL at 15:05

## 2022-07-26 RX ADMIN — IPRATROPIUM BROMIDE AND ALBUTEROL SULFATE 3 ML: 2.5; .5 SOLUTION RESPIRATORY (INHALATION) at 11:41

## 2022-07-26 RX ADMIN — NYSTATIN 500000 UNITS: 100000 SUSPENSION ORAL at 12:03

## 2022-07-26 RX ADMIN — IPRATROPIUM BROMIDE AND ALBUTEROL SULFATE 3 ML: 2.5; .5 SOLUTION RESPIRATORY (INHALATION) at 07:52

## 2022-07-26 RX ADMIN — METHYLPREDNISOLONE SODIUM SUCCINATE 60 MG: 125 INJECTION, POWDER, FOR SOLUTION INTRAMUSCULAR; INTRAVENOUS at 17:41

## 2022-07-26 RX ADMIN — METHYLPREDNISOLONE SODIUM SUCCINATE 60 MG: 125 INJECTION, POWDER, FOR SOLUTION INTRAMUSCULAR; INTRAVENOUS at 12:03

## 2022-07-26 RX ADMIN — KETOROLAC TROMETHAMINE 15 MG: 30 INJECTION, SOLUTION INTRAMUSCULAR; INTRAVENOUS at 01:47

## 2022-07-26 RX ADMIN — Medication 1 PATCH: at 08:30

## 2022-07-26 RX ADMIN — ACETAMINOPHEN 650 MG: 325 TABLET ORAL at 17:44

## 2022-07-26 RX ADMIN — VERAPAMIL HYDROCHLORIDE 80 MG: 80 TABLET ORAL at 06:09

## 2022-07-26 RX ADMIN — NYSTATIN 500000 UNITS: 100000 SUSPENSION ORAL at 17:41

## 2022-07-26 RX ADMIN — METHYLPREDNISOLONE SODIUM SUCCINATE 60 MG: 125 INJECTION, POWDER, FOR SOLUTION INTRAMUSCULAR; INTRAVENOUS at 06:09

## 2022-07-26 RX ADMIN — METHYLPREDNISOLONE SODIUM SUCCINATE 60 MG: 125 INJECTION, POWDER, FOR SOLUTION INTRAMUSCULAR; INTRAVENOUS at 23:45

## 2022-07-27 ENCOUNTER — READMISSION MANAGEMENT (OUTPATIENT)
Dept: CALL CENTER | Facility: HOSPITAL | Age: 47
End: 2022-07-27

## 2022-07-27 VITALS
DIASTOLIC BLOOD PRESSURE: 83 MMHG | BODY MASS INDEX: 22.72 KG/M2 | HEART RATE: 76 BPM | RESPIRATION RATE: 20 BRPM | WEIGHT: 123.46 LBS | TEMPERATURE: 97.7 F | OXYGEN SATURATION: 90 % | HEIGHT: 62 IN | SYSTOLIC BLOOD PRESSURE: 168 MMHG

## 2022-07-27 PROBLEM — J96.02 ACUTE RESPIRATORY FAILURE WITH HYPOXIA AND HYPERCAPNIA (HCC): Status: ACTIVE | Noted: 2022-07-27

## 2022-07-27 PROBLEM — J96.01 ACUTE RESPIRATORY FAILURE WITH HYPOXIA AND HYPERCAPNIA: Status: ACTIVE | Noted: 2022-07-27

## 2022-07-27 PROCEDURE — 94664 DEMO&/EVAL PT USE INHALER: CPT

## 2022-07-27 PROCEDURE — 25010000002 KETOROLAC TROMETHAMINE PER 15 MG: Performed by: INTERNAL MEDICINE

## 2022-07-27 PROCEDURE — 25010000002 METHYLPREDNISOLONE PER 125 MG: Performed by: INTERNAL MEDICINE

## 2022-07-27 PROCEDURE — 94618 PULMONARY STRESS TESTING: CPT

## 2022-07-27 PROCEDURE — 94761 N-INVAS EAR/PLS OXIMETRY MLT: CPT

## 2022-07-27 PROCEDURE — 25010000002 ENOXAPARIN PER 10 MG: Performed by: INTERNAL MEDICINE

## 2022-07-27 PROCEDURE — 94799 UNLISTED PULMONARY SVC/PX: CPT

## 2022-07-27 PROCEDURE — 99238 HOSP IP/OBS DSCHRG MGMT 30/<: CPT | Performed by: HOSPITALIST

## 2022-07-27 RX ORDER — NICOTINE 21 MG/24HR
1 PATCH, TRANSDERMAL 24 HOURS TRANSDERMAL
Qty: 14 EACH | Refills: 0 | Status: SHIPPED | OUTPATIENT
Start: 2022-07-28 | End: 2022-12-06 | Stop reason: HOSPADM

## 2022-07-27 RX ORDER — PREDNISONE 20 MG/1
20 TABLET ORAL DAILY
Start: 2022-07-27 | End: 2022-07-27 | Stop reason: HOSPADM

## 2022-07-27 RX ADMIN — ACETAMINOPHEN 650 MG: 325 TABLET ORAL at 08:48

## 2022-07-27 RX ADMIN — FLUTICASONE PROPIONATE 1 SPRAY: 50 SPRAY, METERED NASAL at 08:43

## 2022-07-27 RX ADMIN — METHYLPREDNISOLONE SODIUM SUCCINATE 60 MG: 125 INJECTION, POWDER, FOR SOLUTION INTRAMUSCULAR; INTRAVENOUS at 11:49

## 2022-07-27 RX ADMIN — NYSTATIN 500000 UNITS: 100000 SUSPENSION ORAL at 08:42

## 2022-07-27 RX ADMIN — METHYLPREDNISOLONE SODIUM SUCCINATE 60 MG: 125 INJECTION, POWDER, FOR SOLUTION INTRAMUSCULAR; INTRAVENOUS at 05:25

## 2022-07-27 RX ADMIN — KETOROLAC TROMETHAMINE 15 MG: 30 INJECTION, SOLUTION INTRAMUSCULAR; INTRAVENOUS at 11:49

## 2022-07-27 RX ADMIN — IPRATROPIUM BROMIDE AND ALBUTEROL SULFATE 3 ML: 2.5; .5 SOLUTION RESPIRATORY (INHALATION) at 07:23

## 2022-07-27 RX ADMIN — VENLAFAXINE HYDROCHLORIDE 75 MG: 37.5 CAPSULE, EXTENDED RELEASE ORAL at 08:42

## 2022-07-27 RX ADMIN — METOPROLOL TARTRATE 25 MG: 25 TABLET, FILM COATED ORAL at 08:42

## 2022-07-27 RX ADMIN — ENOXAPARIN SODIUM 40 MG: 40 INJECTION SUBCUTANEOUS at 08:42

## 2022-07-27 RX ADMIN — LISINOPRIL 20 MG: 20 TABLET ORAL at 08:42

## 2022-07-27 RX ADMIN — NYSTATIN 500000 UNITS: 100000 SUSPENSION ORAL at 11:49

## 2022-07-27 RX ADMIN — CLOPIDOGREL BISULFATE 75 MG: 75 TABLET ORAL at 08:42

## 2022-07-27 RX ADMIN — IPRATROPIUM BROMIDE AND ALBUTEROL SULFATE 3 ML: 2.5; .5 SOLUTION RESPIRATORY (INHALATION) at 11:12

## 2022-07-27 RX ADMIN — ASPIRIN 81 MG: 81 TABLET, COATED ORAL at 08:42

## 2022-07-27 RX ADMIN — Medication 10 ML: at 08:43

## 2022-07-27 RX ADMIN — Medication 1 PATCH: at 08:43

## 2022-07-28 NOTE — OUTREACH NOTE
Prep Survey    Flowsheet Row Responses   [facility] facility patient discharged from? [place]   Is LACE score < 7 ? No   Emergency Room discharge w/ pulse ox? No   Eligibility Readm Mgmt   Discharge diagnosis COPD   Does the patient have one of the following disease processes/diagnoses(primary or secondary)? COPD/Pneumonia   Does the patient have Home health ordered? No   Is there a DME ordered? Yes   What DME was ordered? O2 Therapy   Medication alerts for this patient see AVS   Prep survey completed? Yes          YARON MARTINEZ - Registered Nurse

## 2022-08-02 ENCOUNTER — READMISSION MANAGEMENT (OUTPATIENT)
Dept: CALL CENTER | Facility: HOSPITAL | Age: 47
End: 2022-08-02

## 2022-08-02 NOTE — OUTREACH NOTE
COPD/PN Week 1 Survey    Flowsheet Row Responses   [facility] patient discharged from? [place]   Does the patient have one of the following disease processes/diagnoses(primary or secondary)? COPD/Pneumonia   Was the primary reason for admission: COPD exacerbation   Week 1 attempt successful? Yes   Call start time 1441   Call end time 1453   Discharge diagnosis AECOPD due to Acute Parainfluenza Virus 4 Infection   Is patient permission given to speak with other caregiver? Yes   List who call center can speak with Harpreet Ruiz Spouse    Person spoke with today (if not patient) and relationship Harpreet Ruiz Spouse    Meds reviewed with patient/caregiver? Yes   Does the patient have all medications ordered at discharge? Yes   Is the patient taking all medications as directed (includes completed medication regime)? Yes   Comments regarding appointments Patient to call [facility] after D/C to schedule a new patient visit   Does the patient have a primary care provider?  Yes   Comments regarding PCP  reports patient does have a general provider but can't remember name. Advised for her to do HFU appt with PCP.    Has the patient kept scheduled appointments due by today? N/A   Has home health visited the patient within 72 hours of discharge? N/A   What DME was ordered? O2 Therapy   Has all DME been delivered? Yes   DME comments O21L with exertion   Pulse Ox monitoring Intermittent   Pulse Ox device source Patient   O2 Sat comments  does not know current readings. Reports patient has not been needing O2.    O2 Sat: education provided Sat levels, Monitoring frequency, When to seek care   O2 Sat education comments Advised to try and maintain O2 sat 90% or above.    Psychosocial issues? No   Did the patient receive a copy of their discharge instructions? Yes   Nursing interventions Reviewed instructions with patient  [spouse]   What is the patient's perception of their health status since  discharge? Improving   If the patient is a current smoker, are they able to teach back resources for cessation? Smoking cessation medications  [Patient got patches ordered at discharge. ]   Is the patient/caregiver able to teach back the hierarchy of who to call/visit for symptoms/problems? PCP, Specialist, Home health nurse, Urgent Care, ED, 911 Yes   Additional teach back comments  verbalizes awareness of fire hazards and smoking.    Is the patient able to teach back COPD zones? No   Nursing interventions Education provided on various zones   Patient reports what zone on this call? Green Zone   Green Zone Reports doing well, Breathing without shortness of breath, Usual activity and exercise level   Green Zone interventions: Do not smoke, Take daily medications, Use oxygen as prescribed, Avoid indoor/outdoor triggers   Week 1 call completed? Yes          HENNY GILBERT - Registered Nurse

## 2022-08-18 ENCOUNTER — READMISSION MANAGEMENT (OUTPATIENT)
Dept: CALL CENTER | Facility: HOSPITAL | Age: 47
End: 2022-08-18

## 2022-08-23 ENCOUNTER — READMISSION MANAGEMENT (OUTPATIENT)
Dept: CALL CENTER | Facility: HOSPITAL | Age: 47
End: 2022-08-23

## 2022-08-23 NOTE — OUTREACH NOTE
COPD/PN Week 3 Survey    Flowsheet Row Responses   [facility] facility patient discharged from? [place]   Does the patient have one of the following disease processes/diagnoses(primary or secondary)? COPD/Pneumonia   Was the primary reason for admission: COPD exacerbation   Week 3 attempt successful? No   Unsuccessful attempts Attempt 2          DALILA OSHEA - Registered Nurse

## 2022-12-05 ENCOUNTER — HOSPITAL ENCOUNTER (OUTPATIENT)
Facility: HOSPITAL | Age: 47
Setting detail: OBSERVATION
Discharge: HOME | End: 2022-12-06
Attending: EMERGENCY MEDICINE | Admitting: INTERNAL MEDICINE

## 2022-12-05 ENCOUNTER — APPOINTMENT (OUTPATIENT)
Dept: GENERAL RADIOLOGY | Facility: HOSPITAL | Age: 47
End: 2022-12-05

## 2022-12-05 DIAGNOSIS — J44.1 COPD EXACERBATION: Primary | ICD-10-CM

## 2022-12-05 DIAGNOSIS — J96.21 ACUTE AND CHRONIC RESPIRATORY FAILURE WITH HYPOXIA: ICD-10-CM

## 2022-12-05 DIAGNOSIS — J18.9 PNEUMONIA DUE TO INFECTIOUS ORGANISM, UNSPECIFIED LATERALITY, UNSPECIFIED PART OF LUNG: ICD-10-CM

## 2022-12-05 DIAGNOSIS — J96.22 ACUTE ON CHRONIC RESPIRATORY FAILURE WITH HYPERCAPNIA: ICD-10-CM

## 2022-12-05 LAB
ALBUMIN SERPL-MCNC: 4.1 G/DL (ref 3.5–5.2)
ALBUMIN/GLOB SERPL: 1.1 G/DL
ALP SERPL-CCNC: 104 U/L (ref 39–117)
ALT SERPL W P-5'-P-CCNC: 7 U/L (ref 1–33)
ANION GAP SERPL CALCULATED.3IONS-SCNC: 10.8 MMOL/L (ref 5–15)
ARTERIAL PATENCY WRIST A: ABNORMAL
AST SERPL-CCNC: 11 U/L (ref 1–32)
ATMOSPHERIC PRESS: 740 MMHG
B PARAPERT DNA SPEC QL NAA+PROBE: NOT DETECTED
B PERT DNA SPEC QL NAA+PROBE: NOT DETECTED
BASE EXCESS BLDA CALC-SCNC: 1.7 MMOL/L (ref 0–2)
BASOPHILS # BLD AUTO: 0.04 10*3/MM3 (ref 0–0.2)
BASOPHILS NFR BLD AUTO: 0.3 % (ref 0–1.5)
BDY SITE: ABNORMAL
BILIRUB SERPL-MCNC: 0.5 MG/DL (ref 0–1.2)
BODY TEMPERATURE: 37 C
BUN SERPL-MCNC: 6 MG/DL (ref 6–20)
BUN/CREAT SERPL: 10.7 (ref 7–25)
C PNEUM DNA NPH QL NAA+NON-PROBE: NOT DETECTED
CALCIUM SPEC-SCNC: 9.5 MG/DL (ref 8.6–10.5)
CHLORIDE SERPL-SCNC: 93 MMOL/L (ref 98–107)
CO2 SERPL-SCNC: 28.2 MMOL/L (ref 22–29)
CREAT SERPL-MCNC: 0.56 MG/DL (ref 0.57–1)
D DIMER PPP FEU-MCNC: 0.54 MCGFEU/ML (ref 0–0.5)
D-LACTATE SERPL-SCNC: 1.1 MMOL/L (ref 0.5–2)
DEPRECATED RDW RBC AUTO: 49.2 FL (ref 37–54)
EGFRCR SERPLBLD CKD-EPI 2021: 112 ML/MIN/1.73
EOSINOPHIL # BLD AUTO: 0 10*3/MM3 (ref 0–0.4)
EOSINOPHIL NFR BLD AUTO: 0 % (ref 0.3–6.2)
ERYTHROCYTE [DISTWIDTH] IN BLOOD BY AUTOMATED COUNT: 12.4 % (ref 12.3–15.4)
FLUAV RNA RESP QL NAA+PROBE: NOT DETECTED
FLUAV SUBTYP SPEC NAA+PROBE: NOT DETECTED
FLUBV RNA ISLT QL NAA+PROBE: NOT DETECTED
FLUBV RNA RESP QL NAA+PROBE: NOT DETECTED
GAS FLOW AIRWAY: 3 LPM
GLOBULIN UR ELPH-MCNC: 3.6 GM/DL
GLUCOSE SERPL-MCNC: 149 MG/DL (ref 65–99)
HADV DNA SPEC NAA+PROBE: NOT DETECTED
HCO3 BLDA-SCNC: 30.1 MMOL/L (ref 20–26)
HCOV 229E RNA SPEC QL NAA+PROBE: NOT DETECTED
HCOV HKU1 RNA SPEC QL NAA+PROBE: NOT DETECTED
HCOV NL63 RNA SPEC QL NAA+PROBE: NOT DETECTED
HCOV OC43 RNA SPEC QL NAA+PROBE: NOT DETECTED
HCT VFR BLD AUTO: 49.4 % (ref 34–46.6)
HGB BLD-MCNC: 15.5 G/DL (ref 12–15.9)
HGB BLDA-MCNC: 15.3 G/DL (ref 13.5–17.5)
HMPV RNA NPH QL NAA+NON-PROBE: NOT DETECTED
HOLD SPECIMEN: NORMAL
HOLD SPECIMEN: NORMAL
HPIV1 RNA ISLT QL NAA+PROBE: NOT DETECTED
HPIV2 RNA SPEC QL NAA+PROBE: NOT DETECTED
HPIV3 RNA NPH QL NAA+PROBE: NOT DETECTED
HPIV4 P GENE NPH QL NAA+PROBE: NOT DETECTED
IMM GRANULOCYTES # BLD AUTO: 0.05 10*3/MM3 (ref 0–0.05)
IMM GRANULOCYTES NFR BLD AUTO: 0.3 % (ref 0–0.5)
L PNEUMO1 AG UR QL IA: NEGATIVE
LYMPHOCYTES # BLD AUTO: 0.64 10*3/MM3 (ref 0.7–3.1)
LYMPHOCYTES NFR BLD AUTO: 4.1 % (ref 19.6–45.3)
Lab: ABNORMAL
M PNEUMO IGG SER IA-ACNC: NOT DETECTED
MACROCYTES BLD QL SMEAR: NORMAL
MAGNESIUM SERPL-MCNC: 1.8 MG/DL (ref 1.6–2.6)
MCH RBC QN AUTO: 33.3 PG (ref 26.6–33)
MCHC RBC AUTO-ENTMCNC: 31.4 G/DL (ref 31.5–35.7)
MCV RBC AUTO: 106.2 FL (ref 79–97)
MODALITY: ABNORMAL
MONOCYTES # BLD AUTO: 1.01 10*3/MM3 (ref 0.1–0.9)
MONOCYTES NFR BLD AUTO: 6.4 % (ref 5–12)
NEUTROPHILS NFR BLD AUTO: 13.96 10*3/MM3 (ref 1.7–7)
NEUTROPHILS NFR BLD AUTO: 88.9 % (ref 42.7–76)
NT-PROBNP SERPL-MCNC: 219.4 PG/ML (ref 0–450)
PCO2 BLDA: 62.3 MM HG (ref 35–45)
PCO2 TEMP ADJ BLD: 62.3 MM HG (ref 35–45)
PH BLDA: 7.29 PH UNITS (ref 7.35–7.45)
PH, TEMP CORRECTED: 7.29 PH UNITS (ref 7.35–7.45)
PLAT MORPH BLD: NORMAL
PLATELET # BLD AUTO: 255 10*3/MM3 (ref 140–450)
PMV BLD AUTO: 8.9 FL (ref 6–12)
PO2 BLDA: 71.8 MM HG (ref 83–108)
PO2 TEMP ADJ BLD: 71.8 MM HG (ref 83–108)
POTASSIUM SERPL-SCNC: 3.7 MMOL/L (ref 3.5–5.2)
PROCALCITONIN SERPL-MCNC: 30.34 NG/ML (ref 0–0.25)
PROT SERPL-MCNC: 7.7 G/DL (ref 6–8.5)
QT INTERVAL: 316 MS
RBC # BLD AUTO: 4.65 10*6/MM3 (ref 3.77–5.28)
RHINOVIRUS RNA SPEC NAA+PROBE: NOT DETECTED
RSV RNA NPH QL NAA+NON-PROBE: NOT DETECTED
S PNEUM AG SPEC QL LA: NEGATIVE
SAO2 % BLDCOA: 94.1 % (ref 94–99)
SARS-COV-2 RNA NPH QL NAA+NON-PROBE: NOT DETECTED
SARS-COV-2 RNA RESP QL NAA+PROBE: NOT DETECTED
SODIUM SERPL-SCNC: 132 MMOL/L (ref 136–145)
TROPONIN T SERPL-MCNC: <0.01 NG/ML (ref 0–0.03)
VENTILATOR MODE: ABNORMAL
WBC MORPH BLD: NORMAL
WBC NRBC COR # BLD: 15.7 10*3/MM3 (ref 3.4–10.8)
WHOLE BLOOD HOLD COAG: NORMAL
WHOLE BLOOD HOLD SPECIMEN: NORMAL

## 2022-12-05 PROCEDURE — 99285 EMERGENCY DEPT VISIT HI MDM: CPT

## 2022-12-05 PROCEDURE — 0202U NFCT DS 22 TRGT SARS-COV-2: CPT | Performed by: NURSE PRACTITIONER

## 2022-12-05 PROCEDURE — 94799 UNLISTED PULMONARY SVC/PX: CPT

## 2022-12-05 PROCEDURE — 83605 ASSAY OF LACTIC ACID: CPT | Performed by: EMERGENCY MEDICINE

## 2022-12-05 PROCEDURE — 25010000002 METHYLPREDNISOLONE PER 125 MG: Performed by: EMERGENCY MEDICINE

## 2022-12-05 PROCEDURE — 25010000002 HYDRALAZINE PER 20 MG: Performed by: NURSE PRACTITIONER

## 2022-12-05 PROCEDURE — 85379 FIBRIN DEGRADATION QUANT: CPT | Performed by: NURSE PRACTITIONER

## 2022-12-05 PROCEDURE — 83880 ASSAY OF NATRIURETIC PEPTIDE: CPT | Performed by: EMERGENCY MEDICINE

## 2022-12-05 PROCEDURE — 36600 WITHDRAWAL OF ARTERIAL BLOOD: CPT

## 2022-12-05 PROCEDURE — 36415 COLL VENOUS BLD VENIPUNCTURE: CPT

## 2022-12-05 PROCEDURE — 96376 TX/PRO/DX INJ SAME DRUG ADON: CPT

## 2022-12-05 PROCEDURE — 94640 AIRWAY INHALATION TREATMENT: CPT

## 2022-12-05 PROCEDURE — 96361 HYDRATE IV INFUSION ADD-ON: CPT

## 2022-12-05 PROCEDURE — 87449 NOS EACH ORGANISM AG IA: CPT | Performed by: NURSE PRACTITIONER

## 2022-12-05 PROCEDURE — 84145 PROCALCITONIN (PCT): CPT | Performed by: INTERNAL MEDICINE

## 2022-12-05 PROCEDURE — 96375 TX/PRO/DX INJ NEW DRUG ADDON: CPT

## 2022-12-05 PROCEDURE — 96365 THER/PROPH/DIAG IV INF INIT: CPT

## 2022-12-05 PROCEDURE — 25010000002 CEFTRIAXONE SODIUM-DEXTROSE 1-3.74 GM-%(50ML) RECONSTITUTED SOLUTION: Performed by: EMERGENCY MEDICINE

## 2022-12-05 PROCEDURE — G0378 HOSPITAL OBSERVATION PER HR: HCPCS

## 2022-12-05 PROCEDURE — 94761 N-INVAS EAR/PLS OXIMETRY MLT: CPT

## 2022-12-05 PROCEDURE — 83735 ASSAY OF MAGNESIUM: CPT | Performed by: EMERGENCY MEDICINE

## 2022-12-05 PROCEDURE — 71045 X-RAY EXAM CHEST 1 VIEW: CPT

## 2022-12-05 PROCEDURE — 80053 COMPREHEN METABOLIC PANEL: CPT | Performed by: EMERGENCY MEDICINE

## 2022-12-05 PROCEDURE — 25010000002 KETOROLAC TROMETHAMINE PER 15 MG: Performed by: NURSE PRACTITIONER

## 2022-12-05 PROCEDURE — 93005 ELECTROCARDIOGRAM TRACING: CPT | Performed by: EMERGENCY MEDICINE

## 2022-12-05 PROCEDURE — 82803 BLOOD GASES ANY COMBINATION: CPT

## 2022-12-05 PROCEDURE — 93010 ELECTROCARDIOGRAM REPORT: CPT | Performed by: INTERNAL MEDICINE

## 2022-12-05 PROCEDURE — 25010000002 AZITHROMYCIN PER 500 MG: Performed by: INTERNAL MEDICINE

## 2022-12-05 PROCEDURE — 25010000002 CEFTRIAXONE SODIUM-DEXTROSE 1-3.74 GM-%(50ML) RECONSTITUTED SOLUTION: Performed by: INTERNAL MEDICINE

## 2022-12-05 PROCEDURE — 87205 SMEAR GRAM STAIN: CPT | Performed by: NURSE PRACTITIONER

## 2022-12-05 PROCEDURE — 94664 DEMO&/EVAL PT USE INHALER: CPT

## 2022-12-05 PROCEDURE — 25010000002 AZITHROMYCIN PER 500 MG: Performed by: EMERGENCY MEDICINE

## 2022-12-05 PROCEDURE — 87040 BLOOD CULTURE FOR BACTERIA: CPT | Performed by: EMERGENCY MEDICINE

## 2022-12-05 PROCEDURE — 84484 ASSAY OF TROPONIN QUANT: CPT | Performed by: EMERGENCY MEDICINE

## 2022-12-05 PROCEDURE — 25010000002 LORAZEPAM PER 2 MG: Performed by: EMERGENCY MEDICINE

## 2022-12-05 PROCEDURE — 85007 BL SMEAR W/DIFF WBC COUNT: CPT | Performed by: EMERGENCY MEDICINE

## 2022-12-05 PROCEDURE — 25010000002 METHYLPREDNISOLONE PER 40 MG: Performed by: INTERNAL MEDICINE

## 2022-12-05 PROCEDURE — 87070 CULTURE OTHR SPECIMN AEROBIC: CPT | Performed by: NURSE PRACTITIONER

## 2022-12-05 PROCEDURE — 87636 SARSCOV2 & INF A&B AMP PRB: CPT | Performed by: EMERGENCY MEDICINE

## 2022-12-05 PROCEDURE — 85025 COMPLETE CBC W/AUTO DIFF WBC: CPT | Performed by: EMERGENCY MEDICINE

## 2022-12-05 PROCEDURE — 99220 PR INITIAL OBSERVATION CARE/DAY 70 MINUTES: CPT | Performed by: INTERNAL MEDICINE

## 2022-12-05 PROCEDURE — 94660 CPAP INITIATION&MGMT: CPT

## 2022-12-05 RX ORDER — NITROGLYCERIN 0.4 MG/1
0.4 TABLET SUBLINGUAL
Status: DISCONTINUED | OUTPATIENT
Start: 2022-12-05 | End: 2022-12-06 | Stop reason: HOSPADM

## 2022-12-05 RX ORDER — KETOROLAC TROMETHAMINE 30 MG/ML
30 INJECTION, SOLUTION INTRAMUSCULAR; INTRAVENOUS EVERY 6 HOURS PRN
Status: DISCONTINUED | OUTPATIENT
Start: 2022-12-05 | End: 2022-12-06 | Stop reason: HOSPADM

## 2022-12-05 RX ORDER — ACETAMINOPHEN 500 MG
1000 TABLET ORAL ONCE
Status: COMPLETED | OUTPATIENT
Start: 2022-12-05 | End: 2022-12-05

## 2022-12-05 RX ORDER — CEFTRIAXONE 1 G/50ML
1 INJECTION, SOLUTION INTRAVENOUS EVERY 24 HOURS
Status: DISCONTINUED | OUTPATIENT
Start: 2022-12-05 | End: 2022-12-06 | Stop reason: HOSPADM

## 2022-12-05 RX ORDER — ACETAMINOPHEN 160 MG/5ML
650 SOLUTION ORAL EVERY 4 HOURS PRN
Status: DISCONTINUED | OUTPATIENT
Start: 2022-12-05 | End: 2022-12-06 | Stop reason: HOSPADM

## 2022-12-05 RX ORDER — LORAZEPAM 2 MG/ML
0.5 INJECTION INTRAMUSCULAR ONCE
Status: COMPLETED | OUTPATIENT
Start: 2022-12-05 | End: 2022-12-05

## 2022-12-05 RX ORDER — SODIUM CHLORIDE 9 MG/ML
40 INJECTION, SOLUTION INTRAVENOUS AS NEEDED
Status: DISCONTINUED | OUTPATIENT
Start: 2022-12-05 | End: 2022-12-06 | Stop reason: HOSPADM

## 2022-12-05 RX ORDER — ACETAMINOPHEN 650 MG/1
650 SUPPOSITORY RECTAL EVERY 4 HOURS PRN
Status: DISCONTINUED | OUTPATIENT
Start: 2022-12-05 | End: 2022-12-06 | Stop reason: HOSPADM

## 2022-12-05 RX ORDER — METOPROLOL TARTRATE 50 MG/1
50 TABLET, FILM COATED ORAL EVERY 12 HOURS SCHEDULED
Status: DISCONTINUED | OUTPATIENT
Start: 2022-12-05 | End: 2022-12-06 | Stop reason: HOSPADM

## 2022-12-05 RX ORDER — METHYLPREDNISOLONE SODIUM SUCCINATE 40 MG/ML
40 INJECTION, POWDER, LYOPHILIZED, FOR SOLUTION INTRAMUSCULAR; INTRAVENOUS EVERY 8 HOURS
Status: DISCONTINUED | OUTPATIENT
Start: 2022-12-05 | End: 2022-12-05

## 2022-12-05 RX ORDER — ONDANSETRON 4 MG/1
4 TABLET, FILM COATED ORAL EVERY 6 HOURS PRN
Status: DISCONTINUED | OUTPATIENT
Start: 2022-12-05 | End: 2022-12-06 | Stop reason: HOSPADM

## 2022-12-05 RX ORDER — IPRATROPIUM BROMIDE AND ALBUTEROL SULFATE 2.5; .5 MG/3ML; MG/3ML
3 SOLUTION RESPIRATORY (INHALATION) ONCE
Status: COMPLETED | OUTPATIENT
Start: 2022-12-05 | End: 2022-12-05

## 2022-12-05 RX ORDER — PROCHLORPERAZINE EDISYLATE 5 MG/ML
5 INJECTION INTRAMUSCULAR; INTRAVENOUS EVERY 6 HOURS PRN
Status: DISCONTINUED | OUTPATIENT
Start: 2022-12-05 | End: 2022-12-06 | Stop reason: HOSPADM

## 2022-12-05 RX ORDER — SODIUM CHLORIDE 0.9 % (FLUSH) 0.9 %
10 SYRINGE (ML) INJECTION AS NEEDED
Status: DISCONTINUED | OUTPATIENT
Start: 2022-12-05 | End: 2022-12-06 | Stop reason: HOSPADM

## 2022-12-05 RX ORDER — ATORVASTATIN CALCIUM 20 MG/1
20 TABLET, FILM COATED ORAL DAILY
Status: DISCONTINUED | OUTPATIENT
Start: 2022-12-05 | End: 2022-12-06 | Stop reason: HOSPADM

## 2022-12-05 RX ORDER — SODIUM CHLORIDE 9 MG/ML
100 INJECTION, SOLUTION INTRAVENOUS CONTINUOUS
Status: DISCONTINUED | OUTPATIENT
Start: 2022-12-05 | End: 2022-12-06 | Stop reason: HOSPADM

## 2022-12-05 RX ORDER — FLUTICASONE PROPIONATE 50 MCG
2 SPRAY, SUSPENSION (ML) NASAL DAILY PRN
Status: DISCONTINUED | OUTPATIENT
Start: 2022-12-05 | End: 2022-12-06 | Stop reason: HOSPADM

## 2022-12-05 RX ORDER — ONDANSETRON 2 MG/ML
4 INJECTION INTRAMUSCULAR; INTRAVENOUS EVERY 6 HOURS PRN
Status: DISCONTINUED | OUTPATIENT
Start: 2022-12-05 | End: 2022-12-06 | Stop reason: HOSPADM

## 2022-12-05 RX ORDER — PROCHLORPERAZINE MALEATE 5 MG/1
5 TABLET ORAL EVERY 6 HOURS PRN
Status: DISCONTINUED | OUTPATIENT
Start: 2022-12-05 | End: 2022-12-06 | Stop reason: HOSPADM

## 2022-12-05 RX ORDER — SODIUM CHLORIDE 0.9 % (FLUSH) 0.9 %
10 SYRINGE (ML) INJECTION EVERY 12 HOURS SCHEDULED
Status: DISCONTINUED | OUTPATIENT
Start: 2022-12-05 | End: 2022-12-06 | Stop reason: HOSPADM

## 2022-12-05 RX ORDER — METHYLPREDNISOLONE SODIUM SUCCINATE 125 MG/2ML
125 INJECTION, POWDER, LYOPHILIZED, FOR SOLUTION INTRAMUSCULAR; INTRAVENOUS ONCE
Status: COMPLETED | OUTPATIENT
Start: 2022-12-05 | End: 2022-12-05

## 2022-12-05 RX ORDER — CLOPIDOGREL BISULFATE 75 MG/1
75 TABLET ORAL DAILY
Status: DISCONTINUED | OUTPATIENT
Start: 2022-12-05 | End: 2022-12-06 | Stop reason: HOSPADM

## 2022-12-05 RX ORDER — HYDRALAZINE HYDROCHLORIDE 20 MG/ML
20 INJECTION INTRAMUSCULAR; INTRAVENOUS EVERY 6 HOURS PRN
Status: DISCONTINUED | OUTPATIENT
Start: 2022-12-05 | End: 2022-12-06 | Stop reason: HOSPADM

## 2022-12-05 RX ORDER — ACETAMINOPHEN 325 MG/1
650 TABLET ORAL EVERY 4 HOURS PRN
Status: DISCONTINUED | OUTPATIENT
Start: 2022-12-05 | End: 2022-12-06 | Stop reason: HOSPADM

## 2022-12-05 RX ORDER — LORAZEPAM 2 MG/ML
0.5 INJECTION INTRAMUSCULAR EVERY 4 HOURS PRN
Status: DISCONTINUED | OUTPATIENT
Start: 2022-12-05 | End: 2022-12-06 | Stop reason: HOSPADM

## 2022-12-05 RX ORDER — GUAIFENESIN 600 MG/1
1200 TABLET, EXTENDED RELEASE ORAL 2 TIMES DAILY
Status: DISCONTINUED | OUTPATIENT
Start: 2022-12-05 | End: 2022-12-06 | Stop reason: HOSPADM

## 2022-12-05 RX ORDER — ASPIRIN 81 MG/1
81 TABLET ORAL DAILY
Status: DISCONTINUED | OUTPATIENT
Start: 2022-12-05 | End: 2022-12-06 | Stop reason: HOSPADM

## 2022-12-05 RX ORDER — SODIUM CHLORIDE 9 MG/ML
75 INJECTION, SOLUTION INTRAVENOUS CONTINUOUS
Status: DISCONTINUED | OUTPATIENT
Start: 2022-12-05 | End: 2022-12-05

## 2022-12-05 RX ORDER — IPRATROPIUM BROMIDE AND ALBUTEROL SULFATE 2.5; .5 MG/3ML; MG/3ML
3 SOLUTION RESPIRATORY (INHALATION)
Status: DISCONTINUED | OUTPATIENT
Start: 2022-12-05 | End: 2022-12-06 | Stop reason: HOSPADM

## 2022-12-05 RX ORDER — NICOTINE 21 MG/24HR
1 PATCH, TRANSDERMAL 24 HOURS TRANSDERMAL EVERY 24 HOURS
Status: DISCONTINUED | OUTPATIENT
Start: 2022-12-05 | End: 2022-12-06 | Stop reason: HOSPADM

## 2022-12-05 RX ORDER — IPRATROPIUM BROMIDE AND ALBUTEROL SULFATE 2.5; .5 MG/3ML; MG/3ML
3 SOLUTION RESPIRATORY (INHALATION) EVERY 4 HOURS PRN
Status: DISCONTINUED | OUTPATIENT
Start: 2022-12-05 | End: 2022-12-06 | Stop reason: HOSPADM

## 2022-12-05 RX ORDER — CEFTRIAXONE 1 G/50ML
1 INJECTION, SOLUTION INTRAVENOUS ONCE
Status: COMPLETED | OUTPATIENT
Start: 2022-12-05 | End: 2022-12-05

## 2022-12-05 RX ORDER — LISINOPRIL 10 MG/1
20 TABLET ORAL
Status: DISCONTINUED | OUTPATIENT
Start: 2022-12-05 | End: 2022-12-06 | Stop reason: HOSPADM

## 2022-12-05 RX ORDER — CHOLECALCIFEROL (VITAMIN D3) 125 MCG
5 CAPSULE ORAL NIGHTLY PRN
Status: DISCONTINUED | OUTPATIENT
Start: 2022-12-05 | End: 2022-12-06 | Stop reason: HOSPADM

## 2022-12-05 RX ORDER — METHYLPREDNISOLONE SODIUM SUCCINATE 40 MG/ML
40 INJECTION, POWDER, LYOPHILIZED, FOR SOLUTION INTRAMUSCULAR; INTRAVENOUS EVERY 8 HOURS
Status: DISCONTINUED | OUTPATIENT
Start: 2022-12-05 | End: 2022-12-06 | Stop reason: HOSPADM

## 2022-12-05 RX ORDER — LIDOCAINE 50 MG/G
1 PATCH TOPICAL
Status: DISCONTINUED | OUTPATIENT
Start: 2022-12-05 | End: 2022-12-06 | Stop reason: HOSPADM

## 2022-12-05 RX ORDER — HYDROCHLOROTHIAZIDE 12.5 MG/1
12.5 TABLET ORAL
Status: DISCONTINUED | OUTPATIENT
Start: 2022-12-05 | End: 2022-12-05

## 2022-12-05 RX ORDER — IPRATROPIUM BROMIDE AND ALBUTEROL SULFATE 2.5; .5 MG/3ML; MG/3ML
3 SOLUTION RESPIRATORY (INHALATION)
Status: DISCONTINUED | OUTPATIENT
Start: 2022-12-05 | End: 2022-12-05

## 2022-12-05 RX ORDER — BUDESONIDE 0.5 MG/2ML
0.5 INHALANT ORAL
Status: DISCONTINUED | OUTPATIENT
Start: 2022-12-05 | End: 2022-12-06 | Stop reason: HOSPADM

## 2022-12-05 RX ORDER — PROCHLORPERAZINE 25 MG
25 SUPPOSITORY, RECTAL RECTAL EVERY 12 HOURS PRN
Status: DISCONTINUED | OUTPATIENT
Start: 2022-12-05 | End: 2022-12-06 | Stop reason: HOSPADM

## 2022-12-05 RX ORDER — VENLAFAXINE HYDROCHLORIDE 37.5 MG/1
75 CAPSULE, EXTENDED RELEASE ORAL DAILY
Status: DISCONTINUED | OUTPATIENT
Start: 2022-12-05 | End: 2022-12-06 | Stop reason: HOSPADM

## 2022-12-05 RX ADMIN — IPRATROPIUM BROMIDE AND ALBUTEROL SULFATE 3 ML: 2.5; .5 SOLUTION RESPIRATORY (INHALATION) at 07:27

## 2022-12-05 RX ADMIN — ACETAMINOPHEN 650 MG: 325 TABLET ORAL at 06:45

## 2022-12-05 RX ADMIN — ASPIRIN 81 MG: 81 TABLET, COATED ORAL at 08:04

## 2022-12-05 RX ADMIN — IPRATROPIUM BROMIDE AND ALBUTEROL SULFATE 3 ML: 2.5; .5 SOLUTION RESPIRATORY (INHALATION) at 19:12

## 2022-12-05 RX ADMIN — HYDROCHLOROTHIAZIDE 12.5 MG: 12.5 TABLET ORAL at 08:03

## 2022-12-05 RX ADMIN — VENLAFAXINE HYDROCHLORIDE 75 MG: 37.5 CAPSULE, EXTENDED RELEASE ORAL at 08:03

## 2022-12-05 RX ADMIN — AZITHROMYCIN DIHYDRATE 500 MG: 500 INJECTION, POWDER, LYOPHILIZED, FOR SOLUTION INTRAVENOUS at 20:02

## 2022-12-05 RX ADMIN — LISINOPRIL 20 MG: 10 TABLET ORAL at 08:03

## 2022-12-05 RX ADMIN — METHYLPREDNISOLONE SODIUM SUCCINATE 40 MG: 40 INJECTION, POWDER, FOR SOLUTION INTRAMUSCULAR; INTRAVENOUS at 08:04

## 2022-12-05 RX ADMIN — Medication 10 ML: at 10:41

## 2022-12-05 RX ADMIN — ACETAMINOPHEN 650 MG: 325 TABLET ORAL at 11:22

## 2022-12-05 RX ADMIN — IPRATROPIUM BROMIDE AND ALBUTEROL SULFATE 3 ML: .5; 2.5 SOLUTION RESPIRATORY (INHALATION) at 02:41

## 2022-12-05 RX ADMIN — LIDOCAINE 1 PATCH: 50 PATCH CUTANEOUS at 10:39

## 2022-12-05 RX ADMIN — LORAZEPAM 0.5 MG: 2 INJECTION INTRAMUSCULAR; INTRAVENOUS at 02:25

## 2022-12-05 RX ADMIN — SODIUM CHLORIDE 500 ML: 9 INJECTION, SOLUTION INTRAVENOUS at 03:54

## 2022-12-05 RX ADMIN — BUDESONIDE 0.5 MG: 0.5 SUSPENSION RESPIRATORY (INHALATION) at 19:12

## 2022-12-05 RX ADMIN — ACETAMINOPHEN 650 MG: 325 TABLET ORAL at 17:31

## 2022-12-05 RX ADMIN — IPRATROPIUM BROMIDE AND ALBUTEROL SULFATE 3 ML: .5; 3 SOLUTION RESPIRATORY (INHALATION) at 01:46

## 2022-12-05 RX ADMIN — SODIUM CHLORIDE 100 ML/HR: 9 INJECTION, SOLUTION INTRAVENOUS at 13:02

## 2022-12-05 RX ADMIN — ATORVASTATIN CALCIUM 20 MG: 20 TABLET, FILM COATED ORAL at 08:04

## 2022-12-05 RX ADMIN — SODIUM CHLORIDE 75 ML/HR: 9 INJECTION, SOLUTION INTRAVENOUS at 08:05

## 2022-12-05 RX ADMIN — METOPROLOL TARTRATE 50 MG: 50 TABLET, FILM COATED ORAL at 20:02

## 2022-12-05 RX ADMIN — SODIUM CHLORIDE 1000 ML: 9 INJECTION, SOLUTION INTRAVENOUS at 01:51

## 2022-12-05 RX ADMIN — GUAIFENESIN 1200 MG: 600 TABLET, EXTENDED RELEASE ORAL at 10:13

## 2022-12-05 RX ADMIN — IPRATROPIUM BROMIDE AND ALBUTEROL SULFATE 3 ML: 2.5; .5 SOLUTION RESPIRATORY (INHALATION) at 15:55

## 2022-12-05 RX ADMIN — HYDRALAZINE HYDROCHLORIDE 20 MG: 20 INJECTION INTRAMUSCULAR; INTRAVENOUS at 11:18

## 2022-12-05 RX ADMIN — CEFTRIAXONE 1 G: 1 INJECTION, SOLUTION INTRAVENOUS at 02:25

## 2022-12-05 RX ADMIN — IPRATROPIUM BROMIDE AND ALBUTEROL SULFATE 3 ML: 2.5; .5 SOLUTION RESPIRATORY (INHALATION) at 10:43

## 2022-12-05 RX ADMIN — CLOPIDOGREL BISULFATE 75 MG: 75 TABLET ORAL at 08:04

## 2022-12-05 RX ADMIN — METOPROLOL TARTRATE 25 MG: 25 TABLET, FILM COATED ORAL at 08:04

## 2022-12-05 RX ADMIN — GUAIFENESIN 1200 MG: 600 TABLET, EXTENDED RELEASE ORAL at 20:02

## 2022-12-05 RX ADMIN — METHYLPREDNISOLONE SODIUM SUCCINATE 40 MG: 40 INJECTION, POWDER, FOR SOLUTION INTRAMUSCULAR; INTRAVENOUS at 17:32

## 2022-12-05 RX ADMIN — NICOTINE 1 PATCH: 21 PATCH, EXTENDED RELEASE TRANSDERMAL at 05:18

## 2022-12-05 RX ADMIN — BUDESONIDE 0.5 MG: 0.5 SUSPENSION RESPIRATORY (INHALATION) at 07:27

## 2022-12-05 RX ADMIN — ACETAMINOPHEN 1000 MG: 500 TABLET, FILM COATED ORAL at 01:52

## 2022-12-05 RX ADMIN — KETOROLAC TROMETHAMINE 30 MG: 30 INJECTION, SOLUTION INTRAMUSCULAR; INTRAVENOUS at 21:39

## 2022-12-05 RX ADMIN — Medication 10 ML: at 20:03

## 2022-12-05 RX ADMIN — IPRATROPIUM BROMIDE AND ALBUTEROL SULFATE 3 ML: 2.5; .5 SOLUTION RESPIRATORY (INHALATION) at 22:48

## 2022-12-05 RX ADMIN — KETOROLAC TROMETHAMINE 30 MG: 30 INJECTION, SOLUTION INTRAMUSCULAR; INTRAVENOUS at 15:19

## 2022-12-05 RX ADMIN — CEFTRIAXONE 1 G: 1 INJECTION, SOLUTION INTRAVENOUS at 19:56

## 2022-12-05 RX ADMIN — METHYLPREDNISOLONE SODIUM SUCCINATE 125 MG: 125 INJECTION, POWDER, FOR SOLUTION INTRAMUSCULAR; INTRAVENOUS at 01:45

## 2022-12-05 NOTE — PLAN OF CARE
Problem: Adult Inpatient Plan of Care  Goal: Plan of Care Review  Outcome: Ongoing, Progressing  Flowsheets (Taken 12/5/2022 0604)  Progress: no change  Plan of Care Reviewed With: patient  Outcome Evaluation: pt newly admitted from ER- on Bipap. C/o back pain and Lidocaine patch ordered. on IVF infusing. 1500NS bolus done. Nicotine patch applied. Received antibiotics.   Goal Outcome Evaluation:  Plan of Care Reviewed With: patient        Progress: no change  Outcome Evaluation: pt newly admitted from ER- on Bipap. C/o back pain and Lidocaine patch ordered. on IVF infusing. 1500NS bolus done. Nicotine patch applied. Received antibiotics.

## 2022-12-05 NOTE — NURSING NOTE
Patient oob to [facility]. Requested to change from BIPAP to Nasal cannula so she can eat a little. Tolerating 95% on 2L via Nasal cannula now sitting up in the bed. C/o of headache. Will give toradol as requested

## 2022-12-05 NOTE — SIGNIFICANT NOTE
"   12/05/22 1122   Pain/Comfort/Sleep   Preferred Pain Scale number (Numeric Rating Pain Scale)   (0-10) Pain Rating: Rest 7   Pain Location head   Pain Side/Orientation generalized   Pain Description aching   Pain Management Interventions see MAR   Patient complaining of a headache. Tylenol given as requested. Patient states she drinks \"mountain dew and water all day at home.\" PT blood pressure is elevated. PRN hydralazine given as ordered for systolic >160. Will continue to monitor.  "

## 2022-12-05 NOTE — H&P
"[facility] HOSPITALIST     Provider, No Known    CHIEF COMPLAINT:     Shortness of breath    HISTORY OF PRESENT ILLNESS:    Patient is a 47 year old female with past medical history of COPD, Anxiety, PAD, and continued tobacco abuse. Patient presented to the ED complaining of a one day history of shortness of breath. Patient reports her family members have recently been ill as well. Patient reports her symptoms have been worsening prompting her to come in. Patient reports fatigue and decreased appetite, but she denies any fever or chills. Patient reports her throat feel \"tight\" but denies any sore throat or cough.      Past Medical History:   Diagnosis Date   • Anxiety    • Arthritis    • COPD (chronic obstructive pulmonary disease) (HCC)    • Coronary stent occlusion    • CTS (carpal tunnel syndrome)    • Depression    • Dizziness    • Headache    • Hyperlipidemia    • Hypertension    • Migraine    • Numbness and tingling    • Pneumonia      Past Surgical History:   Procedure Laterality Date   • ANGIOPLASTY ILIAC ARTERY Left 6/17/2016    Procedure: BILATERAL DUPLEX DIRECTED ACCESS, AIF BILATERAL RUNOFF, SELECTIVE CATHETERIZATION OF RIGHT EXTERNAL ILIAC WITH ANGIOPLASTY, LEFT COMMON ILIAC STENT PLACEMENT;  Surgeon: Jose Carlos Plascencia MD;  Location: [facility] 18/19;  Service:    • BREAST SURGERY      BILAT IMPLANTS   • CAROTID ARTERY ANGIOPLASTY       Family History   Problem Relation Age of Onset   • Heart disease Father    • Hypertension Father    • Diabetes Father    • Stroke Father    • COPD Father    • Heart failure Paternal Grandmother    • Heart failure Paternal Grandfather    • COPD Mother    • COPD Maternal Aunt    • COPD Maternal Uncle    • COPD Paternal Uncle      Social History     Tobacco Use   • Smoking status: Every Day     Packs/day: 1.00     Years: 30.00     Pack years: 30.00     Types: Cigarettes   • Smokeless tobacco: Never   Substance Use Topics   • Alcohol use: No   • Drug " use: No     Medications Prior to Admission   Medication Sig Dispense Refill Last Dose   • albuterol (PROVENTIL) (2.5 MG/3ML) 0.083% nebulizer solution USE 1 VIAL VIA NEBULIZER EVERY 4 HOURS AS NEEDED 225 mL 3 12/4/2022   • ALBUTEROL SULFATE  (90 Base) MCG/ACT inhaler TAKE 2 PUFFS BY MOUTH EVERY 4 HOURS AS NEEDED 18 inhaler 3 12/4/2022   • aspirin (aspirin) 81 MG EC tablet Take 1 tablet by mouth Daily. 90 tablet 3 Past Week   • clopidogrel (Plavix) 75 MG tablet Take 1 tablet by mouth Daily. 90 tablet 1 Past Week   • lisinopril-hydrochlorothiazide (PRINZIDE,ZESTORETIC) 20-12.5 MG per tablet TAKE 1 TABLET BY MOUTH EVERY DAY 90 tablet 0 12/4/2022   • nicotine (NICODERM CQ) 14 MG/24HR patch Place 1 patch on the skin as directed by provider Daily. 14 each 0 12/4/2022   • simvastatin (ZOCOR) 40 MG tablet Take 1 tablet by mouth Every Evening. 90 tablet 1 Past Week   • venlafaxine XR (EFFEXOR-XR) 75 MG 24 hr capsule TAKE 1 CAPSULE BY MOUTH EVERY DAY 90 capsule 0 Past Week   • vitamin D (ERGOCALCIFEROL) 81559 units capsule capsule Take 1 capsule by mouth Every 7 (Seven) Days. 6 capsule 5 11/27/2022   • Budesonide-Formoterol Fumarate (SYMBICORT IN) Inhale. Patient isn't sure on dose      • fluticasone (FLONASE) 50 MCG/ACT nasal spray 1 spray into the nostril(s) as directed by provider Daily As Needed.      • medroxyPROGESTERone (DEPO-PROVERA) 150 MG/ML injection Apply 150 mg to cheek Every 3 (Three) Months. Pt takes every 3 months.march or April 2018 last dose   11/9/2022   • metoprolol tartrate (LOPRESSOR) 25 MG tablet Take 1 tablet by mouth Every 12 (Twelve) Hours for 30 days. 60 tablet 0    • nystatin (MYCOSTATIN) 571882 UNIT/ML suspension Swish and swallow 5 mL 4 (Four) Times a Day. Swish and swallow 5mL four times a day for two weeks. 280 mL 0 More than a month     Allergies:  Prednisone    Immunization History   Administered Date(s) Administered   • flucelvax quad pfs =>4 YRS 10/02/2019           REVIEW OF  "SYSTEMS:    Please see the above history of present illness for pertinent positives and negatives.  The remainder of the patient's systems have been reviewed and are negative.     Vital Signs  Temp:  [97.2 °F (36.2 °C)-98.5 °F (36.9 °C)] 97.2 °F (36.2 °C)  Heart Rate:  [119-135] 125  Resp:  [18-29] 29  BP: (153-200)/() 153/98    Flowsheet Rows    Flowsheet Row First Filed Value   Admission Height 157.5 cm (62\") Documented at 12/05/2022 0120   Admission Weight 52.6 kg (116 lb) Documented at 12/05/2022 0120             Physical Exam:    Physical Exam  Vitals reviewed.   Constitutional:       General: She is not in acute distress.     Appearance: She is normal weight.   HENT:      Head: Normocephalic and atraumatic.      Mouth/Throat:      Comments: bipap Mask in place  Eyes:      General: No scleral icterus.     Pupils: Pupils are equal, round, and reactive to light.   Cardiovascular:      Rate and Rhythm: Regular rhythm. Tachycardia present.      Heart sounds: No murmur heard.  Pulmonary:      Comments: Expiratory wheezing throughout, no rhonchi or crackles   Abdominal:      General: Bowel sounds are normal. There is no distension.      Palpations: Abdomen is soft.      Tenderness: There is no abdominal tenderness.   Musculoskeletal:      Right lower leg: No edema.      Left lower leg: No edema.   Skin:     General: Skin is warm and dry.      Findings: No rash.   Neurological:      General: No focal deficit present.      Mental Status: She is alert. Mental status is at baseline.   Psychiatric:         Mood and Affect: Mood normal.         Behavior: Behavior normal.               Results Review:    I reviewed the patient's new clinical results.  Lab Results (most recent)     Procedure Component Value Units Date/Time    [place] Draw [[id number]] Collected: 12/05/22 0133    Specimen: Blood Updated: 12/05/22 0245    Narrative:      The following orders were created for panel order [place] Draw.  Procedure            "                    Abnormality         Status                     ---------                               -----------         ------                     Green Top (Gel)[[id number]]                                  Final result               Lavender Top[[id number]]                                     Final result               Gold Top - SST[[id number]]                                   Final result               Light Blue Top[[id number]]                                   Final result                 Please view results for these tests on the individual orders.    Lavender Top [[id number]] Collected: 12/05/22 0133    Specimen: Blood Updated: 12/05/22 0245     Extra Tube hold for add-on     Comment: Auto resulted       Gold Top - SST [[id number]] Collected: 12/05/22 0133    Specimen: Blood Updated: 12/05/22 0245     Extra Tube Hold for add-ons.     Comment: Auto resulted.       Light Blue Top [[id number]] Collected: 12/05/22 0133    Specimen: Blood Updated: 12/05/22 0245     Extra Tube Hold for add-ons.     Comment: Auto resulted       Green Top (Gel) [[id number]] Collected: 12/05/22 0133    Specimen: Blood Updated: 12/05/22 0245     Extra Tube Hold for add-ons.     Comment: Auto resulted.       BNP [[id number]]  (Normal) Collected: 12/05/22 0133    Specimen: Blood Updated: 12/05/22 0234     proBNP 219.4 pg/mL     Narrative:      Among patients with dyspnea, NT-proBNP is highly sensitive for the detection of acute congestive heart failure. In addition NT-proBNP of <300 pg/ml effectively rules out acute congestive heart failure with 99% negative predictive value.    Results may be falsely decreased if patient taking Biotin.      Troponin [[id number]]  (Normal) Collected: 12/05/22 0133    Specimen: Blood Updated: 12/05/22 0207     Troponin T <0.010 ng/mL     Narrative:      Troponin T Reference Range:  <= 0.03 ng/mL-   Negative for AMI  >0.03 ng/mL-     Abnormal for myocardial necrosis.  Clinicians would have to utilize  clinical acumen, EKG, Troponin and serial changes to determine if it is an Acute Myocardial Infarction or myocardial injury due to an underlying chronic condition.       Results may be falsely decreased if patient taking Biotin.      Comprehensive Metabolic Panel [[id number]]  (Abnormal) Collected: 12/05/22 0133    Specimen: Blood Updated: 12/05/22 0204     Glucose 149 mg/dL      BUN 6 mg/dL      Creatinine 0.56 mg/dL      Sodium 132 mmol/L      Potassium 3.7 mmol/L      Chloride 93 mmol/L      CO2 28.2 mmol/L      Calcium 9.5 mg/dL      Total Protein 7.7 g/dL      Albumin 4.10 g/dL      ALT (SGPT) 7 U/L      AST (SGOT) 11 U/L      Alkaline Phosphatase 104 U/L      Total Bilirubin 0.5 mg/dL      Globulin 3.6 gm/dL      A/G Ratio 1.1 g/dL      BUN/Creatinine Ratio 10.7     Anion Gap 10.8 mmol/L      eGFR 112.0 mL/min/1.73      Comment: National Kidney Foundation and American Society of Nephrology (ASN) Task Force recommended calculation based on the Chronic Kidney Disease Epidemiology Collaboration (CKD-EPI) equation refit without adjustment for race.       Narrative:      GFR Normal >60  Chronic Kidney Disease <60  Kidney Failure <15      Magnesium [[id number]]  (Normal) Collected: 12/05/22 0133    Specimen: Blood Updated: 12/05/22 0204     Magnesium 1.8 mg/dL     COVID PRE-OP / PRE-PROCEDURE SCREENING ORDER (NO ISOLATION) - Swab, Nasopharynx [[id number]]  (Normal) Collected: 12/05/22 0132    Specimen: Swab from Nasopharynx Updated: 12/05/22 0204    Narrative:      The following orders were created for panel order COVID PRE-OP / PRE-PROCEDURE SCREENING ORDER (NO ISOLATION) - Swab, Nasopharynx.  Procedure                               Abnormality         Status                     ---------                               -----------         ------                     COVID-19 and FLU A/B PCR...[[id number]]  Normal              Final result                 Please view results for these tests on the individual orders.     COVID-19 and FLU A/B PCR - Swab, Nasopharynx [[id number]]  (Normal) Collected: 12/05/22 0132    Specimen: Swab from Nasopharynx Updated: 12/05/22 0204     COVID19 Not Detected     Influenza A PCR Not Detected     Influenza B PCR Not Detected    Narrative:      Fact sheet for providers: https://www.fda.gov/media/210762/download    Fact sheet for patients: https://www.fda.gov/media/937140/download    Test performed by PCR.    Lactic Acid, Plasma [[id number]]  (Normal) Collected: 12/05/22 0133    Specimen: Blood Updated: 12/05/22 0203     Lactate 1.1 mmol/L     Blood Gas, Arterial - [[id number]]  (Abnormal) Collected: 12/05/22 0145    Specimen: Arterial Blood Updated: 12/05/22 0200     Site Right Brachial     Marquis's Test N/A     pH, Arterial 7.293 pH units      Comment: 84 Value below reference range        pCO2, Arterial 62.3 mm Hg      Comment: 83 Value above reference range        pO2, Arterial 71.8 mm Hg      Comment: 84 Value below reference range        HCO3, Arterial 30.1 mmol/L      Comment: 83 Value above reference range        Base Excess, Arterial 1.7 mmol/L      O2 Saturation, Arterial 94.1 %      Hemoglobin, Blood Gas 15.3 g/dL      Temperature 37.0 C      Barometric Pressure for Blood Gas 740 mmHg      Modality Nasal Cannula     Flow Rate 3.0 lpm      Ventilator Mode STANDBY     Collected by [id number]     Comment: Meter: [id number]     :  [id number]        pCO2, Temperature Corrected 62.3 mm Hg      pH, Temp Corrected 7.293 pH Units      pO2, Temperature Corrected 71.8 mm Hg     Blood Culture - Blood, Hand, Right [[id number]] Collected: 12/05/22 0150    Specimen: Blood from Hand, Right Updated: 12/05/22 0159    CBC & Differential [[id number]]  (Abnormal) Collected: 12/05/22 0133    Specimen: Blood Updated: 12/05/22 0150    Narrative:      The following orders were created for panel order CBC & Differential.  Procedure                               Abnormality         Status                      ---------                               -----------         ------                     CBC Auto Differential[[id number]]        Abnormal            Final result               Scan Slide[[id number]]                                       Final result                 Please view results for these tests on the individual orders.    Scan Slide [[id number]] Collected: 12/05/22 0133    Specimen: Blood Updated: 12/05/22 0150     Macrocytes Slight/1+     WBC Morphology Normal     Platelet Morphology Normal    CBC Auto Differential [[id number]]  (Abnormal) Collected: 12/05/22 0133    Specimen: Blood Updated: 12/05/22 0150     WBC 15.70 10*3/mm3      RBC 4.65 10*6/mm3      Hemoglobin 15.5 g/dL      Hematocrit 49.4 %      .2 fL      MCH 33.3 pg      MCHC 31.4 g/dL      RDW 12.4 %      RDW-SD 49.2 fl      MPV 8.9 fL      Platelets 255 10*3/mm3      Neutrophil % 88.9 %      Lymphocyte % 4.1 %      Monocyte % 6.4 %      Eosinophil % 0.0 %      Basophil % 0.3 %      Immature Grans % 0.3 %      Neutrophils, Absolute 13.96 10*3/mm3      Lymphocytes, Absolute 0.64 10*3/mm3      Monocytes, Absolute 1.01 10*3/mm3      Eosinophils, Absolute 0.00 10*3/mm3      Basophils, Absolute 0.04 10*3/mm3      Immature Grans, Absolute 0.05 10*3/mm3     Blood Culture - Blood, Hand, Left [[id number]] Collected: 12/05/22 0133    Specimen: Blood from Hand, Left Updated: 12/05/22 0142          Imaging Results (Most Recent)     Procedure Component Value Units Date/Time    XR Chest 1 View [[id number]] Collected: 12/05/22 0232     Updated: 12/05/22 0234    Narrative:      CR Chest 1 Vw    INDICATION:   Shortness of air for several days     COMPARISON:    7/22/2022    FINDINGS:  Portable AP view(s) of the chest.  Lungs are hyperinflated. Clear. Heart size normal. Bones are normal.      Impression:      Hyperinflated lungs. Otherwise normal    Signer Name: Harpreet Castellon MD   Signed: 12/5/2022 2:32 AM   Workstation Name: [facility]    [facility]        CXR personally reviewed and shows hyperinflated lungs with no focal consolidation     ECG/EMG Results (most recent)     Procedure Component Value Units Date/Time    ECG 12 Lead Dyspnea [[id number]] Collected: 12/05/22 0208     Updated: 12/05/22 0210     QT Interval 316 ms     Narrative:      HEART RATE= 126  bpm  RR Interval= 476  ms  SC Interval= 115  ms  P Horizontal Axis=   deg  P Front Axis= 86  deg  QRSD Interval= 76  ms  QT Interval= 316  ms  QRS Axis= 67  deg  T Wave Axis= 78  deg  - BORDERLINE ECG -  Sinus tachycardia  Ventricular premature complex  Aberrant conduction of SV complex(es)  LAE, consider biatrial enlargement  Minimal ST depression, diffuse leads  Electronically Signed By:   Date and Time of Study: 2022-12-05 02:08:12        reviewed    Assessment & Plan   Active Hospital Problems    Diagnosis  POA   • **COPD exacerbation (HCC) [J44.1]  Yes   • Acute on chronic respiratory failure with hypercapnia (HCC) [J96.22]  Yes   • Acute on chronic respiratory failure with hypoxia (HCC) [J96.21]  Yes   • Essential hypertension [I10]  Yes   • Hyperlipidemia [E78.5]  Yes      Resolved Hospital Problems   No resolved problems to display.       Acute on Chronic hypoxic and hypercapnic respiratory failure d/t AECOPD   - patient wears 1L NC all the time at home  - continue bipap for now  - duo-nebs scheduled and prn  - Pulmicort bid   - Solu-medrol 40mg IV q8h  - flu and Covid negative  - viral panel pending  - blood cx- pending  - check procal  - started on Rocephin and Azithromycin in the ED    Essential Hypertension  - continue home lopressor, lisinopril, and hctz  - monitor with routine vital signs and make adjustments as needed      Dyslipidemia  - Continue statin therapy    PAD  - continue aspirin, statin, Plavix    Anxiety  - continue venlafaxine      Tobacco Abuse  - nicotine patch  -  on smoking cessation    DVT Prophylaxis  - SCDs    Patient is high risk     I discussed the  patient's findings and my recommendations with patient and nursing staff.      This patient has been examined wearing appropriate Personal Protective Equipment. 12/05/22      Yazmin Gloria DO  12/05/22  04:26 EST

## 2022-12-05 NOTE — CASE MANAGEMENT/SOCIAL WORK
Discharge Planning Assessment   Sydnee Sanon     Patient Name: Valeria Rubi  MRN: [id number]  Today's Date: 12/5/2022    Admit Date: 12/5/2022    Plan: Home with    Discharge Needs Assessment     Row Name 12/05/22 1230       Living Environment    People in Home spouse    Name(s) of People in Home Harpreet Minch,     Current Living Arrangements home    Duration at Residence 45 Y    Potentially Unsafe Housing Conditions --  none    Primary Care Provided by self    Provides Primary Care For no one    Caregiving Concerns no care giving concerns voiced by patient at this time.    Family Caregiver if Needed spouse;other relative(s);child(ld), adult    Family Caregiver Names Harpreet,  and Arjun son and Sushma daughter in law    Quality of Family Relationships unable to assess    Able to Return to Prior Arrangements yes    Living Arrangement Comments Patient states she lives with her  in a two story house with five steps to gain entry       Resource/Environmental Concerns    Resource/Environmental Concerns none    Transportation Concerns none       Transition Planning    Patient/Family Anticipates Transition to home with family    Patient/Family Anticipated Services at Transition none    Transportation Anticipated family or friend will provide  pt states her  will be able to provide ride home at discharge       Discharge Needs Assessment    Readmission Within the Last 30 Days no previous admission in last 30 days    Current Outpatient/Agency/Support Group --  none    Equipment Currently Used at Home nebulizer;oxygen  1L of oxygen PRN through [facility]    Concerns to be Addressed denies needs/concerns at this time;discharge planning;adjustment to diagnosis/illness    Concerns Comments no discharge needs voiced by patient at this time.    Anticipated Changes Related to Illness none    Equipment Needed After Discharge none    Outpatient/Agency/Support Group Needs --  pt states she will not  need these services at discharge    Discharge Facility/Level of Care Needs --  pt states she will not need these services at discharge    Provided Post Acute Provider List? Refused    Refused Provider List Comment pt declined    Patient's Choice of Community Agency(s) none    Current Discharge Risk --  none    Discharge Coordination/Progress Patient states she plans on returning home at discharge with her , son and daughter in law to help as needed, no discharge needs voiced by patient at this time.               Discharge Plan     Row Name 12/05/22 3666       Plan    Plan Home with     Patient/Family in Agreement with Plan yes    Plan Comments Into room and introduced self and role of CM. Discussed discharge disposition with patient at bedside. Patient is currently resting in bed and remains on BIPAP. Patient is able to answer all questions appropriately. Patient confirms that the info on her face sheet is correct and that she does not have a PCP. CM provided patient with a list of doctor's who are accepting new patient's and she is agreeable to follow up with one at discharge. She states she uses [facility] in [place] and currently has no problem picking up or paying for her med's. She also states that she does have a living will and it is on file here. Patient states she lives with her  in a two story house with five steps to gain entry and has no issues entering the home or maneuvering inside. She states that she is independent with her ADL's and drives and that her  will be able to provide ride home at discharge. She also states that she uses a nebulizer and oxygen 1L as needed at home through [facility] and does not anticipate needing any other equipment at discharge. Patient states she has not used home health in the past and states she will not need this service at discharge. CM offered community resources such as HH, STR and LTC but patient declines the need for them at this  time. Patient states she plans on retuning home at discharge with her , son and daughter in law to help as needed, no discharge needs voiced by patient at this time. Patient had no other questions or concerns regarding discharge plans. Name and number placed on white board in room. CM will follow.              Continued Care and Services - Admitted Since 12/5/2022    Coordination has not been started for this encounter.          Demographic Summary     Row Name 12/05/22 6760       General Information    Admission Type inpatient    Arrived From home    Referral Source admission list    Reason for Consult discharge planning    Preferred Language English       Contact Information    Permission Granted to Share Info With                Functional Status    No documentation.                Psychosocial    No documentation.                Abuse/Neglect    No documentation.                Legal    No documentation.                Substance Abuse    No documentation.                Patient Forms    No documentation.                   Meme Lara RN     No change

## 2022-12-05 NOTE — PLAN OF CARE
Goal Outcome Evaluation:           Progress: improving  Outcome Evaluation: Patient slept most of day. On 2L NC most of day. Did get SOB once and put back on BIPAP. Patient oob with standby assistance to Bedside commode. Lidoderm patch on back, has a chronic Migrane. Tylenol and toradol given as requested and has seemed to help. Blood pressure high, PRN medications ordered. PAtient states at home all she drinks is mountain dew and some water. Tolerating apple juice and water here. She is on IVF . No real appetite here for food at this time. See flow sheet for vital signs.

## 2022-12-05 NOTE — ED PROVIDER NOTES
Subjective   History of Present Illness  47-year-old female past medical history significant for COPD hyperlipidemia hypertension presents emergency room complaining of 1 days worth of worsening shortness of breath.  Patient states her family got her sick from their illness and that she is slowly been getting worse like last 24 hours.  Patient usually is on 1 L of oxygen at home.  She complains of anorexia but no fever or cough.  Of note patient was admitted this summer under similar circumstances with COPD exacerbation secondary to viral illness that required her to be in the ICU on BiPAP.  Reviewing the chart that was a short prodrome of symptoms before she became seriously ill.        Review of Systems   Constitutional: Positive for activity change, appetite change and fatigue. Negative for chills, diaphoresis and fever.   HENT: Negative for congestion, rhinorrhea and sore throat.    Eyes: Negative for photophobia and visual disturbance.   Respiratory: Positive for shortness of breath. Negative for cough.    Cardiovascular: Negative for chest pain, palpitations and leg swelling.   Gastrointestinal: Negative for abdominal distention, abdominal pain, diarrhea, nausea and vomiting.   Genitourinary: Negative for dysuria and flank pain.   Musculoskeletal: Negative for arthralgias and back pain.   Skin: Negative for rash.   Neurological: Negative for dizziness, weakness and headaches.   Psychiatric/Behavioral: Negative for agitation and behavioral problems.       Past Medical History:   Diagnosis Date   • Anxiety    • Arthritis    • COPD (chronic obstructive pulmonary disease) (HCC)    • Coronary stent occlusion    • CTS (carpal tunnel syndrome)    • Depression    • Dizziness    • Headache    • Hyperlipidemia    • Hypertension    • Migraine    • Numbness and tingling    • Pneumonia        Allergies   Allergen Reactions   • Prednisone Other (See Comments)     Rash in mouth       Past Surgical History:   Procedure  Laterality Date   • ANGIOPLASTY ILIAC ARTERY Left 6/17/2016    Procedure: BILATERAL DUPLEX DIRECTED ACCESS, AIF BILATERAL RUNOFF, SELECTIVE CATHETERIZATION OF RIGHT EXTERNAL ILIAC WITH ANGIOPLASTY, LEFT COMMON ILIAC STENT PLACEMENT;  Surgeon: Jose Carlos Plascencia MD;  Location: [facility] 18/19;  Service:    • BREAST SURGERY      BILAT IMPLANTS   • CAROTID ARTERY ANGIOPLASTY         Family History   Problem Relation Age of Onset   • Heart disease Father    • Hypertension Father    • Diabetes Father    • Stroke Father    • COPD Father    • Heart failure Paternal Grandmother    • Heart failure Paternal Grandfather    • COPD Mother    • COPD Maternal Aunt    • COPD Maternal Uncle    • COPD Paternal Uncle        Social History     Socioeconomic History   • Marital status: Single   Tobacco Use   • Smoking status: Every Day     Packs/day: 1.00     Years: 30.00     Pack years: 30.00     Types: Cigarettes   • Smokeless tobacco: Never   Substance and Sexual Activity   • Alcohol use: No   • Drug use: No   • Sexual activity: Defer           Objective   Physical Exam  Constitutional:       General: She is not in acute distress.     Appearance: Normal appearance. She is not ill-appearing, toxic-appearing or diaphoretic.   HENT:      Head: Normocephalic and atraumatic.      Nose: Nose normal.      Mouth/Throat:      Mouth: Mucous membranes are moist.   Eyes:      Conjunctiva/sclera: Conjunctivae normal.   Cardiovascular:      Rate and Rhythm: Regular rhythm. Tachycardia present.      Pulses: Normal pulses.   Pulmonary:      Effort: Tachypnea and accessory muscle usage present.      Breath sounds: Decreased breath sounds and rhonchi present.   Abdominal:      General: Abdomen is flat. There is no distension.      Tenderness: There is no abdominal tenderness.   Musculoskeletal:         General: No swelling. Normal range of motion.      Cervical back: Normal range of motion and neck supple.   Skin:     General: Skin is warm and  dry.   Neurological:      General: No focal deficit present.      Mental Status: She is alert and oriented to person, place, and time.   Psychiatric:         Mood and Affect: Mood normal.         Procedures           ED Course  ED Course as of 12/05/22 0240   Mon Dec 05, 2022   0235 After the first DuoNeb patient's breast down started to improve as far as opening up her airways however she started wheezing at this point is now producing productive sputum.  When reviewing past records patient tired out from breathing relatively quickly and had been placed on BiPAP after discussing with respiratory therapy felt it prudent to go ahead and start her on BiPAP early and patient is amenable to this.  I will give her small dose of IV Ativan to help.    After reviewing labs and radiological findings we will start antibiotics and have paged hospitalist for admission. []   0237 Spoke with Dr. Santamaria hospitalist who is agreed to admission.  Will place patient in ICU tonight on BiPAP and reassess in the morning for possible stepdown. []      ED Course User Index  [] Emile Kelly MD                                           MDM  Number of Diagnoses or Management Options     Amount and/or Complexity of Data Reviewed  Clinical lab tests: ordered and reviewed  Tests in the radiology section of CPT®: ordered and reviewed  Tests in the medicine section of CPT®: ordered and reviewed    Risk of Complications, Morbidity, and/or Mortality  Presenting problems: moderate  Diagnostic procedures: moderate  Management options: moderate        Final diagnoses:   COPD exacerbation (HCC)   Pneumonia due to infectious organism, unspecified laterality, unspecified part of lung   Acute and chronic respiratory failure with hypoxia (HCC)       ED Disposition  ED Disposition     ED Disposition   Decision to Admit    Condition   --    Comment   Level of Care: Critical Care [6]   Diagnosis: COPD exacerbation (HCC) [300005]   Admitting  Physician: BRITTON KAMARA [[id number]]   Attending Physician: RIP KELLY [[id number]]   Certification: I Certify That Inpatient Hospital Services Are Medically Necessary For Greater Than 2 Midnights               No follow-up provider specified.       Medication List      No changes were made to your prescriptions during this visit.          Rip Kelly MD  12/05/22 0831

## 2022-12-05 NOTE — PROGRESS NOTES
Overnight attending notes reviewed. Lab reviewed.    Patient seen and examined, Bipap removed by patient, patient dyspneic with speech, poor air movement with expiratory wheezes throughout. See below    A/C HHRF:  Leukocytosis:   Chronic oxygen dependent COPD with AE: consult pulmonary  Increase frequency of duonebs to every 4 hours  Add mucinex/acapella/IS  BiPAP as needed  Wean oxygen as able  Continue solumedrol/rocephin/azithromycin/pulmicort/duonebs  Check RVP/sputum culture/strep pneumo/legionella  Trend procalcitonin  Await further pulmonary input    Tachycardia:  HTN:  BP and HR now controlled on home lisinopril/hctz/lopressor  Monitor     HLD: remains on statin    PAD s/p left iliac artery angioplasty 6/2016:  S/P carotid artery angioplasty:  Remains on plavix/aspirin/statin    Tobacco abuse:  Continue nicotine patch    Anxiety/depression:  Nothing acute on home effexor    Chronic migraines: no acute issues  Chronic cardiac stent occlusion   Chronic macrocytosis    1230 pm  Intractable migraine:  Reviewed past neurology notes  Will add toradol and compazine as needed for now  Monitor for effect

## 2022-12-05 NOTE — SIGNIFICANT NOTE
12/05/22 0800   Vitals   Heart Rate (!) 123   Patient just returned to bed after using bedside commode. SOB with exertion.

## 2022-12-05 NOTE — CONSULTS
Adult Nutrition  Assessment/PES    Patient Name:  Valeria Rubi  YOB: 1975  MRN: [id number]  Admit Date:  12/5/2022    Assessment Date:  12/5/2022    Comments:  Agree with Reg diet. Encourage po and voice food prefs.   Pt declines snacks and supplements at visit today.  Doesn't appear to meet criteria for malnutrition at this time.  Will cont to follow and monitor.      Reason for Assessment     Row Name 12/05/22 1304          Reason for Assessment    Reason For Assessment identified at risk by screening criteria  diff chew/swallowing     Diagnosis pulmonary disease  SOA AHRF AE COPD r/o covid     Identified At Risk by Screening Criteria MST SCORE 2+                Nutrition/Diet History     Row Name 12/05/22 1305          Nutrition/Diet History    Typical Intake (Food/Fluid/EN/PN) Pt resting but wakes easily, Reports appetite has been decreased and thinks she has lost wt. NKFA. Declines snacks/supplements. Had half sandwich this am, RN ordered her light food for lunch since just off bipap earlier.                Labs/Tests/Procedures/Meds     Row Name 12/05/22 1306          Labs/Procedures/Meds    Lab Results Reviewed reviewed     Lab Results Comments Na 132 L, glu 149 H        Diagnostic Tests/Procedures    Diagnostic Test/Procedure Reviewed reviewed        Medications    Pertinent Medications Reviewed reviewed     Pertinent Medications Comments solumedrol                Physical Findings     Row Name 12/05/22 1306          Physical Findings    Overall Physical Appearance mild/moderate temporal wasting     Exam Agreement consented                Estimated/Assessed Needs - Anthropometrics     Row Name 12/05/22 1307          Anthropometrics    Weight --  117.1#     Additional Documentation --  7/23/22 121.2# down 4# in past 4+ months        Estimated/Assessed Needs    Additional Documentation Estimated Calorie Needs (Group);Fluid Requirements (Group);Protein Requirements (Group)        Estimated  Calorie Needs    Estimated Calorie Requirement (kcal/day) 1388 kcal ( mifflin 1.25)     Estimated Calorie Need Method [place]-St Jeor        Protein Requirements    Est Protein Requirement Amount (gms/kg) 1.0 gm protein  53 gm pro        Fluid Requirements    Estimated Fluid Requirement Method RDA Method  1388 ml                Nutrition Prescription Ordered     Row Name 12/05/22 1308          Nutrition Prescription PO    Current PO Diet Regular                Evaluation of Received Nutrient/Fluid Intake     Row Name 12/05/22 1308          Fluid Intake Evaluation    Oral Fluid (mL) --  insufficient data        PO Evaluation    Number of Days PO Intake Evaluated Insufficient Data                   Problem/Interventions:   Problem 1     Row Name 12/05/22 1309          Nutrition Diagnoses Problem 1    Problem 1 Inadequate Nutrient Intake     Etiology (related to) Medical Diagnosis;Factors Affecting Nutrition     Signs/Symptoms (evidenced by) Report/Observation                      Intervention Goal     Row Name 12/05/22 1309          Intervention Goal    General Meet nutritional needs for age/condition     PO Establish PO;PO intake (%)     PO Intake % 50 %  or greater     Weight No significant weight loss                Nutrition Intervention     Row Name 12/05/22 1309          Nutrition Intervention    RD/Tech Action Interview for preference;Encourage intake;Supplement offered/refused;Follow Tx progress                  Education/Evaluation     Row Name 12/05/22 1309          Education    Education No discharge needs identified at this time  encourage po and voice food prefs        Monitor/Evaluation    Monitor Per protocol;I&O;PO intake;Pertinent labs;Weight;Symptoms                 Electronically signed by:  Lila Hilton RD  12/05/22 13:11 EST

## 2022-12-06 ENCOUNTER — APPOINTMENT (OUTPATIENT)
Dept: GENERAL RADIOLOGY | Facility: HOSPITAL | Age: 47
End: 2022-12-06

## 2022-12-06 ENCOUNTER — READMISSION MANAGEMENT (OUTPATIENT)
Dept: CALL CENTER | Facility: HOSPITAL | Age: 47
End: 2022-12-06

## 2022-12-06 VITALS
OXYGEN SATURATION: 95 % | TEMPERATURE: 98.6 F | SYSTOLIC BLOOD PRESSURE: 162 MMHG | DIASTOLIC BLOOD PRESSURE: 81 MMHG | HEART RATE: 79 BPM | BODY MASS INDEX: 21.54 KG/M2 | HEIGHT: 62 IN | RESPIRATION RATE: 20 BRPM | WEIGHT: 117.06 LBS

## 2022-12-06 LAB
ANION GAP SERPL CALCULATED.3IONS-SCNC: 7.1 MMOL/L (ref 5–15)
BASOPHILS # BLD AUTO: 0.01 10*3/MM3 (ref 0–0.2)
BASOPHILS NFR BLD AUTO: 0.1 % (ref 0–1.5)
BUN SERPL-MCNC: 11 MG/DL (ref 6–20)
BUN/CREAT SERPL: 21.6 (ref 7–25)
CALCIUM SPEC-SCNC: 8.7 MG/DL (ref 8.6–10.5)
CHLORIDE SERPL-SCNC: 98 MMOL/L (ref 98–107)
CO2 SERPL-SCNC: 27.9 MMOL/L (ref 22–29)
CREAT SERPL-MCNC: 0.51 MG/DL (ref 0.57–1)
DEPRECATED RDW RBC AUTO: 49.6 FL (ref 37–54)
EGFRCR SERPLBLD CKD-EPI 2021: 114.6 ML/MIN/1.73
EOSINOPHIL # BLD AUTO: 0 10*3/MM3 (ref 0–0.4)
EOSINOPHIL NFR BLD AUTO: 0 % (ref 0.3–6.2)
ERYTHROCYTE [DISTWIDTH] IN BLOOD BY AUTOMATED COUNT: 12.4 % (ref 12.3–15.4)
GLUCOSE SERPL-MCNC: 177 MG/DL (ref 65–99)
HCT VFR BLD AUTO: 41.8 % (ref 34–46.6)
HGB BLD-MCNC: 12.7 G/DL (ref 12–15.9)
IMM GRANULOCYTES # BLD AUTO: 0.02 10*3/MM3 (ref 0–0.05)
IMM GRANULOCYTES NFR BLD AUTO: 0.2 % (ref 0–0.5)
LYMPHOCYTES # BLD AUTO: 0.56 10*3/MM3 (ref 0.7–3.1)
LYMPHOCYTES NFR BLD AUTO: 5 % (ref 19.6–45.3)
MCH RBC QN AUTO: 32.8 PG (ref 26.6–33)
MCHC RBC AUTO-ENTMCNC: 30.4 G/DL (ref 31.5–35.7)
MCV RBC AUTO: 108 FL (ref 79–97)
MONOCYTES # BLD AUTO: 0.32 10*3/MM3 (ref 0.1–0.9)
MONOCYTES NFR BLD AUTO: 2.8 % (ref 5–12)
NEUTROPHILS NFR BLD AUTO: 10.38 10*3/MM3 (ref 1.7–7)
NEUTROPHILS NFR BLD AUTO: 91.9 % (ref 42.7–76)
PLATELET # BLD AUTO: 242 10*3/MM3 (ref 140–450)
PMV BLD AUTO: 9.4 FL (ref 6–12)
POTASSIUM SERPL-SCNC: 4.2 MMOL/L (ref 3.5–5.2)
PROCALCITONIN SERPL-MCNC: 18.96 NG/ML (ref 0–0.25)
RBC # BLD AUTO: 3.87 10*6/MM3 (ref 3.77–5.28)
SODIUM SERPL-SCNC: 133 MMOL/L (ref 136–145)
WBC NRBC COR # BLD: 11.29 10*3/MM3 (ref 3.4–10.8)

## 2022-12-06 PROCEDURE — 94799 UNLISTED PULMONARY SVC/PX: CPT

## 2022-12-06 PROCEDURE — 25010000002 METHYLPREDNISOLONE PER 40 MG: Performed by: INTERNAL MEDICINE

## 2022-12-06 PROCEDURE — G0378 HOSPITAL OBSERVATION PER HR: HCPCS

## 2022-12-06 PROCEDURE — 71046 X-RAY EXAM CHEST 2 VIEWS: CPT

## 2022-12-06 PROCEDURE — 84145 PROCALCITONIN (PCT): CPT | Performed by: NURSE PRACTITIONER

## 2022-12-06 PROCEDURE — 25010000002 KETOROLAC TROMETHAMINE PER 15 MG: Performed by: NURSE PRACTITIONER

## 2022-12-06 PROCEDURE — 85025 COMPLETE CBC W/AUTO DIFF WBC: CPT | Performed by: INTERNAL MEDICINE

## 2022-12-06 PROCEDURE — 80048 BASIC METABOLIC PNL TOTAL CA: CPT | Performed by: INTERNAL MEDICINE

## 2022-12-06 PROCEDURE — 99217 PR OBSERVATION CARE DISCHARGE MANAGEMENT: CPT | Performed by: NURSE PRACTITIONER

## 2022-12-06 PROCEDURE — 94618 PULMONARY STRESS TESTING: CPT

## 2022-12-06 PROCEDURE — 94761 N-INVAS EAR/PLS OXIMETRY MLT: CPT

## 2022-12-06 PROCEDURE — 96376 TX/PRO/DX INJ SAME DRUG ADON: CPT

## 2022-12-06 RX ORDER — IPRATROPIUM BROMIDE AND ALBUTEROL SULFATE 2.5; .5 MG/3ML; MG/3ML
3 SOLUTION RESPIRATORY (INHALATION) EVERY 4 HOURS PRN
Qty: 90 ML | Refills: 1 | Status: ON HOLD | OUTPATIENT
Start: 2022-12-06 | End: 2023-03-28 | Stop reason: SDUPTHER

## 2022-12-06 RX ORDER — NICOTINE 21 MG/24HR
1 PATCH, TRANSDERMAL 24 HOURS TRANSDERMAL EVERY 24 HOURS
Qty: 30 PATCH | Refills: 0 | Status: SHIPPED | OUTPATIENT
Start: 2022-12-06

## 2022-12-06 RX ORDER — METOPROLOL TARTRATE 50 MG/1
50 TABLET, FILM COATED ORAL EVERY 12 HOURS SCHEDULED
Qty: 60 TABLET | Refills: 1 | Status: SHIPPED | OUTPATIENT
Start: 2022-12-06

## 2022-12-06 RX ORDER — DOXYCYCLINE HYCLATE 100 MG/1
100 CAPSULE ORAL 2 TIMES DAILY
Qty: 14 CAPSULE | Refills: 0 | Status: SHIPPED | OUTPATIENT
Start: 2022-12-06 | End: 2022-12-13

## 2022-12-06 RX ORDER — FLUTICASONE FUROATE AND VILANTEROL 200; 25 UG/1; UG/1
1 POWDER RESPIRATORY (INHALATION) DAILY
Qty: 60 EACH | Refills: 1 | Status: ON HOLD | OUTPATIENT
Start: 2022-12-06 | End: 2023-03-27

## 2022-12-06 RX ORDER — ALBUTEROL SULFATE 90 UG/1
2 AEROSOL, METERED RESPIRATORY (INHALATION) EVERY 4 HOURS PRN
Qty: 8.5 G | Refills: 1 | Status: SHIPPED | OUTPATIENT
Start: 2022-12-06

## 2022-12-06 RX ORDER — METHYLPREDNISOLONE 4 MG/1
TABLET ORAL
Qty: 21 TABLET | Refills: 0 | Status: SHIPPED | OUTPATIENT
Start: 2022-12-06 | End: 2023-01-09

## 2022-12-06 RX ORDER — GUAIFENESIN 600 MG/1
1200 TABLET, EXTENDED RELEASE ORAL 2 TIMES DAILY
Qty: 40 TABLET | Refills: 0 | Status: SHIPPED | OUTPATIENT
Start: 2022-12-06

## 2022-12-06 RX ADMIN — KETOROLAC TROMETHAMINE 30 MG: 30 INJECTION, SOLUTION INTRAMUSCULAR; INTRAVENOUS at 10:49

## 2022-12-06 RX ADMIN — CLOPIDOGREL BISULFATE 75 MG: 75 TABLET ORAL at 08:02

## 2022-12-06 RX ADMIN — ATORVASTATIN CALCIUM 20 MG: 20 TABLET, FILM COATED ORAL at 08:02

## 2022-12-06 RX ADMIN — METHYLPREDNISOLONE SODIUM SUCCINATE 40 MG: 40 INJECTION, POWDER, FOR SOLUTION INTRAMUSCULAR; INTRAVENOUS at 08:02

## 2022-12-06 RX ADMIN — ASPIRIN 81 MG: 81 TABLET, COATED ORAL at 08:02

## 2022-12-06 RX ADMIN — IPRATROPIUM BROMIDE AND ALBUTEROL SULFATE 3 ML: 2.5; .5 SOLUTION RESPIRATORY (INHALATION) at 02:59

## 2022-12-06 RX ADMIN — ACETAMINOPHEN 650 MG: 325 TABLET ORAL at 07:46

## 2022-12-06 RX ADMIN — GUAIFENESIN 1200 MG: 600 TABLET, EXTENDED RELEASE ORAL at 08:02

## 2022-12-06 RX ADMIN — METOPROLOL TARTRATE 50 MG: 50 TABLET, FILM COATED ORAL at 08:02

## 2022-12-06 RX ADMIN — ACETAMINOPHEN 650 MG: 325 TABLET ORAL at 00:24

## 2022-12-06 RX ADMIN — NICOTINE 1 PATCH: 21 PATCH, EXTENDED RELEASE TRANSDERMAL at 04:33

## 2022-12-06 RX ADMIN — IPRATROPIUM BROMIDE AND ALBUTEROL SULFATE 3 ML: 2.5; .5 SOLUTION RESPIRATORY (INHALATION) at 11:25

## 2022-12-06 RX ADMIN — METHYLPREDNISOLONE SODIUM SUCCINATE 40 MG: 40 INJECTION, POWDER, FOR SOLUTION INTRAMUSCULAR; INTRAVENOUS at 00:24

## 2022-12-06 RX ADMIN — IPRATROPIUM BROMIDE AND ALBUTEROL SULFATE 3 ML: 2.5; .5 SOLUTION RESPIRATORY (INHALATION) at 07:06

## 2022-12-06 RX ADMIN — BUDESONIDE 0.5 MG: 0.5 SUSPENSION RESPIRATORY (INHALATION) at 07:06

## 2022-12-06 RX ADMIN — VENLAFAXINE HYDROCHLORIDE 75 MG: 37.5 CAPSULE, EXTENDED RELEASE ORAL at 08:02

## 2022-12-06 RX ADMIN — Medication 10 ML: at 08:03

## 2022-12-06 RX ADMIN — LISINOPRIL 20 MG: 10 TABLET ORAL at 08:02

## 2022-12-06 RX ADMIN — KETOROLAC TROMETHAMINE 30 MG: 30 INJECTION, SOLUTION INTRAMUSCULAR; INTRAVENOUS at 03:31

## 2022-12-06 NOTE — PROCEDURES
Exercise Oximetry    Patient Name:Valeria Rubi   MRN: [id number]   Date: 12/06/22             ROOM AIR BASELINE   SpO2%          91   Heart Rate     88   Blood Pressure      EXERCISE ON ROOM AIR SpO2% EXERCISE ON O2 @     LPM SpO2%   1 MINUTE       91 1 MINUTE    2 MINUTES       88 2 MINUTES     placed O2 2 lpm    3 MINUTES  3 MINUTES       94   4 MINUTES  4 MINUTES       96    5 MINUTES  5 MINUTES       96   6 MINUTES  6 MINUTES        92              Distance Walked   Distance Walked   Dyspnea (Nette Scale)   Dyspnea (Nette Scale)  Pre 0 post 0   Fatigue (Nette Scale)   Fatigue (Nette Scale)     Pre 0 post 0   SpO2% Post Exercise   SpO2% Post Exercise      91   HR Post Exercise   HR Post Exercise              85   Time to Recovery   Time to Recovery               1 minute     Comments:

## 2022-12-06 NOTE — PLAN OF CARE
Problem: Adult Inpatient Plan of Care  Goal: Plan of Care Review  Outcome: Ongoing, Progressing  Flowsheets (Taken 12/6/2022 0303)  Progress: improving  Plan of Care Reviewed With: patient  Outcome Evaluation: been off bipap since supper. offered at 2300 doesnt want to wear. O2 2L nc. 97-98%. Still wheezing but feeling better.   Goal Outcome Evaluation:  Plan of Care Reviewed With: patient        Progress: improving  Outcome Evaluation: been off bipap since supper. offered at 2300 doesnt want to wear. O2 2L nc. 97-98%. Still wheezing but feeling better.

## 2022-12-06 NOTE — DISCHARGE SUMMARY
"Valeria Rubi  1975  [id number]    Hospitalists Discharge Summary    Date of Admission: 12/5/2022  Date of Discharge:  12/6/2022    History of Present Illness from \[facility]\"" on admit:   \"Patient is a 47 year old female with past medical history of COPD, Anxiety, PAD, and continued tobacco abuse. Patient presented to the ED complaining of a one day history of shortness of breath. Patient reports her family members have recently been ill as well. Patient reports her symptoms have been worsening prompting her to come in. Patient reports fatigue and decreased appetite, but she denies any fever or chills. Patient reports her throat feel \"tight\" but denies any sore throat or cough.\"    Primary Discharge diagnoses:  A/C HHRF secondary to bacterial bronchitis and AECOPD on chronic oxygen dependent COPD  Leukocytosis    Secondary Discharge Diagnoses:   Tachycardia  HTN  HLD   PAD s/p left iliac artery angioplasty 6/2016  S/P carotid artery angioplasty   Tobacco abuse   Anxiety/depression   Chronic migraine with intractable migraine  Chronic cardiac stent occlusion   Chronic macrocytosis    Hospital Course Summary:   The patient was initially placed on BiPAP with azithromycin and Rocephin IV and IV hydration as well as IV steroids.  She dramatically improved within 24 hours.  Walking oximetry shows need for 2 L oxygen with exertion. She already has oxygen at home. Procalcitonin and WBCC trending down.  Patient reports she has no PCP and has not seen pulmonary in a long time.  New PCP requested, follow up with pulmonary arranged.  She complained of intractable migraine throughout hospitalization and did not improve on Compazine and Toradol.  She will be referred back to her routine neurologist who has been dealing with her intractable migraines for years.  Home today on doxycycline, Medrol Dosepak, duo nebs as needed with albuterol rescue inhaler, Breo Ellipta (patient reported insurance stopped paying for Symbicort), " nebulizer tubing.  She reports that she has oxygen and all supplies needed.  Metoprolol dosing was adjusted up with blood pressure elevated.  Recommend continuing logging BP and follow-up with PCP.  Tobacco abuse was again addressed and nicotine patches prescribed  Follow-up new PCP 1 week  Follow-up neurology 1 week  Follow-up pulmonary 1 week    PCP  Patient Care Team:  Provider, No Known as PCP - General    Consults:   Consults     No orders found for last 30 day(s).        Operations and Procedures Performed:     XR Chest 1 View    Result Date: 12/5/2022  Narrative: CR Chest 1 Vw INDICATION: Shortness of air for several days COMPARISON:  7/22/2022 FINDINGS: Portable AP view(s) of the chest.  Lungs are hyperinflated. Clear. Heart size normal. Bones are normal.     Impression: Hyperinflated lungs. Otherwise normal Signer Name: Harpreet Castellon MD  Signed: 12/5/2022 2:32 AM  Workstation Name: [facility]-  [facility]    XR Chest PA & Lateral    Result Date: 12/6/2022  Narrative: XR CHEST PA AND LATERAL-: 12/6/2022 8:27 AM  INDICATION:  Short of air. Increased cough symptoms 2 days. Improvement in symptoms today. COPD and tobacco abuse  COMPARISON:  12/5/2022.  FINDINGS: PA and lateral views of the chest.  Cardiac silhouette within normal limits. The vascularity is unremarkable. There is no dense consolidation or effusion. There is no pneumothorax. Lungs are hyperinflated and there is old healed granulomatous disease. There is osteopenia..       Impression: Chronic lung changes. No superimposed active disease.  This report was finalized on 12/6/2022 8:37 AM by Dr. Huy Hilario MD.      Allergies:  is allergic to prednisone.    Marcelo  No medications noted per report, reviewed by me    Discharge Medications:     Discharge Medications      New Medications      Instructions Start Date   doxycycline 100 MG capsule  Commonly known as: VIBRAMYCIN   100 mg, Oral, 2 Times Daily      Fluticasone  Furoate-Vilanterol 200-25 MCG/ACT inhaler  Commonly known as: BREO ELLIPTA   Inhale 1 puff into the lungs Daily.      guaiFENesin 600 MG 12 hr tablet  Commonly known as: MUCINEX   1,200 mg, Oral, 2 Times Daily      ipratropium-albuterol 0.5-2.5 mg/3 ml nebulizer  Commonly known as: DUO-NEB   3 mL, Nebulization, Every 4 Hours PRN      methylPREDNISolone 4 MG dose pack  Commonly known as: MEDROL   Take by mouth as directed on package instructions.      nicotine 21 MG/24HR patch  Commonly known as: NICODERM CQ  Replaces: nicotine 14 MG/24HR patch   1 patch, Transdermal, Every 24 Hours         Changes to Medications      Instructions Start Date   albuterol (2.5 MG/3ML) 0.083% nebulizer solution  Commonly known as: PROVENTIL  What changed: Another medication with the same name was added. Make sure you understand how and when to take each.   USE 1 VIAL VIA NEBULIZER EVERY 4 HOURS AS NEEDED      albuterol sulfate  (90 Base) MCG/ACT inhaler  Commonly known as: PROVENTIL HFA;VENTOLIN HFA;PROAIR HFA  What changed: Another medication with the same name was added. Make sure you understand how and when to take each.   TAKE 2 PUFFS BY MOUTH EVERY 4 HOURS AS NEEDED      albuterol sulfate  (90 Base) MCG/ACT inhaler  Commonly known as: Proventil HFA  What changed: You were already taking a medication with the same name, and this prescription was added. Make sure you understand how and when to take each.   Inhale 2 puffs into the lungs Every 4 (Four) Hours As Needed for Wheezing.      metoprolol tartrate 50 MG tablet  Commonly known as: LOPRESSOR  What changed:   · medication strength  · how much to take   50 mg, Oral, Every 12 Hours Scheduled         Continue These Medications      Instructions Start Date   aspirin 81 MG EC tablet   81 mg, Oral, Daily      clopidogrel 75 MG tablet  Commonly known as: Plavix   75 mg, Oral, Daily      fluticasone 50 MCG/ACT nasal spray  Commonly known as: FLONASE   1 spray, Nasal, Daily  PRN      lisinopril-hydrochlorothiazide 20-12.5 MG per tablet  Commonly known as: PRINZIDE,ZESTORETIC   TAKE 1 TABLET BY MOUTH EVERY DAY      medroxyPROGESTERone 150 MG/ML injection  Commonly known as: DEPO-PROVERA   150 mg, Buccal, Every 3 Months, Pt takes every 3 months.march or April 2018 last dose      nystatin 100,000 unit/mL suspension  Commonly known as: MYCOSTATIN   500,000 Units, Swish & Swallow, 4 Times Daily, Swish and swallow 5mL four times a day for two weeks.      simvastatin 40 MG tablet  Commonly known as: ZOCOR   40 mg, Oral, Every Evening      venlafaxine XR 75 MG 24 hr capsule  Commonly known as: EFFEXOR-XR   TAKE 1 CAPSULE BY MOUTH EVERY DAY      vitamin D 1.25 MG (94437 UT) capsule capsule  Commonly known as: ERGOCALCIFEROL   50,000 Units, Oral, Every 7 Days         Stop These Medications    nicotine 14 MG/24HR patch  Commonly known as: NICODERM CQ  Replaced by: nicotine 21 MG/24HR patch     SYMBICORT IN            Last Lab Results:   Lab Results (most recent)     Procedure Component Value Units Date/Time    Basic Metabolic Panel [[id number]]  (Abnormal) Collected: 12/06/22 0554    Specimen: Blood Updated: 12/06/22 0629     Glucose 177 mg/dL      BUN 11 mg/dL      Creatinine 0.51 mg/dL      Sodium 133 mmol/L      Potassium 4.2 mmol/L      Chloride 98 mmol/L      CO2 27.9 mmol/L      Calcium 8.7 mg/dL      BUN/Creatinine Ratio 21.6     Anion Gap 7.1 mmol/L      eGFR 114.6 mL/min/1.73      Comment: National Kidney Foundation and American Society of Nephrology (ASN) Task Force recommended calculation based on the Chronic Kidney Disease Epidemiology Collaboration (CKD-EPI) equation refit without adjustment for race.       Narrative:      GFR Normal >60  Chronic Kidney Disease <60  Kidney Failure <15      Procalcitonin [[id number]]  (Abnormal) Collected: 12/06/22 0554    Specimen: Blood Updated: 12/06/22 0623     Procalcitonin 18.96 ng/mL     Narrative:      As a Marker for Sepsis (Non-Neonates):    1.  "<0.5 ng/mL represents a low risk of severe sepsis and/or septic shock.  2. >2 ng/mL represents a high risk of severe sepsis and/or septic shock.    As a Marker for Lower Respiratory Tract Infections that require antibiotic therapy:    PCT on Admission    Antibiotic Therapy       6-12 Hrs later    >0.5                Strongly Recommended  >0.25 - <0.5        Recommended   0.1 - 0.25          Discouraged              Remeasure/reassess PCT  <0.1                Strongly Discouraged     Remeasure/reassess PCT    As 28 day mortality risk marker: \"Change in Procalcitonin Result\" (>80% or <=80%) if Day 0 (or Day 1) and Day 4 values are available. Refer to [email or web address]    Change in PCT <=80%  A decrease of PCT levels below or equal to 80% defines a positive change in PCT test result representing a higher risk for 28-day all-cause mortality of patients diagnosed with severe sepsis for septic shock.    Change in PCT >80%  A decrease of PCT levels of more than 80% defines a negative change in PCT result representing a lower risk for 28-day all-cause mortality of patients diagnosed with severe sepsis or septic shock.       CBC Auto Differential [[id number]]  (Abnormal) Collected: 12/06/22 0554    Specimen: Blood Updated: 12/06/22 0610     WBC 11.29 10*3/mm3      RBC 3.87 10*6/mm3      Hemoglobin 12.7 g/dL      Hematocrit 41.8 %      .0 fL      MCH 32.8 pg      MCHC 30.4 g/dL      RDW 12.4 %      RDW-SD 49.6 fl      MPV 9.4 fL      Platelets 242 10*3/mm3      Neutrophil % 91.9 %      Lymphocyte % 5.0 %      Monocyte % 2.8 %      Eosinophil % 0.0 %      Basophil % 0.1 %      Immature Grans % 0.2 %      Neutrophils, Absolute 10.38 10*3/mm3      Lymphocytes, Absolute 0.56 10*3/mm3      Monocytes, Absolute 0.32 10*3/mm3      Eosinophils, Absolute 0.00 10*3/mm3      Basophils, Absolute 0.01 10*3/mm3      Immature Grans, Absolute 0.02 10*3/mm3     Blood Culture - Blood, Hand, Right [[id number]]  (Normal) " "Collected: 12/05/22 0150    Specimen: Blood from Hand, Right Updated: 12/06/22 0200     Blood Culture No growth at 24 hours    Blood Culture - Blood, Hand, Left [[id number]]  (Normal) Collected: 12/05/22 0133    Specimen: Blood from Hand, Left Updated: 12/06/22 0145     Blood Culture No growth at 24 hours    Respiratory Culture - Sputum, Cough [[id number]] Collected: 12/05/22 1609    Specimen: Sputum from Cough Updated: 12/05/22 1641     Gram Stain Many (4+) WBCs seen      Rare (1+) Gram positive cocci    D-dimer, Quantitative [[id number]]  (Abnormal) Collected: 12/05/22 1226    Specimen: Blood Updated: 12/05/22 1314     D-Dimer, Quantitative 0.54 MCGFEU/mL     Narrative:      According to the assay 's published package insert, a normal (<0.50 MCGFEU/mL) D-dimer result in conjunction with a non-high clinical probability assessment, excludes deep vein thrombosis (DVT) and pulmonary embolism (PE) with high sensitivity.    D-dimer values increase with age and this can make VTE exclusion of an older population difficult. To address this, the American College of Physicians, based on best available evidence and recent guidelines, recommends that clinicians use age-adjusted D-dimer thresholds in patients greater than 50 years of age with: a) a low probability of PE who do not meet all Pulmonary Embolism Rule Out Criteria, or b) in those with intermediate probability of PE.   The formula for an age-adjusted D-dimer cut-off is \"age/100\".  For example, a 60 year old patient would have an age-adjusted cut-off of 0.60 MCGFEU/mL and an 80 year old 0.80 MCGFEU/mL.    Respiratory Panel PCR w/COVID-19(SARS-CoV-2) TERRY/ROSSI/KIKA/PAD/COR/MAD/RONAK In-House, NP Swab in [facility], 3-4 HR TAT - Swab, Nasopharynx [[id number]]  (Normal) Collected: 12/05/22 0535    Specimen: Swab from Nasopharynx Updated: 12/05/22 1144     ADENOVIRUS, PCR Not Detected     Coronavirus 229E Not Detected     Coronavirus HKU1 Not Detected     Coronavirus NL63 " Not Detected     Coronavirus OC43 Not Detected     COVID19 Not Detected     Human Metapneumovirus Not Detected     Human Rhinovirus/Enterovirus Not Detected     Influenza A PCR Not Detected     Influenza B PCR Not Detected     Parainfluenza Virus 1 Not Detected     Parainfluenza Virus 2 Not Detected     Parainfluenza Virus 3 Not Detected     Parainfluenza Virus 4 Not Detected     RSV, PCR Not Detected     Bordetella pertussis pcr Not Detected     Bordetella parapertussis PCR Not Detected     Chlamydophila pneumoniae PCR Not Detected     Mycoplasma pneumo by PCR Not Detected    Narrative:      In the setting of a positive respiratory panel with a viral infection PLUS a negative procalcitonin without other underlying concern for bacterial infection, consider observing off antibiotics or discontinuation of antibiotics and continue supportive care. If the respiratory panel is positive for atypical bacterial infection (Bordetella pertussis, Chlamydophila pneumoniae, or Mycoplasma pneumoniae), consider antibiotic de-escalation to target atypical bacterial infection.    Legionella Antigen, Urine - Urine, Urine, Clean Catch [[id number]]  (Normal) Collected: 12/05/22 1026    Specimen: Urine, Clean Catch Updated: 12/05/22 1052     LEGIONELLA ANTIGEN, URINE Negative    S. Pneumo Ag Urine or CSF - Urine, Urine, Clean Catch [[id number]]  (Normal) Collected: 12/05/22 1026    Specimen: Urine, Clean Catch Updated: 12/05/22 1052     Strep Pneumo Ag Negative    Procalcitonin [[id number]]  (Abnormal) Collected: 12/05/22 0133    Specimen: Blood Updated: 12/05/22 0521     Procalcitonin 30.34 ng/mL     Narrative:      As a Marker for Sepsis (Non-Neonates):    1. <0.5 ng/mL represents a low risk of severe sepsis and/or septic shock.  2. >2 ng/mL represents a high risk of severe sepsis and/or septic shock.    As a Marker for Lower Respiratory Tract Infections that require antibiotic therapy:    PCT on Admission    Antibiotic Therapy        "6-12 Hrs later    >0.5                Strongly Recommended  >0.25 - <0.5        Recommended   0.1 - 0.25          Discouraged              Remeasure/reassess PCT  <0.1                Strongly Discouraged     Remeasure/reassess PCT    As 28 day mortality risk marker: \"Change in Procalcitonin Result\" (>80% or <=80%) if Day 0 (or Day 1) and Day 4 values are available. Refer to [email or web address]    Change in PCT <=80%  A decrease of PCT levels below or equal to 80% defines a positive change in PCT test result representing a higher risk for 28-day all-cause mortality of patients diagnosed with severe sepsis for septic shock.    Change in PCT >80%  A decrease of PCT levels of more than 80% defines a negative change in PCT result representing a lower risk for 28-day all-cause mortality of patients diagnosed with severe sepsis or septic shock.       [place] Draw [[id number]] Collected: 12/05/22 0133    Specimen: Blood Updated: 12/05/22 0245    Narrative:      The following orders were created for panel order [place] Draw.  Procedure                               Abnormality         Status                     ---------                               -----------         ------                     Green Top (Gel)[[id number]]                                  Final result               Lavender Top[[id number]]                                     Final result               Gold Top - SST[[id number]]                                   Final result               Light Blue Top[[id number]]                                   Final result                 Please view results for these tests on the individual orders.    Lavender Top [[id number]] Collected: 12/05/22 0133    Specimen: Blood Updated: 12/05/22 0245     Extra Tube hold for add-on     Comment: Auto resulted       Gold Top - SST [[id number]] Collected: 12/05/22 0133    Specimen: Blood Updated: 12/05/22 0245     Extra Tube Hold for add-ons.     Comment: Auto resulted. "       Light Blue Top [[id number]] Collected: 12/05/22 0133    Specimen: Blood Updated: 12/05/22 0245     Extra Tube Hold for add-ons.     Comment: Auto resulted       Green Top (Gel) [[id number]] Collected: 12/05/22 0133    Specimen: Blood Updated: 12/05/22 0245     Extra Tube Hold for add-ons.     Comment: Auto resulted.       BNP [[id number]]  (Normal) Collected: 12/05/22 0133    Specimen: Blood Updated: 12/05/22 0234     proBNP 219.4 pg/mL     Narrative:      Among patients with dyspnea, NT-proBNP is highly sensitive for the detection of acute congestive heart failure. In addition NT-proBNP of <300 pg/ml effectively rules out acute congestive heart failure with 99% negative predictive value.    Results may be falsely decreased if patient taking Biotin.      Troponin [[id number]]  (Normal) Collected: 12/05/22 0133    Specimen: Blood Updated: 12/05/22 0207     Troponin T <0.010 ng/mL     Narrative:      Troponin T Reference Range:  <= 0.03 ng/mL-   Negative for AMI  >0.03 ng/mL-     Abnormal for myocardial necrosis.  Clinicians would have to utilize clinical acumen, EKG, Troponin and serial changes to determine if it is an Acute Myocardial Infarction or myocardial injury due to an underlying chronic condition.       Results may be falsely decreased if patient taking Biotin.      Comprehensive Metabolic Panel [[id number]]  (Abnormal) Collected: 12/05/22 0133    Specimen: Blood Updated: 12/05/22 0204     Glucose 149 mg/dL      BUN 6 mg/dL      Creatinine 0.56 mg/dL      Sodium 132 mmol/L      Potassium 3.7 mmol/L      Chloride 93 mmol/L      CO2 28.2 mmol/L      Calcium 9.5 mg/dL      Total Protein 7.7 g/dL      Albumin 4.10 g/dL      ALT (SGPT) 7 U/L      AST (SGOT) 11 U/L      Alkaline Phosphatase 104 U/L      Total Bilirubin 0.5 mg/dL      Globulin 3.6 gm/dL      A/G Ratio 1.1 g/dL      BUN/Creatinine Ratio 10.7     Anion Gap 10.8 mmol/L      eGFR 112.0 mL/min/1.73      Comment: National Kidney Foundation and American  Society of Nephrology (ASN) Task Force recommended calculation based on the Chronic Kidney Disease Epidemiology Collaboration (CKD-EPI) equation refit without adjustment for race.       Narrative:      GFR Normal >60  Chronic Kidney Disease <60  Kidney Failure <15      Magnesium [[id number]]  (Normal) Collected: 12/05/22 0133    Specimen: Blood Updated: 12/05/22 0204     Magnesium 1.8 mg/dL     COVID PRE-OP / PRE-PROCEDURE SCREENING ORDER (NO ISOLATION) - Swab, Nasopharynx [[id number]]  (Normal) Collected: 12/05/22 0132    Specimen: Swab from Nasopharynx Updated: 12/05/22 0204    Narrative:      The following orders were created for panel order COVID PRE-OP / PRE-PROCEDURE SCREENING ORDER (NO ISOLATION) - Swab, Nasopharynx.  Procedure                               Abnormality         Status                     ---------                               -----------         ------                     COVID-19 and FLU A/B PCR...[[id number]]  Normal              Final result                 Please view results for these tests on the individual orders.    COVID-19 and FLU A/B PCR - Swab, Nasopharynx [[id number]]  (Normal) Collected: 12/05/22 0132    Specimen: Swab from Nasopharynx Updated: 12/05/22 0204     COVID19 Not Detected     Influenza A PCR Not Detected     Influenza B PCR Not Detected    Narrative:      Fact sheet for providers: https://www.fda.gov/media/436952/download    Fact sheet for patients: https://www.fda.gov/media/817936/download    Test performed by PCR.    Lactic Acid, Plasma [[id number]]  (Normal) Collected: 12/05/22 0133    Specimen: Blood Updated: 12/05/22 0203     Lactate 1.1 mmol/L     Blood Gas, Arterial - [[id number]]  (Abnormal) Collected: 12/05/22 0145    Specimen: Arterial Blood Updated: 12/05/22 0200     Site Right Brachial     Marquis's Test N/A     pH, Arterial 7.293 pH units      Comment: 84 Value below reference range        pCO2, Arterial 62.3 mm Hg      Comment: 83 Value above reference range         pO2, Arterial 71.8 mm Hg      Comment: 84 Value below reference range        HCO3, Arterial 30.1 mmol/L      Comment: 83 Value above reference range        Base Excess, Arterial 1.7 mmol/L      O2 Saturation, Arterial 94.1 %      Hemoglobin, Blood Gas 15.3 g/dL      Temperature 37.0 C      Barometric Pressure for Blood Gas 740 mmHg      Modality Nasal Cannula     Flow Rate 3.0 lpm      Ventilator Mode STANDBY     Collected by [id number]     Comment: Meter: [id number]     :  [id number]        pCO2, Temperature Corrected 62.3 mm Hg      pH, Temp Corrected 7.293 pH Units      pO2, Temperature Corrected 71.8 mm Hg     CBC & Differential [[id number]]  (Abnormal) Collected: 12/05/22 0133    Specimen: Blood Updated: 12/05/22 0150    Narrative:      The following orders were created for panel order CBC & Differential.  Procedure                               Abnormality         Status                     ---------                               -----------         ------                     CBC Auto Differential[[id number]]        Abnormal            Final result               Scan Slide[[id number]]                                       Final result                 Please view results for these tests on the individual orders.    Scan Slide [[id number]] Collected: 12/05/22 0133    Specimen: Blood Updated: 12/05/22 0150     Macrocytes Slight/1+     WBC Morphology Normal     Platelet Morphology Normal    CBC Auto Differential [[id number]]  (Abnormal) Collected: 12/05/22 0133    Specimen: Blood Updated: 12/05/22 0150     WBC 15.70 10*3/mm3      RBC 4.65 10*6/mm3      Hemoglobin 15.5 g/dL      Hematocrit 49.4 %      .2 fL      MCH 33.3 pg      MCHC 31.4 g/dL      RDW 12.4 %      RDW-SD 49.2 fl      MPV 8.9 fL      Platelets 255 10*3/mm3      Neutrophil % 88.9 %      Lymphocyte % 4.1 %      Monocyte % 6.4 %      Eosinophil % 0.0 %      Basophil % 0.3 %      Immature Grans % 0.3 %      Neutrophils, Absolute 13.96  10*3/mm3      Lymphocytes, Absolute 0.64 10*3/mm3      Monocytes, Absolute 1.01 10*3/mm3      Eosinophils, Absolute 0.00 10*3/mm3      Basophils, Absolute 0.04 10*3/mm3      Immature Grans, Absolute 0.05 10*3/mm3         Imaging Results (Most Recent)     Procedure Component Value Units Date/Time    XR Chest PA & Lateral [[id number]] Collected: 12/06/22 0831     Updated: 12/06/22 0839    Narrative:      XR CHEST PA AND LATERAL-: 12/6/2022 8:27 AM     INDICATION:    Short of air. Increased cough symptoms 2 days. Improvement in symptoms  today. COPD and tobacco abuse     COMPARISON:    12/5/2022.     FINDINGS:   PA and lateral views of the chest.  Cardiac silhouette within normal  limits. The vascularity is unremarkable. There is no dense consolidation  or effusion. There is no pneumothorax. Lungs are hyperinflated and there  is old healed granulomatous disease. There is osteopenia..         Impression:      Chronic lung changes. No superimposed active disease.     This report was finalized on 12/6/2022 8:37 AM by Dr. Huy Hilario MD.       XR Chest 1 View [[id number]] Collected: 12/05/22 0232     Updated: 12/05/22 0234    Narrative:      CR Chest 1 Vw    INDICATION:   Shortness of air for several days     COMPARISON:    7/22/2022    FINDINGS:  Portable AP view(s) of the chest.  Lungs are hyperinflated. Clear. Heart size normal. Bones are normal.      Impression:      Hyperinflated lungs. Otherwise normal    Signer Name: Harpreet Castellon MD   Signed: 12/5/2022 2:32 AM   Workstation Name: [facility]    [facility]        PROCEDURES: None    Condition on Discharge: Stable    Physical Exam at Discharge  Vital Signs  Temp:  [97.8 °F (36.6 °C)-98.6 °F (37 °C)] 98.6 °F (37 °C)  Heart Rate:  [] 79  Resp:  [20-26] 20  BP: (139-172)/() 162/81   Body mass index is 21.41 kg/m².    Physical Exam  Vitals reviewed.   Constitutional:       General: She is not in acute distress.     Comments: Chronically  ill appearance, appears much older than stated age   HENT:      Head: Normocephalic and atraumatic.      Mouth/Throat:      Mouth: Mucous membranes are moist.      Comments: Edentulous  Eyes:      Extraocular Movements: Extraocular movements intact.      Pupils: Pupils are equal, round, and reactive to light.   Cardiovascular:      Rate and Rhythm: Regular rhythm. Tachycardia present.   Pulmonary:      Effort: Pulmonary effort is normal.      Comments: Diminished but clear  Abdominal:      General: Abdomen is flat. Bowel sounds are normal. There is no distension.      Palpations: Abdomen is soft.      Tenderness: There is no abdominal tenderness. There is no guarding.   Musculoskeletal:         General: No swelling.   Skin:     General: Skin is warm and dry.      Capillary Refill: Capillary refill takes less than 2 seconds.      Findings: No erythema.   Neurological:      General: No focal deficit present.      Mental Status: She is alert and oriented to person, place, and time.   Psychiatric:         Mood and Affect: Mood normal.         Behavior: Behavior normal.       Discharge Disposition  Home    Visiting Nurse:    Yes     Home PT/OT:  Yes     Home Safety Evaluation:  Yes     DME  Nebulizer tubing, already has oxygen    Discharge Diet:      Dietary Orders (From admission, onward)     Start     Ordered    12/05/22 1800  Dietary Nutrition Supplements Boost Plus (Ensure Enlive, Ensure Plus)  Daily With Breakfast, Lunch & Dinner      Question:  Select Supplement:  Answer:  Boost Plus (Ensure Enlive, Ensure Plus)    12/05/22 1236    12/05/22 0420  Diet: Regular/House Diet; Texture: Regular Texture (IDDSI 7); Fluid Consistency: Thin (IDDSI 0)  Diet Effective Now        References:    Diet Order [place]   Question Answer Comment   Diets: Regular/House Diet    Texture: Regular Texture (IDDSI 7)    Fluid Consistency: Thin (IDDSI 0)        12/05/22 0421                Activity at Discharge:  As  "tolerated    Pre-discharge education  Smoking, medications, follow-up    Follow-up Appointments  No future appointments.  Additional Instructions for the Follow-ups that You Need to Schedule     Discharge Follow-up with PCP   As directed       Currently Documented PCP:    Provider, No Known    PCP Phone Number:    [phone]     Follow Up Details: needs new pcp within 1 week         Discharge Follow-up with Specified Provider: Neurology; 1 Week   As directed      To: Neurology    Follow Up: 1 Week         Discharge Follow-up with Specified Provider: pulmonary; 1 Week   As directed      To: pulmonary    Follow Up: 1 Week               Test Results Pending at Discharge: To be followed up by PCP  Pending Labs     Order Current Status    Blood Culture - Blood, Hand, Left Preliminary result    Blood Culture - Blood, Hand, Right Preliminary result    Respiratory Culture - Sputum, Cough Preliminary result           Zita Potter, APRN  12/06/22  13:45 EST    Time: Discharge 30 min (if over 30 minutes give explanation as to why it took greater than 30 minutes)    \"Dictated utilizing Dragon dictation\"      "

## 2022-12-06 NOTE — PLAN OF CARE
Goal Outcome Evaluation:  Plan of Care Reviewed With: patient        Progress: improving  Outcome Evaluation: On 2L NC. C/o a migraine- Toradol and Tylenol given per orders. Overflow today?

## 2022-12-06 NOTE — CASE MANAGEMENT/SOCIAL WORK
Continued Stay Note  SARAH Garcia     Patient Name: Valeria Rubi  MRN: [id number]  Today's Date: 12/6/2022    Admit Date: 12/5/2022    Plan: home with  & HH   Discharge Plan     Row Name 12/06/22 1523       Plan    Plan home with  & HH    Patient/Family in Agreement with Plan yes    Plan Comments Follow up visit with patient. [facility] - DWO obtained for updated 02 need. She requests new tubing for nebulizer and a mask for her 02 as she is a mouth breather. discussed HH, she has no preference of agencies, just someone that takes her insurance and serves [employer]. Spoke with Sushma/Shiela, she will follow up with patient regarding mask for 02.  provides nebulizer tubing and portable 02 tank to patient, informed [facility] will follow up with her regarding mask for 02. Spoke with L[facility]  & Jeane/[facility]  - referral given, await ability to accept. Patient and Reed OLIVEIRA updated.               Discharge Codes    No documentation.               Expected Discharge Date and Time     Expected Discharge Date Expected Discharge Time    Dec 6, 2022             Kasi Dawson RN

## 2022-12-06 NOTE — CASE MANAGEMENT/SOCIAL WORK
Continued Stay Note  SARAH Garcia     Patient Name: Valeria Rubi  MRN: [id number]  Today's Date: 12/6/2022    Admit Date: 12/5/2022    Plan: home with    Discharge Plan     Row Name 12/06/22 1610       Plan    Plan home with     Plan Comments [facility] is out of network and [facility] is unable to accept because patient is not established with PCP. Once established, [facility] accepts patient insurance and serves location. Patient has listing of PCP's and Reed OLIVEIRA was arranging follow up appointments. Reed OLIVEIRA updated - unable to arrange HH at this time.    Row Name 12/06/22 1526       Plan    Plan home with  & HH    Patient/Family in Agreement with Plan yes    Plan Comments Follow up visit with patient. [facility] - DWO obtained for updated 02 need. She requests new tubing for nebulizer and a mask for her 02 as she is a mouth breather. discussed HH, she has no preference of agencies, just someone that takes her insurance and serves [employer]. Spoke with Sushma/Shiela, she will follow up with patient regarding mask for 02.  provides nebulizer tubing and portable 02 tank to patient, informed [facility] will follow up with her regarding mask for 02. Spoke with [facility] - referral given, await ability to accept. Patient and Reed OLIVEIRA updated.               Discharge Codes    No documentation.               Expected Discharge Date and Time     Expected Discharge Date Expected Discharge Time    Dec 6, 2022             Kasi Dawson RN

## 2022-12-06 NOTE — DISCHARGE PLACEMENT REQUEST
"Valeria Garcia (47 y.o. Female)     Date of Birth   1975    Social Security Number       Address   [address] [place] [address]    Home Phone   [phone]    MRN   [id number]       Mormon   None    Marital Status   Single                            Admission Date   12/5/22    Admission Type   Emergency    Admitting Provider   Yazmin Gloria DO    Attending Provider   Yazmin Gloria DO    Department, Room/Bed   [facility] ICU, ICU2/1       Discharge Date       Discharge Disposition   Home or Self Care    Discharge Destination                               Attending Provider: Yazmin Gloria DO    Allergies: Prednisone    Isolation: None   Infection: None   Code Status: CPR    Ht: 157.5 cm (62\")   Wt: 53.1 kg (117 lb 1 oz)    Admission Cmt: None   Principal Problem: COPD exacerbation (HCC) [J44.1]                 Active Insurance as of 12/5/2022     Primary Coverage     Payor Plan Insurance Group Employer/Plan Group    [employer] BLUE [employer]OSS [facility] [employer] BLUE SHIELD PPO [id number]     Payor Plan Address Payor Plan Phone Number Payor Plan Fax Number Effective Dates    [address] [phone]  1/1/2021 - None Entered    [facility] [id number]       Subscriber Name Subscriber Birth Date Member ID       VALERIA GARCIA 1975 [id number]                 Emergency Contacts      (Rel.) Home Phone Work Phone Mobile Phone    Harpreet Ruiz (Spouse) -- -- [phone]    Arjun Ruiz (Son) -- -- [phone]              "

## 2022-12-07 LAB
BACTERIA SPEC RESP CULT: NORMAL
GRAM STN SPEC: NORMAL
GRAM STN SPEC: NORMAL

## 2022-12-07 NOTE — CASE MANAGEMENT/SOCIAL WORK
Case Management Discharge Note      Final Note: Discharged home.    Provided Post Acute Provider List?: Refused  Refused Provider List Comment: pt declined    Selected Continued Care - Discharged on 12/6/2022 Admission date: 12/5/2022 - Discharge disposition: Home or Self Care    Destination    No services have been selected for the patient.              Durable Medical Equipment     Service Provider Selected Services Address Phone Fax Patient Preferred    [facility] [address] [place] [id number] [phone] --          Dialysis/Infusion    No services have been selected for the patient.              Home Medical Care    No services have been selected for the patient.              Therapy    No services have been selected for the patient.              Community Resources    No services have been selected for the patient.              Community & DME    No services have been selected for the patient.                       Final Discharge Disposition Code: 01 - home or self-care

## 2022-12-07 NOTE — OUTREACH NOTE
Prep Survey    Flowsheet Row Responses   [facility] facility patient discharged from? [facility]   Is LACE score < 7 ? No   Emergency Room discharge w/ pulse ox? No   Eligibility Readm Mgmt   Discharge diagnosis A/C HHRF secondary to bacterial bronchitis and AECOPD on chronic oxygen    Does the patient have one of the following disease processes/diagnoses(primary or secondary)? COPD   Does the patient have Home health ordered? No   Is there a DME ordered? No   Prep survey completed? Yes          STEVE PITTS - Registered Nurse

## 2022-12-09 ENCOUNTER — READMISSION MANAGEMENT (OUTPATIENT)
Dept: CALL CENTER | Facility: HOSPITAL | Age: 47
End: 2022-12-09

## 2022-12-09 NOTE — OUTREACH NOTE
COPD/PN Week 1 Survey    Flowsheet Row Responses   [facility] facility patient discharged from? [facility]   Does the patient have one of the following disease processes/diagnoses(primary or secondary)? COPD   Week 1 attempt successful? No   Unsuccessful attempts Attempt 1          KARLO MATTSON - Registered Nurse

## 2022-12-10 LAB
BACTERIA SPEC AEROBE CULT: NORMAL
BACTERIA SPEC AEROBE CULT: NORMAL

## 2022-12-13 ENCOUNTER — READMISSION MANAGEMENT (OUTPATIENT)
Dept: CALL CENTER | Facility: HOSPITAL | Age: 47
End: 2022-12-13

## 2022-12-13 NOTE — OUTREACH NOTE
COPD/PN Week 1 Survey    Flowsheet Row Responses   [facility] patient discharged from? [facility]   Does the patient have one of the following disease processes/diagnoses(primary or secondary)? COPD   Week 1 attempt successful? Yes   Call start time 1357   Call end time 1400   Discharge diagnosis A/C HHRF secondary to bacterial bronchitis and AECOPD on chronic oxygen    Is patient permission given to speak with other caregiver? Yes   List who call center can speak with Spouse   Person spoke with today (if not patient) and relationship Spouse   Meds reviewed with patient/caregiver? Yes   Is the patient having any side effects they believe may be caused by any medication additions or changes? No   Does the patient have all medications ordered at discharge? Yes   Is the patient taking all medications as directed (includes completed medication regime)? Yes   Does the patient have a primary care provider?  Yes   Does the patient have an appointment with their PCP or specialist within 7 days of discharge? No   What is preventing the patient from scheduling follow up appointments within 7 days of discharge? Haven't had time   Nursing Interventions Advised patient to make appointment, Educated patient on importance of making appointment   Has the patient kept scheduled appointments due by today? N/A   DME comments O2 1L, nebulizer   Pulse Ox monitoring Intermittent   Pulse Ox device source Patient   O2 Sat comments Spouse does not know sat reading   O2 Sat: education provided Sat levels, Monitoring frequency, When to seek care   Psychosocial issues? No   Did the patient receive a copy of their discharge instructions? Yes   Nursing interventions Reviewed instructions with patient   What is the patient's perception of their health status since discharge? Improving   Nursing Interventions Nurse provided patient education   Are the patient's immunizations up to date?  Yes   Nursing interventions Educated on importance of  maintaining up to date immunizations as advised by provider   If the patient is a current smoker, are they able to teach back resources for cessation? Smoking cessation classes   Is the patient/caregiver able to teach back the hierarchy of who to call/visit for symptoms/problems? PCP, Specialist, Home health nurse, Urgent Care, ED, 911 Yes   Patient reports what zone on this call? Green Zone   Green Zone Reports doing well, Usual activity and exercise level, Slept well last night, Appetite is good, Usual amounts of cough and phlegm/mucous, Breathing without shortness of breath   Green Zone interventions: Take daily medications, Use oxygen as prescribed, Continue regular exercise/diet plan, At all times avoid cigarette smoking, vaping and inhaled irritants   Week 1 call completed? Yes   Is the patient interested in additional calls from an ambulatory ?  NOTE:  applies to high risk patients requiring additional follow-up. Monserrat RUSSO - Registered Nurse

## 2022-12-21 ENCOUNTER — READMISSION MANAGEMENT (OUTPATIENT)
Dept: CALL CENTER | Facility: HOSPITAL | Age: 47
End: 2022-12-21

## 2022-12-21 NOTE — OUTREACH NOTE
COPD/PN Week 2 Survey    Flowsheet Row Responses   [facility] facility patient discharged from? [facility]   Does the patient have one of the following disease processes/diagnoses(primary or secondary)? COPD   Week 2 attempt successful? No   Unsuccessful attempts Attempt 1  [attempted home, cell and spouses number (reached spouse who deferred back to pt)]          CRISS LEE - Registered Nurse

## 2022-12-22 ENCOUNTER — TELEPHONE (OUTPATIENT)
Dept: NEUROLOGY | Facility: CLINIC | Age: 47
End: 2022-12-22

## 2022-12-22 NOTE — TELEPHONE ENCOUNTER
Called pt [phone] to reschedule appt that she had scheduled for this am at 8:40. LVM to call back.

## 2022-12-28 ENCOUNTER — READMISSION MANAGEMENT (OUTPATIENT)
Dept: CALL CENTER | Facility: HOSPITAL | Age: 47
End: 2022-12-28

## 2022-12-28 NOTE — OUTREACH NOTE
COPD/PN Week 4 Survey    Flowsheet Row Responses   [facility] [place] patient discharged from? [facility]   Does the patient have one of the following disease processes/diagnoses(primary or secondary)? COPD   Week 4 attempt successful? Yes   Call start time 1457   Call end time 1502   Discharge diagnosis A/C HHRF secondary to bacterial bronchitis and AECOPD on chronic oxygen    Meds reviewed with patient/caregiver? Yes   Is the patient having any side effects they believe may be caused by any medication additions or changes? No   Is the patient taking all medications as directed (includes completed medication regime)? Yes   Has the patient kept scheduled appointments due by today? N/A  [pt is in the process of finding a new PCP]   Is the patient still receiving Home Health Services? N/A   DME comments wears oxygen prn   Pulse Ox monitoring Intermittent   Pulse Ox device source Patient   O2 Sat comments 95%   O2 Sat: education provided Sat levels   Psychosocial issues? No   What is the patient's perception of their health status since discharge? Improving   Nursing Interventions Nurse provided patient education   Is the patient/caregiver able to teach back the hierarchy of who to call/visit for symptoms/problems? PCP, Specialist, Home health nurse, Urgent Care, ED, 911 Yes   Is the patient able to teach back COPD zones? Yes   Patient reports what zone on this call? Green Zone   Green Zone Reports doing well, Breathing without shortness of breath, Usual activity and exercise level   Green Zone interventions: Take daily medications, Use oxygen as prescribed, At all times avoid cigarette smoking, vaping and inhaled irritants   Week 4 call completed? Yes   Would the patient like one additional call? No   Graduated Yes          CHUCK LEACH - Registered Nurse

## 2023-01-09 ENCOUNTER — HOSPITAL ENCOUNTER (EMERGENCY)
Facility: HOSPITAL | Age: 48
Discharge: HOME | End: 2023-01-09
Attending: EMERGENCY MEDICINE | Admitting: EMERGENCY MEDICINE
Payer: COMMERCIAL

## 2023-01-09 ENCOUNTER — APPOINTMENT (OUTPATIENT)
Dept: GENERAL RADIOLOGY | Facility: HOSPITAL | Age: 48
End: 2023-01-09
Payer: COMMERCIAL

## 2023-01-09 VITALS
HEART RATE: 88 BPM | OXYGEN SATURATION: 96 % | SYSTOLIC BLOOD PRESSURE: 178 MMHG | DIASTOLIC BLOOD PRESSURE: 92 MMHG | TEMPERATURE: 98.6 F | HEIGHT: 62 IN | RESPIRATION RATE: 20 BRPM | WEIGHT: 112 LBS | BODY MASS INDEX: 20.61 KG/M2

## 2023-01-09 DIAGNOSIS — J44.1 COPD WITH ACUTE EXACERBATION: Primary | ICD-10-CM

## 2023-01-09 LAB
ALBUMIN SERPL-MCNC: 3.9 G/DL (ref 3.5–5.2)
ALBUMIN/GLOB SERPL: 1.2 G/DL
ALP SERPL-CCNC: 87 U/L (ref 39–117)
ALT SERPL W P-5'-P-CCNC: 9 U/L (ref 1–33)
ANION GAP SERPL CALCULATED.3IONS-SCNC: 11.9 MMOL/L (ref 5–15)
AST SERPL-CCNC: 13 U/L (ref 1–32)
BASOPHILS # BLD AUTO: 0.04 10*3/MM3 (ref 0–0.2)
BASOPHILS NFR BLD AUTO: 0.4 % (ref 0–1.5)
BILIRUB SERPL-MCNC: 0.3 MG/DL (ref 0–1.2)
BUN SERPL-MCNC: 8 MG/DL (ref 6–20)
BUN/CREAT SERPL: 12.1 (ref 7–25)
CALCIUM SPEC-SCNC: 9.1 MG/DL (ref 8.6–10.5)
CHLORIDE SERPL-SCNC: 101 MMOL/L (ref 98–107)
CO2 SERPL-SCNC: 28.1 MMOL/L (ref 22–29)
CREAT SERPL-MCNC: 0.66 MG/DL (ref 0.57–1)
DEPRECATED RDW RBC AUTO: 54.1 FL (ref 37–54)
EGFRCR SERPLBLD CKD-EPI 2021: 107.7 ML/MIN/1.73
EOSINOPHIL # BLD AUTO: 0.01 10*3/MM3 (ref 0–0.4)
EOSINOPHIL NFR BLD AUTO: 0.1 % (ref 0.3–6.2)
ERYTHROCYTE [DISTWIDTH] IN BLOOD BY AUTOMATED COUNT: 13.3 % (ref 12.3–15.4)
GLOBULIN UR ELPH-MCNC: 3.3 GM/DL
GLUCOSE SERPL-MCNC: 135 MG/DL (ref 65–99)
HCT VFR BLD AUTO: 50.3 % (ref 34–46.6)
HGB BLD-MCNC: 15.7 G/DL (ref 12–15.9)
IMM GRANULOCYTES # BLD AUTO: 0.06 10*3/MM3 (ref 0–0.05)
IMM GRANULOCYTES NFR BLD AUTO: 0.5 % (ref 0–0.5)
LYMPHOCYTES # BLD AUTO: 1.03 10*3/MM3 (ref 0.7–3.1)
LYMPHOCYTES NFR BLD AUTO: 9.3 % (ref 19.6–45.3)
MACROCYTES BLD QL SMEAR: NORMAL
MCH RBC QN AUTO: 34.1 PG (ref 26.6–33)
MCHC RBC AUTO-ENTMCNC: 31.2 G/DL (ref 31.5–35.7)
MCV RBC AUTO: 109.1 FL (ref 79–97)
MONOCYTES # BLD AUTO: 0.67 10*3/MM3 (ref 0.1–0.9)
MONOCYTES NFR BLD AUTO: 6 % (ref 5–12)
NEUTROPHILS NFR BLD AUTO: 83.7 % (ref 42.7–76)
NEUTROPHILS NFR BLD AUTO: 9.27 10*3/MM3 (ref 1.7–7)
NRBC BLD AUTO-RTO: 0 /100 WBC (ref 0–0.2)
NT-PROBNP SERPL-MCNC: 176.6 PG/ML (ref 0–450)
PLATELET # BLD AUTO: 228 10*3/MM3 (ref 140–450)
PMV BLD AUTO: 9.8 FL (ref 6–12)
POTASSIUM SERPL-SCNC: 4.1 MMOL/L (ref 3.5–5.2)
PROT SERPL-MCNC: 7.2 G/DL (ref 6–8.5)
QT INTERVAL: 326 MS
RBC # BLD AUTO: 4.61 10*6/MM3 (ref 3.77–5.28)
SMALL PLATELETS BLD QL SMEAR: ADEQUATE
SODIUM SERPL-SCNC: 141 MMOL/L (ref 136–145)
TROPONIN T SERPL-MCNC: <0.01 NG/ML (ref 0–0.03)
WBC MORPH BLD: NORMAL
WBC NRBC COR # BLD: 11.08 10*3/MM3 (ref 3.4–10.8)

## 2023-01-09 PROCEDURE — 93005 ELECTROCARDIOGRAM TRACING: CPT

## 2023-01-09 PROCEDURE — 94799 UNLISTED PULMONARY SVC/PX: CPT

## 2023-01-09 PROCEDURE — 93010 ELECTROCARDIOGRAM REPORT: CPT | Performed by: INTERNAL MEDICINE

## 2023-01-09 PROCEDURE — 96374 THER/PROPH/DIAG INJ IV PUSH: CPT

## 2023-01-09 PROCEDURE — 85025 COMPLETE CBC W/AUTO DIFF WBC: CPT | Performed by: EMERGENCY MEDICINE

## 2023-01-09 PROCEDURE — 36415 COLL VENOUS BLD VENIPUNCTURE: CPT

## 2023-01-09 PROCEDURE — 94640 AIRWAY INHALATION TREATMENT: CPT

## 2023-01-09 PROCEDURE — 84484 ASSAY OF TROPONIN QUANT: CPT | Performed by: EMERGENCY MEDICINE

## 2023-01-09 PROCEDURE — 25010000002 METHYLPREDNISOLONE PER 125 MG: Performed by: EMERGENCY MEDICINE

## 2023-01-09 PROCEDURE — 96375 TX/PRO/DX INJ NEW DRUG ADDON: CPT

## 2023-01-09 PROCEDURE — 80053 COMPREHEN METABOLIC PANEL: CPT | Performed by: EMERGENCY MEDICINE

## 2023-01-09 PROCEDURE — 99284 EMERGENCY DEPT VISIT MOD MDM: CPT

## 2023-01-09 PROCEDURE — 71046 X-RAY EXAM CHEST 2 VIEWS: CPT

## 2023-01-09 PROCEDURE — 83880 ASSAY OF NATRIURETIC PEPTIDE: CPT | Performed by: EMERGENCY MEDICINE

## 2023-01-09 PROCEDURE — 85007 BL SMEAR W/DIFF WBC COUNT: CPT | Performed by: EMERGENCY MEDICINE

## 2023-01-09 PROCEDURE — 93005 ELECTROCARDIOGRAM TRACING: CPT | Performed by: EMERGENCY MEDICINE

## 2023-01-09 RX ORDER — METHYLPREDNISOLONE SODIUM SUCCINATE 125 MG/2ML
125 INJECTION, POWDER, LYOPHILIZED, FOR SOLUTION INTRAMUSCULAR; INTRAVENOUS ONCE
Status: COMPLETED | OUTPATIENT
Start: 2023-01-09 | End: 2023-01-09

## 2023-01-09 RX ORDER — SODIUM CHLORIDE 0.9 % (FLUSH) 0.9 %
10 SYRINGE (ML) INJECTION AS NEEDED
Status: DISCONTINUED | OUTPATIENT
Start: 2023-01-09 | End: 2023-01-09 | Stop reason: HOSPADM

## 2023-01-09 RX ORDER — METHYLPREDNISOLONE 4 MG/1
TABLET ORAL
Qty: 21 TABLET | Refills: 0 | Status: ON HOLD | OUTPATIENT
Start: 2023-01-09 | End: 2023-03-27

## 2023-01-09 RX ORDER — IPRATROPIUM BROMIDE AND ALBUTEROL SULFATE 2.5; .5 MG/3ML; MG/3ML
3 SOLUTION RESPIRATORY (INHALATION) ONCE
Status: COMPLETED | OUTPATIENT
Start: 2023-01-09 | End: 2023-01-09

## 2023-01-09 RX ORDER — AZITHROMYCIN 250 MG/1
TABLET, FILM COATED ORAL
Qty: 6 TABLET | Refills: 0 | Status: ON HOLD | OUTPATIENT
Start: 2023-01-09 | End: 2023-03-27

## 2023-01-09 RX ORDER — LABETALOL HYDROCHLORIDE 5 MG/ML
10 INJECTION, SOLUTION INTRAVENOUS ONCE
Status: COMPLETED | OUTPATIENT
Start: 2023-01-09 | End: 2023-01-09

## 2023-01-09 RX ORDER — ALBUTEROL SULFATE 2.5 MG/3ML
2.5 SOLUTION RESPIRATORY (INHALATION)
Status: COMPLETED | OUTPATIENT
Start: 2023-01-09 | End: 2023-01-09

## 2023-01-09 RX ADMIN — IPRATROPIUM BROMIDE AND ALBUTEROL SULFATE 3 ML: 2.5; .5 SOLUTION RESPIRATORY (INHALATION) at 14:57

## 2023-01-09 RX ADMIN — LABETALOL HYDROCHLORIDE 10 MG: 5 INJECTION, SOLUTION INTRAVENOUS at 16:56

## 2023-01-09 RX ADMIN — METHYLPREDNISOLONE SODIUM SUCCINATE 125 MG: 125 INJECTION, POWDER, FOR SOLUTION INTRAMUSCULAR; INTRAVENOUS at 14:50

## 2023-01-09 RX ADMIN — ALBUTEROL SULFATE 2.5 MG: 2.5 SOLUTION RESPIRATORY (INHALATION) at 16:18

## 2023-01-09 RX ADMIN — ALBUTEROL SULFATE 2.5 MG: 2.5 SOLUTION RESPIRATORY (INHALATION) at 15:06

## 2023-01-09 NOTE — ED PROVIDER NOTES
"Subjective     History provided by:  Patient    History of Present Illness    · Chief complaint: Shortness of breath    · Location: Lungs    · Quality/Severity: Severe shortness of breath.    · Timing/Onset: Started a few days ago.    · Modifying Factors: The patient has a history of COPD and has been using her inhaler without improvement.    · Associated symptoms: She always wheezes.  She has a cough that is increased from baseline productive of \"normal\" green sputum.    · Narrative: The patient is a 47-year-old white female with a history of COPD on 2 L of oxygen at home.  She complains of shortness of breath for a few days and a cough that has gotten worse productive of green sputum.  She states she has been diaphoretic.  She states her baseline resting oxygen saturation is 94% on oxygen.  She states today when she got up to walk to go to the bathroom her oxygen saturation dropped into the mid 80s.    Review of Systems   Constitutional: Positive for diaphoresis. Negative for chills and fever.   HENT: Negative for congestion and rhinorrhea.    Respiratory: Positive for cough, shortness of breath and wheezing.    Cardiovascular: Negative for chest pain.   Gastrointestinal: Negative for abdominal pain, diarrhea and vomiting.     Past Medical History:   Diagnosis Date   • Anxiety    • Arthritis    • COPD (chronic obstructive pulmonary disease) (HCC)    • Coronary stent occlusion    • CTS (carpal tunnel syndrome)    • Depression    • Dizziness    • Headache    • Hyperlipidemia    • Hypertension    • Migraine    • Numbness and tingling    • Pneumonia      /92   Pulse 88   Temp 98.6 °F (37 °C)   Resp 20   Ht 157.5 cm (62\")   Wt 50.8 kg (112 lb)   LMP  (LMP Unknown)   SpO2 96%   BMI 20.49 kg/m²     Past Medical History:   Diagnosis Date   • Anxiety    • Arthritis    • COPD (chronic obstructive pulmonary disease) (HCC)    • Coronary stent occlusion    • CTS (carpal tunnel syndrome)    • Depression    • " Dizziness    • Headache    • Hyperlipidemia    • Hypertension    • Migraine    • Numbness and tingling    • Pneumonia        Allergies   Allergen Reactions   • Prednisone Other (See Comments)     Rash in mouth       Past Surgical History:   Procedure Laterality Date   • ANGIOPLASTY ILIAC ARTERY Left 6/17/2016    Procedure: BILATERAL DUPLEX DIRECTED ACCESS, AIF BILATERAL RUNOFF, SELECTIVE CATHETERIZATION OF RIGHT EXTERNAL ILIAC WITH ANGIOPLASTY, LEFT COMMON ILIAC STENT PLACEMENT;  Surgeon: Jose Carlos Plascencia MD;  Location: [facility] 18/19;  Service:    • BREAST SURGERY      BILAT IMPLANTS   • CAROTID ARTERY ANGIOPLASTY         Family History   Problem Relation Age of Onset   • Heart disease Father    • Hypertension Father    • Diabetes Father    • Stroke Father    • COPD Father    • Heart failure Paternal Grandmother    • Heart failure Paternal Grandfather    • COPD Mother    • COPD Maternal Aunt    • COPD Maternal Uncle    • COPD Paternal Uncle        Social History     Socioeconomic History   • Marital status: Single   Tobacco Use   • Smoking status: Every Day     Packs/day: 1.00     Years: 30.00     Pack years: 30.00     Types: Cigarettes   • Smokeless tobacco: Never   Substance and Sexual Activity   • Alcohol use: No   • Drug use: No   • Sexual activity: Defer           Objective   Physical Exam  Vitals and nursing note reviewed.   Constitutional:       Appearance: She is not ill-appearing, toxic-appearing or diaphoretic.      Comments: The patient appears in mild respiratory distress.  She does not appear toxic.  Review of her vital signs: She is afebrile with a temperature of 98.6, tachypnea with a respiratory rate of 22 with a oxygen saturation of 94% on oxygen.  Tachycardic with a heart rate of 129, blood pressure elevated 102/108.   HENT:      Head: Normocephalic and atraumatic.   Neck:      Vascular: No JVD.   Cardiovascular:      Rate and Rhythm: Regular rhythm. Tachycardia present.      Heart sounds:  No murmur heard.  Pulmonary:      Breath sounds: Wheezing present. No rhonchi or rales.      Comments: The patient has fair air movement with diffuse expiratory wheezes throughout.  Abdominal:      General: Bowel sounds are normal.      Palpations: Abdomen is soft.      Tenderness: There is no abdominal tenderness.   Musculoskeletal:      Cervical back: Normal range of motion and neck supple.      Right lower leg: No tenderness. No edema.      Left lower leg: No tenderness. No edema.   Lymphadenopathy:      Cervical: No cervical adenopathy.   Skin:     General: Skin is warm and dry.      Capillary Refill: Capillary refill takes less than 2 seconds.      Coloration: Skin is not cyanotic.      Findings: No erythema or rash.   Neurological:      General: No focal deficit present.      Mental Status: She is alert and oriented to person, place, and time.      Cranial Nerves: No cranial nerve deficit.      Motor: No weakness.   Psychiatric:         Mood and Affect: Mood normal.         Behavior: Behavior normal.         Procedures           ED Course  ED Course as of 01/09/23 1739   Mon Jan 09, 2023   1604 The patient's chest x-ray has hyperinflated lungs due to COPD.  No infiltrate or acute abnormality. [TP]   1604 My interpretation of the patient's EKG tracing performed 14: 08 is sinus tachycardia with a rate of 112, normal axis, normal conduction, biatrial enlargement, no acute ST segment elevation or depression consistent with ischemia, normal R wave transition, normal IN and QT intervals.  No change in comparison to tracing 12/5/2022. [TP]   1606 Is my impression the patient has a COPD exacerbation.  She was administered Solu-Medrol 125 mg IV.  She was administered a DuoNeb and then will be administered 2 albuterol nebulizer treatments. [TP]   1613 16: 10 care transferred to Dr. Ceron. [TP]   1633 WBC(!): 11.08 [SS]   1633 Neutrophils Absolute(!): 9.27 [SS]   1633 Glucose(!): 135 [SS]   1634 CBC shows mild  leukocytosis with white 11.08K with absolute neutrophil count elevated at 9.27K.  Otherwise unremarkable.  CMP shows blood sugar 135.  Otherwise unremarkable. [SS]   1647 Patient states she is breathing better.  She request to be discharged home.  However patient's blood pressure still quite elevated.  Giving labetalol 10 mg IV.  Will reassess after blood pressure is improved. [SS]   1732 Blood pressure is improved.  Heart rate normalized.  Discussed with patient all results, diagnoses, treatment, follow-up and indications turn to the ED.  All questions answered.  Will DC home.    Prescription  1-Zithromax  2-Medrol Dosepak  3-albuterol metered-dose inhaler [SS]      ED Course User Index  [SS] Byron Ceron MD  [TP] Mark Blanca MD      Labs Reviewed   COMPREHENSIVE METABOLIC PANEL - Abnormal; Notable for the following components:       Result Value    Glucose 135 (*)     All other components within normal limits    Narrative:     GFR Normal >60  Chronic Kidney Disease <60  Kidney Failure <15     CBC WITH AUTO DIFFERENTIAL - Abnormal; Notable for the following components:    WBC 11.08 (*)     Hematocrit 50.3 (*)     .1 (*)     MCH 34.1 (*)     MCHC 31.2 (*)     RDW-SD 54.1 (*)     Neutrophil % 83.7 (*)     Lymphocyte % 9.3 (*)     Eosinophil % 0.1 (*)     Neutrophils, Absolute 9.27 (*)     Immature Grans, Absolute 0.06 (*)     All other components within normal limits   TROPONIN (IN-HOUSE) - Normal    Narrative:     Troponin T Reference Range:  <= 0.03 ng/mL-   Negative for AMI  >0.03 ng/mL-     Abnormal for myocardial necrosis.  Clinicians would have to utilize clinical acumen, EKG, Troponin and serial changes to determine if it is an Acute Myocardial Infarction or myocardial injury due to an underlying chronic condition.       Results may be falsely decreased if patient taking Biotin.     BNP (IN-HOUSE) - Normal    Narrative:     Among patients with dyspnea, NT-proBNP is highly sensitive for the  detection of acute congestive heart failure. In addition NT-proBNP of <300 pg/ml effectively rules out acute congestive heart failure with 99% negative predictive value.    Results may be falsely decreased if patient taking Biotin.     SCAN SLIDE   CBC AND DIFFERENTIAL    Narrative:     The following orders were created for panel order CBC & Differential.  Procedure                               Abnormality         Status                     ---------                               -----------         ------                     CBC Auto Differential[[id number]]        Abnormal            Final result               Scan Slide[[id number]]                                       Final result                 Please view results for these tests on the individual orders.     XR Chest 2 View    Result Date: 1/9/2023  Narrative: CR Chest 2 Vws INDICATION:  Shortness of air for 2 days COMPARISON:  12/6/2022 FINDINGS: PA and lateral views of the chest.  Lungs are hyperinflated. There are no infiltrates. Heart size normal. Bones are normal      Impression: Hyperinflated lungs otherwise normal Signer Name: Harpreet Castellon MD  Signed: 1/9/2023 3:53 PM  Workstation Name: [id number]  [facility]    My differential diagnosis for dyspnea includes but is not limited to:  Asthma, COPD, COVID-19, pneumonia, pulmonary embolus, acute respiratory distress syndrome, RSV, pneumothorax, pleural effusion, pulmonary fibrosis, congestive heart failure, myocardial infarction, DKA, uremia, acidosis, sepsis, anemia, drug related, hyperventilation, CNS disease                                           MDM    Final diagnoses:   COPD with acute exacerbation (HCC)       ED Disposition  ED Disposition     ED Disposition   Discharge    Condition   Stable    Comment   --             Your PCP    Schedule an appointment as soon as possible for a visit in 3 days  for continued or worsened symptoms, Sooner if needed         Medication List       New Prescriptions    azithromycin 250 MG tablet  Commonly known as: Zithromax Z-Vikas  Take 2 tablets the first day, then 1 tablet daily for 4 days.        Changed    methylPREDNISolone 4 MG dose pack  Commonly known as: MEDROL  Take as directed on package instructions.  What changed: additional instructions     * nystatin 100,000 unit/mL suspension  Commonly known as: MYCOSTATIN  Swish and swallow 5 mL 4 (Four) Times a Day. Swish and swallow 5mL four times a day for two weeks.  What changed: Another medication with the same name was added. Make sure you understand how and when to take each.     * nystatin 100,000 unit/mL suspension  Commonly known as: MYCOSTATIN  Take 5 mL by mouth 4 (Four) Times a Day As Needed (thrush) for up to 7 days.  What changed: You were already taking a medication with the same name, and this prescription was added. Make sure you understand how and when to take each.         * This list has 2 medication(s) that are the same as other medications prescribed for you. Read the directions carefully, and ask your doctor or other care provider to review them with you.               Where to Get Your Medications      These medications were sent to [facility]/pharmacy #[id number] - [place], [place] - [address] - [phone]  - [phone] [address] [address]    Phone: [phone]   · azithromycin 250 MG tablet  · methylPREDNISolone 4 MG dose pack  · nystatin 100,000 unit/mL suspension          Byron Ceron MD  01/09/23 5012

## 2023-01-11 ENCOUNTER — HOSPITAL ENCOUNTER (EMERGENCY)
Facility: HOSPITAL | Age: 48
Discharge: HOME | End: 2023-01-11
Attending: EMERGENCY MEDICINE | Admitting: EMERGENCY MEDICINE
Payer: COMMERCIAL

## 2023-01-11 ENCOUNTER — APPOINTMENT (OUTPATIENT)
Dept: GENERAL RADIOLOGY | Facility: HOSPITAL | Age: 48
End: 2023-01-11
Payer: COMMERCIAL

## 2023-01-11 ENCOUNTER — APPOINTMENT (OUTPATIENT)
Dept: CT IMAGING | Facility: HOSPITAL | Age: 48
End: 2023-01-11
Payer: COMMERCIAL

## 2023-01-11 VITALS
HEIGHT: 62 IN | TEMPERATURE: 98.3 F | HEART RATE: 115 BPM | SYSTOLIC BLOOD PRESSURE: 120 MMHG | RESPIRATION RATE: 20 BRPM | DIASTOLIC BLOOD PRESSURE: 88 MMHG | WEIGHT: 112 LBS | OXYGEN SATURATION: 100 % | BODY MASS INDEX: 20.61 KG/M2

## 2023-01-11 DIAGNOSIS — J44.1 COPD EXACERBATION: Primary | ICD-10-CM

## 2023-01-11 LAB
ALBUMIN SERPL-MCNC: 4.3 G/DL (ref 3.5–5.2)
ALBUMIN/GLOB SERPL: 1.2 G/DL
ALP SERPL-CCNC: 94 U/L (ref 39–117)
ALT SERPL W P-5'-P-CCNC: 12 U/L (ref 1–33)
ANION GAP SERPL CALCULATED.3IONS-SCNC: 10 MMOL/L (ref 5–15)
AST SERPL-CCNC: 16 U/L (ref 1–32)
BASOPHILS # BLD AUTO: 0.01 10*3/MM3 (ref 0–0.2)
BASOPHILS NFR BLD AUTO: 0.1 % (ref 0–1.5)
BILIRUB SERPL-MCNC: 0.4 MG/DL (ref 0–1.2)
BUN SERPL-MCNC: 11 MG/DL (ref 6–20)
BUN/CREAT SERPL: 16.4 (ref 7–25)
CALCIUM SPEC-SCNC: 9.9 MG/DL (ref 8.6–10.5)
CHLORIDE SERPL-SCNC: 92 MMOL/L (ref 98–107)
CO2 SERPL-SCNC: 35 MMOL/L (ref 22–29)
CREAT SERPL-MCNC: 0.67 MG/DL (ref 0.57–1)
D DIMER PPP FEU-MCNC: 0.73 MCGFEU/ML (ref 0–0.5)
D-LACTATE SERPL-SCNC: 1.4 MMOL/L (ref 0.5–2)
DEPRECATED RDW RBC AUTO: 51.2 FL (ref 37–54)
EGFRCR SERPLBLD CKD-EPI 2021: 107.3 ML/MIN/1.73
EOSINOPHIL # BLD AUTO: 0 10*3/MM3 (ref 0–0.4)
EOSINOPHIL NFR BLD AUTO: 0 % (ref 0.3–6.2)
ERYTHROCYTE [DISTWIDTH] IN BLOOD BY AUTOMATED COUNT: 12.8 % (ref 12.3–15.4)
FLUAV RNA RESP QL NAA+PROBE: NOT DETECTED
FLUBV RNA RESP QL NAA+PROBE: NOT DETECTED
GLOBULIN UR ELPH-MCNC: 3.5 GM/DL
GLUCOSE SERPL-MCNC: 130 MG/DL (ref 65–99)
HCT VFR BLD AUTO: 52 % (ref 34–46.6)
HGB BLD-MCNC: 16.4 G/DL (ref 12–15.9)
HOLD SPECIMEN: NORMAL
HOLD SPECIMEN: NORMAL
IMM GRANULOCYTES # BLD AUTO: 0.05 10*3/MM3 (ref 0–0.05)
IMM GRANULOCYTES NFR BLD AUTO: 0.6 % (ref 0–0.5)
LYMPHOCYTES # BLD AUTO: 1.41 10*3/MM3 (ref 0.7–3.1)
LYMPHOCYTES NFR BLD AUTO: 17.5 % (ref 19.6–45.3)
MACROCYTES BLD QL SMEAR: NORMAL
MCH RBC QN AUTO: 33.8 PG (ref 26.6–33)
MCHC RBC AUTO-ENTMCNC: 31.5 G/DL (ref 31.5–35.7)
MCV RBC AUTO: 107.2 FL (ref 79–97)
MONOCYTES # BLD AUTO: 0.63 10*3/MM3 (ref 0.1–0.9)
MONOCYTES NFR BLD AUTO: 7.8 % (ref 5–12)
NEUTROPHILS NFR BLD AUTO: 5.96 10*3/MM3 (ref 1.7–7)
NEUTROPHILS NFR BLD AUTO: 74 % (ref 42.7–76)
NRBC BLD AUTO-RTO: 0 /100 WBC (ref 0–0.2)
NT-PROBNP SERPL-MCNC: 239 PG/ML (ref 0–450)
PLAT MORPH BLD: NORMAL
PLATELET # BLD AUTO: 269 10*3/MM3 (ref 140–450)
PMV BLD AUTO: 9.3 FL (ref 6–12)
POTASSIUM SERPL-SCNC: 4.1 MMOL/L (ref 3.5–5.2)
PROT SERPL-MCNC: 7.8 G/DL (ref 6–8.5)
QT INTERVAL: 323 MS
RBC # BLD AUTO: 4.85 10*6/MM3 (ref 3.77–5.28)
SARS-COV-2 RNA RESP QL NAA+PROBE: NOT DETECTED
SODIUM SERPL-SCNC: 137 MMOL/L (ref 136–145)
TROPONIN T SERPL-MCNC: <0.01 NG/ML (ref 0–0.03)
WBC MORPH BLD: NORMAL
WBC NRBC COR # BLD: 8.06 10*3/MM3 (ref 3.4–10.8)
WHOLE BLOOD HOLD COAG: NORMAL
WHOLE BLOOD HOLD SPECIMEN: NORMAL

## 2023-01-11 PROCEDURE — 83605 ASSAY OF LACTIC ACID: CPT | Performed by: EMERGENCY MEDICINE

## 2023-01-11 PROCEDURE — 85025 COMPLETE CBC W/AUTO DIFF WBC: CPT | Performed by: EMERGENCY MEDICINE

## 2023-01-11 PROCEDURE — 96360 HYDRATION IV INFUSION INIT: CPT

## 2023-01-11 PROCEDURE — 85007 BL SMEAR W/DIFF WBC COUNT: CPT | Performed by: EMERGENCY MEDICINE

## 2023-01-11 PROCEDURE — 87040 BLOOD CULTURE FOR BACTERIA: CPT | Performed by: EMERGENCY MEDICINE

## 2023-01-11 PROCEDURE — 0 IOPAMIDOL PER 1 ML: Performed by: EMERGENCY MEDICINE

## 2023-01-11 PROCEDURE — 36415 COLL VENOUS BLD VENIPUNCTURE: CPT

## 2023-01-11 PROCEDURE — 71275 CT ANGIOGRAPHY CHEST: CPT

## 2023-01-11 PROCEDURE — 84484 ASSAY OF TROPONIN QUANT: CPT | Performed by: EMERGENCY MEDICINE

## 2023-01-11 PROCEDURE — 85379 FIBRIN DEGRADATION QUANT: CPT | Performed by: EMERGENCY MEDICINE

## 2023-01-11 PROCEDURE — 93005 ELECTROCARDIOGRAM TRACING: CPT | Performed by: EMERGENCY MEDICINE

## 2023-01-11 PROCEDURE — 83880 ASSAY OF NATRIURETIC PEPTIDE: CPT | Performed by: EMERGENCY MEDICINE

## 2023-01-11 PROCEDURE — 94640 AIRWAY INHALATION TREATMENT: CPT

## 2023-01-11 PROCEDURE — 96361 HYDRATE IV INFUSION ADD-ON: CPT

## 2023-01-11 PROCEDURE — 93010 ELECTROCARDIOGRAM REPORT: CPT | Performed by: INTERNAL MEDICINE

## 2023-01-11 PROCEDURE — 80053 COMPREHEN METABOLIC PANEL: CPT | Performed by: EMERGENCY MEDICINE

## 2023-01-11 PROCEDURE — 94799 UNLISTED PULMONARY SVC/PX: CPT

## 2023-01-11 PROCEDURE — 87636 SARSCOV2 & INF A&B AMP PRB: CPT | Performed by: EMERGENCY MEDICINE

## 2023-01-11 PROCEDURE — 99284 EMERGENCY DEPT VISIT MOD MDM: CPT

## 2023-01-11 RX ORDER — SODIUM CHLORIDE, SODIUM LACTATE, POTASSIUM CHLORIDE, CALCIUM CHLORIDE 600; 310; 30; 20 MG/100ML; MG/100ML; MG/100ML; MG/100ML
500 INJECTION, SOLUTION INTRAVENOUS CONTINUOUS
Status: DISCONTINUED | OUTPATIENT
Start: 2023-01-11 | End: 2023-01-11 | Stop reason: HOSPADM

## 2023-01-11 RX ORDER — IPRATROPIUM BROMIDE AND ALBUTEROL SULFATE 2.5; .5 MG/3ML; MG/3ML
3 SOLUTION RESPIRATORY (INHALATION) ONCE
Status: COMPLETED | OUTPATIENT
Start: 2023-01-11 | End: 2023-01-11

## 2023-01-11 RX ORDER — BUPROPION HYDROCHLORIDE 150 MG/1
150 TABLET ORAL DAILY
Status: ON HOLD | COMMUNITY
Start: 2022-12-06 | End: 2023-03-27

## 2023-01-11 RX ORDER — ALBUTEROL SULFATE 90 UG/1
2 AEROSOL, METERED RESPIRATORY (INHALATION) ONCE
Status: DISCONTINUED | OUTPATIENT
Start: 2023-01-11 | End: 2023-01-11

## 2023-01-11 RX ORDER — SODIUM CHLORIDE 0.9 % (FLUSH) 0.9 %
10 SYRINGE (ML) INJECTION AS NEEDED
Status: DISCONTINUED | OUTPATIENT
Start: 2023-01-11 | End: 2023-01-11 | Stop reason: HOSPADM

## 2023-01-11 RX ADMIN — IPRATROPIUM BROMIDE AND ALBUTEROL SULFATE 3 ML: 2.5; .5 SOLUTION RESPIRATORY (INHALATION) at 16:32

## 2023-01-11 RX ADMIN — IPRATROPIUM BROMIDE AND ALBUTEROL SULFATE 3 ML: 2.5; .5 SOLUTION RESPIRATORY (INHALATION) at 18:56

## 2023-01-11 RX ADMIN — IOPAMIDOL 100 ML: 755 INJECTION, SOLUTION INTRAVENOUS at 18:15

## 2023-01-11 RX ADMIN — IPRATROPIUM BROMIDE AND ALBUTEROL SULFATE 3 ML: .5; 3 SOLUTION RESPIRATORY (INHALATION) at 16:27

## 2023-01-11 RX ADMIN — SODIUM CHLORIDE, POTASSIUM CHLORIDE, SODIUM LACTATE AND CALCIUM CHLORIDE 500 ML/HR: 600; 310; 30; 20 INJECTION, SOLUTION INTRAVENOUS at 15:39

## 2023-01-11 NOTE — ED PROVIDER NOTES
Subjective   History of Present Illness  History of Present Illness    Chief complaint: Shortness of air    Location: Home    Quality/Severity: Moderate    Timing/Onset/Duration: Present for the last 11 days, getting worse    Modifying Factors: Worse with exertion    Associated Symptoms: Mild headache.  No fever or chills.  Patient's had a cough productive of her normal yellowish-green sputum.  No sore throat earache or nasal congestion.  No chest pain.  No abdominal pain.  No diarrhea or burning when she urinates other than her chronic diarrhea related to her IBS.  This is not changed in color or amount.    Narrative: This 47-year-old white female with a history of hypertension, COPD, coronary artery disease, who continues to smoke, and is on 2 L/min of oxygen at home, presents with  The patient was seen here on Monday by Dr. Blanca and diagnosed with COPD with acute exacerbation.  The patient was prescribed a Z-Vikas, Medrol Dosepak and given nystatin for thrush.  The patient has not established a primary care provider.      PCP: No primary care     Medication List      ASK your doctor about these medications    * albuterol (2.5 MG/3ML) 0.083% nebulizer solution  Commonly known as: PROVENTIL  USE 1 VIAL VIA NEBULIZER EVERY 4 HOURS AS NEEDED     * albuterol sulfate  (90 Base) MCG/ACT inhaler  Commonly known as: PROVENTIL HFA;VENTOLIN HFA;PROAIR HFA  TAKE 2 PUFFS BY MOUTH EVERY 4 HOURS AS NEEDED     * albuterol sulfate  (90 Base) MCG/ACT inhaler  Commonly known as: Proventil HFA  Inhale 2 puffs into the lungs Every 4 (Four) Hours As Needed for Wheezing.     aspirin 81 MG EC tablet  Take 1 tablet by mouth Daily.     azithromycin 250 MG tablet  Commonly known as: Zithromax Z-Vikas  Take 2 tablets the first day, then 1 tablet daily for 4 days.     buPROPion  MG 24 hr tablet  Commonly known as: WELLBUTRIN XL     clopidogrel 75 MG tablet  Commonly known as: Plavix  Take 1 tablet by mouth Daily.      fluticasone 50 MCG/ACT nasal spray  Commonly known as: FLONASE     Fluticasone Furoate-Vilanterol 200-25 MCG/ACT inhaler  Commonly known as: BREO ELLIPTA  Inhale 1 puff into the lungs Daily.     guaiFENesin 600 MG 12 hr tablet  Commonly known as: MUCINEX  Take 2 tablets by mouth 2 (Two) Times a Day.     ipratropium-albuterol 0.5-2.5 mg/3 ml nebulizer  Commonly known as: DUO-NEB  Take 3 mL by nebulization Every 4 (Four) Hours As Needed for Wheezing.     lisinopril-hydrochlorothiazide 20-12.5 MG per tablet  Commonly known as: PRINZIDE,ZESTORETIC  TAKE 1 TABLET BY MOUTH EVERY DAY     medroxyPROGESTERone 150 MG/ML injection  Commonly known as: DEPO-PROVERA     methylPREDNISolone 4 MG dose pack  Commonly known as: MEDROL  Take as directed on package instructions.     metoprolol tartrate 50 MG tablet  Commonly known as: LOPRESSOR  Take 1 tablet by mouth Every 12 (Twelve) Hours.     nicotine 21 MG/24HR patch  Commonly known as: NICODERM CQ  Place 1 patch on the skin as directed by provider Daily.     * nystatin 100,000 unit/mL suspension  Commonly known as: MYCOSTATIN  Swish and swallow 5 mL 4 (Four) Times a Day. Swish and swallow 5mL four times a day for two weeks.     * nystatin 100,000 unit/mL suspension  Commonly known as: MYCOSTATIN  Take 5 mL by mouth 4 (Four) Times a Day As Needed (thrush) for up to 7 days.     simvastatin 40 MG tablet  Commonly known as: ZOCOR  Take 1 tablet by mouth Every Evening.     venlafaxine XR 75 MG 24 hr capsule  Commonly known as: EFFEXOR-XR  TAKE 1 CAPSULE BY MOUTH EVERY DAY     vitamin D 1.25 MG (40163 UT) capsule capsule  Commonly known as: ERGOCALCIFEROL  Take 1 capsule by mouth Every 7 (Seven) Days.         * This list has 5 medication(s) that are the same as other medications prescribed for you. Read the directions carefully, and ask your doctor or other care provider to review them with you.                Past Medical History:   Diagnosis Date   • Anxiety    • Arthritis    • COPD  (chronic obstructive pulmonary disease) (HCC)    • Coronary stent occlusion    • CTS (carpal tunnel syndrome)    • Depression    • Dizziness    • Headache    • Hyperlipidemia    • Hypertension    • Migraine    • Numbness and tingling    • Pneumonia        Allergies   Allergen Reactions   • Prednisone Other (See Comments)     Rash in mouth       Past Surgical History:   Procedure Laterality Date   • ANGIOPLASTY ILIAC ARTERY Left 6/17/2016    Procedure: BILATERAL DUPLEX DIRECTED ACCESS, AIF BILATERAL RUNOFF, SELECTIVE CATHETERIZATION OF RIGHT EXTERNAL ILIAC WITH ANGIOPLASTY, LEFT COMMON ILIAC STENT PLACEMENT;  Surgeon: Jose Carlos Plascencia MD;  Location: [facility] 18/19;  Service:    • BREAST SURGERY      BILAT IMPLANTS   • CAROTID ARTERY ANGIOPLASTY         Family History   Problem Relation Age of Onset   • Heart disease Father    • Hypertension Father    • Diabetes Father    • Stroke Father    • COPD Father    • Heart failure Paternal Grandmother    • Heart failure Paternal Grandfather    • COPD Mother    • COPD Maternal Aunt    • COPD Maternal Uncle    • COPD Paternal Uncle        Social History     Socioeconomic History   • Marital status: Single   Tobacco Use   • Smoking status: Every Day     Packs/day: 1.00     Years: 30.00     Pack years: 30.00     Types: Cigarettes   • Smokeless tobacco: Never   Substance and Sexual Activity   • Alcohol use: No   • Drug use: No   • Sexual activity: Defer     Review of systems:    General: Malaise  HEENT: No nasal congestion, sore throat related to thrush, no earache  Neck no adenopathy or tenderness  Cardiovascular no chest pain or palpitation  Pulmonary: Shortness of breath  GI: No nausea vomiting, chronic diarrhea unchanged  : No dysuria  Neuro: Mild headache      Objective   Physical Exam  Vitals (The temperature is 98.3 °F, pulse 119, respirations 22, /91, room air pulse ox 96%) and nursing note reviewed.   Constitutional:       Appearance: She is well-developed.    HENT:      Head: Normocephalic and atraumatic.   Cardiovascular:      Rate and Rhythm: Normal rate and regular rhythm.      Heart sounds: No murmur heard.    No friction rub. No gallop.   Pulmonary:      Breath sounds: Wheezing (Moderate bilateral expiratory wheeze) present. No decreased breath sounds.   Chest:      Chest wall: No tenderness.   Abdominal:      General: Bowel sounds are normal.      Palpations: Abdomen is soft. There is no mass.      Tenderness: There is no abdominal tenderness. There is no guarding or rebound.   Musculoskeletal:         General: Normal range of motion.      Right lower leg: No tenderness. No edema.      Left lower leg: No tenderness. No edema.   Skin:     General: Skin is warm and dry.   Neurological:      General: No focal deficit present.      Mental Status: She is alert and oriented to person, place, and time.         Procedures           ED Course  ED Course as of 01/11/23 1748   Wed Jan 11, 2023   1602 The glucose is 130.  The chloride is 92.  The CO2 is 35.  The CMP is otherwise unremarkable.  The GFR is 107    The D-dimer is elevated at 0.73.    The hemoglobin is 16, hematocrit 52, CBC otherwise unremarkable. [RC]   1725 The COVID flu is negative [RC]   1726 The lactic acid is 1.4. [RC]      ED Course User Index  [RC] Leno Lutz MD      15:44 EST, 01/11/23:  The EKG was obtained at 1535 and read by me at 1537.  EKG shows a sinus tachycardia with a rate of 108.  There is a normal axis with no hypertrophy.  The GA, QRS, and QT intervals are unremarkable.  There is no ectopy.  There is nonspecific T wave abnormalities in the lateral leads, there is artifact in multiple leads.  There is no acute ST elevation or depression.  The EKG today compared to EKG dated January 9, 2023 is essentially unchanged.      17:48 EST, 01/11/23:  The patient was reassessed.  She states she does not feel much better.  Her vital signs were reviewed and are stable.  Lungs: Moving better air,  equal bilaterally.  I will order the patient another nebulizer.  The patient is going to CT shortly..       19:11 EST, 01/11/23:  The patient was reassessed.  She feels better.  Lungs: Mildly diminished bilaterally with no wheezes.  She feels better.  The patient saturations are 100% on room air.    19:11 EST, 01/11/23:  The patient's diagnosis of acute exacerbation of COPD was discussed with her.  The patient is currently on a Z-Vikas.  She should continue her Z-Vikas.  She should stop smoking.  She should continue her steroids.  She should use her nebulizer every 4 hours while she is awake for 3 days.  She should call the patient connection line in the morning for primary care provider to follow-up with next week.  She should return to the emergency department if there is worsening shortness of breath, worse in any way at all.  All the patient's question were answered the patient will be discharged in good condition.  Her CAT scan showed possible pneumonia, however the patient just recently started a Z-Vikas and should continue her Zithromax.                                MDM    Final diagnoses:   COPD exacerbation (HCC)       ED Disposition  ED Disposition     None          No follow-up provider specified.       Medication List      No changes were made to your prescriptions during this visit.          Leno Lutz MD  01/12/23 0204

## 2023-01-11 NOTE — ED NOTES
"Pt states that she feels like she has a \"big ball of sputum stuck in her throat that is making her feel more SOB since she can't bring it up\"  "

## 2023-01-12 NOTE — DISCHARGE INSTRUCTIONS
Continue your Zithromax and Medrol Dosepak.  Continue the Mucinex.  Stop smoking.  Call the patient connection line in the morning for primary care provider to follow-up with next week.  Return to the emergency department if there is increased shortness of breath, worse in any way at all.

## 2023-01-16 LAB
BACTERIA SPEC AEROBE CULT: NORMAL
BACTERIA SPEC AEROBE CULT: NORMAL

## 2023-03-27 ENCOUNTER — HOSPITAL ENCOUNTER (OUTPATIENT)
Facility: HOSPITAL | Age: 48
Setting detail: OBSERVATION
Discharge: HOME | End: 2023-03-28
Attending: EMERGENCY MEDICINE | Admitting: INTERNAL MEDICINE
Payer: COMMERCIAL

## 2023-03-27 ENCOUNTER — APPOINTMENT (OUTPATIENT)
Dept: GENERAL RADIOLOGY | Facility: HOSPITAL | Age: 48
End: 2023-03-27
Payer: COMMERCIAL

## 2023-03-27 DIAGNOSIS — J44.1 COPD EXACERBATION: Primary | ICD-10-CM

## 2023-03-27 DIAGNOSIS — J43.1 PANLOBULAR EMPHYSEMA: ICD-10-CM

## 2023-03-27 DIAGNOSIS — J96.22 ACUTE ON CHRONIC RESPIRATORY FAILURE WITH HYPERCAPNIA: ICD-10-CM

## 2023-03-27 LAB
ALBUMIN SERPL-MCNC: 4 G/DL (ref 3.5–5.2)
ALBUMIN/GLOB SERPL: 1 G/DL
ALP SERPL-CCNC: 125 U/L (ref 39–117)
ALT SERPL W P-5'-P-CCNC: 6 U/L (ref 1–33)
ANION GAP SERPL CALCULATED.3IONS-SCNC: 7.2 MMOL/L (ref 5–15)
ARTERIAL PATENCY WRIST A: ABNORMAL
ARTERIAL PATENCY WRIST A: POSITIVE
AST SERPL-CCNC: 13 U/L (ref 1–32)
ATMOSPHERIC PRESS: 735 MMHG
ATMOSPHERIC PRESS: 737 MMHG
ATMOSPHERIC PRESS: 741 MMHG
B PARAPERT DNA SPEC QL NAA+PROBE: NOT DETECTED
B PERT DNA SPEC QL NAA+PROBE: NOT DETECTED
BASE EXCESS BLDA CALC-SCNC: 10.2 MMOL/L (ref 0–2)
BASE EXCESS BLDA CALC-SCNC: 6.4 MMOL/L (ref 0–2)
BASE EXCESS BLDV CALC-SCNC: 1 MMOL/L (ref 0–2)
BASOPHILS # BLD AUTO: 0.03 10*3/MM3 (ref 0–0.2)
BASOPHILS NFR BLD AUTO: 0.1 % (ref 0–1.5)
BDY SITE: ABNORMAL
BILIRUB SERPL-MCNC: 0.3 MG/DL (ref 0–1.2)
BODY TEMPERATURE: 37 C
BUN SERPL-MCNC: 10 MG/DL (ref 6–20)
BUN/CREAT SERPL: 16.4 (ref 7–25)
C PNEUM DNA NPH QL NAA+NON-PROBE: NOT DETECTED
CALCIUM SPEC-SCNC: 9.8 MG/DL (ref 8.6–10.5)
CHLORIDE SERPL-SCNC: 94 MMOL/L (ref 98–107)
CO2 SERPL-SCNC: 35.8 MMOL/L (ref 22–29)
CREAT SERPL-MCNC: 0.61 MG/DL (ref 0.57–1)
D DIMER PPP FEU-MCNC: 0.62 MCGFEU/ML (ref 0–0.5)
DEPRECATED RDW RBC AUTO: 52.4 FL (ref 37–54)
EGFRCR SERPLBLD CKD-EPI 2021: 109.8 ML/MIN/1.73
EOSINOPHIL # BLD AUTO: 0.02 10*3/MM3 (ref 0–0.4)
EOSINOPHIL NFR BLD AUTO: 0.1 % (ref 0.3–6.2)
EPAP: 6
ERYTHROCYTE [DISTWIDTH] IN BLOOD BY AUTOMATED COUNT: 12.4 % (ref 12.3–15.4)
FLUAV RNA RESP QL NAA+PROBE: NOT DETECTED
FLUAV SUBTYP SPEC NAA+PROBE: NOT DETECTED
FLUBV RNA ISLT QL NAA+PROBE: NOT DETECTED
FLUBV RNA RESP QL NAA+PROBE: NOT DETECTED
GAS FLOW AIRWAY: 2 LPM
GEN 5 2HR TROPONIN T REFLEX: 16 NG/L
GLOBULIN UR ELPH-MCNC: 4 GM/DL
GLUCOSE SERPL-MCNC: 160 MG/DL (ref 65–99)
HADV DNA SPEC NAA+PROBE: NOT DETECTED
HCO3 BLDA-SCNC: 38.5 MMOL/L (ref 20–26)
HCO3 BLDA-SCNC: 39.7 MMOL/L (ref 20–26)
HCO3 BLDV-SCNC: 25.9 MMOL/L (ref 22–28)
HCOV 229E RNA SPEC QL NAA+PROBE: NOT DETECTED
HCOV HKU1 RNA SPEC QL NAA+PROBE: NOT DETECTED
HCOV NL63 RNA SPEC QL NAA+PROBE: NOT DETECTED
HCOV OC43 RNA SPEC QL NAA+PROBE: NOT DETECTED
HCT VFR BLD AUTO: 50.8 % (ref 34–46.6)
HGB BLD-MCNC: 15.2 G/DL (ref 12–15.9)
HGB BLDA-MCNC: 10.1 G/DL (ref 13.5–17.5)
HGB BLDA-MCNC: 13.9 G/DL (ref 13.5–17.5)
HGB BLDA-MCNC: 14.6 G/DL (ref 13.5–17.5)
HMPV RNA NPH QL NAA+NON-PROBE: NOT DETECTED
HPIV1 RNA ISLT QL NAA+PROBE: NOT DETECTED
HPIV2 RNA SPEC QL NAA+PROBE: NOT DETECTED
HPIV3 RNA NPH QL NAA+PROBE: NOT DETECTED
HPIV4 P GENE NPH QL NAA+PROBE: NOT DETECTED
IMM GRANULOCYTES # BLD AUTO: 0.15 10*3/MM3 (ref 0–0.05)
IMM GRANULOCYTES NFR BLD AUTO: 0.7 % (ref 0–0.5)
INHALED O2 CONCENTRATION: 25 %
INHALED O2 CONCENTRATION: 30 %
LYMPHOCYTES # BLD AUTO: 1.05 10*3/MM3 (ref 0.7–3.1)
LYMPHOCYTES NFR BLD AUTO: 4.6 % (ref 19.6–45.3)
Lab: ABNORMAL
M PNEUMO IGG SER IA-ACNC: NOT DETECTED
MCH RBC QN AUTO: 33 PG (ref 26.6–33)
MCHC RBC AUTO-ENTMCNC: 29.9 G/DL (ref 31.5–35.7)
MCV RBC AUTO: 110.4 FL (ref 79–97)
MODALITY: ABNORMAL
MONOCYTES # BLD AUTO: 1.2 10*3/MM3 (ref 0.1–0.9)
MONOCYTES NFR BLD AUTO: 5.3 % (ref 5–12)
NEUTROPHILS NFR BLD AUTO: 20.4 10*3/MM3 (ref 1.7–7)
NEUTROPHILS NFR BLD AUTO: 89.2 % (ref 42.7–76)
NEUTS VAC BLD QL SMEAR: NORMAL
NOTIFIED BY: ABNORMAL
NOTIFIED BY: ABNORMAL
NOTIFIED WHO: ABNORMAL
NOTIFIED WHO: ABNORMAL
NT-PROBNP SERPL-MCNC: 316.1 PG/ML (ref 0–450)
PCO2 BLDA: 77 MM HG (ref 35–45)
PCO2 BLDA: 97.8 MM HG (ref 35–45)
PCO2 BLDV: 41.3 MM HG (ref 41–51)
PCO2 TEMP ADJ BLD: 77 MM HG (ref 35–45)
PCO2 TEMP ADJ BLD: 97.8 MM HG (ref 35–45)
PH BLDA: 7.2 PH UNITS (ref 7.35–7.45)
PH BLDA: 7.32 PH UNITS (ref 7.35–7.45)
PH BLDV: 7.41 PH UNITS (ref 7.32–7.42)
PH, TEMP CORRECTED: 7.2 PH UNITS (ref 7.35–7.45)
PH, TEMP CORRECTED: 7.32 PH UNITS (ref 7.35–7.45)
PLAT MORPH BLD: NORMAL
PLATELET # BLD AUTO: 319 10*3/MM3 (ref 140–450)
PMV BLD AUTO: 8.8 FL (ref 6–12)
PO2 BLDA: 71.5 MM HG (ref 83–108)
PO2 BLDA: 80.8 MM HG (ref 83–108)
PO2 BLDV: 47 MM HG (ref 27–53)
PO2 TEMP ADJ BLD: 71.5 MM HG (ref 83–108)
PO2 TEMP ADJ BLD: 80.8 MM HG (ref 83–108)
POTASSIUM SERPL-SCNC: 5.1 MMOL/L (ref 3.5–5.2)
PROCALCITONIN SERPL-MCNC: 73.18 NG/ML (ref 0–0.25)
PROT SERPL-MCNC: 8 G/DL (ref 6–8.5)
QT INTERVAL: 289 MS
RBC # BLD AUTO: 4.6 10*6/MM3 (ref 3.77–5.28)
RHINOVIRUS RNA SPEC NAA+PROBE: NOT DETECTED
RSV RNA NPH QL NAA+NON-PROBE: NOT DETECTED
SAO2 % BLDCOA: 94.9 % (ref 94–99)
SAO2 % BLDCOA: 96.1 % (ref 94–99)
SAO2 % BLDCOV: 89.5 % (ref 45–75)
SARS-COV-2 RNA NPH QL NAA+NON-PROBE: NOT DETECTED
SARS-COV-2 RNA RESP QL NAA+PROBE: NOT DETECTED
SET MECH RESP RATE: 12
SET MECH RESP RATE: 12
SODIUM SERPL-SCNC: 137 MMOL/L (ref 136–145)
STOMATOCYTES BLD QL SMEAR: NORMAL
TROPONIN T DELTA: 5 NG/L
TROPONIN T SERPL HS-MCNC: 11 NG/L
VENTILATOR MODE: ABNORMAL
VENTILATOR MODE: ABNORMAL
VT ON VENT VENT: 500 ML
WBC NRBC COR # BLD: 22.85 10*3/MM3 (ref 3.4–10.8)

## 2023-03-27 PROCEDURE — 96365 THER/PROPH/DIAG IV INF INIT: CPT

## 2023-03-27 PROCEDURE — 99285 EMERGENCY DEPT VISIT HI MDM: CPT

## 2023-03-27 PROCEDURE — 25010000002 LEVOFLOXACIN PER 250 MG: Performed by: EMERGENCY MEDICINE

## 2023-03-27 PROCEDURE — 99223 1ST HOSP IP/OBS HIGH 75: CPT | Performed by: STUDENT IN AN ORGANIZED HEALTH CARE EDUCATION/TRAINING PROGRAM

## 2023-03-27 PROCEDURE — 96368 THER/DIAG CONCURRENT INF: CPT

## 2023-03-27 PROCEDURE — G0378 HOSPITAL OBSERVATION PER HR: HCPCS

## 2023-03-27 PROCEDURE — 82803 BLOOD GASES ANY COMBINATION: CPT

## 2023-03-27 PROCEDURE — 94640 AIRWAY INHALATION TREATMENT: CPT

## 2023-03-27 PROCEDURE — 80053 COMPREHEN METABOLIC PANEL: CPT | Performed by: EMERGENCY MEDICINE

## 2023-03-27 PROCEDURE — 93010 ELECTROCARDIOGRAM REPORT: CPT | Performed by: INTERNAL MEDICINE

## 2023-03-27 PROCEDURE — 85379 FIBRIN DEGRADATION QUANT: CPT | Performed by: INTERNAL MEDICINE

## 2023-03-27 PROCEDURE — 0202U NFCT DS 22 TRGT SARS-COV-2: CPT | Performed by: STUDENT IN AN ORGANIZED HEALTH CARE EDUCATION/TRAINING PROGRAM

## 2023-03-27 PROCEDURE — C9803 HOPD COVID-19 SPEC COLLECT: HCPCS

## 2023-03-27 PROCEDURE — 36600 WITHDRAWAL OF ARTERIAL BLOOD: CPT

## 2023-03-27 PROCEDURE — 83880 ASSAY OF NATRIURETIC PEPTIDE: CPT | Performed by: STUDENT IN AN ORGANIZED HEALTH CARE EDUCATION/TRAINING PROGRAM

## 2023-03-27 PROCEDURE — 94660 CPAP INITIATION&MGMT: CPT

## 2023-03-27 PROCEDURE — 94761 N-INVAS EAR/PLS OXIMETRY MLT: CPT

## 2023-03-27 PROCEDURE — 84484 ASSAY OF TROPONIN QUANT: CPT | Performed by: EMERGENCY MEDICINE

## 2023-03-27 PROCEDURE — 71045 X-RAY EXAM CHEST 1 VIEW: CPT

## 2023-03-27 PROCEDURE — 94799 UNLISTED PULMONARY SVC/PX: CPT

## 2023-03-27 PROCEDURE — 87636 SARSCOV2 & INF A&B AMP PRB: CPT | Performed by: INTERNAL MEDICINE

## 2023-03-27 PROCEDURE — 25010000002 METHYLPREDNISOLONE PER 125 MG: Performed by: EMERGENCY MEDICINE

## 2023-03-27 PROCEDURE — 84145 PROCALCITONIN (PCT): CPT | Performed by: STUDENT IN AN ORGANIZED HEALTH CARE EDUCATION/TRAINING PROGRAM

## 2023-03-27 PROCEDURE — 25010000002 ENOXAPARIN PER 10 MG: Performed by: STUDENT IN AN ORGANIZED HEALTH CARE EDUCATION/TRAINING PROGRAM

## 2023-03-27 PROCEDURE — 85007 BL SMEAR W/DIFF WBC COUNT: CPT | Performed by: EMERGENCY MEDICINE

## 2023-03-27 PROCEDURE — 85025 COMPLETE CBC W/AUTO DIFF WBC: CPT | Performed by: EMERGENCY MEDICINE

## 2023-03-27 PROCEDURE — 96375 TX/PRO/DX INJ NEW DRUG ADDON: CPT

## 2023-03-27 PROCEDURE — 93005 ELECTROCARDIOGRAM TRACING: CPT | Performed by: EMERGENCY MEDICINE

## 2023-03-27 PROCEDURE — 96372 THER/PROPH/DIAG INJ SC/IM: CPT

## 2023-03-27 PROCEDURE — 96367 TX/PROPH/DG ADDL SEQ IV INF: CPT

## 2023-03-27 PROCEDURE — 36415 COLL VENOUS BLD VENIPUNCTURE: CPT

## 2023-03-27 PROCEDURE — 96366 THER/PROPH/DIAG IV INF ADDON: CPT

## 2023-03-27 RX ORDER — HYDROCHLOROTHIAZIDE 12.5 MG/1
12.5 TABLET ORAL
Status: DISCONTINUED | OUTPATIENT
Start: 2023-03-27 | End: 2023-03-28 | Stop reason: HOSPADM

## 2023-03-27 RX ORDER — CLOPIDOGREL BISULFATE 75 MG/1
75 TABLET ORAL DAILY
Status: DISCONTINUED | OUTPATIENT
Start: 2023-03-27 | End: 2023-03-28 | Stop reason: HOSPADM

## 2023-03-27 RX ORDER — ALBUTEROL SULFATE 2.5 MG/3ML
2.5 SOLUTION RESPIRATORY (INHALATION) ONCE
Status: COMPLETED | OUTPATIENT
Start: 2023-03-27 | End: 2023-03-27

## 2023-03-27 RX ORDER — LEVOFLOXACIN 5 MG/ML
750 INJECTION, SOLUTION INTRAVENOUS ONCE
Status: COMPLETED | OUTPATIENT
Start: 2023-03-27 | End: 2023-03-27

## 2023-03-27 RX ORDER — ACETAMINOPHEN 325 MG/1
650 TABLET ORAL EVERY 6 HOURS PRN
Status: DISCONTINUED | OUTPATIENT
Start: 2023-03-27 | End: 2023-03-28 | Stop reason: HOSPADM

## 2023-03-27 RX ORDER — VENLAFAXINE HYDROCHLORIDE 37.5 MG/1
75 CAPSULE, EXTENDED RELEASE ORAL DAILY
Status: DISCONTINUED | OUTPATIENT
Start: 2023-03-27 | End: 2023-03-28 | Stop reason: HOSPADM

## 2023-03-27 RX ORDER — ACETAMINOPHEN, ASPIRIN AND CAFFEINE 250; 250; 65 MG/1; MG/1; MG/1
1 TABLET, FILM COATED ORAL EVERY 12 HOURS PRN
Status: DISCONTINUED | OUTPATIENT
Start: 2023-03-27 | End: 2023-03-28 | Stop reason: HOSPADM

## 2023-03-27 RX ORDER — METOPROLOL TARTRATE 50 MG/1
50 TABLET, FILM COATED ORAL EVERY 12 HOURS SCHEDULED
Status: DISCONTINUED | OUTPATIENT
Start: 2023-03-27 | End: 2023-03-28 | Stop reason: HOSPADM

## 2023-03-27 RX ORDER — SODIUM CHLORIDE 0.9 % (FLUSH) 0.9 %
10 SYRINGE (ML) INJECTION EVERY 12 HOURS SCHEDULED
Status: DISCONTINUED | OUTPATIENT
Start: 2023-03-27 | End: 2023-03-28 | Stop reason: HOSPADM

## 2023-03-27 RX ORDER — IPRATROPIUM BROMIDE AND ALBUTEROL SULFATE 2.5; .5 MG/3ML; MG/3ML
3 SOLUTION RESPIRATORY (INHALATION)
Status: DISCONTINUED | OUTPATIENT
Start: 2023-03-27 | End: 2023-03-28 | Stop reason: HOSPADM

## 2023-03-27 RX ORDER — LISINOPRIL 10 MG/1
20 TABLET ORAL
Status: DISCONTINUED | OUTPATIENT
Start: 2023-03-27 | End: 2023-03-28 | Stop reason: HOSPADM

## 2023-03-27 RX ORDER — SODIUM CHLORIDE 0.9 % (FLUSH) 0.9 %
10 SYRINGE (ML) INJECTION AS NEEDED
Status: DISCONTINUED | OUTPATIENT
Start: 2023-03-27 | End: 2023-03-28 | Stop reason: HOSPADM

## 2023-03-27 RX ORDER — GUAIFENESIN 600 MG/1
1200 TABLET, EXTENDED RELEASE ORAL 2 TIMES DAILY
Status: DISCONTINUED | OUTPATIENT
Start: 2023-03-27 | End: 2023-03-28 | Stop reason: HOSPADM

## 2023-03-27 RX ORDER — ATORVASTATIN CALCIUM 20 MG/1
20 TABLET, FILM COATED ORAL DAILY
Status: DISCONTINUED | OUTPATIENT
Start: 2023-03-27 | End: 2023-03-28 | Stop reason: HOSPADM

## 2023-03-27 RX ORDER — NICOTINE 21 MG/24HR
1 PATCH, TRANSDERMAL 24 HOURS TRANSDERMAL EVERY 24 HOURS
Status: DISCONTINUED | OUTPATIENT
Start: 2023-03-27 | End: 2023-03-28 | Stop reason: HOSPADM

## 2023-03-27 RX ORDER — METHYLPREDNISOLONE SODIUM SUCCINATE 125 MG/2ML
125 INJECTION, POWDER, LYOPHILIZED, FOR SOLUTION INTRAMUSCULAR; INTRAVENOUS ONCE
Status: COMPLETED | OUTPATIENT
Start: 2023-03-27 | End: 2023-03-27

## 2023-03-27 RX ORDER — IPRATROPIUM BROMIDE AND ALBUTEROL SULFATE 2.5; .5 MG/3ML; MG/3ML
3 SOLUTION RESPIRATORY (INHALATION) ONCE
Status: COMPLETED | OUTPATIENT
Start: 2023-03-27 | End: 2023-03-27

## 2023-03-27 RX ORDER — BUDESONIDE AND FORMOTEROL FUMARATE DIHYDRATE 160; 4.5 UG/1; UG/1
2 AEROSOL RESPIRATORY (INHALATION)
COMMUNITY

## 2023-03-27 RX ORDER — SODIUM CHLORIDE 9 MG/ML
40 INJECTION, SOLUTION INTRAVENOUS AS NEEDED
Status: DISCONTINUED | OUTPATIENT
Start: 2023-03-27 | End: 2023-03-28 | Stop reason: HOSPADM

## 2023-03-27 RX ORDER — ENOXAPARIN SODIUM 100 MG/ML
40 INJECTION SUBCUTANEOUS DAILY
Status: DISCONTINUED | OUTPATIENT
Start: 2023-03-27 | End: 2023-03-28 | Stop reason: HOSPADM

## 2023-03-27 RX ORDER — ASPIRIN 81 MG/1
81 TABLET ORAL DAILY
Status: DISCONTINUED | OUTPATIENT
Start: 2023-03-27 | End: 2023-03-28 | Stop reason: HOSPADM

## 2023-03-27 RX ORDER — METHYLPREDNISOLONE SODIUM SUCCINATE 40 MG/ML
40 INJECTION, POWDER, LYOPHILIZED, FOR SOLUTION INTRAMUSCULAR; INTRAVENOUS EVERY 8 HOURS
Status: DISCONTINUED | OUTPATIENT
Start: 2023-03-28 | End: 2023-03-28 | Stop reason: HOSPADM

## 2023-03-27 RX ORDER — ALBUTEROL SULFATE 90 UG/1
2 AEROSOL, METERED RESPIRATORY (INHALATION) EVERY 4 HOURS PRN
Status: DISCONTINUED | OUTPATIENT
Start: 2023-03-27 | End: 2023-03-28 | Stop reason: HOSPADM

## 2023-03-27 RX ORDER — DEXMEDETOMIDINE HYDROCHLORIDE 4 UG/ML
.2-1.5 INJECTION, SOLUTION INTRAVENOUS
Status: DISCONTINUED | OUTPATIENT
Start: 2023-03-27 | End: 2023-03-28 | Stop reason: HOSPADM

## 2023-03-27 RX ADMIN — METOPROLOL TARTRATE 50 MG: 50 TABLET, FILM COATED ORAL at 20:09

## 2023-03-27 RX ADMIN — LEVOFLOXACIN 750 MG: 5 INJECTION, SOLUTION INTRAVENOUS at 05:08

## 2023-03-27 RX ADMIN — METOPROLOL TARTRATE 50 MG: 50 TABLET, FILM COATED ORAL at 08:52

## 2023-03-27 RX ADMIN — ASPIRIN 81 MG: 81 TABLET, COATED ORAL at 08:52

## 2023-03-27 RX ADMIN — ALBUTEROL SULFATE 2.5 MG: 2.5 SOLUTION RESPIRATORY (INHALATION) at 04:58

## 2023-03-27 RX ADMIN — DEXMEDETOMIDINE HYDROCHLORIDE 0.2 MCG/KG/HR: 400 INJECTION, SOLUTION INTRAVENOUS at 09:07

## 2023-03-27 RX ADMIN — ACETAMINOPHEN, ASPIRIN (NSAID), AND CAFFEINE 1 TABLET: 250; 250; 65 TABLET, FILM COATED ORAL at 17:50

## 2023-03-27 RX ADMIN — HYDROCHLOROTHIAZIDE 12.5 MG: 12.5 TABLET ORAL at 08:52

## 2023-03-27 RX ADMIN — NYSTATIN 500000 UNITS: 500000 SUSPENSION ORAL at 20:08

## 2023-03-27 RX ADMIN — ALBUTEROL SULFATE 2.5 MG: 2.5 SOLUTION RESPIRATORY (INHALATION) at 04:38

## 2023-03-27 RX ADMIN — VENLAFAXINE HYDROCHLORIDE 75 MG: 37.5 CAPSULE, EXTENDED RELEASE ORAL at 08:52

## 2023-03-27 RX ADMIN — DOXYCYCLINE 100 MG: 100 INJECTION, POWDER, LYOPHILIZED, FOR SOLUTION INTRAVENOUS at 20:08

## 2023-03-27 RX ADMIN — METHYLPREDNISOLONE SODIUM SUCCINATE 125 MG: 125 INJECTION, POWDER, FOR SOLUTION INTRAMUSCULAR; INTRAVENOUS at 04:08

## 2023-03-27 RX ADMIN — IPRATROPIUM BROMIDE AND ALBUTEROL SULFATE 3 ML: 2.5; .5 SOLUTION RESPIRATORY (INHALATION) at 23:05

## 2023-03-27 RX ADMIN — IPRATROPIUM BROMIDE AND ALBUTEROL SULFATE 3 ML: 2.5; .5 SOLUTION RESPIRATORY (INHALATION) at 19:27

## 2023-03-27 RX ADMIN — IPRATROPIUM BROMIDE AND ALBUTEROL SULFATE 3 ML: 2.5; .5 SOLUTION RESPIRATORY (INHALATION) at 15:45

## 2023-03-27 RX ADMIN — LISINOPRIL 20 MG: 10 TABLET ORAL at 08:52

## 2023-03-27 RX ADMIN — IPRATROPIUM BROMIDE AND ALBUTEROL SULFATE 3 ML: 2.5; .5 SOLUTION RESPIRATORY (INHALATION) at 07:39

## 2023-03-27 RX ADMIN — NICOTINE 1 PATCH: 21 PATCH, EXTENDED RELEASE TRANSDERMAL at 08:53

## 2023-03-27 RX ADMIN — Medication 10 ML: at 08:53

## 2023-03-27 RX ADMIN — GUAIFENESIN 1200 MG: 600 TABLET, EXTENDED RELEASE ORAL at 08:51

## 2023-03-27 RX ADMIN — ENOXAPARIN SODIUM 40 MG: 40 INJECTION SUBCUTANEOUS at 08:53

## 2023-03-27 RX ADMIN — Medication 10 ML: at 20:09

## 2023-03-27 RX ADMIN — CLOPIDOGREL BISULFATE 75 MG: 75 TABLET ORAL at 08:52

## 2023-03-27 RX ADMIN — DOXYCYCLINE 100 MG: 100 INJECTION, POWDER, LYOPHILIZED, FOR SOLUTION INTRAVENOUS at 08:52

## 2023-03-27 RX ADMIN — GUAIFENESIN 1200 MG: 600 TABLET, EXTENDED RELEASE ORAL at 20:09

## 2023-03-27 RX ADMIN — IPRATROPIUM BROMIDE AND ALBUTEROL SULFATE 3 ML: .5; 3 SOLUTION RESPIRATORY (INHALATION) at 04:01

## 2023-03-27 RX ADMIN — ALBUTEROL SULFATE 2.5 MG: 2.5 SOLUTION RESPIRATORY (INHALATION) at 04:48

## 2023-03-27 RX ADMIN — IPRATROPIUM BROMIDE AND ALBUTEROL SULFATE 3 ML: 2.5; .5 SOLUTION RESPIRATORY (INHALATION) at 11:18

## 2023-03-27 RX ADMIN — ATORVASTATIN CALCIUM 20 MG: 20 TABLET, FILM COATED ORAL at 08:52

## 2023-03-27 NOTE — SIGNIFICANT NOTE
03/27/23 0815   OTHER   Discipline physical therapist   Rehab Time/Intention   Session Not Performed unable to treat, medical status change  (hold per RN at this time, will follow up)

## 2023-03-27 NOTE — CASE MANAGEMENT/SOCIAL WORK
Continued Stay Note  SARAH Garcia     Patient Name: Valeria Rubi  MRN: [id number]  Today's Date: 3/27/2023    Admit Date: 3/27/2023        Discharge Plan     Row Name 03/27/23 1502       Plan    Plan Comments Patient currently on bipap, unable to answer questions. Call to ,Harpreet Ruiz - voice mail is full and unable to leave message. LVM for son to return call for dc planning. CM will continue to follow to assess dc needs.               Discharge Codes    No documentation.                     Kasi Dawson RN

## 2023-03-27 NOTE — NURSING NOTE
Pt remains on minimal dose of 0.2 of precedex and is resting calmly with a HR of 81. Pt was able to wake up and answer orientation questions appropriately.

## 2023-03-27 NOTE — H&P
[facility] HOSPITALIST     PCP: Provider, No Known    CODE status: Full Code    CHIEF COMPLAINT: Shortness of air    HISTORY OF PRESENT ILLNESS:    47 year old female with COPD on 2L home O2 as needed, tobacco use, PAD, HTN and anxiety presents with shortness of air. Patient states that she has had progressive SOA, wheezing and productive cough for the past week. She reports that she had been out of her symbicort inhaler for the over one month prior to getting it refilled approximately one week ago. She normally wears 2L home O2 mostly at night and with activity but has had to wear it all the time over the past few days and. She has been doing more frequent breathing treatments at home with minimal improvement in symptoms so called EMS this evening. States that she has a non-productive cough at baseline but has recently been producing thick, green sputum. She does endorse new swelling of bilateral ankles but denies orthopnea or PND. No recent sick contacts. Denies fever, chills, chest pain, palpitations, n/v/d.      Past Medical History:   Diagnosis Date   • Anxiety    • Arthritis    • COPD (chronic obstructive pulmonary disease) (HCC)    • Coronary stent occlusion    • CTS (carpal tunnel syndrome)    • Depression    • Dizziness    • Headache    • Hyperlipidemia    • Hypertension    • Migraine    • Numbness and tingling    • Pneumonia      Past Surgical History:   Procedure Laterality Date   • ANGIOPLASTY ILIAC ARTERY Left 6/17/2016    Procedure: BILATERAL DUPLEX DIRECTED ACCESS, AIF BILATERAL RUNOFF, SELECTIVE CATHETERIZATION OF RIGHT EXTERNAL ILIAC WITH ANGIOPLASTY, LEFT COMMON ILIAC STENT PLACEMENT;  Surgeon: Jose Carlos Plascencia MD;  Location: [facility] 18/19;  Service:    • BREAST SURGERY      BILAT IMPLANTS   • CAROTID ARTERY ANGIOPLASTY       Family History   Problem Relation Age of Onset   • Heart disease Father    • Hypertension Father    • Diabetes Father    • Stroke Father    • COPD  Father    • Heart failure Paternal Grandmother    • Heart failure Paternal Grandfather    • COPD Mother    • COPD Maternal Aunt    • COPD Maternal Uncle    • COPD Paternal Uncle      Social History     Tobacco Use   • Smoking status: Every Day     Packs/day: 1.00     Years: 30.00     Pack years: 30.00     Types: Cigarettes   • Smokeless tobacco: Never   Substance Use Topics   • Alcohol use: No   • Drug use: No     Medications Prior to Admission   Medication Sig Dispense Refill Last Dose   • albuterol (PROVENTIL) (2.5 MG/3ML) 0.083% nebulizer solution USE 1 VIAL VIA NEBULIZER EVERY 4 HOURS AS NEEDED 225 mL 3 3/27/2023   • albuterol sulfate HFA (Proventil HFA) 108 (90 Base) MCG/ACT inhaler Inhale 2 puffs into the lungs Every 4 (Four) Hours As Needed for Wheezing. 8.5 g 1 3/27/2023   • aspirin (aspirin) 81 MG EC tablet Take 1 tablet by mouth Daily. 90 tablet 3 3/27/2023   • budesonide-formoterol (SYMBICORT) 160-4.5 MCG/ACT inhaler Inhale 2 puffs 2 (Two) Times a Day.   3/27/2023   • clopidogrel (Plavix) 75 MG tablet Take 1 tablet by mouth Daily. 90 tablet 1 3/27/2023   • fluticasone (FLONASE) 50 MCG/ACT nasal spray 1 spray into the nostril(s) as directed by provider Daily As Needed.   3/27/2023   • guaiFENesin (MUCINEX) 600 MG 12 hr tablet Take 2 tablets by mouth 2 (Two) Times a Day. 40 tablet 0 3/27/2023   • ipratropium-albuterol (DUO-NEB) 0.5-2.5 mg/3 ml nebulizer Take 3 mL by nebulization Every 4 (Four) Hours As Needed for Wheezing. 90 mL 1 3/27/2023   • lisinopril-hydrochlorothiazide (PRINZIDE,ZESTORETIC) 20-12.5 MG per tablet TAKE 1 TABLET BY MOUTH EVERY DAY 90 tablet 0 3/27/2023   • metoprolol tartrate (LOPRESSOR) 50 MG tablet Take 1 tablet by mouth Every 12 (Twelve) Hours. 60 tablet 1 3/27/2023   • simvastatin (ZOCOR) 40 MG tablet Take 1 tablet by mouth Every Evening. 90 tablet 1 3/27/2023   • venlafaxine XR (EFFEXOR-XR) 75 MG 24 hr capsule TAKE 1 CAPSULE BY MOUTH EVERY DAY 90 capsule 0 3/27/2023   • vitamin D  "(ERGOCALCIFEROL) 32691 units capsule capsule Take 1 capsule by mouth Every 7 (Seven) Days. 6 capsule 5 Past Week   • medroxyPROGESTERone (DEPO-PROVERA) 150 MG/ML injection Apply 1 mL to cheek Every 3 (Three) Months. Pt takes every 3 months.march or April 2018 last dose   Unknown   • nicotine (NICODERM CQ) 21 MG/24HR patch Place 1 patch on the skin as directed by provider Daily. 30 patch 0 Unknown     Allergies:  Prednisone    Immunization History   Administered Date(s) Administered   • flucelvax quad pfs =>4 YRS 10/02/2019       REVIEW OF SYSTEMS:    Gen: No fever, chills. +fatigue  HEENT: No headache, vision changes  CVS: No chest pain, palpitations, orthopnea, PND  Resp: +SOA, +productive cough, +wheezing  Abd: No nausea, vomiting  Extremities: +LE edema  Neuro: No numbness, tingling, weakness    Vital Signs  Temp:  [98.3 °F (36.8 °C)] 98.3 °F (36.8 °C)  Heart Rate:  [138-144] 141  Resp:  [23-27] 27  BP: (133-195)/(107-121) 133/110  Flowsheet Rows    Flowsheet Row First Filed Value   Admission Height 157.5 cm (62\") Documented at 03/27/2023 0341   Admission Weight 51.3 kg (113 lb) Documented at 03/27/2023 0341           Physical Exam:  General: Moderate respiratory distress, BIPAP mask in place  HENT: NCAT, moist mucus membranes  Eyes: EOMI, PERRL   Cardiovascular: Tachycardic, regular rhythm, no m/r/g  Respiratory: Tachypneic, poor air movement, diffuse expiratory wheezes  Abdominal/GI: Soft, nontender, bowel sounds present. No rebound or guarding present.   Extremities: Trace bilateral LE edema to lower ankle  Musculoskeletal: Normal strength and ROM of all extremities  Skin: Warm and dry.   Psych: Normal mood and affect   Neuro: AOx3, CN II-XII grossly intact, no focal neuro deficits    Emotional Behavior: all of the following were found to be normal   Judgment and Insight   Mental Status   Memory   Mood and Affect: neither of the following found acutely        Depression                 Anxiety     Debilities: " no signs of the following found    Physical Weakness     Handicaps     Disabilities     Agitation         Results Review:    I reviewed the patient's new clinical results.  Lab Results (most recent)     Procedure Component Value Units Date/Time    CBC & Differential [[id number]]  (Abnormal) Collected: 03/27/23 0416    Specimen: Blood Updated: 03/27/23 0535    Narrative:      The following orders were created for panel order CBC & Differential.  Procedure                               Abnormality         Status                     ---------                               -----------         ------                     CBC Auto Differential[[id number]]        Abnormal            Final result               Scan Slide[[id number]]                                       Final result                 Please view results for these tests on the individual orders.    Scan Slide [[id number]] Collected: 03/27/23 0416    Specimen: Blood Updated: 03/27/23 0535     Stomatocytes Mod/2+     Vacuolated Neutrophils Slight/1+     Platelet Morphology Normal    High Sensitivity Troponin T [[id number]]  (Abnormal) Collected: 03/27/23 0416    Specimen: Blood Updated: 03/27/23 0444     HS Troponin T 11 ng/L     Narrative:      High Sensitive Troponin T Reference Range:  <10.0 ng/L- Negative Female for AMI  <15.0 ng/L- Negative Male for AMI  >=10 - Abnormal Female indicating possible myocardial injury.  >=15 - Abnormal Male indicating possible myocardial injury.   Clinicians would have to utilize clinical acumen, EKG, Troponin, and serial changes to determine if it is an Acute Myocardial Infarction or myocardial injury due to an underlying chronic condition.         Comprehensive Metabolic Panel [[id number]]  (Abnormal) Collected: 03/27/23 0416    Specimen: Blood Updated: 03/27/23 0442     Glucose 160 mg/dL      BUN 10 mg/dL      Creatinine 0.61 mg/dL      Sodium 137 mmol/L      Potassium 5.1 mmol/L      Chloride 94 mmol/L      CO2 35.8 mmol/L       Calcium 9.8 mg/dL      Total Protein 8.0 g/dL      Albumin 4.0 g/dL      ALT (SGPT) 6 U/L      AST (SGOT) 13 U/L      Alkaline Phosphatase 125 U/L      Total Bilirubin 0.3 mg/dL      Globulin 4.0 gm/dL      A/G Ratio 1.0 g/dL      BUN/Creatinine Ratio 16.4     Anion Gap 7.2 mmol/L      eGFR 109.8 mL/min/1.73     Narrative:      GFR Normal >60  Chronic Kidney Disease <60  Kidney Failure <15      CBC Auto Differential [[id number]]  (Abnormal) Collected: 03/27/23 0416    Specimen: Blood Updated: 03/27/23 0427     WBC 22.85 10*3/mm3      RBC 4.60 10*6/mm3      Hemoglobin 15.2 g/dL      Hematocrit 50.8 %      .4 fL      MCH 33.0 pg      MCHC 29.9 g/dL      RDW 12.4 %      RDW-SD 52.4 fl      MPV 8.8 fL      Platelets 319 10*3/mm3      Neutrophil % 89.2 %      Lymphocyte % 4.6 %      Monocyte % 5.3 %      Eosinophil % 0.1 %      Basophil % 0.1 %      Immature Grans % 0.7 %      Neutrophils, Absolute 20.40 10*3/mm3      Lymphocytes, Absolute 1.05 10*3/mm3      Monocytes, Absolute 1.20 10*3/mm3      Eosinophils, Absolute 0.02 10*3/mm3      Basophils, Absolute 0.03 10*3/mm3      Immature Grans, Absolute 0.15 10*3/mm3     Blood Gas, Arterial - [[id number]]  (Abnormal) Collected: 03/27/23 0408    Specimen: Arterial Blood Updated: 03/27/23 0416     Site Left Radial     Marquis's Test Positive     pH, Arterial 7.203 pH units      Comment: 85 Value below critical limit        pCO2, Arterial 97.8 mm Hg      Comment: 86 Value above critical limit        pO2, Arterial 80.8 mm Hg      Comment: 84 Value below reference range        HCO3, Arterial 38.5 mmol/L      Comment: 83 Value above reference range        Base Excess, Arterial 6.4 mmol/L      Comment: 83 Value above reference range        O2 Saturation, Arterial 94.9 %      Hemoglobin, Blood Gas 14.6 g/dL      Temperature 37.0 C      Barometric Pressure for Blood Gas 735 mmHg      Modality Nasal Cannula     Flow Rate 2.0 lpm      Notified Who RB AND V DR SHORE     Notified  By [id number]     Notified Time 03/27/2023 04:16     Collected by [id number]     Comment: Meter: [id number]     :  [id number]        pCO2, Temperature Corrected 97.8 mm Hg      pH, Temp Corrected 7.203 pH Units      pO2, Temperature Corrected 80.8 mm Hg           Imaging Results (Most Recent)     Procedure Component Value Units Date/Time    XR Chest 1 View [[id number]] Collected: 03/27/23 0452     Updated: 03/27/23 0454    Narrative:      CR Chest 1 Vw    INDICATION:   Shortness of air one week. Worsening symptoms tonight following panic attack. COPD.     COMPARISON:    1/9/2023 and 12/5/2022    FINDINGS:  Portable AP view(s) of the chest.  Cardiac silhouette is within normal limits. The vascularity is unremarkable. The lungs are hyperinflated. There is no effusion or dense consolidation. Old healed granulomatous disease. There is minimal blunting of the  CP angles bilaterally likely related to chronic pleural reaction rather than trace effusions. There is no pneumothorax.      Impression:        1. COPD. No superimposed active disease. No significant change for technical factors.    Signer Name: Huy Hilario MD   Signed: 3/27/2023 4:52 AM   Workstation Name: [id number]    [facility]          ECG/EMG Results (most recent)     Procedure Component Value Units Date/Time    ECG 12 Lead Dyspnea [[id number]] Collected: 03/27/23 0446     Updated: 03/27/23 0448     QT Interval 289 ms     Narrative:      HEART RATE= 141  bpm  RR Interval= 424  ms  KS Interval= 129  ms  P Horizontal Axis= 16  deg  P Front Axis= 79  deg  QRSD Interval= 67  ms  QT Interval= 289  ms  QRS Axis= 61  deg  T Wave Axis= 129  deg  - OTHERWISE NORMAL ECG -  Sinus tachycardia  Ventricular premature complex  Electronically Signed By:   Date and Time of Study: 2023-03-27 04:46:12          Assessment & Plan     Acute on chronic hypercapnic hypoxic respiratory failure secondary to COPD exacerbation  -On 2L NC prn at home  -ABG on  admission: 7.20/97.8/80.8/36 on 2L NC - now on BIPAP  -I viewed patient's CXR on admission and my interpretation is no acute cardiopulmonary disease  -Respiratory viral panel, procal pending  -Continue BIPAP for now and wean to NC as tolerated  -Continue home guaifenesin  -Scheduled duonebs  -Solu-medrol IV 40mg q8h  -Given IV levofloxacin x1 in the ED - QTc read as normal however on my interpretation it appears over half R-R interval on EKG so will switch to doxycycline     Tachycardia  -Likely related to respiratory distress and breathing treatments  -EKG on admission sinus tachycardia (rate 140s) with no ischemic changes  -On continuous telemetry  -Continue home metoprolol    Leukocytosis  -WBC 22 on admission (received IV steroids prior to labs)  -CXR unremarkable, UA pending  -On doxycycline for COPD exacerbation    Hypertension  -Continue home lisinopril, HCTZ and metoprolol     Peripheral arterial disease  -S/p bilateral LE stents in 2016  -Continue home ASA, plavix and simvastatin    Tobacco Use Disorder  -Nicotine patch  - on importance of smoking cessation    Anxiety  -Continue home venlafaxine    Vitamin D deficiency  -Has been taking 50k units weekly for >12 weeks  -Vitamin D level pending  -Hold home supplement for now    DVT PPX: Lovenox  Dispo: Admit to ICU as patient is at high risk for respiratory decompensation    Jose Carlos Castro MD  03/27/23  05:52 EDT

## 2023-03-27 NOTE — NURSING NOTE
Pt remains on 0.2 of precedex and resting comfortably; pt is able to anwser orientation questions appropriately.

## 2023-03-27 NOTE — PLAN OF CARE
Goal Outcome Evaluation:  Plan of Care Reviewed With: patient        Progress: improving  Outcome Evaluation: Pt was on BiPap with FiO2 at 25%, BiPap was taken off after last VBG results and pt is on 2L NC w/BiPap to be used at HS; pt was on 0.2 of precedex while on BiPap for anxiety; all VSS; pt has been able drink fluids but doesn't have much of an appetite; minimal UOP and pt is passing some gas; pt is able to get up to the BSC with no issues.

## 2023-03-27 NOTE — ED PROVIDER NOTES
Subjective   History of Present Illness  47-year-old female with history of COPD presents via EMS with complaint of shortness of breath.  Patient reports her breathing has been getting worse over about the past week.  She has been doing frequent breathing treatments, wearing her oxygen more often than usual reports she did not get relief from this this evening.  Occasional nonproductive cough.  No fevers or chills.  No nausea or vomiting.  No chest pain.  Reports she has not been on steroids since her last visit here which appears to be in November of last year.        Review of Systems   Constitutional: Negative for chills and fever.   HENT: Positive for congestion. Negative for sinus pain and sore throat.    Eyes: Negative.    Respiratory:        Per HPI   Cardiovascular:        Per HPI   Gastrointestinal: Negative.    Genitourinary: Negative.    Musculoskeletal: Negative.    Skin: Negative.    Neurological: Negative.    Psychiatric/Behavioral:        Anxiety       Past Medical History:   Diagnosis Date   • Anxiety    • Arthritis    • COPD (chronic obstructive pulmonary disease) (HCC)    • Coronary stent occlusion    • CTS (carpal tunnel syndrome)    • Depression    • Dizziness    • Headache    • Hyperlipidemia    • Hypertension    • Migraine    • Numbness and tingling    • Pneumonia        Allergies   Allergen Reactions   • Prednisone Other (See Comments)     Rash in mouth       Past Surgical History:   Procedure Laterality Date   • ANGIOPLASTY ILIAC ARTERY Left 6/17/2016    Procedure: BILATERAL DUPLEX DIRECTED ACCESS, AIF BILATERAL RUNOFF, SELECTIVE CATHETERIZATION OF RIGHT EXTERNAL ILIAC WITH ANGIOPLASTY, LEFT COMMON ILIAC STENT PLACEMENT;  Surgeon: Jose Carlos Plascencia MD;  Location: [facility] 18/19;  Service:    • BREAST SURGERY      BILAT IMPLANTS   • CAROTID ARTERY ANGIOPLASTY         Family History   Problem Relation Age of Onset   • Heart disease Father    • Hypertension Father    • Diabetes Father    •  Stroke Father    • COPD Father    • Heart failure Paternal Grandmother    • Heart failure Paternal Grandfather    • COPD Mother    • COPD Maternal Aunt    • COPD Maternal Uncle    • COPD Paternal Uncle        Social History     Socioeconomic History   • Marital status: Single   Tobacco Use   • Smoking status: Every Day     Packs/day: 1.00     Years: 30.00     Pack years: 30.00     Types: Cigarettes   • Smokeless tobacco: Never   Substance and Sexual Activity   • Alcohol use: No   • Drug use: No   • Sexual activity: Defer           Objective   Physical Exam  Constitutional:       Comments: Ill-appearing female in moderate respiratory distress, appears older than stated age   HENT:      Head: Normocephalic and atraumatic.      Mouth/Throat:      Mouth: Mucous membranes are moist.      Pharynx: Oropharynx is clear.   Eyes:      Extraocular Movements: Extraocular movements intact.      Pupils: Pupils are equal, round, and reactive to light.   Cardiovascular:      Comments: Heart rate 130s, regular  Pulmonary:      Comments: Respiratory rate upper 30s, moderately increased work of breathing, very little air movement throughout  Abdominal:      General: There is no distension.      Palpations: Abdomen is soft.      Tenderness: There is no abdominal tenderness.   Musculoskeletal:         General: No swelling, tenderness, deformity or signs of injury. Normal range of motion.   Skin:     General: Skin is warm and dry.   Neurological:      General: No focal deficit present.      Mental Status: She is oriented to person, place, and time. Mental status is at baseline.   Psychiatric:         Mood and Affect: Mood normal.         Behavior: Behavior normal.         Thought Content: Thought content normal.         Judgment: Judgment normal.         Procedures           ED Course  ED Course as of 03/27/23 0508   Mon Mar 27, 2023   0506 Patient arrives in respiratory distress, findings consistent with COPD exacerbation.  She has had  improvement of this with multiple breathing treatments, Solu-Medrol, and is now on BiPAP.  Patient does have elevated white blood cell count but does not really give infectious history.  ABG with pH 7.2, CO2 of 97 however patient is awake, alert, oriented.  We will give patient Levaquin for COPD exacerbation.  Will admit to hospitalist for further management. [TD]   0507 EKG obtained at 0446 shows sinus tachycardia, rate 141, normal NV and QTc, no ST segment or T wave changes [TD]      ED Course User Index  [TD] Mark Dugan MD                                           [facility]    Final diagnoses:   COPD exacerbation (HCC)       ED Disposition  ED Disposition     ED Disposition   Decision to Admit    Condition   --    Comment   Level of Care: Critical Care [6]   Diagnosis: COPD exacerbation (HCC) [100652]   Admitting Physician: SANGEETHA BERRY [[id number]]   Attending Physician: SANGEETHA BERRY [[id number]]               No follow-up provider specified.       Medication List      No changes were made to your prescriptions during this visit.          Mark Dugan MD  03/27/23 8134

## 2023-03-27 NOTE — PROGRESS NOTES
Early morning colleagues note reviewed by me.  Patient known to me in this facility from previous admissions.  She remains with severe tachycardia, and increased work of breathing, she is acidotic with PCO2 of over 97.  She is currently on BiPAP saturating well.  We will give IV Lopressor in an attempt to lower her heart rate and thereby decrease work of breathing now that she is saturating well.  Checking D-dimer as well, and since procalcitonin is severely elevated will check UA.  Hoping patient will further stabilize so that CT chest can be performed. I have asked nursing staff to contact RT to have intubation tools outside the room in ICU.

## 2023-03-27 NOTE — SIGNIFICANT NOTE
03/27/23 1433   OTHER   Discipline physical therapist   Rehab Time/Intention   Session Not Performed other (see comments)  (per RN, pt continues not to be appropriate for PT evaluation at this time, will follow up in AM.)

## 2023-03-28 ENCOUNTER — READMISSION MANAGEMENT (OUTPATIENT)
Dept: CALL CENTER | Facility: HOSPITAL | Age: 48
End: 2023-03-28
Payer: COMMERCIAL

## 2023-03-28 VITALS
RESPIRATION RATE: 16 BRPM | BODY MASS INDEX: 21.1 KG/M2 | DIASTOLIC BLOOD PRESSURE: 82 MMHG | WEIGHT: 114.64 LBS | SYSTOLIC BLOOD PRESSURE: 132 MMHG | HEART RATE: 83 BPM | HEIGHT: 62 IN | OXYGEN SATURATION: 91 % | TEMPERATURE: 98 F

## 2023-03-28 LAB
25(OH)D3 SERPL-MCNC: 10.8 NG/ML (ref 30–100)
ANION GAP SERPL CALCULATED.3IONS-SCNC: 9.4 MMOL/L (ref 5–15)
BACTERIA UR QL AUTO: ABNORMAL /HPF
BASOPHILS # BLD AUTO: 0.02 10*3/MM3 (ref 0–0.2)
BASOPHILS NFR BLD AUTO: 0.1 % (ref 0–1.5)
BILIRUB UR QL STRIP: NEGATIVE
BUN SERPL-MCNC: 22 MG/DL (ref 6–20)
BUN/CREAT SERPL: 30.1 (ref 7–25)
CALCIUM SPEC-SCNC: 9.3 MG/DL (ref 8.6–10.5)
CHLORIDE SERPL-SCNC: 93 MMOL/L (ref 98–107)
CLARITY UR: ABNORMAL
CO2 SERPL-SCNC: 34.6 MMOL/L (ref 22–29)
COLOR UR: YELLOW
CREAT SERPL-MCNC: 0.73 MG/DL (ref 0.57–1)
DEPRECATED RDW RBC AUTO: 49.9 FL (ref 37–54)
EGFRCR SERPLBLD CKD-EPI 2021: 101 ML/MIN/1.73
EOSINOPHIL # BLD AUTO: 0.01 10*3/MM3 (ref 0–0.4)
EOSINOPHIL NFR BLD AUTO: 0.1 % (ref 0.3–6.2)
ERYTHROCYTE [DISTWIDTH] IN BLOOD BY AUTOMATED COUNT: 12.5 % (ref 12.3–15.4)
GLUCOSE SERPL-MCNC: 96 MG/DL (ref 65–99)
GLUCOSE UR STRIP-MCNC: NEGATIVE MG/DL
HCT VFR BLD AUTO: 43 % (ref 34–46.6)
HGB BLD-MCNC: 13.3 G/DL (ref 12–15.9)
HGB UR QL STRIP.AUTO: ABNORMAL
HYALINE CASTS UR QL AUTO: ABNORMAL /LPF
IMM GRANULOCYTES # BLD AUTO: 0.11 10*3/MM3 (ref 0–0.05)
IMM GRANULOCYTES NFR BLD AUTO: 0.7 % (ref 0–0.5)
KETONES UR QL STRIP: NEGATIVE
LEUKOCYTE ESTERASE UR QL STRIP.AUTO: NEGATIVE
LYMPHOCYTES # BLD AUTO: 1.71 10*3/MM3 (ref 0.7–3.1)
LYMPHOCYTES NFR BLD AUTO: 10.8 % (ref 19.6–45.3)
MACROCYTES BLD QL SMEAR: NORMAL
MAGNESIUM SERPL-MCNC: 2.3 MG/DL (ref 1.6–2.6)
MCH RBC QN AUTO: 33.6 PG (ref 26.6–33)
MCHC RBC AUTO-ENTMCNC: 30.9 G/DL (ref 31.5–35.7)
MCV RBC AUTO: 108.6 FL (ref 79–97)
MONOCYTES # BLD AUTO: 0.91 10*3/MM3 (ref 0.1–0.9)
MONOCYTES NFR BLD AUTO: 5.7 % (ref 5–12)
NEUTROPHILS NFR BLD AUTO: 13.07 10*3/MM3 (ref 1.7–7)
NEUTROPHILS NFR BLD AUTO: 82.6 % (ref 42.7–76)
NITRITE UR QL STRIP: NEGATIVE
NRBC BLD AUTO-RTO: 0 /100 WBC (ref 0–0.2)
PH UR STRIP.AUTO: 6 [PH] (ref 4.5–8)
PHOSPHATE SERPL-MCNC: 1.7 MG/DL (ref 2.5–4.5)
PLAT MORPH BLD: NORMAL
PLATELET # BLD AUTO: 292 10*3/MM3 (ref 140–450)
PMV BLD AUTO: 9.3 FL (ref 6–12)
POLYCHROMASIA BLD QL SMEAR: NORMAL
POTASSIUM SERPL-SCNC: 4.7 MMOL/L (ref 3.5–5.2)
PROT UR QL STRIP: ABNORMAL
RBC # BLD AUTO: 3.96 10*6/MM3 (ref 3.77–5.28)
RBC # UR STRIP: ABNORMAL /HPF
REF LAB TEST METHOD: ABNORMAL
SODIUM SERPL-SCNC: 137 MMOL/L (ref 136–145)
SP GR UR STRIP: 1.02 (ref 1–1.03)
SQUAMOUS #/AREA URNS HPF: ABNORMAL /HPF
UROBILINOGEN UR QL STRIP: ABNORMAL
WBC # UR STRIP: ABNORMAL /HPF
WBC MORPH BLD: NORMAL
WBC NRBC COR # BLD: 15.83 10*3/MM3 (ref 3.4–10.8)

## 2023-03-28 PROCEDURE — 97161 PT EVAL LOW COMPLEX 20 MIN: CPT

## 2023-03-28 PROCEDURE — 85025 COMPLETE CBC W/AUTO DIFF WBC: CPT | Performed by: STUDENT IN AN ORGANIZED HEALTH CARE EDUCATION/TRAINING PROGRAM

## 2023-03-28 PROCEDURE — 94799 UNLISTED PULMONARY SVC/PX: CPT

## 2023-03-28 PROCEDURE — 96372 THER/PROPH/DIAG INJ SC/IM: CPT

## 2023-03-28 PROCEDURE — 99239 HOSP IP/OBS DSCHRG MGMT >30: CPT | Performed by: INTERNAL MEDICINE

## 2023-03-28 PROCEDURE — 80048 BASIC METABOLIC PNL TOTAL CA: CPT | Performed by: INTERNAL MEDICINE

## 2023-03-28 PROCEDURE — 96376 TX/PRO/DX INJ SAME DRUG ADON: CPT

## 2023-03-28 PROCEDURE — 84100 ASSAY OF PHOSPHORUS: CPT | Performed by: STUDENT IN AN ORGANIZED HEALTH CARE EDUCATION/TRAINING PROGRAM

## 2023-03-28 PROCEDURE — 96366 THER/PROPH/DIAG IV INF ADDON: CPT

## 2023-03-28 PROCEDURE — 97165 OT EVAL LOW COMPLEX 30 MIN: CPT

## 2023-03-28 PROCEDURE — 94664 DEMO&/EVAL PT USE INHALER: CPT

## 2023-03-28 PROCEDURE — 25010000002 ENOXAPARIN PER 10 MG: Performed by: STUDENT IN AN ORGANIZED HEALTH CARE EDUCATION/TRAINING PROGRAM

## 2023-03-28 PROCEDURE — G0378 HOSPITAL OBSERVATION PER HR: HCPCS

## 2023-03-28 PROCEDURE — 25010000002 METHYLPREDNISOLONE PER 40 MG: Performed by: STUDENT IN AN ORGANIZED HEALTH CARE EDUCATION/TRAINING PROGRAM

## 2023-03-28 PROCEDURE — 81001 URINALYSIS AUTO W/SCOPE: CPT | Performed by: STUDENT IN AN ORGANIZED HEALTH CARE EDUCATION/TRAINING PROGRAM

## 2023-03-28 PROCEDURE — 85007 BL SMEAR W/DIFF WBC COUNT: CPT | Performed by: STUDENT IN AN ORGANIZED HEALTH CARE EDUCATION/TRAINING PROGRAM

## 2023-03-28 PROCEDURE — 83735 ASSAY OF MAGNESIUM: CPT | Performed by: STUDENT IN AN ORGANIZED HEALTH CARE EDUCATION/TRAINING PROGRAM

## 2023-03-28 PROCEDURE — 94761 N-INVAS EAR/PLS OXIMETRY MLT: CPT

## 2023-03-28 PROCEDURE — 82306 VITAMIN D 25 HYDROXY: CPT | Performed by: STUDENT IN AN ORGANIZED HEALTH CARE EDUCATION/TRAINING PROGRAM

## 2023-03-28 RX ORDER — DOXYCYCLINE 100 MG/1
100 CAPSULE ORAL EVERY 12 HOURS SCHEDULED
Status: DISCONTINUED | OUTPATIENT
Start: 2023-03-28 | End: 2023-03-28 | Stop reason: HOSPADM

## 2023-03-28 RX ORDER — METHYLPREDNISOLONE 4 MG/1
TABLET ORAL
Qty: 21 TABLET | Refills: 0 | Status: SHIPPED | OUTPATIENT
Start: 2023-03-28

## 2023-03-28 RX ORDER — DOXYCYCLINE 100 MG/1
100 CAPSULE ORAL EVERY 12 HOURS SCHEDULED
Qty: 10 CAPSULE | Refills: 0 | Status: SHIPPED | OUTPATIENT
Start: 2023-03-28 | End: 2023-04-02

## 2023-03-28 RX ORDER — IPRATROPIUM BROMIDE AND ALBUTEROL SULFATE 2.5; .5 MG/3ML; MG/3ML
3 SOLUTION RESPIRATORY (INHALATION) EVERY 4 HOURS PRN
Qty: 90 ML | Refills: 1 | Status: SHIPPED | OUTPATIENT
Start: 2023-03-28

## 2023-03-28 RX ADMIN — NYSTATIN 500000 UNITS: 500000 SUSPENSION ORAL at 08:57

## 2023-03-28 RX ADMIN — ASPIRIN 81 MG: 81 TABLET, COATED ORAL at 08:58

## 2023-03-28 RX ADMIN — IPRATROPIUM BROMIDE AND ALBUTEROL SULFATE 3 ML: 2.5; .5 SOLUTION RESPIRATORY (INHALATION) at 11:03

## 2023-03-28 RX ADMIN — Medication 10 ML: at 08:59

## 2023-03-28 RX ADMIN — POTASSIUM & SODIUM PHOSPHATES POWDER PACK 280-160-250 MG 2 PACKET: 280-160-250 PACK at 06:22

## 2023-03-28 RX ADMIN — HYDROCHLOROTHIAZIDE 12.5 MG: 12.5 TABLET ORAL at 08:58

## 2023-03-28 RX ADMIN — ATORVASTATIN CALCIUM 20 MG: 20 TABLET, FILM COATED ORAL at 08:58

## 2023-03-28 RX ADMIN — DOXYCYCLINE 100 MG: 100 INJECTION, POWDER, LYOPHILIZED, FOR SOLUTION INTRAVENOUS at 08:57

## 2023-03-28 RX ADMIN — CLOPIDOGREL BISULFATE 75 MG: 75 TABLET ORAL at 08:58

## 2023-03-28 RX ADMIN — ENOXAPARIN SODIUM 40 MG: 40 INJECTION SUBCUTANEOUS at 08:57

## 2023-03-28 RX ADMIN — GUAIFENESIN 1200 MG: 600 TABLET, EXTENDED RELEASE ORAL at 08:58

## 2023-03-28 RX ADMIN — METOPROLOL TARTRATE 50 MG: 50 TABLET, FILM COATED ORAL at 08:58

## 2023-03-28 RX ADMIN — LISINOPRIL 20 MG: 10 TABLET ORAL at 08:58

## 2023-03-28 RX ADMIN — IPRATROPIUM BROMIDE AND ALBUTEROL SULFATE 3 ML: 2.5; .5 SOLUTION RESPIRATORY (INHALATION) at 03:04

## 2023-03-28 RX ADMIN — METHYLPREDNISOLONE SODIUM SUCCINATE 40 MG: 40 INJECTION, POWDER, FOR SOLUTION INTRAMUSCULAR; INTRAVENOUS at 12:28

## 2023-03-28 RX ADMIN — NICOTINE 1 PATCH: 21 PATCH, EXTENDED RELEASE TRANSDERMAL at 05:51

## 2023-03-28 RX ADMIN — IPRATROPIUM BROMIDE AND ALBUTEROL SULFATE 3 ML: 2.5; .5 SOLUTION RESPIRATORY (INHALATION) at 07:09

## 2023-03-28 RX ADMIN — VENLAFAXINE HYDROCHLORIDE 75 MG: 37.5 CAPSULE, EXTENDED RELEASE ORAL at 08:58

## 2023-03-28 RX ADMIN — METHYLPREDNISOLONE SODIUM SUCCINATE 40 MG: 40 INJECTION, POWDER, FOR SOLUTION INTRAMUSCULAR; INTRAVENOUS at 03:33

## 2023-03-28 RX ADMIN — NYSTATIN 500000 UNITS: 500000 SUSPENSION ORAL at 12:28

## 2023-03-28 NOTE — PLAN OF CARE
Goal Outcome Evaluation:              Outcome Evaluation: Physical therapy evaluation complete.  Patient performs supine to sit and sit to stand independently.  Patient performs gait without use of device x60 feet, independently.  Patient able to manage direction changes without difficulty or balance loss.  patient reports at baseline she ambulates short distances within the home and reports current mobility/balance is at baseline.  Patient with no concerns regarding return home at this time.  No further PT needs at this time.

## 2023-03-28 NOTE — CASE MANAGEMENT/SOCIAL WORK
Case Management Discharge Note      Final Note: dc home         Selected Continued Care - Discharged on 3/28/2023 Admission date: 3/27/2023 - Discharge disposition: Home or Self Care    Destination    No services have been selected for the patient.              Durable Medical Equipment     Service Provider Selected Services Address Phone Fax Patient Preferred    [facility] [address], [place] [id number] [phone] --          Dialysis/Infusion    No services have been selected for the patient.              Home Medical Care    No services have been selected for the patient.              Therapy    No services have been selected for the patient.              Community Resources    No services have been selected for the patient.              Community & DME    No services have been selected for the patient.                       Final Discharge Disposition Code: 01 - home or self-care

## 2023-03-28 NOTE — CASE MANAGEMENT/SOCIAL WORK
Continued Stay Note  SARAH Garcia     Patient Name: Valeria Rubi  MRN: [id number]  Today's Date: 3/28/2023    Admit Date: 3/27/2023    Plan: plan home with    Discharge Plan     Row Name 03/28/23 1402       Plan    Plan plan home with     Patient/Family in Agreement with Plan yes    Plan Comments Spoke with patient at bedside, face sheet verified. Patient lives in a home with her  and is independent of ADLs including driving. She states she has 02 per [employer] and has a nebulizer that is not working. She has not used HH or inpatient rehab previously. She has a living will on file. She does not have a PCP, listing of area providers given to patient for review. She uses [facility] and denies issues obtaining medications.  She plans to return home at [place] and her  will provide ride home. Spoke with [employer] who states insurance will cover a nebulizer. [facility]. portable 02 tank from [employer] provided for dc. Patient will contact [employer] once she arrives home to have her home portable tanks replaced per [employer]. No additional needs noted. Anticipate dc home with  this afternoon.               Discharge Codes    No documentation.               Expected Discharge Date and Time     Expected Discharge Date Expected Discharge Time    Mar 28, 2023             Kasi Dawson RN

## 2023-03-28 NOTE — THERAPY EVALUATION
Patient Name: Valeria Rubi  : 1975    MRN: [id number]                              Today's Date: 3/28/2023       Admit Date: 3/27/2023    Visit Dx:     ICD-10-CM ICD-9-CM   1. COPD exacerbation (HCC)  J44.1 491.21     Patient Active Problem List   Diagnosis   • Abnormal mammogram   • Atopic rhinitis   • Bronchitis   • Chronic headache   • Pain of hand   • Hyperlipidemia   • Essential hypertension   • Sprain of ankle   • Nausea   • Pain in wrist   • Wheezing   • Panlobular emphysema (HCC)   • Tobacco use   • Pharyngeal dysphagia   • Radiculopathy   • Lumbar degenerative disc disease   • Peripheral artery disease (HCC)   • Pain of toe of left foot   • Abnormal femoral pulse   • Coronary stent occlusion   • COPD exacerbation (HCC)   • Hyperglycemia   • Menopausal symptoms   • Surveillance for Depo-Provera contraception   • Anxiety and depression   • Numbness and tingling of both feet   • Low vitamin D level   • Low vitamin B12 level   • Pneumonia of left lower lobe due to infectious organism   • Headache, migraine   • Pneumonia of both lower lobes due to infectious organism   • Acute on chronic respiratory failure with hypoxia (HCC)   • Acute respiratory failure with hypoxia and hypercapnia (HCC)   • Acute on chronic respiratory failure with hypercapnia (HCC)     Past Medical History:   Diagnosis Date   • Anxiety    • Arthritis    • COPD (chronic obstructive pulmonary disease) (HCC)    • Coronary stent occlusion    • CTS (carpal tunnel syndrome)    • Depression    • Dizziness    • Headache    • Hyperlipidemia    • Hypertension    • Migraine    • Numbness and tingling    • Pneumonia      Past Surgical History:   Procedure Laterality Date   • ANGIOPLASTY ILIAC ARTERY Left 2016    Procedure: BILATERAL DUPLEX DIRECTED ACCESS, AIF BILATERAL RUNOFF, SELECTIVE CATHETERIZATION OF RIGHT EXTERNAL ILIAC WITH ANGIOPLASTY, LEFT COMMON ILIAC STENT PLACEMENT;  Surgeon: Jose Carlos Plascencia MD;  Location: [facility] OR  18/19;  Service:    • BREAST SURGERY      BILAT IMPLANTS   • CAROTID ARTERY ANGIOPLASTY        General Information     Row Name 03/28/23 0951          OT Time and Intention    Document Type discharge evaluation/summary  -EN     Mode of Treatment occupational therapy  -EN     Row Name 03/28/23 0954 03/28/23 0951       General Information    Patient Profile Reviewed -- yes  -EN    Prior Level of Function independent:;ADL's;all household mobility  -EN --    Barriers to Rehab none identified  -EN --    Row Name 03/28/23 0954          Living Environment    People in Home spouse  -EN     Row Name 03/28/23 0956          Home Main Entrance    Number of Stairs, Main Entrance none;other (see comments)  ramp to enter in the back  -EN     Row Name 03/28/23 0956          Stairs Within Home, Primary    Stairs, Within Home, Primary 2 story home, lives on first level  -EN     Row Name 03/28/23 0956          Cognition    Orientation Status (Cognition) oriented x 3  -EN           User Key  (r) = Recorded By, (t) = Taken By, (c) = Cosigned By    Initials Name Provider Type    EN Felisha Pierre OTNOA Occupational Therapist                 Mobility/ADL's     Row Name 03/28/23 0957          Bed Mobility    Bed Mobility supine-sit  -EN     Supine-Sit [place] (Bed Mobility) independent  -EN     Row Name 03/28/23 0957          Transfers    Transfers sit-stand transfer  -EN     Row Name 03/28/23 0957          Sit-Stand Transfer    Sit-Stand [place] (Transfers) independent  -EN     Row Name 03/28/23 0957          Functional Mobility    Functional Mobility- Ind. Level --  -EN     Functional Mobility-Distance (Feet) 60  -EN     Row Name 03/28/23 0957          Activities of Daily Living    BADL Assessment/Intervention lower body dressing  -EN     Row Name 03/28/23 0957          Lower Body Dressing Assessment/Training    [place] Level (Lower Body Dressing) lower body dressing skills;independent  -EN           User Key  (r) =  Recorded By, (t) = Taken By, (c) = Cosigned By    Initials Name Provider Type    Felisha Leija OTR Occupational Therapist               Obj/Interventions     Row Name 03/28/23 1007          Range of Motion Comprehensive    General Range of Motion bilateral upper extremity ROM WFL  -EN     Row Name 03/28/23 1007          Strength Comprehensive (MMT)    Comment, General Manual Muscle Testing (MMT) Assessment B UE strength WFL  -EN           User Key  (r) = Recorded By, (t) = Taken By, (c) = Cosigned By    Initials Name Provider Type    EN Felisha Pierre OTR Occupational Therapist               Goals/Plan    No documentation.                Clinical Impression     Row Name 03/28/23 1007          Pain Assessment    Pretreatment Pain Rating 0/10 - no pain  -EN     Posttreatment Pain Rating 0/10 - no pain  -EN     [facility] Name 03/28/23 1007          Plan of Care Review    Outcome Evaluation OT evaluation completed. Patient alert and oriented X 3. Patient performed bed mobility independently. Patient performed sit to stand and functional mobility without an assistive device X 60 ft independently. Patient demonstrated independence with LB dressing. Patient reports she is feeling better and plans to return home with significant other at discharge. Patient has no skilled OT needs at this time.  -EN     Row Name 03/28/23 1007          Therapy Assessment/Plan (OT)    Therapy Frequency (OT) evaluation only  -EN     Row Name 03/28/23 1007          Therapy Plan Review/Discharge Plan (OT)    Anticipated Discharge Disposition (OT) home  -EN     Row Name 03/28/23 1007          Positioning and Restraints    Pre-Treatment Position in bed  -EN     Post Treatment Position chair  -EN     In Chair reclined;call light within reach;encouraged to call for assist  -EN           User Key  (r) = Recorded By, (t) = Taken By, (c) = Cosigned By    Initials Name Provider Type    Felisha Leija OTR Occupational Therapist                Outcome Measures     Row Name 03/28/23 1013          How much help from another is currently needed...    Putting on and taking off regular lower body clothing? 4  -EN     Bathing (including washing, rinsing, and drying) 4  -EN     Toileting (which includes using toilet bed pan or urinal) 4  -EN     Putting on and taking off regular upper body clothing 4  -EN     Taking care of personal grooming (such as brushing teeth) 4  -EN     Eating meals 4  -EN     AM-PAC 6 Clicks Score (OT) 24  -EN     Row Name 03/28/23 0824          How much help from another person do you currently need...    Turning from your back to your side while in flat bed without using bedrails? 4  -JW     Moving from lying on back to sitting on the side of a flat bed without bedrails? 4  -JW     Moving to and from a bed to a chair (including a wheelchair)? 4  -JW     Standing up from a chair using your arms (e.g., wheelchair, bedside chair)? 4  -JW     Climbing 3-5 steps with a railing? 3  -JW     To walk in hospital room? 4  -JW     AM-PAC 6 Clicks Score (PT) 23  -JW     Highest level of mobility 7 --> Walked 25 feet or more  -     Row Name 03/28/23 1013 03/28/23 0824       Functional Assessment    Outcome Measure Options AM-PAC 6 Clicks Daily Activity (OT)  -EN AM-PAC 6 Clicks Basic Mobility (PT)  -          User Key  (r) = Recorded By, (t) = Taken By, (c) = Cosigned By    Initials Name Provider Type    Felisha Leija OTR Occupational Therapist    Yolanda Arrieta PT Physical Therapist                Occupational Therapy Education     Title: PT OT SLP Therapies (Resolved)     Topic: Occupational Therapy (Resolved)     Point: ADL training (Resolved)     Description:   Instruct learner(s) on proper safety adaptation and remediation techniques during self care or transfers.   Instruct in proper use of assistive devices.              Learning Progress Summary           Patient AcceptanceROMULO VU by CLAUDIA at 3/28/2023 1013                                User Key     Initials Effective Dates Name Provider Type Discipline    EN 06/16/21 -  Felisha Pierre OTR Occupational Therapist OT              OT Recommendation and Plan  Therapy Frequency (OT): evaluation only  Plan of Care Review  Outcome Evaluation: OT evaluation completed. Patient alert and oriented X 3. Patient performed bed mobility independently. Patient performed sit to stand and functional mobility without an assistive device X 60 ft independently. Patient demonstrated independence with LB dressing. Patient reports she is feeling better and plans to return home with significant other at discharge. Patient has no skilled OT needs at this time.     Time Calculation:    Time Calculation- OT     Row Name 03/28/23 1014             Time Calculation- OT    OT Start Time 0825  -EN            User Key  (r) = Recorded By, (t) = Taken By, (c) = Cosigned By    Initials Name Provider Type    EN Felisha Pierre OTR Occupational Therapist              Therapy Charges for Today     Code Description Service Date Service Provider Modifiers Qty    [id number] HC OT EVAL LOW COMPLEXITY 2 3/28/2023 Felisha Pierre OTR GO 1               RICKIE New  3/28/2023

## 2023-03-28 NOTE — DISCHARGE SUMMARY
Date of Admission: 3/27/2023    Date of Discharge:  3/28/2023    Length of stay:  LOS: 1 day     Presenting Problem:   COPD exacerbation (HCC) [J44.1]      Active Diagnosis During Hospital Stay/Discharge Diagnoses/Course by Diagnoses:    Acute on chronic hypercapnic hypoxic respiratory failure secondary to COPD exacerbation  -On 2L NC prn at home, and now on the same at d/c  -RVP negative  -continue nebs and inhalers at home  -home on doxycycline course  -may benefit from BIPAP/triology eval outpt, will schedule for pulmonary f/u at d/c      Tachycardia  -Likely related to respiratory distress and breathing treatments  -resolved   -Continue home metoprolol     Leukocytosis  -likely in part re-active  -On doxycycline as above     Hypertension  -Continue home lisinopril, HCTZ and metoprolol      Peripheral arterial disease  -S/p bilateral LE stents in 2016  -Continue home ASA, plavix and simvastatin     Tobacco Use Disorder  -Nicotine patch  - on importance of smoking cessation     Anxiety  -Continue home venlafaxine     Vitamin D deficiency  -continue home supplement          Active Hospital Problems   No active problems to display.      Resolved Hospital Problems    Diagnosis Date Resolved POA   • **COPD exacerbation (HCC) [J44.1] 03/28/2023 Yes         Hospital Course  Patient is a 47 y.o. female presented with acute on chronic hypercapnic respiratory failure.  She was admitted and briefly had been placed on BiPAP but was able to be weaned to the next day where she was only wearing her regular 2 L nasal cannula as needed which she has at home.  She felt back to baseline.  Labs were stable and improved.  At this point, she was felt stable to discharge home and was in agreement with plan.  She was also counseled on needing to have tobacco cessation.      Procedures Performed:as noted above         Consults:   Consults     No orders found for last 30 day(s).          Pertinent Test Results:     Results from  last 7 days   Lab Units 03/28/23 0414 03/27/23 0416   WBC 10*3/mm3 15.83* 22.85*   HEMOGLOBIN g/dL 13.3 15.2   HEMATOCRIT % 43.0 50.8*   PLATELETS 10*3/mm3 292 319     Results from last 7 days   Lab Units 03/28/23 0414 03/27/23 0416   SODIUM mmol/L 137 137   POTASSIUM mmol/L 4.7 5.1   CHLORIDE mmol/L 93* 94*   CO2 mmol/L 34.6* 35.8*   BUN mg/dL 22* 10   CREATININE mg/dL 0.73 0.61   CALCIUM mg/dL 9.3 9.8   BILIRUBIN mg/dL  --  0.3   ALK PHOS U/L  --  125*   ALT (SGPT) U/L  --  6   AST (SGOT) U/L  --  13   GLUCOSE mg/dL 96 160*       Microbiology Results (last 10 days)     Procedure Component Value - Date/Time    COVID PRE-OP / PRE-PROCEDURE SCREENING ORDER (NO ISOLATION) - Swab, Nasopharynx [[id number]]  (Normal) Collected: 03/27/23 0851    Lab Status: Final result Specimen: Swab from Nasopharynx Updated: 03/27/23 0928    Narrative:      The following orders were created for panel order COVID PRE-OP / PRE-PROCEDURE SCREENING ORDER (NO ISOLATION) - Swab, Nasopharynx.  Procedure                               Abnormality         Status                     ---------                               -----------         ------                     COVID-19 and FLU A/B PCR...[[id number]]  Normal              Final result                 Please view results for these tests on the individual orders.    COVID-19 and FLU A/B PCR - Swab, Nasopharynx [[id number]]  (Normal) Collected: 03/27/23 0851    Lab Status: Final result Specimen: Swab from Nasopharynx Updated: 03/27/23 0928     COVID19 Not Detected     Influenza A PCR Not Detected     Influenza B PCR Not Detected    Narrative:      Fact sheet for providers: https://www.fda.gov/media/862585/download    Fact sheet for patients: https://www.fda.gov/media/191870/download    Test performed by PCR.    Respiratory Panel PCR w/COVID-19(SARS-CoV-2) TERRY/ROSSI/KIKA/PAD/COR/MAD/RONAK In-House, NP Swab in UTM/VTM, 3-4 HR TAT - Swab, Nasopharynx [[id number]]  (Normal) Collected: 03/27/23 0622     Lab Status: Final result Specimen: Swab from Nasopharynx Updated: 03/27/23 1102     ADENOVIRUS, PCR Not Detected     Coronavirus 229E Not Detected     Coronavirus HKU1 Not Detected     Coronavirus NL63 Not Detected     Coronavirus OC43 Not Detected     COVID19 Not Detected     Human Metapneumovirus Not Detected     Human Rhinovirus/Enterovirus Not Detected     Influenza A PCR Not Detected     Influenza B PCR Not Detected     Parainfluenza Virus 1 Not Detected     Parainfluenza Virus 2 Not Detected     Parainfluenza Virus 3 Not Detected     Parainfluenza Virus 4 Not Detected     RSV, PCR Not Detected     Bordetella pertussis pcr Not Detected     Bordetella parapertussis PCR Not Detected     Chlamydophila pneumoniae PCR Not Detected     Mycoplasma pneumo by PCR Not Detected    Narrative:      In the setting of a positive respiratory panel with a viral infection PLUS a negative procalcitonin without other underlying concern for bacterial infection, consider observing off antibiotics or discontinuation of antibiotics and continue supportive care. If the respiratory panel is positive for atypical bacterial infection (Bordetella pertussis, Chlamydophila pneumoniae, or Mycoplasma pneumoniae), consider antibiotic de-escalation to target atypical bacterial infection.              Imaging Results (All)     Procedure Component Value Units Date/Time    XR Chest 1 View [[id number]] Collected: 03/27/23 0452     Updated: 03/27/23 0454    Narrative:      CR Chest 1 Vw    INDICATION:   Shortness of air one week. Worsening symptoms tonight following panic attack. COPD.     COMPARISON:    1/9/2023 and 12/5/2022    FINDINGS:  Portable AP view(s) of the chest.  Cardiac silhouette is within normal limits. The vascularity is unremarkable. The lungs are hyperinflated. There is no effusion or dense consolidation. Old healed granulomatous disease. There is minimal blunting of the  CP angles bilaterally likely related to chronic pleural  reaction rather than trace effusions. There is no pneumothorax.      Impression:        1. COPD. No superimposed active disease. No significant change for technical factors.    Signer Name: Huy Hilario MD   Signed: 3/27/2023 4:52 AM   Workstation Name: [id number]    [facility]            Condition on Discharge:  Stable back to baseline    Vital Signs  Temp:  [98 °F (36.7 °C)-98.4 °F (36.9 °C)] 98 °F (36.7 °C)  Heart Rate:  [] 83  Resp:  [15-22] 16  BP: ()/(66-91) 132/82    Physical Exam:  Physical Exam  Cardiovascular:      Rate and Rhythm: Normal rate.   Pulmonary:      Effort: No respiratory distress.   Abdominal:      General: There is no distension.   Skin:     General: Skin is warm.   Neurological:      Mental Status: She is alert. Mental status is at baseline.         Emotional Behavior:        Depression none               Anxiety  non    Debilities:     Agitation  Stable     Discharge Disposition  Home or Self Care    Discharge Medications     Discharge Medications      New Medications      Instructions Start Date   doxycycline 100 MG capsule  Commonly known as: MONODOX   100 mg, Oral, Every 12 Hours Scheduled      methylPREDNISolone 4 MG dose pack  Commonly known as: MEDROL   Take as directed on package instructions.         Changes to Medications      Instructions Start Date   albuterol sulfate  (90 Base) MCG/ACT inhaler  Commonly known as: Proventil HFA  What changed: Another medication with the same name was removed. Continue taking this medication, and follow the directions you see here.   Inhale 2 puffs into the lungs Every 4 (Four) Hours As Needed for Wheezing.         Continue These Medications      Instructions Start Date   aspirin 81 MG EC tablet   81 mg, Oral, Daily      budesonide-formoterol 160-4.5 MCG/ACT inhaler  Commonly known as: SYMBICORT   2 puffs, Inhalation, 2 Times Daily - RT      clopidogrel 75 MG tablet  Commonly known as: Plavix   75 mg,  Oral, Daily      fluticasone 50 MCG/ACT nasal spray  Commonly known as: FLONASE   1 spray, Nasal, Daily PRN      guaiFENesin 600 MG 12 hr tablet  Commonly known as: MUCINEX   1,200 mg, Oral, 2 Times Daily      ipratropium-albuterol 0.5-2.5 mg/3 ml nebulizer  Commonly known as: DUO-NEB   3 mL, Nebulization, Every 4 Hours PRN      lisinopril-hydrochlorothiazide 20-12.5 MG per tablet  Commonly known as: PRINZIDE,ZESTORETIC   TAKE 1 TABLET BY MOUTH EVERY DAY      medroxyPROGESTERone 150 MG/ML injection  Commonly known as: DEPO-PROVERA   150 mg, Buccal, Every 3 Months, Pt takes every 3 months.march or April 2018 last dose      metoprolol tartrate 50 MG tablet  Commonly known as: LOPRESSOR   50 mg, Oral, Every 12 Hours Scheduled      nicotine 21 MG/24HR patch  Commonly known as: NICODERM CQ   1 patch, Transdermal, Every 24 Hours      simvastatin 40 MG tablet  Commonly known as: ZOCOR   40 mg, Oral, Every Evening      venlafaxine XR 75 MG 24 hr capsule  Commonly known as: EFFEXOR-XR   TAKE 1 CAPSULE BY MOUTH EVERY DAY      vitamin D 1.25 MG (64624 UT) capsule capsule  Commonly known as: ERGOCALCIFEROL   50,000 Units, Oral, Every 7 Days             Discharge Diet:     Activity at Discharge:   Activity Instructions     Activity as Tolerated            Follow-up Appointments  No future appointments.  Additional Instructions for the Follow-ups that You Need to Schedule     Discharge Follow-up with PCP   As directed       Currently Documented PCP:    Provider, No Known    PCP Phone Number:    [phone]     Follow Up Details: one week         Discharge Follow-up with Specified Provider: pulmonology   As directed      To: pulmonology    Follow Up Details: 1-2 weeks               Test Results Pending at Discharge       Risk for Readmission (LACE) Score: 10 (3/28/2023  6:00 AM)          Time: Discharge 34 min with face-to-face history exam, writing of prescriptions, and documenting discharge data including care coordination with  the nursing staff.      Electronically signed by Ravinder Estrella DO, 03/28/23, 11:55 EDT.

## 2023-03-28 NOTE — PLAN OF CARE
Goal Outcome Evaluation:  Plan of Care Reviewed With: patient        Progress: improving  Outcome Evaluation: Pt tolerating 2L NC while awake and bi-pap while sleeping at 25% fiO2. Low dose of Precedex gtt while pt on bi-pap due to anxiety/restlessness. Pt did tolerate the bi-pap overnight and was able to get some rest. Remains on solu-medrol and doxycycline. Pt reports that she is feeling better and her work of breathing has significantly improved since admission. Urine specimen sent to lab, see results. Pt requires minimal assistance to get on the BSC. All VSS.

## 2023-03-28 NOTE — PLAN OF CARE
Goal Outcome Evaluation:              Outcome Evaluation: OT evaluation completed. Patient alert and oriented X 3. Patient performed bed mobility independently. Patient performed sit to stand and functional mobility without an assistive device X 60 ft independently. Patient demonstrated independence with LB dressing. Patient reports she is feeling better and plans to return home with significant other at discharge. Patient has no skilled OT needs at this time.

## 2023-03-28 NOTE — THERAPY DISCHARGE NOTE
Patient Name: Valeria Rubi  : 1975    MRN: [id number]                              Today's Date: 3/28/2023       Admit Date: 3/27/2023    Visit Dx:     ICD-10-CM ICD-9-CM   1. COPD exacerbation (HCC)  J44.1 491.21     Patient Active Problem List   Diagnosis   • Abnormal mammogram   • Atopic rhinitis   • Bronchitis   • Chronic headache   • Pain of hand   • Hyperlipidemia   • Essential hypertension   • Sprain of ankle   • Nausea   • Pain in wrist   • Wheezing   • Panlobular emphysema (HCC)   • Tobacco use   • Pharyngeal dysphagia   • Radiculopathy   • Lumbar degenerative disc disease   • Peripheral artery disease (HCC)   • Pain of toe of left foot   • Abnormal femoral pulse   • Coronary stent occlusion   • COPD exacerbation (HCC)   • Hyperglycemia   • Menopausal symptoms   • Surveillance for Depo-Provera contraception   • Anxiety and depression   • Numbness and tingling of both feet   • Low vitamin D level   • Low vitamin B12 level   • Pneumonia of left lower lobe due to infectious organism   • Headache, migraine   • Pneumonia of both lower lobes due to infectious organism   • Acute on chronic respiratory failure with hypoxia (HCC)   • Acute respiratory failure with hypoxia and hypercapnia (HCC)   • Acute on chronic respiratory failure with hypercapnia (HCC)     Past Medical History:   Diagnosis Date   • Anxiety    • Arthritis    • COPD (chronic obstructive pulmonary disease) (HCC)    • Coronary stent occlusion    • CTS (carpal tunnel syndrome)    • Depression    • Dizziness    • Headache    • Hyperlipidemia    • Hypertension    • Migraine    • Numbness and tingling    • Pneumonia      Past Surgical History:   Procedure Laterality Date   • ANGIOPLASTY ILIAC ARTERY Left 2016    Procedure: BILATERAL DUPLEX DIRECTED ACCESS, AIF BILATERAL RUNOFF, SELECTIVE CATHETERIZATION OF RIGHT EXTERNAL ILIAC WITH ANGIOPLASTY, LEFT COMMON ILIAC STENT PLACEMENT;  Surgeon: Jose Carlos Plascencia MD;  Location: [facility] OR  18/19;  Service:    • BREAST SURGERY      BILAT IMPLANTS   • CAROTID ARTERY ANGIOPLASTY        General Information     [facility] Name 03/28/23 0824          Physical Therapy Time and Intention    Document Type discharge evaluation/summary  -     Row Name 03/28/23 0824          General Information    Patient Profile Reviewed yes  -     Prior Level of Function independent:;all household mobility;community mobility  -     Existing Precautions/Restrictions oxygen therapy device and L/min  1 L/min  -     Barriers to Rehab none identified  -     Row Name 03/28/23 0824          Living Environment    People in Home spouse  -     Row Name 03/28/23 0824          Home Main Entrance    Number of Stairs, Main Entrance --  ramp to enter the home in the back  -     Row Name 03/28/23 0824          Stairs Within Home, Primary    Stairs, Within Home, Primary 2 story house, stays on first floor  -     Row Name 03/28/23 0824          Cognition    Orientation Status (Cognition) oriented x 3  -           User Key  (r) = Recorded By, (t) = Taken By, (c) = Cosigned By    Initials Name Provider Type    JW Yolanda Davey, PT Physical Therapist               Mobility     Row Name 03/28/23 0824          Bed Mobility    Bed Mobility supine-sit  -     Supine-Sit [place] (Bed Mobility) independent  -     Row Name 03/28/23 0824          Sit-Stand Transfer    Sit-Stand [place] (Transfers) independent  -     Assistive Device (Sit-Stand Transfers) other (see comments)  no device used  -     Row Name 03/28/23 0824          Gait/Stairs (Locomotion)    [place] Level (Gait) independent  -     Assistive Device (Gait) other (see comments)  no device used  -     Distance in Feet (Gait) 60  -     Bilateral Gait Deviations forward flexed posture  -     Comment, (Gait/Stairs) pt manages direction changes without difficulty or balance loss  -           User Key  (r) = Recorded By, (t) = Taken By, (c) = Cosigned By  "   Initials Name Provider Type     Yolanda Davey, PT Physical Therapist               Obj/Interventions     Row Name 03/28/23 0824          Range of Motion Comprehensive    Comment, General Range of Motion LE ROM WFL bilaterally  -     Row Name 03/28/23 0824          Strength Comprehensive (MMT)    Comment, General Manual Muscle Testing (MMT) Assessment LE strength 4/5 bilaterally  -     Row Name 03/28/23 0824          Balance    Comment, Balance independent with sitting and standing balance without device  -           User Key  (r) = Recorded By, (t) = Taken By, (c) = Cosigned By    Initials Name Provider Type    Yolanda Arrieta, PT Physical Therapist               Goals/Plan    No documentation.                Clinical Impression     Row Name 03/28/23 0824          Pain    Pretreatment Pain Rating 0/10 - no pain  -     Posttreatment Pain Rating 0/10 - no pain  -     Row Name 03/28/23 0824          Plan of Care Review    Outcome Evaluation Physical therapy evaluation complete.  Patient performs supine to sit and sit to stand independently.  Patient performs gait without use of device x60 feet, independently.  Patient able to manage direction changes without difficulty or balance loss.  patient reports at baseline she ambulates short distances within the home and reports current mobility/balance is at baseline.  Patient with no concerns regarding return home at this time.  No further PT needs at this time.  -     Row Name 03/28/23 0824          Therapy Assessment/Plan (PT)    Patient/Family Therapy Goals Statement (PT) \"go home\"  -     Criteria for Skilled Interventions Met (PT) no problems identified which require skilled intervention  -     Therapy Frequency (PT) evaluation only  -     Row Name 03/28/23 0824          Positioning and Restraints    Pre-Treatment Position in bed  -     Post Treatment Position chair  -     In Chair notified nsg;reclined;call light within reach;encouraged " to call for assist  -           User Key  (r) = Recorded By, (t) = Taken By, (c) = Cosigned By    Initials Name Provider Type    Yolanda Arrieta, OLAMIDE Physical Therapist               Outcome Measures     Row Name 03/28/23 0824          How much help from another person do you currently need...    Turning from your back to your side while in flat bed without using bedrails? 4  -JW     Moving from lying on back to sitting on the side of a flat bed without bedrails? 4  -JW     Moving to and from a bed to a chair (including a wheelchair)? 4  -JW     Standing up from a chair using your arms (e.g., wheelchair, bedside chair)? 4  -JW     Climbing 3-5 steps with a railing? 3  -JW     To walk in hospital room? 4  -     AM-PAC 6 Clicks Score (PT) 23  -     Highest level of mobility 7 --> Walked 25 feet or more  -     Row Name 03/28/23 1013 03/28/23 0824       Functional Assessment    Outcome Measure Options AM-PAC 6 Clicks Daily Activity (OT)  -EN AM-PAC 6 Clicks Basic Mobility (PT)  -          User Key  (r) = Recorded By, (t) = Taken By, (c) = Cosigned By    Initials Name Provider Type    Felisha Leija, OTR Occupational Therapist    Yolanda Arrieta, OLAMIDE Physical Therapist              Physical Therapy Education     Title: PT OT SLP Therapies (Resolved)     Topic: Physical Therapy (Resolved)     Point: Mobility training (Resolved)     Learning Progress Summary           Patient Acceptance, E,TB, VU by  at 3/28/2023 1012                               User Key     Initials Effective Dates Name Provider Type Discipline     06/16/21 -  Yolanda Davey, OLAMIDE Physical Therapist PT              PT Recommendation and Plan     Outcome Evaluation: Physical therapy evaluation complete.  Patient performs supine to sit and sit to stand independently.  Patient performs gait without use of device x60 feet, independently.  Patient able to manage direction changes without difficulty or balance loss.  patient reports  at baseline she ambulates short distances within the home and reports current mobility/balance is at baseline.  Patient with no concerns regarding return home at this time.  No further PT needs at this time.     Time Calculation:    PT Charges     Row Name 03/28/23 0824             Time Calculation    Start Time 0824  -      Stop Time 0840  -      Time Calculation (min) 16 min  -      PT Received On 03/28/23  -            User Key  (r) = Recorded By, (t) = Taken By, (c) = Cosigned By    Initials Name Provider Type    Yolanda Arrieta, PT Physical Therapist              Therapy Charges for Today     Code Description Service Date Service Provider Modifiers Qty    0031973 HC PT EVAL LOW COMPLEXITY 1 3/28/2023 Yolanda Davey, PT GP 1          PT G-Codes  Outcome Measure Options: AM-PAC 6 Clicks Daily Activity (OT)  AM-PAC 6 Clicks Score (PT): 23  AM-PAC 6 Clicks Score (OT): 24    PT Discharge Summary  Anticipated Discharge Disposition (PT): home    Yolanda Davey PT  3/28/2023

## 2023-03-29 NOTE — OUTREACH NOTE
Prep Survey    Flowsheet Row Responses   [facility] discharged from? [place]   Is LACE score < 7 ? No   Eligibility Readm Mgmt   Discharge diagnosis COPD   Does the patient have one of the following disease processes/diagnoses(primary or secondary)? COPD   Does the patient have Home health ordered? No   Is there a DME ordered? Yes   What DME was ordered? O2 and nebulizer   Prep survey completed? Yes          Leah LEACH - Registered Nurse

## 2023-03-31 ENCOUNTER — READMISSION MANAGEMENT (OUTPATIENT)
Dept: CALL CENTER | Facility: HOSPITAL | Age: 48
End: 2023-03-31
Payer: COMMERCIAL

## 2023-03-31 NOTE — OUTREACH NOTE
"COPD/PN Week 1 Survey    Flowsheet Row Responses   [facility] patient discharged from? [place]   Does the patient have one of the following disease processes/diagnoses(primary or secondary)? COPD   Week 1 attempt successful? Yes   Call start time 1316   Call end time 1320   Discharge diagnosis COPD   Person spoke with today (if not patient) and relationship Harpreet   Meds reviewed with patient/caregiver? Yes   Is the patient having any side effects they believe may be caused by any medication additions or changes? No   Does the patient have all medications ordered at discharge? Yes   Is the patient taking all medications as directed (includes completed medication regime)? Yes   Does the patient have a primary care provider?  No   PCP Nursing Intervention Connected with Patient Connection [facility] for PCP   Does the patient have an appointment with their PCP or specialist within 7 days of discharge? No   Comments regarding PCP Provided  with HUB number.    Has home health visited the patient within 72 hours of discharge? N/A   Pulse Ox monitoring Intermittent   Pulse Ox device source Patient   O2 Sat: education provided Sat levels, Monitoring frequency, When to seek care   Psychosocial issues? No   Did the patient receive a copy of their discharge instructions? Yes   Nursing interventions Reviewed instructions with patient   What is the patient's perception of their health status since discharge? Improving   Nursing Interventions Nurse provided patient education   Is the patient/caregiver able to teach back the hierarchy of who to call/visit for symptoms/problems? PCP, Specialist, Home health nurse, Urgent Care, ED, 911 Yes   Is the patient able to teach back COPD zones? No   Nursing interventions Education provided on various zones   Patient reports what zone on this call? Yellow Zone   Yellow Zone More breathless than usual, I feel like I have a \"chest cold\", Increased or thicker phlegm/mucus, Reports having " a bad day or a COPD flare   Yellow interventions Continue taking daily medications   Week 1 call completed? Yes   Wrap up additional comments  reports Pt is doing better. Educated  on ZONES and when to call the MARILU VALADEZ - Registered Nurse

## 2023-04-12 ENCOUNTER — READMISSION MANAGEMENT (OUTPATIENT)
Dept: CALL CENTER | Facility: HOSPITAL | Age: 48
End: 2023-04-12
Payer: COMMERCIAL

## 2023-04-12 ENCOUNTER — PATIENT OUTREACH (OUTPATIENT)
Dept: CASE MANAGEMENT | Facility: CLINIC | Age: 48
End: 2023-04-12
Payer: COMMERCIAL
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
## 2023-05-15 ENCOUNTER — TRANSCRIBE ORDERS (OUTPATIENT)
Dept: CT IMAGING | Facility: HOSPITAL | Age: 48
End: 2023-05-15
Payer: COMMERCIAL

## 2023-05-15 DIAGNOSIS — R91.1 SOLITARY PULMONARY NODULE: Primary | ICD-10-CM

## 2023-05-31 ENCOUNTER — HOSPITAL ENCOUNTER (OUTPATIENT)
Dept: CT IMAGING | Facility: HOSPITAL | Age: 48
Discharge: HOME | End: 2023-05-31
Admitting: INTERNAL MEDICINE

## 2023-05-31 DIAGNOSIS — R91.1 SOLITARY PULMONARY NODULE: ICD-10-CM

## 2023-05-31 PROCEDURE — 71250 CT THORAX DX C-: CPT

## 2023-12-26 NOTE — ANESTHESIA PROCEDURE NOTES
Peripheral IV    Patient location during procedure: ICU  Start time: 7/23/2022 8:30 PM  End time: 7/23/2022 9:00 PM  Line placed for Difficult Access.  Performed By   CRNA/CAA: Facundo Chinchilla CRNA  Preanesthetic Checklist  Completed: patient identified, IV checked, site marked, risks and benefits discussed, surgical consent, monitors and equipment checked, pre-op evaluation and timeout performed  Peripheral IV Prep   Patient position: supine   Prep: ChloraPrep  Patient monitoring: heart rate, cardiac monitor and continuous pulse ox  Peripheral IV Procedure   Laterality:left  Location:  Arm  Catheter size: 20 G  Guidance: ultrasound guided         Post Assessment   Dressing Type: tape and transparent.    IV Dressing/Site: clean, dry and intact            
N/A

## 2024-01-17 ENCOUNTER — APPOINTMENT (OUTPATIENT)
Dept: GENERAL RADIOLOGY | Facility: HOSPITAL | Age: 49
End: 2024-01-17
Payer: COMMERCIAL

## 2024-01-17 ENCOUNTER — APPOINTMENT (OUTPATIENT)
Dept: CT IMAGING | Facility: HOSPITAL | Age: 49
End: 2024-01-17
Payer: COMMERCIAL

## 2024-01-17 ENCOUNTER — HOSPITAL ENCOUNTER (INPATIENT)
Facility: HOSPITAL | Age: 49
LOS: 8 days | Discharge: NURSING, REHAB OR LONG-TERM CARE | End: 2024-01-25
Attending: EMERGENCY MEDICINE | Admitting: INTERNAL MEDICINE
Payer: COMMERCIAL

## 2024-01-17 DIAGNOSIS — J18.9 PNEUMONIA OF BOTH LOWER LOBES DUE TO INFECTIOUS ORGANISM: ICD-10-CM

## 2024-01-17 DIAGNOSIS — J96.01 ACUTE HYPOXIC RESPIRATORY FAILURE: Primary | ICD-10-CM

## 2024-01-17 DIAGNOSIS — J44.1 COPD WITH ACUTE EXACERBATION: ICD-10-CM

## 2024-01-17 DIAGNOSIS — T38.0X5A STEROID-INDUCED HYPERGLYCEMIA: ICD-10-CM

## 2024-01-17 DIAGNOSIS — R73.9 STEROID-INDUCED HYPERGLYCEMIA: ICD-10-CM

## 2024-01-17 DIAGNOSIS — J96.02 ACUTE RESPIRATORY FAILURE WITH HYPOXIA AND HYPERCAPNIA: ICD-10-CM

## 2024-01-17 DIAGNOSIS — J96.01 ACUTE RESPIRATORY FAILURE WITH HYPOXIA AND HYPERCAPNIA: ICD-10-CM

## 2024-01-17 PROBLEM — J96.90 RESPIRATORY FAILURE: Status: ACTIVE | Noted: 2024-01-17

## 2024-01-17 LAB
ALBUMIN SERPL-MCNC: 4 G/DL (ref 3.5–5.2)
ALBUMIN/GLOB SERPL: 1.1 G/DL
ALP SERPL-CCNC: 100 U/L (ref 39–117)
ALT SERPL W P-5'-P-CCNC: 10 U/L (ref 1–33)
ANION GAP SERPL CALCULATED.3IONS-SCNC: 9.1 MMOL/L (ref 5–15)
ARTERIAL PATENCY WRIST A: POSITIVE
ARTERIAL PATENCY WRIST A: POSITIVE
AST SERPL-CCNC: 22 U/L (ref 1–32)
ATMOSPHERIC PRESS: 742 MMHG
ATMOSPHERIC PRESS: 743 MMHG
ATMOSPHERIC PRESS: 744 MMHG
B PARAPERT DNA SPEC QL NAA+PROBE: NOT DETECTED
B PERT DNA SPEC QL NAA+PROBE: NOT DETECTED
BASE EXCESS BLDA CALC-SCNC: -0.5 MMOL/L (ref 0–2)
BASE EXCESS BLDA CALC-SCNC: -0.8 MMOL/L (ref 0–2)
BASE EXCESS BLDV CALC-SCNC: -1.3 MMOL/L (ref 0–2)
BASOPHILS # BLD AUTO: 0.03 10*3/MM3 (ref 0–0.2)
BASOPHILS NFR BLD AUTO: 0.2 % (ref 0–1.5)
BDY SITE: ABNORMAL
BILIRUB SERPL-MCNC: 0.3 MG/DL (ref 0–1.2)
BODY TEMPERATURE: 37
BUN SERPL-MCNC: 11 MG/DL (ref 6–20)
BUN/CREAT SERPL: 14.7 (ref 7–25)
C PNEUM DNA NPH QL NAA+NON-PROBE: NOT DETECTED
CALCIUM SPEC-SCNC: 9.3 MG/DL (ref 8.6–10.5)
CHLORIDE SERPL-SCNC: 90 MMOL/L (ref 98–107)
CO2 SERPL-SCNC: 30.9 MMOL/L (ref 22–29)
CREAT SERPL-MCNC: 0.75 MG/DL (ref 0.57–1)
D-LACTATE SERPL-SCNC: 1.5 MMOL/L (ref 0.5–2)
DEPRECATED RDW RBC AUTO: 50.7 FL (ref 37–54)
EGFRCR SERPLBLD CKD-EPI 2021: 97.1 ML/MIN/1.73
EOSINOPHIL # BLD AUTO: 0 10*3/MM3 (ref 0–0.4)
EOSINOPHIL NFR BLD AUTO: 0 % (ref 0.3–6.2)
EPAP: 6
ERYTHROCYTE [DISTWIDTH] IN BLOOD BY AUTOMATED COUNT: 13.7 % (ref 12.3–15.4)
FLUAV SUBTYP SPEC NAA+PROBE: NOT DETECTED
FLUBV RNA ISLT QL NAA+PROBE: NOT DETECTED
GEN 5 2HR TROPONIN T REFLEX: 10 NG/L
GLOBULIN UR ELPH-MCNC: 3.6 GM/DL
GLUCOSE SERPL-MCNC: 133 MG/DL (ref 65–99)
HADV DNA SPEC NAA+PROBE: NOT DETECTED
HCO3 BLDA-SCNC: 28.9 MMOL/L (ref 20–26)
HCO3 BLDA-SCNC: 30.3 MMOL/L (ref 20–26)
HCO3 BLDV-SCNC: 28 MMOL/L (ref 22–28)
HCOV 229E RNA SPEC QL NAA+PROBE: NOT DETECTED
HCOV HKU1 RNA SPEC QL NAA+PROBE: NOT DETECTED
HCOV NL63 RNA SPEC QL NAA+PROBE: NOT DETECTED
HCOV OC43 RNA SPEC QL NAA+PROBE: NOT DETECTED
HCT VFR BLD AUTO: 47.3 % (ref 34–46.6)
HGB BLD-MCNC: 15.1 G/DL (ref 12–15.9)
HGB BLDA-MCNC: 12.5 G/DL (ref 13.5–17.5)
HGB BLDA-MCNC: 14.2 G/DL (ref 13.5–17.5)
HGB BLDA-MCNC: 14.7 G/DL (ref 13.5–17.5)
HMPV RNA NPH QL NAA+NON-PROBE: NOT DETECTED
HPIV1 RNA ISLT QL NAA+PROBE: NOT DETECTED
HPIV2 RNA SPEC QL NAA+PROBE: NOT DETECTED
HPIV3 RNA NPH QL NAA+PROBE: NOT DETECTED
HPIV4 P GENE NPH QL NAA+PROBE: NOT DETECTED
IMM GRANULOCYTES # BLD AUTO: 0.07 10*3/MM3 (ref 0–0.05)
IMM GRANULOCYTES NFR BLD AUTO: 0.5 % (ref 0–0.5)
INHALED O2 CONCENTRATION: 35 %
INHALED O2 CONCENTRATION: 50 %
INHALED O2 CONCENTRATION: <21 %
IPAP: 14
IPAP: 14
IPAP: 18
LYMPHOCYTES # BLD AUTO: 0.86 10*3/MM3 (ref 0.7–3.1)
LYMPHOCYTES NFR BLD AUTO: 6.5 % (ref 19.6–45.3)
Lab: ABNORMAL
M PNEUMO IGG SER IA-ACNC: NOT DETECTED
MCH RBC QN AUTO: 32.3 PG (ref 26.6–33)
MCHC RBC AUTO-ENTMCNC: 31.9 G/DL (ref 31.5–35.7)
MCV RBC AUTO: 101.3 FL (ref 79–97)
MODALITY: ABNORMAL
MONOCYTES # BLD AUTO: 0.86 10*3/MM3 (ref 0.1–0.9)
MONOCYTES NFR BLD AUTO: 6.5 % (ref 5–12)
NEUTROPHILS NFR BLD AUTO: 11.33 10*3/MM3 (ref 1.7–7)
NEUTROPHILS NFR BLD AUTO: 86.3 % (ref 42.7–76)
NOTIFIED BY: ABNORMAL
NOTIFIED WHO: ABNORMAL
NRBC BLD AUTO-RTO: 0 /100 WBC (ref 0–0.2)
NT-PROBNP SERPL-MCNC: 520.1 PG/ML (ref 0–900)
PCO2 BLDA: 69 MM HG (ref 35–45)
PCO2 BLDA: 82.4 MM HG (ref 35–45)
PCO2 BLDV: 69.5 MM HG (ref 41–51)
PCO2 TEMP ADJ BLD: 69 MM HG (ref 35–45)
PCO2 TEMP ADJ BLD: 82.4 MM HG (ref 35–45)
PEEP RESPIRATORY: 0 CM[H2O]
PH BLDA: 7.17 PH UNITS (ref 7.35–7.45)
PH BLDA: 7.23 PH UNITS (ref 7.35–7.45)
PH BLDV: 7.21 PH UNITS (ref 7.32–7.42)
PH, TEMP CORRECTED: 7.17 PH UNITS (ref 7.35–7.45)
PH, TEMP CORRECTED: 7.23 PH UNITS (ref 7.35–7.45)
PLAT MORPH BLD: NORMAL
PLATELET # BLD AUTO: 242 10*3/MM3 (ref 140–450)
PMV BLD AUTO: 8.5 FL (ref 6–12)
PO2 BLDA: 105 MM HG (ref 83–108)
PO2 BLDA: 107 MM HG (ref 83–108)
PO2 BLDV: 32.5 MM HG (ref 27–53)
PO2 TEMP ADJ BLD: 105 MM HG (ref 83–108)
PO2 TEMP ADJ BLD: 107 MM HG (ref 83–108)
POTASSIUM SERPL-SCNC: 4.7 MMOL/L (ref 3.5–5.2)
PROCALCITONIN SERPL-MCNC: 46.14 NG/ML (ref 0–0.25)
PROT SERPL-MCNC: 7.6 G/DL (ref 6–8.5)
QT INTERVAL: 305 MS
QTC INTERVAL: 450 MS
RBC # BLD AUTO: 4.67 10*6/MM3 (ref 3.77–5.28)
RBC MORPH BLD: NORMAL
RHINOVIRUS RNA SPEC NAA+PROBE: NOT DETECTED
RSV RNA NPH QL NAA+NON-PROBE: DETECTED
SAO2 % BLDCOA: 97.8 % (ref 94–99)
SAO2 % BLDCOA: 98.3 % (ref 94–99)
SAO2 % BLDCOV: 58.6 % (ref 45–75)
SARS-COV-2 RNA NPH QL NAA+NON-PROBE: NOT DETECTED
SET MECH RESP RATE: 16
SODIUM SERPL-SCNC: 130 MMOL/L (ref 136–145)
TROPONIN T DELTA: 0 NG/L
TROPONIN T SERPL HS-MCNC: 10 NG/L
VENTILATOR MODE: ABNORMAL
WBC MORPH BLD: NORMAL
WBC NRBC COR # BLD AUTO: 13.15 10*3/MM3 (ref 3.4–10.8)

## 2024-01-17 PROCEDURE — 99223 1ST HOSP IP/OBS HIGH 75: CPT | Performed by: INTERNAL MEDICINE

## 2024-01-17 PROCEDURE — 94799 UNLISTED PULMONARY SVC/PX: CPT

## 2024-01-17 PROCEDURE — 94761 N-INVAS EAR/PLS OXIMETRY MLT: CPT

## 2024-01-17 PROCEDURE — 80053 COMPREHEN METABOLIC PANEL: CPT | Performed by: EMERGENCY MEDICINE

## 2024-01-17 PROCEDURE — 83605 ASSAY OF LACTIC ACID: CPT | Performed by: NURSE PRACTITIONER

## 2024-01-17 PROCEDURE — 36415 COLL VENOUS BLD VENIPUNCTURE: CPT

## 2024-01-17 PROCEDURE — 82803 BLOOD GASES ANY COMBINATION: CPT

## 2024-01-17 PROCEDURE — 84145 PROCALCITONIN (PCT): CPT | Performed by: NURSE PRACTITIONER

## 2024-01-17 PROCEDURE — 93005 ELECTROCARDIOGRAM TRACING: CPT | Performed by: EMERGENCY MEDICINE

## 2024-01-17 PROCEDURE — 25010000002 AMPICILLIN-SULBACTAM PER 1.5 G: Performed by: INTERNAL MEDICINE

## 2024-01-17 PROCEDURE — 36600 WITHDRAWAL OF ARTERIAL BLOOD: CPT

## 2024-01-17 PROCEDURE — 25010000002 METHYLPREDNISOLONE PER 125 MG: Performed by: INTERNAL MEDICINE

## 2024-01-17 PROCEDURE — 25010000002 METHYLPREDNISOLONE PER 125 MG: Performed by: EMERGENCY MEDICINE

## 2024-01-17 PROCEDURE — 87040 BLOOD CULTURE FOR BACTERIA: CPT | Performed by: INTERNAL MEDICINE

## 2024-01-17 PROCEDURE — 71260 CT THORAX DX C+: CPT

## 2024-01-17 PROCEDURE — 85025 COMPLETE CBC W/AUTO DIFF WBC: CPT | Performed by: EMERGENCY MEDICINE

## 2024-01-17 PROCEDURE — 25010000002 ENOXAPARIN PER 10 MG: Performed by: NURSE PRACTITIONER

## 2024-01-17 PROCEDURE — 93010 ELECTROCARDIOGRAM REPORT: CPT | Performed by: INTERNAL MEDICINE

## 2024-01-17 PROCEDURE — 99285 EMERGENCY DEPT VISIT HI MDM: CPT

## 2024-01-17 PROCEDURE — 83880 ASSAY OF NATRIURETIC PEPTIDE: CPT | Performed by: EMERGENCY MEDICINE

## 2024-01-17 PROCEDURE — 94640 AIRWAY INHALATION TREATMENT: CPT

## 2024-01-17 PROCEDURE — 71045 X-RAY EXAM CHEST 1 VIEW: CPT

## 2024-01-17 PROCEDURE — 25010000002 LORAZEPAM PER 2 MG: Performed by: NURSE PRACTITIONER

## 2024-01-17 PROCEDURE — 25510000001 IOPAMIDOL PER 1 ML: Performed by: INTERNAL MEDICINE

## 2024-01-17 PROCEDURE — C1751 CATH, INF, PER/CENT/MIDLINE: HCPCS

## 2024-01-17 PROCEDURE — 94660 CPAP INITIATION&MGMT: CPT

## 2024-01-17 PROCEDURE — 25810000003 SODIUM CHLORIDE 0.9 % SOLUTION: Performed by: NURSE PRACTITIONER

## 2024-01-17 PROCEDURE — 85007 BL SMEAR W/DIFF WBC COUNT: CPT | Performed by: EMERGENCY MEDICINE

## 2024-01-17 PROCEDURE — 0202U NFCT DS 22 TRGT SARS-COV-2: CPT | Performed by: NURSE PRACTITIONER

## 2024-01-17 PROCEDURE — 84484 ASSAY OF TROPONIN QUANT: CPT | Performed by: EMERGENCY MEDICINE

## 2024-01-17 RX ORDER — AMOXICILLIN 250 MG
2 CAPSULE ORAL 2 TIMES DAILY
Status: DISCONTINUED | OUTPATIENT
Start: 2024-01-17 | End: 2024-01-17 | Stop reason: SDUPTHER

## 2024-01-17 RX ORDER — ONDANSETRON 4 MG/1
4 TABLET, ORALLY DISINTEGRATING ORAL EVERY 6 HOURS PRN
Status: DISCONTINUED | OUTPATIENT
Start: 2024-01-17 | End: 2024-01-25 | Stop reason: HOSPADM

## 2024-01-17 RX ORDER — BISACODYL 5 MG/1
5 TABLET, DELAYED RELEASE ORAL DAILY PRN
Status: DISCONTINUED | OUTPATIENT
Start: 2024-01-17 | End: 2024-01-25 | Stop reason: HOSPADM

## 2024-01-17 RX ORDER — NITROGLYCERIN 0.4 MG/1
0.4 TABLET SUBLINGUAL
Status: DISCONTINUED | OUTPATIENT
Start: 2024-01-17 | End: 2024-01-25 | Stop reason: HOSPADM

## 2024-01-17 RX ORDER — SODIUM CHLORIDE 9 MG/ML
100 INJECTION, SOLUTION INTRAVENOUS CONTINUOUS
Status: DISCONTINUED | OUTPATIENT
Start: 2024-01-17 | End: 2024-01-18

## 2024-01-17 RX ORDER — CLOPIDOGREL BISULFATE 75 MG/1
75 TABLET ORAL DAILY
Status: DISCONTINUED | OUTPATIENT
Start: 2024-01-17 | End: 2024-01-25 | Stop reason: HOSPADM

## 2024-01-17 RX ORDER — SODIUM CHLORIDE 9 MG/ML
40 INJECTION, SOLUTION INTRAVENOUS AS NEEDED
Status: DISCONTINUED | OUTPATIENT
Start: 2024-01-17 | End: 2024-01-25 | Stop reason: HOSPADM

## 2024-01-17 RX ORDER — LORAZEPAM 2 MG/ML
0.5 INJECTION INTRAMUSCULAR EVERY 4 HOURS PRN
Status: DISCONTINUED | OUTPATIENT
Start: 2024-01-17 | End: 2024-01-20

## 2024-01-17 RX ORDER — AMOXICILLIN 250 MG
2 CAPSULE ORAL 2 TIMES DAILY PRN
Status: DISCONTINUED | OUTPATIENT
Start: 2024-01-17 | End: 2024-01-25 | Stop reason: HOSPADM

## 2024-01-17 RX ORDER — DEXMEDETOMIDINE HYDROCHLORIDE 4 UG/ML
.2-1.5 INJECTION, SOLUTION INTRAVENOUS
Status: DISCONTINUED | OUTPATIENT
Start: 2024-01-17 | End: 2024-01-23

## 2024-01-17 RX ORDER — ACETAMINOPHEN 650 MG/1
650 SUPPOSITORY RECTAL EVERY 4 HOURS PRN
Status: DISCONTINUED | OUTPATIENT
Start: 2024-01-17 | End: 2024-01-25 | Stop reason: HOSPADM

## 2024-01-17 RX ORDER — SODIUM CHLORIDE 0.9 % (FLUSH) 0.9 %
10 SYRINGE (ML) INJECTION AS NEEDED
Status: DISCONTINUED | OUTPATIENT
Start: 2024-01-17 | End: 2024-01-25 | Stop reason: HOSPADM

## 2024-01-17 RX ORDER — ENOXAPARIN SODIUM 100 MG/ML
40 INJECTION SUBCUTANEOUS EVERY 24 HOURS
Status: DISCONTINUED | OUTPATIENT
Start: 2024-01-17 | End: 2024-01-25 | Stop reason: HOSPADM

## 2024-01-17 RX ORDER — NICOTINE 21 MG/24HR
1 PATCH, TRANSDERMAL 24 HOURS TRANSDERMAL EVERY 24 HOURS
Status: DISCONTINUED | OUTPATIENT
Start: 2024-01-17 | End: 2024-01-19

## 2024-01-17 RX ORDER — SODIUM CHLORIDE 0.9 % (FLUSH) 0.9 %
20 SYRINGE (ML) INJECTION AS NEEDED
Status: DISCONTINUED | OUTPATIENT
Start: 2024-01-17 | End: 2024-01-25 | Stop reason: HOSPADM

## 2024-01-17 RX ORDER — IPRATROPIUM BROMIDE AND ALBUTEROL SULFATE 2.5; .5 MG/3ML; MG/3ML
3 SOLUTION RESPIRATORY (INHALATION)
Status: DISCONTINUED | OUTPATIENT
Start: 2024-01-17 | End: 2024-01-24

## 2024-01-17 RX ORDER — SODIUM CHLORIDE 0.9 % (FLUSH) 0.9 %
10 SYRINGE (ML) INJECTION EVERY 12 HOURS SCHEDULED
Status: DISCONTINUED | OUTPATIENT
Start: 2024-01-17 | End: 2024-01-25 | Stop reason: HOSPADM

## 2024-01-17 RX ORDER — ACETAMINOPHEN 325 MG/1
650 TABLET ORAL EVERY 4 HOURS PRN
Status: DISCONTINUED | OUTPATIENT
Start: 2024-01-17 | End: 2024-01-25 | Stop reason: HOSPADM

## 2024-01-17 RX ORDER — METHYLPREDNISOLONE SODIUM SUCCINATE 125 MG/2ML
60 INJECTION, POWDER, LYOPHILIZED, FOR SOLUTION INTRAMUSCULAR; INTRAVENOUS EVERY 6 HOURS
Status: DISCONTINUED | OUTPATIENT
Start: 2024-01-17 | End: 2024-01-20

## 2024-01-17 RX ORDER — BISACODYL 10 MG
10 SUPPOSITORY, RECTAL RECTAL DAILY PRN
Status: DISCONTINUED | OUTPATIENT
Start: 2024-01-17 | End: 2024-01-25 | Stop reason: HOSPADM

## 2024-01-17 RX ORDER — METHYLPREDNISOLONE SODIUM SUCCINATE 40 MG/ML
40 INJECTION, POWDER, LYOPHILIZED, FOR SOLUTION INTRAMUSCULAR; INTRAVENOUS EVERY 8 HOURS
Status: DISCONTINUED | OUTPATIENT
Start: 2024-01-17 | End: 2024-01-17

## 2024-01-17 RX ORDER — ASPIRIN 81 MG/1
81 TABLET ORAL DAILY
Status: DISCONTINUED | OUTPATIENT
Start: 2024-01-17 | End: 2024-01-25 | Stop reason: HOSPADM

## 2024-01-17 RX ORDER — BISACODYL 5 MG/1
5 TABLET, DELAYED RELEASE ORAL DAILY PRN
Status: DISCONTINUED | OUTPATIENT
Start: 2024-01-17 | End: 2024-01-17 | Stop reason: SDUPTHER

## 2024-01-17 RX ORDER — DEXMEDETOMIDINE HYDROCHLORIDE 4 UG/ML
INJECTION, SOLUTION INTRAVENOUS
Status: COMPLETED
Start: 2024-01-17 | End: 2024-01-17

## 2024-01-17 RX ORDER — LORAZEPAM 2 MG/ML
0.5 INJECTION INTRAMUSCULAR ONCE
Status: DISCONTINUED | OUTPATIENT
Start: 2024-01-17 | End: 2024-01-17

## 2024-01-17 RX ORDER — ONDANSETRON 2 MG/ML
4 INJECTION INTRAMUSCULAR; INTRAVENOUS EVERY 6 HOURS PRN
Status: DISCONTINUED | OUTPATIENT
Start: 2024-01-17 | End: 2024-01-25 | Stop reason: HOSPADM

## 2024-01-17 RX ORDER — BISACODYL 10 MG
10 SUPPOSITORY, RECTAL RECTAL DAILY PRN
Status: DISCONTINUED | OUTPATIENT
Start: 2024-01-17 | End: 2024-01-17 | Stop reason: SDUPTHER

## 2024-01-17 RX ORDER — ATORVASTATIN CALCIUM 20 MG/1
20 TABLET, FILM COATED ORAL DAILY
Status: DISCONTINUED | OUTPATIENT
Start: 2024-01-17 | End: 2024-01-25 | Stop reason: HOSPADM

## 2024-01-17 RX ORDER — GUAIFENESIN 600 MG/1
1200 TABLET, EXTENDED RELEASE ORAL 2 TIMES DAILY
Status: DISCONTINUED | OUTPATIENT
Start: 2024-01-17 | End: 2024-01-21

## 2024-01-17 RX ORDER — IPRATROPIUM BROMIDE AND ALBUTEROL SULFATE 2.5; .5 MG/3ML; MG/3ML
3 SOLUTION RESPIRATORY (INHALATION)
Status: DISCONTINUED | OUTPATIENT
Start: 2024-01-17 | End: 2024-01-17

## 2024-01-17 RX ORDER — METHYLPREDNISOLONE SODIUM SUCCINATE 125 MG/2ML
125 INJECTION, POWDER, LYOPHILIZED, FOR SOLUTION INTRAMUSCULAR; INTRAVENOUS ONCE
Status: COMPLETED | OUTPATIENT
Start: 2024-01-17 | End: 2024-01-17

## 2024-01-17 RX ORDER — VENLAFAXINE HYDROCHLORIDE 37.5 MG/1
75 CAPSULE, EXTENDED RELEASE ORAL DAILY
Status: DISCONTINUED | OUTPATIENT
Start: 2024-01-17 | End: 2024-01-25 | Stop reason: HOSPADM

## 2024-01-17 RX ORDER — IPRATROPIUM BROMIDE AND ALBUTEROL SULFATE 2.5; .5 MG/3ML; MG/3ML
3 SOLUTION RESPIRATORY (INHALATION) ONCE
Status: COMPLETED | OUTPATIENT
Start: 2024-01-17 | End: 2024-01-17

## 2024-01-17 RX ORDER — LORAZEPAM 2 MG/ML
1 INJECTION INTRAMUSCULAR EVERY 4 HOURS PRN
Status: DISCONTINUED | OUTPATIENT
Start: 2024-01-17 | End: 2024-01-17

## 2024-01-17 RX ORDER — IPRATROPIUM BROMIDE AND ALBUTEROL SULFATE 2.5; .5 MG/3ML; MG/3ML
3 SOLUTION RESPIRATORY (INHALATION) EVERY 6 HOURS PRN
Status: DISCONTINUED | OUTPATIENT
Start: 2024-01-17 | End: 2024-01-17

## 2024-01-17 RX ORDER — SODIUM CHLORIDE 0.9 % (FLUSH) 0.9 %
10 SYRINGE (ML) INJECTION EVERY 12 HOURS SCHEDULED
Status: DISCONTINUED | OUTPATIENT
Start: 2024-01-17 | End: 2024-01-20

## 2024-01-17 RX ORDER — POLYETHYLENE GLYCOL 3350 17 G/17G
17 POWDER, FOR SOLUTION ORAL DAILY PRN
Status: DISCONTINUED | OUTPATIENT
Start: 2024-01-17 | End: 2024-01-17 | Stop reason: SDUPTHER

## 2024-01-17 RX ORDER — ALBUTEROL SULFATE 2.5 MG/3ML
2.5 SOLUTION RESPIRATORY (INHALATION)
Status: COMPLETED | OUTPATIENT
Start: 2024-01-17 | End: 2024-01-17

## 2024-01-17 RX ORDER — IPRATROPIUM BROMIDE AND ALBUTEROL SULFATE 2.5; .5 MG/3ML; MG/3ML
SOLUTION RESPIRATORY (INHALATION)
Status: DISPENSED
Start: 2024-01-17 | End: 2024-01-17

## 2024-01-17 RX ORDER — IPRATROPIUM BROMIDE AND ALBUTEROL SULFATE 2.5; .5 MG/3ML; MG/3ML
3 SOLUTION RESPIRATORY (INHALATION) EVERY 4 HOURS PRN
Status: DISCONTINUED | OUTPATIENT
Start: 2024-01-17 | End: 2024-01-23

## 2024-01-17 RX ORDER — FLUTICASONE PROPIONATE 50 MCG
1 SPRAY, SUSPENSION (ML) NASAL DAILY PRN
Status: DISCONTINUED | OUTPATIENT
Start: 2024-01-17 | End: 2024-01-25 | Stop reason: HOSPADM

## 2024-01-17 RX ORDER — DEXMEDETOMIDINE HYDROCHLORIDE 4 UG/ML
.2-1.5 INJECTION, SOLUTION INTRAVENOUS
Status: DISCONTINUED | OUTPATIENT
Start: 2024-01-17 | End: 2024-01-17

## 2024-01-17 RX ORDER — POLYETHYLENE GLYCOL 3350 17 G/17G
17 POWDER, FOR SOLUTION ORAL DAILY PRN
Status: DISCONTINUED | OUTPATIENT
Start: 2024-01-17 | End: 2024-01-25 | Stop reason: HOSPADM

## 2024-01-17 RX ADMIN — Medication 10 ML: at 09:43

## 2024-01-17 RX ADMIN — IPRATROPIUM BROMIDE AND ALBUTEROL SULFATE 3 ML: 2.5; .5 SOLUTION RESPIRATORY (INHALATION) at 18:57

## 2024-01-17 RX ADMIN — METHYLPREDNISOLONE SODIUM SUCCINATE 60 MG: 125 INJECTION, POWDER, FOR SOLUTION INTRAMUSCULAR; INTRAVENOUS at 20:30

## 2024-01-17 RX ADMIN — IPRATROPIUM BROMIDE AND ALBUTEROL SULFATE 3 ML: 2.5; .5 SOLUTION RESPIRATORY (INHALATION) at 23:12

## 2024-01-17 RX ADMIN — IPRATROPIUM BROMIDE AND ALBUTEROL SULFATE 3 ML: .5; 2.5 SOLUTION RESPIRATORY (INHALATION) at 07:30

## 2024-01-17 RX ADMIN — ALBUTEROL SULFATE 2.5 MG: 2.5 SOLUTION RESPIRATORY (INHALATION) at 05:34

## 2024-01-17 RX ADMIN — SODIUM CHLORIDE 100 ML/HR: 9 INJECTION, SOLUTION INTRAVENOUS at 18:32

## 2024-01-17 RX ADMIN — IPRATROPIUM BROMIDE AND ALBUTEROL SULFATE 3 ML: 2.5; .5 SOLUTION RESPIRATORY (INHALATION) at 07:30

## 2024-01-17 RX ADMIN — DEXMEDETOMIDINE HYDROCHLORIDE 1 MCG/KG/HR: 4 INJECTION, SOLUTION INTRAVENOUS at 21:31

## 2024-01-17 RX ADMIN — AMPICILLIN AND SULBACTAM 1.5 G: 1; .5 INJECTION, POWDER, FOR SOLUTION INTRAVENOUS at 08:55

## 2024-01-17 RX ADMIN — AMPICILLIN AND SULBACTAM 1.5 G: 1; .5 INJECTION, POWDER, FOR SOLUTION INTRAVENOUS at 21:26

## 2024-01-17 RX ADMIN — Medication 10 ML: at 20:08

## 2024-01-17 RX ADMIN — METOPROLOL TARTRATE 5 MG: 5 INJECTION INTRAVENOUS at 14:38

## 2024-01-17 RX ADMIN — SODIUM CHLORIDE 100 ML/HR: 9 INJECTION, SOLUTION INTRAVENOUS at 07:32

## 2024-01-17 RX ADMIN — DEXMEDETOMIDINE HYDROCHLORIDE 0.2 MCG/KG/HR: 4 INJECTION, SOLUTION INTRAVENOUS at 07:33

## 2024-01-17 RX ADMIN — METHYLPREDNISOLONE SODIUM SUCCINATE 60 MG: 125 INJECTION, POWDER, FOR SOLUTION INTRAMUSCULAR; INTRAVENOUS at 08:55

## 2024-01-17 RX ADMIN — LORAZEPAM 1 MG: 2 INJECTION INTRAMUSCULAR; INTRAVENOUS at 05:57

## 2024-01-17 RX ADMIN — DEXMEDETOMIDINE HYDROCHLORIDE 1.2 MCG/KG/HR: 4 INJECTION, SOLUTION INTRAVENOUS at 14:33

## 2024-01-17 RX ADMIN — LORAZEPAM 1 MG: 2 INJECTION INTRAMUSCULAR; INTRAVENOUS at 06:21

## 2024-01-17 RX ADMIN — DOXYCYCLINE 100 MG: 100 INJECTION, POWDER, LYOPHILIZED, FOR SOLUTION INTRAVENOUS at 20:05

## 2024-01-17 RX ADMIN — IPRATROPIUM BROMIDE AND ALBUTEROL SULFATE 3 ML: .5; 3 SOLUTION RESPIRATORY (INHALATION) at 04:48

## 2024-01-17 RX ADMIN — NICOTINE 1 PATCH: 21 PATCH, EXTENDED RELEASE TRANSDERMAL at 08:56

## 2024-01-17 RX ADMIN — METHYLPREDNISOLONE SODIUM SUCCINATE 125 MG: 125 INJECTION, POWDER, FOR SOLUTION INTRAMUSCULAR; INTRAVENOUS at 04:36

## 2024-01-17 RX ADMIN — METOPROLOL TARTRATE 5 MG: 5 INJECTION INTRAVENOUS at 06:11

## 2024-01-17 RX ADMIN — IPRATROPIUM BROMIDE AND ALBUTEROL SULFATE 3 ML: 2.5; .5 SOLUTION RESPIRATORY (INHALATION) at 15:54

## 2024-01-17 RX ADMIN — ALBUTEROL SULFATE 2.5 MG: 2.5 SOLUTION RESPIRATORY (INHALATION) at 05:00

## 2024-01-17 RX ADMIN — IPRATROPIUM BROMIDE AND ALBUTEROL SULFATE 3 ML: 2.5; .5 SOLUTION RESPIRATORY (INHALATION) at 12:18

## 2024-01-17 RX ADMIN — ALBUTEROL SULFATE 2.5 MG: 2.5 SOLUTION RESPIRATORY (INHALATION) at 05:11

## 2024-01-17 RX ADMIN — METOPROLOL TARTRATE 5 MG: 5 INJECTION INTRAVENOUS at 10:22

## 2024-01-17 RX ADMIN — IOPAMIDOL 100 ML: 755 INJECTION, SOLUTION INTRAVENOUS at 13:37

## 2024-01-17 RX ADMIN — METOPROLOL TARTRATE 5 MG: 5 INJECTION INTRAVENOUS at 21:31

## 2024-01-17 RX ADMIN — METHYLPREDNISOLONE SODIUM SUCCINATE 60 MG: 125 INJECTION, POWDER, FOR SOLUTION INTRAMUSCULAR; INTRAVENOUS at 14:38

## 2024-01-17 RX ADMIN — ENOXAPARIN SODIUM 40 MG: 40 INJECTION SUBCUTANEOUS at 08:55

## 2024-01-17 RX ADMIN — AMPICILLIN AND SULBACTAM 1.5 G: 1; .5 INJECTION, POWDER, FOR SOLUTION INTRAVENOUS at 14:37

## 2024-01-17 RX ADMIN — DOXYCYCLINE 100 MG: 100 INJECTION, POWDER, LYOPHILIZED, FOR SOLUTION INTRAVENOUS at 08:54

## 2024-01-17 RX ADMIN — LORAZEPAM 0.5 MG: 2 INJECTION INTRAMUSCULAR; INTRAVENOUS at 19:17

## 2024-01-17 NOTE — ED NOTES
Nursing report ED to floor  Valeria Rubi  48 y.o.  female    HPI :   Chief Complaint   Patient presents with    Shortness of Breath     Starting Saturday and worsening until this AM - labored breathing, grunting and wheezy present during triage        Admitting doctor:   Facundo Flanagan DO    Admitting diagnosis:   The primary encounter diagnosis was Acute hypoxic respiratory failure. A diagnosis of COPD with acute exacerbation was also pertinent to this visit.    Code status:   Current Code Status       Date Active Code Status Order ID Comments User Context       Prior            Allergies:   Prednisone    Isolation:   No active isolations    Intake and Output  No intake or output data in the 24 hours ending 01/17/24 0534    Weight:       01/17/24  0423   Weight: 57.2 kg (126 lb)       Most recent vitals:   Vitals:    01/17/24 0501 01/17/24 0511 01/17/24 0528 01/17/24 0530   BP: (!) 190/113   (!) 195/111   BP Location: Right arm   Right arm   Patient Position: Lying   Lying   Pulse: (!) 132 (!) 133 (!) 140 (!) 140   Resp: 28 27  (!) 30   Temp:       TempSrc:       SpO2: 98% 99% 97% 95%   Weight:       Height:           Active LDAs/IV Access:   Lines, Drains & Airways       Active LDAs       Name Placement date Placement time Site Days    Peripheral IV 01/17/24 0430 Right Antecubital 01/17/24  0430  Antecubital  less than 1                    Labs (abnormal labs have a star):   Labs Reviewed   COMPREHENSIVE METABOLIC PANEL - Abnormal; Notable for the following components:       Result Value    Glucose 133 (*)     Sodium 130 (*)     Chloride 90 (*)     CO2 30.9 (*)     All other components within normal limits    Narrative:     GFR Normal >60  Chronic Kidney Disease <60  Kidney Failure <15     CBC WITH AUTO DIFFERENTIAL - Abnormal; Notable for the following components:    WBC 13.15 (*)     Hematocrit 47.3 (*)     .3 (*)     Neutrophil % 86.3 (*)     Lymphocyte % 6.5 (*)     Eosinophil % 0.0 (*)      Neutrophils, Absolute 11.33 (*)     Immature Grans, Absolute 0.07 (*)     All other components within normal limits   BNP (IN-HOUSE) - Normal    Narrative:     This assay is used as an aid in the diagnosis of individuals suspected of having heart failure. It can be used as an aid in the diagnosis of acute decompensated heart failure (ADHF) in patients presenting with signs and symptoms of ADHF to the emergency department (ED). In addition, NT-proBNP of <300 pg/mL indicates ADHF is not likely.    Age Range Result Interpretation  NT-proBNP Concentration (pg/mL:      <50             Positive            >450                   Gray                 300-450                    Negative             <300    50-75           Positive            >900                  Gray                300-900                  Negative            <300      >75             Positive            >1800                  Gray                300-1800                  Negative            <300   TROPONIN - Normal    Narrative:     High Sensitive Troponin T Reference Range:  <14.0 ng/L- Negative Female for AMI  <22.0 ng/L- Negative Male for AMI  >=14 - Abnormal Female indicating possible myocardial injury.  >=22 - Abnormal Male indicating possible myocardial injury.   Clinicians would have to utilize clinical acumen, EKG, Troponin, and serial changes to determine if it is an Acute Myocardial Infarction or myocardial injury due to an underlying chronic condition.        SCAN SLIDE - Normal   HIGH SENSITIVITIY TROPONIN T 2HR   CBC AND DIFFERENTIAL    Narrative:     The following orders were created for panel order CBC & Differential.  Procedure                               Abnormality         Status                     ---------                               -----------         ------                     CBC Auto Differential[[id number]]        Abnormal            Final result               Scan Slide[[id number]]                   Normal              Final  result                 Please view results for these tests on the individual orders.       Results       Procedure Component Value Ref Range Date/Time    BNP [[id number]]  (Normal) Collected: 01/17/24 0435    Order Status: Completed Specimen: Blood Updated: 01/17/24 0513     proBNP 520.1 0.0 - 900.0 pg/mL     Narrative:      This assay is used as an aid in the diagnosis of individuals suspected of having heart failure. It can be used as an aid in the diagnosis of acute decompensated heart failure (ADHF) in patients presenting with signs and symptoms of ADHF to the emergency department (ED). In addition, NT-proBNP of <300 pg/mL indicates ADHF is not likely.    Age Range Result Interpretation  NT-proBNP Concentration (pg/mL:      <50             Positive            >450                   Gray                 300-450                    Negative             <300    50-75           Positive            >900                  Gray                300-900                  Negative            <300      >75             Positive            >1800                  Gray                300-1800                  Negative            <300    High Sensitivity Troponin T 2Hr [[id number]]     Order Status: Sent Specimen: Blood     High Sensitivity Troponin T [[id number]]  (Normal) Collected: 01/17/24 0435    Order Status: Completed Specimen: Blood Updated: 01/17/24 0513     HS Troponin T 10 <14 ng/L     Narrative:      High Sensitive Troponin T Reference Range:  <14.0 ng/L- Negative Female for AMI  <22.0 ng/L- Negative Male for AMI  >=14 - Abnormal Female indicating possible myocardial injury.  >=22 - Abnormal Male indicating possible myocardial injury.   Clinicians would have to utilize clinical acumen, EKG, Troponin, and serial changes to determine if it is an Acute Myocardial Infarction or myocardial injury due to an underlying chronic condition.         Scan Slide [[id number]]  (Normal) Collected: 01/17/24 0435    Order Status: Completed  Specimen: Blood Updated: 01/17/24 0512     RBC Morphology Normal Normal      WBC Morphology Normal Normal      Platelet Morphology Normal Normal     CBC Auto Differential [[id number]]  (Abnormal) Collected: 01/17/24 0435    Order Status: Completed Specimen: Blood Updated: 01/17/24 0512     WBC 13.15 3.40 - 10.80 10*3/mm3      RBC 4.67 3.77 - 5.28 10*6/mm3      Hemoglobin 15.1 12.0 - 15.9 g/dL      Hematocrit 47.3 34.0 - 46.6 %      .3 79.0 - 97.0 fL      MCH 32.3 26.6 - 33.0 pg      MCHC 31.9 31.5 - 35.7 g/dL      RDW 13.7 12.3 - 15.4 %      RDW-SD 50.7 37.0 - 54.0 fl      MPV 8.5 6.0 - 12.0 fL      Platelets 242 140 - 450 10*3/mm3      Neutrophil % 86.3 42.7 - 76.0 %      Lymphocyte % 6.5 19.6 - 45.3 %      Monocyte % 6.5 5.0 - 12.0 %      Eosinophil % 0.0 0.3 - 6.2 %      Basophil % 0.2 0.0 - 1.5 %      Immature Grans % 0.5 0.0 - 0.5 %      Neutrophils, Absolute 11.33 1.70 - 7.00 10*3/mm3      Lymphocytes, Absolute 0.86 0.70 - 3.10 10*3/mm3      Monocytes, Absolute 0.86 0.10 - 0.90 10*3/mm3      Eosinophils, Absolute 0.00 0.00 - 0.40 10*3/mm3      Basophils, Absolute 0.03 0.00 - 0.20 10*3/mm3      Immature Grans, Absolute 0.07 0.00 - 0.05 10*3/mm3      nRBC 0.0 0.0 - 0.2 /100 WBC     Comprehensive Metabolic Panel [[id number]]  (Abnormal) Collected: 01/17/24 0435    Order Status: Completed Specimen: Blood Updated: 01/17/24 0503     Glucose 133 65 - 99 mg/dL      BUN 11 6 - 20 mg/dL      Creatinine 0.75 0.57 - 1.00 mg/dL      Sodium 130 136 - 145 mmol/L      Potassium 4.7 3.5 - 5.2 mmol/L      Comment: Slight hemolysis detected by analyzer. Result may be falsely elevated.        Chloride 90 98 - 107 mmol/L      CO2 30.9 22.0 - 29.0 mmol/L      Calcium 9.3 8.6 - 10.5 mg/dL      Total Protein 7.6 6.0 - 8.5 g/dL      Albumin 4.0 3.5 - 5.2 g/dL      ALT (SGPT) 10 1 - 33 U/L      AST (SGOT) 22 1 - 32 U/L      Comment: Slight hemolysis detected by analyzer. Result may be falsely elevated.        Alkaline Phosphatase  100 39 - 117 U/L      Total Bilirubin 0.3 0.0 - 1.2 mg/dL      Globulin 3.6 gm/dL      A/G Ratio 1.1 g/dL      BUN/Creatinine Ratio 14.7 7.0 - 25.0      Anion Gap 9.1 5.0 - 15.0 mmol/L      eGFR 97.1 >60.0 mL/min/1.73     Narrative:      GFR Normal >60  Chronic Kidney Disease <60  Kidney Failure <15               EKG:   ECG 12 Lead Dyspnea   Preliminary Result   HEART RATE= 130  bpm   RR Interval= 460  ms   MO Interval= 120  ms   P Horizontal Axis=   deg   P Front Axis= 95  deg   QRSD Interval= 73  ms   QT Interval= 305  ms   QTcB= 450  ms   QRS Axis= 51  deg   T Wave Axis= 86  deg   - BORDERLINE ECG -   Sinus tachycardia   Ventricular premature complex   Aberrant complex   Minimal ST depression, lateral leads   Electronically Signed By:    Date and Time of Study: 2024-01-17 04:53:04          Meds given in ED:   Medications   sodium chloride 0.9 % flush 10 mL (has no administration in time range)   albuterol (PROVENTIL) nebulizer solution 0.083% 2.5 mg/3mL (2.5 mg Nebulization Given 1/17/24 0511)   ipratropium-albuterol (DUO-NEB) nebulizer solution 3 mL (3 mL Nebulization Given 1/17/24 2864)   methylPREDNISolone sodium succinate (SOLU-Medrol) injection 125 mg (125 mg Intravenous Given 1/17/24 0436)       Imaging results:  XR Chest 1 View    Result Date: 1/17/2024  COPD. No active disease. Signer Name: Danny Davis MD Signed: 1/17/2024 5:06 AM [facility] [place]     Ambulatory status:   - full assist    Social issues:   Social History     Socioeconomic History    Marital status: Single   Tobacco Use    Smoking status: Every Day     Packs/day: 1.00     Years: 30.00     Additional pack years: 0.00     Total pack years: 30.00     Types: Cigarettes     Passive exposure: Current    Smokeless tobacco: Never   Vaping Use    Vaping Use: Never used   Substance and Sexual Activity    Alcohol use: No    Drug use: No    Sexual activity: Defer       NIH Stroke Scale:         Margareth Nicole RN  01/17/24  05:34 EST

## 2024-01-17 NOTE — ED NOTES
Nursing report ED to floor  Valeria Rubi  48 y.o.  female    HPI :   Chief Complaint   Patient presents with    Shortness of Breath     Starting Saturday and worsening until this AM - labored breathing, grunting and wheezy present during triage        Admitting doctor:   Facundo Flanagan DO    Admitting diagnosis:   The primary encounter diagnosis was Acute hypoxic respiratory failure. A diagnosis of COPD with acute exacerbation was also pertinent to this visit.    Code status:   Current Code Status       Date Active Code Status Order ID Comments User Context       1/17/2024 0535 CPR (Attempt to Resuscitate) [id number]  Zita Potter, APRN ED        Question Answer    Code Status (Patient has no pulse and is not breathing) CPR (Attempt to Resuscitate)    Medical Interventions (Patient has pulse or is breathing) Full Support    Level Of Support Discussed With Patient                    Allergies:   Prednisone    Isolation:   Enhanced Droplet/Contact     Intake and Output  No intake or output data in the 24 hours ending 01/17/24 0701    Weight:       01/17/24  0423   Weight: 57.2 kg (126 lb)       Most recent vitals:   Vitals:    01/17/24 0534 01/17/24 0601 01/17/24 0632 01/17/24 0653   BP:  (!) 192/122 (!) 190/110    BP Location:  Right arm Right arm    Patient Position:  Lying Lying    Pulse: (!) 135 (!) 138 111 117   Resp: 27 (!) 30 27 (!) 33   Temp:       TempSrc:       SpO2: 97% 94% 95% 96%   Weight:       Height:           Active LDAs/IV Access:   Lines, Drains & Airways       Active LDAs       Name Placement date Placement time Site Days    Peripheral IV 01/17/24 0430 Right Antecubital 01/17/24  0430  Antecubital  less than 1    Peripheral IV 01/17/24 0700 Left Hand 01/17/24  0700  Hand  less than 1    Peripheral IV 01/17/24 0700 Right Forearm 01/17/24  0700  Forearm  less than 1                    Labs (abnormal labs have a star):   Labs Reviewed   COMPREHENSIVE METABOLIC PANEL - Abnormal; Notable  for the following components:       Result Value    Glucose 133 (*)     Sodium 130 (*)     Chloride 90 (*)     CO2 30.9 (*)     All other components within normal limits    Narrative:     GFR Normal >60  Chronic Kidney Disease <60  Kidney Failure <15     CBC WITH AUTO DIFFERENTIAL - Abnormal; Notable for the following components:    WBC 13.15 (*)     Hematocrit 47.3 (*)     .3 (*)     Neutrophil % 86.3 (*)     Lymphocyte % 6.5 (*)     Eosinophil % 0.0 (*)     Neutrophils, Absolute 11.33 (*)     Immature Grans, Absolute 0.07 (*)     All other components within normal limits   BNP (IN-HOUSE) - Normal    Narrative:     This assay is used as an aid in the diagnosis of individuals suspected of having heart failure. It can be used as an aid in the diagnosis of acute decompensated heart failure (ADHF) in patients presenting with signs and symptoms of ADHF to the emergency department (ED). In addition, NT-proBNP of <300 pg/mL indicates ADHF is not likely.    Age Range Result Interpretation  NT-proBNP Concentration (pg/mL:      <50             Positive            >450                   Gray                 300-450                    Negative             <300    50-75           Positive            >900                  Gray                300-900                  Negative            <300      >75             Positive            >1800                  Gray                300-1800                  Negative            <300   TROPONIN - Normal    Narrative:     High Sensitive Troponin T Reference Range:  <14.0 ng/L- Negative Female for AMI  <22.0 ng/L- Negative Male for AMI  >=14 - Abnormal Female indicating possible myocardial injury.  >=22 - Abnormal Male indicating possible myocardial injury.   Clinicians would have to utilize clinical acumen, EKG, Troponin, and serial changes to determine if it is an Acute Myocardial Infarction or myocardial injury due to an underlying chronic condition.        SCAN SLIDE - Normal    RESPIRATORY PANEL PCR W/ COVID-19 (SARS-COV-2), NP SWAB IN UTM/VTP, 2 HR TAT   BLOOD CULTURE   BLOOD CULTURE   HIGH SENSITIVITIY TROPONIN T 2HR   BLOOD GAS, ARTERIAL   LACTIC ACID, PLASMA   BLOOD GAS, VENOUS   CBC AND DIFFERENTIAL    Narrative:     The following orders were created for panel order CBC & Differential.  Procedure                               Abnormality         Status                     ---------                               -----------         ------                     CBC Auto Differential[[id number]]        Abnormal            Final result               Scan Slide[[id number]]                   Normal              Final result                 Please view results for these tests on the individual orders.       Results       Procedure Component Value Ref Range Date/Time    Lactic Acid, Plasma [[id number]] Collected: 01/17/24 0700    Order Status: Sent Specimen: Blood Updated: 01/17/24 0700    Blood Gas, Venous - [[id number]]     Order Status: No result Specimen: Venous Blood     High Sensitivity Troponin T 2Hr [[id number]] Collected: 01/17/24 0642    Order Status: Sent Specimen: Blood Updated: 01/17/24 0652    Blood Culture - Blood, Arm, Right [[id number]] Collected: 01/17/24 0645    Order Status: Sent Specimen: Blood from Arm, Right Updated: 01/17/24 0652    Respiratory Panel PCR w/COVID-19(SARS-CoV-2) TERRY/ROSSI/KIKA/PAD/COR/RONAK In-House, NP Swab in UTM/VTM, 2 HR TAT - Swab, Nasopharynx [[id number]] Collected: 01/17/24 0639    Order Status: Sent Specimen: Swab from Nasopharynx Updated: 01/17/24 0652    Blood Culture - Blood, [[id number]]     Order Status: Sent Specimen: Blood     Blood Gas, Arterial - [[id number]]     Order Status: No result Specimen: Arterial Blood     BNP [[id number]]  (Normal) Collected: 01/17/24 0435    Order Status: Completed Specimen: Blood Updated: 01/17/24 0513     proBNP 520.1 0.0 - 900.0 pg/mL     Narrative:      This assay is used as an aid in the diagnosis of individuals  suspected of having heart failure. It can be used as an aid in the diagnosis of acute decompensated heart failure (ADHF) in patients presenting with signs and symptoms of ADHF to the emergency department (ED). In addition, NT-proBNP of <300 pg/mL indicates ADHF is not likely.    Age Range Result Interpretation  NT-proBNP Concentration (pg/mL:      <50             Positive            >450                   Gray                 300-450                    Negative             <300    50-75           Positive            >900                  Gray                300-900                  Negative            <300      >75             Positive            >1800                  Gray                300-1800                  Negative            <300    High Sensitivity Troponin T [[id number]]  (Normal) Collected: 01/17/24 0435    Order Status: Completed Specimen: Blood Updated: 01/17/24 0513     HS Troponin T 10 <14 ng/L     Narrative:      High Sensitive Troponin T Reference Range:  <14.0 ng/L- Negative Female for AMI  <22.0 ng/L- Negative Male for AMI  >=14 - Abnormal Female indicating possible myocardial injury.  >=22 - Abnormal Male indicating possible myocardial injury.   Clinicians would have to utilize clinical acumen, EKG, Troponin, and serial changes to determine if it is an Acute Myocardial Infarction or myocardial injury due to an underlying chronic condition.         Scan Slide [[id number]]  (Normal) Collected: 01/17/24 0435    Order Status: Completed Specimen: Blood Updated: 01/17/24 0512     RBC Morphology Normal Normal      WBC Morphology Normal Normal      Platelet Morphology Normal Normal     CBC Auto Differential [[id number]]  (Abnormal) Collected: 01/17/24 0435    Order Status: Completed Specimen: Blood Updated: 01/17/24 0512     WBC 13.15 3.40 - 10.80 10*3/mm3      RBC 4.67 3.77 - 5.28 10*6/mm3      Hemoglobin 15.1 12.0 - 15.9 g/dL      Hematocrit 47.3 34.0 - 46.6 %      .3 79.0 - 97.0 fL      MCH 32.3  26.6 - 33.0 pg      MCHC 31.9 31.5 - 35.7 g/dL      RDW 13.7 12.3 - 15.4 %      RDW-SD 50.7 37.0 - 54.0 fl      MPV 8.5 6.0 - 12.0 fL      Platelets 242 140 - 450 10*3/mm3      Neutrophil % 86.3 42.7 - 76.0 %      Lymphocyte % 6.5 19.6 - 45.3 %      Monocyte % 6.5 5.0 - 12.0 %      Eosinophil % 0.0 0.3 - 6.2 %      Basophil % 0.2 0.0 - 1.5 %      Immature Grans % 0.5 0.0 - 0.5 %      Neutrophils, Absolute 11.33 1.70 - 7.00 10*3/mm3      Lymphocytes, Absolute 0.86 0.70 - 3.10 10*3/mm3      Monocytes, Absolute 0.86 0.10 - 0.90 10*3/mm3      Eosinophils, Absolute 0.00 0.00 - 0.40 10*3/mm3      Basophils, Absolute 0.03 0.00 - 0.20 10*3/mm3      Immature Grans, Absolute 0.07 0.00 - 0.05 10*3/mm3      nRBC 0.0 0.0 - 0.2 /100 WBC     Comprehensive Metabolic Panel [[id number]]  (Abnormal) Collected: 01/17/24 0435    Order Status: Completed Specimen: Blood Updated: 01/17/24 0503     Glucose 133 65 - 99 mg/dL      BUN 11 6 - 20 mg/dL      Creatinine 0.75 0.57 - 1.00 mg/dL      Sodium 130 136 - 145 mmol/L      Potassium 4.7 3.5 - 5.2 mmol/L      Comment: Slight hemolysis detected by analyzer. Result may be falsely elevated.        Chloride 90 98 - 107 mmol/L      CO2 30.9 22.0 - 29.0 mmol/L      Calcium 9.3 8.6 - 10.5 mg/dL      Total Protein 7.6 6.0 - 8.5 g/dL      Albumin 4.0 3.5 - 5.2 g/dL      ALT (SGPT) 10 1 - 33 U/L      AST (SGOT) 22 1 - 32 U/L      Comment: Slight hemolysis detected by analyzer. Result may be falsely elevated.        Alkaline Phosphatase 100 39 - 117 U/L      Total Bilirubin 0.3 0.0 - 1.2 mg/dL      Globulin 3.6 gm/dL      A/G Ratio 1.1 g/dL      BUN/Creatinine Ratio 14.7 7.0 - 25.0      Anion Gap 9.1 5.0 - 15.0 mmol/L      eGFR 97.1 >60.0 mL/min/1.73     Narrative:      GFR Normal >60  Chronic Kidney Disease <60  Kidney Failure <15               EKG:   ECG 12 Lead Dyspnea   Preliminary Result   HEART RATE= 130  bpm   RR Interval= 460  ms   GA Interval= 120  ms   P Horizontal Axis=   deg   P Front Axis=  95  deg   QRSD Interval= 73  ms   QT Interval= 305  ms   QTcB= 450  ms   QRS Axis= 51  deg   T Wave Axis= 86  deg   - BORDERLINE ECG -   Sinus tachycardia   Ventricular premature complex   Aberrant complex   Minimal ST depression, lateral leads   Electronically Signed By:    Date and Time of Study: 2024-01-17 04:53:04          Meds given in ED:   Medications   sodium chloride 0.9 % flush 10 mL (has no administration in time range)   metoprolol tartrate (LOPRESSOR) injection 5 mg (5 mg Intravenous Given 1/17/24 0611)   sennosides-docusate (PERICOLACE) 8.6-50 MG per tablet 2 tablet (has no administration in time range)     And   polyethylene glycol (MIRALAX) packet 17 g (has no administration in time range)     And   bisacodyl (DULCOLAX) EC tablet 5 mg (has no administration in time range)     And   bisacodyl (DULCOLAX) suppository 10 mg (has no administration in time range)   dexmedetomidine (PRECEDEX) 400 mcg in 100 mL NS infusion ( Intravenous Held by provider 1/17/24 0630)   LORazepam (ATIVAN) injection 0.5 mg (has no administration in time range)   sodium chloride 0.9 % infusion (has no administration in time range)   ipratropium-albuterol (DUO-NEB) nebulizer solution 3 mL (0 mL Nebulization Hold 1/17/24 0652)   albuterol (PROVENTIL) nebulizer solution 0.083% 2.5 mg/3mL (2.5 mg Nebulization Given 1/17/24 0534)   methylPREDNISolone sodium succinate (SOLU-Medrol) injection 125 mg (125 mg Intravenous Given 1/17/24 2606)       Imaging results:  XR Chest 1 View    Result Date: 1/17/2024  COPD. No active disease. Signer Name: Danny Davis MD Signed: 1/17/2024 5:06 AM [facility] [place]     Ambulatory status:   - up with assistance     Social issues:   Social History     Socioeconomic History    Marital status: Single   Tobacco Use    Smoking status: Every Day     Packs/day: 1.00     Years: 30.00     Additional pack years: 0.00     Total pack years: 30.00     Types: Cigarettes     Passive  exposure: Current    Smokeless tobacco: Never   Vaping Use    Vaping Use: Never used   Substance and Sexual Activity    Alcohol use: No    Drug use: No    Sexual activity: Defer       NIH Stroke Scale:         Margareth Nicole RN  01/17/24 07:01 EST

## 2024-01-17 NOTE — CASE MANAGEMENT/SOCIAL WORK
Continued Stay Note  SARAH Garcia     Patient Name: Valeria Rubi  MRN: [id number]  Today's Date: 1/17/2024    Admit Date: 1/17/2024    Plan: to be determined   Discharge Plan       Row Name 01/17/24 1114       Plan    Plan to be determined    Patient/Family in Agreement with Plan yes    Plan Comments Unable to interview patient at this time, she is on bipap, sedated. Per chart review, patient lives with her  and is independent of ADLs. She uses 02 per [employer] and has a nebulizer. She has a living will on file. She sees Dr Ceron as PCP and uses [facility]. No issues obtaining medications. CM will continue to follow and assist with dc needs as appropriate.                   Discharge Codes    No documentation.                       Kasi Dawson RN

## 2024-01-17 NOTE — PROGRESS NOTES
"Pharmacy Consult - Lovenox    Valeria Rubi has been consulted for pharmacy to dose enoxaparin for VTE prophylaxis per Krzysztof Gonzalez request.     Relevant clinical data and objective history reviewed:  48 y.o. female 157.5 cm (62\") 57.2 kg (126 lb)      Past Medical History:   Diagnosis Date    Anxiety     Arthritis     COPD (chronic obstructive pulmonary disease)     Coronary stent occlusion     CTS (carpal tunnel syndrome)     Depression     Dizziness     Headache     Hyperlipidemia     Hypertension     Migraine     Numbness and tingling     Pneumonia      is allergic to prednisone.    Lab Results   Component Value Date    WBC 13.15 (H) 01/17/2024    HGB 15.1 01/17/2024    HCT 47.3 (H) 01/17/2024    .3 (H) 01/17/2024     01/17/2024     Lab Results   Component Value Date    GLUCOSE 133 (H) 01/17/2024    CALCIUM 9.3 01/17/2024     (L) 01/17/2024    K 4.7 01/17/2024    CO2 30.9 (H) 01/17/2024    CL 90 (L) 01/17/2024    BUN 11 01/17/2024    CREATININE 0.75 01/17/2024    EGFRIFAFRI 96 07/22/2020    EGFRIFNONA 93 04/03/2021    BCR 14.7 01/17/2024    ANIONGAP 9.1 01/17/2024       Estimated Creatinine Clearance: 81 mL/min (by C-G formula based on SCr of 0.75 mg/dL).    Assessment/Plan    1) Will start patient on Lovenox 40 mg subcutaneously every 24 hours.     2) Will monitor patient's renal function every 24 hours, platelets at least every 3 days, and adjust as needed.        Thank you-  Nilay Loja, PharmD   "

## 2024-01-17 NOTE — ED PROVIDER NOTES
"Subjective     History provided by:  Patient and medical records    History of Present Illness    Chief complaint: Shortness of breath    Location: Lungs    Quality/Severity: Severe shortness of breath with wheezing.    Timing/Onset: Started yesterday.    Modifying Factors: Has a history of COPD and continues to smoke.  She has been utilizing her albuterol nebulizer every 4 hours without relief.    Associated symptoms: She has a chronic nonproductive cough.  Denies a fever.    Narrative: The patient is a 48-year-old white female with a history of COPD on home O2 2 L nasal cannula.  She continues to smoke.  She developed shortness of breath yesterday.  Her nebulizer is not helping.  Reviewing her records in epic show that she was admitted last March for a COPD exacerbation with acute on chronic hypercapnic hypoxic respiratory failure.    Review of Systems  Past Medical History:   Diagnosis Date    Anxiety     Arthritis     COPD (chronic obstructive pulmonary disease)     Coronary stent occlusion     CTS (carpal tunnel syndrome)     Depression     Dizziness     Headache     Hyperlipidemia     Hypertension     Migraine     Numbness and tingling     Pneumonia      BP (!) 192/122 (BP Location: Right arm, Patient Position: Lying)   Pulse (!) 138   Temp 98.4 °F (36.9 °C) (Oral)   Resp (!) 30   Ht 157.5 cm (62\")   Wt 57.2 kg (126 lb)   LMP  (LMP Unknown)   SpO2 94%   BMI 23.05 kg/m²     Past Medical History:   Diagnosis Date    Anxiety     Arthritis     COPD (chronic obstructive pulmonary disease)     Coronary stent occlusion     CTS (carpal tunnel syndrome)     Depression     Dizziness     Headache     Hyperlipidemia     Hypertension     Migraine     Numbness and tingling     Pneumonia        Allergies   Allergen Reactions    Prednisone Other (See Comments)     Rash in mouth       Past Surgical History:   Procedure Laterality Date    ANGIOPLASTY ILIAC ARTERY Left 6/17/2016    Procedure: BILATERAL DUPLEX DIRECTED " ACCESS, AIF BILATERAL RUNOFF, SELECTIVE CATHETERIZATION OF RIGHT EXTERNAL ILIAC WITH ANGIOPLASTY, LEFT COMMON ILIAC STENT PLACEMENT;  Surgeon: Jose Carlos Plascencia MD;  Location: [facility] OR 18/19;  Service:     BREAST SURGERY      BILAT IMPLANTS    CAROTID ARTERY ANGIOPLASTY         Family History   Problem Relation Age of Onset    Heart disease Father     Hypertension Father     Diabetes Father     Stroke Father     COPD Father     Heart failure Paternal Grandmother     Heart failure Paternal Grandfather     COPD Mother     COPD Maternal Aunt     COPD Maternal Uncle     COPD Paternal Uncle        Social History     Socioeconomic History    Marital status: Single   Tobacco Use    Smoking status: Every Day     Packs/day: 1.00     Years: 30.00     Additional pack years: 0.00     Total pack years: 30.00     Types: Cigarettes     Passive exposure: Current    Smokeless tobacco: Never   Vaping Use    Vaping Use: Never used   Substance and Sexual Activity    Alcohol use: No    Drug use: No    Sexual activity: Defer           Objective   Physical Exam  Vitals and nursing note reviewed.   Constitutional:       General: She is in acute distress.      Appearance: She is well-developed. She is not toxic-appearing or diaphoretic.      Comments: The patient appears in acute respiratory distress.  Review of the patient's vital signs: Her respiratory rate is elevated at 30 with a oxygen saturation of 81% on her home O2 of 2 L, when the oxygen is increased to 6 L her O2 sat improved to 98%.  She is tachycardic with a heart rate of 130.   HENT:      Head: Normocephalic and atraumatic.      Mouth/Throat:      Mouth: Mucous membranes are moist.      Pharynx: Oropharynx is clear.   Neck:      Vascular: No JVD.   Cardiovascular:      Rate and Rhythm: Regular rhythm. Tachycardia present.      Heart sounds: No murmur heard.  Pulmonary:      Effort: Tachypnea, accessory muscle usage and respiratory distress present.      Breath sounds:  Examination of the right-upper field reveals decreased breath sounds and wheezing. Examination of the left-upper field reveals decreased breath sounds and wheezing. Examination of the right-middle field reveals decreased breath sounds and wheezing. Examination of the left-middle field reveals decreased breath sounds and wheezing. Examination of the right-lower field reveals decreased breath sounds and wheezing. Examination of the left-lower field reveals decreased breath sounds and wheezing. Decreased breath sounds and wheezing present. No rhonchi or rales.   Abdominal:      General: Bowel sounds are normal.      Palpations: Abdomen is soft.      Tenderness: There is no abdominal tenderness. There is no guarding.   Musculoskeletal:      Cervical back: Normal range of motion and neck supple.      Right lower leg: No edema.      Left lower leg: No edema.   Lymphadenopathy:      Cervical: No cervical adenopathy.   Skin:     General: Skin is warm and dry.      Capillary Refill: Capillary refill takes less than 2 seconds.      Coloration: Skin is not cyanotic or pale.      Findings: No erythema.      Nails: There is no clubbing.   Neurological:      General: No focal deficit present.      Mental Status: She is alert and oriented to person, place, and time.      Cranial Nerves: No cranial nerve deficit.   Psychiatric:      Comments: Anxious affect.         Procedures           ED Course  ED Course as of 01/17/24 0606   Wed Jan 17, 2024   0519 My interpretation of the patient's EKG tracing performed at 04: 53 is sinus tachycardia with a rate of 130, normal axis, rate related ST segment depression in the lateral chest leads, no acute ST segment elevation consistent with ischemia, normal R wave transition, normal NV and QT intervals.  No change in comparison to tracing 3/27/2023. [TP]   0522 The patient's chest x-ray was interpreted by me and the radiologist has background COPD, no acute disease. [TP]   0523 Review of the  patient's laboratory studies: The patient's high sensitive troponin is normal at 10.  proBNP normal at 520.  CBC has an elevated white count of 13.15 with a left shift.  Hemoglobin, hematocrit and platelets within normal limits.  CMP has a slightly low sodium of 130 with a normal potassium of 4.7 and elevated glucose of 133.  Renal function test and liver enzymes within normal limits. [TP]   0524 The patient arrived hypoxic with a oxygen saturation of 81% on her home O2 of 2 L via nasal cannula.  She was immediately placed on 6 L of oxygen via nasal cannula that improved her pulse oximetry to 98%.  I ordered BiPAP and the patient maintained an oxygen saturation of 98 to 99%.  She was administered a DuoNeb followed by albuterol and nebulizer treatments x 2.  She was administered Solu-Medrol 125 mg IV.  Repeat examination at 05: 22 the patient still has poor air movement with expiratory wheezes. [TP]   0526 05: 27 patient discussed with CATIE Acosta hospitalist, who will admit the patient to the intensive care unit. [TP]      ED Course User Index  [TP] Mark Blanca MD                                             Medical Decision Making  My differential diagnosis for dyspnea includes but is not limited to:  Asthma, COPD, pneumonia, pulmonary embolus, acute respiratory distress syndrome, pneumothorax, pleural effusion, pulmonary fibrosis, congestive heart failure, myocardial infarction, DKA, uremia, acidosis, sepsis, anemia, drug related, hyperventilation, CNS disease     Problems Addressed:  Acute hypoxic respiratory failure: complicated acute illness or injury  COPD with acute exacerbation: complicated acute illness or injury    Amount and/or Complexity of Data Reviewed  Labs: ordered. Decision-making details documented in ED Course.  Radiology: ordered. Decision-making details documented in ED Course.  ECG/medicine tests: ordered and independent interpretation performed. Decision-making details documented  in ED Course.  Discussion of management or test interpretation with external provider(s): Details documented in the ED course.    Risk  Prescription drug management.  Decision regarding hospitalization.        Final diagnoses:   Acute hypoxic respiratory failure   COPD with acute exacerbation       ED Disposition  ED Disposition       ED Disposition   Decision to Admit    Condition   --    Comment   Level of Care: Critical Care [6]   Diagnosis: Respiratory failure [284720]   Admitting Physician: MERCEDES PAL [[id number]]   Isolate for COVID?: No [0]   Bed Request Comments: Acute respiratory failure with COPD exacerbation   Certification: I Certify That Inpatient Hospital Services Are Medically Necessary For Greater Than 2 Midnights                 No follow-up provider specified.       Medication List      No changes were made to your prescriptions during this visit.           Labs Reviewed   COMPREHENSIVE METABOLIC PANEL - Abnormal; Notable for the following components:       Result Value    Glucose 133 (*)     Sodium 130 (*)     Chloride 90 (*)     CO2 30.9 (*)     All other components within normal limits    Narrative:     GFR Normal >60  Chronic Kidney Disease <60  Kidney Failure <15     CBC WITH AUTO DIFFERENTIAL - Abnormal; Notable for the following components:    WBC 13.15 (*)     Hematocrit 47.3 (*)     .3 (*)     Neutrophil % 86.3 (*)     Lymphocyte % 6.5 (*)     Eosinophil % 0.0 (*)     Neutrophils, Absolute 11.33 (*)     Immature Grans, Absolute 0.07 (*)     All other components within normal limits   BNP (IN-HOUSE) - Normal    Narrative:     This assay is used as an aid in the diagnosis of individuals suspected of having heart failure. It can be used as an aid in the diagnosis of acute decompensated heart failure (ADHF) in patients presenting with signs and symptoms of ADHF to the emergency department (ED). In addition, NT-proBNP of <300 pg/mL indicates ADHF is not likely.    Age Range Result  Interpretation  NT-proBNP Concentration (pg/mL:      <50             Positive            >450                   Gray                 300-450                    Negative             <300    50-75           Positive            >900                  Gray                300-900                  Negative            <300      >75             Positive            >1800                  Gray                300-1800                  Negative            <300   TROPONIN - Normal    Narrative:     High Sensitive Troponin T Reference Range:  <14.0 ng/L- Negative Female for AMI  <22.0 ng/L- Negative Male for AMI  >=14 - Abnormal Female indicating possible myocardial injury.  >=22 - Abnormal Male indicating possible myocardial injury.   Clinicians would have to utilize clinical acumen, EKG, Troponin, and serial changes to determine if it is an Acute Myocardial Infarction or myocardial injury due to an underlying chronic condition.        SCAN SLIDE - Normal   RESPIRATORY PANEL PCR W/ COVID-19 (SARS-COV-2), NP SWAB IN UTM/VTP, 2 HR TAT   HIGH SENSITIVITIY TROPONIN T 2HR   BLOOD GAS, ARTERIAL   CBC AND DIFFERENTIAL    Narrative:     The following orders were created for panel order CBC & Differential.  Procedure                               Abnormality         Status                     ---------                               -----------         ------                     CBC Auto Differential[[id number]]        Abnormal            Final result               Scan Slide[[id number]]                   Normal              Final result                 Please view results for these tests on the individual orders.     XR Chest 1 View   Final Result   COPD. No active disease.            Signer Name: Danny Davis MD    Signed: 1/17/2024 5:06 AM EST   [facility]             Medication List      No changes were made to your prescriptions during this visit.              Mark Blanca MD  01/17/24 0606

## 2024-01-17 NOTE — PLAN OF CARE
Goal Outcome Evaluation:         Pt was admitted form the ED on Bipap, pt remains on Bipap w/ FiO2 25%; precedex being used for light sedation; NS remains @ 100/hr; f/c inserted as ordered w/good UOP; pt remains NPO, swabs of water to moisturize mouth; Pt is able to communicate her needs to staff.

## 2024-01-17 NOTE — H&P
[facility] HOSPITALIST     Talib Ceron MD    CHIEF COMPLAINT: SOA    HISTORY OF PRESENT ILLNESS:  The patient is a 48-year-old female who presented to the emergency department secondary to 3 days of increasing shortness of air, wheezing.  She notes that she was seen by her PCP approximately 2 weeks ago and was given antibiotics and steroids for acute bronchitis.  She reports that she did improve however worsened again over the weekend.  She had abstained from tobacco since May of last year however notes intermittent tobacco abuse including recently.  She denies any other sick symptoms such as fever, chills, rhinorrhea, nasal congestion, sore throat.  She is unaware of any sick contacts.  She has been using her nebulizer and her inhalers frequently throughout the day.  She notes using 2 L of oxygen as needed and using it in the last several days continuously.    At time of my exam, patient is extremely anxious, tachypneic, tachycardic and appears in acute distress.  She would like to remain on BiPAP if at all possible however is agreeable to intubation if that is necessary.  He has already received 4 DuoNebs and IV Solu-Medrol.  Some improvement after Ativan and metoprolol given.  Awaiting ICU bed.    Diagnostics in ER showed CXR unremarkable.  Significant lab includes WBC 13.15    She has a history of oxygen dependent COPD, hypertension, PAD, s/p bilateral lower extremity stents 2016 and carotid artery angioplasty, HLD, tobacco abuse, vitamin D deficiency, anxiety, chronic migraines, chronic cardiac stent occlusion, chronic macrocytosis.    She otherwise denies recent f/c/headache/rhinorrhea/nasal congestion/lightheadedness/syncopal sensation/n/v/d/chest pain/abdominal pain/change in bowel or bladder habits/no weight change/bloody emesis or bloody stools/change in medications or any other new concerns.    Past Medical History:   Diagnosis Date    Anxiety     Arthritis     COPD (chronic  obstructive pulmonary disease)     Coronary stent occlusion     CTS (carpal tunnel syndrome)     Depression     Dizziness     Headache     Hyperlipidemia     Hypertension     Migraine     Numbness and tingling     Pneumonia      Past Surgical History:   Procedure Laterality Date    ANGIOPLASTY ILIAC ARTERY Left 6/17/2016    Procedure: BILATERAL DUPLEX DIRECTED ACCESS, AIF BILATERAL RUNOFF, SELECTIVE CATHETERIZATION OF RIGHT EXTERNAL ILIAC WITH ANGIOPLASTY, LEFT COMMON ILIAC STENT PLACEMENT;  Surgeon: Jose Carlos Plascencia MD;  Location: [facility] 18/19;  Service:     BREAST SURGERY      BILAT IMPLANTS    CAROTID ARTERY ANGIOPLASTY       Family History   Problem Relation Age of Onset    Heart disease Father     Hypertension Father     Diabetes Father     Stroke Father     COPD Father     Heart failure Paternal Grandmother     Heart failure Paternal Grandfather     COPD Mother     COPD Maternal Aunt     COPD Maternal Uncle     COPD Paternal Uncle      Social History     Tobacco Use    Smoking status: Every Day     Packs/day: 1.00     Years: 30.00     Additional pack years: 0.00     Total pack years: 30.00     Types: Cigarettes     Passive exposure: Current    Smokeless tobacco: Never   Vaping Use    Vaping Use: Never used   Substance Use Topics    Alcohol use: No    Drug use: No     Medications Prior to Admission   Medication Sig Dispense Refill Last Dose    albuterol sulfate HFA (Proventil HFA) 108 (90 Base) MCG/ACT inhaler Inhale 2 puffs into the lungs Every 4 (Four) Hours As Needed for Wheezing. 8.5 g 1     aspirin (aspirin) 81 MG EC tablet Take 1 tablet by mouth Daily. 90 tablet 3     budesonide-formoterol (SYMBICORT) 160-4.5 MCG/ACT inhaler Inhale 2 puffs 2 (Two) Times a Day.       clopidogrel (Plavix) 75 MG tablet Take 1 tablet by mouth Daily. 90 tablet 1     fluticasone (FLONASE) 50 MCG/ACT nasal spray 1 spray into the nostril(s) as directed by provider Daily As Needed.       guaiFENesin (MUCINEX) 600 MG 12 hr  "tablet Take 2 tablets by mouth 2 (Two) Times a Day. 40 tablet 0     ipratropium-albuterol (DUO-NEB) 0.5-2.5 mg/3 ml nebulizer Take 3 mL by nebulization Every 4 (Four) Hours As Needed for Wheezing. 90 mL 1     lisinopril-hydrochlorothiazide (PRINZIDE,ZESTORETIC) 20-12.5 MG per tablet TAKE 1 TABLET BY MOUTH EVERY DAY 90 tablet 0     medroxyPROGESTERone (DEPO-PROVERA) 150 MG/ML injection Apply 1 mL to cheek Every 3 (Three) Months. Pt takes every 3 months.march or April 2018 last dose       methylPREDNISolone (MEDROL) 4 MG dose pack Take as directed on package instructions. 21 tablet 0     metoprolol tartrate (LOPRESSOR) 50 MG tablet Take 1 tablet by mouth Every 12 (Twelve) Hours. 60 tablet 1     nicotine (NICODERM CQ) 21 MG/24HR patch Place 1 patch on the skin as directed by provider Daily. 30 patch 0     simvastatin (ZOCOR) 40 MG tablet Take 1 tablet by mouth Every Evening. 90 tablet 1     venlafaxine XR (EFFEXOR-XR) 75 MG 24 hr capsule TAKE 1 CAPSULE BY MOUTH EVERY DAY 90 capsule 0     vitamin D (ERGOCALCIFEROL) 64956 units capsule capsule Take 1 capsule by mouth Every 7 (Seven) Days. 6 capsule 5      Allergies:  Prednisone    Immunization History   Administered Date(s) Administered    COVID-19 (MODERNA) 1st,2nd,3rd Dose Monovalent 05/13/2021, 06/10/2021    flucelvax quad pfs =>4 YRS 10/02/2019       REVIEW OF SYSTEMS:  Please see the above history of present illness for pertinent positives and negatives.  The remainder of the patient's systems have been reviewed and are negative.     Vital Signs  Temp:  [98.4 °F (36.9 °C)] 98.4 °F (36.9 °C)  Heart Rate:  [111-140] 117  Resp:  [26-33] 33  BP: (133-195)/() 136/92  Body mass index is 23.05 kg/m².    Flowsheet Rows      Flowsheet Row First Filed Value   Admission Height 157.5 cm (62\") Documented at 01/17/2024 0423   Admission Weight 57.2 kg (126 lb) Documented at 01/17/2024 0423             Physical Exam  Vitals reviewed.   Constitutional:       General: She is in " acute distress.      Appearance: She is ill-appearing.      Comments: Appears older than stated age, chronically ill appearance     HENT:      Head: Normocephalic and atraumatic.      Mouth/Throat:      Mouth: Mucous membranes are dry.   Eyes:      Extraocular Movements: Extraocular movements intact.      Pupils: Pupils are equal, round, and reactive to light.   Cardiovascular:      Rate and Rhythm: Regular rhythm. Tachycardia present.   Pulmonary:      Comments: Bipap, poor air movement throughout, expiratory wheezes R>L  Abdominal:      General: Abdomen is flat. Bowel sounds are normal. There is no distension.      Palpations: Abdomen is soft.      Tenderness: There is no abdominal tenderness. There is no guarding.   Musculoskeletal:         General: No swelling.   Skin:     General: Skin is warm and dry.      Capillary Refill: Capillary refill takes less than 2 seconds.      Findings: No erythema.   Neurological:      General: No focal deficit present.      Mental Status: She is alert and oriented to person, place, and time.   Psychiatric:      Comments: anxious       Emotional Behavior:    Judgment and Insight: fair   Mental Status:  Alertness  alert   Memory:  good   Mood and Affect:         Depression  none               Anxiety  moderate    Debilities:   Physical Weakness  unknown   Handicaps  unknown   Disabilities  unknown   Agitation  mild     Results Review:    I reviewed the patient's new clinical results.  Lab Results (most recent)       Procedure Component Value Units Date/Time    BNP [[id number]]  (Normal) Collected: 01/17/24 0435    Specimen: Blood Updated: 01/17/24 0513     proBNP 520.1 pg/mL     Narrative:      This assay is used as an aid in the diagnosis of individuals suspected of having heart failure. It can be used as an aid in the diagnosis of acute decompensated heart failure (ADHF) in patients presenting with signs and symptoms of ADHF to the emergency department (ED). In addition, NT-proBNP  of <300 pg/mL indicates ADHF is not likely.    Age Range Result Interpretation  NT-proBNP Concentration (pg/mL:      <50             Positive            >450                   Gray                 300-450                    Negative             <300    50-75           Positive            >900                  Gray                300-900                  Negative            <300      >75             Positive            >1800                  Gray                300-1800                  Negative            <300    High Sensitivity Troponin T [[id number]]  (Normal) Collected: 01/17/24 0435    Specimen: Blood Updated: 01/17/24 0513     HS Troponin T 10 ng/L     Narrative:      High Sensitive Troponin T Reference Range:  <14.0 ng/L- Negative Female for AMI  <22.0 ng/L- Negative Male for AMI  >=14 - Abnormal Female indicating possible myocardial injury.  >=22 - Abnormal Male indicating possible myocardial injury.   Clinicians would have to utilize clinical acumen, EKG, Troponin, and serial changes to determine if it is an Acute Myocardial Infarction or myocardial injury due to an underlying chronic condition.         CBC & Differential [[id number]]  (Abnormal) Collected: 01/17/24 0435    Specimen: Blood Updated: 01/17/24 0512    Narrative:      The following orders were created for panel order CBC & Differential.  Procedure                               Abnormality         Status                     ---------                               -----------         ------                     CBC Auto Differential[[id number]]        Abnormal            Final result               Scan Slide[[id number]]                   Normal              Final result                 Please view results for these tests on the individual orders.    Scan Slide [[id number]]  (Normal) Collected: 01/17/24 0435    Specimen: Blood Updated: 01/17/24 0512     RBC Morphology Normal     WBC Morphology Normal     Platelet Morphology Normal    CBC Auto  Differential [[id number]]  (Abnormal) Collected: 01/17/24 0435    Specimen: Blood Updated: 01/17/24 0512     WBC 13.15 10*3/mm3      RBC 4.67 10*6/mm3      Hemoglobin 15.1 g/dL      Hematocrit 47.3 %      .3 fL      MCH 32.3 pg      MCHC 31.9 g/dL      RDW 13.7 %      RDW-SD 50.7 fl      MPV 8.5 fL      Platelets 242 10*3/mm3      Neutrophil % 86.3 %      Lymphocyte % 6.5 %      Monocyte % 6.5 %      Eosinophil % 0.0 %      Basophil % 0.2 %      Immature Grans % 0.5 %      Neutrophils, Absolute 11.33 10*3/mm3      Lymphocytes, Absolute 0.86 10*3/mm3      Monocytes, Absolute 0.86 10*3/mm3      Eosinophils, Absolute 0.00 10*3/mm3      Basophils, Absolute 0.03 10*3/mm3      Immature Grans, Absolute 0.07 10*3/mm3      nRBC 0.0 /100 WBC     Comprehensive Metabolic Panel [[id number]]  (Abnormal) Collected: 01/17/24 0435    Specimen: Blood Updated: 01/17/24 0503     Glucose 133 mg/dL      BUN 11 mg/dL      Creatinine 0.75 mg/dL      Sodium 130 mmol/L      Potassium 4.7 mmol/L      Comment: Slight hemolysis detected by analyzer. Result may be falsely elevated.        Chloride 90 mmol/L      CO2 30.9 mmol/L      Calcium 9.3 mg/dL      Total Protein 7.6 g/dL      Albumin 4.0 g/dL      ALT (SGPT) 10 U/L      AST (SGOT) 22 U/L      Comment: Slight hemolysis detected by analyzer. Result may be falsely elevated.        Alkaline Phosphatase 100 U/L      Total Bilirubin 0.3 mg/dL      Globulin 3.6 gm/dL      A/G Ratio 1.1 g/dL      BUN/Creatinine Ratio 14.7     Anion Gap 9.1 mmol/L      eGFR 97.1 mL/min/1.73     Narrative:      GFR Normal >60  Chronic Kidney Disease <60  Kidney Failure <15              Imaging Results (Most Recent)       Procedure Component Value Units Date/Time    XR Chest 1 View [[id number]] Collected: 01/17/24 0449     Updated: 01/17/24 0508    Narrative:      CHEST X-RAY, 1/17/2024        HISTORY:  48-year-old female in the ED with shortness of air, decreased oxygen saturation. History of  COPD.    TECHNIQUE:  AP portable upright chest x-ray.    COMPARISON:  *  Chest x-ray, 3/27/2023. CT chest, 5/31/2023.    FINDINGS:  Pulmonary hyperinflation compatible with COPD. The lungs appear clear. No visible pulmonary infiltrate or pleural effusion.    Heart size and pulmonary vascularity are normal.      Impression:      COPD. No active disease.        Signer Name: Danny Davis MD   Signed: 1/17/2024 5:06 AM EST  [facility]          reviewed    ECG/EMG Results (most recent)       Procedure Component Value Units Date/Time    ECG 12 Lead Dyspnea [[id number]] Collected: 01/17/24 0453     Updated: 01/17/24 0454     QT Interval 305 ms      QTC Interval 450 ms     Narrative:      HEART RATE= 130  bpm  RR Interval= 460  ms  NH Interval= 120  ms  P Horizontal Axis=   deg  P Front Axis= 95  deg  QRSD Interval= 73  ms  QT Interval= 305  ms  QTcB= 450  ms  QRS Axis= 51  deg  T Wave Axis= 86  deg  - BORDERLINE ECG -  Sinus tachycardia  Ventricular premature complex  Aberrant complex  Minimal ST depression, lateral leads  Electronically Signed By:   Date and Time of Study: 2024-01-17 04:53:04          reviewed    Assessment & Plan   A/C HHRF secondary to AE chronic oxygen dependent COPD:  Leukocytosis:  BiPAP, ABG initially showed pH 7.1 pCO2 of greater than 80, subsequent ABG not yet in system but reported to me personally shows pH improving as well as CO2 decreasing, therefore we will continue BiPAP  Ativan for anxiety  Check RVP, sputum culture, procalcitonin  Continue Solu-Medrol 40 mg every 8 hours  Add duonebs every 4 hours and prn, add Mucinex  Continue Precedex keep patient comfortable while utilizing BiPAP.   Patient by definition meeting SIRS criteria, no source identified as of yet, therefore not septic!!!  Given procalcitonin elevation and leukocytosis and unremarkable chest x-ray except for COPD, will prefer to proceed with CT chest.   -Start doxycyline and unasyn for now  "determine further need after CT     Tachycardia:   Hypertension:   Present in past during exacerbations  Add IV metoprolol  Troponin negative  Monitor    PAD, s/p bilateral LE stents 2016:  S/P carotid artery angioplasty:  Chronic cardiac stent occlusion:  HLD:   Continue home aspirin, plavix, zocor when off NPO    Tobacco abuse: add nicotine patch    Anxiety: ativan as above  Continue home effexor when able    Chronic migraines:  No home meds, will consider Fioricet if needed when acute concerns improve    I discussed the patient's findings and my recommendations with patient and staff.     Facundo Flanagan DO  01/17/24  08:22 EST    \"Dictated utilizing Dragon dictation\"    Note disclaimer: At [facility], we believe that sharing information builds trust and better relationships. You are receiving this note because you recently visited [facility]. It is possible you will see health information before a provider has talked with you about it. This kind of information can be easy to misunderstand. To help you fully understand what it means for your health, we urge you to discuss this note with your provider.        "

## 2024-01-18 ENCOUNTER — APPOINTMENT (OUTPATIENT)
Dept: CARDIOLOGY | Facility: HOSPITAL | Age: 49
End: 2024-01-18
Payer: COMMERCIAL

## 2024-01-18 ENCOUNTER — APPOINTMENT (OUTPATIENT)
Dept: GENERAL RADIOLOGY | Facility: HOSPITAL | Age: 49
End: 2024-01-18
Payer: COMMERCIAL

## 2024-01-18 LAB
ANION GAP SERPL CALCULATED.3IONS-SCNC: 5.2 MMOL/L (ref 5–15)
AORTIC DIMENSIONLESS INDEX: 0.8 (DI)
ARTERIAL PATENCY WRIST A: POSITIVE
ARTERIAL PATENCY WRIST A: POSITIVE
ATMOSPHERIC PRESS: 738 MMHG
ATMOSPHERIC PRESS: 740 MMHG
ATMOSPHERIC PRESS: 741 MMHG
BASE EXCESS BLDA CALC-SCNC: -1 MMOL/L (ref 0–2)
BASE EXCESS BLDA CALC-SCNC: -1.7 MMOL/L (ref 0–2)
BASE EXCESS BLDV CALC-SCNC: 0.6 MMOL/L (ref 0–2)
BASOPHILS # BLD AUTO: 0.01 10*3/MM3 (ref 0–0.2)
BASOPHILS NFR BLD AUTO: 0.2 % (ref 0–1.5)
BDY SITE: ABNORMAL
BH CV ECHO MEAS - AO MAX PG: 8.2 MMHG
BH CV ECHO MEAS - AO MEAN PG: 4 MMHG
BH CV ECHO MEAS - AO V2 MAX: 143 CM/SEC
BH CV ECHO MEAS - AO V2 VTI: 24.4 CM
BH CV ECHO MEAS - AVA(I,D): 1.86 CM2
BH CV ECHO MEAS - EDV(CUBED): 51.3 ML
BH CV ECHO MEAS - ESV(CUBED): 18.3 ML
BH CV ECHO MEAS - FS: 29.1 %
BH CV ECHO MEAS - IVS/LVPW: 1.03 CM
BH CV ECHO MEAS - IVSD: 0.64 CM
BH CV ECHO MEAS - LAT PEAK E' VEL: 12.7 CM/SEC
BH CV ECHO MEAS - LV MASS(C)D: 60.6 GRAMS
BH CV ECHO MEAS - LV MAX PG: 5.5 MMHG
BH CV ECHO MEAS - LV MEAN PG: 3 MMHG
BH CV ECHO MEAS - LV V1 MAX: 117 CM/SEC
BH CV ECHO MEAS - LV V1 VTI: 20.8 CM
BH CV ECHO MEAS - LVIDD: 3.7 CM
BH CV ECHO MEAS - LVIDS: 2.6 CM
BH CV ECHO MEAS - LVOT AREA: 2.18 CM2
BH CV ECHO MEAS - LVOT DIAM: 1.67 CM
BH CV ECHO MEAS - LVPWD: 0.62 CM
BH CV ECHO MEAS - MED PEAK E' VEL: 11.3 CM/SEC
BH CV ECHO MEAS - MV A MAX VEL: 70.2 CM/SEC
BH CV ECHO MEAS - MV DEC SLOPE: 608.6 CM/SEC2
BH CV ECHO MEAS - MV DEC TIME: 0.15 SEC
BH CV ECHO MEAS - MV E MAX VEL: 94.3 CM/SEC
BH CV ECHO MEAS - MV E/A: 1.34
BH CV ECHO MEAS - MV MAX PG: 5.9 MMHG
BH CV ECHO MEAS - MV MEAN PG: 2.47 MMHG
BH CV ECHO MEAS - MV P1/2T: 57.9 MSEC
BH CV ECHO MEAS - MV V2 VTI: 28.3 CM
BH CV ECHO MEAS - MVA(P1/2T): 3.8 CM2
BH CV ECHO MEAS - MVA(VTI): 1.6 CM2
BH CV ECHO MEAS - PA ACC TIME: 0.12 SEC
BH CV ECHO MEAS - RV MAX PG: 2.22 MMHG
BH CV ECHO MEAS - RV V1 MAX: 74.4 CM/SEC
BH CV ECHO MEAS - RV V1 VTI: 13.8 CM
BH CV ECHO MEAS - SV(LVOT): 45.3 ML
BH CV ECHO MEASUREMENTS AVERAGE E/E' RATIO: 7.86
BH CV XLRA - RV BASE: 2.6 CM
BH CV XLRA - RV LENGTH: 5.7 CM
BH CV XLRA - RV MID: 2.1 CM
BH CV XLRA - TDI S': 11.1 CM/SEC
BODY TEMPERATURE: 37
BODY TEMPERATURE: 37
BUN SERPL-MCNC: 37 MG/DL (ref 6–20)
BUN/CREAT SERPL: 37.4 (ref 7–25)
CALCIUM SPEC-SCNC: 7.9 MG/DL (ref 8.6–10.5)
CHLORIDE SERPL-SCNC: 103 MMOL/L (ref 98–107)
CO2 SERPL-SCNC: 28.8 MMOL/L (ref 22–29)
CREAT SERPL-MCNC: 0.99 MG/DL (ref 0.57–1)
DEPRECATED RDW RBC AUTO: 53.1 FL (ref 37–54)
EGFRCR SERPLBLD CKD-EPI 2021: 69.6 ML/MIN/1.73
EOSINOPHIL # BLD AUTO: 0 10*3/MM3 (ref 0–0.4)
EOSINOPHIL NFR BLD AUTO: 0 % (ref 0.3–6.2)
EPAP: 6
EPAP: 6
ERYTHROCYTE [DISTWIDTH] IN BLOOD BY AUTOMATED COUNT: 13.7 % (ref 12.3–15.4)
GLUCOSE SERPL-MCNC: 183 MG/DL (ref 65–99)
HCO3 BLDA-SCNC: 26.6 MMOL/L (ref 20–26)
HCO3 BLDA-SCNC: 27.6 MMOL/L (ref 20–26)
HCO3 BLDV-SCNC: 29.1 MMOL/L (ref 22–28)
HCT VFR BLD AUTO: 37.5 % (ref 34–46.6)
HGB BLD-MCNC: 11.7 G/DL (ref 12–15.9)
HGB BLDA-MCNC: 11.4 G/DL (ref 13.5–17.5)
HGB BLDA-MCNC: 11.5 G/DL (ref 13.5–17.5)
HGB BLDA-MCNC: 11.5 G/DL (ref 13.5–17.5)
IMM GRANULOCYTES # BLD AUTO: 0.05 10*3/MM3 (ref 0–0.05)
IMM GRANULOCYTES NFR BLD AUTO: 0.8 % (ref 0–0.5)
INHALED O2 CONCENTRATION: 30 %
INHALED O2 CONCENTRATION: 35 %
INHALED O2 CONCENTRATION: 40 %
IPAP: 18
IPAP: 18
LYMPHOCYTES # BLD AUTO: 0.85 10*3/MM3 (ref 0.7–3.1)
LYMPHOCYTES NFR BLD AUTO: 13.3 % (ref 19.6–45.3)
Lab: ABNORMAL
MCH RBC QN AUTO: 32.5 PG (ref 26.6–33)
MCHC RBC AUTO-ENTMCNC: 31.2 G/DL (ref 31.5–35.7)
MCV RBC AUTO: 104.2 FL (ref 79–97)
MODALITY: ABNORMAL
MONOCYTES # BLD AUTO: 0.24 10*3/MM3 (ref 0.1–0.9)
MONOCYTES NFR BLD AUTO: 3.8 % (ref 5–12)
NEUTROPHILS NFR BLD AUTO: 5.25 10*3/MM3 (ref 1.7–7)
NEUTROPHILS NFR BLD AUTO: 81.9 % (ref 42.7–76)
NRBC BLD AUTO-RTO: 0 /100 WBC (ref 0–0.2)
PCO2 BLDA: 61.8 MM HG (ref 35–45)
PCO2 BLDA: 65.5 MM HG (ref 35–45)
PCO2 BLDV: 66 MM HG (ref 41–51)
PCO2 TEMP ADJ BLD: 61.8 MM HG (ref 35–45)
PEEP RESPIRATORY: 3 CM[H2O]
PH BLDA: 7.23 PH UNITS (ref 7.35–7.45)
PH BLDA: 7.24 PH UNITS (ref 7.35–7.45)
PH BLDV: 7.25 PH UNITS (ref 7.32–7.42)
PH, TEMP CORRECTED: 7.24 PH UNITS (ref 7.35–7.45)
PLATELET # BLD AUTO: 176 10*3/MM3 (ref 140–450)
PMV BLD AUTO: 9.3 FL (ref 6–12)
PO2 BLDA: 119 MM HG (ref 83–108)
PO2 BLDA: 93.2 MM HG (ref 83–108)
PO2 BLDV: 36.3 MM HG (ref 27–53)
PO2 TEMP ADJ BLD: 119 MM HG (ref 83–108)
POTASSIUM SERPL-SCNC: 4.4 MMOL/L (ref 3.5–5.2)
PROCALCITONIN SERPL-MCNC: 35.76 NG/ML (ref 0–0.25)
RBC # BLD AUTO: 3.6 10*6/MM3 (ref 3.77–5.28)
SAO2 % BLDCOA: 93.2 % (ref 94–99)
SAO2 % BLDCOA: 99.5 % (ref 94–99)
SAO2 % BLDCOV: 66.4 % (ref 45–75)
SET MECH RESP RATE: 16
SET MECH RESP RATE: 18
SODIUM SERPL-SCNC: 137 MMOL/L (ref 136–145)
TOTAL RATE: 24 BREATHS/MINUTE
VENTILATOR MODE: ABNORMAL
VT ON VENT VENT: 500 ML
WBC NRBC COR # BLD AUTO: 6.4 10*3/MM3 (ref 3.4–10.8)

## 2024-01-18 PROCEDURE — 94799 UNLISTED PULMONARY SVC/PX: CPT

## 2024-01-18 PROCEDURE — 93306 TTE W/DOPPLER COMPLETE: CPT | Performed by: INTERNAL MEDICINE

## 2024-01-18 PROCEDURE — 94667 MNPJ CHEST WALL 1ST: CPT

## 2024-01-18 PROCEDURE — 25810000003 SODIUM CHLORIDE 0.9 % SOLUTION: Performed by: NURSE PRACTITIONER

## 2024-01-18 PROCEDURE — 5A1945Z RESPIRATORY VENTILATION, 24-96 CONSECUTIVE HOURS: ICD-10-PCS | Performed by: HOSPITALIST

## 2024-01-18 PROCEDURE — 82803 BLOOD GASES ANY COMBINATION: CPT

## 2024-01-18 PROCEDURE — 94660 CPAP INITIATION&MGMT: CPT

## 2024-01-18 PROCEDURE — 84145 PROCALCITONIN (PCT): CPT | Performed by: NURSE PRACTITIONER

## 2024-01-18 PROCEDURE — 99233 SBSQ HOSP IP/OBS HIGH 50: CPT | Performed by: HOSPITALIST

## 2024-01-18 PROCEDURE — 82803 BLOOD GASES ANY COMBINATION: CPT | Performed by: HOSPITALIST

## 2024-01-18 PROCEDURE — 85025 COMPLETE CBC W/AUTO DIFF WBC: CPT | Performed by: NURSE PRACTITIONER

## 2024-01-18 PROCEDURE — 87070 CULTURE OTHR SPECIMN AEROBIC: CPT | Performed by: NURSE PRACTITIONER

## 2024-01-18 PROCEDURE — 71045 X-RAY EXAM CHEST 1 VIEW: CPT

## 2024-01-18 PROCEDURE — 36600 WITHDRAWAL OF ARTERIAL BLOOD: CPT

## 2024-01-18 PROCEDURE — 80048 BASIC METABOLIC PNL TOTAL CA: CPT | Performed by: NURSE PRACTITIONER

## 2024-01-18 PROCEDURE — 25010000002 ENOXAPARIN PER 10 MG: Performed by: NURSE PRACTITIONER

## 2024-01-18 PROCEDURE — 74018 RADEX ABDOMEN 1 VIEW: CPT

## 2024-01-18 PROCEDURE — 94002 VENT MGMT INPAT INIT DAY: CPT

## 2024-01-18 PROCEDURE — 87205 SMEAR GRAM STAIN: CPT | Performed by: NURSE PRACTITIONER

## 2024-01-18 PROCEDURE — 25010000002 PROPOFOL 10 MG/ML EMULSION: Performed by: HOSPITALIST

## 2024-01-18 PROCEDURE — 25010000002 SODIUM CHLORIDE 0.9 % WITH KCL 20 MEQ 20-0.9 MEQ/L-% SOLUTION: Performed by: HOSPITALIST

## 2024-01-18 PROCEDURE — 94761 N-INVAS EAR/PLS OXIMETRY MLT: CPT

## 2024-01-18 PROCEDURE — 25010000002 AMPICILLIN-SULBACTAM PER 1.5 G: Performed by: INTERNAL MEDICINE

## 2024-01-18 PROCEDURE — 25010000002 LORAZEPAM PER 2 MG: Performed by: NURSE PRACTITIONER

## 2024-01-18 PROCEDURE — 25010000002 PROPOFOL 10 MG/ML EMULSION

## 2024-01-18 PROCEDURE — 36600 WITHDRAWAL OF ARTERIAL BLOOD: CPT | Performed by: HOSPITALIST

## 2024-01-18 PROCEDURE — 93306 TTE W/DOPPLER COMPLETE: CPT

## 2024-01-18 PROCEDURE — 25010000002 METHYLPREDNISOLONE PER 125 MG: Performed by: INTERNAL MEDICINE

## 2024-01-18 RX ORDER — SODIUM CHLORIDE AND POTASSIUM CHLORIDE 150; 900 MG/100ML; MG/100ML
150 INJECTION, SOLUTION INTRAVENOUS CONTINUOUS
Status: DISCONTINUED | OUTPATIENT
Start: 2024-01-18 | End: 2024-01-19

## 2024-01-18 RX ORDER — PROPOFOL 10 MG/ML
VIAL (ML) INTRAVENOUS
Status: COMPLETED
Start: 2024-01-18 | End: 2024-01-18

## 2024-01-18 RX ORDER — PANTOPRAZOLE SODIUM 40 MG/10ML
40 INJECTION, POWDER, LYOPHILIZED, FOR SOLUTION INTRAVENOUS
Status: DISCONTINUED | OUTPATIENT
Start: 2024-01-18 | End: 2024-01-22

## 2024-01-18 RX ORDER — BISACODYL 5 MG/1
5 TABLET, DELAYED RELEASE ORAL DAILY PRN
Status: DISCONTINUED | OUTPATIENT
Start: 2024-01-18 | End: 2024-01-25

## 2024-01-18 RX ORDER — AMOXICILLIN 250 MG
2 CAPSULE ORAL 2 TIMES DAILY
Status: DISCONTINUED | OUTPATIENT
Start: 2024-01-18 | End: 2024-01-25

## 2024-01-18 RX ORDER — POLYETHYLENE GLYCOL 3350 17 G/17G
17 POWDER, FOR SOLUTION ORAL DAILY PRN
Status: DISCONTINUED | OUTPATIENT
Start: 2024-01-18 | End: 2024-01-25

## 2024-01-18 RX ORDER — CHLORHEXIDINE GLUCONATE ORAL RINSE 1.2 MG/ML
15 SOLUTION DENTAL EVERY 12 HOURS SCHEDULED
Status: DISCONTINUED | OUTPATIENT
Start: 2024-01-18 | End: 2024-01-22

## 2024-01-18 RX ORDER — SODIUM CHLORIDE FOR INHALATION 7 %
4 VIAL, NEBULIZER (ML) INHALATION
Status: DISCONTINUED | OUTPATIENT
Start: 2024-01-18 | End: 2024-01-20

## 2024-01-18 RX ORDER — BISACODYL 10 MG
10 SUPPOSITORY, RECTAL RECTAL DAILY PRN
Status: DISCONTINUED | OUTPATIENT
Start: 2024-01-18 | End: 2024-01-25

## 2024-01-18 RX ADMIN — NICOTINE 1 PATCH: 21 PATCH, EXTENDED RELEASE TRANSDERMAL at 09:28

## 2024-01-18 RX ADMIN — PROPOFOL INJECTABLE EMULSION 5 MCG/KG/MIN: 10 INJECTION, EMULSION INTRAVENOUS at 12:30

## 2024-01-18 RX ADMIN — Medication 10 ML: at 21:22

## 2024-01-18 RX ADMIN — DOXYCYCLINE 100 MG: 100 INJECTION, POWDER, LYOPHILIZED, FOR SOLUTION INTRAVENOUS at 21:20

## 2024-01-18 RX ADMIN — Medication 10 ML: at 09:26

## 2024-01-18 RX ADMIN — IPRATROPIUM BROMIDE AND ALBUTEROL SULFATE 3 ML: 2.5; .5 SOLUTION RESPIRATORY (INHALATION) at 11:22

## 2024-01-18 RX ADMIN — IPRATROPIUM BROMIDE AND ALBUTEROL SULFATE 3 ML: 2.5; .5 SOLUTION RESPIRATORY (INHALATION) at 18:36

## 2024-01-18 RX ADMIN — IPRATROPIUM BROMIDE AND ALBUTEROL SULFATE 3 ML: 2.5; .5 SOLUTION RESPIRATORY (INHALATION) at 03:25

## 2024-01-18 RX ADMIN — Medication 10 ML: at 21:21

## 2024-01-18 RX ADMIN — SODIUM CHLORIDE SOLN NEBU 7% 4 ML: 7 NEBU SOLN at 07:27

## 2024-01-18 RX ADMIN — AMPICILLIN AND SULBACTAM 1.5 G: 1; .5 INJECTION, POWDER, FOR SOLUTION INTRAVENOUS at 09:28

## 2024-01-18 RX ADMIN — DEXMEDETOMIDINE HYDROCHLORIDE 1.1 MCG/KG/HR: 4 INJECTION, SOLUTION INTRAVENOUS at 04:43

## 2024-01-18 RX ADMIN — DOXYCYCLINE 100 MG: 100 INJECTION, POWDER, LYOPHILIZED, FOR SOLUTION INTRAVENOUS at 09:28

## 2024-01-18 RX ADMIN — METOPROLOL TARTRATE 5 MG: 5 INJECTION INTRAVENOUS at 05:20

## 2024-01-18 RX ADMIN — LORAZEPAM 0.5 MG: 2 INJECTION INTRAMUSCULAR; INTRAVENOUS at 07:58

## 2024-01-18 RX ADMIN — METHYLPREDNISOLONE SODIUM SUCCINATE 60 MG: 125 INJECTION, POWDER, FOR SOLUTION INTRAMUSCULAR; INTRAVENOUS at 16:00

## 2024-01-18 RX ADMIN — LORAZEPAM 0.5 MG: 2 INJECTION INTRAMUSCULAR; INTRAVENOUS at 23:52

## 2024-01-18 RX ADMIN — PROPOFOL INJECTABLE EMULSION 40 MCG/KG/MIN: 10 INJECTION, EMULSION INTRAVENOUS at 21:20

## 2024-01-18 RX ADMIN — DEXMEDETOMIDINE HYDROCHLORIDE 1.5 MCG/KG/HR: 4 INJECTION, SOLUTION INTRAVENOUS at 10:45

## 2024-01-18 RX ADMIN — IPRATROPIUM BROMIDE AND ALBUTEROL SULFATE 3 ML: 2.5; .5 SOLUTION RESPIRATORY (INHALATION) at 07:27

## 2024-01-18 RX ADMIN — AMPICILLIN AND SULBACTAM 1.5 G: 1; .5 INJECTION, POWDER, FOR SOLUTION INTRAVENOUS at 16:00

## 2024-01-18 RX ADMIN — METHYLPREDNISOLONE SODIUM SUCCINATE 60 MG: 125 INJECTION, POWDER, FOR SOLUTION INTRAMUSCULAR; INTRAVENOUS at 02:51

## 2024-01-18 RX ADMIN — AMPICILLIN AND SULBACTAM 1.5 G: 1; .5 INJECTION, POWDER, FOR SOLUTION INTRAVENOUS at 02:54

## 2024-01-18 RX ADMIN — CHLORHEXIDINE GLUCONATE 0.12% ORAL RINSE 15 ML: 1.2 LIQUID ORAL at 14:09

## 2024-01-18 RX ADMIN — METHYLPREDNISOLONE SODIUM SUCCINATE 60 MG: 125 INJECTION, POWDER, FOR SOLUTION INTRAMUSCULAR; INTRAVENOUS at 09:28

## 2024-01-18 RX ADMIN — SODIUM CHLORIDE 100 ML/HR: 9 INJECTION, SOLUTION INTRAVENOUS at 07:13

## 2024-01-18 RX ADMIN — IPRATROPIUM BROMIDE AND ALBUTEROL SULFATE 3 ML: 2.5; .5 SOLUTION RESPIRATORY (INHALATION) at 23:11

## 2024-01-18 RX ADMIN — IPRATROPIUM BROMIDE AND ALBUTEROL SULFATE 3 ML: 2.5; .5 SOLUTION RESPIRATORY (INHALATION) at 15:45

## 2024-01-18 RX ADMIN — PANTOPRAZOLE SODIUM 40 MG: 40 INJECTION, POWDER, FOR SOLUTION INTRAVENOUS at 14:09

## 2024-01-18 RX ADMIN — GUAIFENESIN 1200 MG: 600 TABLET, EXTENDED RELEASE ORAL at 21:21

## 2024-01-18 RX ADMIN — PROPOFOL INJECTABLE EMULSION 45 MCG/KG/MIN: 10 INJECTION, EMULSION INTRAVENOUS at 16:07

## 2024-01-18 RX ADMIN — LORAZEPAM 0.5 MG: 2 INJECTION INTRAMUSCULAR; INTRAVENOUS at 04:23

## 2024-01-18 RX ADMIN — SODIUM CHLORIDE SOLN NEBU 7% 4 ML: 7 NEBU SOLN at 18:49

## 2024-01-18 RX ADMIN — AMPICILLIN AND SULBACTAM 1.5 G: 1; .5 INJECTION, POWDER, FOR SOLUTION INTRAVENOUS at 21:20

## 2024-01-18 RX ADMIN — DEXMEDETOMIDINE HYDROCHLORIDE 0.8 MCG/KG/HR: 4 INJECTION, SOLUTION INTRAVENOUS at 20:25

## 2024-01-18 RX ADMIN — POTASSIUM CHLORIDE AND SODIUM CHLORIDE 150 ML/HR: 900; 150 INJECTION, SOLUTION INTRAVENOUS at 18:24

## 2024-01-18 RX ADMIN — ENOXAPARIN SODIUM 40 MG: 40 INJECTION SUBCUTANEOUS at 09:27

## 2024-01-18 RX ADMIN — METHYLPREDNISOLONE SODIUM SUCCINATE 60 MG: 125 INJECTION, POWDER, FOR SOLUTION INTRAMUSCULAR; INTRAVENOUS at 21:20

## 2024-01-18 RX ADMIN — CHLORHEXIDINE GLUCONATE 0.12% ORAL RINSE 15 ML: 1.2 LIQUID ORAL at 21:20

## 2024-01-18 NOTE — PAYOR COMM NOTE
"Valeria Rubi (48 y.o. Female)       Date of Birth   1975    Social Security Number       Address   [address] [place], [place] [address]    Home Phone       MRN   [id number]       Mandaeism   None    Marital Status   Single                            Admission Date   1/17/24    Admission Type   Emergency    Admitting Provider   Facundo Flanagan DO    Attending Provider   Facundo Flanagan DO    Department, Room/Bed   [facility] ICU, ICU2/1       Discharge Date       Discharge Disposition       Discharge Destination                                 Attending Provider: Facundo Flanagan DO    Allergies: Prednisone    Isolation: Contact   Infection: RSV (01/17/24)   Code Status: CPR    Ht: 157 cm (61.81\")   Wt: 58 kg (127 lb 13.9 oz)    Admission Cmt: None   Principal Problem: Respiratory failure [J96.90]                   Active Insurance as of 1/17/2024       Primary Coverage       Payor Plan Insurance Group Employer/Plan Group    ANTHEM BLUE CROSS ANTHEM BLUE CROSS BLUE SHIELD PPO [id number]       Payor Plan Address Payor Plan Phone Number Payor Plan Fax Number Effective Dates    [address] [phone]  1/1/2021 - None Entered    [place] [address]         Subscriber Name Subscriber Birth Date Member ID       VALERIA RUBI 1975 [id number]                     Emergency Contacts        (Rel.) Home Phone Work Phone Mobile Phone    Harpreet Ruiz (Spouse) -- -- [phone]    Arjun Ruiz (Son) -- -- [phone]              Insurance Information                  ANTHEM BLUE CROSS/ANTHEM BLUE CROSS BLUE SHIELD PPO Phone: [phone]    Subscriber: Valeria Rubi Subscriber#: [id number]    Group#: [id number] Precert#: --             History & Physical        Facundo Flanagan DO at 01/17/24 0822          [facility] GROUP HOSPITALIST     Talib Ceron MD    CHIEF COMPLAINT: SOA    HISTORY OF PRESENT ILLNESS:  The patient is a 48-year-old " female who presented to the emergency department secondary to 3 days of increasing shortness of air, wheezing.  She notes that she was seen by her PCP approximately 2 weeks ago and was given antibiotics and steroids for acute bronchitis.  She reports that she did improve however worsened again over the weekend.  She had abstained from tobacco since May of last year however notes intermittent tobacco abuse including recently.  She denies any other sick symptoms such as fever, chills, rhinorrhea, nasal congestion, sore throat.  She is unaware of any sick contacts.  She has been using her nebulizer and her inhalers frequently throughout the day.  She notes using 2 L of oxygen as needed and using it in the last several days continuously.    At time of my exam, patient is extremely anxious, tachypneic, tachycardic and appears in acute distress.  She would like to remain on BiPAP if at all possible however is agreeable to intubation if that is necessary.  He has already received 4 DuoNebs and IV Solu-Medrol.  Some improvement after Ativan and metoprolol given.  Awaiting ICU bed.    Diagnostics in ER showed CXR unremarkable.  Significant lab includes WBC 13.15    She has a history of oxygen dependent COPD, hypertension, PAD, s/p bilateral lower extremity stents 2016 and carotid artery angioplasty, HLD, tobacco abuse, vitamin D deficiency, anxiety, chronic migraines, chronic cardiac stent occlusion, chronic macrocytosis.    She otherwise denies recent f/c/headache/rhinorrhea/nasal congestion/lightheadedness/syncopal sensation/n/v/d/chest pain/abdominal pain/change in bowel or bladder habits/no weight change/bloody emesis or bloody stools/change in medications or any other new concerns.    Past Medical History:   Diagnosis Date    Anxiety     Arthritis     COPD (chronic obstructive pulmonary disease)     Coronary stent occlusion     CTS (carpal tunnel syndrome)     Depression     Dizziness     Headache     Hyperlipidemia      Hypertension     Migraine     Numbness and tingling     Pneumonia      Past Surgical History:   Procedure Laterality Date    ANGIOPLASTY ILIAC ARTERY Left 6/17/2016    Procedure: BILATERAL DUPLEX DIRECTED ACCESS, AIF BILATERAL RUNOFF, SELECTIVE CATHETERIZATION OF RIGHT EXTERNAL ILIAC WITH ANGIOPLASTY, LEFT COMMON ILIAC STENT PLACEMENT;  Surgeon: Jose Carlos Plascencia MD;  Location: [facility] 18/19;  Service:     BREAST SURGERY      BILAT IMPLANTS    CAROTID ARTERY ANGIOPLASTY       Family History   Problem Relation Age of Onset    Heart disease Father     Hypertension Father     Diabetes Father     Stroke Father     COPD Father     Heart failure Paternal Grandmother     Heart failure Paternal Grandfather     COPD Mother     COPD Maternal Aunt     COPD Maternal Uncle     COPD Paternal Uncle      Social History     Tobacco Use    Smoking status: Every Day     Packs/day: 1.00     Years: 30.00     Additional pack years: 0.00     Total pack years: 30.00     Types: Cigarettes     Passive exposure: Current    Smokeless tobacco: Never   Vaping Use    Vaping Use: Never used   Substance Use Topics    Alcohol use: No    Drug use: No     Medications Prior to Admission   Medication Sig Dispense Refill Last Dose    albuterol sulfate HFA (Proventil HFA) 108 (90 Base) MCG/ACT inhaler Inhale 2 puffs into the lungs Every 4 (Four) Hours As Needed for Wheezing. 8.5 g 1     aspirin (aspirin) 81 MG EC tablet Take 1 tablet by mouth Daily. 90 tablet 3     budesonide-formoterol (SYMBICORT) 160-4.5 MCG/ACT inhaler Inhale 2 puffs 2 (Two) Times a Day.       clopidogrel (Plavix) 75 MG tablet Take 1 tablet by mouth Daily. 90 tablet 1     fluticasone (FLONASE) 50 MCG/ACT nasal spray 1 spray into the nostril(s) as directed by provider Daily As Needed.       guaiFENesin (MUCINEX) 600 MG 12 hr tablet Take 2 tablets by mouth 2 (Two) Times a Day. 40 tablet 0     ipratropium-albuterol (DUO-NEB) 0.5-2.5 mg/3 ml nebulizer Take 3 mL by nebulization Every  "4 (Four) Hours As Needed for Wheezing. 90 mL 1     lisinopril-hydrochlorothiazide (PRINZIDE,ZESTORETIC) 20-12.5 MG per tablet TAKE 1 TABLET BY MOUTH EVERY DAY 90 tablet 0     medroxyPROGESTERone (DEPO-PROVERA) 150 MG/ML injection Apply 1 mL to cheek Every 3 (Three) Months. Pt takes every 3 months.march or April 2018 last dose       methylPREDNISolone (MEDROL) 4 MG dose pack Take as directed on package instructions. 21 tablet 0     metoprolol tartrate (LOPRESSOR) 50 MG tablet Take 1 tablet by mouth Every 12 (Twelve) Hours. 60 tablet 1     nicotine (NICODERM CQ) 21 MG/24HR patch Place 1 patch on the skin as directed by provider Daily. 30 patch 0     simvastatin (ZOCOR) 40 MG tablet Take 1 tablet by mouth Every Evening. 90 tablet 1     venlafaxine XR (EFFEXOR-XR) 75 MG 24 hr capsule TAKE 1 CAPSULE BY MOUTH EVERY DAY 90 capsule 0     vitamin D (ERGOCALCIFEROL) 64530 units capsule capsule Take 1 capsule by mouth Every 7 (Seven) Days. 6 capsule 5      Allergies:  Prednisone    Immunization History   Administered Date(s) Administered    COVID-19 (MODERNA) 1st,2nd,3rd Dose Monovalent 05/13/2021, 06/10/2021    flucelvax quad pfs =>4 YRS 10/02/2019       REVIEW OF SYSTEMS:  Please see the above history of present illness for pertinent positives and negatives.  The remainder of the patient's systems have been reviewed and are negative.     Vital Signs  Temp:  [98.4 °F (36.9 °C)] 98.4 °F (36.9 °C)  Heart Rate:  [111-140] 117  Resp:  [26-33] 33  BP: (133-195)/() 136/92  Body mass index is 23.05 kg/m².    Flowsheet Rows      Flowsheet Row First Filed Value   Admission Height 157.5 cm (62\") Documented at 01/17/2024 0423   Admission Weight 57.2 kg (126 lb) Documented at 01/17/2024 0423             Physical Exam  Vitals reviewed.   Constitutional:       General: She is in acute distress.      Appearance: She is ill-appearing.      Comments: Appears older than stated age, chronically ill appearance     HENT:      Head: " Normocephalic and atraumatic.      Mouth/Throat:      Mouth: Mucous membranes are dry.   Eyes:      Extraocular Movements: Extraocular movements intact.      Pupils: Pupils are equal, round, and reactive to light.   Cardiovascular:      Rate and Rhythm: Regular rhythm. Tachycardia present.   Pulmonary:      Comments: Bipap, poor air movement throughout, expiratory wheezes R>L  Abdominal:      General: Abdomen is flat. Bowel sounds are normal. There is no distension.      Palpations: Abdomen is soft.      Tenderness: There is no abdominal tenderness. There is no guarding.   Musculoskeletal:         General: No swelling.   Skin:     General: Skin is warm and dry.      Capillary Refill: Capillary refill takes less than 2 seconds.      Findings: No erythema.   Neurological:      General: No focal deficit present.      Mental Status: She is alert and oriented to person, place, and time.   Psychiatric:      Comments: anxious       Emotional Behavior:    Judgment and Insight: fair   Mental Status:  Alertness  alert   Memory:  good   Mood and Affect:         Depression  none               Anxiety  moderate    Debilities:   Physical Weakness  unknown   Handicaps  unknown   Disabilities  unknown   Agitation  mild     Results Review:    I reviewed the patient's new clinical results.  Lab Results (most recent)       Procedure Component Value Units Date/Time    BNP [[id number]]  (Normal) Collected: 01/17/24 0435    Specimen: Blood Updated: 01/17/24 0513     proBNP 520.1 pg/mL     Narrative:      This assay is used as an aid in the diagnosis of individuals suspected of having heart failure. It can be used as an aid in the diagnosis of acute decompensated heart failure (ADHF) in patients presenting with signs and symptoms of ADHF to the emergency department (ED). In addition, NT-proBNP of <300 pg/mL indicates ADHF is not likely.    Age Range Result Interpretation  NT-proBNP Concentration (pg/mL:      <50             Positive             >450                   Gray                 300-450                    Negative             <300    50-75           Positive            >900                  Gray                300-900                  Negative            <300      >75             Positive            >1800                  Gray                300-1800                  Negative            <300    High Sensitivity Troponin T [[id number]]  (Normal) Collected: 01/17/24 0435    Specimen: Blood Updated: 01/17/24 0513     HS Troponin T 10 ng/L     Narrative:      High Sensitive Troponin T Reference Range:  <14.0 ng/L- Negative Female for AMI  <22.0 ng/L- Negative Male for AMI  >=14 - Abnormal Female indicating possible myocardial injury.  >=22 - Abnormal Male indicating possible myocardial injury.   Clinicians would have to utilize clinical acumen, EKG, Troponin, and serial changes to determine if it is an Acute Myocardial Infarction or myocardial injury due to an underlying chronic condition.         CBC & Differential [[id number]]  (Abnormal) Collected: 01/17/24 0435    Specimen: Blood Updated: 01/17/24 0512    Narrative:      The following orders were created for panel order CBC & Differential.  Procedure                               Abnormality         Status                     ---------                               -----------         ------                     CBC Auto Differential[[id number]]        Abnormal            Final result               Scan Slide[[id number]]                   Normal              Final result                 Please view results for these tests on the individual orders.    Scan Slide [[id number]]  (Normal) Collected: 01/17/24 0435    Specimen: Blood Updated: 01/17/24 0512     RBC Morphology Normal     WBC Morphology Normal     Platelet Morphology Normal    CBC Auto Differential [[id number]]  (Abnormal) Collected: 01/17/24 0435    Specimen: Blood Updated: 01/17/24 0512     WBC 13.15 10*3/mm3      RBC 4.67 10*6/mm3       Hemoglobin 15.1 g/dL      Hematocrit 47.3 %      .3 fL      MCH 32.3 pg      MCHC 31.9 g/dL      RDW 13.7 %      RDW-SD 50.7 fl      MPV 8.5 fL      Platelets 242 10*3/mm3      Neutrophil % 86.3 %      Lymphocyte % 6.5 %      Monocyte % 6.5 %      Eosinophil % 0.0 %      Basophil % 0.2 %      Immature Grans % 0.5 %      Neutrophils, Absolute 11.33 10*3/mm3      Lymphocytes, Absolute 0.86 10*3/mm3      Monocytes, Absolute 0.86 10*3/mm3      Eosinophils, Absolute 0.00 10*3/mm3      Basophils, Absolute 0.03 10*3/mm3      Immature Grans, Absolute 0.07 10*3/mm3      nRBC 0.0 /100 WBC     Comprehensive Metabolic Panel [[id number]]  (Abnormal) Collected: 01/17/24 0435    Specimen: Blood Updated: 01/17/24 0503     Glucose 133 mg/dL      BUN 11 mg/dL      Creatinine 0.75 mg/dL      Sodium 130 mmol/L      Potassium 4.7 mmol/L      Comment: Slight hemolysis detected by analyzer. Result may be falsely elevated.        Chloride 90 mmol/L      CO2 30.9 mmol/L      Calcium 9.3 mg/dL      Total Protein 7.6 g/dL      Albumin 4.0 g/dL      ALT (SGPT) 10 U/L      AST (SGOT) 22 U/L      Comment: Slight hemolysis detected by analyzer. Result may be falsely elevated.        Alkaline Phosphatase 100 U/L      Total Bilirubin 0.3 mg/dL      Globulin 3.6 gm/dL      A/G Ratio 1.1 g/dL      BUN/Creatinine Ratio 14.7     Anion Gap 9.1 mmol/L      eGFR 97.1 mL/min/1.73     Narrative:      GFR Normal >60  Chronic Kidney Disease <60  Kidney Failure <15              Imaging Results (Most Recent)       Procedure Component Value Units Date/Time    XR Chest 1 View [[id number]] Collected: 01/17/24 0449     Updated: 01/17/24 0508    Narrative:      CHEST X-RAY, 1/17/2024        HISTORY:  48-year-old female in the ED with shortness of air, decreased oxygen saturation. History of COPD.    TECHNIQUE:  AP portable upright chest x-ray.    COMPARISON:  *  Chest x-ray, 3/27/2023. CT chest, 5/31/2023.    FINDINGS:  Pulmonary hyperinflation compatible with  COPD. The lungs appear clear. No visible pulmonary infiltrate or pleural effusion.    Heart size and pulmonary vascularity are normal.      Impression:      COPD. No active disease.        Signer Name: Danny Davis MD   Signed: 1/17/2024 5:06 AM EST  [facility]          reviewed    ECG/EMG Results (most recent)       Procedure Component Value Units Date/Time    ECG 12 Lead Dyspnea [[id number]] Collected: 01/17/24 0453     Updated: 01/17/24 0454     QT Interval 305 ms      QTC Interval 450 ms     Narrative:      HEART RATE= 130  bpm  RR Interval= 460  ms  MS Interval= 120  ms  P Horizontal Axis=   deg  P Front Axis= 95  deg  QRSD Interval= 73  ms  QT Interval= 305  ms  QTcB= 450  ms  QRS Axis= 51  deg  T Wave Axis= 86  deg  - BORDERLINE ECG -  Sinus tachycardia  Ventricular premature complex  Aberrant complex  Minimal ST depression, lateral leads  Electronically Signed By:   Date and Time of Study: 2024-01-17 04:53:04          reviewed    Assessment & Plan  A/C HHRF secondary to AE chronic oxygen dependent COPD:  Leukocytosis:  BiPAP, ABG initially showed pH 7.1 pCO2 of greater than 80, subsequent ABG not yet in system but reported to me personally shows pH improving as well as CO2 decreasing, therefore we will continue BiPAP  Ativan for anxiety  Check RVP, sputum culture, procalcitonin  Continue Solu-Medrol 40 mg every 8 hours  Add duonebs every 4 hours and prn, add Mucinex  Continue Precedex keep patient comfortable while utilizing BiPAP.   Patient by definition meeting SIRS criteria, no source identified as of yet, therefore not septic!!!  Given procalcitonin elevation and leukocytosis and unremarkable chest x-ray except for COPD, will prefer to proceed with CT chest.   -Start doxycyline and unasyn for now determine further need after CT     Tachycardia:   Hypertension:   Present in past during exacerbations  Add IV metoprolol  Troponin negative  Monitor    PAD, s/p bilateral LE  "stents 2016:  S/P carotid artery angioplasty:  Chronic cardiac stent occlusion:  HLD:   Continue home aspirin, plavix, zocor when off NPO    Tobacco abuse: add nicotine patch    Anxiety: ativan as above  Continue home effexor when able    Chronic migraines:  No home meds, will consider Fioricet if needed when acute concerns improve    I discussed the patient's findings and my recommendations with patient and staff.     Facundo Flanagan DO  01/17/24  08:22 EST    \"Dictated utilizing Dragon dictation\"    Note disclaimer: At [facility], we believe that sharing information builds trust and better relationships. You are receiving this note because you recently visited [facility]. It is possible you will see health information before a provider has talked with you about it. This kind of information can be easy to misunderstand. To help you fully understand what it means for your health, we urge you to discuss this note with your provider.          Electronically signed by Facundo Flanagan DO at 01/17/24 0828       Facility-Administered Medications as of 1/18/2024   Medication Dose Route Frequency Provider Last Rate Last Admin    acetaminophen (TYLENOL) tablet 650 mg  650 mg Oral Q4H PRN Zita Potter APRN        Or    acetaminophen (TYLENOL) suppository 650 mg  650 mg Rectal Q4H PRN Zita Potter APRN        [COMPLETED] albuterol (PROVENTIL) nebulizer solution 0.083% 2.5 mg/3mL  2.5 mg Nebulization Q15 Min Mark Blanca MD   2.5 mg at 01/17/24 0534    ampicillin-sulbactam (UNASYN) 1.5 g in sodium chloride 0.9 % 100 mL IVPB-MBP  1.5 g Intravenous Q6H Facundo Flanagan DO   1.5 g at 01/18/24 0928    aspirin EC tablet 81 mg  81 mg Oral Daily Zita Potter APRN        atorvastatin (LIPITOR) tablet 20 mg  20 mg Oral Daily Zita Potter APRN        sennosides-docusate (PERICOLACE) 8.6-50 MG per tablet 2 tablet  2 tablet Oral BID PRN Zita Potter APRN        And    polyethylene " glycol (MIRALAX) packet 17 g  17 g Oral Daily PRN Zita Potter APRN        And    bisacodyl (DULCOLAX) EC tablet 5 mg  5 mg Oral Daily PRN Zita Potter APRN        And    bisacodyl (DULCOLAX) suppository 10 mg  10 mg Rectal Daily PRN Zita Potter APRN        sennosides-docusate (PERICOLACE) 8.6-50 MG per tablet 2 tablet  2 tablet Oral BID Dagoberto Chilel MD        And    polyethylene glycol (MIRALAX) packet 17 g  17 g Oral Daily PRN Dagoberto Chilel MD        And    bisacodyl (DULCOLAX) EC tablet 5 mg  5 mg Oral Daily PRN Dagoberto Chilel MD        And    bisacodyl (DULCOLAX) suppository 10 mg  10 mg Rectal Daily PRN Dagoberto Chilel MD        Calcium Replacement - Follow Nurse / BPA Driven Protocol   Does not apply PRN Zita Potter APRN        chlorhexidine (PERIDEX) 0.12 % solution 15 mL  15 mL Mouth/Throat Q12H Dagoberto Chilel MD   15 mL at 01/18/24 1409    clopidogrel (PLAVIX) tablet 75 mg  75 mg Oral Daily Zita Potter APRN        dexmedetomidine (PRECEDEX) 400 mcg in 100 mL NS infusion  0.2-1.5 mcg/kg/hr Intravenous Titrated Facundo Flanagan DO 8.6 mL/hr at 01/18/24 1330 0.6 mcg/kg/hr at 01/18/24 1330    doxycycline (VIBRAMYCIN) 100 mg in sodium chloride 0.9 % 100 mL IVPB  100 mg Intravenous Q12H Facundo Flanagan DO   100 mg at 01/18/24 0928    Enoxaparin Sodium (LOVENOX) syringe 40 mg  40 mg Subcutaneous Q24H Zita Potter APRN   40 mg at 01/18/24 0927    fluticasone (FLONASE) 50 MCG/ACT nasal spray 1 spray  1 spray Nasal Daily PRN Zita Potter APRN        guaiFENesin (MUCINEX) 12 hr tablet 1,200 mg  1,200 mg Oral BID Zita Potter APRN        [COMPLETED] iopamidol (ISOVUE-370) 76 % injection 100 mL  100 mL Intravenous Once in imaging Facundo Flanagan DO   100 mL at 01/17/24 1337    [COMPLETED] ipratropium-albuterol (DUO-NEB) nebulizer solution 3 mL  3 mL Nebulization Once Mark Blanca MD   3 mL at 01/17/24 9018    ipratropium-albuterol  (DUO-NEB) nebulizer solution 3 mL  3 mL Nebulization Q4H - RT Zita Potter APRN   3 mL at 01/18/24 1122    ipratropium-albuterol (DUO-NEB) nebulizer solution 3 mL  3 mL Nebulization Q4H PRN Zita Potter, APRN        LORazepam (ATIVAN) injection 0.5 mg  0.5 mg Intravenous Q4H PRN Zita Potter APRN   0.5 mg at 01/18/24 0758    Magnesium Standard Dose Replacement - Follow Nurse / BPA Driven Protocol   Does not apply PRN Zita Potter APRN        [COMPLETED] methylPREDNISolone sodium succinate (SOLU-Medrol) injection 125 mg  125 mg Intravenous Once Mark Blanca MD   125 mg at 01/17/24 0436    methylPREDNISolone sodium succinate (SOLU-Medrol) injection 60 mg  60 mg Intravenous Q6H Facundo Flanagan, DO   60 mg at 01/18/24 0928    metoprolol tartrate (LOPRESSOR) injection 5 mg  5 mg Intravenous Q5 Min PRN Facundo Flanagan, DO   5 mg at 01/17/24 1022    nicotine (NICODERM CQ) 21 MG/24HR patch 1 patch  1 patch Transdermal Q24H Zita Potter APRN   1 patch at 01/18/24 0928    nitroglycerin (NITROSTAT) SL tablet 0.4 mg  0.4 mg Sublingual Q5 Min PRN Zita Potter, APRN        ondansetron ODT (ZOFRAN-ODT) disintegrating tablet 4 mg  4 mg Oral Q6H PRN Zita Potter APRN        Or    ondansetron (ZOFRAN) injection 4 mg  4 mg Intravenous Q6H PRN Zita Potter, APRN        pantoprazole (PROTONIX) injection 40 mg  40 mg Intravenous Q24H Dagoberto Chilel MD   40 mg at 01/18/24 1409    Pharmacy to Dose enoxaparin (LOVENOX)   Does not apply Continuous PRN Zita Potter R, APRN        Phosphorus Replacement - Follow Nurse / BPA Driven Protocol   Does not apply PRN Zita Potter APRN        Potassium Replacement - Follow Nurse / BPA Driven Protocol   Does not apply PRN Zita Potter, APRN        propofol (DIPRIVAN) infusion 10 mg/mL 100 mL  5-50 mcg/kg/min Intravenous Titrated Dagoberto Chilel MD 15.66 mL/hr at 01/18/24 1300 45 mcg/kg/min at 01/18/24 1300    sodium  chloride 0.9 % flush 10 mL  10 mL Intravenous PRN Mark Blanca MD        sodium chloride 0.9 % flush 10 mL  10 mL Intravenous Q12H Zita Potter, APRN   10 mL at 01/18/24 0926    sodium chloride 0.9 % flush 10 mL  10 mL Intravenous PRN TariqIndira onofreana R, APRN        sodium chloride 0.9 % flush 10 mL  10 mL Intravenous Q12H Facundo Flanagan R, DO   10 mL at 01/18/24 0926    sodium chloride 0.9 % flush 10 mL  10 mL Intravenous Q12H Facundo Flanagan R, DO   10 mL at 01/18/24 0926    sodium chloride 0.9 % flush 10 mL  10 mL Intravenous Q12H Panchito, Facundo R, DO   10 mL at 01/18/24 0926    sodium chloride 0.9 % flush 10 mL  10 mL Intravenous PRN Facundo Flanagan R, DO        sodium chloride 0.9 % flush 20 mL  20 mL Intravenous PRN Facundo Flanagan R, DO        sodium chloride 0.9 % infusion 40 mL  40 mL Intravenous PRN Zita Potter R, APRN        sodium chloride 0.9 % infusion  100 mL/hr Intravenous Continuous Zita Potter APRN 100 mL/hr at 01/18/24 0713 100 mL/hr at 01/18/24 0713    sodium chloride 7 % nebulizer solution nebulizer solution 4 mL  4 mL Nebulization BID - RT Zita Potter APRN   4 mL at 01/18/24 0727    venlafaxine XR (EFFEXOR-XR) 24 hr capsule 75 mg  75 mg Oral Daily Zita Potter APRN         Lab Results (last 72 hours)       Procedure Component Value Units Date/Time    Blood Culture - Blood, Blood, PICC Line [[id number]]  (Normal) Collected: 01/17/24 1421    Specimen: Blood, PICC Line Updated: 01/18/24 1446     Blood Culture No growth at 24 hours    Respiratory Culture - Sputum, Cough [[id number]] Collected: 01/18/24 1356    Specimen: Sputum from Cough Updated: 01/18/24 1407    Blood Gas, Arterial - [[id number]]  (Abnormal) Collected: 01/18/24 1355    Specimen: Arterial Blood Updated: 01/18/24 1401     Site Left Radial     Marquis's Test Positive     pH, Arterial 7.242 pH units      Comment: 84 Value below reference range        pCO2, Arterial 61.8 mm Hg      Comment:  83 Value above reference range        pO2, Arterial 119.0 mm Hg      Comment: 83 Value above reference range        HCO3, Arterial 26.6 mmol/L      Comment: 83 Value above reference range        Base Excess, Arterial -1.7 mmol/L      Comment: 84 Value below reference range        O2 Saturation, Arterial 99.5 %      Comment: 83 Value above reference range        Hemoglobin, Blood Gas 11.5 g/dL      Temperature 37.0     Barometric Pressure for Blood Gas 738 mmHg      Modality Ventilator     FIO2 40 %      Ventilator Mode VC/AC     Set Tidal Volume 500     Set Mech Resp Rate 18.0     PEEP 3.0     Collected by [id number]     Comment: Meter: [id number]     :  [id number]        pCO2, Temperature Corrected 61.8 mm Hg      pH, Temp Corrected 7.242 pH Units      pO2, Temperature Corrected 119 mm Hg     Blood Gas, Arterial - [[id number]]  (Abnormal) Collected: 01/18/24 0905    Specimen: Arterial Blood Updated: 01/18/24 1043     Site Left Radial     Marquis's Test Positive     pH, Arterial 7.233 pH units      pCO2, Arterial 65.5 mm Hg      pO2, Arterial 93.2 mm Hg      HCO3, Arterial 27.6 mmol/L      Base Excess, Arterial -1.0 mmol/L      O2 Saturation, Arterial 93.2 %      Hemoglobin, Blood Gas 11.4 g/dL      Barometric Pressure for Blood Gas 741 mmHg      Modality NIV     FIO2 35 %      Ventilator Mode BiPAP-ST     Rate 24 Breaths/minute      IPAP 18     EPAP 6     Collected by [id number]    Blood Gas, Venous - [[id number]]  (Abnormal) Collected: 01/18/24 0730    Specimen: Venous Blood Updated: 01/18/24 0714     Site OTHER     pH, Venous 7.252 pH Units      Comment: 84 Value below reference range        pCO2, Venous 66.0 mm Hg      Comment: 83 Value above reference range        pO2, Venous 36.3 mm Hg      HCO3, Venous 29.1 mmol/L      Comment: 83 Value above reference range        Base Excess, Venous 0.6 mmol/L      O2 Saturation, Venous 66.4 %      Hemoglobin, Blood Gas 11.5 g/dL      Temperature 37.0     Barometric  "Pressure for Blood Gas 740 mmHg      Modality NIV     FIO2 30 %      Ventilator Mode BIPAP-ST     Set Mech Resp Rate 16.0     IPAP 18     Comment: Meter: [id number]     :  [id number]        EPAP 6     Collected by REMY OLIVEIRA    Blood Culture - Blood, Arm, Right [[id number]]  (Normal) Collected: 01/17/24 0645    Specimen: Blood from Arm, Right Updated: 01/18/24 0701     Blood Culture No growth at 24 hours    Basic Metabolic Panel [[id number]]  (Abnormal) Collected: 01/18/24 0408    Specimen: Blood from Arm, Right Updated: 01/18/24 0505     Glucose 183 mg/dL      BUN 37 mg/dL      Creatinine 0.99 mg/dL      Sodium 137 mmol/L      Potassium 4.4 mmol/L      Chloride 103 mmol/L      CO2 28.8 mmol/L      Calcium 7.9 mg/dL      BUN/Creatinine Ratio 37.4     Anion Gap 5.2 mmol/L      eGFR 69.6 mL/min/1.73     Narrative:      GFR Normal >60  Chronic Kidney Disease <60  Kidney Failure <15      Procalcitonin [[id number]]  (Abnormal) Collected: 01/18/24 0408    Specimen: Blood from Arm, Right Updated: 01/18/24 0438     Procalcitonin 35.76 ng/mL     Narrative:      As a Marker for Sepsis (Non-Neonates):    1. <0.5 ng/mL represents a low risk of severe sepsis and/or septic shock.  2. >2 ng/mL represents a high risk of severe sepsis and/or septic shock.    As a Marker for Lower Respiratory Tract Infections that require antibiotic therapy:    PCT on Admission    Antibiotic Therapy       6-12 Hrs later    >0.5                Strongly Recommended  >0.25 - <0.5        Recommended   0.1 - 0.25          Discouraged              Remeasure/reassess PCT  <0.1                Strongly Discouraged     Remeasure/reassess PCT    As 28 day mortality risk marker: \"Change in Procalcitonin Result\" (>80% or <=80%) if Day 0 (or Day 1) and Day 4 values are available. Refer to http://www.[employer]-pct-calculator.com    Change in PCT <=80%  A decrease of PCT levels below or equal to 80% defines a positive change in PCT test result representing a " higher risk for 28-day all-cause mortality of patients diagnosed with severe sepsis for septic shock.    Change in PCT >80%  A decrease of PCT levels of more than 80% defines a negative change in PCT result representing a lower risk for 28-day all-cause mortality of patients diagnosed with severe sepsis or septic shock.       CBC & Differential [[id number]]  (Abnormal) Collected: 01/18/24 0408    Specimen: Blood from Arm, Right Updated: 01/18/24 0423    Narrative:      The following orders were created for panel order CBC & Differential.  Procedure                               Abnormality         Status                     ---------                               -----------         ------                     CBC Auto Differential[[id number]]        Abnormal            Final result               Scan Slide[[id number]]                                                                    Please view results for these tests on the individual orders.    CBC Auto Differential [[id number]]  (Abnormal) Collected: 01/18/24 0408    Specimen: Blood from Arm, Right Updated: 01/18/24 0423     WBC 6.40 10*3/mm3      RBC 3.60 10*6/mm3      Hemoglobin 11.7 g/dL      Hematocrit 37.5 %      .2 fL      MCH 32.5 pg      MCHC 31.2 g/dL      RDW 13.7 %      RDW-SD 53.1 fl      MPV 9.3 fL      Platelets 176 10*3/mm3      Neutrophil % 81.9 %      Lymphocyte % 13.3 %      Monocyte % 3.8 %      Eosinophil % 0.0 %      Basophil % 0.2 %      Immature Grans % 0.8 %      Neutrophils, Absolute 5.25 10*3/mm3      Lymphocytes, Absolute 0.85 10*3/mm3      Monocytes, Absolute 0.24 10*3/mm3      Eosinophils, Absolute 0.00 10*3/mm3      Basophils, Absolute 0.01 10*3/mm3      Immature Grans, Absolute 0.05 10*3/mm3      nRBC 0.0 /100 WBC     Blood Gas, Venous - [[id number]]  (Abnormal) Collected: 01/17/24 1555    Specimen: Venous Blood Updated: 01/17/24 1606     Site OTHER     pH, Venous 7.213 pH Units      Comment: 85 Value below critical limit         pCO2, Venous 69.5 mm Hg      Comment: 83 Value above reference range        pO2, Venous 32.5 mm Hg      HCO3, Venous 28.0 mmol/L      Base Excess, Venous -1.3 mmol/L      Comment: 84 Value below reference range        O2 Saturation, Venous 58.6 %      Hemoglobin, Blood Gas 12.5 g/dL      Temperature 37.0     Barometric Pressure for Blood Gas 742 mmHg      Modality NIV     FIO2 35 %      Ventilator Mode BIPAP-ST     Set Mech Resp Rate 16.0     PEEP 0.0     IPAP 18     Comment: Meter: [id number]     :  [id number]        EPAP 6     Notified Who RB AND V DR PAL     Notified By [id number]     Notified Time 01/17/2024 16:07     Collected by [id number]    Respiratory Panel PCR w/COVID-19(SARS-CoV-2) TERRY/ROSSI/KIKA/PAD/COR/RONAK In-House, NP Swab in UTM/VTM, 2 HR TAT - Swab, Nasopharynx [[id number]]  (Abnormal) Collected: 01/17/24 0639    Specimen: Swab from Nasopharynx Updated: 01/17/24 1150     ADENOVIRUS, PCR Not Detected     Coronavirus 229E Not Detected     Coronavirus HKU1 Not Detected     Coronavirus NL63 Not Detected     Coronavirus OC43 Not Detected     COVID19 Not Detected     Human Metapneumovirus Not Detected     Human Rhinovirus/Enterovirus Not Detected     Influenza A PCR Not Detected     Influenza B PCR Not Detected     Parainfluenza Virus 1 Not Detected     Parainfluenza Virus 2 Not Detected     Parainfluenza Virus 3 Not Detected     Parainfluenza Virus 4 Not Detected     RSV, PCR Detected     Bordetella pertussis pcr Not Detected     Bordetella parapertussis PCR Not Detected     Chlamydophila pneumoniae PCR Not Detected     Mycoplasma pneumo by PCR Not Detected    Narrative:      In the setting of a positive respiratory panel with a viral infection PLUS a negative procalcitonin without other underlying concern for bacterial infection, consider observing off antibiotics or discontinuation of antibiotics and continue supportive care. If the respiratory panel is positive for atypical bacterial infection  "(Bordetella pertussis, Chlamydophila pneumoniae, or Mycoplasma pneumoniae), consider antibiotic de-escalation to target atypical bacterial infection.    Blood Gas, Arterial - [[id number]]  (Abnormal) Collected: 01/17/24 0815    Specimen: Arterial Blood Updated: 01/17/24 0829     Site Right Radial     Marquis's Test Positive     pH, Arterial 7.231 pH units      Comment: 84 Value below reference range        pCO2, Arterial 69.0 mm Hg      Comment: 86 Value above critical limit        pO2, Arterial 105.0 mm Hg      HCO3, Arterial 28.9 mmol/L      Comment: 83 Value above reference range        Base Excess, Arterial -0.5 mmol/L      Comment: 84 Value below reference range        O2 Saturation, Arterial 98.3 %      Hemoglobin, Blood Gas 14.2 g/dL      Temperature 37.0     Barometric Pressure for Blood Gas 744 mmHg      Modality NIV     FIO2 <21 %      Ventilator Mode BIPAP-ST     Set Mech Resp Rate 16.0     IPAP 14     Comment: Meter: [id number]     :  [id number]        EPAP 6     Notified By [id number]     Collected by [id number]     pCO2, Temperature Corrected 69.0 mm Hg      pH, Temp Corrected 7.231 pH Units      pO2, Temperature Corrected 105 mm Hg     Procalcitonin [[id number]]  (Abnormal) Collected: 01/17/24 0642    Specimen: Blood Updated: 01/17/24 0738     Procalcitonin 46.14 ng/mL     Narrative:      As a Marker for Sepsis (Non-Neonates):    1. <0.5 ng/mL represents a low risk of severe sepsis and/or septic shock.  2. >2 ng/mL represents a high risk of severe sepsis and/or septic shock.    As a Marker for Lower Respiratory Tract Infections that require antibiotic therapy:    PCT on Admission    Antibiotic Therapy       6-12 Hrs later    >0.5                Strongly Recommended  >0.25 - <0.5        Recommended   0.1 - 0.25          Discouraged              Remeasure/reassess PCT  <0.1                Strongly Discouraged     Remeasure/reassess PCT    As 28 day mortality risk marker: \"Change in Procalcitonin Result\" " (>80% or <=80%) if Day 0 (or Day 1) and Day 4 values are available. Refer to http://www.[facility]-pct-calculator.com    Change in PCT <=80%  A decrease of PCT levels below or equal to 80% defines a positive change in PCT test result representing a higher risk for 28-day all-cause mortality of patients diagnosed with severe sepsis for septic shock.    Change in PCT >80%  A decrease of PCT levels of more than 80% defines a negative change in PCT result representing a lower risk for 28-day all-cause mortality of patients diagnosed with severe sepsis or septic shock.       High Sensitivity Troponin T 2Hr [[id number]]  (Normal) Collected: 01/17/24 0642    Specimen: Blood Updated: 01/17/24 0715     HS Troponin T 10 ng/L      Troponin T Delta 0 ng/L     Narrative:      High Sensitive Troponin T Reference Range:  <14.0 ng/L- Negative Female for AMI  <22.0 ng/L- Negative Male for AMI  >=14 - Abnormal Female indicating possible myocardial injury.  >=22 - Abnormal Male indicating possible myocardial injury.   Clinicians would have to utilize clinical acumen, EKG, Troponin, and serial changes to determine if it is an Acute Myocardial Infarction or myocardial injury due to an underlying chronic condition.         Lactic Acid, Plasma [[id number]]  (Normal) Collected: 01/17/24 0700    Specimen: Blood Updated: 01/17/24 0714     Lactate 1.5 mmol/L     BNP [[id number]]  (Normal) Collected: 01/17/24 0435    Specimen: Blood Updated: 01/17/24 0513     proBNP 520.1 pg/mL     Narrative:      This assay is used as an aid in the diagnosis of individuals suspected of having heart failure. It can be used as an aid in the diagnosis of acute decompensated heart failure (ADHF) in patients presenting with signs and symptoms of ADHF to the emergency department (ED). In addition, NT-proBNP of <300 pg/mL indicates ADHF is not likely.    Age Range Result Interpretation  NT-proBNP Concentration (pg/mL:      <50             Positive            >450                    Gray                 300-450                    Negative             <300    50-75           Positive            >900                  Gray                300-900                  Negative            <300      >75             Positive            >1800                  Gray                300-1800                  Negative            <300    High Sensitivity Troponin T [[id number]]  (Normal) Collected: 01/17/24 0435    Specimen: Blood Updated: 01/17/24 0513     HS Troponin T 10 ng/L     Narrative:      High Sensitive Troponin T Reference Range:  <14.0 ng/L- Negative Female for AMI  <22.0 ng/L- Negative Male for AMI  >=14 - Abnormal Female indicating possible myocardial injury.  >=22 - Abnormal Male indicating possible myocardial injury.   Clinicians would have to utilize clinical acumen, EKG, Troponin, and serial changes to determine if it is an Acute Myocardial Infarction or myocardial injury due to an underlying chronic condition.         CBC & Differential [[id number]]  (Abnormal) Collected: 01/17/24 0435    Specimen: Blood Updated: 01/17/24 0512    Narrative:      The following orders were created for panel order CBC & Differential.  Procedure                               Abnormality         Status                     ---------                               -----------         ------                     CBC Auto Differential[[id number]]        Abnormal            Final result               Scan Slide[[id number]]                   Normal              Final result                 Please view results for these tests on the individual orders.    Scan Slide [[id number]]  (Normal) Collected: 01/17/24 0435    Specimen: Blood Updated: 01/17/24 0512     RBC Morphology Normal     WBC Morphology Normal     Platelet Morphology Normal    CBC Auto Differential [[id number]]  (Abnormal) Collected: 01/17/24 0435    Specimen: Blood Updated: 01/17/24 0512     WBC 13.15 10*3/mm3      RBC 4.67 10*6/mm3      Hemoglobin 15.1 g/dL       Hematocrit 47.3 %      .3 fL      MCH 32.3 pg      MCHC 31.9 g/dL      RDW 13.7 %      RDW-SD 50.7 fl      MPV 8.5 fL      Platelets 242 10*3/mm3      Neutrophil % 86.3 %      Lymphocyte % 6.5 %      Monocyte % 6.5 %      Eosinophil % 0.0 %      Basophil % 0.2 %      Immature Grans % 0.5 %      Neutrophils, Absolute 11.33 10*3/mm3      Lymphocytes, Absolute 0.86 10*3/mm3      Monocytes, Absolute 0.86 10*3/mm3      Eosinophils, Absolute 0.00 10*3/mm3      Basophils, Absolute 0.03 10*3/mm3      Immature Grans, Absolute 0.07 10*3/mm3      nRBC 0.0 /100 WBC     Comprehensive Metabolic Panel [[id number]]  (Abnormal) Collected: 01/17/24 0435    Specimen: Blood Updated: 01/17/24 0503     Glucose 133 mg/dL      BUN 11 mg/dL      Creatinine 0.75 mg/dL      Sodium 130 mmol/L      Potassium 4.7 mmol/L      Comment: Slight hemolysis detected by analyzer. Result may be falsely elevated.        Chloride 90 mmol/L      CO2 30.9 mmol/L      Calcium 9.3 mg/dL      Total Protein 7.6 g/dL      Albumin 4.0 g/dL      ALT (SGPT) 10 U/L      AST (SGOT) 22 U/L      Comment: Slight hemolysis detected by analyzer. Result may be falsely elevated.        Alkaline Phosphatase 100 U/L      Total Bilirubin 0.3 mg/dL      Globulin 3.6 gm/dL      A/G Ratio 1.1 g/dL      BUN/Creatinine Ratio 14.7     Anion Gap 9.1 mmol/L      eGFR 97.1 mL/min/1.73     Narrative:      GFR Normal >60  Chronic Kidney Disease <60  Kidney Failure <15            Imaging Results (Last 72 Hours)       Procedure Component Value Units Date/Time    XR Chest 1 View [[id number]] Collected: 01/18/24 1355     Updated: 01/18/24 1400    Narrative:      XR CHEST 1 VW-: 1/18/2024 1:12 PM     INDICATION:   ET tube repositioning     COMPARISON:    1212 hours 1/18/2024.     FINDINGS:  Single Portable AP view(s) of the chest. The tip of the endotracheal  tube is now at the level of the posterior left fifth rib, at least 4.2  cm above the level of the luis antonio. Right-sided PICC line  is unchanged.  Enteric tube tip at the level of the mid body stomach.     There is no pneumothorax in the field-of-view. Portions of the chest are  excluded from view. No new consolidation or effusion. Persistent  pulmonary hyperinflation.       Impression:         1. The tip of the endotracheal tube is in good position above the luis antonio  by at least a distance of 4.2 cm. No postprocedural pneumothorax.  2. Enteric tube and right-sided PICC line in position as described.  3. Pulmonary hyperinflation.     This report was finalized on 1/18/2024 1:58 PM by Dr. Huy Hilario MD.       XR Abdomen KUB [[id number]] Collected: 01/18/24 1335     Updated: 01/18/24 1340    Narrative:      XR ABDOMEN KUB-: 1/18/2024 1:11 PM     INDICATION:   Dobbhoff tube placement     COMPARISON:   1246 hours 1/18/2024.     FINDINGS:  AP radiograph of the lower chest and abdomen. The tip of the Dobbhoff  tube is at the level of the mid body stomach. Right-sided central line  is unchanged. The tip of the endotracheal tube is now about 5.7 cm above  the level of the luis antonio. Postop change left iliac vascular stent  placement. Lungs hyperinflated and otherwise clear. No pneumothorax or  effusion in the field-of-view..       Impression:         1. The tip of the enteric tube is at the level of the mid body stomach.     This report was finalized on 1/18/2024 1:38 PM by Dr. Huy Hilario MD.       XR Chest 1 View [[id number]] Collected: 01/18/24 1225     Updated: 01/18/24 1234    Narrative:      XR CHEST 1 VW-: 1/18/2024 12:09 PM     INDICATION:   Intubation. Endotracheal tube placement. Dobbhoff tube placement.     COMPARISON:    1/18/2024 at 0924 hours.     FINDINGS:  Single Portable AP view(s) of the chest. The tip of the endotracheal  tube is about 1 cm from the luis antonio and partially extends into the right  mainstem bronchus. Retraction on the order of about 4 cm is recommended  for positioning in the more proximal trachea. There is an enteric  tube  present. The tip is difficult to discriminate from the external tubing  artifact. The tip of the tube is felt to be at the level of the distal  esophagus. Suggest advancement of the tube a distance of about 8 cm with  a follow-up radiograph to document appropriate positioning in the  stomach prior to usage. It would be helpful if the external tubing could  be reposition so that it is not superimposed over the chest and abdomen  at the time of the repeat view. Right-sided PICC line extending to the  mid SVC level is unchanged. Additional external support equipment  artifact.     There is no pneumothorax. No new consolidation or effusion.       Impression:      1. Endotracheal tube tip extends into the proximal right mainstem  bronchus and should be retracted a distance of about 4 cm for  positioning in the more proximal trachea.  2. Enteric tube tip most likely at the level of the distal esophagus.  Advancement on the order of 8 cm recommended.  3. Repeat frontal chest including the upper abdomen recommended upon  repositioning of the support equipment to document appropriate  positioning. Findings and recommendations personally discussed by  telephone with Tina, the ICU nurse caring for the patient, at 1232  hours 1/18/2024     This report was finalized on 1/18/2024 12:32 PM by Dr. Huy Hilario MD.       XR Chest 1 View [[id number]] Collected: 01/18/24 0934     Updated: 01/18/24 0938    Narrative:      XR CHEST 1 VW-: 1/18/2024 9:19 AM     INDICATION:   Respiratory failure. Tobacco abuse. RSV infection.     COMPARISON:    1/17/2024.     FINDINGS:  Single Portable AP view(s) of the chest. Right-sided PICC line extends  to the mid SVC level, unchanged. Stable cardiac silhouette. The  vascularity is unremarkable. Pulmonary hyperinflation. No new  consolidation or effusion. No pneumothorax. Old healed granulomatous  disease and probable mild atelectasis/scarring in the lung bases.  External support equipment  artifact.       Impression:         1. Right-sided PICC line unchanged. No pneumothorax.  2. Pulmonary hyperinflation. No new effusion or dense consolidation.     This report was finalized on 1/18/2024 9:35 AM by Dr. Huy Hilario MD.       CT Chest With Contrast Diagnostic [[id number]] Collected: 01/17/24 1341     Updated: 01/17/24 1405    Narrative:      CT Chest W    INDICATION:    Acute respiratory failure with hypoxia. COPD.    TECHNIQUE:   CT of the chest with IV contrast. Coronal and sagittal reconstructions were obtained.  Radiation dose reduction techniques included automated exposure control or exposure modulation based on body size. Count of known CT and cardiac nuc med studies  performed in previous 12 months: 1.      COMPARISON:    CT chest 5/31/2023    FINDINGS: Examination motion degraded.  Diffuse hyperinflation with patchy hyperlucency compatible with centrilobular emphysema. Scattered subpleural reticular changes but no focal consolidation. Minimal tree-in-bud opacity posterior right upper lobe may represent a small area of pneumonitis.  No effusions. Trachea and bronchi unremarkable. Calcified left lower lobe granuloma. Bandlike scarring or atelectasis left lower lobe.    Heart size within normal limits. Aorta and pulmonary vessels unremarkable. No central mass or adenopathy. Upper abdomen unremarkable. Osseous structures unremarkable. Step-off artifact from patient motion seen within the sternum. Bilateral breast  implants without visible complication. Right upper extremity venous catheter terminating in mid SVC.      Impression:      Moderate emphysema with minimal tree-in-bud airspace opacity posterior segment right upper lobe could represent an area of pneumonitis but no focal consolidation.    Signer Name: Niels Walter MD   Signed: 1/17/2024 2:03 PM EST  [facility]    XR Chest 1 View [[id number]] Collected: 01/17/24 1217     Updated: 01/17/24 1220    Narrative:      XR  CHEST 1 VW-, 1/17/2024 12:16 PM     HISTORY:  PICC line placement.     COMPARISON:  *  Chest 1/17/2024     FINDINGS:  Single frontal view(s) of the chest. The lungs are clear. No pleural  effusions. No pneumothorax. Heart size is normal. Interval placement of  a right-sided PICC line with tip projecting over the lower SVC.  Mediastinal contours are within normal limits. Pulmonary vasculature is  normal. No acute osseous abnormality.        Impression:      Interval placement of a right-sided PICC line with tip projecting over  the lower SVC. No pneumothorax. No other acute chest findings.     This report was finalized on 1/17/2024 12:18 PM by Dr. Blaise Vaughn MD.       XR Chest 1 View [[id number]] Collected: 01/17/24 0449     Updated: 01/17/24 0508    Narrative:      CHEST X-RAY, 1/17/2024        HISTORY:  48-year-old female in the ED with shortness of air, decreased oxygen saturation. History of COPD.    TECHNIQUE:  AP portable upright chest x-ray.    COMPARISON:  *  Chest x-ray, 3/27/2023. CT chest, 5/31/2023.    FINDINGS:  Pulmonary hyperinflation compatible with COPD. The lungs appear clear. No visible pulmonary infiltrate or pleural effusion.    Heart size and pulmonary vascularity are normal.      Impression:      COPD. No active disease.        Signer Name: Danny Davis MD   Signed: 1/17/2024 5:06 AM [facility]  [facility]          ECG/EMG Results (last 72 hours)       Procedure Component Value Units Date/Time    ECG 12 Lead Dyspnea [[id number]] Collected: 01/17/24 0453     Updated: 01/17/24 0830     QT Interval 305 ms      QTC Interval 450 ms     Narrative:      HEART RATE= 130  bpm  RR Interval= 460  ms  PA Interval= 120  ms  P Horizontal Axis=   deg  P Front Axis= 95  deg  QRSD Interval= 73  ms  QT Interval= 305  ms  QTcB= 450  ms  QRS Axis= 51  deg  T Wave Axis= 86  deg  - BORDERLINE ECG -  Sinus tachycardia  Minimal ST depression, lateral leads  POOR R WAVE PROGRESSION  No change  from prior tracing  Electronically Signed By: Lynne Posey ([facility]) 17-Jan-2024 08:30:41  Date and Time of Study: 2024-01-17 04:53:04    Adult Transthoracic Echo Complete w/ Color, Spectral and Contrast if Necessary Per Protocol [[id number]] Resulted: 01/18/24 1017     Updated: 01/18/24 1019     LVIDd 3.7 cm      LVIDs 2.6 cm      IVSd 0.64 cm      LVPWd 0.62 cm      FS 29.1 %      IVS/LVPW 1.03 cm      ESV(cubed) 18.3 ml      EDV(cubed) 51.3 ml      LV mass(C)d 60.6 grams      LVOT area 2.18 cm2      LVOT diam 1.67 cm      MV E max devonte 94.3 cm/sec      MV A max devonte 70.2 cm/sec      MV dec time 0.15 sec      MV E/A 1.34     Med Peak E' Devonte 11.3 cm/sec      Lat Peak E' Devonte 12.7 cm/sec      Avg E/e' ratio 7.86     SV(LVOT) 45.3 ml      RV Base 2.6 cm      RV Mid 2.10 cm      RV Length 5.7 cm      RV S' 11.1 cm/sec      LV V1 max 117.0 cm/sec      LV V1 max PG 5.5 mmHg      LV V1 mean PG 3.0 mmHg      LV V1 VTI 20.8 cm      Ao pk devonte 143.0 cm/sec      Ao max PG 8.2 mmHg      Ao mean PG 4.0 mmHg      Ao V2 VTI 24.4 cm      FAB(I,D) 1.86 cm2      MV max PG 5.9 mmHg      MV mean PG 2.47 mmHg      MV V2 VTI 28.3 cm      MV P1/2t 57.9 msec      MVA(P1/2t) 3.8 cm2      MVA(VTI) 1.60 cm2      MV dec slope 608.6 cm/sec2      RV V1 max PG 2.22 mmHg      RV V1 max 74.4 cm/sec      RV V1 VTI 13.8 cm      PA acc time 0.12 sec      Dimensionless Index 0.80 (DI)     Narrative:        Left ventricular systolic function is normal. Left ventricular ejection   fraction appears to be 56 - 60%.    Left ventricular diastolic function was normal.    : The study is technically suboptimal for diagnosis. The quality of the   study is limited due to patient body habitus, an uncooperative patient and   breast implants. Image enhancer was not used for this study due to   inability to consent.            Orders (last 72 hrs)        Start     Ordered    01/18/24 1600  Oral Care & Teeth Brushing - Intubated Patient  Every 4 Hours      Comments:  [facility] Teeth at Least 2x/day    01/18/24 1201    01/18/24 1425  Inpatient Nutrition Consult  Once        Provider:  (Not yet assigned)    01/18/24 1433    01/18/24 1401  Blood Gas, Arterial -  PROCEDURE ONCE         01/18/24 1355    01/18/24 1300  chlorhexidine (PERIDEX) 0.12 % solution 15 mL  Every 12 Hours Scheduled         01/18/24 1201    01/18/24 1300  pantoprazole (PROTONIX) injection 40 mg  Every 24 Hours Scheduled         01/18/24 1201    01/18/24 1234  XR Abdomen KUB  1 Time Imaging         01/18/24 1234    01/18/24 1233  XR Chest 1 View  1 Time Imaging         01/18/24 1234    01/18/24 1230  Blood Gas, Arterial -  Once,   Status:  Canceled         01/18/24 1031    01/18/24 1230  sennosides-docusate (PERICOLACE) 8.6-50 MG per tablet 2 tablet  2 Times Daily        See Hyperspace for full Linked Orders Report.    01/18/24 1138    01/18/24 1230  propofol (DIPRIVAN) infusion 10 mg/mL 100 mL  Titrated         01/18/24 1138    01/18/24 1230  Restraints Non-Violent or Non-Self Destructive  Calendar Day         01/18/24 1232    01/18/24 1202  Feeding Tube Insertion  Once         01/18/24 1201    01/18/24 1201  Elevate HOB  Continuous         01/18/24 1201    01/18/24 1201  Subglottic Suctioning Must Be Done Every 6 Hours  Per Order Details        Comments: At Least Every 6 Hours    01/18/24 1201    01/18/24 1201  Target Arousal Level RASS -1 to -2  Continuous         01/18/24 1201    01/18/24 1201  NPO Diet NPO Type: Strict NPO  Diet Effective Now         01/18/24 1201    01/18/24 1201  Blood Gas, Arterial -Obtain on: Current Settings  Once        Comments: One Hour After Intubation      01/18/24 1201    01/18/24 1201  RN May Place Order For ABG As Needed for Respiratory Distress  Continuous         01/18/24 1201    01/18/24 1201  Intubate  Once         01/18/24 1201    01/18/24 1159  Intubation  Once         01/18/24 1158    01/18/24 1158  XR Chest 1 View  1 Time Imaging         01/18/24 1157    01/18/24 1137   polyethylene glycol (MIRALAX) packet 17 g  Daily PRN        See Hyperspace for full Linked Orders Report.    01/18/24 1138    01/18/24 1137  bisacodyl (DULCOLAX) EC tablet 5 mg  Daily PRN        See Hyperspace for full Linked Orders Report.    01/18/24 1138    01/18/24 1137  bisacodyl (DULCOLAX) suppository 10 mg  Daily PRN        See Hyperspace for full Linked Orders Report.    01/18/24 1138    01/18/24 0930  sodium chloride 7 % nebulizer solution nebulizer solution 4 mL  2 Times Daily - RT         01/18/24 0257    01/18/24 0930  Chest Physiotherapy (PD & P)  2 Times Daily - RT       01/18/24 0257    01/18/24 0930  Blood Gas, Arterial -  Once         01/18/24 1028    01/18/24 0800  Blood Gas, Venous -  Once         01/18/24 0440    01/18/24 0800  XR Chest 1 View  1 Time Imaging         01/18/24 0440    01/18/24 0736  Blood Gas, Venous -  PROCEDURE ONCE         01/18/24 0730    01/18/24 0700  XR Chest 1 View  1 Time Imaging,   Status:  Canceled         01/18/24 0232    01/18/24 0700  Blood Gas, Venous -  Once,   Status:  Canceled         01/18/24 0232    01/18/24 0600  Procalcitonin  Morning Draw         01/17/24 1942    01/18/24 0600  CBC & Differential  Morning Draw         01/18/24 0231    01/18/24 0600  Basic Metabolic Panel  Morning Draw         01/18/24 0231    01/18/24 0600  CBC Auto Differential  PROCEDURE ONCE         01/18/24 0231    01/18/24 0415  Scan Slide  Once,   Status:  Canceled         01/18/24 0414    01/17/24 1932  Adult Transthoracic Echo Complete w/ Color, Spectral and Contrast if Necessary Per Protocol  Once         01/17/24 1942 01/17/24 1922  ipratropium-albuterol (DUO-NEB) nebulizer solution 3 mL  Every 4 Hours PRN         01/17/24 1922    01/17/24 1706  Inpatient Pulmonology Consult  Once        Specialty:  Pulmonary Disease  Provider:  Pino Fernandes MD    01/17/24 1705    01/17/24 1607  Blood Gas, Venous -  PROCEDURE ONCE         01/17/24 1555    01/17/24 1545  Blood Gas, Venous -   Once         01/17/24 1545    01/17/24 1528  NIPPV (CPAP or BIPAP)  Until Discontinued         01/17/24 1529    01/17/24 1430  iopamidol (ISOVUE-370) 76 % injection 100 mL  Once in Imaging         01/17/24 1337    01/17/24 1247  Insert Indwelling Urinary Catheter  Once        Comments: Follow Protocol As Outlined in Process Instructions (Open Order Report to View Full Instructions)   See Hyperspace for full Linked Orders Report.    01/17/24 1246    01/17/24 1247  Assess Need for Indwelling Urinary Catheter - Follow Removal Protocol  Continuous        Comments: Follow Protocol As Outlined in Process Instructions (Open Order Report to View Full Instructions)   See Hyperspace for full Linked Orders Report.    01/17/24 1246    01/17/24 1247  Urinary Catheter Care  Every Shift      See Hyperspace for full Linked Orders Report.    01/17/24 1246    01/17/24 1230  methylPREDNISolone sodium succinate (SOLU-Medrol) injection 40 mg  Every 8 Hours,   Status:  Discontinued         01/17/24 0702    01/17/24 1158  XR Chest 1 View  1 Time Imaging         01/17/24 1157    01/17/24 1016  metoprolol tartrate (LOPRESSOR) injection 5 mg  Every 5 Minutes PRN         01/17/24 1017    01/17/24 1010  Continuous Pulse Oximetry  Continuous         01/17/24 1009    01/17/24 1000  Incentive Spirometry  Every 4 Hours While Awake       01/17/24 0702    01/17/24 1000  sodium chloride 0.9 % flush 10 mL  Every 12 Hours Scheduled         01/17/24 0912    01/17/24 1000  sodium chloride 0.9 % flush 10 mL  Every 12 Hours Scheduled         01/17/24 0912    01/17/24 1000  sodium chloride 0.9 % flush 10 mL  Every 12 Hours Scheduled         01/17/24 0912    01/17/24 0946  Blood Culture - Blood, Blood, PICC Line  Once        Comments: Utilize picc to acquire if need be      01/17/24 0945    01/17/24 0930  methylPREDNISolone sodium succinate (SOLU-Medrol) injection 60 mg  Every 6 Hours         01/17/24 0839    01/17/24 0915  ampicillin-sulbactam (UNASYN) 1.5  g in sodium chloride 0.9 % 100 mL IVPB-MBP  Every 6 Hours         01/17/24 0827 01/17/24 0913  Insert PICC Line At Bedside - 3 Lumens  Once        Comments: Confirmation of placement, care and maintenance per site specific policy.    [place]:  Staff call ext. [phone], to notify PICC nurse, between the hours of 8am to 4:30pm. **After hours, weekends or holidays, contact the .**    01/17/24 0912 01/17/24 0913  PICC LINE CARE - Change Transparent Dressing With CHG Disk & Securement Device Every 7 Days  Weekly        Comments: Per [facility] Policy    01/17/24 0912 01/17/24 0913  PICC LINE CARE - Connectors / Hubs Must Be Scrubbed 15 Seconds Using 70% Alcohol & Allowed to Dry Before Accessing Line  Continuous         01/17/24 0912 01/17/24 0913  PICC LINE CARE - Change Needleless Connectors  Per Order Details        Comments: Per [facility] Policy    01/17/24 0912 01/17/24 0912  sodium chloride 0.9 % flush 10 mL  As Needed         01/17/24 0912 01/17/24 0912  sodium chloride 0.9 % flush 20 mL  As Needed         01/17/24 0912 01/17/24 0900  sodium chloride 0.9 % flush 10 mL  Every 12 Hours Scheduled         01/17/24 0702 01/17/24 0900  sennosides-docusate (PERICOLACE) 8.6-50 MG per tablet 2 tablet  2 Times Daily,   Status:  Discontinued        See [facility] for full Linked Orders Report.    01/17/24 0702 01/17/24 0900  guaiFENesin (MUCINEX) 12 hr tablet 1,200 mg  2 Times Daily         01/17/24 0702    01/17/24 0900  aspirin EC tablet 81 mg  Daily         01/17/24 0702    01/17/24 0900  clopidogrel (PLAVIX) tablet 75 mg  Daily         01/17/24 0702 01/17/24 0900  atorvastatin (LIPITOR) tablet 20 mg  Daily         01/17/24 0702    01/17/24 0900  venlafaxine XR (EFFEXOR-XR) 24 hr capsule 75 mg  Daily         01/17/24 0702 01/17/24 0900  doxycycline (VIBRAMYCIN) 100 mg in sodium chloride 0.9 % 100 mL IVPB  Every 12 Hours Scheduled         01/17/24 0827    01/17/24 0845  Enoxaparin Sodium  (LOVENOX) syringe 40 mg  Every 24 Hours         01/17/24 0753    01/17/24 0830  Blood Gas, Arterial -  PROCEDURE ONCE         01/17/24 0815    01/17/24 0825  CT Chest With Contrast Diagnostic  1 Time Imaging         01/17/24 0824    01/17/24 0825  Target Arousal Level RASS -1 to -2  Continuous,   Status:  Canceled         01/17/24 0824    01/17/24 0815  dexmedetomidine (PRECEDEX) 400 mcg in 100 mL NS infusion  Titrated         01/17/24 0726    01/17/24 0814  Blood Gas, Arterial -  Once         01/17/24 0814    01/17/24 0800  Vital Signs Every Hour and Per Hospital Policy Based on Patient Condition  Every Hour       01/17/24 0702    01/17/24 0800  Intake & Output  Every Hour       01/17/24 0702    01/17/24 0730  Oscillating Positive Expiratory Pressure (OPEP)  Every 4 Hours - RT       01/17/24 0702    01/17/24 0730  ipratropium-albuterol (DUO-NEB) nebulizer solution 3 mL  Every 4 Hours - RT,   Status:  Discontinued         01/17/24 0702    01/17/24 0730  ipratropium-albuterol (DUO-NEB) nebulizer solution 3 mL  Every 4 Hours - RT         01/17/24 0702    01/17/24 0730  Blood Gas, Venous -  Once,   Status:  Canceled         01/17/24 0654    01/17/24 0718  nicotine (NICODERM CQ) 21 MG/24HR patch 1 patch  Every 24 Hours         01/17/24 0702    01/17/24 0709  sodium chloride 0.9 % infusion  Continuous         01/17/24 0653    01/17/24 0703  Continuous Cardiac Monitoring  Continuous        Comments: Follow Standing Orders As Outlined in Process Instructions (Open Order Report to View Full Instructions)    01/17/24 0702    01/17/24 0703  Telemetry - Maintain IV Access  Continuous,   Status:  Canceled         01/17/24 0702    01/17/24 0703  Telemetry - Place Orders & Notify Provider of Results When Patient Experiences Acute Chest Pain, Dysrhythmia or Respiratory Distress  Until Discontinued         01/17/24 0702    01/17/24 0703  Continuous Pulse Oximetry  Continuous,   Status:  Canceled         01/17/24 0702    01/17/24  0703  Height & Weight  Once         01/17/24 0702    01/17/24 0703  Daily Weights  Daily       01/17/24 0702    01/17/24 0703  Oral Care - Patient Not on NPPV & Not Intubated  Every Shift       01/17/24 0702    01/17/24 0703  Target Arousal Level RASS 0 to -1  Continuous,   Status:  Canceled         01/17/24 0702    01/17/24 0703  Use Mobility Guidelines for Advancement of Activity  Continuous         01/17/24 0702    01/17/24 0703  Insert Peripheral IV  Once         01/17/24 0702    01/17/24 0703  Saline Lock & Maintain IV Access  Continuous         01/17/24 0702    01/17/24 0703  Place Sequential Compression Device  Once         01/17/24 0702    01/17/24 0703  Maintain Sequential Compression Device  Continuous         01/17/24 0702    01/17/24 0703  Capnography (ETCO2) Monitoring  Continuous,   Status:  Canceled         01/17/24 0702    01/17/24 0703  Tobacco Cessation Education  Once         01/17/24 0702    01/17/24 0703  NPPV Settings AVAPS; 6 mL/kg PBW; 88 - 92%; Initial IPAP 12 cmH2O, Max IPAP 25 cmH2O; Initial EPAP 6 cmH2O; Maintain Minimum PS of 6 mmH2O; Back Up RR 10-14  Continuous,   Status:  Canceled         01/17/24 0702    01/17/24 0703  Blood Gas, Arterial -  Once        Comments: Draw 1 Hour After Initiating NPPV Therapy      01/17/24 0702    01/17/24 0703  Notify Provider - NPPV  Until Discontinued         01/17/24 0702    01/17/24 0703  Continuous Pulse Oximetry  Continuous,   Status:  Canceled         01/17/24 0702    01/17/24 0703  NPO Diet NPO Type: Strict NPO  Diet Effective Now,   Status:  Canceled         01/17/24 0702    01/17/24 0703  Procalcitonin  STAT         01/17/24 0702    01/17/24 0703  Respiratory Culture - Sputum, Cough  Once         01/17/24 0702    01/17/24 0702  polyethylene glycol (MIRALAX) packet 17 g  Daily PRN,   Status:  Discontinued        See [facility] for full Linked Orders Report.    01/17/24 0702    01/17/24 0702  bisacodyl (DULCOLAX) EC tablet 5 mg  Daily PRN,    Status:  Discontinued        See Hyperspace for full Linked Orders Report.    01/17/24 0702    01/17/24 0702  bisacodyl (DULCOLAX) suppository 10 mg  Daily PRN,   Status:  Discontinued        See Hyperspace for full Linked Orders Report.    01/17/24 0702    01/17/24 0702  Pharmacy to Dose enoxaparin (LOVENOX)  Continuous PRN         01/17/24 0702    01/17/24 0702  Potassium Replacement - Follow Nurse / BPA Driven Protocol  As Needed         01/17/24 0702    01/17/24 0702  Magnesium Standard Dose Replacement - Follow Nurse / BPA Driven Protocol  As Needed         01/17/24 0702    01/17/24 0702  Phosphorus Replacement - Follow Nurse / BPA Driven Protocol  As Needed         01/17/24 0702    01/17/24 0702  Calcium Replacement - Follow Nurse / BPA Driven Protocol  As Needed         01/17/24 0702    01/17/24 0702  acetaminophen (TYLENOL) tablet 650 mg  Every 4 Hours PRN        See Hyperspace for full Linked Orders Report.    01/17/24 0702    01/17/24 0702  acetaminophen (TYLENOL) suppository 650 mg  Every 4 Hours PRN        See Hyperspace for full Linked Orders Report.    01/17/24 0702    01/17/24 0702  ipratropium-albuterol (DUO-NEB) nebulizer solution 3 mL  Every 6 Hours PRN,   Status:  Discontinued         01/17/24 0702    01/17/24 0702  ondansetron ODT (ZOFRAN-ODT) disintegrating tablet 4 mg  Every 6 Hours PRN        See Hyperspace for full Linked Orders Report.    01/17/24 0702    01/17/24 0702  ondansetron (ZOFRAN) injection 4 mg  Every 6 Hours PRN        See Hyperspace for full Linked Orders Report.    01/17/24 0702    01/17/24 0702  fluticasone (FLONASE) 50 MCG/ACT nasal spray 1 spray  Daily PRN         01/17/24 0702    01/17/24 0702  nitroglycerin (NITROSTAT) SL tablet 0.4 mg  Every 5 Minutes PRN         01/17/24 0702    01/17/24 0702  sodium chloride 0.9 % flush 10 mL  As Needed         01/17/24 0702    01/17/24 0702  sodium chloride 0.9 % infusion 40 mL  As Needed         01/17/24 0702    01/17/24 0700  Blood  Gas, Arterial -  STAT         01/17/24 0604    01/17/24 0652  LORazepam (ATIVAN) injection 0.5 mg  Every 4 Hours PRN         01/17/24 0653    01/17/24 0645  Lactic Acid, Plasma  STAT         01/17/24 0644    01/17/24 0644  Blood Culture - Blood,  Once,   Status:  Canceled         01/17/24 0644    01/17/24 0644  Blood Culture - Blood, Arm, Right  Once         01/17/24 0644    01/17/24 0635  High Sensitivity Troponin T 2Hr  PROCEDURE ONCE         01/17/24 0513    01/17/24 0631  LORazepam (ATIVAN) injection 0.5 mg  Once,   Status:  Discontinued         01/17/24 0615    01/17/24 0624  dexmedetomidine (PRECEDEX) 400 mcg in 100 mL NS infusion  Titrated,   Status:  Discontinued         01/17/24 0608    01/17/24 0620  metoprolol tartrate (LOPRESSOR) injection 5 mg  Every 8 Hours,   Status:  Discontinued         01/17/24 0605    01/17/24 0609  Target Arousal Level RASS -1 to -2  Continuous,   Status:  Canceled         01/17/24 0608    01/17/24 0607  sennosides-docusate (PERICOLACE) 8.6-50 MG per tablet 2 tablet  2 Times Daily PRN        See Hyperspace for full Linked Orders Report.    01/17/24 0608    01/17/24 0607  polyethylene glycol (MIRALAX) packet 17 g  Daily PRN        See Hyperspace for full Linked Orders Report.    01/17/24 0608    01/17/24 0607  bisacodyl (DULCOLAX) EC tablet 5 mg  Daily PRN        See Hyperspace for full Linked Orders Report.    01/17/24 0608    01/17/24 0607  bisacodyl (DULCOLAX) suppository 10 mg  Daily PRN        See Hyperspace for full Linked Orders Report.    01/17/24 0608    01/17/24 0605  Respiratory Panel PCR w/COVID-19(SARS-CoV-2) TERRY/ROSSI/KIKA/PAD/COR/RONAK In-House, NP Swab in UTM/VTM, 2 HR TAT - Swab, Nasopharynx  Once         01/17/24 0605    01/17/24 0550  LORazepam (ATIVAN) injection 1 mg  Every 4 Hours PRN,   Status:  Discontinued         01/17/24 0550    01/17/24 0531  Code Status and Medical Interventions:  Continuous         01/17/24 0535    01/17/24 0529  Inpatient Admission  Once          01/17/24 0529    01/17/24 0446  BNP  Once         01/17/24 0428    01/17/24 0445  methylPREDNISolone sodium succinate (SOLU-Medrol) injection 125 mg  Once         01/17/24 0429    01/17/24 0444  ipratropium-albuterol (DUO-NEB) nebulizer solution 3 mL  Once         01/17/24 0428    01/17/24 0444  albuterol (PROVENTIL) nebulizer solution 0.083% 2.5 mg/3mL  Every 15 Minutes         01/17/24 0428    01/17/24 0442  Scan Slide  Once         01/17/24 0441    01/17/24 0429  ECG 12 Lead Dyspnea  Once         01/17/24 0428    01/17/24 0429  High Sensitivity Troponin T  Once         01/17/24 0428    01/17/24 0428  NIPPV (CPAP or BIPAP)  Until Discontinued,   Status:  Canceled         01/17/24 0428    01/17/24 0428  CBC & Differential  Once         01/17/24 0428    01/17/24 0428  Insert Peripheral IV  Once        See Hyperspace for full Linked Orders Report.    01/17/24 0428    01/17/24 0428  Comprehensive Metabolic Panel  Once         01/17/24 0428    01/17/24 0428  XR Chest 1 View  1 Time Imaging         01/17/24 0428    01/17/24 0428  CBC Auto Differential  PROCEDURE ONCE         01/17/24 0428    01/17/24 0427  sodium chloride 0.9 % flush 10 mL  As Needed        See Hyperspace for full Linked Orders Report.    01/17/24 0428    Unscheduled  Oxygen Therapy- Nasal Cannula; Titrate 1-6 LPM Per SpO2; 90 - 95%  Continuous PRN       01/17/24 0702    Unscheduled  PICC LINE CARE - Change Dressing As Needed When Damp, Loose or Soiled  As Needed       01/17/24 0912    Unscheduled  Spontaneous Awakening Trial  Daily - SAT       01/18/24 1201    Unscheduled  Spontaneous Breathing Trial  Daily - SBT       01/18/24 1201                  Operative/Procedure Notes (last 72 hours)  Notes from 01/15/24 1506 through 01/18/24 1506   No notes of this type exist for this encounter.          Physician Progress Notes (last 72 hours)        Dagoberto Chilel MD at 01/18/24 0813          Hospitalist Team      Patient Care Team:  Talib Ceron,  "MD as PCP - General (Family Medicine)        Chief Complaint:  Acute Respiratory Failure    Subjective    No acute events overnight.  Tolerating BiPAP and sedation.  Staff report she is more comfortable.    Objective    Vital Signs  Temp:  [97 °F (36.1 °C)-99 °F (37.2 °C)] 99 °F (37.2 °C)  Heart Rate:  [] 93  Resp:  [22-37] 27  BP: ()/(49-86) 131/77  Oxygen Therapy  SpO2: (!) 89 %  Pulse Oximetry Type: Continuous  Device (Oxygen Therapy): NPPV/NIV  Flow (L/min): 6  Oxygen Concentration (%): 30}    Flowsheet Rows      Flowsheet Row First Filed Value   Admission Height 157.5 cm (62\") Documented at 01/17/2024 0423   Admission Weight 57.2 kg (126 lb) Documented at 01/17/2024 0423            Physical Exam:    General: Appears older than stated age in no acute distress.  HEENT: Pupils are equal and reactive to light.  No injection appreciated.  Gaze is conjugate.  Lungs: Breath sounds are markedly diminished throughout all fields a fine expiratory wheeze.  There is accessory use .  CV: Regular rate and rhythm.  No murmurs appreciated.  Radial pulses are 2+ and symmetric.  Abdomen: Soft and non-tender w/ active bowel sounds.  MSK: No C/C/E.  Skin: No suspicious rash.  Neuro: Normal muscle bulk throughout.  Psych: Sedated.    Results Review:     I reviewed the patient's new clinical results.    Lab Results (last 24 hours)       Procedure Component Value Units Date/Time    Blood Gas, Venous - [[id number]]  (Abnormal) Collected: 01/18/24 0730    Specimen: Venous Blood Updated: 01/18/24 0735     Site OTHER     pH, Venous 7.252 pH Units      Comment: 84 Value below reference range        pCO2, Venous 66.0 mm Hg      Comment: 83 Value above reference range        pO2, Venous 36.3 mm Hg      HCO3, Venous 29.1 mmol/L      Comment: 83 Value above reference range        Base Excess, Venous 0.6 mmol/L      O2 Saturation, Venous 66.4 %      Hemoglobin, Blood Gas 11.5 g/dL      Temperature 37.0     Barometric Pressure for " "Blood Gas 740 mmHg      Modality NIV     FIO2 30 %      Ventilator Mode BIPAP-ST     Set Mech Resp Rate 16.0     IPAP 18     Comment: Meter: [id number]     :  [id number]        EPAP 6     Collected by REMY OLIVEIRA    Blood Culture - Blood, Arm, Right [[id number]]  (Normal) Collected: 01/17/24 0645    Specimen: Blood from Arm, Right Updated: 01/18/24 0701     Blood Culture No growth at 24 hours    Basic Metabolic Panel [[id number]]  (Abnormal) Collected: 01/18/24 0408    Specimen: Blood from Arm, Right Updated: 01/18/24 0505     Glucose 183 mg/dL      BUN 37 mg/dL      Creatinine 0.99 mg/dL      Sodium 137 mmol/L      Potassium 4.4 mmol/L      Chloride 103 mmol/L      CO2 28.8 mmol/L      Calcium 7.9 mg/dL      BUN/Creatinine Ratio 37.4     Anion Gap 5.2 mmol/L      eGFR 69.6 mL/min/1.73     Narrative:      GFR Normal >60  Chronic Kidney Disease <60  Kidney Failure <15      Procalcitonin [[id number]]  (Abnormal) Collected: 01/18/24 0408    Specimen: Blood from Arm, Right Updated: 01/18/24 0438     Procalcitonin 35.76 ng/mL     Narrative:      As a Marker for Sepsis (Non-Neonates):    1. <0.5 ng/mL represents a low risk of severe sepsis and/or septic shock.  2. >2 ng/mL represents a high risk of severe sepsis and/or septic shock.    As a Marker for Lower Respiratory Tract Infections that require antibiotic therapy:    PCT on Admission    Antibiotic Therapy       6-12 Hrs later    >0.5                Strongly Recommended  >0.25 - <0.5        Recommended   0.1 - 0.25          Discouraged              Remeasure/reassess PCT  <0.1                Strongly Discouraged     Remeasure/reassess PCT    As 28 day mortality risk marker: \"Change in Procalcitonin Result\" (>80% or <=80%) if Day 0 (or Day 1) and Day 4 values are available. Refer to http://www.[employer]-pct-calculator.com    Change in PCT <=80%  A decrease of PCT levels below or equal to 80% defines a positive change in PCT test result representing a higher risk for " 28-day all-cause mortality of patients diagnosed with severe sepsis for septic shock.    Change in PCT >80%  A decrease of PCT levels of more than 80% defines a negative change in PCT result representing a lower risk for 28-day all-cause mortality of patients diagnosed with severe sepsis or septic shock.       CBC & Differential [[id number]]  (Abnormal) Collected: 01/18/24 0408    Specimen: Blood from Arm, Right Updated: 01/18/24 0423    Narrative:      The following orders were created for panel order CBC & Differential.  Procedure                               Abnormality         Status                     ---------                               -----------         ------                     CBC Auto Differential[[id number]]        Abnormal            Final result               Scan Slide[[id number]]                                                                    Please view results for these tests on the individual orders.    CBC Auto Differential [[id number]]  (Abnormal) Collected: 01/18/24 0408    Specimen: Blood from Arm, Right Updated: 01/18/24 0423     WBC 6.40 10*3/mm3      RBC 3.60 10*6/mm3      Hemoglobin 11.7 g/dL      Hematocrit 37.5 %      .2 fL      MCH 32.5 pg      MCHC 31.2 g/dL      RDW 13.7 %      RDW-SD 53.1 fl      MPV 9.3 fL      Platelets 176 10*3/mm3      Neutrophil % 81.9 %      Lymphocyte % 13.3 %      Monocyte % 3.8 %      Eosinophil % 0.0 %      Basophil % 0.2 %      Immature Grans % 0.8 %      Neutrophils, Absolute 5.25 10*3/mm3      Lymphocytes, Absolute 0.85 10*3/mm3      Monocytes, Absolute 0.24 10*3/mm3      Eosinophils, Absolute 0.00 10*3/mm3      Basophils, Absolute 0.01 10*3/mm3      Immature Grans, Absolute 0.05 10*3/mm3      nRBC 0.0 /100 WBC     Blood Gas, Venous - [[id number]]  (Abnormal) Collected: 01/17/24 1555    Specimen: Venous Blood Updated: 01/17/24 1606     Site OTHER     pH, Venous 7.213 pH Units      Comment: 85 Value below critical limit        pCO2, Venous  69.5 mm Hg      Comment: 83 Value above reference range        pO2, Venous 32.5 mm Hg      HCO3, Venous 28.0 mmol/L      Base Excess, Venous -1.3 mmol/L      Comment: 84 Value below reference range        O2 Saturation, Venous 58.6 %      Hemoglobin, Blood Gas 12.5 g/dL      Temperature 37.0     Barometric Pressure for Blood Gas 742 mmHg      Modality NIV     FIO2 35 %      Ventilator Mode BIPAP-ST     Set Mech Resp Rate 16.0     PEEP 0.0     IPAP 18     Comment: Meter: [id number]     :  [id number]        EPAP 6     Notified Who RB AND V DR PAL     Notified By [id number]     Notified Time 01/17/2024 16:07     Collected by [id number]    Blood Culture - Blood, Blood, PICC Line [[id number]] Collected: 01/17/24 1421    Specimen: Blood, PICC Line Updated: 01/17/24 1436    Respiratory Panel PCR w/COVID-19(SARS-CoV-2) TERRY/ROSSI/KIKA/PAD/COR/RONAK In-House, NP Swab in UTM/VTM, 2 HR TAT - Swab, Nasopharynx [[id number]]  (Abnormal) Collected: 01/17/24 0639    Specimen: Swab from Nasopharynx Updated: 01/17/24 1150     ADENOVIRUS, PCR Not Detected     Coronavirus 229E Not Detected     Coronavirus HKU1 Not Detected     Coronavirus NL63 Not Detected     Coronavirus OC43 Not Detected     COVID19 Not Detected     Human Metapneumovirus Not Detected     Human Rhinovirus/Enterovirus Not Detected     Influenza A PCR Not Detected     Influenza B PCR Not Detected     Parainfluenza Virus 1 Not Detected     Parainfluenza Virus 2 Not Detected     Parainfluenza Virus 3 Not Detected     Parainfluenza Virus 4 Not Detected     RSV, PCR Detected     Bordetella pertussis pcr Not Detected     Bordetella parapertussis PCR Not Detected     Chlamydophila pneumoniae PCR Not Detected     Mycoplasma pneumo by PCR Not Detected    Narrative:      In the setting of a positive respiratory panel with a viral infection PLUS a negative procalcitonin without other underlying concern for bacterial infection, consider observing off antibiotics or  discontinuation of antibiotics and continue supportive care. If the respiratory panel is positive for atypical bacterial infection (Bordetella pertussis, Chlamydophila pneumoniae, or Mycoplasma pneumoniae), consider antibiotic de-escalation to target atypical bacterial infection.    Blood Gas, Arterial - [[id number]]  (Abnormal) Collected: 01/17/24 0815    Specimen: Arterial Blood Updated: 01/17/24 0829     Site Right Radial     Marquis's Test Positive     pH, Arterial 7.231 pH units      Comment: 84 Value below reference range        pCO2, Arterial 69.0 mm Hg      Comment: 86 Value above critical limit        pO2, Arterial 105.0 mm Hg      HCO3, Arterial 28.9 mmol/L      Comment: 83 Value above reference range        Base Excess, Arterial -0.5 mmol/L      Comment: 84 Value below reference range        O2 Saturation, Arterial 98.3 %      Hemoglobin, Blood Gas 14.2 g/dL      Temperature 37.0     Barometric Pressure for Blood Gas 744 mmHg      Modality NIV     FIO2 <21 %      Ventilator Mode BIPAP-ST     Set Mech Resp Rate 16.0     IPAP 14     Comment: Meter: [id number]     :  [id number]        EPAP 6     Notified By [id number]     Collected by [id number]     pCO2, Temperature Corrected 69.0 mm Hg      pH, Temp Corrected 7.231 pH Units      pO2, Temperature Corrected 105 mm Hg             Imaging Results (Last 24 Hours)       Procedure Component Value Units Date/Time    CT Chest With Contrast Diagnostic [[id number]] Collected: 01/17/24 1341     Updated: 01/17/24 1405    Narrative:      CT Chest W    INDICATION:    Acute respiratory failure with hypoxia. COPD.    TECHNIQUE:   CT of the chest with IV contrast. Coronal and sagittal reconstructions were obtained.  Radiation dose reduction techniques included automated exposure control or exposure modulation based on body size. Count of known CT and cardiac nuc med studies  performed in previous 12 months: 1.      COMPARISON:    CT chest 5/31/2023    FINDINGS: Examination  motion degraded.  Diffuse hyperinflation with patchy hyperlucency compatible with centrilobular emphysema. Scattered subpleural reticular changes but no focal consolidation. Minimal tree-in-bud opacity posterior right upper lobe may represent a small area of pneumonitis.  No effusions. Trachea and bronchi unremarkable. Calcified left lower lobe granuloma. Bandlike scarring or atelectasis left lower lobe.    Heart size within normal limits. Aorta and pulmonary vessels unremarkable. No central mass or adenopathy. Upper abdomen unremarkable. Osseous structures unremarkable. Step-off artifact from patient motion seen within the sternum. Bilateral breast  implants without visible complication. Right upper extremity venous catheter terminating in mid SVC.      Impression:      Moderate emphysema with minimal tree-in-bud airspace opacity posterior segment right upper lobe could represent an area of pneumonitis but no focal consolidation.    Signer Name: Niels Walter MD   Signed: 1/17/2024 2:03 PM EST  [facility]    XR Chest 1 View [[id number]] Collected: 01/17/24 1217     Updated: 01/17/24 1220    Narrative:      XR CHEST 1 VW-, 1/17/2024 12:16 PM     HISTORY:  PICC line placement.     COMPARISON:  *  Chest 1/17/2024     FINDINGS:  Single frontal view(s) of the chest. The lungs are clear. No pleural  effusions. No pneumothorax. Heart size is normal. Interval placement of  a right-sided PICC line with tip projecting over the lower SVC.  Mediastinal contours are within normal limits. Pulmonary vasculature is  normal. No acute osseous abnormality.        Impression:      Interval placement of a right-sided PICC line with tip projecting over  the lower SVC. No pneumothorax. No other acute chest findings.     This report was finalized on 1/17/2024 12:18 PM by Dr. Blaise Vaughn MD.               X-ray reviewed personally by physician.        Medication Review:   I have reviewed the patient's current  medication list    Current Facility-Administered Medications:     acetaminophen (TYLENOL) tablet 650 mg, 650 mg, Oral, Q4H PRN **OR** acetaminophen (TYLENOL) suppository 650 mg, 650 mg, Rectal, Q4H PRN, Zita Potter APRN    ampicillin-sulbactam (UNASYN) 1.5 g in sodium chloride 0.9 % 100 mL IVPB-MBP, 1.5 g, Intravenous, Q6H, Facundo Flanagan DO, 1.5 g at 01/18/24 0254    aspirin EC tablet 81 mg, 81 mg, Oral, Daily, Zita Potter APRN    atorvastatin (LIPITOR) tablet 20 mg, 20 mg, Oral, Daily, Zita Potter APRN    sennosides-docusate (PERICOLACE) 8.6-50 MG per tablet 2 tablet, 2 tablet, Oral, BID PRN **AND** polyethylene glycol (MIRALAX) packet 17 g, 17 g, Oral, Daily PRN **AND** bisacodyl (DULCOLAX) EC tablet 5 mg, 5 mg, Oral, Daily PRN **AND** bisacodyl (DULCOLAX) suppository 10 mg, 10 mg, Rectal, Daily PRN, Zita Potter APRN    Calcium Replacement - Follow Nurse / BPA Driven Protocol, , Does not apply, PRN, Zita Potter APRN    clopidogrel (PLAVIX) tablet 75 mg, 75 mg, Oral, Daily, Zita Potter APRN    dexmedetomidine (PRECEDEX) 400 mcg in 100 mL NS infusion, 0.2-1.5 mcg/kg/hr, Intravenous, Titrated, Facundo Flanagan DO, Last Rate: 18.6 mL/hr at 01/18/24 0734, 1.3 mcg/kg/hr at 01/18/24 0734    doxycycline (VIBRAMYCIN) 100 mg in sodium chloride 0.9 % 100 mL IVPB, 100 mg, Intravenous, Q12H, Facundo Flanagan DO, 100 mg at 01/17/24 2005    Enoxaparin Sodium (LOVENOX) syringe 40 mg, 40 mg, Subcutaneous, Q24H, Zita Potter APRN, 40 mg at 01/17/24 0855    fluticasone (FLONASE) 50 MCG/ACT nasal spray 1 spray, 1 spray, Nasal, Daily PRN, Zita Potter APRN    guaiFENesin (MUCINEX) 12 hr tablet 1,200 mg, 1,200 mg, Oral, BID, Zita Potter APRN    ipratropium-albuterol (DUO-NEB) nebulizer solution 3 mL, 3 mL, Nebulization, Q4H - RT, Zita Potter APRN, 3 mL at 01/18/24 0727    ipratropium-albuterol (DUO-NEB) nebulizer solution 3 mL, 3 mL,  Nebulization, Q4H PRN, Zita Potter R, APRN    LORazepam (ATIVAN) injection 0.5 mg, 0.5 mg, Intravenous, Q4H PRN, Zita Potter R, APRN, 0.5 mg at 01/18/24 0758    Magnesium Standard Dose Replacement - Follow Nurse / BPA Driven Protocol, , Does not apply, PRN, Zita Potter R, APRN    methylPREDNISolone sodium succinate (SOLU-Medrol) injection 60 mg, 60 mg, Intravenous, Q6H, Facundo Flanagan, DO, 60 mg at 01/18/24 0251    metoprolol tartrate (LOPRESSOR) injection 5 mg, 5 mg, Intravenous, Q8H, Zita Potter, APRN, 5 mg at 01/18/24 0520    metoprolol tartrate (LOPRESSOR) injection 5 mg, 5 mg, Intravenous, Q5 Min PRN, Facundo Flanagan, DO, 5 mg at 01/17/24 1022    nicotine (NICODERM CQ) 21 MG/24HR patch 1 patch, 1 patch, Transdermal, Q24H, Zita Potter R, APRN, 1 patch at 01/17/24 0856    nitroglycerin (NITROSTAT) SL tablet 0.4 mg, 0.4 mg, Sublingual, Q5 Min PRN, Zita Potter R, APRN    ondansetron ODT (ZOFRAN-ODT) disintegrating tablet 4 mg, 4 mg, Oral, Q6H PRN **OR** ondansetron (ZOFRAN) injection 4 mg, 4 mg, Intravenous, Q6H PRN, Zita Potter R, APRN    Pharmacy to Dose enoxaparin (LOVENOX), , Does not apply, Continuous PRN, Zita Potter R, APRN    Phosphorus Replacement - Follow Nurse / BPA Driven Protocol, , Does not apply, PRN, Zita Potter R, APRN    Potassium Replacement - Follow Nurse / BPA Driven Protocol, , Does not apply, PRN, Zita Potter R, APRN    [COMPLETED] Insert Peripheral IV, , , Once **AND** sodium chloride 0.9 % flush 10 mL, 10 mL, Intravenous, PRN, Mark Blanca MD    sodium chloride 0.9 % flush 10 mL, 10 mL, Intravenous, Q12H, Zita Potter APRN, 10 mL at 01/17/24 2008    sodium chloride 0.9 % flush 10 mL, 10 mL, Intravenous, PRN, Zita Potter, CATIE    sodium chloride 0.9 % flush 10 mL, 10 mL, Intravenous, Q12H, Facundo Flanagan, DO, 10 mL at 01/17/24 2008    sodium chloride 0.9 % flush 10 mL, 10 mL, Intravenous, Q12H, Panchito  Facundo LATHAM, , 10 mL at 01/17/24 2008    sodium chloride 0.9 % flush 10 mL, 10 mL, Intravenous, Q12H, Facundo Flanagan, DO, 10 mL at 01/17/24 2008    sodium chloride 0.9 % flush 10 mL, 10 mL, Intravenous, PRN, Facundo Flanagan,     sodium chloride 0.9 % flush 20 mL, 20 mL, Intravenous, PRN, Facundo Flanagan,     sodium chloride 0.9 % infusion 40 mL, 40 mL, Intravenous, PRN, Zita Potter APRN    sodium chloride 0.9 % infusion, 100 mL/hr, Intravenous, Continuous, Zita Potter APRN, Last Rate: 100 mL/hr at 01/18/24 0713, 100 mL/hr at 01/18/24 0713    sodium chloride 7 % nebulizer solution nebulizer solution 4 mL, 4 mL, Nebulization, BID - RT, Zita Potter APRN, 4 mL at 01/18/24 0727    venlafaxine XR (EFFEXOR-XR) 24 hr capsule 75 mg, 75 mg, Oral, Daily, Zita Potter APRN      Assessment & Plan     Acute Hypoxic/Hypercapnic Respiratory Failure: Her numbers look great on current setting.  However, what concerns me is her continued accessory use and her lungs sound tight.  PCO2 has decreased but appears to have stopped it's decline.  Tired?  I believe she will need mechanical ventilation.  I'm going to repeat her ABG at lunch time before I make a definitive decision.  Continue nebs and IV steroid therapy.  Essential Hypertension: At goal on exam.  Given use of Precedex, I'm going to stop IV Lopressor.  PAD/CAD: No acute issues currently.  Anxiety: Continue prn Ativan.  Chronic Migraines: No acute issues currently.  Tobacco Abuse: Poor judgement given her comorbidities at her age.  On Nicoderm.    Plan for disposition: Predicated on hospital course.    Dagoberto Chilel MD  01/18/24  08:13 EST    Electronically signed by Dagoberto Chilel MD at 01/18/24 1055       Zita Potter APRN at 01/18/24 0506          Patient seen on and off overnight. HR/RR/sats normalized while sedated. When awakens and removes bipap, sats immediately drop/lips cyanotic, dyspneic. When sedation increased  even slightly and with ativan, and replacement of bipap, all normalizes again. Attempting to avoid intubation, however seems eminent. CPT/saline nebs added as well. CXR and VBG in am as well as pulmonary to see. Patient previously agreed to be intubated if needed but preferred to remain on bipap if at all possible, therefore this has been continued. WBC now normal, procal trending down. Clinically appears much improved so long as bipap is in place.     Electronically signed by iZta Potter APRN at 01/18/24 0560       Consult Notes (last 72 hours)  Notes from 01/15/24 1506 through 01/18/24 1506   No notes of this type exist for this encounter.

## 2024-01-18 NOTE — POST-PROCEDURE NOTE
Reason for Intubation: Worsening respiratory status    Description of Procedure:  BiPAP was removed, and I easily passed a 7.5 ETT under direct visualization w/o further need of sedation.  Post-intubation film ordered.

## 2024-01-18 NOTE — NURSING NOTE
Called and spoke with pts , Harpreet, on the phone and notified him that pt was intubated due to worsening respiratory status. Pt was also placed in restraints due to her trying to pull at lines and tubes.

## 2024-01-18 NOTE — PLAN OF CARE
Goal Outcome Evaluation:           Progress: improving  Outcome Evaluation: Pt. continues on BiPAP at 30%. Remains tachypneic between 22-27. Rested majority of night, however, recieved PRN Ativan x2 for anxiety. Precedex gtt continues and titraved overnight per parameters.

## 2024-01-18 NOTE — PROGRESS NOTES
"Hospitalist Team      Patient Care Team:  Talib Ceron MD as PCP - General (Family Medicine)        Chief Complaint:  Acute Respiratory Failure    Subjective    No acute events overnight.  Tolerating BiPAP and sedation.  Staff report she is more comfortable.    Objective    Vital Signs  Temp:  [97 °F (36.1 °C)-99 °F (37.2 °C)] 99 °F (37.2 °C)  Heart Rate:  [] 93  Resp:  [22-37] 27  BP: ()/(49-86) 131/77  Oxygen Therapy  SpO2: (!) 89 %  Pulse Oximetry Type: Continuous  Device (Oxygen Therapy): NPPV/NIV  Flow (L/min): 6  Oxygen Concentration (%): 30}    Flowsheet Rows      Flowsheet Row First Filed Value   Admission Height 157.5 cm (62\") Documented at 01/17/2024 0423   Admission Weight 57.2 kg (126 lb) Documented at 01/17/2024 0423            Physical Exam:    General: Appears older than stated age in no acute distress.  HEENT: Pupils are equal and reactive to light.  No injection appreciated.  Gaze is conjugate.  Lungs: Breath sounds are markedly diminished throughout all fields a fine expiratory wheeze.  There is accessory use .  CV: Regular rate and rhythm.  No murmurs appreciated.  Radial pulses are 2+ and symmetric.  Abdomen: Soft and non-tender w/ active bowel sounds.  MSK: No C/C/E.  Skin: No suspicious rash.  Neuro: Normal muscle bulk throughout.  Psych: Sedated.    Results Review:     I reviewed the patient's new clinical results.    Lab Results (last 24 hours)       Procedure Component Value Units Date/Time    Blood Gas, Venous - [[id number]]  (Abnormal) Collected: 01/18/24 0730    Specimen: Venous Blood Updated: 01/18/24 0735     Site OTHER     pH, Venous 7.252 pH Units      Comment: 84 Value below reference range        pCO2, Venous 66.0 mm Hg      Comment: 83 Value above reference range        pO2, Venous 36.3 mm Hg      HCO3, Venous 29.1 mmol/L      Comment: 83 Value above reference range        Base Excess, Venous 0.6 mmol/L      O2 Saturation, Venous 66.4 %      Hemoglobin, Blood Gas " "11.5 g/dL      Temperature 37.0     Barometric Pressure for Blood Gas 740 mmHg      Modality NIV     FIO2 30 %      Ventilator Mode BIPAP-ST     Set Mech Resp Rate 16.0     IPAP 18     Comment: Meter: [id number]     :  [id number]        EPAP 6     Collected by REMY OLIVEIRA    Blood Culture - Blood, Arm, Right [[id number]]  (Normal) Collected: 01/17/24 0645    Specimen: Blood from Arm, Right Updated: 01/18/24 0701     Blood Culture No growth at 24 hours    Basic Metabolic Panel [[id number]]  (Abnormal) Collected: 01/18/24 0408    Specimen: Blood from Arm, Right Updated: 01/18/24 0505     Glucose 183 mg/dL      BUN 37 mg/dL      Creatinine 0.99 mg/dL      Sodium 137 mmol/L      Potassium 4.4 mmol/L      Chloride 103 mmol/L      CO2 28.8 mmol/L      Calcium 7.9 mg/dL      BUN/Creatinine Ratio 37.4     Anion Gap 5.2 mmol/L      eGFR 69.6 mL/min/1.73     Narrative:      GFR Normal >60  Chronic Kidney Disease <60  Kidney Failure <15      Procalcitonin [[id number]]  (Abnormal) Collected: 01/18/24 0408    Specimen: Blood from Arm, Right Updated: 01/18/24 0438     Procalcitonin 35.76 ng/mL     Narrative:      As a Marker for Sepsis (Non-Neonates):    1. <0.5 ng/mL represents a low risk of severe sepsis and/or septic shock.  2. >2 ng/mL represents a high risk of severe sepsis and/or septic shock.    As a Marker for Lower Respiratory Tract Infections that require antibiotic therapy:    PCT on Admission    Antibiotic Therapy       6-12 Hrs later    >0.5                Strongly Recommended  >0.25 - <0.5        Recommended   0.1 - 0.25          Discouraged              Remeasure/reassess PCT  <0.1                Strongly Discouraged     Remeasure/reassess PCT    As 28 day mortality risk marker: \"Change in Procalcitonin Result\" (>80% or <=80%) if Day 0 (or Day 1) and Day 4 values are available. Refer to http://[email or web address]-pct-calculator.[email or web address]    Change in PCT <=80%  A decrease of PCT levels below or equal to 80% defines a " positive change in PCT test result representing a higher risk for 28-day all-cause mortality of patients diagnosed with severe sepsis for septic shock.    Change in PCT >80%  A decrease of PCT levels of more than 80% defines a negative change in PCT result representing a lower risk for 28-day all-cause mortality of patients diagnosed with severe sepsis or septic shock.       CBC & Differential [[id number]]  (Abnormal) Collected: 01/18/24 0408    Specimen: Blood from Arm, Right Updated: 01/18/24 0423    Narrative:      The following orders were created for panel order CBC & Differential.  Procedure                               Abnormality         Status                     ---------                               -----------         ------                     CBC Auto Differential[[id number]]        Abnormal            Final result               Scan Slide[[id number]]                                                                    Please view results for these tests on the individual orders.    CBC Auto Differential [[id number]]  (Abnormal) Collected: 01/18/24 0408    Specimen: Blood from Arm, Right Updated: 01/18/24 0423     WBC 6.40 10*3/mm3      RBC 3.60 10*6/mm3      Hemoglobin 11.7 g/dL      Hematocrit 37.5 %      .2 fL      MCH 32.5 pg      MCHC 31.2 g/dL      RDW 13.7 %      RDW-SD 53.1 fl      MPV 9.3 fL      Platelets 176 10*3/mm3      Neutrophil % 81.9 %      Lymphocyte % 13.3 %      Monocyte % 3.8 %      Eosinophil % 0.0 %      Basophil % 0.2 %      Immature Grans % 0.8 %      Neutrophils, Absolute 5.25 10*3/mm3      Lymphocytes, Absolute 0.85 10*3/mm3      Monocytes, Absolute 0.24 10*3/mm3      Eosinophils, Absolute 0.00 10*3/mm3      Basophils, Absolute 0.01 10*3/mm3      Immature Grans, Absolute 0.05 10*3/mm3      nRBC 0.0 /100 WBC     Blood Gas, Venous - [[id number]]  (Abnormal) Collected: 01/17/24 1555    Specimen: Venous Blood Updated: 01/17/24 1606     Site OTHER     pH, Venous 7.213 pH Units       Comment: 85 Value below critical limit        pCO2, Venous 69.5 mm Hg      Comment: 83 Value above reference range        pO2, Venous 32.5 mm Hg      HCO3, Venous 28.0 mmol/L      Base Excess, Venous -1.3 mmol/L      Comment: 84 Value below reference range        O2 Saturation, Venous 58.6 %      Hemoglobin, Blood Gas 12.5 g/dL      Temperature 37.0     Barometric Pressure for Blood Gas 742 mmHg      Modality NIV     FIO2 35 %      Ventilator Mode BIPAP-ST     Set Mech Resp Rate 16.0     PEEP 0.0     IPAP 18     Comment: Meter: [id number]     :  [id number]        EPAP 6     Notified Who RB AND V DR PAL     Notified By [id number]     Notified Time 01/17/2024 16:07     Collected by [id number]    Blood Culture - Blood, Blood, PICC Line [[id number]] Collected: 01/17/24 1421    Specimen: Blood, PICC Line Updated: 01/17/24 1436    Respiratory Panel PCR w/COVID-19(SARS-CoV-2) TERRY/ROSSI/KIKA/PAD/COR/RONAK In-House, NP Swab in UTM/VTM, 2 HR TAT - Swab, Nasopharynx [[id number]]  (Abnormal) Collected: 01/17/24 0639    Specimen: Swab from Nasopharynx Updated: 01/17/24 1150     ADENOVIRUS, PCR Not Detected     Coronavirus 229E Not Detected     Coronavirus HKU1 Not Detected     Coronavirus NL63 Not Detected     Coronavirus OC43 Not Detected     COVID19 Not Detected     Human Metapneumovirus Not Detected     Human Rhinovirus/Enterovirus Not Detected     Influenza A PCR Not Detected     Influenza B PCR Not Detected     Parainfluenza Virus 1 Not Detected     Parainfluenza Virus 2 Not Detected     Parainfluenza Virus 3 Not Detected     Parainfluenza Virus 4 Not Detected     RSV, PCR Detected     Bordetella pertussis pcr Not Detected     Bordetella parapertussis PCR Not Detected     Chlamydophila pneumoniae PCR Not Detected     Mycoplasma pneumo by PCR Not Detected    Narrative:      In the setting of a positive respiratory panel with a viral infection PLUS a negative procalcitonin without other underlying concern for bacterial  infection, consider observing off antibiotics or discontinuation of antibiotics and continue supportive care. If the respiratory panel is positive for atypical bacterial infection (Bordetella pertussis, Chlamydophila pneumoniae, or Mycoplasma pneumoniae), consider antibiotic de-escalation to target atypical bacterial infection.    Blood Gas, Arterial - [[id number]]  (Abnormal) Collected: 01/17/24 0815    Specimen: Arterial Blood Updated: 01/17/24 0829     Site Right Radial     Marquis's Test Positive     pH, Arterial 7.231 pH units      Comment: 84 Value below reference range        pCO2, Arterial 69.0 mm Hg      Comment: 86 Value above critical limit        pO2, Arterial 105.0 mm Hg      HCO3, Arterial 28.9 mmol/L      Comment: 83 Value above reference range        Base Excess, Arterial -0.5 mmol/L      Comment: 84 Value below reference range        O2 Saturation, Arterial 98.3 %      Hemoglobin, Blood Gas 14.2 g/dL      Temperature 37.0     Barometric Pressure for Blood Gas 744 mmHg      Modality NIV     FIO2 <21 %      Ventilator Mode BIPAP-ST     Set Mech Resp Rate 16.0     IPAP 14     Comment: Meter: [id number]     :  [id number]        EPAP 6     Notified By [id number]     Collected by [id number]     pCO2, Temperature Corrected 69.0 mm Hg      pH, Temp Corrected 7.231 pH Units      pO2, Temperature Corrected 105 mm Hg             Imaging Results (Last 24 Hours)       Procedure Component Value Units Date/Time    CT Chest With Contrast Diagnostic [[id number]] Collected: 01/17/24 1341     Updated: 01/17/24 1405    Narrative:      CT Chest W    INDICATION:    Acute respiratory failure with hypoxia. COPD.    TECHNIQUE:   CT of the chest with IV contrast. Coronal and sagittal reconstructions were obtained.  Radiation dose reduction techniques included automated exposure control or exposure modulation based on body size. Count of known CT and cardiac nuc med studies  performed in previous 12 months: 1.       COMPARISON:    CT chest 5/31/2023    FINDINGS: Examination motion degraded.  Diffuse hyperinflation with patchy hyperlucency compatible with centrilobular emphysema. Scattered subpleural reticular changes but no focal consolidation. Minimal tree-in-bud opacity posterior right upper lobe may represent a small area of pneumonitis.  No effusions. Trachea and bronchi unremarkable. Calcified left lower lobe granuloma. Bandlike scarring or atelectasis left lower lobe.    Heart size within normal limits. Aorta and pulmonary vessels unremarkable. No central mass or adenopathy. Upper abdomen unremarkable. Osseous structures unremarkable. Step-off artifact from patient motion seen within the sternum. Bilateral breast  implants without visible complication. Right upper extremity venous catheter terminating in mid SVC.      Impression:      Moderate emphysema with minimal tree-in-bud airspace opacity posterior segment right upper lobe could represent an area of pneumonitis but no focal consolidation.    Signer Name: Niels Walter MD   Signed: 1/17/2024 2:03 PM EST  [facility]    XR Chest 1 View [[id number]] Collected: 01/17/24 1217     Updated: 01/17/24 1220    Narrative:      XR CHEST 1 VW-, 1/17/2024 12:16 PM     HISTORY:  PICC line placement.     COMPARISON:  *  Chest 1/17/2024     FINDINGS:  Single frontal view(s) of the chest. The lungs are clear. No pleural  effusions. No pneumothorax. Heart size is normal. Interval placement of  a right-sided PICC line with tip projecting over the lower SVC.  Mediastinal contours are within normal limits. Pulmonary vasculature is  normal. No acute osseous abnormality.        Impression:      Interval placement of a right-sided PICC line with tip projecting over  the lower SVC. No pneumothorax. No other acute chest findings.     This report was finalized on 1/17/2024 12:18 PM by Dr. Blaise Vaughn MD.               X-ray reviewed personally by  physician.        Medication Review:   I have reviewed the patient's current medication list    Current Facility-Administered Medications:     acetaminophen (TYLENOL) tablet 650 mg, 650 mg, Oral, Q4H PRN **OR** acetaminophen (TYLENOL) suppository 650 mg, 650 mg, Rectal, Q4H PRN, Zita Potter APRN    ampicillin-sulbactam (UNASYN) 1.5 g in sodium chloride 0.9 % 100 mL IVPB-MBP, 1.5 g, Intravenous, Q6H, Facundo Flanagan DO, 1.5 g at 01/18/24 0254    aspirin EC tablet 81 mg, 81 mg, Oral, Daily, Zita Potter APRN    atorvastatin (LIPITOR) tablet 20 mg, 20 mg, Oral, Daily, Zita Potter APRN    sennosides-docusate (PERICOLACE) 8.6-50 MG per tablet 2 tablet, 2 tablet, Oral, BID PRN **AND** polyethylene glycol (MIRALAX) packet 17 g, 17 g, Oral, Daily PRN **AND** bisacodyl (DULCOLAX) EC tablet 5 mg, 5 mg, Oral, Daily PRN **AND** bisacodyl (DULCOLAX) suppository 10 mg, 10 mg, Rectal, Daily PRN, Zita Potter APRN    Calcium Replacement - Follow Nurse / BPA Driven Protocol, , Does not apply, PRN, Zita Potter APRN    clopidogrel (PLAVIX) tablet 75 mg, 75 mg, Oral, Daily, Zita Potter APRN    dexmedetomidine (PRECEDEX) 400 mcg in 100 mL NS infusion, 0.2-1.5 mcg/kg/hr, Intravenous, Titrated, Facundo Flanagan DO, Last Rate: 18.6 mL/hr at 01/18/24 0734, 1.3 mcg/kg/hr at 01/18/24 0734    doxycycline (VIBRAMYCIN) 100 mg in sodium chloride 0.9 % 100 mL IVPB, 100 mg, Intravenous, Q12H, Facundo Flanagan DO, 100 mg at 01/17/24 2005    Enoxaparin Sodium (LOVENOX) syringe 40 mg, 40 mg, Subcutaneous, Q24H, Zita Potter APRN, 40 mg at 01/17/24 0855    fluticasone (FLONASE) 50 MCG/ACT nasal spray 1 spray, 1 spray, Nasal, Daily PRN, Zita Potter APRN    guaiFENesin (MUCINEX) 12 hr tablet 1,200 mg, 1,200 mg, Oral, BID, Zita Potter APRN    ipratropium-albuterol (DUO-NEB) nebulizer solution 3 mL, 3 mL, Nebulization, Q4H - RT, Zita Potter APRN, 3 mL at 01/18/24  0727    ipratropium-albuterol (DUO-NEB) nebulizer solution 3 mL, 3 mL, Nebulization, Q4H PRN, Zita Potter, APRN    LORazepam (ATIVAN) injection 0.5 mg, 0.5 mg, Intravenous, Q4H PRN, Zita Potter APRN, 0.5 mg at 01/18/24 0758    Magnesium Standard Dose Replacement - Follow Nurse / BPA Driven Protocol, , Does not apply, PRN, Zita Potter, APRN    methylPREDNISolone sodium succinate (SOLU-Medrol) injection 60 mg, 60 mg, Intravenous, Q6H, Facundo Flanagan DO, 60 mg at 01/18/24 0251    metoprolol tartrate (LOPRESSOR) injection 5 mg, 5 mg, Intravenous, Q8H, Zita Potter APRN, 5 mg at 01/18/24 0520    metoprolol tartrate (LOPRESSOR) injection 5 mg, 5 mg, Intravenous, Q5 Min PRN, Facundo Flanagan DO, 5 mg at 01/17/24 1022    nicotine (NICODERM CQ) 21 MG/24HR patch 1 patch, 1 patch, Transdermal, Q24H, Zita Potter APRN, 1 patch at 01/17/24 0856    nitroglycerin (NITROSTAT) SL tablet 0.4 mg, 0.4 mg, Sublingual, Q5 Min PRN, Zita Potter R, APRN    ondansetron ODT (ZOFRAN-ODT) disintegrating tablet 4 mg, 4 mg, Oral, Q6H PRN **OR** ondansetron (ZOFRAN) injection 4 mg, 4 mg, Intravenous, Q6H PRN, Zita Potter R, APRN    Pharmacy to Dose enoxaparin (LOVENOX), , Does not apply, Continuous PRN, Zita Potter R, APRN    Phosphorus Replacement - Follow Nurse / BPA Driven Protocol, , Does not apply, PRN, Zita Potter R, APRN    Potassium Replacement - Follow Nurse / BPA Driven Protocol, , Does not apply, PRN, Zita Potter APRN    [COMPLETED] Insert Peripheral IV, , , Once **AND** sodium chloride 0.9 % flush 10 mL, 10 mL, Intravenous, PRN, Mark Blanca MD    sodium chloride 0.9 % flush 10 mL, 10 mL, Intravenous, Q12H, Zita Potter APRN, 10 mL at 01/17/24 2008    sodium chloride 0.9 % flush 10 mL, 10 mL, Intravenous, PRN, Zita Potter, APRN    sodium chloride 0.9 % flush 10 mL, 10 mL, Intravenous, Q12H, Facundo Flanagan DO, 10 mL at 01/17/24 2008     sodium chloride 0.9 % flush 10 mL, 10 mL, Intravenous, Q12H, Facundo Flanagan, DO, 10 mL at 01/17/24 2008    sodium chloride 0.9 % flush 10 mL, 10 mL, Intravenous, Q12H, Facundo Flanagan, DO, 10 mL at 01/17/24 2008    sodium chloride 0.9 % flush 10 mL, 10 mL, Intravenous, PRN, Facundo Flanagan R, DO    sodium chloride 0.9 % flush 20 mL, 20 mL, Intravenous, PRN, Facundo Flanagan,     sodium chloride 0.9 % infusion 40 mL, 40 mL, Intravenous, PRN, Zita Potter, CATIE    sodium chloride 0.9 % infusion, 100 mL/hr, Intravenous, Continuous, Zita Potter APRN, Last Rate: 100 mL/hr at 01/18/24 0713, 100 mL/hr at 01/18/24 0713    sodium chloride 7 % nebulizer solution nebulizer solution 4 mL, 4 mL, Nebulization, BID - RT, Zita Potter APRN, 4 mL at 01/18/24 0727    venlafaxine XR (EFFEXOR-XR) 24 hr capsule 75 mg, 75 mg, Oral, Daily, Zita Potter APRN      Assessment & Plan     Acute Hypoxic/Hypercapnic Respiratory Failure: Her numbers look great on current setting.  However, what concerns me is her continued accessory use and her lungs sound tight.  PCO2 has decreased but appears to have stopped it's decline.  Tired?  I believe she will need mechanical ventilation.  I'm going to repeat her ABG at lunch time before I make a definitive decision.  Continue nebs and IV steroid therapy.  Essential Hypertension: At goal on exam.  Given use of Precedex, I'm going to stop IV Lopressor.  PAD/CAD: No acute issues currently.  Anxiety: Continue prn Ativan.  Chronic Migraines: No acute issues currently.  Tobacco Abuse: Poor judgement given her comorbidities at her age.  On Nicoderm.    Plan for disposition: Predicated on hospital course.    Dagoberto Chilel MD  01/18/24  08:13 EST

## 2024-01-18 NOTE — PROGRESS NOTES
Patient seen on and off overnight. HR/RR/sats normalized while sedated. When awakens and removes bipap, sats immediately drop/lips cyanotic, dyspneic. When sedation increased even slightly and with ativan, and replacement of bipap, all normalizes again. Attempting to avoid intubation, however seems eminent. CPT/saline nebs added as well. CXR and VBG in am as well as pulmonary to see. Patient previously agreed to be intubated if needed but preferred to remain on bipap if at all possible, therefore this has been continued. WBC now normal, procal trending down. Clinically appears much improved so long as bipap is in place.

## 2024-01-18 NOTE — PLAN OF CARE
Goal Outcome Evaluation:  Plan of Care Reviewed With: patient        Progress: improving  Outcome Evaluation: Pt was on continuous bipap this AM, but not tolerating well. She was very anxious and tachypneic. After several hours and almost no change in her abg, pulm decided that pt needs to be intubated. Pt was intubated at bedside. She remains intubated with FIo2 at 30 %. She was attempting to pull at lines and tubes, so pt was placed in restraints and remains in restraints at this time. She is sedated on Precedex at 0.6 mcg/kg/hr and propofol at 45 mcg/kg/min. She is resting comfortably on vent. She remains on IV unasyn and doxy, as well as IV solumedrol. NG tube placed in right nare. NG is currently clamped, and nutrition to give TF recommendations for patient in AM. Vásquez remains in place, and pt has only had about 400 cc of urine output this shift.  came and visit with pt this afternoon. VSS.

## 2024-01-19 LAB
ALBUMIN SERPL-MCNC: 3.2 G/DL (ref 3.5–5.2)
ALBUMIN/GLOB SERPL: 1.4 G/DL
ALP SERPL-CCNC: 56 U/L (ref 39–117)
ALT SERPL W P-5'-P-CCNC: 9 U/L (ref 1–33)
ANION GAP SERPL CALCULATED.3IONS-SCNC: 4.3 MMOL/L (ref 5–15)
ARTERIAL PATENCY WRIST A: POSITIVE
AST SERPL-CCNC: 14 U/L (ref 1–32)
ATMOSPHERIC PRESS: 737 MMHG
BASE EXCESS BLDA CALC-SCNC: -1.2 MMOL/L (ref 0–2)
BASOPHILS # BLD AUTO: 0 10*3/MM3 (ref 0–0.2)
BASOPHILS NFR BLD AUTO: 0 % (ref 0–1.5)
BDY SITE: ABNORMAL
BILIRUB SERPL-MCNC: <0.2 MG/DL (ref 0–1.2)
BODY TEMPERATURE: 37
BUN SERPL-MCNC: 32 MG/DL (ref 6–20)
BUN/CREAT SERPL: 44.4 (ref 7–25)
CALCIUM SPEC-SCNC: 7.9 MG/DL (ref 8.6–10.5)
CHLORIDE SERPL-SCNC: 111 MMOL/L (ref 98–107)
CO2 SERPL-SCNC: 27.7 MMOL/L (ref 22–29)
CREAT SERPL-MCNC: 0.72 MG/DL (ref 0.57–1)
DEPRECATED RDW RBC AUTO: 54.5 FL (ref 37–54)
EGFRCR SERPLBLD CKD-EPI 2021: 102 ML/MIN/1.73
EOSINOPHIL # BLD AUTO: 0 10*3/MM3 (ref 0–0.4)
EOSINOPHIL NFR BLD AUTO: 0 % (ref 0.3–6.2)
ERYTHROCYTE [DISTWIDTH] IN BLOOD BY AUTOMATED COUNT: 13.8 % (ref 12.3–15.4)
GLOBULIN UR ELPH-MCNC: 2.3 GM/DL
GLUCOSE BLDC GLUCOMTR-MCNC: 209 MG/DL (ref 70–130)
GLUCOSE SERPL-MCNC: 222 MG/DL (ref 65–99)
HCO3 BLDA-SCNC: 26 MMOL/L (ref 20–26)
HCT VFR BLD AUTO: 37.6 % (ref 34–46.6)
HGB BLD-MCNC: 11.4 G/DL (ref 12–15.9)
HGB BLDA-MCNC: 11.3 G/DL (ref 13.5–17.5)
IMM GRANULOCYTES # BLD AUTO: 0.04 10*3/MM3 (ref 0–0.05)
IMM GRANULOCYTES NFR BLD AUTO: 0.5 % (ref 0–0.5)
INHALED O2 CONCENTRATION: 25 %
LYMPHOCYTES # BLD AUTO: 0.41 10*3/MM3 (ref 0.7–3.1)
LYMPHOCYTES NFR BLD AUTO: 5.5 % (ref 19.6–45.3)
Lab: ABNORMAL
MAGNESIUM SERPL-MCNC: 2.9 MG/DL (ref 1.6–2.6)
MCH RBC QN AUTO: 32.1 PG (ref 26.6–33)
MCHC RBC AUTO-ENTMCNC: 30.3 G/DL (ref 31.5–35.7)
MCV RBC AUTO: 105.9 FL (ref 79–97)
MODALITY: ABNORMAL
MONOCYTES # BLD AUTO: 0.44 10*3/MM3 (ref 0.1–0.9)
MONOCYTES NFR BLD AUTO: 5.9 % (ref 5–12)
NEUTROPHILS NFR BLD AUTO: 6.58 10*3/MM3 (ref 1.7–7)
NEUTROPHILS NFR BLD AUTO: 88.1 % (ref 42.7–76)
PCO2 BLDA: 54.5 MM HG (ref 35–45)
PCO2 TEMP ADJ BLD: 54.5 MM HG (ref 35–45)
PEEP RESPIRATORY: 5 CM[H2O]
PH BLDA: 7.29 PH UNITS (ref 7.35–7.45)
PH, TEMP CORRECTED: 7.29 PH UNITS (ref 7.35–7.45)
PHOSPHATE SERPL-MCNC: 2 MG/DL (ref 2.5–4.5)
PHOSPHATE SERPL-MCNC: 2.1 MG/DL (ref 2.5–4.5)
PLAT MORPH BLD: NORMAL
PLATELET # BLD AUTO: 197 10*3/MM3 (ref 140–450)
PMV BLD AUTO: 9.4 FL (ref 6–12)
PO2 BLDA: 70.8 MM HG (ref 83–108)
PO2 TEMP ADJ BLD: 70.8 MM HG (ref 83–108)
POTASSIUM SERPL-SCNC: 4.9 MMOL/L (ref 3.5–5.2)
PROCALCITONIN SERPL-MCNC: 10.84 NG/ML (ref 0–0.25)
PROT SERPL-MCNC: 5.5 G/DL (ref 6–8.5)
RBC # BLD AUTO: 3.55 10*6/MM3 (ref 3.77–5.28)
RBC MORPH BLD: NORMAL
SAO2 % BLDCOA: 94 % (ref 94–99)
SET MECH RESP RATE: 18
SODIUM SERPL-SCNC: 143 MMOL/L (ref 136–145)
VENTILATOR MODE: ABNORMAL
VT ON VENT VENT: 450 ML
WBC MORPH BLD: NORMAL
WBC NRBC COR # BLD AUTO: 7.47 10*3/MM3 (ref 3.4–10.8)

## 2024-01-19 PROCEDURE — 82948 REAGENT STRIP/BLOOD GLUCOSE: CPT

## 2024-01-19 PROCEDURE — 94003 VENT MGMT INPAT SUBQ DAY: CPT

## 2024-01-19 PROCEDURE — 84100 ASSAY OF PHOSPHORUS: CPT | Performed by: INTERNAL MEDICINE

## 2024-01-19 PROCEDURE — 94668 MNPJ CHEST WALL SBSQ: CPT

## 2024-01-19 PROCEDURE — 99233 SBSQ HOSP IP/OBS HIGH 50: CPT | Performed by: INTERNAL MEDICINE

## 2024-01-19 PROCEDURE — 94799 UNLISTED PULMONARY SVC/PX: CPT

## 2024-01-19 PROCEDURE — 25010000002 METHYLPREDNISOLONE PER 125 MG: Performed by: INTERNAL MEDICINE

## 2024-01-19 PROCEDURE — 25010000002 SODIUM CHLORIDE 0.9 % WITH KCL 20 MEQ 20-0.9 MEQ/L-% SOLUTION: Performed by: HOSPITALIST

## 2024-01-19 PROCEDURE — 25810000003 SODIUM CHLORIDE 0.9 % SOLUTION 250 ML FLEX CONT: Performed by: INTERNAL MEDICINE

## 2024-01-19 PROCEDURE — 25010000002 PROPOFOL 10 MG/ML EMULSION: Performed by: HOSPITALIST

## 2024-01-19 PROCEDURE — 25010000002 FENTANYL CITRATE (PF) 50 MCG/ML SOLUTION: Performed by: INTERNAL MEDICINE

## 2024-01-19 PROCEDURE — 25010000002 ENOXAPARIN PER 10 MG: Performed by: NURSE PRACTITIONER

## 2024-01-19 PROCEDURE — 80053 COMPREHEN METABOLIC PANEL: CPT | Performed by: HOSPITALIST

## 2024-01-19 PROCEDURE — 94761 N-INVAS EAR/PLS OXIMETRY MLT: CPT

## 2024-01-19 PROCEDURE — 36600 WITHDRAWAL OF ARTERIAL BLOOD: CPT

## 2024-01-19 PROCEDURE — 83735 ASSAY OF MAGNESIUM: CPT | Performed by: INTERNAL MEDICINE

## 2024-01-19 PROCEDURE — 85025 COMPLETE CBC W/AUTO DIFF WBC: CPT | Performed by: HOSPITALIST

## 2024-01-19 PROCEDURE — 82803 BLOOD GASES ANY COMBINATION: CPT

## 2024-01-19 PROCEDURE — 85007 BL SMEAR W/DIFF WBC COUNT: CPT | Performed by: HOSPITALIST

## 2024-01-19 PROCEDURE — 84145 PROCALCITONIN (PCT): CPT | Performed by: HOSPITALIST

## 2024-01-19 PROCEDURE — 25010000002 AMPICILLIN-SULBACTAM PER 1.5 G: Performed by: INTERNAL MEDICINE

## 2024-01-19 RX ORDER — ALBUTEROL SULFATE 2.5 MG/3ML
2.5 SOLUTION RESPIRATORY (INHALATION)
Status: DISCONTINUED | OUTPATIENT
Start: 2024-01-19 | End: 2024-01-23

## 2024-01-19 RX ORDER — SODIUM CHLORIDE 9 MG/ML
INJECTION, SOLUTION INTRAVENOUS
Status: DISPENSED
Start: 2024-01-19 | End: 2024-01-20

## 2024-01-19 RX ORDER — METOPROLOL TARTRATE 50 MG/1
50 TABLET, FILM COATED ORAL EVERY 12 HOURS SCHEDULED
Status: DISCONTINUED | OUTPATIENT
Start: 2024-01-19 | End: 2024-01-25 | Stop reason: HOSPADM

## 2024-01-19 RX ORDER — NICOTINE 21 MG/24HR
1 PATCH, TRANSDERMAL 24 HOURS TRANSDERMAL EVERY 24 HOURS
Status: DISCONTINUED | OUTPATIENT
Start: 2024-01-19 | End: 2024-01-25 | Stop reason: HOSPADM

## 2024-01-19 RX ORDER — HYDRALAZINE HYDROCHLORIDE 20 MG/ML
10 INJECTION INTRAMUSCULAR; INTRAVENOUS EVERY 6 HOURS PRN
Status: DISCONTINUED | OUTPATIENT
Start: 2024-01-19 | End: 2024-01-25 | Stop reason: HOSPADM

## 2024-01-19 RX ORDER — FENTANYL CITRATE 50 UG/ML
50 INJECTION, SOLUTION INTRAMUSCULAR; INTRAVENOUS
Status: DISCONTINUED | OUTPATIENT
Start: 2024-01-19 | End: 2024-01-23

## 2024-01-19 RX ADMIN — GUAIFENESIN 1200 MG: 600 TABLET, EXTENDED RELEASE ORAL at 08:02

## 2024-01-19 RX ADMIN — METHYLPREDNISOLONE SODIUM SUCCINATE 60 MG: 125 INJECTION, POWDER, FOR SOLUTION INTRAMUSCULAR; INTRAVENOUS at 03:54

## 2024-01-19 RX ADMIN — SODIUM PHOSPHATE, MONOBASIC, MONOHYDRATE AND SODIUM PHOSPHATE, DIBASIC, ANHYDROUS 15 MMOL: 276; 142 INJECTION, SOLUTION INTRAVENOUS at 20:09

## 2024-01-19 RX ADMIN — ALBUTEROL SULFATE 2.5 MG: 2.5 SOLUTION RESPIRATORY (INHALATION) at 20:41

## 2024-01-19 RX ADMIN — PANTOPRAZOLE SODIUM 40 MG: 40 INJECTION, POWDER, FOR SOLUTION INTRAVENOUS at 08:29

## 2024-01-19 RX ADMIN — ATORVASTATIN CALCIUM 20 MG: 20 TABLET, FILM COATED ORAL at 08:02

## 2024-01-19 RX ADMIN — IPRATROPIUM BROMIDE AND ALBUTEROL SULFATE 3 ML: 2.5; .5 SOLUTION RESPIRATORY (INHALATION) at 23:09

## 2024-01-19 RX ADMIN — SODIUM CHLORIDE SOLN NEBU 7% 4 ML: 7 NEBU SOLN at 19:26

## 2024-01-19 RX ADMIN — GUAIFENESIN 1200 MG: 600 TABLET, EXTENDED RELEASE ORAL at 20:20

## 2024-01-19 RX ADMIN — AMPICILLIN AND SULBACTAM 1.5 G: 1; .5 INJECTION, POWDER, FOR SOLUTION INTRAVENOUS at 03:04

## 2024-01-19 RX ADMIN — DOCUSATE SODIUM 50 MG AND SENNOSIDES 8.6 MG 2 TABLET: 8.6; 5 TABLET, FILM COATED ORAL at 08:02

## 2024-01-19 RX ADMIN — DEXMEDETOMIDINE HYDROCHLORIDE 1.3 MCG/KG/HR: 4 INJECTION, SOLUTION INTRAVENOUS at 14:55

## 2024-01-19 RX ADMIN — DOCUSATE SODIUM 50 MG AND SENNOSIDES 8.6 MG 2 TABLET: 8.6; 5 TABLET, FILM COATED ORAL at 21:11

## 2024-01-19 RX ADMIN — DOXYCYCLINE 100 MG: 100 INJECTION, POWDER, LYOPHILIZED, FOR SOLUTION INTRAVENOUS at 08:22

## 2024-01-19 RX ADMIN — VENLAFAXINE HYDROCHLORIDE 75 MG: 37.5 CAPSULE, EXTENDED RELEASE ORAL at 08:02

## 2024-01-19 RX ADMIN — FENTANYL CITRATE 50 MCG: 50 INJECTION, SOLUTION INTRAMUSCULAR; INTRAVENOUS at 01:02

## 2024-01-19 RX ADMIN — Medication 10 ML: at 20:29

## 2024-01-19 RX ADMIN — POTASSIUM CHLORIDE AND SODIUM CHLORIDE 150 ML/HR: 900; 150 INJECTION, SOLUTION INTRAVENOUS at 02:40

## 2024-01-19 RX ADMIN — CHLORHEXIDINE GLUCONATE 0.12% ORAL RINSE 15 ML: 1.2 LIQUID ORAL at 20:28

## 2024-01-19 RX ADMIN — DOXYCYCLINE 100 MG: 100 INJECTION, POWDER, LYOPHILIZED, FOR SOLUTION INTRAVENOUS at 21:04

## 2024-01-19 RX ADMIN — SODIUM PHOSPHATE, MONOBASIC, MONOHYDRATE 15 MMOL: 276; 142 INJECTION, SOLUTION INTRAVENOUS at 20:10

## 2024-01-19 RX ADMIN — IPRATROPIUM BROMIDE AND ALBUTEROL SULFATE 3 ML: 2.5; .5 SOLUTION RESPIRATORY (INHALATION) at 07:13

## 2024-01-19 RX ADMIN — Medication 10 ML: at 08:34

## 2024-01-19 RX ADMIN — AMPICILLIN AND SULBACTAM 1.5 G: 1; .5 INJECTION, POWDER, FOR SOLUTION INTRAVENOUS at 09:04

## 2024-01-19 RX ADMIN — AMPICILLIN AND SULBACTAM 1.5 G: 1; .5 INJECTION, POWDER, FOR SOLUTION INTRAVENOUS at 21:12

## 2024-01-19 RX ADMIN — AMPICILLIN AND SULBACTAM 1.5 G: 1; .5 INJECTION, POWDER, FOR SOLUTION INTRAVENOUS at 14:56

## 2024-01-19 RX ADMIN — FENTANYL CITRATE 50 MCG: 50 INJECTION, SOLUTION INTRAMUSCULAR; INTRAVENOUS at 19:39

## 2024-01-19 RX ADMIN — METHYLPREDNISOLONE SODIUM SUCCINATE 60 MG: 125 INJECTION, POWDER, FOR SOLUTION INTRAMUSCULAR; INTRAVENOUS at 15:00

## 2024-01-19 RX ADMIN — FENTANYL CITRATE 50 MCG: 50 INJECTION, SOLUTION INTRAMUSCULAR; INTRAVENOUS at 23:10

## 2024-01-19 RX ADMIN — METHYLPREDNISOLONE SODIUM SUCCINATE 60 MG: 125 INJECTION, POWDER, FOR SOLUTION INTRAMUSCULAR; INTRAVENOUS at 21:12

## 2024-01-19 RX ADMIN — ASPIRIN 81 MG: 81 TABLET, COATED ORAL at 08:02

## 2024-01-19 RX ADMIN — METHYLPREDNISOLONE SODIUM SUCCINATE 60 MG: 125 INJECTION, POWDER, FOR SOLUTION INTRAMUSCULAR; INTRAVENOUS at 08:34

## 2024-01-19 RX ADMIN — DEXMEDETOMIDINE HYDROCHLORIDE 1.5 MCG/KG/HR: 4 INJECTION, SOLUTION INTRAVENOUS at 08:37

## 2024-01-19 RX ADMIN — PROPOFOL INJECTABLE EMULSION 50 MCG/KG/MIN: 10 INJECTION, EMULSION INTRAVENOUS at 02:40

## 2024-01-19 RX ADMIN — CHLORHEXIDINE GLUCONATE 0.12% ORAL RINSE 15 ML: 1.2 LIQUID ORAL at 08:01

## 2024-01-19 RX ADMIN — FENTANYL CITRATE 50 MCG: 50 INJECTION, SOLUTION INTRAMUSCULAR; INTRAVENOUS at 21:15

## 2024-01-19 RX ADMIN — PROPOFOL INJECTABLE EMULSION 45 MCG/KG/MIN: 10 INJECTION, EMULSION INTRAVENOUS at 13:16

## 2024-01-19 RX ADMIN — IPRATROPIUM BROMIDE AND ALBUTEROL SULFATE 3 ML: 2.5; .5 SOLUTION RESPIRATORY (INHALATION) at 03:10

## 2024-01-19 RX ADMIN — DEXMEDETOMIDINE HYDROCHLORIDE 1.5 MCG/KG/HR: 4 INJECTION, SOLUTION INTRAVENOUS at 20:19

## 2024-01-19 RX ADMIN — IPRATROPIUM BROMIDE AND ALBUTEROL SULFATE 3 ML: 2.5; .5 SOLUTION RESPIRATORY (INHALATION) at 11:42

## 2024-01-19 RX ADMIN — METOPROLOL TARTRATE 50 MG: 50 TABLET, FILM COATED ORAL at 13:16

## 2024-01-19 RX ADMIN — SODIUM CHLORIDE SOLN NEBU 7% 4 ML: 7 NEBU SOLN at 07:13

## 2024-01-19 RX ADMIN — IPRATROPIUM BROMIDE AND ALBUTEROL SULFATE 3 ML: 2.5; .5 SOLUTION RESPIRATORY (INHALATION) at 15:26

## 2024-01-19 RX ADMIN — PROPOFOL INJECTABLE EMULSION 50 MCG/KG/MIN: 10 INJECTION, EMULSION INTRAVENOUS at 20:26

## 2024-01-19 RX ADMIN — PROPOFOL INJECTABLE EMULSION 50 MCG/KG/MIN: 10 INJECTION, EMULSION INTRAVENOUS at 06:54

## 2024-01-19 RX ADMIN — FENTANYL CITRATE 50 MCG: 50 INJECTION, SOLUTION INTRAMUSCULAR; INTRAVENOUS at 05:07

## 2024-01-19 RX ADMIN — FENTANYL CITRATE 50 MCG: 50 INJECTION, SOLUTION INTRAMUSCULAR; INTRAVENOUS at 01:55

## 2024-01-19 RX ADMIN — METOPROLOL TARTRATE 50 MG: 50 TABLET, FILM COATED ORAL at 20:28

## 2024-01-19 RX ADMIN — Medication 10 ML: at 08:02

## 2024-01-19 RX ADMIN — ACETAMINOPHEN 650 MG: 325 TABLET ORAL at 23:37

## 2024-01-19 RX ADMIN — CLOPIDOGREL BISULFATE 75 MG: 75 TABLET ORAL at 08:02

## 2024-01-19 RX ADMIN — IPRATROPIUM BROMIDE AND ALBUTEROL SULFATE 3 ML: 2.5; .5 SOLUTION RESPIRATORY (INHALATION) at 18:43

## 2024-01-19 RX ADMIN — DEXMEDETOMIDINE HYDROCHLORIDE 1.5 MCG/KG/HR: 4 INJECTION, SOLUTION INTRAVENOUS at 03:04

## 2024-01-19 RX ADMIN — NICOTINE 1 PATCH: 21 PATCH, EXTENDED RELEASE TRANSDERMAL at 08:01

## 2024-01-19 RX ADMIN — Medication 10 ML: at 18:17

## 2024-01-19 RX ADMIN — FENTANYL CITRATE 50 MCG: 50 INJECTION, SOLUTION INTRAMUSCULAR; INTRAVENOUS at 18:17

## 2024-01-19 RX ADMIN — SODIUM PHOSPHATE, MONOBASIC, MONOHYDRATE AND SODIUM PHOSPHATE, DIBASIC, ANHYDROUS 15 MMOL: 142; 276 INJECTION, SOLUTION INTRAVENOUS at 07:59

## 2024-01-19 RX ADMIN — POTASSIUM CHLORIDE AND SODIUM CHLORIDE 150 ML/HR: 900; 150 INJECTION, SOLUTION INTRAVENOUS at 09:06

## 2024-01-19 RX ADMIN — Medication 10 ML: at 15:00

## 2024-01-19 RX ADMIN — ENOXAPARIN SODIUM 40 MG: 40 INJECTION SUBCUTANEOUS at 08:00

## 2024-01-19 NOTE — PLAN OF CARE
Problem: Restraint, Nonviolent  Goal: Absence of Harm or Injury  Outcome: Ongoing, Progressing  Intervention: Implement Least Restrictive Safety Strategies  Recent Flowsheet Documentation  Taken 1/19/2024 1800 by Radha Prakash RN  Medical Device Protection: tubing secured  Less Restrictive Alternative:   calming techniques promoted   positive reinforcement provided  De-Escalation Techniques: stimulation decreased  Diversional Activities: television  Taken 1/19/2024 1600 by Radha Prakash RN  Medical Device Protection: tubing secured  Less Restrictive Alternative: positive reinforcement provided  De-Escalation Techniques: stimulation decreased  Diversional Activities: television  Taken 1/19/2024 1500 by Radha Prakash RN  Medical Device Protection: tubing secured  Taken 1/19/2024 1400 by Radha Prakash RN  Medical Device Protection:   tubing secured   torso covered  Less Restrictive Alternative: positive reinforcement provided  De-Escalation Techniques: stimulation decreased  Diversional Activities: television  Taken 1/19/2024 1200 by Radha Prakash RN  Medical Device Protection:   tubing secured   torso covered  Less Restrictive Alternative: sensory stimulation limited  De-Escalation Techniques: stimulation decreased  Diversional Activities: television  Taken 1/19/2024 1000 by Radha Prakash RN  Medical Device Protection: tubing secured  Less Restrictive Alternative: positive reinforcement provided  De-Escalation Techniques: stimulation decreased  Diversional Activities: television  Taken 1/19/2024 0929 by Radha Prakash RN  Medical Device Protection:   torso covered   tubing secured  Less Restrictive Alternative: positive reinforcement provided  De-Escalation Techniques: stimulation decreased  Diversional Activities: television  Taken 1/19/2024 0800 by Radha Prakash RN  Medical Device Protection: tubing secured  Less Restrictive Alternative:   positive reinforcement  provided   calming techniques promoted  De-Escalation Techniques: stimulation decreased  Diversional Activities: television  Taken 1/19/2024 0700 by Radha Prakash RN  Medical Device Protection: tubing secured  Less Restrictive Alternative:   positive reinforcement provided   calming techniques promoted  De-Escalation Techniques: reoriented  Diversional Activities: television  Intervention: Protect Skin and Joint Integrity  Recent Flowsheet Documentation  Taken 1/19/2024 1805 by Radha Prakash RN  Body Position:   turned   left  Taken 1/19/2024 1700 by Radha Prakash RN  Body Position:   tilted   legs elevated  Taken 1/19/2024 1600 by Radha Prakash RN  Body Position:   right   turned   upper extremity elevated  Taken 1/19/2024 1500 by Radha Prakash RN  Body Position:   supine   legs elevated  Taken 1/19/2024 1200 by Radha Prakash RN  Body Position:   turned   right  Taken 1/19/2024 1000 by Radha Prakash RN  Body Position:   left   turned  Taken 1/19/2024 0946 by Radha Prakash RN  Body Position:   right   turned  Taken 1/19/2024 0837 by Radha Prakash RN  Body Position:   30 degrees   tilted  Taken 1/19/2024 0713 by Radha Prakash RN  Body Position:   left   turned     Problem: Restraint, Nonviolent  Goal: Absence of Harm or Injury  Intervention: Implement Least Restrictive Safety Strategies  Recent Flowsheet Documentation  Taken 1/19/2024 1800 by Radha Prakash RN  Medical Device Protection: tubing secured  Less Restrictive Alternative:   calming techniques promoted   positive reinforcement provided  De-Escalation Techniques: stimulation decreased  Diversional Activities: television  Taken 1/19/2024 1600 by Radha Prakash RN  Medical Device Protection: tubing secured  Less Restrictive Alternative: positive reinforcement provided  De-Escalation Techniques: stimulation decreased  Diversional Activities: television  Taken 1/19/2024 1500 by Dwain  Radha LATHAM RN  Medical Device Protection: tubing secured  Taken 1/19/2024 1400 by Radha Prakash RN  Medical Device Protection:   tubing secured   torso covered  Less Restrictive Alternative: positive reinforcement provided  De-Escalation Techniques: stimulation decreased  Diversional Activities: television  Taken 1/19/2024 1200 by Radha Prakash RN  Medical Device Protection:   tubing secured   torso covered  Less Restrictive Alternative: sensory stimulation limited  De-Escalation Techniques: stimulation decreased  Diversional Activities: television  Taken 1/19/2024 1000 by Radha Prakash RN  Medical Device Protection: tubing secured  Less Restrictive Alternative: positive reinforcement provided  De-Escalation Techniques: stimulation decreased  Diversional Activities: television  Taken 1/19/2024 0929 by Radha Prakash RN  Medical Device Protection:   torso covered   tubing secured  Less Restrictive Alternative: positive reinforcement provided  De-Escalation Techniques: stimulation decreased  Diversional Activities: television  Taken 1/19/2024 0800 by Radha Prakash RN  Medical Device Protection: tubing secured  Less Restrictive Alternative:   positive reinforcement provided   calming techniques promoted  De-Escalation Techniques: stimulation decreased  Diversional Activities: television  Taken 1/19/2024 0700 by Radha Prakash RN  Medical Device Protection: tubing secured  Less Restrictive Alternative:   positive reinforcement provided   calming techniques promoted  De-Escalation Techniques: reoriented  Diversional Activities: television   Goal Outcome Evaluation:  Plan of Care Reviewed With: spouse        Progress: no change   PT HAS REMAINED ON VENT WITH BOTH PRECEDEX AND DIPROVAN INFUSING. DID NOT TOLERATE WEANING OFF SEDATION AS SHE BECAME VERY AGITATED AND FIGHTING VENT. Md is aware . Will attempt weaning in am and RT to do trial.     Remains with restraints to prevent self  extubation. Has tolerated well. Blood pressure has been elevated, home Bp meds restarted

## 2024-01-19 NOTE — PLAN OF CARE
Goal Outcome Evaluation:  Plan of Care Reviewed With: spouse            Danette. Restraints. Danette Vent. Decreased O2 to 25%. Added pain medication.

## 2024-01-19 NOTE — CASE MANAGEMENT/SOCIAL WORK
Continued Stay Note  SARAH Garcia     Patient Name: Valeria Rubi  MRN: [id number]  Today's Date: 1/19/2024    Admit Date: 1/17/2024    Plan: to be determined   Discharge Plan       Row Name 01/19/24 1607       Plan    Plan to be determined    Plan Comments Chart reviewed, patient remains on the ventilator. CM will continue to follow to assist with dc needs.                   Discharge Codes    No documentation.                       Kasi Dawson RN

## 2024-01-19 NOTE — CONSULTS
Patient Identification:  Valeria Rubi  48 y.o.  female  1975  [id number]          LOS 1    Requesting physician:   Facundo Flanagan DO    Reason for Consultation:    Acute respiratory failure    History of Present Illness:   48-year-old female with history of chronic obstructive pulmonary disease, coronary artery disease, hypertension, hyperlipidemia, depression, anxiety who presented to the hospital with increasing shortness of breath and wheezing.  She was recently seen by her primary care physician 2 weeks prior and given antibiotics and steroids for acute bronchitis.  Symptoms improved however worsened again and then she presented for further evaluation in the hospital.  Was admitted to the hospital and placed on BiPAP due to tachypnea and work of breathing.  Was also started on IV steroids in addition to DuoNeb therapy.  Found to be in COPD exacerbation secondary to RSV pneumonia.    On evaluation in the ICU patient was on BiPAP, on Precedex, unable to obtain history due to mental status.  History reviewed from chart.    Past Medical History:  Past Medical History:   Diagnosis Date    Anxiety     Arthritis     COPD (chronic obstructive pulmonary disease)     Coronary stent occlusion     CTS (carpal tunnel syndrome)     Depression     Dizziness     Headache     Hyperlipidemia     Hypertension     Migraine     Numbness and tingling     Pneumonia        Past Surgical History:  Past Surgical History:   Procedure Laterality Date    ANGIOPLASTY ILIAC ARTERY Left 6/17/2016    Procedure: BILATERAL DUPLEX DIRECTED ACCESS, AIF BILATERAL RUNOFF, SELECTIVE CATHETERIZATION OF RIGHT EXTERNAL ILIAC WITH ANGIOPLASTY, LEFT COMMON ILIAC STENT PLACEMENT;  Surgeon: Jose Carlos Plascencia MD;  Location: [facility] 18/19;  Service:     BREAST SURGERY      BILAT IMPLANTS    CAROTID ARTERY ANGIOPLASTY          Home Meds:  Medications Prior to Admission   Medication Sig Dispense Refill Last Dose    albuterol sulfate  HFA (Proventil HFA) 108 (90 Base) MCG/ACT inhaler Inhale 2 puffs into the lungs Every 4 (Four) Hours As Needed for Wheezing. 8.5 g 1     aspirin (aspirin) 81 MG EC tablet Take 1 tablet by mouth Daily. 90 tablet 3     budesonide-formoterol (SYMBICORT) 160-4.5 MCG/ACT inhaler Inhale 2 puffs 2 (Two) Times a Day.       clopidogrel (Plavix) 75 MG tablet Take 1 tablet by mouth Daily. 90 tablet 1     fluticasone (FLONASE) 50 MCG/ACT nasal spray 1 spray into the nostril(s) as directed by provider Daily As Needed.       guaiFENesin (MUCINEX) 600 MG 12 hr tablet Take 2 tablets by mouth 2 (Two) Times a Day. 40 tablet 0     ipratropium-albuterol (DUO-NEB) 0.5-2.5 mg/3 ml nebulizer Take 3 mL by nebulization Every 4 (Four) Hours As Needed for Wheezing. 90 mL 1     lisinopril-hydrochlorothiazide (PRINZIDE,ZESTORETIC) 20-12.5 MG per tablet TAKE 1 TABLET BY MOUTH EVERY DAY 90 tablet 0     methylPREDNISolone (MEDROL) 4 MG dose pack Take as directed on package instructions. 21 tablet 0     metoprolol tartrate (LOPRESSOR) 50 MG tablet Take 1 tablet by mouth Every 12 (Twelve) Hours. 60 tablet 1     nicotine (NICODERM CQ) 21 MG/24HR patch Place 1 patch on the skin as directed by provider Daily. 30 patch 0     simvastatin (ZOCOR) 40 MG tablet Take 1 tablet by mouth Every Evening. 90 tablet 1     venlafaxine XR (EFFEXOR-XR) 75 MG 24 hr capsule TAKE 1 CAPSULE BY MOUTH EVERY DAY 90 capsule 0     vitamin D (ERGOCALCIFEROL) 50621 units capsule capsule Take 1 capsule by mouth Every 7 (Seven) Days. 6 capsule 5          Allergies:  Allergies   Allergen Reactions    Prednisone Other (See Comments)     Rash in mouth       Social History:   Social History     Socioeconomic History    Marital status: Single   Tobacco Use    Smoking status: Every Day     Packs/day: 1.00     Years: 30.00     Additional pack years: 0.00     Total pack years: 30.00     Types: Cigarettes     Passive exposure: Current    Smokeless tobacco: Never   Vaping Use    Vaping Use:  "Never used   Substance and Sexual Activity    Alcohol use: No    Drug use: No    Sexual activity: Defer       Family History:  Family History   Problem Relation Age of Onset    Heart disease Father     Hypertension Father     Diabetes Father     Stroke Father     COPD Father     Heart failure Paternal Grandmother     Heart failure Paternal Grandfather     COPD Mother     COPD Maternal Aunt     COPD Maternal Uncle     COPD Paternal Uncle        Review of Systems:  Unable to acquire due to mental status  Objective:    PHYSICAL EXAM:    BP (!) 85/59   Pulse 93   Temp 97.3 °F (36.3 °C) (Axillary)   Resp 18   Ht 157 cm (61.81\")   Wt 58 kg (127 lb 13.9 oz)   LMP  (LMP Unknown)   SpO2 100%   BMI 23.53 kg/m²  Body mass index is 23.53 kg/m². 100% 58 kg (127 lb 13.9 oz)    General: Sedated  HEENT: NC/AT, EOMI, MMM  Neck: Supple, trachea midline  Cardiac: RRR, no murmur, gallops, rubs  Pulmonary: Diminished bilaterally, mechanical breath sounds on BiPAP  GI: Soft, non-tender, non-distended, normal bowel sounds  Extremities: Warm, well perfused, no LE edema  Skin: no visible rash  Neuro: CN II - XII grossly intact  Psychiatry: Sedated    Lab Review:   Results from last 7 days   Lab Units 01/18/24  0408 01/17/24  0435   WBC 10*3/mm3 6.40 13.15*   HEMOGLOBIN g/dL 11.7* 15.1   PLATELETS 10*3/mm3 176 242     Results from last 7 days   Lab Units 01/18/24  0408 01/17/24  0435   SODIUM mmol/L 137 130*   POTASSIUM mmol/L 4.4 4.7   CHLORIDE mmol/L 103 90*   CO2 mmol/L 28.8 30.9*   BUN mg/dL 37* 11   CREATININE mg/dL 0.99 0.75   GLUCOSE mg/dL 183* 133*   CALCIUM mg/dL 7.9* 9.3   Estimated Creatinine Clearance: 62.2 mL/min (by C-G formula based on SCr of 0.99 mg/dL).    Results from last 7 days   Lab Units 01/18/24  0408 01/17/24  0700 01/17/24  0642 01/17/24  0435   AST (SGOT) U/L  --   --   --  22   ALT (SGPT) U/L  --   --   --  10   PROCALCITONIN ng/mL 35.76*  --  46.14*  --    LACTATE mmol/L  --  1.5  --   --    PLATELETS " 10*3/mm3 176  --   --  242       Results from last 7 days   Lab Units 01/18/24  1355 01/18/24  0905 01/17/24  0815   PH, ARTERIAL pH units 7.242* 7.233* 7.231*   PCO2, ARTERIAL mm Hg 61.8* 65.5* 69.0*   PO2 ART mm Hg 119.0* 93.2 105.0   HCO3 ART mmol/L 26.6* 27.6* 28.9*        Imaging reviewed  Chest imaging from this hospitalization reviewed.       Assessment / Recommendations:    Acute hypoxic and hypercapnic respiratory failure  Acute exacerbation of chronic obstructive pulmonary disease  RSV pneumonia  Leukocytosis  Tachycardia  Hypertension  Peripheral arterial disease  Coronary disease  Hyperlipidemia    -Patient with respiratory failure necessitating BiPAP therapy secondary to RSV pneumonia.  Despite being on BiPAP, her work of breathing is elevated and ABGs are plateaued.  Decision made to intubate the patient.  -Continue Solu-Medrol for COPD exacerbation.  -Continue bronchodilator therapy.  -Continue empiric antibiotics with Unasyn and doxycycline.   -Sedation with Precedex at this time, will need to initiate propofol after intubation  -Supportive care for RSV.    Critical care time: 35 minutes    Thank you for allowing me to participate in the care of this patient.  I will continue to follow along with you.    Mikael Robb MD  [facility], [facility]  Pulmonary and Critical Care Medicine, Interventional Pulmonology    1/18/2024  19:12 EST    Parts of this note may be an electronic transcription/translation of spoken language to printed text using the Dragon dictation system.

## 2024-01-19 NOTE — CONSULTS
Adult Nutrition  Assessment/PES    Patient Name:  Valeria Rubi  YOB: 1975  MRN: [id number]  Admit Date:  1/17/2024    Assessment Date:  1/19/2024    Comments:    Consult for TF received, pt sedated on vent with diprovan providing 412/kcal/day from fat.    Recommend: 1) confirm placement of tube    2) Diabetasource AC 20 ml/hr increase 10 ml every 8 hrs to goal 35 ml/hr at this time     This will provide: 840 ml, 1008 kcal, 50 gm pro, 84 gm CHO, 689 ml free water, 67% DRI    3) Free water 25 ml/hr to help meet fluid needs if no IVF  Will cont to follow and monitor, goal will need be adjusted with significant changes in sedation.     Reason for Assessment       Row Name 01/19/24 0931          Reason for Assessment    Reason For Assessment TF/PN     Diagnosis pulmonary disease  AHRF intubated, RSV PNA, HTN                    Nutrition/Diet History       Row Name 01/19/24 0932          Nutrition/Diet History    Typical Intake (Food/Fluid/EN/PN) Pt sedated on vent, restrained at visit.                    Labs/Tests/Procedures/Meds       Row Name 01/19/24 0933          Labs/Procedures/Meds    Lab Results Reviewed reviewed     Lab Results Comments Mg 2.9 elevated, K 4.9 wnl, Phos 2.1 L, BUn 32, Gluc 222        Diagnostic Tests/Procedures    Diagnostic Test/Procedure Reviewed reviewed        Medications    Pertinent Medications Reviewed reviewed     Pertinent Medications Comments solumedrol, diprovan 15.6 ml/hr = 412 kcal from fat                    Physical Findings       Row Name 01/19/24 0933          Physical Findings    Overall Physical Appearance some temporal loss. difficult to complete physical exam b/c of restraints, SCD, corbin tapes to leg, BP cuff                    Estimated/Assessed Needs - Anthropometrics       Row Name 01/19/24 0986          Anthropometrics    Weight for Calculation 58 kg (127 lb 12.8 oz)        Estimated/Assessed Needs    Additional Documentation Estimated Calorie Needs  (Group);Fluid Requirements (Group);Protein Requirements (Group)        Estimated Calorie Needs    Estimated Calorie Requirement (kcal/day) 1292 kcal ( [facility])     Estimated Calorie Need Method [place]        Protein Requirements    Est Protein Requirement Amount (gms/kg) --  46-70 gm pro (.8-1.2 gm/kg pro)        Fluid Requirements    Estimated Fluid Requirement Method other (see comments)  1740  ( 30 ml/kg )                    Nutrition Prescription Ordered       Row Name 01/19/24 0937          Nutrition Prescription PO    Current PO Diet NPO                    Evaluation of Received Nutrient/Fluid Intake       Row Name 01/19/24 0937          Fluid Intake Evaluation    Oral Fluid (mL) --  insufficient data        PO Evaluation    Number of Days PO Intake Evaluated Insufficient Data                       Problem/Interventions:   Problem 1       Row Name 01/19/24 0938          Nutrition Diagnoses Problem 1    Problem 1 Other (comment)  Inadequate energy intake related to AHRF as evidenced by NPO on vent.                          Intervention Goal       Row Name 01/19/24 0938          Intervention Goal    General Meet nutritional needs for age/condition;Nutrition support treatment     TF/PN Inititiate TF/PN                    Nutrition Intervention       Row Name 01/19/24 0938          Nutrition Intervention    RD/Tech Action Follow Tx progress                    Nutrition Prescription       Row Name 01/19/24 0938          Nutrition Prescription EN    Enteral Prescription Enteral begin/change     Enteral Route --  DHT     Product Diabetisource                    Education/Evaluation       Row Name 01/19/24 0939          Education    Education Education not appropriate at this time        Monitor/Evaluation    Monitor Per protocol;I&O;Pertinent labs;TF delivery/tolerance;Weight;Symptoms                     Electronically signed by:  Lila Hilton RD  01/19/24 09:39 EST

## 2024-01-20 ENCOUNTER — APPOINTMENT (OUTPATIENT)
Dept: GENERAL RADIOLOGY | Facility: HOSPITAL | Age: 49
End: 2024-01-20
Payer: COMMERCIAL

## 2024-01-20 LAB
ALBUMIN SERPL-MCNC: 3.2 G/DL (ref 3.5–5.2)
ANION GAP SERPL CALCULATED.3IONS-SCNC: 2.1 MMOL/L (ref 5–15)
ARTERIAL PATENCY WRIST A: POSITIVE
ATMOSPHERIC PRESS: 749 MMHG
BACTERIA SPEC RESP CULT: NORMAL
BASE EXCESS BLDA CALC-SCNC: 2.5 MMOL/L (ref 0–2)
BDY SITE: ABNORMAL
BODY TEMPERATURE: 37
BUN SERPL-MCNC: 29 MG/DL (ref 6–20)
BUN/CREAT SERPL: 38.2 (ref 7–25)
CALCIUM SPEC-SCNC: 7.8 MG/DL (ref 8.6–10.5)
CHLORIDE SERPL-SCNC: 110 MMOL/L (ref 98–107)
CO2 SERPL-SCNC: 30.9 MMOL/L (ref 22–29)
CREAT SERPL-MCNC: 0.76 MG/DL (ref 0.57–1)
DEPRECATED RDW RBC AUTO: 53.9 FL (ref 37–54)
EGFRCR SERPLBLD CKD-EPI 2021: 95.6 ML/MIN/1.73
ERYTHROCYTE [DISTWIDTH] IN BLOOD BY AUTOMATED COUNT: 13.9 % (ref 12.3–15.4)
GLUCOSE BLDC GLUCOMTR-MCNC: 205 MG/DL (ref 70–130)
GLUCOSE BLDC GLUCOMTR-MCNC: 223 MG/DL (ref 70–130)
GLUCOSE BLDC GLUCOMTR-MCNC: 232 MG/DL (ref 70–130)
GLUCOSE BLDC GLUCOMTR-MCNC: 237 MG/DL (ref 70–130)
GLUCOSE BLDC GLUCOMTR-MCNC: 251 MG/DL (ref 70–130)
GLUCOSE SERPL-MCNC: 242 MG/DL (ref 65–99)
GRAM STN SPEC: NORMAL
HCO3 BLDA-SCNC: 29.1 MMOL/L (ref 20–26)
HCT VFR BLD AUTO: 37.7 % (ref 34–46.6)
HGB BLD-MCNC: 11.5 G/DL (ref 12–15.9)
HGB BLDA-MCNC: 11.7 G/DL (ref 13.5–17.5)
INHALED O2 CONCENTRATION: 25 %
Lab: ABNORMAL
MCH RBC QN AUTO: 31.9 PG (ref 26.6–33)
MCHC RBC AUTO-ENTMCNC: 30.5 G/DL (ref 31.5–35.7)
MCV RBC AUTO: 104.7 FL (ref 79–97)
MODALITY: ABNORMAL
PAW @ PEAK INSP FLOW SETTING VENT: 32 CMH2O
PCO2 BLDA: 53.4 MM HG (ref 35–45)
PCO2 TEMP ADJ BLD: 53.4 MM HG (ref 35–45)
PEEP RESPIRATORY: 5 CM[H2O]
PH BLDA: 7.34 PH UNITS (ref 7.35–7.45)
PH, TEMP CORRECTED: 7.34 PH UNITS (ref 7.35–7.45)
PHOSPHATE SERPL-MCNC: 2.2 MG/DL (ref 2.5–4.5)
PHOSPHATE SERPL-MCNC: 2.2 MG/DL (ref 2.5–4.5)
PLATELET # BLD AUTO: 202 10*3/MM3 (ref 140–450)
PMV BLD AUTO: 9.7 FL (ref 6–12)
PO2 BLDA: 84 MM HG (ref 83–108)
PO2 TEMP ADJ BLD: 84 MM HG (ref 83–108)
POTASSIUM SERPL-SCNC: 4.5 MMOL/L (ref 3.5–5.2)
PROCALCITONIN SERPL-MCNC: 3.87 NG/ML (ref 0–0.25)
RBC # BLD AUTO: 3.6 10*6/MM3 (ref 3.77–5.28)
SAO2 % BLDCOA: 97.1 % (ref 94–99)
SET MECH RESP RATE: 18
SODIUM SERPL-SCNC: 143 MMOL/L (ref 136–145)
VENTILATOR MODE: ABNORMAL
VT ON VENT VENT: 401 ML
WBC NRBC COR # BLD AUTO: 5.26 10*3/MM3 (ref 3.4–10.8)

## 2024-01-20 PROCEDURE — 80069 RENAL FUNCTION PANEL: CPT | Performed by: INTERNAL MEDICINE

## 2024-01-20 PROCEDURE — 36600 WITHDRAWAL OF ARTERIAL BLOOD: CPT

## 2024-01-20 PROCEDURE — 71045 X-RAY EXAM CHEST 1 VIEW: CPT

## 2024-01-20 PROCEDURE — 94799 UNLISTED PULMONARY SVC/PX: CPT

## 2024-01-20 PROCEDURE — 25010000002 AMPICILLIN-SULBACTAM PER 1.5 G: Performed by: INTERNAL MEDICINE

## 2024-01-20 PROCEDURE — 94761 N-INVAS EAR/PLS OXIMETRY MLT: CPT

## 2024-01-20 PROCEDURE — 99233 SBSQ HOSP IP/OBS HIGH 50: CPT | Performed by: INTERNAL MEDICINE

## 2024-01-20 PROCEDURE — 63710000001 INSULIN ASPART PER 5 UNITS: Performed by: INTERNAL MEDICINE

## 2024-01-20 PROCEDURE — 25010000002 METHYLPREDNISOLONE PER 125 MG: Performed by: INTERNAL MEDICINE

## 2024-01-20 PROCEDURE — 25010000002 PROPOFOL 10 MG/ML EMULSION: Performed by: HOSPITALIST

## 2024-01-20 PROCEDURE — 94003 VENT MGMT INPAT SUBQ DAY: CPT

## 2024-01-20 PROCEDURE — 25010000002 METHYLPREDNISOLONE PER 40 MG: Performed by: INTERNAL MEDICINE

## 2024-01-20 PROCEDURE — 94668 MNPJ CHEST WALL SBSQ: CPT

## 2024-01-20 PROCEDURE — 85027 COMPLETE CBC AUTOMATED: CPT | Performed by: INTERNAL MEDICINE

## 2024-01-20 PROCEDURE — 25010000002 ENOXAPARIN PER 10 MG: Performed by: NURSE PRACTITIONER

## 2024-01-20 PROCEDURE — 25010000002 FUROSEMIDE PER 20 MG: Performed by: INTERNAL MEDICINE

## 2024-01-20 PROCEDURE — 25010000002 FENTANYL CITRATE (PF) 50 MCG/ML SOLUTION: Performed by: INTERNAL MEDICINE

## 2024-01-20 PROCEDURE — 84100 ASSAY OF PHOSPHORUS: CPT | Performed by: INTERNAL MEDICINE

## 2024-01-20 PROCEDURE — 82948 REAGENT STRIP/BLOOD GLUCOSE: CPT

## 2024-01-20 PROCEDURE — 82803 BLOOD GASES ANY COMBINATION: CPT

## 2024-01-20 PROCEDURE — 84145 PROCALCITONIN (PCT): CPT | Performed by: INTERNAL MEDICINE

## 2024-01-20 RX ORDER — NICOTINE POLACRILEX 4 MG
15 LOZENGE BUCCAL
Status: DISCONTINUED | OUTPATIENT
Start: 2024-01-20 | End: 2024-01-21

## 2024-01-20 RX ORDER — INSULIN ASPART 100 [IU]/ML
2-7 INJECTION, SOLUTION INTRAVENOUS; SUBCUTANEOUS EVERY 6 HOURS
Status: DISCONTINUED | OUTPATIENT
Start: 2024-01-20 | End: 2024-01-22

## 2024-01-20 RX ORDER — METHYLPREDNISOLONE SODIUM SUCCINATE 40 MG/ML
40 INJECTION, POWDER, LYOPHILIZED, FOR SOLUTION INTRAMUSCULAR; INTRAVENOUS EVERY 6 HOURS
Status: DISCONTINUED | OUTPATIENT
Start: 2024-01-20 | End: 2024-01-23

## 2024-01-20 RX ORDER — FUROSEMIDE 10 MG/ML
40 INJECTION INTRAMUSCULAR; INTRAVENOUS ONCE
Status: COMPLETED | OUTPATIENT
Start: 2024-01-20 | End: 2024-01-20

## 2024-01-20 RX ORDER — IBUPROFEN 600 MG/1
1 TABLET ORAL
Status: DISCONTINUED | OUTPATIENT
Start: 2024-01-20 | End: 2024-01-21

## 2024-01-20 RX ORDER — DEXTROSE MONOHYDRATE 25 G/50ML
25 INJECTION, SOLUTION INTRAVENOUS
Status: DISCONTINUED | OUTPATIENT
Start: 2024-01-20 | End: 2024-01-25 | Stop reason: HOSPADM

## 2024-01-20 RX ORDER — INSULIN ASPART 100 [IU]/ML
2-7 INJECTION, SOLUTION INTRAVENOUS; SUBCUTANEOUS EVERY 6 HOURS
Status: DISCONTINUED | OUTPATIENT
Start: 2024-01-20 | End: 2024-01-20

## 2024-01-20 RX ADMIN — INSULIN ASPART 3 UNITS: 100 INJECTION, SOLUTION INTRAVENOUS; SUBCUTANEOUS at 12:19

## 2024-01-20 RX ADMIN — Medication 10 ML: at 09:12

## 2024-01-20 RX ADMIN — AMPICILLIN AND SULBACTAM 1.5 G: 1; .5 INJECTION, POWDER, FOR SOLUTION INTRAVENOUS at 16:12

## 2024-01-20 RX ADMIN — METHYLPREDNISOLONE SODIUM SUCCINATE 40 MG: 40 INJECTION, POWDER, FOR SOLUTION INTRAMUSCULAR; INTRAVENOUS at 08:38

## 2024-01-20 RX ADMIN — IPRATROPIUM BROMIDE AND ALBUTEROL SULFATE 3 ML: 2.5; .5 SOLUTION RESPIRATORY (INHALATION) at 19:25

## 2024-01-20 RX ADMIN — Medication 10 ML: at 08:21

## 2024-01-20 RX ADMIN — INSULIN ASPART 4 UNITS: 100 INJECTION, SOLUTION INTRAVENOUS; SUBCUTANEOUS at 23:55

## 2024-01-20 RX ADMIN — PROPOFOL INJECTABLE EMULSION 45 MCG/KG/MIN: 10 INJECTION, EMULSION INTRAVENOUS at 00:23

## 2024-01-20 RX ADMIN — AMPICILLIN AND SULBACTAM 1.5 G: 1; .5 INJECTION, POWDER, FOR SOLUTION INTRAVENOUS at 21:04

## 2024-01-20 RX ADMIN — METHYLPREDNISOLONE SODIUM SUCCINATE 40 MG: 40 INJECTION, POWDER, FOR SOLUTION INTRAMUSCULAR; INTRAVENOUS at 14:59

## 2024-01-20 RX ADMIN — DEXMEDETOMIDINE HYDROCHLORIDE 1.5 MCG/KG/HR: 4 INJECTION, SOLUTION INTRAVENOUS at 19:48

## 2024-01-20 RX ADMIN — ENOXAPARIN SODIUM 40 MG: 40 INJECTION SUBCUTANEOUS at 08:12

## 2024-01-20 RX ADMIN — FENTANYL CITRATE 50 MCG: 50 INJECTION, SOLUTION INTRAMUSCULAR; INTRAVENOUS at 18:03

## 2024-01-20 RX ADMIN — SODIUM PHOSPHATE, MONOBASIC, MONOHYDRATE AND SODIUM PHOSPHATE, DIBASIC, ANHYDROUS 15 MMOL: 142; 276 INJECTION, SOLUTION INTRAVENOUS at 07:08

## 2024-01-20 RX ADMIN — ATORVASTATIN CALCIUM 20 MG: 20 TABLET, FILM COATED ORAL at 08:12

## 2024-01-20 RX ADMIN — METOPROLOL TARTRATE 50 MG: 50 TABLET, FILM COATED ORAL at 20:58

## 2024-01-20 RX ADMIN — Medication 10 ML: at 08:40

## 2024-01-20 RX ADMIN — ACETAMINOPHEN 650 MG: 325 TABLET ORAL at 20:57

## 2024-01-20 RX ADMIN — PROPOFOL INJECTABLE EMULSION 5 MCG/KG/MIN: 10 INJECTION, EMULSION INTRAVENOUS at 09:25

## 2024-01-20 RX ADMIN — GUAIFENESIN 1200 MG: 600 TABLET, EXTENDED RELEASE ORAL at 08:13

## 2024-01-20 RX ADMIN — ACETAMINOPHEN 650 MG: 325 TABLET ORAL at 03:38

## 2024-01-20 RX ADMIN — DEXMEDETOMIDINE HYDROCHLORIDE 1 MCG/KG/HR: 4 INJECTION, SOLUTION INTRAVENOUS at 07:08

## 2024-01-20 RX ADMIN — METHYLPREDNISOLONE SODIUM SUCCINATE 40 MG: 40 INJECTION, POWDER, FOR SOLUTION INTRAMUSCULAR; INTRAVENOUS at 21:04

## 2024-01-20 RX ADMIN — GUAIFENESIN 1200 MG: 600 TABLET, EXTENDED RELEASE ORAL at 20:23

## 2024-01-20 RX ADMIN — FENTANYL CITRATE 50 MCG: 50 INJECTION, SOLUTION INTRAMUSCULAR; INTRAVENOUS at 01:18

## 2024-01-20 RX ADMIN — NICOTINE 1 PATCH: 21 PATCH, EXTENDED RELEASE TRANSDERMAL at 08:25

## 2024-01-20 RX ADMIN — ALBUTEROL SULFATE 2.5 MG: 2.5 SOLUTION RESPIRATORY (INHALATION) at 13:42

## 2024-01-20 RX ADMIN — FENTANYL CITRATE 50 MCG: 50 INJECTION, SOLUTION INTRAMUSCULAR; INTRAVENOUS at 03:38

## 2024-01-20 RX ADMIN — FENTANYL CITRATE 50 MCG: 50 INJECTION, SOLUTION INTRAMUSCULAR; INTRAVENOUS at 05:59

## 2024-01-20 RX ADMIN — AMPICILLIN AND SULBACTAM 1.5 G: 1; .5 INJECTION, POWDER, FOR SOLUTION INTRAVENOUS at 02:54

## 2024-01-20 RX ADMIN — PANTOPRAZOLE SODIUM 40 MG: 40 INJECTION, POWDER, FOR SOLUTION INTRAVENOUS at 08:11

## 2024-01-20 RX ADMIN — Medication 10 ML: at 20:58

## 2024-01-20 RX ADMIN — DOXYCYCLINE 100 MG: 100 INJECTION, POWDER, LYOPHILIZED, FOR SOLUTION INTRAVENOUS at 08:25

## 2024-01-20 RX ADMIN — FENTANYL CITRATE 50 MCG: 50 INJECTION, SOLUTION INTRAMUSCULAR; INTRAVENOUS at 19:51

## 2024-01-20 RX ADMIN — ASPIRIN 81 MG: 81 TABLET, COATED ORAL at 08:12

## 2024-01-20 RX ADMIN — DEXMEDETOMIDINE HYDROCHLORIDE 1.5 MCG/KG/HR: 4 INJECTION, SOLUTION INTRAVENOUS at 01:09

## 2024-01-20 RX ADMIN — Medication 10 ML: at 14:59

## 2024-01-20 RX ADMIN — METHYLPREDNISOLONE SODIUM SUCCINATE 60 MG: 125 INJECTION, POWDER, FOR SOLUTION INTRAMUSCULAR; INTRAVENOUS at 02:54

## 2024-01-20 RX ADMIN — METOPROLOL TARTRATE 50 MG: 50 TABLET, FILM COATED ORAL at 08:13

## 2024-01-20 RX ADMIN — FUROSEMIDE 40 MG: 10 INJECTION, SOLUTION INTRAMUSCULAR; INTRAVENOUS at 09:21

## 2024-01-20 RX ADMIN — CLOPIDOGREL BISULFATE 75 MG: 75 TABLET ORAL at 08:12

## 2024-01-20 RX ADMIN — AMPICILLIN AND SULBACTAM 1.5 G: 1; .5 INJECTION, POWDER, FOR SOLUTION INTRAVENOUS at 08:21

## 2024-01-20 RX ADMIN — INSULIN ASPART 3 UNITS: 100 INJECTION, SOLUTION INTRAVENOUS; SUBCUTANEOUS at 05:56

## 2024-01-20 RX ADMIN — Medication 10 ML: at 18:04

## 2024-01-20 RX ADMIN — PROPOFOL INJECTABLE EMULSION 50 MCG/KG/MIN: 10 INJECTION, EMULSION INTRAVENOUS at 18:36

## 2024-01-20 RX ADMIN — DOCUSATE SODIUM 50 MG AND SENNOSIDES 8.6 MG 2 TABLET: 8.6; 5 TABLET, FILM COATED ORAL at 08:12

## 2024-01-20 RX ADMIN — DEXMEDETOMIDINE HYDROCHLORIDE 1.3 MCG/KG/HR: 4 INJECTION, SOLUTION INTRAVENOUS at 15:01

## 2024-01-20 RX ADMIN — Medication 10 ML: at 09:23

## 2024-01-20 RX ADMIN — DOXYCYCLINE 100 MG: 100 INJECTION, POWDER, LYOPHILIZED, FOR SOLUTION INTRAVENOUS at 21:04

## 2024-01-20 RX ADMIN — IPRATROPIUM BROMIDE AND ALBUTEROL SULFATE 3 ML: 2.5; .5 SOLUTION RESPIRATORY (INHALATION) at 11:44

## 2024-01-20 RX ADMIN — IPRATROPIUM BROMIDE AND ALBUTEROL SULFATE 3 ML: 2.5; .5 SOLUTION RESPIRATORY (INHALATION) at 07:40

## 2024-01-20 RX ADMIN — CHLORHEXIDINE GLUCONATE 0.12% ORAL RINSE 15 ML: 1.2 LIQUID ORAL at 08:29

## 2024-01-20 RX ADMIN — PROPOFOL INJECTABLE EMULSION 25 MCG/KG/MIN: 10 INJECTION, EMULSION INTRAVENOUS at 12:15

## 2024-01-20 RX ADMIN — IPRATROPIUM BROMIDE AND ALBUTEROL SULFATE 3 ML: 2.5; .5 SOLUTION RESPIRATORY (INHALATION) at 23:02

## 2024-01-20 RX ADMIN — VENLAFAXINE HYDROCHLORIDE 75 MG: 37.5 CAPSULE, EXTENDED RELEASE ORAL at 08:13

## 2024-01-20 RX ADMIN — IPRATROPIUM BROMIDE AND ALBUTEROL SULFATE 3 ML: 2.5; .5 SOLUTION RESPIRATORY (INHALATION) at 03:10

## 2024-01-20 RX ADMIN — IPRATROPIUM BROMIDE AND ALBUTEROL SULFATE 3 ML: 2.5; .5 SOLUTION RESPIRATORY (INHALATION) at 15:37

## 2024-01-20 RX ADMIN — INSULIN ASPART 3 UNITS: 100 INJECTION, SOLUTION INTRAVENOUS; SUBCUTANEOUS at 18:36

## 2024-01-20 RX ADMIN — CHLORHEXIDINE GLUCONATE 0.12% ORAL RINSE 15 ML: 1.2 LIQUID ORAL at 20:28

## 2024-01-20 NOTE — PROGRESS NOTES
Consult Daily Progress Note  [address]  9/23/2023    Patient Name:  Valeria Rubi  MRN:  [id number]   YOB: 1975  Age: 48 y.o.  Sex: female  LOS: 2    Reason for Consult:  Acute respiratory failure    Hospital Course:   48-year-old female with a history of chronic obstructive pulmonary disease, coronary disease, hypertension, hyperlipidemia presented to the hospital with shortness of breath and wheezing and found to have COPD exacerbation secondary to RSV pneumonia.  Initially on BiPAP for work of breathing however due to worsening respiratory status was intubated (1/18/2024)    Interval History:  Intubated yesterday for worsening respiratory status  On sedation with propofol and dexmedetomidine  Blood pressure is appropriate  Unable to acquire history.    Physical Exam:  Vitals:    01/19/24 1843   BP:    Pulse: 90   Resp: 19   Temp:    SpO2: 94%       Intake/Output    Intake/Output Summary (Last 24 hours) at 1/19/2024 1905  Last data filed at 1/19/2024 1500  Gross per 24 hour   Intake 3458.86 ml   Output 1120 ml   Net 2338.86 ml     General: Intubated, sedated  HEENT: NC/AT, EOMI, MMM  Neck: Supple, trachea midline  Cardiac: RRR, no murmur, gallops, rubs  Pulmonary: Mechanical breath sounds  GI: Soft, non-tender, non-distended, normal bowel sounds  Extremities: Warm, well perfused, no LE edema  Skin: no visible rash  Neuro: CN II - XII grossly intact  Psychiatry: Sedated    Data Review:  Results from last 7 days   Lab Units 01/19/24  0535 01/18/24  0408 01/17/24  0435   WBC 10*3/mm3 7.47 6.40 13.15*   HEMOGLOBIN g/dL 11.4* 11.7* 15.1   PLATELETS 10*3/mm3 197 176 242     Results from last 7 days   Lab Units 01/19/24  1507 01/19/24  0535 01/18/24  0408 01/17/24  0435   SODIUM mmol/L  --  143 137 130*   POTASSIUM mmol/L  --  4.9 4.4 4.7   CHLORIDE mmol/L  --  111* 103 90*   CO2 mmol/L  --  27.7 28.8 30.9*   BUN mg/dL  --  32* 37* 11   CREATININE mg/dL  --  0.72 0.99 0.75    GLUCOSE mg/dL  --  222* 183* 133*   CALCIUM mg/dL  --  7.9* 7.9* 9.3   MAGNESIUM mg/dL  --  2.9*  --   --    PHOSPHORUS mg/dL 2.0* 2.1*  --   --    Estimated Creatinine Clearance: 85.6 mL/min (by C-G formula based on SCr of 0.72 mg/dL).    Results from last 7 days   Lab Units 01/19/24  0535 01/18/24  0408 01/17/24  0700 01/17/24  0642 01/17/24  0435   AST (SGOT) U/L 14  --   --   --  22   ALT (SGPT) U/L 9  --   --   --  10   PROCALCITONIN ng/mL 10.84* 35.76*  --  46.14*  --    LACTATE mmol/L  --   --  1.5  --   --    PLATELETS 10*3/mm3 197 176  --   --  242     Results from last 7 days   Lab Units 01/19/24  0520 01/18/24  2130 01/18/24  1355 01/18/24  0905 01/17/24  0815 01/17/24  0625   PH, ARTERIAL pH units 7.287* 7.308* 7.242* 7.233* 7.231* 7.173*   PCO2, ARTERIAL mm Hg 54.5* 50.8* 61.8* 65.5* 69.0* 82.4*   PO2 ART mm Hg 70.8* 117.0* 119.0* 93.2 105.0 107.0   HCO3 ART mmol/L 26.0 25.5 26.6* 27.6* 28.9* 30.3*     Imaging:  Reviewed chest images personally from past 3 days    ASSESSMENT  /  PLAN:    Acute hypoxic and hypercapnic respiratory failure  Intubated 1/18  Acute exacerbation of chronic obstructive pulmonary disease  RSV pneumonia  Leukocytosis  Tachycardia  Hypertension  Peripheral arterial disease  Coronary disease  Hyperlipidemia     -Patient with worsening respiratory status on BiPAP and decision was made to intubate.  -Significant resistance noted on ventilator  -Continue Solu-Medrol for COPD exacerbation.  -Continue bronchodilator therapy.  -Continue empiric antibiotics with Unasyn and doxycycline.   -Wean sedation as tolerated to keep patient comfortable, continue sedation holidays every day  -Supportive care for RSV.  -Extubation trials to start tomorrow    Critical care time: 35 minutes    Thank you for allowing us to participate in this patients care. Pulmonary will continue to follow.     Mikael Robb MD  [facility]  Pulmonary and Critical Care Medicine, Interventional  Pulmonology    Parts of this note may be an electronic transcription/translation of spoken language to printed text using the Dragon dictation system.

## 2024-01-20 NOTE — PROGRESS NOTES
[facility]         Dr Mikala Lopes   LOS: 3 days   Patient Care Team:  Talib Ceron MD as PCP - General (Family Medicine)    Chief Complaint: Acute hypoxemic hypercapnic respite failure RSV pneumonia suspect underlying obstructive lung disease events noted chart reviewed.  She is currently on Precedex drip    Interval History: Events noted chart reviewed.  She is currently on Precedex drip.  Tube feeds currently ongoing.  Slightly tachypneic on the vent breathing over the vent.  Currently not following commands for me.    REVIEW OF SYSTEMS:   Sedated intubated     Ventilator/Non-Invasive Ventilation Settings (From admission, onward)       Start     Ordered    01/19/24 2130  Ventilator - Vent Mode: AC/PC; Rate: Other; Rate: 18; FiO2: Titrate Per SpO2; Titrate Oxygen for SpO2: 88% or Greater; PEEP: 5  Continuous        Question Answer Comment   Vent Mode AC/PC    Rate Other    Rate 18    FiO2 Titrate Per SpO2    Titrate Oxygen for SpO2 88% or Greater    PEEP 5        01/19/24 2130    01/19/24 0145  Ventilator - Vent Mode: AC/VC; Rate: Other; Rate: 18; FiO2: Titrate Per SpO2; Titrate Oxygen for SpO2: 90 - 95%; PEEP: 5; Tidal Volume: mL; TV: 450  Continuous,   Status:  Canceled        Question Answer Comment   Vent Mode AC/VC    Rate Other    Rate 18    FiO2 Titrate Per SpO2    Titrate Oxygen for SpO2 90 - 95%    PEEP 5    Tidal Volume mL            01/19/24 0144    01/18/24 2214  Ventilator - Vent Mode: AC/VC; Rate: Other; Rate: 18; FiO2: 30%; PEEP: 5; Tidal Volume: mL; TV: 450  Continuous,   Status:  Canceled        Question Answer Comment   Vent Mode AC/VC    Rate Other    Rate 18    FiO2 30%    PEEP 5    Tidal Volume mL            01/18/24 2213    01/18/24 1200  Ventilator - Vent Mode: AC/VC; Rate: Other; Rate: 18; FiO2: 30%; PEEP: 5; Tidal Volume: mL; TV: 500  Continuous,   Status:  Canceled        Question Answer Comment   Vent Mode AC/VC    Rate Other    Rate 18    FiO2 30%  "   PEEP 5    Tidal Volume mL            01/18/24 1743    01/17/24 1528  NIPPV (CPAP or BIPAP)  Until Discontinued,   Status:  Canceled        Question Answer Comment   Indication Acute Respiratory Failure    Type BIPAP    IPAP 18    EPAP 6    Breath Rate 16    Titrate Oxygen for SpO2 88 - 92%        01/17/24 1529    01/17/24 0428  NIPPV (CPAP or BIPAP)  Until Discontinued,   Status:  Canceled        Question:  Type  Answer:  AutoBIPAP    01/17/24 0428                      Vital Signs  Temp:  [97.5 °F (36.4 °C)-100.6 °F (38.1 °C)] 97.9 °F (36.6 °C)  Heart Rate:  [81-98] 98  Resp:  [17-24] 22  BP: (126-173)/(69-98) 147/79  FiO2 (%):  [25 %-30 %] 26 %    Intake/Output Summary (Last 24 hours) at 1/20/2024 0805  Last data filed at 1/20/2024 0734  Gross per 24 hour   Intake 2837.83 ml   Output 1130 ml   Net 1707.83 ml     Flowsheet Rows      Flowsheet Row First Filed Value   Admission Height 157.5 cm (62\") Documented at 01/17/2024 0423   Admission Weight 57.2 kg (126 lb) Documented at 01/17/2024 0423            Physical Exam:  Patient is examined using the personal protective equipment as per guidelines from infection control for this particular patient as enacted.  Hand hygiene was performed before and after patient interaction.   General Appearance:  Sedated intubated.  ET tube good position orally.  Not following commands for me at this time  ENT ET tube good position orally   Neck midline trachea, no thyromegaly   Lungs:   Equal breath sounds on the vent.  Overbreathing the vent currently.    Heart:    Regular rhythm and normal rate, normal S1 and S2, no            murmur, no gallop, no rub, no click   Chest Wall:    No abnormalities observed   Abdomen:     Normal bowel sounds, no masses, no organomegaly, soft        nontender, nondistended, no guarding, no rebound                tenderness   Extremities:   Moves all extremities well, no edema, no cyanosis, no             redness  CNS sedated intubated not " following  Skin no rashes no nodules  Musculoskeletal no cyanosis no clubbing normal range of motion     Results Review:        Results from last 7 days   Lab Units 01/20/24  0532 01/19/24  0535 01/18/24  0408   SODIUM mmol/L 143 143 137   POTASSIUM mmol/L 4.5 4.9 4.4   CHLORIDE mmol/L 110* 111* 103   CO2 mmol/L 30.9* 27.7 28.8   BUN mg/dL 29* 32* 37*   CREATININE mg/dL 0.76 0.72 0.99   GLUCOSE mg/dL 242* 222* 183*   CALCIUM mg/dL 7.8* 7.9* 7.9*     Results from last 7 days   Lab Units 01/17/24  0642 01/17/24  0435   HSTROP T ng/L 10 10     Results from last 7 days   Lab Units 01/20/24  0532 01/19/24  0535 01/18/24  0408   WBC 10*3/mm3 5.26 7.47 6.40   HEMOGLOBIN g/dL 11.5* 11.4* 11.7*   HEMATOCRIT % 37.7 37.6 37.5   PLATELETS 10*3/mm3 202 197 176             Results from last 7 days   Lab Units 01/19/24  0535   MAGNESIUM mg/dL 2.9*         Results from last 7 days   Lab Units 01/20/24  0726   PH, ARTERIAL pH units 7.344*   PO2 ART mm Hg 84.0   PCO2, ARTERIAL mm Hg 53.4*   HCO3 ART mmol/L 29.1*       I reviewed the patient's new clinical results.  I personally viewed and interpreted the patient's chest x-ray.        Medication Review:   ampicillin-sulbactam, 1.5 g, Intravenous, Q6H  aspirin, 81 mg, Oral, Daily  atorvastatin, 20 mg, Oral, Daily  chlorhexidine, 15 mL, Mouth/Throat, Q12H  clopidogrel, 75 mg, Oral, Daily  doxycycline, 100 mg, Intravenous, Q12H  enoxaparin, 40 mg, Subcutaneous, Q24H  guaiFENesin, 1,200 mg, Oral, BID  Insulin Aspart, 2-7 Units, Subcutaneous, Q6H  ipratropium-albuterol, 3 mL, Nebulization, Q4H - RT  methylPREDNISolone sodium succinate, 60 mg, Intravenous, Q6H  metoprolol tartrate, 50 mg, Nasogastric, Q12H  nicotine, 1 patch, Transdermal, Q24H  pantoprazole, 40 mg, Intravenous, Q24H  senna-docusate sodium, 2 tablet, Oral, BID  sodium chloride, 10 mL, Intravenous, Q12H  sodium chloride, 10 mL, Intravenous, Q12H  sodium chloride, 10 mL, Intravenous, Q12H  sodium chloride, 10 mL, Intravenous,  Q12H  sodium phosphate, 15 mmol, Intravenous, Once  venlafaxine XR, 75 mg, Oral, Daily        dexmedetomidine, 0.2-1.5 mcg/kg/hr, Last Rate: 1 mcg/kg/hr (01/20/24 0708)  Pharmacy to Dose enoxaparin (LOVENOX),   propofol, 5-50 mcg/kg/min, Last Rate: Stopped (01/20/24 0722)        ASSESSMENT:   Acute hypoxic and hypercapnic respiratory failure  Intubated 1/18  Acute exacerbation of chronic obstructive pulmonary disease  RSV pneumonia  Leukocytosis  Tachycardia  Hypertension  Peripheral arterial disease  Coronary disease  Hyperlipidemia    PLAN:  ABGs reviewed this morning.  Low volumes noted with current inspiratory pressure.  HME was changed out and volumes improved some.  Will attempt to maintain slightly higher minute ventilation and have dropped inspiratory pressure slightly to 23.  Not ready to wean at this time until peak pressures improved.  Chest x-ray And CT chest reviewed.  Remains on empiric antibiotics.  I suspect he may have some viral bronchitis/pneumonia  I will give her 1 dose of diuretics.  Continue steroids will start weaning down as tolerated  Continue bronchodilators  Sedation vacation per protocol  Tube feeds currently goals  ICU core measures    Critical care time 35 minutes    Mikala Lopes MD  01/20/24  08:05 EST

## 2024-01-20 NOTE — PLAN OF CARE
Goal Outcome Evaluation:  Plan of Care Reviewed With: other (see comments)        Progress: no change  Outcome Evaluation: Recieved alsix this am, UOP has been well. pt has been with agitation and distress this evening. she is maxed on sedation and recieved Fent 50 mcg. she has been tachypnic and tachycardic this evening with 's. she is resting well now after fentayl administered.     Typo Error correction: received lasix in am   Uop 2600 this shift  Propofol ay 50 mcg/k/min  Precedex at 1.5 mcg/kg/h    Has had 3 incontinent liquid bowel movements this shift. Tube feeding continues at goal of 35 ml/h. No free water flushes infusing. Solumedrol decreased to 40 but blood sugars remain in thew 200's.    Remains in restraints and is restless, agitated and distressed with stimulation. Did not tolerate any weaning today. Sedation vacation at 0700 . Pt did open eyes but did not follow commands off propofol but precedex remained infusing.

## 2024-01-20 NOTE — NURSING NOTE
Sedation vacation started at 0705, preceded continues but propofol off for almost 2 hours. Pt did open eyes on command and became tachypnic with stimulation, but did not follow any other commands. Propofol restarted at much lower dose, will titrate as needed

## 2024-01-20 NOTE — PROGRESS NOTES
"Hospital Medicine Team    LOS 3 days      Patient Care Team:  Talib Ceron MD as PCP - General (Family Medicine)      Subjective       Chief Complaint:  f/u resp failure    Subjective    Intubated and sedated today. O'n events noted with high peak pressures on vent improved with suctioning and changing vent mode    Objective       Vital Signs  Temp:  [97.5 °F (36.4 °C)-100.6 °F (38.1 °C)] 99.2 °F (37.3 °C)  Heart Rate:  [] 96  Resp:  [18-27] 24  BP: (126-173)/(69-99) 165/94  FiO2 (%):  [25 %-93 %] 93 %  Oxygen Therapy  SpO2: 96 %  Pulse Oximetry Type: Continuous  Device (Oxygen Therapy): ventilator  Flow (L/min): 6  Oxygen Concentration (%): 25  ETCO2 (mmHg): 46 mmHg  Flowsheet Rows      Flowsheet Row First Filed Value   Admission Height 157.5 cm (62\") Documented at 01/17/2024 0423   Admission Weight 57.2 kg (126 lb) Documented at 01/17/2024 0423                Physical Exam:  Physical Exam  Vitals reviewed.   Constitutional:       Appearance: She is ill-appearing.   HENT:      Mouth/Throat:      Comments: Et tube in place  Cardiovascular:      Rate and Rhythm: Normal rate.   Pulmonary:      Breath sounds: Examination of the right-lower field reveals decreased breath sounds. Examination of the left-lower field reveals decreased breath sounds. Decreased breath sounds present.   Abdominal:      General: There is no distension.   Neurological:      Comments: Sedated on vent            Results Review:    I reviewed the patient's new clinical results.    [x]  Laboratory  [x]  Microbiology  [x]  Radiology  []  EKG/Telemetry   []  Cardiology/Vascular   []  Pathology  []  Old records  []  Other:      X-rays, labs reviewed personally by physician.    Medication Review:   I have reviewed the patient's current medication list    Scheduled Meds  ampicillin-sulbactam, 1.5 g, Intravenous, Q6H  aspirin, 81 mg, Oral, Daily  atorvastatin, 20 mg, Oral, Daily  chlorhexidine, 15 mL, Mouth/Throat, Q12H  clopidogrel, 75 mg, " Oral, Daily  doxycycline, 100 mg, Intravenous, Q12H  enoxaparin, 40 mg, Subcutaneous, Q24H  guaiFENesin, 1,200 mg, Oral, BID  Insulin Aspart, 2-7 Units, Subcutaneous, Q6H  ipratropium-albuterol, 3 mL, Nebulization, Q4H - RT  methylPREDNISolone sodium succinate, 40 mg, Intravenous, Q6H  metoprolol tartrate, 50 mg, Nasogastric, Q12H  nicotine, 1 patch, Transdermal, Q24H  pantoprazole, 40 mg, Intravenous, Q24H  senna-docusate sodium, 2 tablet, Oral, BID  sodium chloride, 10 mL, Intravenous, Q12H  sodium chloride, 10 mL, Intravenous, Q12H  sodium chloride, 10 mL, Intravenous, Q12H  sodium chloride, 10 mL, Intravenous, Q12H  venlafaxine XR, 75 mg, Oral, Daily        Meds Infusions  dexmedetomidine, 0.2-1.5 mcg/kg/hr, Last Rate: 1 mcg/kg/hr (01/20/24 0708)  Pharmacy to Dose enoxaparin (LOVENOX),   propofol, 5-50 mcg/kg/min, Last Rate: 15 mcg/kg/min (01/20/24 0944)        Meds PRN    acetaminophen **OR** acetaminophen    albuterol    senna-docusate sodium **AND** polyethylene glycol **AND** bisacodyl **AND** bisacodyl    senna-docusate sodium **AND** polyethylene glycol **AND** bisacodyl **AND** bisacodyl    Calcium Replacement - Follow Nurse / BPA Driven Protocol    dextrose    dextrose    fentaNYL citrate (PF)    fluticasone    glucagon (human recombinant)    hydrALAZINE    ipratropium-albuterol    Magnesium Standard Dose Replacement - Follow Nurse / BPA Driven Protocol    metoprolol tartrate    nitroglycerin    ondansetron ODT **OR** ondansetron    Pharmacy to Dose enoxaparin (LOVENOX)    Phosphorus Replacement - Follow Nurse / BPA Driven Protocol    Potassium Replacement - Follow Nurse / BPA Driven Protocol    [COMPLETED] Insert Peripheral IV **AND** sodium chloride    sodium chloride    sodium chloride    sodium chloride    sodium chloride        Assessment / Plan       Active Hospital Problems:  Active Hospital Problems    Diagnosis  POA    **Respiratory failure [J96.90]  Yes      Resolved Hospital Problems   No  resolved problems to display.     Acute Hypoxic/Hypercapnic Respiratory Failure d/t RSV pneumonia w/ suspected superimposed GN bacterial PNA :  -intubated 1/18/24  -continue vent per pulm, not yet ready to wean  -procal improving  -sputum Cx w/normal resp gabe  -conitnue IV steroids  -continue Unasyn and doxycycline   -pulm following appreciate recs,     Hypophosphatemia  -follow   -replace per protocol   -other lytes stable     Essential Hypertension  -overall stable. Continue home metoprolol via ng tube. Prn hydralazine     PAD/CAD:   -chronic and stable, continue DAPT and statin as able     Anxiety   -chronic and currently sedated on vent     Chronic Migraines:   -chronic    Tobacco Abuse   - on cessation as appropriate.  On Nicoderm.    Nutrition  -dietician following add TF per their recs today    DVT ppx-lovenox      Plan for disposition:defer for now to remain in ICU    Electronically signed by Ravinder Estrella DO, 01/20/24, 11:33 EST.      Note disclaimer: At [facility], we believe that sharing information builds trust and better relationships. You are receiving this note because you recently visited [facility]. It is possible you will see health information before a provider has talked with you about it. This kind of information can be easy to misunderstand. To help you fully understand what it means for your health, we urge you to discuss this note with your provider.

## 2024-01-20 NOTE — SIGNIFICANT NOTE
09/19/23 0807   Pain Assessment   Pain Assessment Tool 0-10   Pain Score 7   Pain Location/Orientation Orientation: Right;Location: Shoulder   Restrictions/Precautions   Precautions Bed/chair alarms;Cognitive; Fall Risk;Supervision on toilet/commode   Cognition   Overall Cognitive Status Impaired   Subjective   Subjective "i'm leaving today"   Roll Left and Right   Type of Assistance Needed Physical assistance   Physical Assistance Level 51%-75%   Comment use of bed rail   Roll Left and Right CARE Score 2   Lying to Sitting on Side of Bed   Type of Assistance Needed Physical assistance   Physical Assistance Level 51%-75%   Comment scooting to edge of bed   Lying to Sitting on Side of Bed CARE Score 2   Bed-Chair Transfer   Type of Assistance Needed Physical assistance;Verbal cues   Physical Assistance Level 51%-75%   Comment slideboard txs; cueing to utilize LEs   Chair/Bed-to-Chair Transfer CARE Score 2   Car Transfer   Reason if not Attempted Refused to perform   Car Transfer CARE Score 7   Walk 10 Feet   Reason if not Attempted Refused to perform   Walk 10 Feet CARE Score 7   Walk 50 Feet with Two Turns   Reason if not Attempted Safety concerns   Walk 50 Feet with Two Turns CARE Score 88   Walk 150 Feet   Reason if not Attempted Safety concerns   Walk 150 Feet CARE Score 88   Walking 10 Feet on Uneven Surfaces   Reason if not Attempted Safety concerns   Walking 10 Feet on Uneven Surfaces CARE Score 88   Ambulation   Findings wanted to have pt walk, but pt did not want to aggrevate the knee today   Curb or Single Stair   Reason if not Attempted Safety concerns   1 Step (Curb) CARE Score 88   4 Steps   Reason if not Attempted Safety concerns   4 Steps CARE Score 88   12 Steps   Reason if not Attempted Safety concerns   12 Steps CARE Score 88   Therapeutic Interventions   Neuromuscular Re-Education seated, edge of mat working on unsupported core strengthening exs, trunk rotation, PNF with wt ball, lateral reaching Contacted Dr. Flanagan about increased PIP 38-48, changed to PC, doing much better.   while wt bearing on R elbow on wedge; performed sets until pt fatigued x2   Equipment Use   NuStep 15mins L1-2, LEs only   Assessment   Treatment Assessment pt demonstrating muscle return in RUE and LE present during ther exs and mobility. pt cont to not want to walk this session due to fear of R knee buckling. pt cont to be below baseline, but making good progress. pt to be d/c to SNF for further rehab with goals to work on walking, improving standing tolerance and strength to decrease burden of care and progress with functional mobility for home. Problem List Decreased strength;Decreased range of motion;Decreased endurance; Impaired balance;Decreased mobility; Decreased coordination;Decreased cognition; Impaired judgement;Decreased safety awareness; Obesity;Pain; Impaired sensation   Barriers to Discharge Inaccessible home environment;Decreased caregiver support   PT Barriers   Functional Limitation Ramp negotiation;Car transfers;Stair negotiation;Transfers; Walking;Standing   Plan   Progress Progressing toward goals   Recommendation   PT Discharge Recommendation Post acute rehabilitation services   Equipment Recommended Wheelchair   PT Therapy Minutes   PT Time In 0830   PT Time Out 1000   PT Total Time (minutes) 90   PT Mode of treatment - Individual (minutes) 90   PT Mode of treatment - Concurrent (minutes) 0   PT Mode of treatment - Group (minutes) 0   PT Mode of treatment - Co-treat (minutes) 0   PT Mode of Treatment - Total time(minutes) 90 minutes   PT Cumulative Minutes 1775   Therapy Time missed   Time missed?  No

## 2024-01-20 NOTE — PROGRESS NOTES
Patient with elevated peak pressures on vent. RT has performed lavage and cleared numerous large mucus plugs. Additional sedation given by ICU RN. Vent settings changed to pressure support with peak pressures now improving.     Strongly suspect patient would benefit from bronchoscopy to evaluate mucus plugs and persistent tree in bud findings on CT.

## 2024-01-20 NOTE — PLAN OF CARE
Goal Outcome Evaluation:  Plan of Care Reviewed With: patient         Had temp. Tonight. 100.6 max at this time. Tylenol given. See vent changes. Pt. With less ventilator asynchrony this night compared to Thursday night. Phos. Replaced this shift. Added Q6 SSI W/ accu checks.  Danette. TF. Feeding at goal.Danette.wrist restraints.

## 2024-01-21 LAB
ANION GAP SERPL CALCULATED.3IONS-SCNC: -0.5 MMOL/L (ref 5–15)
ARTERIAL PATENCY WRIST A: ABNORMAL
ATMOSPHERIC PRESS: 756 MMHG
BASE EXCESS BLDA CALC-SCNC: 5.4 MMOL/L (ref 0–2)
BDY SITE: ABNORMAL
BODY TEMPERATURE: 37
BUN SERPL-MCNC: 34 MG/DL (ref 6–20)
BUN/CREAT SERPL: 47.2 (ref 7–25)
CALCIUM SPEC-SCNC: 7.6 MG/DL (ref 8.6–10.5)
CHLORIDE SERPL-SCNC: 109 MMOL/L (ref 98–107)
CO2 SERPL-SCNC: 39.5 MMOL/L (ref 22–29)
CREAT SERPL-MCNC: 0.72 MG/DL (ref 0.57–1)
DEPRECATED RDW RBC AUTO: 53.2 FL (ref 37–54)
EGFRCR SERPLBLD CKD-EPI 2021: 102 ML/MIN/1.73
ERYTHROCYTE [DISTWIDTH] IN BLOOD BY AUTOMATED COUNT: 13.9 % (ref 12.3–15.4)
GLUCOSE BLDC GLUCOMTR-MCNC: 162 MG/DL (ref 70–130)
GLUCOSE BLDC GLUCOMTR-MCNC: 205 MG/DL (ref 70–130)
GLUCOSE BLDC GLUCOMTR-MCNC: 210 MG/DL (ref 70–130)
GLUCOSE SERPL-MCNC: 233 MG/DL (ref 65–99)
HBA1C MFR BLD: 5.8 % (ref 4.8–5.6)
HCO3 BLDA-SCNC: 31.7 MMOL/L (ref 20–26)
HCT VFR BLD AUTO: 36.5 % (ref 34–46.6)
HGB BLD-MCNC: 11.2 G/DL (ref 12–15.9)
HGB BLDA-MCNC: 12.1 G/DL (ref 13.5–17.5)
INHALED O2 CONCENTRATION: 25 %
Lab: ABNORMAL
MAGNESIUM SERPL-MCNC: 2.9 MG/DL (ref 1.6–2.6)
MCH RBC QN AUTO: 31.7 PG (ref 26.6–33)
MCHC RBC AUTO-ENTMCNC: 30.7 G/DL (ref 31.5–35.7)
MCV RBC AUTO: 103.4 FL (ref 79–97)
MODALITY: ABNORMAL
PCO2 BLDA: 53.4 MM HG (ref 35–45)
PCO2 TEMP ADJ BLD: 53.4 MM HG (ref 35–45)
PH BLDA: 7.38 PH UNITS (ref 7.35–7.45)
PH, TEMP CORRECTED: 7.38 PH UNITS (ref 7.35–7.45)
PHOSPHATE SERPL-MCNC: 1.9 MG/DL (ref 2.5–4.5)
PHOSPHATE SERPL-MCNC: 2.6 MG/DL (ref 2.5–4.5)
PLATELET # BLD AUTO: 198 10*3/MM3 (ref 140–450)
PMV BLD AUTO: 9.8 FL (ref 6–12)
PO2 BLDA: 72 MM HG (ref 83–108)
PO2 TEMP ADJ BLD: 72 MM HG (ref 83–108)
POTASSIUM SERPL-SCNC: 4.2 MMOL/L (ref 3.5–5.2)
RBC # BLD AUTO: 3.53 10*6/MM3 (ref 3.77–5.28)
SAO2 % BLDCOA: 95.5 % (ref 94–99)
SET MECH RESP RATE: 18
SODIUM SERPL-SCNC: 148 MMOL/L (ref 136–145)
VENTILATOR MODE: ABNORMAL
WBC NRBC COR # BLD AUTO: 6.3 10*3/MM3 (ref 3.4–10.8)

## 2024-01-21 PROCEDURE — 25010000002 FENTANYL CITRATE (PF) 50 MCG/ML SOLUTION: Performed by: INTERNAL MEDICINE

## 2024-01-21 PROCEDURE — 25810000003 SODIUM CHLORIDE 0.9 % SOLUTION

## 2024-01-21 PROCEDURE — 63710000001 INSULIN ASPART PER 5 UNITS: Performed by: INTERNAL MEDICINE

## 2024-01-21 PROCEDURE — 94668 MNPJ CHEST WALL SBSQ: CPT

## 2024-01-21 PROCEDURE — 84100 ASSAY OF PHOSPHORUS: CPT | Performed by: INTERNAL MEDICINE

## 2024-01-21 PROCEDURE — 94799 UNLISTED PULMONARY SVC/PX: CPT

## 2024-01-21 PROCEDURE — 85027 COMPLETE CBC AUTOMATED: CPT | Performed by: INTERNAL MEDICINE

## 2024-01-21 PROCEDURE — 25010000002 AMPICILLIN-SULBACTAM PER 1.5 G: Performed by: INTERNAL MEDICINE

## 2024-01-21 PROCEDURE — 83735 ASSAY OF MAGNESIUM: CPT | Performed by: INTERNAL MEDICINE

## 2024-01-21 PROCEDURE — 80048 BASIC METABOLIC PNL TOTAL CA: CPT | Performed by: INTERNAL MEDICINE

## 2024-01-21 PROCEDURE — 63710000001 INSULIN DETEMIR PER 5 UNITS: Performed by: INTERNAL MEDICINE

## 2024-01-21 PROCEDURE — 94761 N-INVAS EAR/PLS OXIMETRY MLT: CPT

## 2024-01-21 PROCEDURE — 36600 WITHDRAWAL OF ARTERIAL BLOOD: CPT

## 2024-01-21 PROCEDURE — 25010000002 METHYLPREDNISOLONE PER 40 MG: Performed by: INTERNAL MEDICINE

## 2024-01-21 PROCEDURE — 94664 DEMO&/EVAL PT USE INHALER: CPT

## 2024-01-21 PROCEDURE — 82948 REAGENT STRIP/BLOOD GLUCOSE: CPT

## 2024-01-21 PROCEDURE — 83036 HEMOGLOBIN GLYCOSYLATED A1C: CPT | Performed by: INTERNAL MEDICINE

## 2024-01-21 PROCEDURE — 99233 SBSQ HOSP IP/OBS HIGH 50: CPT | Performed by: INTERNAL MEDICINE

## 2024-01-21 PROCEDURE — 25010000002 ACETAZOLAMIDE PER 500 MG: Performed by: INTERNAL MEDICINE

## 2024-01-21 PROCEDURE — 25010000002 ENOXAPARIN PER 10 MG: Performed by: NURSE PRACTITIONER

## 2024-01-21 PROCEDURE — 82803 BLOOD GASES ANY COMBINATION: CPT

## 2024-01-21 PROCEDURE — 94003 VENT MGMT INPAT SUBQ DAY: CPT

## 2024-01-21 PROCEDURE — 25010000002 PROPOFOL 10 MG/ML EMULSION: Performed by: HOSPITALIST

## 2024-01-21 RX ORDER — ACETAZOLAMIDE 500 MG/5ML
250 INJECTION, POWDER, LYOPHILIZED, FOR SOLUTION INTRAVENOUS DAILY
Status: COMPLETED | OUTPATIENT
Start: 2024-01-21 | End: 2024-01-23

## 2024-01-21 RX ORDER — NICOTINE POLACRILEX 4 MG
15 LOZENGE BUCCAL
Status: DISCONTINUED | OUTPATIENT
Start: 2024-01-21 | End: 2024-01-25 | Stop reason: HOSPADM

## 2024-01-21 RX ORDER — DEXTROSE MONOHYDRATE 25 G/50ML
25 INJECTION, SOLUTION INTRAVENOUS
Status: DISCONTINUED | OUTPATIENT
Start: 2024-01-21 | End: 2024-01-25 | Stop reason: HOSPADM

## 2024-01-21 RX ORDER — IBUPROFEN 600 MG/1
1 TABLET ORAL
Status: DISCONTINUED | OUTPATIENT
Start: 2024-01-21 | End: 2024-01-25 | Stop reason: HOSPADM

## 2024-01-21 RX ORDER — SODIUM CHLORIDE 9 MG/ML
INJECTION, SOLUTION INTRAVENOUS
Status: COMPLETED
Start: 2024-01-21 | End: 2024-01-21

## 2024-01-21 RX ORDER — GUAIFENESIN 200 MG/10ML
400 LIQUID ORAL 3 TIMES DAILY
Status: DISCONTINUED | OUTPATIENT
Start: 2024-01-21 | End: 2024-01-22

## 2024-01-21 RX ADMIN — METOPROLOL TARTRATE 50 MG: 50 TABLET, FILM COATED ORAL at 20:23

## 2024-01-21 RX ADMIN — INSULIN ASPART 3 UNITS: 100 INJECTION, SOLUTION INTRAVENOUS; SUBCUTANEOUS at 18:18

## 2024-01-21 RX ADMIN — DOXYCYCLINE 100 MG: 100 INJECTION, POWDER, LYOPHILIZED, FOR SOLUTION INTRAVENOUS at 08:13

## 2024-01-21 RX ADMIN — IPRATROPIUM BROMIDE AND ALBUTEROL SULFATE 3 ML: 2.5; .5 SOLUTION RESPIRATORY (INHALATION) at 23:03

## 2024-01-21 RX ADMIN — DOXYCYCLINE 100 MG: 100 INJECTION, POWDER, LYOPHILIZED, FOR SOLUTION INTRAVENOUS at 20:24

## 2024-01-21 RX ADMIN — DEXMEDETOMIDINE HYDROCHLORIDE 0.8 MCG/KG/HR: 4 INJECTION, SOLUTION INTRAVENOUS at 18:19

## 2024-01-21 RX ADMIN — Medication 10 ML: at 09:51

## 2024-01-21 RX ADMIN — IPRATROPIUM BROMIDE AND ALBUTEROL SULFATE 3 ML: 2.5; .5 SOLUTION RESPIRATORY (INHALATION) at 19:09

## 2024-01-21 RX ADMIN — CLOPIDOGREL BISULFATE 75 MG: 75 TABLET ORAL at 08:01

## 2024-01-21 RX ADMIN — INSULIN DETEMIR 15 UNITS: 100 INJECTION, SOLUTION SUBCUTANEOUS at 09:43

## 2024-01-21 RX ADMIN — AMPICILLIN AND SULBACTAM 1.5 G: 1; .5 INJECTION, POWDER, FOR SOLUTION INTRAVENOUS at 08:17

## 2024-01-21 RX ADMIN — IPRATROPIUM BROMIDE AND ALBUTEROL SULFATE 3 ML: 2.5; .5 SOLUTION RESPIRATORY (INHALATION) at 15:06

## 2024-01-21 RX ADMIN — SODIUM CHLORIDE 40 ML: 9 INJECTION, SOLUTION INTRAVENOUS at 20:22

## 2024-01-21 RX ADMIN — CHLORHEXIDINE GLUCONATE 0.12% ORAL RINSE 15 ML: 1.2 LIQUID ORAL at 20:23

## 2024-01-21 RX ADMIN — ENOXAPARIN SODIUM 40 MG: 40 INJECTION SUBCUTANEOUS at 07:46

## 2024-01-21 RX ADMIN — Medication 10 ML: at 08:02

## 2024-01-21 RX ADMIN — Medication 10 ML: at 08:01

## 2024-01-21 RX ADMIN — ASPIRIN 81 MG: 81 TABLET, COATED ORAL at 08:01

## 2024-01-21 RX ADMIN — IPRATROPIUM BROMIDE AND ALBUTEROL SULFATE 3 ML: 2.5; .5 SOLUTION RESPIRATORY (INHALATION) at 07:02

## 2024-01-21 RX ADMIN — Medication 10 ML: at 20:22

## 2024-01-21 RX ADMIN — INSULIN ASPART 3 UNITS: 100 INJECTION, SOLUTION INTRAVENOUS; SUBCUTANEOUS at 06:14

## 2024-01-21 RX ADMIN — PROPOFOL INJECTABLE EMULSION 10 MCG/KG/MIN: 10 INJECTION, EMULSION INTRAVENOUS at 09:46

## 2024-01-21 RX ADMIN — AMPICILLIN AND SULBACTAM 1.5 G: 1; .5 INJECTION, POWDER, FOR SOLUTION INTRAVENOUS at 22:00

## 2024-01-21 RX ADMIN — METHYLPREDNISOLONE SODIUM SUCCINATE 40 MG: 40 INJECTION, POWDER, FOR SOLUTION INTRAMUSCULAR; INTRAVENOUS at 14:50

## 2024-01-21 RX ADMIN — PANTOPRAZOLE SODIUM 40 MG: 40 INJECTION, POWDER, FOR SOLUTION INTRAVENOUS at 08:02

## 2024-01-21 RX ADMIN — INSULIN ASPART 2 UNITS: 100 INJECTION, SOLUTION INTRAVENOUS; SUBCUTANEOUS at 23:45

## 2024-01-21 RX ADMIN — PROPOFOL INJECTABLE EMULSION 45 MCG/KG/MIN: 10 INJECTION, EMULSION INTRAVENOUS at 00:07

## 2024-01-21 RX ADMIN — SODIUM PHOSPHATE, MONOBASIC, MONOHYDRATE AND SODIUM PHOSPHATE, DIBASIC, ANHYDROUS 15 MMOL: 142; 276 INJECTION, SOLUTION INTRAVENOUS at 07:07

## 2024-01-21 RX ADMIN — DEXMEDETOMIDINE HYDROCHLORIDE 1.3 MCG/KG/HR: 4 INJECTION, SOLUTION INTRAVENOUS at 00:36

## 2024-01-21 RX ADMIN — METHYLPREDNISOLONE SODIUM SUCCINATE 40 MG: 40 INJECTION, POWDER, FOR SOLUTION INTRAMUSCULAR; INTRAVENOUS at 21:05

## 2024-01-21 RX ADMIN — AMPICILLIN AND SULBACTAM 1.5 G: 1; .5 INJECTION, POWDER, FOR SOLUTION INTRAVENOUS at 14:50

## 2024-01-21 RX ADMIN — ACETAZOLAMIDE SODIUM 250 MG: 500 INJECTION, POWDER, LYOPHILIZED, FOR SOLUTION INTRAVENOUS at 09:48

## 2024-01-21 RX ADMIN — IPRATROPIUM BROMIDE AND ALBUTEROL SULFATE 3 ML: 2.5; .5 SOLUTION RESPIRATORY (INHALATION) at 03:13

## 2024-01-21 RX ADMIN — METHYLPREDNISOLONE SODIUM SUCCINATE 40 MG: 40 INJECTION, POWDER, FOR SOLUTION INTRAMUSCULAR; INTRAVENOUS at 09:45

## 2024-01-21 RX ADMIN — GUAIFENESIN 1200 MG: 600 TABLET, EXTENDED RELEASE ORAL at 08:01

## 2024-01-21 RX ADMIN — Medication 10 ML: at 14:50

## 2024-01-21 RX ADMIN — CHLORHEXIDINE GLUCONATE 0.12% ORAL RINSE 15 ML: 1.2 LIQUID ORAL at 08:00

## 2024-01-21 RX ADMIN — INSULIN ASPART 3 UNITS: 100 INJECTION, SOLUTION INTRAVENOUS; SUBCUTANEOUS at 12:07

## 2024-01-21 RX ADMIN — ATORVASTATIN CALCIUM 20 MG: 20 TABLET, FILM COATED ORAL at 08:01

## 2024-01-21 RX ADMIN — DEXMEDETOMIDINE HYDROCHLORIDE 0.8 MCG/KG/HR: 4 INJECTION, SOLUTION INTRAVENOUS at 07:51

## 2024-01-21 RX ADMIN — IPRATROPIUM BROMIDE AND ALBUTEROL SULFATE 3 ML: 2.5; .5 SOLUTION RESPIRATORY (INHALATION) at 11:06

## 2024-01-21 RX ADMIN — VENLAFAXINE HYDROCHLORIDE 75 MG: 37.5 CAPSULE, EXTENDED RELEASE ORAL at 08:10

## 2024-01-21 RX ADMIN — NICOTINE 1 PATCH: 21 PATCH, EXTENDED RELEASE TRANSDERMAL at 08:00

## 2024-01-21 RX ADMIN — GUAIFENESIN 400 MG: 200 SOLUTION ORAL at 22:06

## 2024-01-21 RX ADMIN — AMPICILLIN AND SULBACTAM 1.5 G: 1; .5 INJECTION, POWDER, FOR SOLUTION INTRAVENOUS at 03:01

## 2024-01-21 RX ADMIN — METOPROLOL TARTRATE 50 MG: 50 TABLET, FILM COATED ORAL at 08:01

## 2024-01-21 RX ADMIN — FENTANYL CITRATE 50 MCG: 50 INJECTION, SOLUTION INTRAMUSCULAR; INTRAVENOUS at 03:30

## 2024-01-21 RX ADMIN — METHYLPREDNISOLONE SODIUM SUCCINATE 40 MG: 40 INJECTION, POWDER, FOR SOLUTION INTRAMUSCULAR; INTRAVENOUS at 03:00

## 2024-01-21 NOTE — PROGRESS NOTES
[facility]         Dr Bach Sayied   LOS: 4 days   Patient Care Team:  Talib Ceron MD as PCP - General (Family Medicine)    Chief Complaint: Acute hypoxemic hypercapnic respite failure RSV pneumonia suspect underlying obstructive lung disease events noted chart reviewed.  She is currently on Precedex drip    Interval History: On the vent 25% FiO2 PEEP of 5.  Sedated with propofol and Precedex drip currently.  Tube feeds currently goals.  I placed on spontaneous breathing trial she is currently tolerating pressure support 12.  Earlier upon holding sedation nursing staff tells me she is attempting to follow commands.    REVIEW OF SYSTEMS:   Sedated intubated     Ventilator/Non-Invasive Ventilation Settings (From admission, onward)       Start     Ordered    01/19/24 2130  Ventilator - Vent Mode: AC/PC; Rate: Other; Rate: 18; FiO2: Titrate Per SpO2; Titrate Oxygen for SpO2: 88% or Greater; PEEP: 5  Continuous        Question Answer Comment   Vent Mode AC/PC    Rate Other    Rate 18    FiO2 Titrate Per SpO2    Titrate Oxygen for SpO2 88% or Greater    PEEP 5        01/19/24 2130    01/19/24 0145  Ventilator - Vent Mode: AC/VC; Rate: Other; Rate: 18; FiO2: Titrate Per SpO2; Titrate Oxygen for SpO2: 90 - 95%; PEEP: 5; Tidal Volume: mL; TV: 450  Continuous,   Status:  Canceled        Question Answer Comment   Vent Mode AC/VC    Rate Other    Rate 18    FiO2 Titrate Per SpO2    Titrate Oxygen for SpO2 90 - 95%    PEEP 5    Tidal Volume mL            01/19/24 0144    01/18/24 2214  Ventilator - Vent Mode: AC/VC; Rate: Other; Rate: 18; FiO2: 30%; PEEP: 5; Tidal Volume: mL; TV: 450  Continuous,   Status:  Canceled        Question Answer Comment   Vent Mode AC/VC    Rate Other    Rate 18    FiO2 30%    PEEP 5    Tidal Volume mL            01/18/24 2213    01/18/24 1200  Ventilator - Vent Mode: AC/VC; Rate: Other; Rate: 18; FiO2: 30%; PEEP: 5; Tidal Volume: mL; TV: 500  Continuous,    "Status:  Canceled        Question Answer Comment   Vent Mode AC/VC    Rate Other    Rate 18    FiO2 30%    PEEP 5    Tidal Volume mL            01/18/24 1743    01/17/24 1528  NIPPV (CPAP or BIPAP)  Until Discontinued,   Status:  Canceled        Question Answer Comment   Indication Acute Respiratory Failure    Type BIPAP    IPAP 18    EPAP 6    Breath Rate 16    Titrate Oxygen for SpO2 88 - 92%        01/17/24 1529    01/17/24 0428  NIPPV (CPAP or BIPAP)  Until Discontinued,   Status:  Canceled        Question:  Type  Answer:  AutoBIPAP    01/17/24 0428                      Vital Signs  Temp:  [98 °F (36.7 °C)-99.8 °F (37.7 °C)] 98.5 °F (36.9 °C)  Heart Rate:  [] 82  Resp:  [17-28] 22  BP: (104-165)/(56-94) 148/77  FiO2 (%):  [25 %-93 %] 30 %    Intake/Output Summary (Last 24 hours) at 1/21/2024 0927  Last data filed at 1/21/2024 0825  Gross per 24 hour   Intake 2474.13 ml   Output 3320 ml   Net -845.87 ml     Flowsheet Rows      Flowsheet Row First Filed Value   Admission Height 157.5 cm (62\") Documented at 01/17/2024 0423   Admission Weight 57.2 kg (126 lb) Documented at 01/17/2024 0423            Physical Exam:  Patient is examined using the personal protective equipment as per guidelines from infection control for this particular patient as enacted.  Hand hygiene was performed before and after patient interaction.   General Appearance:  Sedated intubated.  ET tube good position orally.  Not following commands for me at this time  ENT ET tube good position orally   Neck midline trachea, no thyromegaly   Lungs:   Equal breath sounds on the vent.  Overbreathing the vent currently.    Heart:    Regular rhythm and normal rate, normal S1 and S2, no            murmur, no gallop, no rub, no click   Chest Wall:    No abnormalities observed   Abdomen:     Normal bowel sounds, no masses, no organomegaly, soft        nontender, nondistended, no guarding, no rebound                tenderness   Extremities:   " Moves all extremities well, no edema, no cyanosis, no             redness  CNS sedated intubated not following  Skin no rashes no nodules  Musculoskeletal no cyanosis no clubbing normal range of motion     Results Review:        Results from last 7 days   Lab Units 01/21/24  0531 01/20/24  0532 01/19/24  0535   SODIUM mmol/L 148* 143 143   POTASSIUM mmol/L 4.2 4.5 4.9   CHLORIDE mmol/L 109* 110* 111*   CO2 mmol/L 39.5* 30.9* 27.7   BUN mg/dL 34* 29* 32*   CREATININE mg/dL 0.72 0.76 0.72   GLUCOSE mg/dL 233* 242* 222*   CALCIUM mg/dL 7.6* 7.8* 7.9*     Results from last 7 days   Lab Units 01/17/24  0642 01/17/24  0435   HSTROP T ng/L 10 10     Results from last 7 days   Lab Units 01/21/24  0531 01/20/24  0532 01/19/24  0535   WBC 10*3/mm3 6.30 5.26 7.47   HEMOGLOBIN g/dL 11.2* 11.5* 11.4*   HEMATOCRIT % 36.5 37.7 37.6   PLATELETS 10*3/mm3 198 202 197             Results from last 7 days   Lab Units 01/21/24  0531   MAGNESIUM mg/dL 2.9*         Results from last 7 days   Lab Units 01/20/24  0726   PH, ARTERIAL pH units 7.344*   PO2 ART mm Hg 84.0   PCO2, ARTERIAL mm Hg 53.4*   HCO3 ART mmol/L 29.1*       I reviewed the patient's new clinical results.  I personally viewed and interpreted the patient's chest x-ray.        Medication Review:   ampicillin-sulbactam, 1.5 g, Intravenous, Q6H  aspirin, 81 mg, Oral, Daily  atorvastatin, 20 mg, Oral, Daily  chlorhexidine, 15 mL, Mouth/Throat, Q12H  clopidogrel, 75 mg, Oral, Daily  doxycycline, 100 mg, Intravenous, Q12H  enoxaparin, 40 mg, Subcutaneous, Q24H  guaiFENesin, 1,200 mg, Oral, BID  Insulin Aspart, 2-7 Units, Subcutaneous, Q6H  insulin detemir, 15 Units, Subcutaneous, Q12H  ipratropium-albuterol, 3 mL, Nebulization, Q4H - RT  methylPREDNISolone sodium succinate, 40 mg, Intravenous, Q6H  metoprolol tartrate, 50 mg, Nasogastric, Q12H  nicotine, 1 patch, Transdermal, Q24H  pantoprazole, 40 mg, Intravenous, Q24H  senna-docusate sodium, 2 tablet, Oral, BID  sodium  chloride, 10 mL, Intravenous, Q12H  sodium chloride, 10 mL, Intravenous, Q12H  venlafaxine XR, 75 mg, Oral, Daily        dexmedetomidine, 0.2-1.5 mcg/kg/hr, Last Rate: 0.8 mcg/kg/hr (01/21/24 0759)  Pharmacy to Dose enoxaparin (LOVENOX),   propofol, 5-50 mcg/kg/min, Last Rate: 10 mcg/kg/min (01/21/24 1551)        ASSESSMENT:   Acute hypoxic and hypercapnic respiratory failure  Intubated 1/18  Acute exacerbation of chronic obstructive pulmonary disease  RSV pneumonia  Leukocytosis  Tachycardia  Hypertension  Peripheral arterial disease  Coronary disease  Hyperlipidemia  Hypernatremia    PLAN:  Tolerating pressure support better this morning.  Currently on pressure support 12 will attempt to wean as tolerated to maintain tidal volume 400+.  We may be able to give her a trial of pressure support as she tolerates.  Her bronchospasm seems to have improved.  Antibiotics for secondary bacterial infection to be continued  Add Levemir 15 units daily for blood glucose management  Excellent diuresis with Lasix yesterday.  Keep volume status on the dry side.  Rising bicarb noted.  Will add Diamox to 250 mg daily for 3 days  Rising sodium will increase free water and watch sodium closely.  Continue steroids IV.  Still with significant bronchospasm.  Continue bronchodilators  Sedation vacation per protocol  Tube feeds currently goals with additional free water as above  ICU core measures  Monitor clinical course closely.        Critical care time 35 minutes    Mikala Lopes MD  01/21/24  09:27 EST

## 2024-01-21 NOTE — PLAN OF CARE
Goal Outcome Evaluation:  Plan of Care Reviewed With: patient        Progress: no change  Outcome Evaluation: patient has been much more comfortable this shift, no distress noted. she is tolerating propofol at 15 mcg, precedex at .8. RR isd 18 and she is resting well. she does open eyes with stimulation. PERRL. Continues on unasyn & doxycycline as ordered. Had one liquid BM this shift.  tube feeding remains at goal. added free water 100 ml Q 4 H as NA is 148 this am. Bicarb noted, diamox added to be given daily x 3 days. will cont with daily awakening trials in am and daily am ABG's.  Lungs sound much improved. No wheezing and coarseness at this time as prev noted.  Cont on duo nebs, solumedrol, musinex via NGT.

## 2024-01-21 NOTE — PROGRESS NOTES
"Hospital Medicine Team    LOS 4 days      Patient Care Team:  Talib Ceron MD as PCP - General (Family Medicine)      Subjective       Chief Complaint:  f/u resp failure    Subjective  Patient remains intubated, sedated. Nursing reports patient has been less agitated so she is going to try to wean sedation today.     Objective       Vital Signs  Temp:  [98 °F (36.7 °C)-99.8 °F (37.7 °C)] 98.7 °F (37.1 °C)  Heart Rate:  [] 78  Resp:  [17-28] 21  BP: (104-162)/(56-87) 139/80  FiO2 (%):  [25 %-30 %] 26 %  Oxygen Therapy  SpO2: 95 %  Pulse Oximetry Type: Continuous  Device (Oxygen Therapy): ventilator  Flow (L/min): 6  Oxygen Concentration (%): 25  ETCO2 (mmHg): 36 mmHg  Flowsheet Rows      Flowsheet Row First Filed Value   Admission Height 157.5 cm (62\") Documented at 01/17/2024 0423   Admission Weight 57.2 kg (126 lb) Documented at 01/17/2024 0423                Physical Exam:  Physical Exam  Vitals reviewed.   Constitutional:       Interventions: She is sedated and intubated.   HENT:      Mouth/Throat:      Comments: Et tube in place  Cardiovascular:      Rate and Rhythm: Normal rate.   Pulmonary:      Effort: She is intubated.      Breath sounds: No wheezing.   Abdominal:      General: There is no distension.   Genitourinary:     Comments: Vásquez in place with light yellow urine draining  Musculoskeletal:      Right lower leg: No edema.      Left lower leg: No edema.   Neurological:      Comments: Sedated on vent            Results Review:    I reviewed the patient's new clinical results.    [x]  Laboratory  [x]  Microbiology  [x]  Radiology  []  EKG/Telemetry   []  Cardiology/Vascular   []  Pathology  []  Old records  []  Other:      X-rays, labs reviewed personally by physician.    Medication Review:   I have reviewed the patient's current medication list    Scheduled Meds  acetaZOLAMIDE, 250 mg, Intravenous, Daily  ampicillin-sulbactam, 1.5 g, Intravenous, Q6H  aspirin, 81 mg, Oral, " Daily  atorvastatin, 20 mg, Oral, Daily  chlorhexidine, 15 mL, Mouth/Throat, Q12H  clopidogrel, 75 mg, Oral, Daily  doxycycline, 100 mg, Intravenous, Q12H  enoxaparin, 40 mg, Subcutaneous, Q24H  guaiFENesin, 1,200 mg, Oral, BID  Insulin Aspart, 2-7 Units, Subcutaneous, Q6H  insulin detemir, 15 Units, Subcutaneous, Daily  ipratropium-albuterol, 3 mL, Nebulization, Q4H - RT  methylPREDNISolone sodium succinate, 40 mg, Intravenous, Q6H  metoprolol tartrate, 50 mg, Nasogastric, Q12H  nicotine, 1 patch, Transdermal, Q24H  pantoprazole, 40 mg, Intravenous, Q24H  senna-docusate sodium, 2 tablet, Oral, BID  sodium chloride, 10 mL, Intravenous, Q12H  sodium chloride, 10 mL, Intravenous, Q12H  venlafaxine XR, 75 mg, Oral, Daily        Meds Infusions  dexmedetomidine, 0.2-1.5 mcg/kg/hr, Last Rate: 0.8 mcg/kg/hr (01/21/24 0751)  Pharmacy to Dose enoxaparin (LOVENOX),   propofol, 5-50 mcg/kg/min, Last Rate: 15 mcg/kg/min (01/21/24 0910)        Meds PRN    acetaminophen **OR** acetaminophen    albuterol    senna-docusate sodium **AND** polyethylene glycol **AND** bisacodyl **AND** bisacodyl    senna-docusate sodium **AND** polyethylene glycol **AND** bisacodyl **AND** bisacodyl    Calcium Replacement - Follow Nurse / BPA Driven Protocol    dextrose    dextrose    dextrose    fentaNYL citrate (PF)    fluticasone    glucagon (human recombinant)    hydrALAZINE    ipratropium-albuterol    Magnesium Standard Dose Replacement - Follow Nurse / BPA Driven Protocol    metoprolol tartrate    nitroglycerin    ondansetron ODT **OR** ondansetron    Pharmacy to Dose enoxaparin (LOVENOX)    Phosphorus Replacement - Follow Nurse / BPA Driven Protocol    Potassium Replacement - Follow Nurse / BPA Driven Protocol    [COMPLETED] Insert Peripheral IV **AND** sodium chloride    sodium chloride    sodium chloride    sodium chloride    sodium chloride        Assessment / Plan       Active Hospital Problems:  Active Hospital Problems    Diagnosis  POA     **Respiratory failure [J96.90]  Yes      Resolved Hospital Problems   No resolved problems to display.       Acute Hypoxic/Hypercapnic Respiratory Failure d/t RSV pneumonia w/ suspected superimposed GN bacterial PNA   AECOPD  -intubated 1/18/24  -continue vent per pulm, not yet ready to wean  -procal improving  -sputum Cx w/normal resp gabe  -conitnue IV steroids  -continue Unasyn and doxycycline   -pulm following appreciate recs  - pulm add Diamox 250mg daily x 3 days     Hypernatremia  - added free water flushes   - repeat in am     Hypophosphatemia  - continue replacement protocol  - monitor with routine labs     Hyperglycemia  - likely related to steroids  - started on levemir as per pulm this am  - will check hgb A1c  - continue monitor with routine accu-checks    Essential Hypertension  -overall stable  - Continue home metoprolol via ng tube. Prn hydralazine     PAD/CAD:   -chronic and stable, continue DAPT and statin as able     Anxiety   -chronic and currently sedated on vent     Chronic Migraines:   -chronic    Tobacco Abuse   - on cessation as appropriate.  On Nicoderm.    Nutrition  -dietician following continue TF per their recs today    DVT ppx-lovenox      Plan for disposition:defer for now to remain in ICU    Electronically signed by Yazmin Gloria DO, 01/21/24, 11:24 EST.      Note disclaimer: At [facility], we believe that sharing information builds trust and better relationships. You are receiving this note because you recently visited [facility]. It is possible you will see health information before a provider has talked with you about it. This kind of information can be easy to misunderstand. To help you fully understand what it means for your health, we urge you to discuss this note with your provider.

## 2024-01-21 NOTE — PLAN OF CARE
Goal Outcome Evaluation:  Plan of Care Reviewed With: patient        Progress: no change  Outcome Evaluation: Weaning sedation.  Pt. less anxious than previous two nights. Requiring less medication.  Mild elevated temp 99.8 max. Afebrile at this time.  Danette. vent and restraints.

## 2024-01-22 LAB
ALBUMIN SERPL-MCNC: 3.1 G/DL (ref 3.5–5.2)
ANION GAP SERPL CALCULATED.3IONS-SCNC: 2.6 MMOL/L (ref 5–15)
ARTERIAL PATENCY WRIST A: POSITIVE
ARTERIAL PATENCY WRIST A: POSITIVE
ATMOSPHERIC PRESS: 737 MMHG
ATMOSPHERIC PRESS: 749 MMHG
BACTERIA SPEC AEROBE CULT: NORMAL
BACTERIA SPEC AEROBE CULT: NORMAL
BASE EXCESS BLDA CALC-SCNC: -1.3 MMOL/L (ref 0–2)
BASE EXCESS BLDA CALC-SCNC: 3.5 MMOL/L (ref 0–2)
BDY SITE: ABNORMAL
BDY SITE: ABNORMAL
BODY TEMPERATURE: 37
BODY TEMPERATURE: 37
BUN SERPL-MCNC: 32 MG/DL (ref 6–20)
BUN/CREAT SERPL: 54.2 (ref 7–25)
CALCIUM SPEC-SCNC: 8 MG/DL (ref 8.6–10.5)
CHLORIDE SERPL-SCNC: 109 MMOL/L (ref 98–107)
CO2 SERPL-SCNC: 32.4 MMOL/L (ref 22–29)
CREAT SERPL-MCNC: 0.59 MG/DL (ref 0.57–1)
DEPRECATED RDW RBC AUTO: 49.8 FL (ref 37–54)
EGFRCR SERPLBLD CKD-EPI 2021: 110 ML/MIN/1.73
ERYTHROCYTE [DISTWIDTH] IN BLOOD BY AUTOMATED COUNT: 13.2 % (ref 12.3–15.4)
GLUCOSE BLDC GLUCOMTR-MCNC: 149 MG/DL (ref 70–130)
GLUCOSE BLDC GLUCOMTR-MCNC: 199 MG/DL (ref 70–130)
GLUCOSE BLDC GLUCOMTR-MCNC: 199 MG/DL (ref 70–130)
GLUCOSE BLDC GLUCOMTR-MCNC: 210 MG/DL (ref 70–130)
GLUCOSE SERPL-MCNC: 195 MG/DL (ref 65–99)
HCO3 BLDA-SCNC: 25.5 MMOL/L (ref 20–26)
HCO3 BLDA-SCNC: 28.5 MMOL/L (ref 20–26)
HCT VFR BLD AUTO: 36.4 % (ref 34–46.6)
HGB BLD-MCNC: 11.4 G/DL (ref 12–15.9)
HGB BLDA-MCNC: 11.3 G/DL (ref 13.5–17.5)
HGB BLDA-MCNC: 11.5 G/DL (ref 13.5–17.5)
INHALED O2 CONCENTRATION: 25 %
INHALED O2 CONCENTRATION: 30 %
Lab: ABNORMAL
Lab: ABNORMAL
MAGNESIUM SERPL-MCNC: 2.7 MG/DL (ref 1.6–2.6)
MCH RBC QN AUTO: 32 PG (ref 26.6–33)
MCHC RBC AUTO-ENTMCNC: 31.3 G/DL (ref 31.5–35.7)
MCV RBC AUTO: 102.2 FL (ref 79–97)
MODALITY: ABNORMAL
MODALITY: ABNORMAL
PAW @ PEAK INSP FLOW SETTING VENT: 22 CMH2O
PCO2 BLDA: 43.9 MM HG (ref 35–45)
PCO2 BLDA: 50.8 MM HG (ref 35–45)
PCO2 TEMP ADJ BLD: 43.9 MM HG (ref 35–45)
PCO2 TEMP ADJ BLD: 50.8 MM HG (ref 35–45)
PEEP RESPIRATORY: 5 CM[H2O]
PEEP RESPIRATORY: 5 CM[H2O]
PH BLDA: 7.31 PH UNITS (ref 7.35–7.45)
PH BLDA: 7.42 PH UNITS (ref 7.35–7.45)
PH, TEMP CORRECTED: 7.31 PH UNITS (ref 7.35–7.45)
PH, TEMP CORRECTED: 7.42 PH UNITS (ref 7.35–7.45)
PHOSPHATE SERPL-MCNC: 1.8 MG/DL (ref 2.5–4.5)
PHOSPHATE SERPL-MCNC: 3 MG/DL (ref 2.5–4.5)
PLATELET # BLD AUTO: 227 10*3/MM3 (ref 140–450)
PMV BLD AUTO: 10 FL (ref 6–12)
PO2 BLDA: 117 MM HG (ref 83–108)
PO2 BLDA: 78.2 MM HG (ref 83–108)
PO2 TEMP ADJ BLD: 117 MM HG (ref 83–108)
PO2 TEMP ADJ BLD: 78.2 MM HG (ref 83–108)
POTASSIUM SERPL-SCNC: 4.1 MMOL/L (ref 3.5–5.2)
RBC # BLD AUTO: 3.56 10*6/MM3 (ref 3.77–5.28)
SAO2 % BLDCOA: 97.4 % (ref 94–99)
SAO2 % BLDCOA: 98.9 % (ref 94–99)
SET MECH RESP RATE: 18
SET MECH RESP RATE: 18
SODIUM SERPL-SCNC: 144 MMOL/L (ref 136–145)
TOTAL RATE: 18 BREATHS/MINUTE
VENTILATOR MODE: ABNORMAL
VENTILATOR MODE: ABNORMAL
VT ON VENT VENT: 500 ML
WBC NRBC COR # BLD AUTO: 9.12 10*3/MM3 (ref 3.4–10.8)

## 2024-01-22 PROCEDURE — 83735 ASSAY OF MAGNESIUM: CPT | Performed by: INTERNAL MEDICINE

## 2024-01-22 PROCEDURE — 25010000002 ONDANSETRON PER 1 MG: Performed by: NURSE PRACTITIONER

## 2024-01-22 PROCEDURE — 94668 MNPJ CHEST WALL SBSQ: CPT

## 2024-01-22 PROCEDURE — 99233 SBSQ HOSP IP/OBS HIGH 50: CPT | Performed by: HOSPITALIST

## 2024-01-22 PROCEDURE — 25010000002 ACETAZOLAMIDE PER 500 MG: Performed by: INTERNAL MEDICINE

## 2024-01-22 PROCEDURE — 82803 BLOOD GASES ANY COMBINATION: CPT

## 2024-01-22 PROCEDURE — 36600 WITHDRAWAL OF ARTERIAL BLOOD: CPT

## 2024-01-22 PROCEDURE — 94761 N-INVAS EAR/PLS OXIMETRY MLT: CPT

## 2024-01-22 PROCEDURE — 25010000002 ENOXAPARIN PER 10 MG: Performed by: NURSE PRACTITIONER

## 2024-01-22 PROCEDURE — 85027 COMPLETE CBC AUTOMATED: CPT | Performed by: INTERNAL MEDICINE

## 2024-01-22 PROCEDURE — 94799 UNLISTED PULMONARY SVC/PX: CPT

## 2024-01-22 PROCEDURE — 63710000001 INSULIN ASPART PER 5 UNITS: Performed by: INTERNAL MEDICINE

## 2024-01-22 PROCEDURE — 82948 REAGENT STRIP/BLOOD GLUCOSE: CPT

## 2024-01-22 PROCEDURE — 94660 CPAP INITIATION&MGMT: CPT

## 2024-01-22 PROCEDURE — 25010000002 AMPICILLIN-SULBACTAM PER 1.5 G: Performed by: INTERNAL MEDICINE

## 2024-01-22 PROCEDURE — 63710000001 INSULIN ASPART PER 5 UNITS: Performed by: NURSE PRACTITIONER

## 2024-01-22 PROCEDURE — 63710000001 INSULIN DETEMIR PER 5 UNITS: Performed by: INTERNAL MEDICINE

## 2024-01-22 PROCEDURE — 25810000003 SODIUM CHLORIDE 0.9 % SOLUTION

## 2024-01-22 PROCEDURE — 80069 RENAL FUNCTION PANEL: CPT | Performed by: INTERNAL MEDICINE

## 2024-01-22 PROCEDURE — 94003 VENT MGMT INPAT SUBQ DAY: CPT

## 2024-01-22 PROCEDURE — 25810000003 SODIUM CHLORIDE 0.9 % SOLUTION 250 ML FLEX CONT: Performed by: HOSPITALIST

## 2024-01-22 PROCEDURE — 25010000002 PROPOFOL 10 MG/ML EMULSION: Performed by: HOSPITALIST

## 2024-01-22 PROCEDURE — 25010000002 METHYLPREDNISOLONE PER 40 MG: Performed by: INTERNAL MEDICINE

## 2024-01-22 PROCEDURE — 84100 ASSAY OF PHOSPHORUS: CPT | Performed by: INTERNAL MEDICINE

## 2024-01-22 PROCEDURE — 25010000002 AZITHROMYCIN PER 500 MG: Performed by: HOSPITALIST

## 2024-01-22 RX ORDER — GUAIFENESIN 600 MG/1
1200 TABLET, EXTENDED RELEASE ORAL EVERY 12 HOURS SCHEDULED
Status: DISCONTINUED | OUTPATIENT
Start: 2024-01-23 | End: 2024-01-25 | Stop reason: HOSPADM

## 2024-01-22 RX ORDER — SODIUM CHLORIDE 9 MG/ML
INJECTION, SOLUTION INTRAVENOUS
Status: COMPLETED
Start: 2024-01-22 | End: 2024-01-22

## 2024-01-22 RX ORDER — INSULIN ASPART 100 [IU]/ML
2-7 INJECTION, SOLUTION INTRAVENOUS; SUBCUTANEOUS
Status: DISCONTINUED | OUTPATIENT
Start: 2024-01-22 | End: 2024-01-25 | Stop reason: HOSPADM

## 2024-01-22 RX ADMIN — GUAIFENESIN 400 MG: 200 SOLUTION ORAL at 20:04

## 2024-01-22 RX ADMIN — METHYLPREDNISOLONE SODIUM SUCCINATE 40 MG: 40 INJECTION, POWDER, FOR SOLUTION INTRAMUSCULAR; INTRAVENOUS at 02:53

## 2024-01-22 RX ADMIN — IPRATROPIUM BROMIDE AND ALBUTEROL SULFATE 3 ML: 2.5; .5 SOLUTION RESPIRATORY (INHALATION) at 19:03

## 2024-01-22 RX ADMIN — IPRATROPIUM BROMIDE AND ALBUTEROL SULFATE 3 ML: 2.5; .5 SOLUTION RESPIRATORY (INHALATION) at 03:17

## 2024-01-22 RX ADMIN — Medication 10 ML: at 20:04

## 2024-01-22 RX ADMIN — ACETAMINOPHEN 650 MG: 325 TABLET ORAL at 18:35

## 2024-01-22 RX ADMIN — ACETAZOLAMIDE SODIUM 250 MG: 500 INJECTION, POWDER, LYOPHILIZED, FOR SOLUTION INTRAVENOUS at 08:06

## 2024-01-22 RX ADMIN — ASPIRIN 81 MG: 81 TABLET, COATED ORAL at 08:07

## 2024-01-22 RX ADMIN — IPRATROPIUM BROMIDE AND ALBUTEROL SULFATE 3 ML: 2.5; .5 SOLUTION RESPIRATORY (INHALATION) at 22:31

## 2024-01-22 RX ADMIN — METOPROLOL TARTRATE 50 MG: 50 TABLET, FILM COATED ORAL at 20:04

## 2024-01-22 RX ADMIN — INSULIN ASPART 2 UNITS: 100 INJECTION, SOLUTION INTRAVENOUS; SUBCUTANEOUS at 18:35

## 2024-01-22 RX ADMIN — Medication 10 ML: at 08:09

## 2024-01-22 RX ADMIN — DOXYCYCLINE 100 MG: 100 INJECTION, POWDER, LYOPHILIZED, FOR SOLUTION INTRAVENOUS at 08:07

## 2024-01-22 RX ADMIN — SODIUM CHLORIDE 250 ML: 9 INJECTION, SOLUTION INTRAVENOUS at 09:00

## 2024-01-22 RX ADMIN — SODIUM PHOSPHATE, MONOBASIC, MONOHYDRATE AND SODIUM PHOSPHATE, DIBASIC, ANHYDROUS 15 MMOL: 142; 276 INJECTION, SOLUTION INTRAVENOUS at 07:37

## 2024-01-22 RX ADMIN — ATORVASTATIN CALCIUM 20 MG: 20 TABLET, FILM COATED ORAL at 08:07

## 2024-01-22 RX ADMIN — CLOPIDOGREL BISULFATE 75 MG: 75 TABLET ORAL at 08:07

## 2024-01-22 RX ADMIN — PROPOFOL INJECTABLE EMULSION 15 MCG/KG/MIN: 10 INJECTION, EMULSION INTRAVENOUS at 01:44

## 2024-01-22 RX ADMIN — INSULIN DETEMIR 15 UNITS: 100 INJECTION, SOLUTION SUBCUTANEOUS at 08:08

## 2024-01-22 RX ADMIN — IPRATROPIUM BROMIDE AND ALBUTEROL SULFATE 3 ML: 2.5; .5 SOLUTION RESPIRATORY (INHALATION) at 15:21

## 2024-01-22 RX ADMIN — CHLORHEXIDINE GLUCONATE 0.12% ORAL RINSE 15 ML: 1.2 LIQUID ORAL at 08:07

## 2024-01-22 RX ADMIN — NICOTINE 1 PATCH: 21 PATCH, EXTENDED RELEASE TRANSDERMAL at 08:07

## 2024-01-22 RX ADMIN — IPRATROPIUM BROMIDE AND ALBUTEROL SULFATE 3 ML: 2.5; .5 SOLUTION RESPIRATORY (INHALATION) at 11:32

## 2024-01-22 RX ADMIN — VENLAFAXINE HYDROCHLORIDE 75 MG: 37.5 CAPSULE, EXTENDED RELEASE ORAL at 08:07

## 2024-01-22 RX ADMIN — INSULIN ASPART 3 UNITS: 100 INJECTION, SOLUTION INTRAVENOUS; SUBCUTANEOUS at 20:29

## 2024-01-22 RX ADMIN — ONDANSETRON 4 MG: 2 INJECTION INTRAMUSCULAR; INTRAVENOUS at 12:07

## 2024-01-22 RX ADMIN — GUAIFENESIN 400 MG: 200 SOLUTION ORAL at 08:07

## 2024-01-22 RX ADMIN — METOPROLOL TARTRATE 50 MG: 50 TABLET, FILM COATED ORAL at 08:07

## 2024-01-22 RX ADMIN — AMPICILLIN AND SULBACTAM 1.5 G: 1; .5 INJECTION, POWDER, FOR SOLUTION INTRAVENOUS at 02:53

## 2024-01-22 RX ADMIN — ENOXAPARIN SODIUM 40 MG: 40 INJECTION SUBCUTANEOUS at 08:07

## 2024-01-22 RX ADMIN — METHYLPREDNISOLONE SODIUM SUCCINATE 40 MG: 40 INJECTION, POWDER, FOR SOLUTION INTRAMUSCULAR; INTRAVENOUS at 20:31

## 2024-01-22 RX ADMIN — PANTOPRAZOLE SODIUM 40 MG: 40 INJECTION, POWDER, FOR SOLUTION INTRAVENOUS at 08:06

## 2024-01-22 RX ADMIN — INSULIN ASPART 2 UNITS: 100 INJECTION, SOLUTION INTRAVENOUS; SUBCUTANEOUS at 05:06

## 2024-01-22 RX ADMIN — DEXMEDETOMIDINE HYDROCHLORIDE 0.8 MCG/KG/HR: 4 INJECTION, SOLUTION INTRAVENOUS at 01:43

## 2024-01-22 RX ADMIN — AZITHROMYCIN DIHYDRATE 500 MG: 500 INJECTION, POWDER, LYOPHILIZED, FOR SOLUTION INTRAVENOUS at 11:50

## 2024-01-22 RX ADMIN — METHYLPREDNISOLONE SODIUM SUCCINATE 40 MG: 40 INJECTION, POWDER, FOR SOLUTION INTRAMUSCULAR; INTRAVENOUS at 15:39

## 2024-01-22 RX ADMIN — ONDANSETRON 4 MG: 2 INJECTION INTRAMUSCULAR; INTRAVENOUS at 20:24

## 2024-01-22 RX ADMIN — IPRATROPIUM BROMIDE AND ALBUTEROL SULFATE 3 ML: 2.5; .5 SOLUTION RESPIRATORY (INHALATION) at 07:06

## 2024-01-22 RX ADMIN — AMPICILLIN AND SULBACTAM 1.5 G: 1; .5 INJECTION, POWDER, FOR SOLUTION INTRAVENOUS at 08:22

## 2024-01-22 RX ADMIN — METHYLPREDNISOLONE SODIUM SUCCINATE 40 MG: 40 INJECTION, POWDER, FOR SOLUTION INTRAMUSCULAR; INTRAVENOUS at 08:30

## 2024-01-22 NOTE — PLAN OF CARE
Goal Outcome Evaluation:                                              Problem: Noninvasive Ventilation Acute  Goal: Effective Unassisted Ventilation and Oxygenation  Outcome: Unable to Meet, Plan Revised

## 2024-01-22 NOTE — PLAN OF CARE
Goal Outcome Evaluation:  Plan of Care Reviewed With: patient        Progress: improving  Outcome Evaluation: restaints remain inplace; continue q2hr safety, circulation, & skin checks.                               Problem: Noninvasive Ventilation Acute  Goal: Effective Unassisted Ventilation and Oxygenation  Outcome: Unable to Meet, Plan Revised

## 2024-01-22 NOTE — PLAN OF CARE
Problem: Adult Inpatient Plan of Care  Goal: Plan of Care Review  Outcome: Ongoing, Progressing  Flowsheets (Taken 1/22/2024 0547)  Progress: improving  Plan of Care Reviewed With: patient  Outcome Evaluation:   pt remains intubated & sedated overnight. VSS. IV ABX, Steroids, Diprivan & Precedex gtt's continue. Pt arouses easily overnight   respons appropriately to commands. Continues to tolerate TF @ Goal Rate. FMS placed overnight. F/C remains in place. am labs pending. Plan for SBT this am.   Goal Outcome Evaluation:  Plan of Care Reviewed With: patient        Progress: improving  Outcome Evaluation: pt remains intubated & sedated overnight. VSS. IV ABX, Steroids, Diprivan & Precedex gtt's continue. Pt arouses easily overnight; respons appropriately to commands. Continues to tolerate TF @ Goal Rate. FMS placed overnight. F/C remains in place. am labs pending. Plan for SBT this am.                               Problem: Noninvasive Ventilation Acute  Goal: Effective Unassisted Ventilation and Oxygenation  Outcome: Unable to Meet, Plan Revised

## 2024-01-22 NOTE — CASE MANAGEMENT/SOCIAL WORK
Discharge Planning Assessment   Sydnee Sanon     Patient Name: Valeria Rubi  MRN: [id number]  Today's Date: 1/22/2024    Admit Date: 1/17/2024    Plan: Home with  and possible HH if needed   Discharge Needs Assessment       Row Name 01/22/24 1517       Living Environment    People in Home spouse    Name(s) of People in Home Harpreet Webbch,     Current Living Arrangements home    Duration at Residence 30 Y    Potentially Unsafe Housing Conditions none    In the past 12 months has the electric, gas, oil, or water company threatened to shut off services in your home? No    Primary Care Provided by self    Provides Primary Care For no one    Caregiving Concerns pt voiced no care giving concerns at this time.    Family Caregiver if Needed spouse    Family Caregiver Names Harpreet, , son Arjun and daughter in law Sushma    Quality of Family Relationships unable to assess    Able to Return to Prior Arrangements yes    Living Arrangement Comments Patient states she lives with  her  in a two story house with four steps to gain entry       Resource/Environmental Concerns    Resource/Environmental Concerns none    Transportation Concerns none       Transportation Needs    In the past 12 months, has lack of transportation kept you from medical appointments or from getting medications? no    In the past 12 months, has lack of transportation kept you from meetings, work, or from getting things needed for daily living? No       Food Insecurity    Within the past 12 months, you worried that your food would run out before you got the money to buy more. Never true    Within the past 12 months, the food you bought just didn't last and you didn't have money to get more. Never true       Transition Planning    Patient/Family Anticipates Transition to home with family;home with help/services    Patient/Family Anticipated Services at Transition ;durable medical equipment;home health care     Transportation Anticipated family or friend will provide  pt states either her , son or daughter in law will be able to provide ride home at discharge       Discharge Needs Assessment    Readmission Within the Last 30 Days no previous admission in last 30 days    Current Outpatient/Agency/Support Group --  none    Equipment Currently Used at Home oxygen;nebulizer  pt states she has a rolling walker, BSC, hospital bed and wheelchair that was her dad's she can use if needed. Oxygen through [facility]    Concerns to be Addressed adjustment to diagnosis/illness;discharge planning    Concerns Comments Patient may need HH at discharge    Anticipated Changes Related to Illness none    Equipment Needed After Discharge none    Outpatient/Agency/Support Group Needs homecare agency    Discharge Facility/Level of Care Needs home with home health    Provided Post Acute Provider List? Yes    Post Acute Provider List Home Health;Inpatient Rehab    Provided Post Acute Provider Quality & Resource List? Yes    Post Acute Provider Quality and Resource List Home Health;Inpatient Rehab    Delivered To Patient    Method of Delivery In person    Patient's Choice of Community Agency(s) none    Current Discharge Risk chronically ill    Discharge Coordination/Progress Patient states she plans on returning home at discharge with  her  and family to help as needed and would use HH if recommended.                   Discharge Plan       Row Name 01/22/24 1526       Plan    Plan Home with  and possible HH if needed    Patient/Family in Agreement with Plan yes    Plan Comments Patient was extubated this morning. CM entered room and introduced self and role of CM. Discussed discharge disposition with patient at bedside. Patient is currently resting in bed and is currently off oxygen. Patient confirms that the info on her face sheet is correct and she see's Dr. Talib Ceron as PCP. She states she normally uses [facility] in [place] and currently has no problem picking up or paying for her med's. She also states she does have a living will and it is on file here. Patient states she lives with her  Harpreet in a two story house with four steps to gain entry and normally has no issues entering the home or maneuvering inside. She states she is normally independent with her ADL's and drives and either her , son or daughter in law will be able to provide ride home at discharge. She also states she uses oxygen 2L at home through [facility] and nebulizer and has multiple other pieces of equipment at home that was her dad's she can use as needed and does not think she will need any  other equipment at discharge. Patient states she has not used home health in the past and states she will use them if recommended and CM provided her with a list of agencies and a list of STR facilities if needed. She states she has not been to STR before and does not think she will need this service at discharge. Patient states she plans on returning home at discharge with her family to help as needed. She had no other questions or concerns regarding discharge plans. Name and number placed on white board in room. CM will follow.                  Continued Care and Services - Admitted Since 1/17/2024    Coordination has not been started for this encounter.          Demographic Summary    No documentation.                  Functional Status    No documentation.                  Psychosocial    No documentation.                  Abuse/Neglect    No documentation.                  Legal    No documentation.                  Substance Abuse    No documentation.                  Patient Forms    No documentation.                     Meme Lara RN

## 2024-01-22 NOTE — PLAN OF CARE
Goal Outcome Evaluation:         Pt was extubated and restraints removed at 0850.

## 2024-01-22 NOTE — PLAN OF CARE
Goal Outcome Evaluation:              Outcome Evaluation: Pt was extubated this am, pt is on RA, all VSS; all IV abx given as ordered; pt is has tolerated a liquid diet and oder was placed to advance to a regular diet per MD; good UOP and FMS was in place with good output this shift; PRN meds given for nausea with relief of symptoms; pt has been educated on smoking cesstion and states that she understands; pt was also educated on the importance of trying to get up to the chair tomorrow, PT consult may be beneficial.

## 2024-01-22 NOTE — PROGRESS NOTES
Adult Nutrition  Assessment/PES    Patient Name:  Valeria Rubi  YOB: 1975  MRN: [id number]  Admit Date:  1/17/2024    Assessment Date:  1/22/2024    Comments:  Pt extubated and NGT out as well.   Diet adv, nausea today.    Uncertain if pt will be able to meet po needs at this time.    Adv diet as indicated and add Boost Plus once on at least Full Liquids.  Will cont to follow and monitor.      Reason for Assessment       Row Name 01/22/24 1358          Reason for Assessment    Reason For Assessment follow-up protocol     Diagnosis pulmonary disease  AHRF extubated , AHRF, AE COPD, RSV PNA, HTN                    Nutrition/Diet History       Row Name 01/22/24 1359          Nutrition/Diet History    Typical Intake (Food/Fluid/EN/PN) Pt moaning & saying she going to be sick at visit, RN provides zofran at visit. Deferred further visit                    Labs/Tests/Procedures/Meds       Row Name 01/22/24 1400          Labs/Procedures/Meds    Lab Results Reviewed reviewed     Lab Results Comments glu 195, 199, phos 1.8 L, mg 2.7 H        Diagnostic Tests/Procedures    Diagnostic Test/Procedure Reviewed reviewed        Medications    Pertinent Medications Reviewed reviewed     Pertinent Medications Comments novolog, levemir, solumedrol                      Estimated/Assessed Needs - Anthropometrics       Row Name 01/22/24 1400          Anthropometrics    Weight for Calculation 59 kg (130 lb 1.6 oz)        Estimated/Assessed Needs    Additional Documentation Estimated Calorie Needs (Group);Fluid Requirements (Group);Protein Requirements (Group)        Estimated Calorie Needs    Estimated Calorie Requirement (kcal/day) 1770 kcal ( 30 kcal/kg)     Estimated Calorie Need Method kcal/kg        Protein Requirements    Est Protein Requirement Amount (gms/kg) 1.2 gm protein  71 gm pro ( 1.2 gm/kg pro)        Fluid Requirements    Estimated Fluid Requirement Method RDA Method  1770 ml                     Nutrition Prescription Ordered       Row Name 01/22/24 1401          Nutrition Prescription PO    Current PO Diet Clear Liquid                    Evaluation of Received Nutrient/Fluid Intake       Row Name 01/22/24 1401          PO Evaluation    Number of Days PO Intake Evaluated Insufficient Data        EN Evaluation    Number of Days EN Intake Evaluated 3 days     EN Average Volume Delivered (mL/day) 586 mL/day                       Problem/Interventions:   Problem 1       Row Name 01/22/24 1402          Nutrition Diagnoses Problem 1    Problem 1 Other (comment)  Inadequate energy intake related to AHRF as evidenced by recent extubation                          Intervention Goal       Row Name 01/22/24 1402          Intervention Goal    General Meet nutritional needs for age/condition     PO Establish PO;PO intake (%)     PO Intake % 50 %                    Nutrition Intervention       Row Name 01/22/24 1403          Nutrition Intervention    RD/Tech Action Encourage intake;Follow Tx progress                      Education/Evaluation       Row Name 01/22/24 1403          Education    Education Education not appropriate at this time  pt moaning wtih nausea        Monitor/Evaluation    Monitor Per protocol;I&O;PO intake;Pertinent labs;Weight;Symptoms                     Electronically signed by:  Lila Hilton RD  01/22/24 14:03 EST

## 2024-01-23 LAB
ALBUMIN SERPL-MCNC: 3.5 G/DL (ref 3.5–5.2)
ALBUMIN/GLOB SERPL: 1.7 G/DL
ALP SERPL-CCNC: 55 U/L (ref 39–117)
ALT SERPL W P-5'-P-CCNC: 16 U/L (ref 1–33)
ANION GAP SERPL CALCULATED.3IONS-SCNC: 4.3 MMOL/L (ref 5–15)
AST SERPL-CCNC: 17 U/L (ref 1–32)
BILIRUB SERPL-MCNC: 0.5 MG/DL (ref 0–1.2)
BUN SERPL-MCNC: 22 MG/DL (ref 6–20)
BUN/CREAT SERPL: 35.5 (ref 7–25)
CALCIUM SPEC-SCNC: 9 MG/DL (ref 8.6–10.5)
CHLORIDE SERPL-SCNC: 103 MMOL/L (ref 98–107)
CO2 SERPL-SCNC: 32.7 MMOL/L (ref 22–29)
CREAT SERPL-MCNC: 0.62 MG/DL (ref 0.57–1)
EGFRCR SERPLBLD CKD-EPI 2021: 108.6 ML/MIN/1.73
GLOBULIN UR ELPH-MCNC: 2.1 GM/DL
GLUCOSE BLDC GLUCOMTR-MCNC: 119 MG/DL (ref 70–130)
GLUCOSE BLDC GLUCOMTR-MCNC: 137 MG/DL (ref 70–130)
GLUCOSE BLDC GLUCOMTR-MCNC: 150 MG/DL (ref 70–130)
GLUCOSE BLDC GLUCOMTR-MCNC: 182 MG/DL (ref 70–130)
GLUCOSE SERPL-MCNC: 162 MG/DL (ref 65–99)
POTASSIUM SERPL-SCNC: 3.6 MMOL/L (ref 3.5–5.2)
PROT SERPL-MCNC: 5.6 G/DL (ref 6–8.5)
SODIUM SERPL-SCNC: 140 MMOL/L (ref 136–145)

## 2024-01-23 PROCEDURE — 25010000002 ACETAZOLAMIDE PER 500 MG: Performed by: INTERNAL MEDICINE

## 2024-01-23 PROCEDURE — 94664 DEMO&/EVAL PT USE INHALER: CPT

## 2024-01-23 PROCEDURE — 82948 REAGENT STRIP/BLOOD GLUCOSE: CPT

## 2024-01-23 PROCEDURE — 25010000002 ENOXAPARIN PER 10 MG: Performed by: NURSE PRACTITIONER

## 2024-01-23 PROCEDURE — 94660 CPAP INITIATION&MGMT: CPT

## 2024-01-23 PROCEDURE — 25810000003 SODIUM CHLORIDE 0.9 % SOLUTION 250 ML FLEX CONT: Performed by: HOSPITALIST

## 2024-01-23 PROCEDURE — 25010000002 METHYLPREDNISOLONE PER 40 MG: Performed by: INTERNAL MEDICINE

## 2024-01-23 PROCEDURE — 94799 UNLISTED PULMONARY SVC/PX: CPT

## 2024-01-23 PROCEDURE — 25010000002 HYDRALAZINE PER 20 MG: Performed by: INTERNAL MEDICINE

## 2024-01-23 PROCEDURE — 80053 COMPREHEN METABOLIC PANEL: CPT | Performed by: HOSPITALIST

## 2024-01-23 PROCEDURE — 63710000001 INSULIN ASPART PER 5 UNITS: Performed by: NURSE PRACTITIONER

## 2024-01-23 PROCEDURE — 99233 SBSQ HOSP IP/OBS HIGH 50: CPT | Performed by: HOSPITALIST

## 2024-01-23 PROCEDURE — 97161 PT EVAL LOW COMPLEX 20 MIN: CPT

## 2024-01-23 PROCEDURE — 94761 N-INVAS EAR/PLS OXIMETRY MLT: CPT

## 2024-01-23 PROCEDURE — 25010000002 ONDANSETRON PER 1 MG: Performed by: NURSE PRACTITIONER

## 2024-01-23 PROCEDURE — 97165 OT EVAL LOW COMPLEX 30 MIN: CPT

## 2024-01-23 PROCEDURE — 25010000002 AZITHROMYCIN PER 500 MG: Performed by: HOSPITALIST

## 2024-01-23 PROCEDURE — 63710000001 INSULIN DETEMIR PER 5 UNITS: Performed by: INTERNAL MEDICINE

## 2024-01-23 RX ORDER — POTASSIUM CHLORIDE 20 MEQ/1
40 TABLET, EXTENDED RELEASE ORAL EVERY 4 HOURS
Status: COMPLETED | OUTPATIENT
Start: 2024-01-23 | End: 2024-01-23

## 2024-01-23 RX ORDER — PREDNISONE 10 MG/1
40 TABLET ORAL
Status: DISCONTINUED | OUTPATIENT
Start: 2024-01-24 | End: 2024-01-25 | Stop reason: HOSPADM

## 2024-01-23 RX ORDER — LISINOPRIL 10 MG/1
20 TABLET ORAL
Status: DISCONTINUED | OUTPATIENT
Start: 2024-01-23 | End: 2024-01-25 | Stop reason: HOSPADM

## 2024-01-23 RX ADMIN — IPRATROPIUM BROMIDE AND ALBUTEROL SULFATE 3 ML: 2.5; .5 SOLUTION RESPIRATORY (INHALATION) at 07:06

## 2024-01-23 RX ADMIN — Medication 10 ML: at 20:24

## 2024-01-23 RX ADMIN — HYDRALAZINE HYDROCHLORIDE 10 MG: 20 INJECTION INTRAMUSCULAR; INTRAVENOUS at 05:07

## 2024-01-23 RX ADMIN — INSULIN DETEMIR 15 UNITS: 100 INJECTION, SOLUTION SUBCUTANEOUS at 08:05

## 2024-01-23 RX ADMIN — ENOXAPARIN SODIUM 40 MG: 40 INJECTION SUBCUTANEOUS at 07:52

## 2024-01-23 RX ADMIN — INSULIN ASPART 2 UNITS: 100 INJECTION, SOLUTION INTRAVENOUS; SUBCUTANEOUS at 07:52

## 2024-01-23 RX ADMIN — AZITHROMYCIN DIHYDRATE 500 MG: 500 INJECTION, POWDER, LYOPHILIZED, FOR SOLUTION INTRAVENOUS at 10:59

## 2024-01-23 RX ADMIN — LISINOPRIL 20 MG: 10 TABLET ORAL at 16:36

## 2024-01-23 RX ADMIN — METHYLPREDNISOLONE SODIUM SUCCINATE 40 MG: 40 INJECTION, POWDER, FOR SOLUTION INTRAMUSCULAR; INTRAVENOUS at 02:53

## 2024-01-23 RX ADMIN — METOPROLOL TARTRATE 50 MG: 50 TABLET, FILM COATED ORAL at 08:04

## 2024-01-23 RX ADMIN — ONDANSETRON 4 MG: 2 INJECTION INTRAMUSCULAR; INTRAVENOUS at 09:36

## 2024-01-23 RX ADMIN — ASPIRIN 81 MG: 81 TABLET, COATED ORAL at 08:04

## 2024-01-23 RX ADMIN — METHYLPREDNISOLONE SODIUM SUCCINATE 40 MG: 40 INJECTION, POWDER, FOR SOLUTION INTRAMUSCULAR; INTRAVENOUS at 09:14

## 2024-01-23 RX ADMIN — Medication 10 ML: at 09:14

## 2024-01-23 RX ADMIN — IPRATROPIUM BROMIDE AND ALBUTEROL SULFATE 3 ML: 2.5; .5 SOLUTION RESPIRATORY (INHALATION) at 23:46

## 2024-01-23 RX ADMIN — NICOTINE 1 PATCH: 21 PATCH, EXTENDED RELEASE TRANSDERMAL at 08:05

## 2024-01-23 RX ADMIN — IPRATROPIUM BROMIDE AND ALBUTEROL SULFATE 3 ML: 2.5; .5 SOLUTION RESPIRATORY (INHALATION) at 03:07

## 2024-01-23 RX ADMIN — IPRATROPIUM BROMIDE AND ALBUTEROL SULFATE 3 ML: 2.5; .5 SOLUTION RESPIRATORY (INHALATION) at 11:28

## 2024-01-23 RX ADMIN — ACETAZOLAMIDE SODIUM 250 MG: 500 INJECTION, POWDER, LYOPHILIZED, FOR SOLUTION INTRAVENOUS at 08:00

## 2024-01-23 RX ADMIN — METOPROLOL TARTRATE 50 MG: 50 TABLET, FILM COATED ORAL at 20:23

## 2024-01-23 RX ADMIN — INSULIN ASPART 2 UNITS: 100 INJECTION, SOLUTION INTRAVENOUS; SUBCUTANEOUS at 12:36

## 2024-01-23 RX ADMIN — Medication 10 ML: at 08:05

## 2024-01-23 RX ADMIN — IPRATROPIUM BROMIDE AND ALBUTEROL SULFATE 3 ML: 2.5; .5 SOLUTION RESPIRATORY (INHALATION) at 19:05

## 2024-01-23 RX ADMIN — ACETAMINOPHEN 650 MG: 325 TABLET ORAL at 09:36

## 2024-01-23 RX ADMIN — ATORVASTATIN CALCIUM 20 MG: 20 TABLET, FILM COATED ORAL at 08:03

## 2024-01-23 RX ADMIN — NYSTATIN 500000 UNITS: 100000 SUSPENSION ORAL at 12:41

## 2024-01-23 RX ADMIN — GUAIFENESIN 1200 MG: 600 TABLET, EXTENDED RELEASE ORAL at 08:04

## 2024-01-23 RX ADMIN — ACETAMINOPHEN 650 MG: 325 TABLET ORAL at 20:44

## 2024-01-23 RX ADMIN — Medication 10 ML: at 08:06

## 2024-01-23 RX ADMIN — CLOPIDOGREL BISULFATE 75 MG: 75 TABLET ORAL at 08:04

## 2024-01-23 RX ADMIN — VENLAFAXINE HYDROCHLORIDE 75 MG: 37.5 CAPSULE, EXTENDED RELEASE ORAL at 08:04

## 2024-01-23 RX ADMIN — IPRATROPIUM BROMIDE AND ALBUTEROL SULFATE 3 ML: 2.5; .5 SOLUTION RESPIRATORY (INHALATION) at 14:57

## 2024-01-23 RX ADMIN — GUAIFENESIN 1200 MG: 600 TABLET, EXTENDED RELEASE ORAL at 20:23

## 2024-01-23 RX ADMIN — POTASSIUM CHLORIDE 40 MEQ: 1500 TABLET, EXTENDED RELEASE ORAL at 20:23

## 2024-01-23 RX ADMIN — POTASSIUM CHLORIDE 40 MEQ: 1500 TABLET, EXTENDED RELEASE ORAL at 16:36

## 2024-01-23 RX ADMIN — ACETAMINOPHEN 650 MG: 325 TABLET ORAL at 00:35

## 2024-01-23 RX ADMIN — NYSTATIN 500000 UNITS: 100000 SUSPENSION ORAL at 20:24

## 2024-01-23 RX ADMIN — Medication 10 ML: at 09:36

## 2024-01-23 NOTE — PLAN OF CARE
Goal Outcome Evaluation:  Plan of Care Reviewed With: patient           Outcome Evaluation: OT evaluation completed. pt required max assist x 2 for bed mobility including scooting along EOB. pt required mod assist x 2 for functional transfers from EOB with RW, unable to advance LEs for steps to perform stand pivot transfers. pt required min-mod assist for static standing balance with RW. pt with posterior lean in standing, leaning against bed for support. pt with significantly decreased AROM and strength in B UEs. initially co numbness in B UE/LE however denies numbness at conclusion of session. anticipate pt will require max assist for tb adls. pt would benefit from skilled OT services to address adl retraining, functional transfers and balance. OT will see pt during hospitalization, at this time would rec SNF at discharge      Anticipated Discharge Disposition (OT): skilled nursing facility

## 2024-01-23 NOTE — PLAN OF CARE
Goal Outcome Evaluation:  Plan of Care Reviewed With: patient           Outcome Evaluation: PT Evaluation Complete: Patient performs supine to/from sit transfer with max A x 2 and sit to/from stand transfers with mod A x 2. Patient requires min/mod A x 2 for static standing balance with posterior lean noted. Attempted side steps however patient unable to move LE's and fatigues very quickly. O2 sats noted to be 91-94% throughout on room air. Recommend mohini lift for out of bed transfers with nursing staff. Patient would benefit from physical therapy to address deficits in functional mobility and LE strength.  Recommend STR at discharge, patient receptive. Notified .      Anticipated Discharge Disposition (PT): skilled nursing facility

## 2024-01-23 NOTE — PROGRESS NOTES
"Hospitalist Team      Patient Care Team:  Talib Ceron MD as PCP - General (Family Medicine)        Chief Complaint:  Follow up Acute H/H Respiratory Failure    Subjective    No acute events overnight.  Ms. Rubi rested well on BiPAP.  She is tolerating PO.  She does complain of a headache w/ is chronic for her.  She is tolerating PO.  Staff report quite a bit of stool coming out through the FMS.    Objective    Vital Signs  Temp:  [97 °F (36.1 °C)-98.7 °F (37.1 °C)] 98 °F (36.7 °C)  Heart Rate:  [] 112  Resp:  [16-22] 18  BP: (141-183)/(76-96) 161/88  Oxygen Therapy  SpO2: 91 %  Pulse Oximetry Type: Continuous  Device (Oxygen Therapy): nasal cannula with ETCO2  Flow (L/min): 2  Oxygen Concentration (%): 25  ETCO2 (mmHg): 38 mmHg}    Flowsheet Rows      Flowsheet Row First Filed Value   Admission Height 157.5 cm (62\") Documented at 01/17/2024 0423   Admission Weight 57.2 kg (126 lb) Documented at 01/17/2024 0423          Physical Exam:    General: Appears older than stated age in no no acute distress.  HEENT: Pupils are equal and EOMI.  Lungs: Good air movement throughout all fields.  I appreciate no wheeze.  Respirations are non-labored.  CV: Regular rate and rhythm.  No murmur appreciated.  Radial pulses are 2+ and symmetric.  Abdomen: Soft and non-tender w/ active bowel sounds.  MSK: No C/C/E.  Skin: No suspicious rash.  No jaundice.  Neuro: CN II-XII grossly intact.  Psych: Normal affect.  Ox3.    Results Review:     I reviewed the patient's new clinical results.    Lab Results (last 24 hours)       Procedure Component Value Units Date/Time    Comprehensive Metabolic Panel [[id number]]  (Abnormal) Collected: 01/23/24 0751    Specimen: Blood Updated: 01/23/24 0852     Glucose 162 mg/dL      BUN 22 mg/dL      Creatinine 0.62 mg/dL      Sodium 140 mmol/L      Potassium 3.6 mmol/L      Chloride 103 mmol/L      CO2 32.7 mmol/L      Calcium 9.0 mg/dL      Total Protein 5.6 g/dL      Albumin 3.5 g/dL      " ALT (SGPT) 16 U/L      AST (SGOT) 17 U/L      Alkaline Phosphatase 55 U/L      Total Bilirubin 0.5 mg/dL      Globulin 2.1 gm/dL      A/G Ratio 1.7 g/dL      BUN/Creatinine Ratio 35.5     Anion Gap 4.3 mmol/L      eGFR 108.6 mL/min/1.73     Narrative:      GFR Normal >60  Chronic Kidney Disease <60  Kidney Failure <15      POC Glucose Once [[id number]]  (Abnormal) Collected: 01/23/24 0735    Specimen: Blood Updated: 01/23/24 0742     Glucose 150 mg/dL     POC Glucose Once [[id number]]  (Abnormal) Collected: 01/22/24 2022    Specimen: Blood Updated: 01/22/24 2029     Glucose 210 mg/dL     POC Glucose Once [[id number]]  (Abnormal) Collected: 01/22/24 1807    Specimen: Blood Updated: 01/22/24 1813     Glucose 199 mg/dL     Phosphorus [[id number]]  (Normal) Collected: 01/22/24 1539    Specimen: Blood Updated: 01/22/24 1610     Phosphorus 3.0 mg/dL     Blood Culture - Blood, Blood, PICC Line [[id number]]  (Normal) Collected: 01/17/24 1421    Specimen: Blood, PICC Line Updated: 01/22/24 1445     Blood Culture No growth at 5 days    POC Glucose Once [[id number]]  (Abnormal) Collected: 01/22/24 1210    Specimen: Blood Updated: 01/22/24 1216     Glucose 149 mg/dL             Imaging Results (Last 24 Hours)       ** No results found for the last 24 hours. **              Medication Review:   I have reviewed the patient's current medication list    Current Facility-Administered Medications:     acetaminophen (TYLENOL) tablet 650 mg, 650 mg, Oral, Q4H PRN, 650 mg at 01/23/24 0035 **OR** acetaminophen (TYLENOL) suppository 650 mg, 650 mg, Rectal, Q4H PRN, Zita Potter APRN    albuterol (PROVENTIL) nebulizer solution 0.083% 2.5 mg/3mL, 2.5 mg, Nebulization, Q2H PRN, Facundo Flanagan DO, 2.5 mg at 01/20/24 1342    aspirin EC tablet 81 mg, 81 mg, Oral, Daily, Zita Potter APRN, 81 mg at 01/23/24 0804    atorvastatin (LIPITOR) tablet 20 mg, 20 mg, Oral, Daily, Zita Potter APRN, 20 mg at 01/23/24 0803     azithromycin (ZITHROMAX) 500 mg in sodium chloride 0.9 % 250 mL IVPB-VTB, 500 mg, Intravenous, Q24H, Dagoberto Chilel MD, 500 mg at 01/22/24 1150    sennosides-docusate (PERICOLACE) 8.6-50 MG per tablet 2 tablet, 2 tablet, Oral, BID PRN **AND** polyethylene glycol (MIRALAX) packet 17 g, 17 g, Oral, Daily PRN **AND** bisacodyl (DULCOLAX) EC tablet 5 mg, 5 mg, Oral, Daily PRN **AND** bisacodyl (DULCOLAX) suppository 10 mg, 10 mg, Rectal, Daily PRN, Zita Potter APRN    sennosides-docusate (PERICOLACE) 8.6-50 MG per tablet 2 tablet, 2 tablet, Oral, BID, 2 tablet at 01/20/24 0812 **AND** polyethylene glycol (MIRALAX) packet 17 g, 17 g, Oral, Daily PRN **AND** bisacodyl (DULCOLAX) EC tablet 5 mg, 5 mg, Oral, Daily PRN **AND** bisacodyl (DULCOLAX) suppository 10 mg, 10 mg, Rectal, Daily PRN, Dagoberto Chilel MD    Calcium Replacement - Follow Nurse / BPA Driven Protocol, , Does not apply, PRN, Zita Potter APRN    clopidogrel (PLAVIX) tablet 75 mg, 75 mg, Oral, Daily, Zita Potter APRN, 75 mg at 01/23/24 0804    dexmedetomidine (PRECEDEX) 400 mcg in 100 mL NS infusion, 0.2-1.5 mcg/kg/hr, Intravenous, Titrated, Facundo Flanagan DO, Stopped at 01/22/24 0842    dextrose (D50W) (25 g/50 mL) IV injection 25 g, 25 g, Intravenous, Q15 Min PRN, Facundo Flanagan DO    dextrose (D50W) (25 g/50 mL) IV injection 25 g, 25 g, Intravenous, Q15 Min PRN, Mikala Lopes MD    dextrose (GLUTOSE) oral gel 15 g, 15 g, Oral, Q15 Min PRN, Mikala Lopes MD    Enoxaparin Sodium (LOVENOX) syringe 40 mg, 40 mg, Subcutaneous, Q24H, Zita Potter APRN, 40 mg at 01/23/24 0752    fentaNYL citrate (PF) (SUBLIMAZE) injection 50 mcg, 50 mcg, Intravenous, Q30 Min PRN, Yazmin Gloria DO, 50 mcg at 01/21/24 0330    fluticasone (FLONASE) 50 MCG/ACT nasal spray 1 spray, 1 spray, Nasal, Daily PRN, Zita Potter APRN    glucagon (GLUCAGEN) injection 1 mg, 1 mg, Intramuscular, Q15 Min PRN, Mikala Lopes MD     guaiFENesin (MUCINEX) 12 hr tablet 1,200 mg, 1,200 mg, Oral, Q12H, Zita Potter APRN, 1,200 mg at 01/23/24 0804    hydrALAZINE (APRESOLINE) injection 10 mg, 10 mg, Intravenous, Q6H PRN, Ravinder Estrella, DO, 10 mg at 01/23/24 0507    Insulin Aspart (novoLOG) injection 2-7 Units, 2-7 Units, Subcutaneous, 4x Daily AC & at Bedtime, Zita Potter APRN, 2 Units at 01/23/24 0752    insulin detemir (LEVEMIR) injection 15 Units, 15 Units, Subcutaneous, Daily, Mikala Lopes MD, 15 Units at 01/23/24 0805    ipratropium-albuterol (DUO-NEB) nebulizer solution 3 mL, 3 mL, Nebulization, Q4H - RT, Zita Potter APRN, 3 mL at 01/23/24 0706    ipratropium-albuterol (DUO-NEB) nebulizer solution 3 mL, 3 mL, Nebulization, Q4H PRN, Zita Potter APRN    Magnesium Standard Dose Replacement - Follow Nurse / BPA Driven Protocol, , Does not apply, PRN, Zita Potter APRN    methylPREDNISolone sodium succinate (SOLU-Medrol) injection 40 mg, 40 mg, Intravenous, Q6H, Mikala Lopes MD, 40 mg at 01/23/24 0914    metoprolol tartrate (LOPRESSOR) injection 5 mg, 5 mg, Intravenous, Q5 Min PRN, Facundo Flanagan DO, 5 mg at 01/17/24 1022    metoprolol tartrate (LOPRESSOR) tablet 50 mg, 50 mg, Nasogastric, Q12H, Ravinder Estrella, , 50 mg at 01/23/24 0804    nicotine (NICODERM CQ) 21 MG/24HR patch 1 patch, 1 patch, Transdermal, Q24H, Facundo Flanagan DO, 1 patch at 01/23/24 0805    nitroglycerin (NITROSTAT) SL tablet 0.4 mg, 0.4 mg, Sublingual, Q5 Min PRN, Zita Potter APRN    ondansetron ODT (ZOFRAN-ODT) disintegrating tablet 4 mg, 4 mg, Oral, Q6H PRN **OR** ondansetron (ZOFRAN) injection 4 mg, 4 mg, Intravenous, Q6H PRN, Zita Potter APRN, 4 mg at 01/22/24 2024    Pharmacy to Dose enoxaparin (LOVENOX), , Does not apply, Continuous PRN, Zita Potter APRN    Phosphorus Replacement - Follow Nurse / BPA Driven Protocol, , Does not apply, PRNTariq Shoshana R, APRN    Potassium Replacement -  Follow Nurse / BPA Driven Protocol, , Does not apply, PRN, Zita Potter R, APRN    propofol (DIPRIVAN) infusion 10 mg/mL 100 mL, 5-50 mcg/kg/min, Intravenous, Titrated, Dagoberto Chilel MD, Stopped at 01/22/24 0735    [COMPLETED] Insert Peripheral IV, , , Once **AND** sodium chloride 0.9 % flush 10 mL, 10 mL, Intravenous, PRN, Mark Blanca MD    sodium chloride 0.9 % flush 10 mL, 10 mL, Intravenous, PRN, TariqZita onofre R, APRN    sodium chloride 0.9 % flush 10 mL, 10 mL, Intravenous, Q12H, Facundo Flanagan, , 10 mL at 01/23/24 0805    sodium chloride 0.9 % flush 10 mL, 10 mL, Intravenous, Q12H, Facundo Flanagan DO, 10 mL at 01/23/24 0806    sodium chloride 0.9 % flush 10 mL, 10 mL, Intravenous, PRN, Facundo Flanagan DO, 10 mL at 01/23/24 0914    sodium chloride 0.9 % flush 20 mL, 20 mL, Intravenous, PRN, Facundo Flanagan R, DO    sodium chloride 0.9 % infusion 40 mL, 40 mL, Intravenous, PRN, Zita Potter R, APRN, 40 mL at 01/21/24 2022    venlafaxine XR (EFFEXOR-XR) 24 hr capsule 75 mg, 75 mg, Oral, Daily, Zita Potter APRN, 75 mg at 01/23/24 0804      Assessment & Plan     Acute Hypoxic/Hypercapnic Respiratory Failure due to AECOPD: Successfully extubated yesterday.  Rested overnight on BiPAP.  She reports she does have O2 at home, but doesn't wear it with any regularity.  I'm going to watch her on RA today.  Need to start mobilizing her.  AECOPD due to RSV: Will change to PO Prednisone.  I believe her reaction to Prednisone is not a true allergy but rather sounds like thrush.  Will start on Nystatin.  Discussed w/ Pulmonary.  Steroid-induced Hyperglycemia: A well-known side effect and NOT a complication.  Should see some resolution as IV steroids are stopped in favor of oral.  Essential Hypertension: Not at goal on exam, and trend is up.  Will resume home Lisinopril but hold HCTZ.  CAD/PAD: Nothing acute.  Continue current regimen.  Anxiety: Nothing acute.  Tobacco Abuse:  Counseled.     Plan for disposition: Will downgrade from ICU today.  Likely home soon.  Will have PT evaluate as well.    Dagoberto Chilel MD  01/23/24  09:15 EST

## 2024-01-23 NOTE — THERAPY EVALUATION
Patient Name: Valeria Rubi  : 1975    MRN: [id number]                              Today's Date: 2024       Admit Date: 2024    Visit Dx:     ICD-10-CM ICD-9-CM   1. Acute hypoxic respiratory failure  J96.01 518.81   2. COPD with acute exacerbation  J44.1 491.21     Patient Active Problem List   Diagnosis    Abnormal mammogram    Atopic rhinitis    Bronchitis    Chronic headache    Pain of hand    Hyperlipidemia    Essential hypertension    Sprain of ankle    Nausea    Pain in wrist    Wheezing    Panlobular emphysema    Tobacco use    Pharyngeal dysphagia    Radiculopathy    Lumbar degenerative disc disease    Peripheral artery disease    Pain of toe of left foot    Abnormal femoral pulse    Coronary stent occlusion    Hyperglycemia    Menopausal symptoms    Surveillance for Depo-Provera contraception    Anxiety and depression    Numbness and tingling of both feet    Low vitamin D level    Low vitamin B12 level    Pneumonia of left lower lobe due to infectious organism    Headache, migraine    Pneumonia of both lower lobes due to infectious organism    Acute on chronic respiratory failure with hypoxia    Acute respiratory failure with hypoxia and hypercapnia    Acute on chronic respiratory failure with hypercapnia    Respiratory failure     Past Medical History:   Diagnosis Date    Anxiety     Arthritis     COPD (chronic obstructive pulmonary disease)     Coronary stent occlusion     CTS (carpal tunnel syndrome)     Depression     Dizziness     Headache     Hyperlipidemia     Hypertension     Migraine     Numbness and tingling     Pneumonia      Past Surgical History:   Procedure Laterality Date    ANGIOPLASTY ILIAC ARTERY Left 2016    Procedure: BILATERAL DUPLEX DIRECTED ACCESS, AIF BILATERAL RUNOFF, SELECTIVE CATHETERIZATION OF RIGHT EXTERNAL ILIAC WITH ANGIOPLASTY, LEFT COMMON ILIAC STENT PLACEMENT;  Surgeon: Jose Carlos Plascencia MD;  Location: [facility] ;  Service:     BREAST  SURGERY      BILAT IMPLANTS    CAROTID ARTERY ANGIOPLASTY        General Information       Row Name 01/23/24 1354          Physical Therapy Time and Intention    Document Type evaluation  -BP     Mode of Treatment physical therapy  -BP       Row Name 01/23/24 1353          General Information    Patient Profile Reviewed yes  Patient presents to ED due to SOA. Patient admitted for treatment of RSV. Patient placed on ventilator x 4 days, extubated on 1/22. Patient with h/o COPD, per chart on chronically on 2L O2/NC.  -BP     Prior Level of Function --  Patient reports at baseline she is independent with mobility without use of an AD.  -BP     Existing Precautions/Restrictions fall  -BP     Barriers to Rehab none identified  -BP       Row Name 01/23/24 1353          Living Environment    People in Home spouse  -BP       Row Name 01/23/24 1353          Home Main Entrance    Number of Stairs, Main Entrance four  -BP     Stair Railings, Main Entrance none  -BP       Row Name 01/23/24 1353          Stairs Within Home, Primary    Stairs, Within Home, Primary two story home. Reports she will be staying on the first floor.  -BP       Row Name 01/23/24 1353          Cognition    Orientation Status (Cognition) oriented x 3  -BP       Row Name 01/23/24 1355          Safety Issues, Functional Mobility    Safety Issues Affecting Function (Mobility) positioning of assistive device;insight into deficits/self-awareness;at risk behavior observed  -BP     Impairments Affecting Function (Mobility) strength;shortness of breath  -BP     Comment, Safety Issues/Impairments (Mobility) Patient fatigues very quickly with minimal activity  -BP               User Key  (r) = Recorded By, (t) = Taken By, (c) = Cosigned By      Initials Name Provider Type    BP Marisaebl Macias, PT Physical Therapist                   Mobility       Row Name 01/23/24 1350          Bed Mobility    Bed Mobility supine-sit;sit-supine;scooting/bridging  -BP      "Scooting/Bridging [place] (Bed Mobility) maximum assist (25% patient effort);2 person assist  Scooting along EOB in sitting as well as in supine.  -BP     Supine-Sit [place] (Bed Mobility) maximum assist (25% patient effort)  -BP     Sit-Supine [place] (Bed Mobility) 2 person assist  -BP     Assistive Device (Bed Mobility) draw sheet;head of bed elevated  -BP     Comment, (Bed Mobility) verbal cues for sequencing  -BP       Row Name 01/23/24 1358          Transfers    Comment, (Transfers) Verbal cues for hand placement. performed sit to/from stand x 2 from elevated EOB. Requires mod A x 2 each trial. attempted side steps to initiate a pivot transfer to chair, patient unable to complete. Fatigues very quickly in standing.  -BP       Row Name 01/23/24 1356          Bed-Chair Transfer    Comment, (Bed-Chair Transfer) States \"I can't.\" fatigues very quickly-unable to advance steps to complete pivot transfer  -       Row Name 01/23/24 1350          Sit-Stand Transfer    Sit-Stand [place] (Transfers) moderate assist (50% patient effort);2 person assist  -BP     Assistive Device (Sit-Stand Transfers) walker, front-wheeled  -       Row Name 01/23/24 1355          Gait/Stairs (Locomotion)    Comment, (Gait/Stairs) transfers only  -               User Key  (r) = Recorded By, (t) = Taken By, (c) = Cosigned By      Initials Name Provider Type    BP Marisabel Macias, PT Physical Therapist                   Obj/Interventions       Row Name 01/23/24 1404          Range of Motion Comprehensive    Comment, General Range of Motion B knee and hip ROM WFL. B ankle active DF limited.  -BP       Row Name 01/23/24 1404          Strength Comprehensive (MMT)    Comment, General Manual Muscle Testing (MMT) Assessment B quad strength 4/5. B hip flexor strength-3+/5. B hamstring strength 4-/5.  -BP       Row Name 01/23/24 1404          Balance    Comment, Balance sitting balance-initially requires min A, able to " progress to CGA with cues and UE support. Standing balance-min/mod A x 2 with use of FWW. Posterior lean noted in standing.  -BP       Row Name 01/23/24 1404          Sensory Assessment (Somatosensory)    Sensory Assessment (Somatosensory) --  Initially reports reports numbness in bilateral Upper and lower extremities however at the end of evaluation denies numbness.  -BP               User Key  (r) = Recorded By, (t) = Taken By, (c) = Cosigned By      Initials Name Provider Type    BP Marisabel Macias, PT Physical Therapist                   Goals/Plan       Row Name 01/23/24 1412          Bed Mobility Goal 1 (PT)    Activity/Assistive Device (Bed Mobility Goal 1, PT) bed mobility activities, all  -BP     [place] Level/Cues Needed (Bed Mobility Goal 1, PT) minimum assist (75% or more patient effort)  -BP     Time Frame (Bed Mobility Goal 1, PT) 5 days  -BP     Progress/Outcomes (Bed Mobility Goal 1, PT) new goal  -BP       Row Name 01/23/24 1412          Transfer Goal 1 (PT)    Activity/Assistive Device (Transfer Goal 1, PT) transfers, all;walker, rolling  -BP     [place] Level/Cues Needed (Transfer Goal 1, PT) minimum assist (75% or more patient effort)  -BP     Time Frame (Transfer Goal 1, PT) 5 days  -BP     Progress/Outcome (Transfer Goal 1, PT) new goal  -BP       Row Name 01/23/24 1412          Gait Training Goal 1 (PT)    Activity/Assistive Device (Gait Training Goal 1, PT) gait (walking locomotion);walker, rolling  -BP     [place] Level (Gait Training Goal 1, PT) minimum assist (75% or more patient effort)  -BP     Distance (Gait Training Goal 1, PT) 50  -BP     Time Frame (Gait Training Goal 1, PT) 5 days  -BP     Progress/Outcome (Gait Training Goal 1, PT) new goal  -BP       Row Name 01/23/24 1412          Therapy Assessment/Plan (PT)    Planned Therapy Interventions (PT) balance training;bed mobility training;gait training;home exercise program;patient/family education;transfer  training;strengthening  -BP               User Key  (r) = Recorded By, (t) = Taken By, (c) = Cosigned By      Initials Name Provider Type    BP Marisabel Macias, PT Physical Therapist                   Clinical Impression       Row Name 01/23/24 1407          Pain    Pretreatment Pain Rating 0/10 - no pain  -BP     Posttreatment Pain Rating 0/10 - no pain  -BP     Additional Documentation Pain Scale: Numbers Pre/Post-Treatment (Group)  -BP       Row Name 01/23/24 1404          Plan of Care Review    Plan of Care Reviewed With patient  -BP     Outcome Evaluation PT Evaluation Complete: Patient performs supine to/from sit transfer with max A x 2 and sit to/from stand transfers with mod A x 2. Patient requires min/mod A x 2 for static standing balance with posterior lean noted. Attempted side steps however patient unable to move LE's and fatigues very quickly. O2 sats noted to be 91-94% throughout on room air. Recommend mohini lift for out of bed transfers with nursing staff. Patient would benefit from physical therapy to address deficits in functional mobility and LE strength.  Recommend STR at discharge, patient receptive. Notified .  -BP       Row Name 01/23/24 1407          Therapy Assessment/Plan (PT)    Rehab Potential (PT) good, to achieve stated therapy goals  -BP     Criteria for Skilled Interventions Met (PT) yes;meets criteria  -BP     Therapy Frequency (PT) 6 times/wk  -BP     Predicted Duration of Therapy Intervention (PT) 5 days  -BP       Row Name 01/23/24 1408          Vital Signs    Pre SpO2 (%) 94  -BP     O2 Delivery Pre Treatment room air  -BP     Intra SpO2 (%) 91  -BP     O2 Delivery Intra Treatment room air  -BP     Post SpO2 (%) 94  -BP     O2 Delivery Post Treatment room air  -BP       Row Name 01/23/24 1409          Positioning and Restraints    Pre-Treatment Position in bed  -BP     Post Treatment Position bed  -BP     In Bed notified nsg;supine;call light within reach;encouraged  to call for assist  -BP               User Key  (r) = Recorded By, (t) = Taken By, (c) = Cosigned By      Initials Name Provider Type    BP Marisabel Macias PT Physical Therapist                   Outcome Measures       Row Name 01/23/24 1413          How much help from another person do you currently need...    Turning from your back to your side while in flat bed without using bedrails? 1  -BP     Moving from lying on back to sitting on the side of a flat bed without bedrails? 1  -BP     Moving to and from a bed to a chair (including a wheelchair)? 1  -BP     Standing up from a chair using your arms (e.g., wheelchair, bedside chair)? 1  -BP     Climbing 3-5 steps with a railing? 1  -BP     To walk in hospital room? 1  -BP     AM-PAC 6 Clicks Score (PT) 6  -BP     Highest Level of Mobility Goal 2 --> Bed activities/dependent transfer  -BP               User Key  (r) = Recorded By, (t) = Taken By, (c) = Cosigned By      Initials Name Provider Type    Marisabel Jacobs PT Physical Therapist                                 Physical Therapy Education       Title: PT OT SLP Therapies (In Progress)       Topic: Physical Therapy (In Progress)       Point: Mobility training (Done)       Learning Progress Summary             Patient Acceptance, E,TB, VU,NR by  at 1/23/2024 1413                         Point: Home exercise program (Not Started)       Learner Progress:  Not documented in this visit.                              User Key       Initials Effective Dates Name Provider Type Discipline     06/16/21 -  Marisabel Macias PT Physical Therapist PT                  PT Recommendation and Plan  Planned Therapy Interventions (PT): balance training, bed mobility training, gait training, home exercise program, patient/family education, transfer training, strengthening  Plan of Care Reviewed With: patient  Outcome Evaluation: PT Evaluation Complete: Patient performs supine to/from sit transfer with max A x 2 and  sit to/from stand transfers with mod A x 2. Patient requires min/mod A x 2 for static standing balance with posterior lean noted. Attempted side steps however patient unable to move LE's and fatigues very quickly. O2 sats noted to be 91-94% throughout on room air. Recommend mohini lift for out of bed transfers with nursing staff. Patient would benefit from physical therapy to address deficits in functional mobility and LE strength.  Recommend STR at discharge, patient receptive. Notified .     Time Calculation:   PT Evaluation Complexity  History, PT Evaluation Complexity: 3 or more personal factors and/or comorbidities  Examination of Body Systems (PT Eval Complexity): total of 3 or more elements  Clinical Presentation (PT Evaluation Complexity): stable  Clinical Decision Making (PT Evaluation Complexity): low complexity  Overall Complexity (PT Evaluation Complexity): low complexity     PT Charges       Row Name 01/23/24 1414             Time Calculation    Start Time 1320  -BP      PT Received On 01/23/24  -BP      PT - Next Appointment 01/24/24  -BP                User Key  (r) = Recorded By, (t) = Taken By, (c) = Cosigned By      Initials Name Provider Type    BP Marisabel Macias, PT Physical Therapist                  Therapy Charges for Today       Code Description Service Date Service Provider Modifiers Qty    92690692529 HC PT EVAL LOW COMPLEXITY 2 1/23/2024 Marisabel Macias, PT GP 1            PT G-Codes  AM-PAC 6 Clicks Score (PT): 6  PT Discharge Summary  Anticipated Discharge Disposition (PT): skilled nursing facility    Marisabel Macias PT  1/23/2024

## 2024-01-23 NOTE — PLAN OF CARE
Problem: Adult Inpatient Plan of Care  Goal: Plan of Care Review  Outcome: Ongoing, Progressing  Flowsheets (Taken 1/23/2024 7769)  Progress: improving  Plan of Care Reviewed With: patient  Outcome Evaluation:   pt rested intermittently overnight. BP elevated overnight   treated with prn hydralazine per mar. C/O HA overnight, improved with prn tylenol and ice packs. Tolerated BIPAP well overnight. Good urine output noted. PT/OT eval placed for am.

## 2024-01-23 NOTE — THERAPY EVALUATION
Acute Care - Occupational Therapy Initial Evaluation   Sydnee Sanon     Patient Name: Valeria Rubi  : 1975  MRN: [id number]  Today's Date: 2024  Onset of Illness/Injury or Date of Surgery: 24  Date of Referral to OT: 24  Referring Physician: Zita HADDAD    Admit Date: 2024       ICD-10-CM ICD-9-CM   1. Acute hypoxic respiratory failure  J96.01 518.81   2. COPD with acute exacerbation  J44.1 491.21     Patient Active Problem List   Diagnosis    Abnormal mammogram    Atopic rhinitis    Bronchitis    Chronic headache    Pain of hand    Hyperlipidemia    Essential hypertension    Sprain of ankle    Nausea    Pain in wrist    Wheezing    Panlobular emphysema    Tobacco use    Pharyngeal dysphagia    Radiculopathy    Lumbar degenerative disc disease    Peripheral artery disease    Pain of toe of left foot    Abnormal femoral pulse    Coronary stent occlusion    Hyperglycemia    Menopausal symptoms    Surveillance for Depo-Provera contraception    Anxiety and depression    Numbness and tingling of both feet    Low vitamin D level    Low vitamin B12 level    Pneumonia of left lower lobe due to infectious organism    Headache, migraine    Pneumonia of both lower lobes due to infectious organism    Acute on chronic respiratory failure with hypoxia    Acute respiratory failure with hypoxia and hypercapnia    Acute on chronic respiratory failure with hypercapnia    Respiratory failure     Past Medical History:   Diagnosis Date    Anxiety     Arthritis     COPD (chronic obstructive pulmonary disease)     Coronary stent occlusion     CTS (carpal tunnel syndrome)     Depression     Dizziness     Headache     Hyperlipidemia     Hypertension     Migraine     Numbness and tingling     Pneumonia      Past Surgical History:   Procedure Laterality Date    ANGIOPLASTY ILIAC ARTERY Left 2016    Procedure: BILATERAL DUPLEX DIRECTED ACCESS, AIF BILATERAL RUNOFF, SELECTIVE CATHETERIZATION OF  "RIGHT EXTERNAL ILIAC WITH ANGIOPLASTY, LEFT COMMON ILIAC STENT PLACEMENT;  Surgeon: Jose Carlos Plascencia MD;  Location: [facility] OR 18/19;  Service:     BREAST SURGERY      BILAT IMPLANTS    CAROTID ARTERY ANGIOPLASTY           OT ASSESSMENT FLOWSHEET (last 12 hours)       OT Evaluation and Treatment       Row Name 01/23/24 2768                   OT Time and Intention    Subjective Information complains of;numbness  -JJ        Document Type evaluation  -JJ        Mode of Treatment occupational therapy  -JJ        Patient Effort adequate  -JJ        Comment pt with co numbness in B UE/LE initially during evaluation and at end of treatment denies numbness in extremities  -JJ           General Information    Patient Profile Reviewed yes  -JJ        Onset of Illness/Injury or Date of Surgery 01/17/24  -JJ        Referring Physician Zita Espinoza APRN  -JJ        General Observations of Patient pt supine in bed agreed to evaluation  -JJ        Prior Level of Function independent:;all household mobility;transfer;bed mobility;ADL's  -JJ        Equipment Currently Used at Home none  -JJ        Pertinent History of Current Functional Problem pt presents to ED with SOA, RSV diagnosis intubated x 4 days, extubated on 1-22. per pt and chart review pt on 2 L O2 NC  -JJ        Existing Precautions/Restrictions fall  -JJ        Risks Reviewed patient:;increased discomfort  -JJ        Benefits Reviewed patient:;improve function;increase independence  -JJ        Barriers to Rehab none identified  -JJ           Living Environment    Current Living Arrangements home  -JJ        People in Home spouse  -JJ           Home Main Entrance    Number of Stairs, Main Entrance four  -JJ        Stair Railings, Main Entrance none  -JJ           Stairs Within Home, Primary    Stairs, Within Home, Primary two story home pt states will stay on first floor, states \"will sleep on couch because  is sick\"  -JJ           Pain Assessment    " Pretreatment Pain Rating 0/10 - no pain  -JJ        Posttreatment Pain Rating 0/10 - no pain  -JJ           Cognition    Orientation Status (Cognition) oriented x 3  -JJ        Follows Commands (Cognition) WFL  -JJ        Personal Safety Interventions gait belt;nonskid shoes/slippers when out of bed  -JJ           Range of Motion Comprehensive    Comment, General Range of Motion pt with minimal AROM of B UE. pt able to flex B wrist and elbow teo 75%. no active shoulder motion  -JJ           Strength Comprehensive (MMT)    Comment, General Manual Muscle Testing (MMT) Assessment minimal  strength and tricep strength bilaterally  -JJ           Activities of Daily Living    BADL Assessment/Intervention lower body dressing  -JJ           Lower Body Dressing Assessment/Training    Comment, (Lower Body Dressing) anticipate pt will require max assist for TB adls  -JJ           Bed Mobility    Bed Mobility supine-sit;sit-supine  -JJ        Scooting/Bridging [place] (Bed Mobility) maximum assist (25% patient effort);2 person assist  scooting EOB  -JJ        Supine-Sit [place] (Bed Mobility) maximum assist (25% patient effort);2 person assist  -JJ        Sit-Supine [place] (Bed Mobility) 2 person assist  -JJ        Assistive Device (Bed Mobility) bed rails;draw sheet;head of bed elevated  -JJ        Comment, (Bed Mobility) verbal cues for sequencing  -JJ           Transfer Assessment/Treatment    Comment, (Transfers) verbal cues for hand placement,  posterior lean  -JJ           Bed-Chair Transfer    Comment, (Bed-Chair Transfer) unable to advane steps to perform bed to chair transfers.  -JJ           Sit-Stand Transfer    Sit-Stand [place] (Transfers) moderate assist (50% patient effort);verbal cues;2 person assist  -JJ        Assistive Device (Sit-Stand Transfers) walker, front-wheeled  -JJ           Safety Issues, Functional Mobility    Safety Issues Affecting Function (Mobility) positioning of  assistive device;insight into deficits/self-awareness  -        Impairments Affecting Function (Mobility) strength;shortness of breath  -           Balance    Comment, Balance sitting balance- initially min assist able to transition to CGA with cues static standing balance-min-mod x2 with RW, posterior lean on bed with B LEs  -J           Plan of Care Review    Plan of Care Reviewed With patient  -JJ        Outcome Evaluation OT evaluation completed. pt required max assist x 2 for bed mobility including scooting along EOB. pt required mod assist x 2 for functional transfers from EOB with RW, unable to advance LEs for steps to perform stand pivot transfers. pt required min-mod assist for static standing balance with RW. pt with posterior lean in standing, leaning against bed for support. pt with significantly decreased AROM and strength in B UEs. initially co numbness in B UE/LE however denies numbness at conclusion of session. anticipate pt will require max assist for tb adls. pt would benefit from skilled OT services to address adl retraining, functional transfers and balance. OT will see pt during hospitalization, at this time would rec SNF at discharge  -           Positioning and Restraints    Pre-Treatment Position in bed  -J        Post Treatment Position bed  -JJ        In Bed notified nsg;supine;call light within reach;encouraged to call for assist  -           Therapy Assessment/Plan (OT)    Date of Referral to OT 01/23/24  -        Patient/Family Therapy Goal Statement (OT) go home  -        OT Diagnosis weakness sp hospitalization  -        Rehab Potential (OT) good, to achieve stated therapy goals  -        Criteria for Skilled Therapeutic Interventions Met (OT) yes;skilled treatment is necessary  -        Therapy Frequency (OT) 5 times/wk  -        Predicted Duration of Therapy Intervention (OT) x 1 week  -        Problem List (OT) balance;coordination;mobility;range of motion  (ROM);motor control;strength  -JJ        Activity Limitations Related to Problem List (OT) unable to ambulate safely;unable to transfer safely;BADLs not performed adequately or safely;IADLs not performed adequately or safely  -JJ        Planned Therapy Interventions (OT) BADL retraining;functional balance retraining;patient/caregiver education/training;ROM/therapeutic exercise;transfer/mobility retraining  -JJ           Evaluation Complexity (OT)    Overall Complexity of Evaluation (OT) low complexity  -JJ           Therapy Plan Review/Discharge Plan (OT)    Anticipated Discharge Disposition (OT) [facility] nursing facility  -JJ           Transfer Goal 1 (OT)    Activity/Assistive Device (Transfer Goal 1, OT) toilet;commode, 3-in-1;walker, rolling  -JJ        [place] Level/Cues Needed (Transfer Goal 1, OT) contact guard required  -JJ        Time Frame (Transfer Goal 1, OT) 1 week  -JJ        Progress/Outcome (Transfer Goal 1, OT) --  new goal  -JJ           Dressing Goal 1 (OT)    Activity/Device (Dressing Goal 1, OT) upper body dressing  -JJ        [place]/Cues Needed (Dressing Goal 1, OT) standby assist  -JJ        Time Frame (Dressing Goal 1, OT) 1 week  -JJ        Progress/Outcome (Dressing Goal 1, OT) --  new goal  -JJ           Balance Goal 1 (OT)    Activity/Assistive Device (Balance Goal 1, OT) standing, static  -JJ        [place] Level/Cues Needed (Balance Goal 1, OT) standby assist  x 2-3 minutes to increase I with adls  -JJ        Time Frame (Balance Goal 1, OT) 1 week  -JJ        Progress/Outcomes (Balance Goal 1, OT) --  new goal  -JJ           Patient Education Goal (OT)    Activity (Patient Education Goal, OT) pt will demonstrate I with B UE AROM HEP  -JJ        Time Frame (Patient Education Goal, OT) 1 week  -JJ        Progress/Outcome (Patient Education Goal, OT) --  new goal  -JJ                  User Key  (r) = Recorded By, (t) = Taken By, (c) = Cosigned By      Initials Name Effective  Dates    Valerie Ramos, OTR 06/16/21 -                      Occupational Therapy Education       Title: PT OT SLP Therapies (In Progress)       Topic: Occupational Therapy (In Progress)       Point: ADL training (Done)       Description:   Instruct learner(s) on proper safety adaptation and remediation techniques during self care or transfers.   Instruct in proper use of assistive devices.                  Learning Progress Summary             Patient Acceptance, E,TB, VU by DIANE at 1/23/2024 3756    Comment: pt educated on benefits of activity, arom of B UE and safety with functional transfers                         Point: Home exercise program (Not Started)       Description:   Instruct learner(s) on appropriate technique for monitoring, assisting and/or progressing therapeutic exercises/activities.                  Learner Progress:  Not documented in this visit.                              User Key       Initials Effective Dates Name Provider Type Discipline    DIANE 06/16/21 -  Valerie Albert, OTR Occupational Therapist OT                      OT Recommendation and Plan  Planned Therapy Interventions (OT): BADL retraining, functional balance retraining, patient/caregiver education/training, ROM/therapeutic exercise, transfer/mobility retraining  Therapy Frequency (OT): 5 times/wk  Plan of Care Review  Plan of Care Reviewed With: patient  Outcome Evaluation: OT evaluation completed. pt required max assist x 2 for bed mobility including scooting along EOB. pt required mod assist x 2 for functional transfers from EOB with RW, unable to advance LEs for steps to perform stand pivot transfers. pt required min-mod assist for static standing balance with RW. pt with posterior lean in standing, leaning against bed for support. pt with significantly decreased AROM and strength in B UEs. initially co numbness in B UE/LE however denies numbness at conclusion of session. anticipate pt will require max  assist for tb adls. pt would benefit from skilled OT services to address adl retraining, functional transfers and balance. OT will see pt during hospitalization, at this time would rec SNF at discharge  Plan of Care Reviewed With: patient  Outcome Evaluation: OT evaluation completed. pt required max assist x 2 for bed mobility including scooting along EOB. pt required mod assist x 2 for functional transfers from EOB with RW, unable to advance LEs for steps to perform stand pivot transfers. pt required min-mod assist for static standing balance with RW. pt with posterior lean in standing, leaning against bed for support. pt with significantly decreased AROM and strength in B UEs. initially co numbness in B UE/LE however denies numbness at conclusion of session. anticipate pt will require max assist for tb adls. pt would benefit from skilled OT services to address adl retraining, functional transfers and balance. OT will see pt during hospitalization, at this time would rec SNF at discharge     Outcome Measures       Row Name 01/23/24 1350             How much help from another is currently needed...    Putting on and taking off regular lower body clothing? 2  -JJ      Bathing (including washing, rinsing, and drying) 2  -JJ      Toileting (which includes using toilet bed pan or urinal) 1  -JJ      Putting on and taking off regular upper body clothing 2  -JJ      Taking care of personal grooming (such as brushing teeth) 2  -JJ      Eating meals 3  -JJ      AM-PAC 6 Clicks Score (OT) 12  -JJ         Functional Assessment    Outcome Measure Options AM-PAC 6 Clicks Daily Activity (OT)  -                User Key  (r) = Recorded By, (t) = Taken By, (c) = Cosigned By      Initials Name Provider Type    Valerie Ramos, OTR Occupational Therapist                    Time Calculation:    Time Calculation- OT       Row Name 01/23/24 1540             Time Calculation- OT    OT Start Time 1320  -                User  Key  (r) = Recorded By, (t) = Taken By, (c) = Cosigned By      Initials Name Provider Type    Valerie Ramos, OTR Occupational Therapist                  Therapy Charges for Today       Code Description Service Date Service Provider Modifiers Qty    [id number] HC OT EVAL LOW COMPLEXITY 2 1/23/2024 Valerie Albert OTR GO 1                 RICKIE Doshi  1/23/2024

## 2024-01-23 NOTE — PROGRESS NOTES
"      [facility]         Dr Bach Sayied   LOS: 6 days   Patient Care Team:  Talib Ceron MD as PCP - General (Family Medicine)    Chief Complaint: Acute hypoxemic hypercapnic respite failure RSV pneumonia suspect underlying obstructive lung disease events noted chart reviewed.  She is currently on Precedex drip    Interval History: Extubated yesterday.  Currently on nasal cannula oxygen used AVAPS overnight.  Still short of breath with minimal activity.    REVIEW OF SYSTEMS:   Short of breath with activity    Ventilator/Non-Invasive Ventilation Settings (From admission, onward)         Vital Signs  Temp:  [97 °F (36.1 °C)-98.7 °F (37.1 °C)] 98 °F (36.7 °C)  Heart Rate:  [] 112  Resp:  [16-22] 18  BP: (141-183)/(76-96) 161/88    Intake/Output Summary (Last 24 hours) at 1/23/2024 0918  Last data filed at 1/23/2024 0900  Gross per 24 hour   Intake 835 ml   Output 6395 ml   Net -5560 ml     Flowsheet Rows      Flowsheet Row First Filed Value   Admission Height 157.5 cm (62\") Documented at 01/17/2024 0423   Admission Weight 57.2 kg (126 lb) Documented at 01/17/2024 0423            Physical Exam:  Patient is examined using the personal protective equipment as per guidelines from infection control for this particular patient as enacted.  Hand hygiene was performed before and after patient interaction.   General Appearance:  Awake following commands  ENT normocephalic atraumatic.  Neck midline trachea, no thyromegaly   Lungs:   Diminished breath sounds with slight use of accessory muscles.    Heart:    Regular rhythm and normal rate, normal S1 and S2, no            murmur, no gallop, no rub, no click   Chest Wall:    No abnormalities observed   Abdomen:     Normal bowel sounds, no masses, no organomegaly, soft        nontender, nondistended, no guarding, no rebound                tenderness   Extremities:   Moves all extremities well, no edema, no cyanosis, no             redness  CNS intact no " deficits  Skin no rashes no nodules  Musculoskeletal no cyanosis no clubbing normal range of motion     Results Review:        Results from last 7 days   Lab Units 01/23/24  0751 01/22/24  0452 01/21/24  0531   SODIUM mmol/L 140 144 148*   POTASSIUM mmol/L 3.6 4.1 4.2   CHLORIDE mmol/L 103 109* 109*   CO2 mmol/L 32.7* 32.4* 39.5*   BUN mg/dL 22* 32* 34*   CREATININE mg/dL 0.62 0.59 0.72   GLUCOSE mg/dL 162* 195* 233*   CALCIUM mg/dL 9.0 8.0* 7.6*     Results from last 7 days   Lab Units 01/17/24  0642 01/17/24  0435   HSTROP T ng/L 10 10     Results from last 7 days   Lab Units 01/22/24  0452 01/21/24  0531 01/20/24  0532   WBC 10*3/mm3 9.12 6.30 5.26   HEMOGLOBIN g/dL 11.4* 11.2* 11.5*   HEMATOCRIT % 36.4 36.5 37.7   PLATELETS 10*3/mm3 227 198 202             Results from last 7 days   Lab Units 01/22/24  0452   MAGNESIUM mg/dL 2.7*         Results from last 7 days   Lab Units 01/22/24  0710   PH, ARTERIAL pH units 7.421   PO2 ART mm Hg 78.2*   PCO2, ARTERIAL mm Hg 43.9   HCO3 ART mmol/L 28.5*       I reviewed the patient's new clinical results.  I personally viewed and interpreted the patient's chest x-ray.        Medication Review:   aspirin, 81 mg, Oral, Daily  atorvastatin, 20 mg, Oral, Daily  azithromycin, 500 mg, Intravenous, Q24H  clopidogrel, 75 mg, Oral, Daily  enoxaparin, 40 mg, Subcutaneous, Q24H  guaiFENesin, 1,200 mg, Oral, Q12H  Insulin Aspart, 2-7 Units, Subcutaneous, 4x Daily AC & at Bedtime  insulin detemir, 15 Units, Subcutaneous, Daily  ipratropium-albuterol, 3 mL, Nebulization, Q4H - RT  metoprolol tartrate, 50 mg, Nasogastric, Q12H  nicotine, 1 patch, Transdermal, Q24H  nystatin, 5 mL, Swish & Spit, 4x Daily  predniSONE, 40 mg, Oral, Daily With Breakfast  senna-docusate sodium, 2 tablet, Oral, BID  sodium chloride, 10 mL, Intravenous, Q12H  sodium chloride, 10 mL, Intravenous, Q12H  venlafaxine XR, 75 mg, Oral, Daily        dexmedetomidine, 0.2-1.5 mcg/kg/hr, Last Rate: Stopped (01/22/24  0842)  Pharmacy to Dose enoxaparin (LOVENOX),   propofol, 5-50 mcg/kg/min, Last Rate: Stopped (01/22/24 9593)        ASSESSMENT:   Acute hypoxic and hypercapnic respiratory failure  Intubated 1/18  Acute exacerbation of chronic obstructive pulmonary disease  RSV pneumonia  Leukocytosis  Tachycardia  Hypertension  Peripheral arterial disease  Coronary disease  Hyperlipidemia  Hypernatremia    PLAN:  Tolerating extubation well.  Switch to oral steroids wean as tolerated  Will switch to oral antibiotics.  Blood glucose management further per hospitalist.  Completed Diamox for 3 days with improvement in metabolic alkalosis.  Diet per speech.  Agree with oral steroids.  Wean as tolerated  Continue bronchodilators  Mobilize ambulate  PT OT  No objections to transfer patient out of the ICU  Discussed with Dr. Getachew Lopes MD  01/23/24  09:18 EST

## 2024-01-24 LAB
GLUCOSE BLDC GLUCOMTR-MCNC: 110 MG/DL (ref 70–130)
GLUCOSE BLDC GLUCOMTR-MCNC: 136 MG/DL (ref 70–130)
GLUCOSE BLDC GLUCOMTR-MCNC: 154 MG/DL (ref 70–130)
GLUCOSE BLDC GLUCOMTR-MCNC: 176 MG/DL (ref 70–130)

## 2024-01-24 PROCEDURE — 94664 DEMO&/EVAL PT USE INHALER: CPT

## 2024-01-24 PROCEDURE — 94761 N-INVAS EAR/PLS OXIMETRY MLT: CPT

## 2024-01-24 PROCEDURE — 94799 UNLISTED PULMONARY SVC/PX: CPT

## 2024-01-24 PROCEDURE — 97110 THERAPEUTIC EXERCISES: CPT

## 2024-01-24 PROCEDURE — 63710000001 PREDNISONE PER 5 MG: Performed by: HOSPITALIST

## 2024-01-24 PROCEDURE — 25810000003 SODIUM CHLORIDE 0.9 % SOLUTION 250 ML FLEX CONT: Performed by: HOSPITALIST

## 2024-01-24 PROCEDURE — 63710000001 INSULIN ASPART PER 5 UNITS: Performed by: HOSPITALIST

## 2024-01-24 PROCEDURE — 99233 SBSQ HOSP IP/OBS HIGH 50: CPT | Performed by: HOSPITALIST

## 2024-01-24 PROCEDURE — 82948 REAGENT STRIP/BLOOD GLUCOSE: CPT

## 2024-01-24 PROCEDURE — 25010000002 AZITHROMYCIN PER 500 MG: Performed by: HOSPITALIST

## 2024-01-24 PROCEDURE — 63710000001 INSULIN DETEMIR PER 5 UNITS: Performed by: HOSPITALIST

## 2024-01-24 PROCEDURE — 97530 THERAPEUTIC ACTIVITIES: CPT

## 2024-01-24 PROCEDURE — 25010000002 ENOXAPARIN PER 10 MG: Performed by: HOSPITALIST

## 2024-01-24 RX ORDER — BUDESONIDE AND FORMOTEROL FUMARATE DIHYDRATE 160; 4.5 UG/1; UG/1
2 AEROSOL RESPIRATORY (INHALATION)
Status: DISCONTINUED | OUTPATIENT
Start: 2024-01-24 | End: 2024-01-25 | Stop reason: HOSPADM

## 2024-01-24 RX ORDER — IPRATROPIUM BROMIDE AND ALBUTEROL SULFATE 2.5; .5 MG/3ML; MG/3ML
3 SOLUTION RESPIRATORY (INHALATION) EVERY 4 HOURS PRN
Status: DISCONTINUED | OUTPATIENT
Start: 2024-01-24 | End: 2024-01-25 | Stop reason: HOSPADM

## 2024-01-24 RX ADMIN — Medication 10 ML: at 08:57

## 2024-01-24 RX ADMIN — ATORVASTATIN CALCIUM 20 MG: 20 TABLET, FILM COATED ORAL at 08:54

## 2024-01-24 RX ADMIN — CLOPIDOGREL BISULFATE 75 MG: 75 TABLET ORAL at 08:55

## 2024-01-24 RX ADMIN — ASPIRIN 81 MG: 81 TABLET, COATED ORAL at 08:55

## 2024-01-24 RX ADMIN — PREDNISONE 40 MG: 10 TABLET ORAL at 09:01

## 2024-01-24 RX ADMIN — GUAIFENESIN 1200 MG: 600 TABLET, EXTENDED RELEASE ORAL at 08:55

## 2024-01-24 RX ADMIN — NYSTATIN 500000 UNITS: 100000 SUSPENSION ORAL at 08:53

## 2024-01-24 RX ADMIN — NICOTINE 1 PATCH: 21 PATCH, EXTENDED RELEASE TRANSDERMAL at 08:56

## 2024-01-24 RX ADMIN — Medication 10 ML: at 08:43

## 2024-01-24 RX ADMIN — METOPROLOL TARTRATE 50 MG: 50 TABLET, FILM COATED ORAL at 08:55

## 2024-01-24 RX ADMIN — Medication 10 ML: at 22:44

## 2024-01-24 RX ADMIN — AZITHROMYCIN DIHYDRATE 500 MG: 500 INJECTION, POWDER, LYOPHILIZED, FOR SOLUTION INTRAVENOUS at 11:14

## 2024-01-24 RX ADMIN — BUDESONIDE AND FORMOTEROL FUMARATE DIHYDRATE 2 PUFF: 160; 4.5 AEROSOL RESPIRATORY (INHALATION) at 19:13

## 2024-01-24 RX ADMIN — Medication 10 ML: at 22:45

## 2024-01-24 RX ADMIN — Medication 10 ML: at 09:00

## 2024-01-24 RX ADMIN — NYSTATIN 500000 UNITS: 100000 SUSPENSION ORAL at 17:39

## 2024-01-24 RX ADMIN — ACETAMINOPHEN 650 MG: 325 TABLET ORAL at 03:01

## 2024-01-24 RX ADMIN — LISINOPRIL 20 MG: 10 TABLET ORAL at 08:55

## 2024-01-24 RX ADMIN — INSULIN DETEMIR 15 UNITS: 100 INJECTION, SOLUTION SUBCUTANEOUS at 08:47

## 2024-01-24 RX ADMIN — METOPROLOL TARTRATE 50 MG: 50 TABLET, FILM COATED ORAL at 21:20

## 2024-01-24 RX ADMIN — IPRATROPIUM BROMIDE AND ALBUTEROL SULFATE 3 ML: 2.5; .5 SOLUTION RESPIRATORY (INHALATION) at 11:01

## 2024-01-24 RX ADMIN — IPRATROPIUM BROMIDE AND ALBUTEROL SULFATE 3 ML: 2.5; .5 SOLUTION RESPIRATORY (INHALATION) at 03:06

## 2024-01-24 RX ADMIN — GUAIFENESIN 1200 MG: 600 TABLET, EXTENDED RELEASE ORAL at 21:20

## 2024-01-24 RX ADMIN — VENLAFAXINE HYDROCHLORIDE 75 MG: 37.5 CAPSULE, EXTENDED RELEASE ORAL at 08:55

## 2024-01-24 RX ADMIN — INSULIN ASPART 2 UNITS: 100 INJECTION, SOLUTION INTRAVENOUS; SUBCUTANEOUS at 13:07

## 2024-01-24 RX ADMIN — ENOXAPARIN SODIUM 40 MG: 40 INJECTION SUBCUTANEOUS at 08:47

## 2024-01-24 RX ADMIN — DOCUSATE SODIUM 50 MG AND SENNOSIDES 8.6 MG 2 TABLET: 8.6; 5 TABLET, FILM COATED ORAL at 21:20

## 2024-01-24 RX ADMIN — INSULIN ASPART 2 UNITS: 100 INJECTION, SOLUTION INTRAVENOUS; SUBCUTANEOUS at 17:39

## 2024-01-24 RX ADMIN — IPRATROPIUM BROMIDE AND ALBUTEROL SULFATE 3 ML: 2.5; .5 SOLUTION RESPIRATORY (INHALATION) at 07:28

## 2024-01-24 NOTE — PLAN OF CARE
Goal Outcome Evaluation:              Outcome Evaluation: pt on ra; bg monitored today; pt with very frequent bm's today;

## 2024-01-24 NOTE — CASE MANAGEMENT/SOCIAL WORK
Continued Stay Note  SARAH Garcia     Patient Name: Valeria Rubi  MRN: [id number]  Today's Date: 1/24/2024    Admit Date: 1/17/2024    Plan: [facility] pending pre cert   Discharge Plan       Row Name 01/24/24 1402       Plan    Plan [facility] pending pre cert    Patient/Family in Agreement with Plan yes    Plan Comments LEONIDES received a call from Kailey at [facility] and states she left a message for patient to call her back regarding STR. CM went to patient's room and she had her cell phone at bedside and is agreeable to talk to Kailey regarding placement there. LEONIDES dialed Kailey's phone number and placed her on speaker phone so patient could discuss admission. LEONIDES received a call back from Kailey and she states they can accept pending pre cert and will have a bed available tomorrow and to start pre cert today. LEONIDES sent E-mail to [facility] requesting pre cert be started for possible discharge tomorrow. CM will follow.                   Discharge Codes    No documentation.                 Expected Discharge Date and Time       Expected Discharge Date Expected Discharge Time    Jan 25, 2024               Meme Lara RN

## 2024-01-24 NOTE — PLAN OF CARE
Goal Outcome Evaluation:  Plan of Care Reviewed With: patient        Progress: improving  Outcome Evaluation: pt remains very weak and needs max assist for ADL's. austyn to get up to chair this shift despite PT/OT efforts. was on RA today with no SOA or distress. Diamox regimen completed.    Blood glucose controlled well with addition of Levimir. Continues on IVPB Zithromax.

## 2024-01-24 NOTE — THERAPY TREATMENT NOTE
Patient Name: Valeria Rubi  : 1975    MRN: [id number]                              Today's Date: 2024       Admit Date: 2024    Visit Dx:     ICD-10-CM ICD-9-CM   1. Acute hypoxic respiratory failure  J96.01 518.81   2. COPD with acute exacerbation  J44.1 491.21     Patient Active Problem List   Diagnosis    Abnormal mammogram    Atopic rhinitis    Bronchitis    Chronic headache    Pain of hand    Hyperlipidemia    Essential hypertension    Sprain of ankle    Nausea    Pain in wrist    Wheezing    Panlobular emphysema    Tobacco use    Pharyngeal dysphagia    Radiculopathy    Lumbar degenerative disc disease    Peripheral artery disease    Pain of toe of left foot    Abnormal femoral pulse    Coronary stent occlusion    Hyperglycemia    Menopausal symptoms    Surveillance for Depo-Provera contraception    Anxiety and depression    Numbness and tingling of both feet    Low vitamin D level    Low vitamin B12 level    Pneumonia of left lower lobe due to infectious organism    Headache, migraine    Pneumonia of both lower lobes due to infectious organism    Acute on chronic respiratory failure with hypoxia    Acute respiratory failure with hypoxia and hypercapnia    Acute on chronic respiratory failure with hypercapnia    Respiratory failure     Past Medical History:   Diagnosis Date    Anxiety     Arthritis     COPD (chronic obstructive pulmonary disease)     Coronary stent occlusion     CTS (carpal tunnel syndrome)     Depression     Dizziness     Headache     Hyperlipidemia     Hypertension     Migraine     Numbness and tingling     Pneumonia      Past Surgical History:   Procedure Laterality Date    ANGIOPLASTY ILIAC ARTERY Left 2016    Procedure: BILATERAL DUPLEX DIRECTED ACCESS, AIF BILATERAL RUNOFF, SELECTIVE CATHETERIZATION OF RIGHT EXTERNAL ILIAC WITH ANGIOPLASTY, LEFT COMMON ILIAC STENT PLACEMENT;  Surgeon: Jose Carlos Plascencia MD;  Location: [facility] ;  Service:     BREAST  SURGERY      BILAT IMPLANTS    CAROTID ARTERY ANGIOPLASTY        General Information       Row Name 01/24/24 1040          OT Time and Intention    Document Type therapy note (daily note)  -SD     Mode of Treatment occupational therapy  -SD       Row Name 01/24/24 1040          General Information    Existing Precautions/Restrictions fall  -SD       Row Name 01/24/24 1040          Safety Issues, Functional Mobility    Impairments Affecting Function (Mobility) balance;strength;endurance/activity tolerance  -SD               User Key  (r) = Recorded By, (t) = Taken By, (c) = Cosigned By      Initials Name Provider Type    SD Chidi Garza OTR Occupational Therapist                     Mobility/ADL's       Row Name 01/24/24 1224          Bed Mobility    Bed Mobility supine-sit  -SD     Supine-Sit [place] (Bed Mobility) minimum assist (75% patient effort);moderate assist (50% patient effort);2 person assist  -SD     Assistive Device (Bed Mobility) draw sheet;head of bed elevated  -SD       Row Name 01/24/24 1224          Transfers    Transfers sit-stand transfer;stand-sit transfer;bed-chair transfer  -SD       Row Name 01/24/24 1224          Bed-Chair Transfer    Bed-Chair [place] (Transfers) minimum assist (75% patient effort);verbal cues  -SD     Assistive Device (Bed-Chair Transfers) walker, front-wheeled  -SD     Comment, (Bed-Chair Transfer) pt performed stand step transfer from bed to recliner. Pt c/o fatigue and was unable to manage further mobility.  -SD       Row Name 01/24/24 1224          Sit-Stand Transfer    Sit-Stand [place] (Transfers) minimum assist (75% patient effort);verbal cues  -SD     Assistive Device (Sit-Stand Transfers) walker, front-wheeled  -SD       Row Name 01/24/24 1224          Stand-Sit Transfer    Stand-Sit [place] (Transfers) minimum assist (75% patient effort);verbal cues  -SD     Assistive Device (Stand-Sit Transfers) walker, front-wheeled  -SD                User Key  (r) = Recorded By, (t) = Taken By, (c) = Cosigned By      Initials Name Provider Type    SD Chidi Garza, OTR Occupational Therapist                   Obj/Interventions       Row Name 01/24/24 1230          Range of Motion Comprehensive    Comment, General Range of Motion Pt with improve BUE rom/strength today. She was able to raise BUE's against gravity (right shoulder to appox 90 degrees and left shoulder to approx 75 degrees). Pt demonstrated rom wfl distal to shoulders.  Pt demonstrated full bilateral shoulder flexion with active assistance from therapist. Reinforcd benefits of arom of BUE's to prevent joint stiffness and address functional strength needed for management of basic daily tasks.  -SD       Row Name 01/24/24 1230          Strength Comprehensive (MMT)    Comment, General Manual Muscle Testing (MMT) Assessment Bilateral shoulders 2+/5, 3/5 for distal BUE's  -SD       Row Name 01/24/24 1230          Balance    Comment, Balance SBA/CGA for sitting balance and min assist for standing balance using a rolling walker for support  -SD               User Key  (r) = Recorded By, (t) = Taken By, (c) = Cosigned By      Initials Name Provider Type    Chidi Tee OTR Occupational Therapist                   Goals/Plan       Row Name 01/24/24 1240          Transfer Goal 1 (OT)    Activity/Assistive Device (Transfer Goal 1, OT) toilet;commode, 3-in-1;walker, rolling  -SD     [place] Level/Cues Needed (Transfer Goal 1, OT) contact guard required  -SD     Time Frame (Transfer Goal 1, OT) 1 week  -SD       Row Name 01/24/24 1240          Dressing Goal 1 (OT)    Activity/Device (Dressing Goal 1, OT) upper body dressing  -SD     [place]/Cues Needed (Dressing Goal 1, OT) standby assist  -SD     Time Frame (Dressing Goal 1, OT) 1 week  -SD     Progress/Outcome (Dressing Goal 1, OT) goal ongoing  -SD       Row Name 01/24/24 1240          Therapy Assessment/Plan (OT)    Planned Therapy  Interventions (OT) patient/caregiver education/training;transfer/mobility retraining;ROM/therapeutic exercise;functional balance retraining;BADL retraining;activity tolerance training  -SD               User Key  (r) = Recorded By, (t) = Taken By, (c) = Cosigned By      Initials Name Provider Type    Chidi Tee OTR Occupational Therapist                   Clinical Impression       Row Name 01/24/24 1235          Pain Assessment    Pretreatment Pain Rating 0/10 - no pain  -SD     Posttreatment Pain Rating 0/10 - no pain  -SD       Row Name 01/24/24 1235          Plan of Care Review    Plan of Care Reviewed With patient  -SD     Outcome Evaluation OT: Pt demonstrated improved functional strength and activity tolerance today. She managed bed mobility with min/mod assist, and she was able to manage a stand step pivot transfer from bed to recliner with min assist. Pt continues with significant weakness and limited activity tolerance. Reinforced benefits of arom program of BUE's to prevent joint stiffness and addreess functional strength to manage basic daily tasks. Rec SNF upon discharge.  -SD       Row Name 01/24/24 1235          Therapy Plan Review/Discharge Plan (OT)    Anticipated Discharge Disposition (OT) skilled nursing facility  -SD       Row Name 01/24/24 1235          Vital Signs    Pre SpO2 (%) 92  -SD     O2 Delivery Pre Treatment room air  -SD     Post SpO2 (%) 90  -SD     O2 Delivery Post Treatment room air  -SD       Row Name 01/24/24 1235          Positioning and Restraints    Pre-Treatment Position in bed  -SD     Post Treatment Position chair  -SD     In Chair notified nsg;reclined;call light within reach;encouraged to call for assist  -SD               User Key  (r) = Recorded By, (t) = Taken By, (c) = Cosigned By      Initials Name Provider Type    Chidi Tee OTR Occupational Therapist                   Outcome Measures       Row Name 01/24/24 1240          How much help from  another is currently needed...    Putting on and taking off regular lower body clothing? 2  -SD     Bathing (including washing, rinsing, and drying) 2  -SD     Toileting (which includes using toilet bed pan or urinal) 1  -SD     Putting on and taking off regular upper body clothing 2  -SD     Taking care of personal grooming (such as brushing teeth) 2  -SD     Eating meals 3  -SD     AM-PAC 6 Clicks Score (OT) 12  -SD       Row Name 01/24/24 1240          Functional Assessment    Outcome Measure Options AM-PAC 6 Clicks Daily Activity (OT)  -SD               User Key  (r) = Recorded By, (t) = Taken By, (c) = Cosigned By      Initials Name Provider Type    Chidi Tee OTNOA Occupational Therapist                    Occupational Therapy Education       Title: PT OT SLP Therapies (In Progress)       Topic: Occupational Therapy (Done)       Point: ADL training (Done)       Description:   Instruct learner(s) on proper safety adaptation and remediation techniques during self care or transfers.   Instruct in proper use of assistive devices.                  Learning Progress Summary             Patient Acceptance, E, VU by SD at 1/24/2024 1048    Comment: Education regarding benefits of activity and safety with bed mobility and transfers. Rec SPS transfer at this time due to weakness/balance deficits.    Acceptance, E,TB, VU by DIANE at 1/23/2024 1538    Comment: pt educated on benefits of activity, arom of B UE and safety with functional transfers                         Point: Home exercise program (Done)       Description:   Instruct learner(s) on appropriate technique for monitoring, assisting and/or progressing therapeutic exercises/activities.                  Learning Progress Summary             Patient Acceptance, E, VU by SD at 1/24/2024 1048    Comment: Education regarding benefits of activity/BUE arom program.                                         User Key       Initials Effective Dates Name Provider Type  Discipline    SD 06/16/21 -  Chidi Garza OTR Occupational Therapist OT    JCHANDANA 06/16/21 -  Valerie Albert OTR Occupational Therapist OT                  OT Recommendation and Plan  Planned Therapy Interventions (OT): patient/caregiver education/training, transfer/mobility retraining, ROM/therapeutic exercise, functional balance retraining, BADL retraining, activity tolerance training  Plan of Care Review  Plan of Care Reviewed With: patient  Outcome Evaluation: OT: Pt demonstrated improved functional strength and activity tolerance today. She managed bed mobility with min/mod assist, and she was able to manage a stand step pivot transfer from bed to recliner with min assist. Pt continues with significant weakness and limited activity tolerance. Reinforced benefits of arom program of BUE's to prevent joint stiffness and addreess functional strength to manage basic daily tasks. Rec SNF upon discharge.     Time Calculation:         Time Calculation- OT       Row Name 01/24/24 1243             Time Calculation- OT    OT Start Time 1020  -SD      OT Stop Time 1038  -SD      OT Time Calculation (min) 18 min  -SD         Timed Charges    08840 - OT Therapeutic Activity Minutes 18  -SD         Total Minutes    Timed Charges Total Minutes 18  -SD       Total Minutes 18  -SD                User Key  (r) = Recorded By, (t) = Taken By, (c) = Cosigned By      Initials Name Provider Type    Chidi Tee OTR Occupational Therapist                  Therapy Charges for Today       Code Description Service Date Service Provider Modifiers Qty    26891800569 HC OT THERAPEUTIC ACT EA 15 MIN 1/24/2024 Chidi Garza OTR GO 1                 RICKIE Shelby  1/24/2024

## 2024-01-24 NOTE — THERAPY TREATMENT NOTE
Acute Care - Physical Therapy Treatment Note  SARAH Garcia     Patient Name: Valeria Rbui  : 1975  MRN: [id number]  Today's Date: 2024   Onset of Illness/Injury or Date of Surgery: 24  Visit Dx:     ICD-10-CM ICD-9-CM   1. Acute hypoxic respiratory failure  J96.01 518.81   2. COPD with acute exacerbation  J44.1 491.21     Patient Active Problem List   Diagnosis    Abnormal mammogram    Atopic rhinitis    Bronchitis    Chronic headache    Pain of hand    Hyperlipidemia    Essential hypertension    Sprain of ankle    Nausea    Pain in wrist    Wheezing    Panlobular emphysema    Tobacco use    Pharyngeal dysphagia    Radiculopathy    Lumbar degenerative disc disease    Peripheral artery disease    Pain of toe of left foot    Abnormal femoral pulse    Coronary stent occlusion    Hyperglycemia    Menopausal symptoms    Surveillance for Depo-Provera contraception    Anxiety and depression    Numbness and tingling of both feet    Low vitamin D level    Low vitamin B12 level    Pneumonia of left lower lobe due to infectious organism    Headache, migraine    Pneumonia of both lower lobes due to infectious organism    Acute on chronic respiratory failure with hypoxia    Acute respiratory failure with hypoxia and hypercapnia    Acute on chronic respiratory failure with hypercapnia    Respiratory failure     Past Medical History:   Diagnosis Date    Anxiety     Arthritis     COPD (chronic obstructive pulmonary disease)     Coronary stent occlusion     CTS (carpal tunnel syndrome)     Depression     Dizziness     Headache     Hyperlipidemia     Hypertension     Migraine     Numbness and tingling     Pneumonia      Past Surgical History:   Procedure Laterality Date    ANGIOPLASTY ILIAC ARTERY Left 2016    Procedure: BILATERAL DUPLEX DIRECTED ACCESS, AIF BILATERAL RUNOFF, SELECTIVE CATHETERIZATION OF RIGHT EXTERNAL ILIAC WITH ANGIOPLASTY, LEFT COMMON ILIAC STENT PLACEMENT;  Surgeon: Jose Carlos Plascencia MD;   Location: [facility] OR 18/19;  Service:     BREAST SURGERY      BILAT IMPLANTS    CAROTID ARTERY ANGIOPLASTY       PT Assessment (last 12 hours)       PT Evaluation and Treatment       Row Name 01/24/24 1020          Physical Therapy Time and Intention    Subjective Information complains of;weakness  -BP     Document Type therapy note (daily note)  -BP     Mode of Treatment physical therapy  -BP     Patient Effort adequate  -BP     Symptoms Noted During/After Treatment fatigue;shortness of breath  -BP       Row Name 01/24/24 1020          General Information    Patient Profile Reviewed yes  -BP     Patient/Family/Caregiver Comments/Observations Patient supine in bed with HOB elevated. Room air, corbin cath in use. Agreeable to PT treatment  -BP     Existing Precautions/Restrictions fall  -BP     Risks Reviewed patient:;LOB;increased discomfort  -BP     Benefits Reviewed patient:;increase independence;improve function;increase strength  -BP     Barriers to Rehab none identified  -BP       Row Name 01/24/24 1020          Pain    Pre/Posttreatment Pain Comment Patient does not complain of pain  -BP       Row Name 01/24/24 1020          Cognition    Personal Safety Interventions gait belt;nonskid shoes/slippers when out of bed  -BP       Row Name 01/24/24 1020          Bed Mobility    Bed Mobility supine-sit  -BP     Supine-Sit [place] (Bed Mobility) minimum assist (75% patient effort);moderate assist (50% patient effort);verbal cues;2 person assist  -BP     Bed Mobility, Safety Issues decreased use of legs for bridging/pushing;decreased use of arms for pushing/pulling  -BP     Assistive Device (Bed Mobility) draw sheet;head of bed elevated  -BP     Comment, (Bed Mobility) extended time to complete  -BP       Row Name 01/24/24 1020          Transfers    Transfers sit-stand transfer;stand-sit transfer;bed-chair transfer  -BP     Comment, (Transfers) Verbal cues for hand placement. extended time to complete bed to  chair transfer  -BP       Row Name 01/24/24 1020          Bed-Chair Transfer    Bed-Chair [place] (Transfers) minimum assist (75% patient effort);2 person assist;verbal cues  -BP     Assistive Device (Bed-Chair Transfers) walker, front-wheeled  -BP       Row Name 01/24/24 1020          Sit-Stand Transfer    Sit-Stand [place] (Transfers) minimum assist (75% patient effort);2 person assist;verbal cues  -BP     Assistive Device (Sit-Stand Transfers) walker, front-wheeled  -BP       Row Name 01/24/24 1020          Stand-Sit Transfer    Stand-Sit [place] (Transfers) minimum assist (75% patient effort);2 person assist;verbal cues  -BP     Assistive Device (Stand-Sit Transfers) walker, front-wheeled  -BP       Row Name 01/24/24 1020          Gait/Stairs (Locomotion)    Comment, (Gait/Stairs) transfers only. Patient fatigues very quickly and declines ability to initiate forward gait training due to SOA and fatigue.  -       Row Name 01/24/24 1020          Safety Issues, Functional Mobility    Safety Issues Affecting Function (Mobility) positioning of assistive device  -       Row Name 01/24/24 1020          Balance    Comment, Balance sitting balance-SBA. Static standing balance-min A with device  -       Row Name 01/24/24 1020          Motor Skills    Therapeutic Exercise --  educated patient on ankle pumps, heel slides, and hip ABD/ADD however unable to participate in HEP due to fatigue from transfers. Patient agreeable to work on ther ex throughout the day independently  -       Row Name 01/24/24 1020          Plan of Care Review    Plan of Care Reviewed With patient  -BP     Outcome Evaluation PT: Patient performs supine to sit transfer with min/mod A x 2, sit to/from stand transfers with min A x 2, and stand pivot chair bed to recliner with min A x 2 with use of FWW. Patient fatigues very quickly with minimal activity. Discussed LE HEP and educated patient on exercises however unable to  participate during session due to fatigue and SOA, agreeable to perform independently. Will progress mobility and ther ex as tolerated. Patient on room air throughout with O2 sats 89% or higher throughout. Continue to recommend STR at discharge. Patient agreeable.  -       Row Name 01/24/24 1020          Positioning and Restraints    Pre-Treatment Position in bed  -BP     Post Treatment Position chair  -BP     In Chair notified nsg;reclined;call light within reach;encouraged to call for assist  -BP       Row Name 01/24/24 1020          Progress Summary (PT)    Progress Toward Functional Goals (PT) progress toward functional goals is gradual  -       Row Name 01/24/24 1020          Therapy Plan Review/Discharge Plan (PT)    Therapy Plan Review (PT) patient;risks/benefits reviewed  -               User Key  (r) = Recorded By, (t) = Taken By, (c) = Cosigned By      Initials Name Provider Type    BP Marisabel Macias, PT Physical Therapist                    Physical Therapy Education       Title: PT OT SLP Therapies (Done)       Topic: Physical Therapy (Done)       Point: Mobility training (Done)       Learning Progress Summary             Patient Acceptance, E,TB, VU by  at 1/24/2024 1427    Acceptance, E,TB, VU,NR by  at 1/23/2024 1413                         Point: Home exercise program (Done)       Learning Progress Summary             Patient Acceptance, E,TB, VU by  at 1/24/2024 1427                                         User Key       Initials Effective Dates Name Provider Type Discipline     06/16/21 -  Marisabel Macias, PT Physical Therapist PT                  PT Recommendation and Plan  Anticipated Discharge Disposition (PT): skilled nursing facility  Planned Therapy Interventions (PT): balance training, bed mobility training, gait training, home exercise program, patient/family education, transfer training, strengthening  Therapy Frequency (PT): 6 times/wk  Progress Summary (PT)  Progress  Toward Functional Goals (PT): progress toward functional goals is gradual  Plan of Care Reviewed With: patient  Outcome Evaluation: PT: Patient performs supine to sit transfer with min/mod A x 2, sit to/from stand transfers with min A x 2, and stand pivot chair bed to recliner with min A x 2 with use of FWW. Patient fatigues very quickly with minimal activity. Discussed LE HEP and educated patient on exercises however unable to participate during session due to fatigue and SOA, agreeable to perform independently. Will progress mobility and ther ex as tolerated. Patient on room air throughout with O2 sats 89% or higher throughout. Continue to recommend STR at discharge. Patient agreeable.   Outcome Measures       Row Name 01/24/24 1020 01/23/24 1350          How much help from another person do you currently need...    Turning from your back to your side while in flat bed without using bedrails? 2  -BP --     Moving from lying on back to sitting on the side of a flat bed without bedrails? 2  -BP --     Moving to and from a bed to a chair (including a wheelchair)? 2  -BP --     Standing up from a chair using your arms (e.g., wheelchair, bedside chair)? 2  -BP --     Climbing 3-5 steps with a railing? 1  -BP --     To walk in hospital room? 1  -BP --     AM-PAC 6 Clicks Score (PT) 10  -BP --     Highest Level of Mobility Goal 4 --> Transfer to chair/commode  -BP --        How much help from another is currently needed...    Putting on and taking off regular lower body clothing? -- 2  -JJ     Bathing (including washing, rinsing, and drying) -- 2  -JJ     Toileting (which includes using toilet bed pan or urinal) -- 1  -JJ     Putting on and taking off regular upper body clothing -- 2  -JJ     Taking care of personal grooming (such as brushing teeth) -- 2  -JJ     Eating meals -- 3  -JJ     AM-PAC 6 Clicks Score (OT) -- 12  -JJ        Functional Assessment    Outcome Measure Options AM-PAC 6 Clicks Basic Mobility (PT)   -BP AM-PAC 6 Clicks Daily Activity (OT)  -DIANE               User Key  (r) = Recorded By, (t) = Taken By, (c) = Cosigned By      Initials Name Provider Type    Valerie Ramos, OTR Occupational Therapist    BP Marisabel Macias, PT Physical Therapist                     Time Calculation:    PT Charges       Row Name 01/24/24 1428             Time Calculation    Start Time 1020  -BP      Stop Time 1038  -BP      Time Calculation (min) 18 min  -BP      PT Received On 01/24/24  -BP      PT - Next Appointment 01/25/24  -BP         Timed Charges    92225 - PT Therapeutic Exercise Minutes 18  -BP         Total Minutes    Timed Charges Total Minutes 18  -BP       Total Minutes 18  -BP                User Key  (r) = Recorded By, (t) = Taken By, (c) = Cosigned By      Initials Name Provider Type    Marisabel Jacobs, PT Physical Therapist                  Therapy Charges for Today       Code Description Service Date Service Provider Modifiers Qty    31857135369 HC PT EVAL LOW COMPLEXITY 2 1/23/2024 Marisabel Macias, PT GP 1    21876434365 HC PT THER PROC EA 15 MIN 1/24/2024 Marisabel Macias, PT GP 1            PT G-Codes  Outcome Measure Options: AM-PAC 6 Clicks Daily Activity (OT)  AM-PAC 6 Clicks Score (PT): 10  AM-PAC 6 Clicks Score (OT): 12    Marisabel Macias, PT  1/24/2024

## 2024-01-24 NOTE — SIGNIFICANT NOTE
01/24/24 1558   Post Acute Pre-Cert Documentation   Request Submitted by Facility - Type: Hospital   Post-Acute Authorization Type Submitted: SNF   Date Post Acute Pre-Cert Inititated per Facility 01/24/24   Accepting Facility [facility]   Hospital Discharge Date Requested 01/25/24   All Clinicals Submitted? Yes   Had Accepting Facility at Time of Submission Yes   Response Received from Insurance? Approval   Response Communicated to: ;Accepting Facility Liaison;Accepting Facility Auth Department   Authorization Number: [id number]   Post Acute Pre-Cert Initiated Comment RENATO REN

## 2024-01-24 NOTE — PROGRESS NOTES
"Hospitalist Team      Patient Care Team:  Talib Ceron MD as PCP - General (Family Medicine)        Chief Complaint:  Follow-up Acute H/H Resp Failure due to RSV    Subjective    Continues to do well clinically, but mobility still an issue.  She denies chest pain and dyspnea.  Staff only report 2 loose stools overnight.  No complaints of mouth pain (as she chomps down on her finley).    Objective    Vital Signs  Temp:  [97 °F (36.1 °C)-98.2 °F (36.8 °C)] 97 °F (36.1 °C)  Heart Rate:  [] 92  Resp:  [16-20] 20  BP: (141-168)/(78-90) 141/78  Oxygen Therapy  SpO2: 91 %  Pulse Oximetry Type: Continuous  Device (Oxygen Therapy): room air  Flow (L/min): 2  Oxygen Concentration (%): 25  ETCO2 (mmHg): 38 mmHg}      Flowsheet Rows      Flowsheet Row First Filed Value   Admission Height 157.5 cm (62\") Documented at 01/17/2024 0423   Admission Weight 57.2 kg (126 lb) Documented at 01/17/2024 0423            Physical Exam:    General: Appears older than stated age in no acute distress.  Lungs: Breath sounds are clear throughout all fields.  Respirations are non-labored.  CV: Regular rate and rhythm.  No murmurs appreciated.  Radial pulses are 2+ and symmetric.  Abdomen: Soft and non-tender w/ active bowel sounds.  MSK: No C/C/E.  Skin: No suspicious rash.  Neuro: CN II-XII grossly intact.  Psych: Normal affect.  Ox3.    Results Review:     I reviewed the patient's new clinical results.  Lab Holiday.    Lab Results (last 24 hours)       Procedure Component Value Units Date/Time    POC Glucose Once [[id number]]  (Normal) Collected: 01/24/24 0841    Specimen: Blood Updated: 01/24/24 0848     Glucose 110 mg/dL     POC Glucose Once [[id number]]  (Normal) Collected: 01/23/24 2023    Specimen: Blood Updated: 01/23/24 2039     Glucose 119 mg/dL     POC Glucose Once [[id number]]  (Abnormal) Collected: 01/23/24 1717    Specimen: Blood Updated: 01/23/24 1733     Glucose 137 mg/dL     POC Glucose Once [[id number]]  (Abnormal) " Collected: 01/23/24 1231    Specimen: Blood Updated: 01/23/24 1237     Glucose 182 mg/dL     Comprehensive Metabolic Panel [[id number]]  (Abnormal) Collected: 01/23/24 0751    Specimen: Blood Updated: 01/23/24 0852     Glucose 162 mg/dL      BUN 22 mg/dL      Creatinine 0.62 mg/dL      Sodium 140 mmol/L      Potassium 3.6 mmol/L      Chloride 103 mmol/L      CO2 32.7 mmol/L      Calcium 9.0 mg/dL      Total Protein 5.6 g/dL      Albumin 3.5 g/dL      ALT (SGPT) 16 U/L      AST (SGOT) 17 U/L      Alkaline Phosphatase 55 U/L      Total Bilirubin 0.5 mg/dL      Globulin 2.1 gm/dL      A/G Ratio 1.7 g/dL      BUN/Creatinine Ratio 35.5     Anion Gap 4.3 mmol/L      eGFR 108.6 mL/min/1.73     Narrative:      GFR Normal >60  Chronic Kidney Disease <60  Kidney Failure <15              Imaging Results (Last 24 Hours)       ** No results found for the last 24 hours. **              Medication Review:   I have reviewed the patient's current medication list    Current Facility-Administered Medications:     acetaminophen (TYLENOL) tablet 650 mg, 650 mg, Oral, Q4H PRN, 650 mg at 01/24/24 0301 **OR** acetaminophen (TYLENOL) suppository 650 mg, 650 mg, Rectal, Q4H PRN, Dagoberto Chilel MD    aspirin EC tablet 81 mg, 81 mg, Oral, Daily, Dagoberto Chilel MD, 81 mg at 01/23/24 0804    atorvastatin (LIPITOR) tablet 20 mg, 20 mg, Oral, Daily, Dagoberto Chilel MD, 20 mg at 01/23/24 0803    azithromycin (ZITHROMAX) 500 mg in sodium chloride 0.9 % 250 mL IVPB-VTB, 500 mg, Intravenous, Q24H, Dagoberto Chilel MD, 500 mg at 01/23/24 1059    sennosides-docusate (PERICOLACE) 8.6-50 MG per tablet 2 tablet, 2 tablet, Oral, BID PRN **AND** polyethylene glycol (MIRALAX) packet 17 g, 17 g, Oral, Daily PRN **AND** bisacodyl (DULCOLAX) EC tablet 5 mg, 5 mg, Oral, Daily PRN **AND** bisacodyl (DULCOLAX) suppository 10 mg, 10 mg, Rectal, Daily PRN, Dagoberto Chilel MD    sennosides-docusate (PERICOLACE) 8.6-50 MG per tablet 2 tablet, 2 tablet, Oral, BID, 2  tablet at 01/20/24 0812 **AND** polyethylene glycol (MIRALAX) packet 17 g, 17 g, Oral, Daily PRN **AND** bisacodyl (DULCOLAX) EC tablet 5 mg, 5 mg, Oral, Daily PRN **AND** bisacodyl (DULCOLAX) suppository 10 mg, 10 mg, Rectal, Daily PRN, Dagoberto Chilel MD    Calcium Replacement - Follow Nurse / BPA Driven Protocol, , Does not apply, PRN, Dagoberto Chilel MD    clopidogrel (PLAVIX) tablet 75 mg, 75 mg, Oral, Daily, Dagoberto Chilel MD, 75 mg at 01/23/24 0804    dextrose (D50W) (25 g/50 mL) IV injection 25 g, 25 g, Intravenous, Q15 Min PRN, Dagoberto Chilel MD    dextrose (D50W) (25 g/50 mL) IV injection 25 g, 25 g, Intravenous, Q15 Min PRN, Dagoberto Chilel MD    dextrose (GLUTOSE) oral gel 15 g, 15 g, Oral, Q15 Min PRN, Dagoberto Chilel MD    Enoxaparin Sodium (LOVENOX) syringe 40 mg, 40 mg, Subcutaneous, Q24H, Dagoberto Chilel MD, 40 mg at 01/24/24 0847    fluticasone (FLONASE) 50 MCG/ACT nasal spray 1 spray, 1 spray, Nasal, Daily PRN, Dagoberto Chilel MD    glucagon (GLUCAGEN) injection 1 mg, 1 mg, Intramuscular, Q15 Min PRN, Dagoberto Chilel MD    guaiFENesin (MUCINEX) 12 hr tablet 1,200 mg, 1,200 mg, Oral, Q12H, Dagoberto Chilel MD, 1,200 mg at 01/23/24 2023    hydrALAZINE (APRESOLINE) injection 10 mg, 10 mg, Intravenous, Q6H PRN, Dagoberto Chilel MD, 10 mg at 01/23/24 0507    Insulin Aspart (novoLOG) injection 2-7 Units, 2-7 Units, Subcutaneous, 4x Daily AC & at Bedtime, Dagoberto Chilel MD, 2 Units at 01/23/24 1236    insulin detemir (LEVEMIR) injection 15 Units, 15 Units, Subcutaneous, Daily, Dagoberto Chilel MD, 15 Units at 01/24/24 0847    ipratropium-albuterol (DUO-NEB) nebulizer solution 3 mL, 3 mL, Nebulization, Q4H - RT, Dagoberto Chilel MD, 3 mL at 01/24/24 0728    lisinopril (PRINIVIL,ZESTRIL) tablet 20 mg, 20 mg, Oral, Q24H, Dagoberto Chilel MD, 20 mg at 01/23/24 1636    Magnesium Standard Dose Replacement - Follow Nurse / BPA Driven Protocol, , Does not apply, PRN, Dagoberto Chilel MD    metoprolol tartrate  (LOPRESSOR) tablet 50 mg, 50 mg, Nasogastric, Q12H, Dagoberto Chilel MD, 50 mg at 01/23/24 2023    nicotine (NICODERM CQ) 21 MG/24HR patch 1 patch, 1 patch, Transdermal, Q24H, Dagoberto Chilel MD, 1 patch at 01/23/24 0805    nitroglycerin (NITROSTAT) SL tablet 0.4 mg, 0.4 mg, Sublingual, Q5 Min PRN, Dagoberto Chilel MD    nystatin (MYCOSTATIN) 100,000 unit/mL suspension 500,000 Units, 5 mL, Swish & Spit, 4x Daily, Dagoberto Chilel MD, 500,000 Units at 01/23/24 2024    ondansetron ODT (ZOFRAN-ODT) disintegrating tablet 4 mg, 4 mg, Oral, Q6H PRN **OR** ondansetron (ZOFRAN) injection 4 mg, 4 mg, Intravenous, Q6H PRN, Dagoberto Chilel MD, 4 mg at 01/23/24 0936    Pharmacy to Dose enoxaparin (LOVENOX), , Does not apply, Continuous PRN, Dagoberto Chilel MD    Phosphorus Replacement - Follow Nurse / BPA Driven Protocol, , Does not apply, PRN, Dagoberto Chilel MD    Potassium Replacement - Follow Nurse / BPA Driven Protocol, , Does not apply, PRGetachew LOJA Rusty T, MD    predniSONE (DELTASONE) tablet 40 mg, 40 mg, Oral, Daily With Breakfast, Dagoberto Chilel MD    [COMPLETED] Insert Peripheral IV, , , Once **AND** sodium chloride 0.9 % flush 10 mL, 10 mL, Intravenous, PRN, Dagoberto Chilel MD    sodium chloride 0.9 % flush 10 mL, 10 mL, Intravenous, PRN, Dagoberto Chilel MD, 10 mL at 01/23/24 0936    sodium chloride 0.9 % flush 10 mL, 10 mL, Intravenous, Q12H, Dagoberto Chilel MD, 10 mL at 01/23/24 2024    sodium chloride 0.9 % flush 10 mL, 10 mL, Intravenous, Q12H, Dagoberto Chilel MD, 10 mL at 01/24/24 0843    sodium chloride 0.9 % flush 10 mL, 10 mL, Intravenous, PRN, Dagoberto Chilel MD, 10 mL at 01/23/24 0914    sodium chloride 0.9 % flush 20 mL, 20 mL, Intravenous, PRN, Dagoberto Chilel MD    sodium chloride 0.9 % infusion 40 mL, 40 mL, Intravenous, PRN, Dagoberto Chilel MD, 40 mL at 01/21/24 2022    venlafaxine XR (EFFEXOR-XR) 24 hr capsule 75 mg, 75 mg, Oral, Daily, Dagoberto Chilel MD, 75 mg at 01/23/24 0804      Assessment & Plan      Acute Hypoxic/Hypercapnic Respiratory Failure due to AECOPD: Resolved.  Maintaining SaO2 on RA.  Will also de-escalate breathing treatment and resume her maintenance therapy.  AECOPD due to RSV: Resolved.  Breathing treatments as above.  Continue Prednisone and Nystatin.  Steroid-induced Hyperglycemia: A well-known side effect and NOT a complication.  Trend is dropping w/ change over from IV steroids.  Continue to monitor.  Essential Hypertension: Near goal on exam.  Trend a little better.  Lisinopril added yesterday.  CAD/PAD: No acute issues.  Tobacco cessation!  Anxiety: No acute issues.  Tobacco Abuse: Continue to  daily.    Plan for disposition: STR at discharge.    Dagoberto Chilel MD  01/24/24  08:52 EST

## 2024-01-24 NOTE — CASE MANAGEMENT/SOCIAL WORK
Continued Stay Note  SARAH Garcia     Patient Name: Valeria Rubi  MRN: [id number]  Today's Date: 1/24/2024    Admit Date: 1/17/2024    Plan: [facility] pending pre cert   Discharge Plan       Row Name 01/24/24 1523       Plan                                         [facility] pending pre cert    Plan Comments CM left VM for Damien with [facility] regarding pre cert for patient to be obtained for SNF. CM will follow.      Row Name 01/24/24 1402       Plan    Plan [facility] pending pre cert    Patient/Family in Agreement with Plan yes    Plan Comments CM received a call from Kailey at [facility] and states she left a message for patient to call her back regarding STR. LEONIDES went to patient's room and she had her cell phone at bedside and is agreeable to talk to [facility] regarding placement there. LEONIDES dialed Kailey's phone number and placed her on speaker phone so patient could discuss admisison. LEONIDES received a call back from Kailey and she states they can accept pending pre cert and will have a bed available tomorrow and to start pre cert today. LEONIDES sent E-mail to [facility] requesting pre cert be started for possible discharge tomorrow. CM will follow.                   Discharge Codes    No documentation.                 Expected Discharge Date and Time       Expected Discharge Date Expected Discharge Time    Jan 25, 2024               Meme Lara RN

## 2024-01-24 NOTE — PLAN OF CARE
Problem: Adult Inpatient Plan of Care  Goal: Plan of Care Review  Recent Flowsheet Documentation  Taken 1/24/2024 1235 by Chidi Garza OTR  Plan of Care Reviewed With: patient  Outcome Evaluation: OT: Pt demonstrated improved functional strength and activity tolerance today. She managed bed mobility with min/mod assist, and she was able to manage a stand step pivot transfer from bed to recliner with min assist. Pt continues with significant weakness and limited activity tolerance. Reinforced benefits of arom program of BUE's to prevent joint stiffness and addreess functional strength to manage basic daily tasks. Rec SNF upon discharge.

## 2024-01-24 NOTE — PLAN OF CARE
Goal Outcome Evaluation:  Plan of Care Reviewed With: patient        Progress: no change  Outcome Evaluation: pt rested intermittently overnight. Continued encouragement with ADLS overnight. 2 incontinent liquid BM's noted overnight. VSS.

## 2024-01-24 NOTE — PROGRESS NOTES
"      [facility]         Dr Bach Sayied   LOS: 7 days   Patient Care Team:  Talib Ceron MD as PCP - General (Family Medicine)    Chief Complaint: Acute hypoxemic hypercapnic respite failure RSV pneumonia suspect underlying obstructive lung disease events noted chart reviewed.  She is currently on Precedex drip    Interval History: Remains on 2 L oxygen nasal cannula.  Slowly improving.    REVIEW OF SYSTEMS:   Short of breath with activity    Ventilator/Non-Invasive Ventilation Settings (From admission, onward)         Vital Signs  Temp:  [97 °F (36.1 °C)-98.2 °F (36.8 °C)] 97 °F (36.1 °C)  Heart Rate:  [] 115  Resp:  [16-20] 20  BP: (141-168)/(78-90) 141/78    Intake/Output Summary (Last 24 hours) at 1/24/2024 0941  Last data filed at 1/24/2024 0457  Gross per 24 hour   Intake 250 ml   Output 1315 ml   Net -1065 ml     Flowsheet Rows      Flowsheet Row First Filed Value   Admission Height 157.5 cm (62\") Documented at 01/17/2024 0423   Admission Weight 57.2 kg (126 lb) Documented at 01/17/2024 0423            Physical Exam:  Patient is examined using the personal protective equipment as per guidelines from infection control for this particular patient as enacted.  Hand hygiene was performed before and after patient interaction.   General Appearance:  Awake following commands  ENT normocephalic atraumatic.  Neck midline trachea, no thyromegaly   Lungs:   Diminished breath sounds no use of accessory muscles at this time.    Heart:    Regular rhythm and normal rate, normal S1 and S2, no            murmur, no gallop, no rub, no click   Chest Wall:    No abnormalities observed   Abdomen:     Normal bowel sounds, no masses, no organomegaly, soft        nontender, nondistended, no guarding, no rebound                tenderness   Extremities:   Moves all extremities well, no edema, no cyanosis, no             redness  CNS intact no deficits  Skin no rashes no nodules  Musculoskeletal no cyanosis " no clubbing normal range of motion     Results Review:        Results from last 7 days   Lab Units 01/23/24  0751 01/22/24  0452 01/21/24  0531   SODIUM mmol/L 140 144 148*   POTASSIUM mmol/L 3.6 4.1 4.2   CHLORIDE mmol/L 103 109* 109*   CO2 mmol/L 32.7* 32.4* 39.5*   BUN mg/dL 22* 32* 34*   CREATININE mg/dL 0.62 0.59 0.72   GLUCOSE mg/dL 162* 195* 233*   CALCIUM mg/dL 9.0 8.0* 7.6*           Results from last 7 days   Lab Units 01/22/24  0452 01/21/24  0531 01/20/24  0532   WBC 10*3/mm3 9.12 6.30 5.26   HEMOGLOBIN g/dL 11.4* 11.2* 11.5*   HEMATOCRIT % 36.4 36.5 37.7   PLATELETS 10*3/mm3 227 198 202             Results from last 7 days   Lab Units 01/22/24  0452   MAGNESIUM mg/dL 2.7*         Results from last 7 days   Lab Units 01/22/24  0710   PH, ARTERIAL pH units 7.421   PO2 ART mm Hg 78.2*   PCO2, ARTERIAL mm Hg 43.9   HCO3 ART mmol/L 28.5*       I reviewed the patient's new clinical results.  I personally viewed and interpreted the patient's chest x-ray.        Medication Review:   aspirin, 81 mg, Oral, Daily  atorvastatin, 20 mg, Oral, Daily  azithromycin, 500 mg, Intravenous, Q24H  clopidogrel, 75 mg, Oral, Daily  enoxaparin, 40 mg, Subcutaneous, Q24H  guaiFENesin, 1,200 mg, Oral, Q12H  Insulin Aspart, 2-7 Units, Subcutaneous, 4x Daily AC & at Bedtime  insulin detemir, 15 Units, Subcutaneous, Daily  ipratropium-albuterol, 3 mL, Nebulization, Q4H - RT  lisinopril, 20 mg, Oral, Q24H  metoprolol tartrate, 50 mg, Nasogastric, Q12H  nicotine, 1 patch, Transdermal, Q24H  nystatin, 5 mL, Swish & Spit, 4x Daily  predniSONE, 40 mg, Oral, Daily With Breakfast  senna-docusate sodium, 2 tablet, Oral, BID  sodium chloride, 10 mL, Intravenous, Q12H  sodium chloride, 10 mL, Intravenous, Q12H  venlafaxine XR, 75 mg, Oral, Daily        Pharmacy to Dose enoxaparin (LOVENOX),         ASSESSMENT:   Acute hypoxic and hypercapnic respiratory failure  Intubated 1/18  Acute exacerbation of chronic obstructive pulmonary disease  RSV  pneumonia  Leukocytosis  Tachycardia  Hypertension  Peripheral arterial disease  Coronary disease  Hyperlipidemia  Hypernatremia    PLAN:  Respiratory status slowly improving.  Continue to wean down oxygen as tolerated.  Will complete antibiotics today.  No further antibiotics needed  Blood glucose management further per hospitalist.  Completed Diamox for 3 days with improvement in metabolic alkalosis.  Diet per speech.  Continue weaning down steroids slowly  Continue bronchodilators  Mobilize ambulate  PT OT  Will need follow-up with pulmonary as outpatient in 4 weeks.  Nothing further to add we will sign off              Mikala Lopes MD  01/24/24  09:41 EST

## 2024-01-24 NOTE — PLAN OF CARE
Goal Outcome Evaluation:  Plan of Care Reviewed With: patient           Outcome Evaluation: PT: Patient performs supine to sit transfer with min/mod A x 2, sit to/from stand transfers with min A x 2, and stand pivot chair bed to recliner with min A x 2 with use of FWW. Patient fatigues very quickly with minimal activity. Discussed LE HEP and educated patient on exercises however unable to participate during session due to fatigue and SOA, agreeable to perform independently. Will progress mobility and ther ex as tolerated. Patient on room air throughout with O2 sats 89% or higher throughout. Continue to recommend STR at discharge. Patient agreeable.      Anticipated Discharge Disposition (PT): skilled nursing facility

## 2024-01-24 NOTE — SIGNIFICANT NOTE
01/24/24 1630   Post Acute Pre-Cert Documentation   Request Submitted by Facility - Type: Hospital   Post-Acute Authorization Type Submitted: SNF   Date Post Acute Pre-Cert Inititated per Facility 01/24/24   Date Post Acute Pre-Cert Completed 01/24/24   Accepting Facility [facility]   Hospital Discharge Date Requested 01/25/24   All Clinicals Submitted? Yes   Had Accepting Facility at Time of Submission Yes   Response Received from Insurance? Approval   Response Communicated to: ;Accepting Facility Liaison;Accepting Facility Auth Department   Authorization Number: [id number]   Post Acute Pre-Cert Initiated Comment RENATO REN

## 2024-01-24 NOTE — CASE MANAGEMENT/SOCIAL WORK
Continued Stay Note  SARAH Garcia     Patient Name: Valeria Rubi  MRN: [id number]  Today's Date: 1/24/2024    Admit Date: 1/17/2024    Plan: SNF for STR   Discharge Plan       Row Name 01/24/24 1047       Plan    Plan SNF for STR    Patient/Family in Agreement with Plan yes    Plan Comments CM followed up with patient today regarding PT eval and recommendations for SNF for STR. Patient is currently resting in bed and she is agreeable for STR. CM provided her with list of facilities and a [facility] magazine. Patient preference  is [facility] and she is also agreeable for referral to [facility] and [facility]. All referrals were entered into EPIC. CM spoke with Kailey at [facility] and she states she will look at clinicals and let me know if they can accept. CM will follow.                   Discharge Codes    No documentation.                 Expected Discharge Date and Time       Expected Discharge Date Expected Discharge Time    Jan 25, 2024               Meme Lara RN

## 2024-01-25 ENCOUNTER — APPOINTMENT (OUTPATIENT)
Dept: GENERAL RADIOLOGY | Facility: HOSPITAL | Age: 49
End: 2024-01-25
Payer: COMMERCIAL

## 2024-01-25 VITALS
DIASTOLIC BLOOD PRESSURE: 81 MMHG | HEART RATE: 91 BPM | BODY MASS INDEX: 21.83 KG/M2 | RESPIRATION RATE: 20 BRPM | WEIGHT: 118.61 LBS | SYSTOLIC BLOOD PRESSURE: 123 MMHG | TEMPERATURE: 97.7 F | OXYGEN SATURATION: 92 % | HEIGHT: 62 IN

## 2024-01-25 LAB
ALBUMIN SERPL-MCNC: 3.3 G/DL (ref 3.5–5.2)
ALBUMIN/GLOB SERPL: 1.5 G/DL
ALP SERPL-CCNC: 59 U/L (ref 39–117)
ALT SERPL W P-5'-P-CCNC: 74 U/L (ref 1–33)
ANION GAP SERPL CALCULATED.3IONS-SCNC: 4.8 MMOL/L (ref 5–15)
AST SERPL-CCNC: 24 U/L (ref 1–32)
BASOPHILS # BLD AUTO: 0.19 10*3/MM3 (ref 0–0.2)
BASOPHILS # BLD MANUAL: 0.22 10*3/MM3 (ref 0–0.2)
BASOPHILS NFR BLD AUTO: 0.9 % (ref 0–1.5)
BASOPHILS NFR BLD MANUAL: 1 % (ref 0–1.5)
BILIRUB SERPL-MCNC: 0.5 MG/DL (ref 0–1.2)
BUN SERPL-MCNC: 27 MG/DL (ref 6–20)
BUN/CREAT SERPL: 44.3 (ref 7–25)
CALCIUM SPEC-SCNC: 8.6 MG/DL (ref 8.6–10.5)
CHLORIDE SERPL-SCNC: 103 MMOL/L (ref 98–107)
CO2 SERPL-SCNC: 33.2 MMOL/L (ref 22–29)
CREAT SERPL-MCNC: 0.61 MG/DL (ref 0.57–1)
DEPRECATED RDW RBC AUTO: 48.5 FL (ref 37–54)
EGFRCR SERPLBLD CKD-EPI 2021: 109.1 ML/MIN/1.73
EOSINOPHIL # BLD AUTO: 0.08 10*3/MM3 (ref 0–0.4)
EOSINOPHIL # BLD MANUAL: 0.22 10*3/MM3 (ref 0–0.4)
EOSINOPHIL NFR BLD AUTO: 0.4 % (ref 0.3–6.2)
EOSINOPHIL NFR BLD MANUAL: 1 % (ref 0.3–6.2)
ERYTHROCYTE [DISTWIDTH] IN BLOOD BY AUTOMATED COUNT: 13.3 % (ref 12.3–15.4)
GLOBULIN UR ELPH-MCNC: 2.2 GM/DL
GLUCOSE SERPL-MCNC: 127 MG/DL (ref 65–99)
HCT VFR BLD AUTO: 45.5 % (ref 34–46.6)
HGB BLD-MCNC: 14.4 G/DL (ref 12–15.9)
IMM GRANULOCYTES # BLD AUTO: 1.24 10*3/MM3 (ref 0–0.05)
IMM GRANULOCYTES NFR BLD AUTO: 5.7 % (ref 0–0.5)
LYMPHOCYTES # BLD AUTO: 4.76 10*3/MM3 (ref 0.7–3.1)
LYMPHOCYTES # BLD MANUAL: 5.7 10*3/MM3 (ref 0.7–3.1)
LYMPHOCYTES NFR BLD AUTO: 21.7 % (ref 19.6–45.3)
LYMPHOCYTES NFR BLD MANUAL: 9 % (ref 5–12)
MCH RBC QN AUTO: 31.6 PG (ref 26.6–33)
MCHC RBC AUTO-ENTMCNC: 31.6 G/DL (ref 31.5–35.7)
MCV RBC AUTO: 99.8 FL (ref 79–97)
MONOCYTES # BLD AUTO: 1.86 10*3/MM3 (ref 0.1–0.9)
MONOCYTES # BLD: 1.97 10*3/MM3 (ref 0.1–0.9)
MONOCYTES NFR BLD AUTO: 8.5 % (ref 5–12)
NEUTROPHILS # BLD AUTO: 13.82 10*3/MM3 (ref 1.7–7)
NEUTROPHILS NFR BLD AUTO: 13.8 10*3/MM3 (ref 1.7–7)
NEUTROPHILS NFR BLD AUTO: 62.8 % (ref 42.7–76)
NEUTROPHILS NFR BLD MANUAL: 57 % (ref 42.7–76)
NEUTS BAND NFR BLD MANUAL: 6 % (ref 0–5)
NRBC BLD AUTO-RTO: 0 /100 WBC (ref 0–0.2)
PLAT MORPH BLD: NORMAL
PLATELET # BLD AUTO: 425 10*3/MM3 (ref 140–450)
PMV BLD AUTO: 9.2 FL (ref 6–12)
POTASSIUM SERPL-SCNC: 4.1 MMOL/L (ref 3.5–5.2)
PROCALCITONIN SERPL-MCNC: 0.2 NG/ML (ref 0–0.25)
PROT SERPL-MCNC: 5.5 G/DL (ref 6–8.5)
RBC # BLD AUTO: 4.56 10*6/MM3 (ref 3.77–5.28)
RBC MORPH BLD: NORMAL
SCAN SLIDE: NORMAL
SODIUM SERPL-SCNC: 141 MMOL/L (ref 136–145)
VARIANT LYMPHS NFR BLD MANUAL: 26 % (ref 19.6–45.3)
WBC MORPH BLD: NORMAL
WBC NRBC COR # BLD AUTO: 21.93 10*3/MM3 (ref 3.4–10.8)

## 2024-01-25 PROCEDURE — 94664 DEMO&/EVAL PT USE INHALER: CPT

## 2024-01-25 PROCEDURE — 71045 X-RAY EXAM CHEST 1 VIEW: CPT

## 2024-01-25 PROCEDURE — 84145 PROCALCITONIN (PCT): CPT | Performed by: NURSE PRACTITIONER

## 2024-01-25 PROCEDURE — 85007 BL SMEAR W/DIFF WBC COUNT: CPT | Performed by: HOSPITALIST

## 2024-01-25 PROCEDURE — 25010000002 ENOXAPARIN PER 10 MG: Performed by: HOSPITALIST

## 2024-01-25 PROCEDURE — 94799 UNLISTED PULMONARY SVC/PX: CPT

## 2024-01-25 PROCEDURE — 63710000001 PREDNISONE PER 5 MG: Performed by: HOSPITALIST

## 2024-01-25 PROCEDURE — 94761 N-INVAS EAR/PLS OXIMETRY MLT: CPT

## 2024-01-25 PROCEDURE — 80053 COMPREHEN METABOLIC PANEL: CPT | Performed by: HOSPITALIST

## 2024-01-25 PROCEDURE — 99239 HOSP IP/OBS DSCHRG MGMT >30: CPT | Performed by: NURSE PRACTITIONER

## 2024-01-25 PROCEDURE — 63710000001 INSULIN DETEMIR PER 5 UNITS: Performed by: HOSPITALIST

## 2024-01-25 PROCEDURE — 85025 COMPLETE CBC W/AUTO DIFF WBC: CPT | Performed by: HOSPITALIST

## 2024-01-25 RX ORDER — NICOTINE 21 MG/24HR
1 PATCH, TRANSDERMAL 24 HOURS TRANSDERMAL EVERY 24 HOURS
Start: 2024-01-25

## 2024-01-25 RX ORDER — ACETAMINOPHEN 325 MG/1
650 TABLET ORAL EVERY 4 HOURS PRN
Start: 2024-01-25

## 2024-01-25 RX ORDER — ONDANSETRON 4 MG/1
4 TABLET, ORALLY DISINTEGRATING ORAL EVERY 6 HOURS PRN
Start: 2024-01-25

## 2024-01-25 RX ORDER — PREDNISONE 20 MG/1
40 TABLET ORAL
Start: 2024-01-25 | End: 2024-01-30

## 2024-01-25 RX ORDER — IBUPROFEN 600 MG/1
1 TABLET ORAL
Start: 2024-01-25

## 2024-01-25 RX ORDER — NICOTINE POLACRILEX 4 MG
15 LOZENGE BUCCAL
Start: 2024-01-25

## 2024-01-25 RX ORDER — LISINOPRIL 20 MG/1
20 TABLET ORAL
Start: 2024-01-25

## 2024-01-25 RX ORDER — INSULIN ASPART 100 [IU]/ML
2-7 INJECTION, SOLUTION INTRAVENOUS; SUBCUTANEOUS
Start: 2024-01-25

## 2024-01-25 RX ADMIN — NICOTINE 1 PATCH: 21 PATCH, EXTENDED RELEASE TRANSDERMAL at 08:52

## 2024-01-25 RX ADMIN — ATORVASTATIN CALCIUM 20 MG: 20 TABLET, FILM COATED ORAL at 08:52

## 2024-01-25 RX ADMIN — GUAIFENESIN 1200 MG: 600 TABLET, EXTENDED RELEASE ORAL at 08:52

## 2024-01-25 RX ADMIN — INSULIN DETEMIR 15 UNITS: 100 INJECTION, SOLUTION SUBCUTANEOUS at 08:53

## 2024-01-25 RX ADMIN — ACETAMINOPHEN 650 MG: 325 TABLET ORAL at 00:48

## 2024-01-25 RX ADMIN — CLOPIDOGREL BISULFATE 75 MG: 75 TABLET ORAL at 08:52

## 2024-01-25 RX ADMIN — PREDNISONE 40 MG: 10 TABLET ORAL at 08:51

## 2024-01-25 RX ADMIN — LISINOPRIL 20 MG: 10 TABLET ORAL at 08:52

## 2024-01-25 RX ADMIN — Medication 10 ML: at 08:53

## 2024-01-25 RX ADMIN — VENLAFAXINE HYDROCHLORIDE 75 MG: 37.5 CAPSULE, EXTENDED RELEASE ORAL at 08:52

## 2024-01-25 RX ADMIN — ENOXAPARIN SODIUM 40 MG: 40 INJECTION SUBCUTANEOUS at 08:51

## 2024-01-25 RX ADMIN — BUDESONIDE AND FORMOTEROL FUMARATE DIHYDRATE 2 PUFF: 160; 4.5 AEROSOL RESPIRATORY (INHALATION) at 09:48

## 2024-01-25 RX ADMIN — ASPIRIN 81 MG: 81 TABLET, COATED ORAL at 08:52

## 2024-01-25 RX ADMIN — METOPROLOL TARTRATE 50 MG: 50 TABLET, FILM COATED ORAL at 08:52

## 2024-01-25 RX ADMIN — NYSTATIN 500000 UNITS: 100000 SUSPENSION ORAL at 08:52

## 2024-01-25 NOTE — PROGRESS NOTES
Adult Nutrition  Assessment/PES    Patient Name:  Valeria Rubi  YOB: 1975  MRN: [id number]  Admit Date:  1/17/2024    Assessment Date:  1/25/2024    Comments:  Po intake 25% x 1 meal recorded.    Recommend: Add Boost Plus or equivalent to aid with po until improved.    Will cont to follow and monitor.      Reason for Assessment       Row Name 01/25/24 1118          Reason for Assessment    Reason For Assessment follow-up protocol     Diagnosis pulmonary disease  AHRF extubated , AHRF, AE COPD, RSV PNA, HTN                    Nutrition/Diet History       Row Name 01/25/24 1118          Nutrition/Diet History    Typical Intake (Food/Fluid/EN/PN) Pt with MD at visit to room. Per RN ate 25% this am, plan for home today                    Labs/Tests/Procedures/Meds       Row Name 01/25/24 1118          Labs/Procedures/Meds    Lab Results Reviewed reviewed     Lab Results Comments glu 127 H BUn 27 H        Diagnostic Tests/Procedures    Diagnostic Test/Procedure Reviewed reviewed        Medications    Pertinent Medications Reviewed reviewed     Pertinent Medications Comments novolog, levemir, prednisone                        Nutrition Prescription Ordered       Row Name 01/25/24 1119          Nutrition Prescription PO    Current PO Diet Regular                    Evaluation of Received Nutrient/Fluid Intake       Row Name 01/25/24 1119          Fluid Intake Evaluation    Oral Fluid (mL) 500  ave x 2, 28%        PO Evaluation    Number of Meals 1     % PO Intake 25                       Problem/Interventions:   Problem 1       Row Name 01/25/24 1119          Nutrition Diagnoses Problem 1    Problem 1 Other (comment)  Inadequate energy intake related to AHRF as evidenced by recent extubation                          Intervention Goal       Row Name 01/25/24 1119          Intervention Goal    General Meet nutritional needs for age/condition     PO Increase intake;PO intake (%)     PO Intake % 50 %                     Nutrition Intervention       Row Name 01/25/24 1120          Nutrition Intervention    RD/Tech Action Encourage intake;Follow Tx progress;Recommend/ordered     Recommended/Ordered Supplement                    Nutrition Prescription       Row Name 01/25/24 1120          Nutrition Prescription PO    PO Prescription Begin/change supplement     Supplement Boost Plus     Supplement Frequency 2 times a day                    Education/Evaluation       Row Name 01/25/24 1120          Education    Education Education not appropriate at this time        Monitor/Evaluation    Monitor Per protocol;I&O;PO intake;Supplement intake;Pertinent labs;Weight;Symptoms                     Electronically signed by:  Lila Hilton RD  01/25/24 11:20 EST

## 2024-01-25 NOTE — NURSING NOTE
Called and notified pts spouse, Harpreet, that pt was getting picked up by EMS now and should be heading to [facility] for rehab.

## 2024-01-25 NOTE — DISCHARGE SUMMARY
"Valeria Rubi  1975  [id number]    Hospitalists Discharge Summary    Date of Admission: 1/17/2024  Date of Discharge:  1/25/2024    History of Present Illness from [facility] on admit:   \"The patient is a 48-year-old female who presented to the emergency department secondary to 3 days of increasing shortness of air, wheezing.  She notes that she was seen by her PCP approximately 2 weeks ago and was given antibiotics and steroids for acute bronchitis.  She reports that she did improve however worsened again over the weekend.  She had abstained from tobacco since May of last year however notes intermittent tobacco abuse including recently.  She denies any other sick symptoms such as fever, chills, rhinorrhea, nasal congestion, sore throat.  She is unaware of any sick contacts.  She has been using her nebulizer and her inhalers frequently throughout the day.  She notes using 2 L of oxygen as needed and using it in the last several days continuously.     At time of my exam, patient is extremely anxious, tachypneic, tachycardic and appears in acute distress.  She would like to remain on BiPAP if at all possible however is agreeable to intubation if that is necessary.  He has already received 4 DuoNebs and IV Solu-Medrol.  Some improvement after Ativan and metoprolol given.  Awaiting ICU bed.     Diagnostics in ER showed CXR unremarkable.  Significant lab includes WBC 13.15     She has a history of oxygen dependent COPD, hypertension, PAD, s/p bilateral lower extremity stents 2016 and carotid artery angioplasty, HLD, tobacco abuse, vitamin D deficiency, anxiety, chronic migraines, chronic cardiac stent occlusion, chronic macrocytosis.     She otherwise denies recent f/c/headache/rhinorrhea/nasal congestion/lightheadedness/syncopal sensation/n/v/d/chest pain/abdominal pain/change in bowel or bladder habits/no weight change/bloody emesis or bloody stools/change in medications or any other new concerns.\"     Primary " Discharge diagnoses:  Acute hypoxic, hypercarbic respiratory failure secondary to acute RSV and bacterial pneumonia and AECOPD    Secondary Discharge Diagnoses:   Steroid induced leukocytosis  Steroid induced hyperglycemia A1c 5.8%  Electrolyte imbalance  Tachycardia  HTN  PAD, s/p LLE stent 2016, s/p carotid artery angioplasty  CAD/HLD with chronic cardiac stent occlusion  Chronic migraine headaches  Chronic cardiac stent occlusion  Anxiety  Tobacco abuse  Elevated ALT  Diarrhea  Chronic macrocytosis    Hospital Course Summary:   The patient was immediately placed on BiPAP in ER due to respiratory distress.  Patient requested to remain on BiPAP as long as possible but work of breathing remained elevated with poor air movement and she was subsequently intubated.  She was liberated from vent 1/22/2024 and has remained generally on room air with occasional use of 2 L of oxygen when sats drop below 88%.  She has not utilized BiPAP since 1/23/2024.    She was followed in consultation by pulmonary.  IV steroids were weaned to oral.  Inhalers and nebulizers were weaned to Symbicort and as needed DuoNebs.  She received 6-day course of Unasyn then 3 days of azithromycin for pneumonia.    Hyperglycemia was managed with glucommander insulin dosing and should continue until steroids are completed    Tachycardia was managed with home dose metoprolol    She was extensively counseled regarding need for complete cessation of tobacco use.    PT/OT recommended STR as patient remains extremely weak from illness and hospitalization.    ALT was elevated on date of discharge, as was WBC and both should be repeated within 1 week.    F/U Pulmonary 4 weeks  F/U Talib Ceron MD 1 week    PCP  Patient Care Team:  Talib Ceron MD as PCP - General (Family Medicine)    Consults:   Consults       Date and Time Order Name Status Description    1/17/2024  5:05 PM Inpatient Pulmonology Consult Completed           Operations and  Procedures Performed:     XR Chest 1 View    Result Date: 1/20/2024  Narrative: CR Chest 1 Vw INDICATION:  Acute respiratory failure. ICU patient with intubation. COPD with acute exacerbation. TECHNIQUE: Portable upright chest COMPARISON:  CT chest 1/17/2024. FINDINGS: Endotracheal tube in place distal tip 4.6 cm above the luis antonio. Nasogastric tube courses through the mediastinum with its distal tip and proximal port out of the field-of-view but well below the GE junction. Diffuse hyperinflation and hyperlucency compatible with emphysema. No focal infiltrates. No effusions or pneumothorax. Heart and mediastinum unremarkable. Right upper and PICC line terminating mid SVC.     Impression: Tubes and lines in satisfactory position. Moderate to severe emphysema. Signer Name: Niels Walter MD Signed: 1/20/2024 9:11 AM EST [facility]    XR Chest 1 View    Result Date: 1/18/2024  Narrative: XR CHEST 1 VW-: 1/18/2024 1:12 PM  INDICATION: ET tube repositioning  COMPARISON:  1212 hours 1/18/2024.  FINDINGS: Single Portable AP view(s) of the chest. The tip of the endotracheal tube is now at the level of the posterior left fifth rib, at least 4.2 cm above the level of the luis antonio. Right-sided PICC line is unchanged. Enteric tube tip at the level of the mid body stomach.  There is no pneumothorax in the field-of-view. Portions of the chest are excluded from view. No new consolidation or effusion. Persistent pulmonary hyperinflation.      Impression:  1. The tip of the endotracheal tube is in good position above the luis antonio by at least a distance of 4.2 cm. No postprocedural pneumothorax. 2. Enteric tube and right-sided PICC line in position as described. 3. Pulmonary hyperinflation.  This report was finalized on 1/18/2024 1:58 PM by Dr. Huy Hilario MD.      XR Abdomen KUB    Result Date: 1/18/2024  Narrative: XR ABDOMEN KUB-: 1/18/2024 1:11 PM  INDICATION: Dobbhoff tube placement  COMPARISON: 1246 hours  1/18/2024.  FINDINGS: AP radiograph of the lower chest and abdomen. The tip of the Dobbhoff tube is at the level of the mid body stomach. Right-sided central line is unchanged. The tip of the endotracheal tube is now about 5.7 cm above the level of the luis antonio. Postop change left iliac vascular stent placement. Lungs hyperinflated and otherwise clear. No pneumothorax or effusion in the field-of-view..      Impression:  1. The tip of the enteric tube is at the level of the mid body stomach.  This report was finalized on 1/18/2024 1:38 PM by Dr. Huy Hilario MD.      XR Chest 1 View    Result Date: 1/18/2024  Narrative: XR CHEST 1 VW-: 1/18/2024 12:09 PM  INDICATION: Intubation. Endotracheal tube placement. Dobbhoff tube placement.  COMPARISON:  1/18/2024 at 0924 hours.  FINDINGS: Single Portable AP view(s) of the chest. The tip of the endotracheal tube is about 1 cm from the luis antonio and partially extends into the right mainstem bronchus. Retraction on the order of about 4 cm is recommended for positioning in the more proximal trachea. There is an enteric tube present. The tip is difficult to discriminate from the external tubing artifact. The tip of the tube is felt to be at the level of the distal esophagus. Suggest advancement of the tube a distance of about 8 cm with a follow-up radiograph to document appropriate positioning in the stomach prior to usage. It would be helpful if the external tubing could be reposition so that it is not superimposed over the chest and abdomen at the time of the repeat view. Right-sided PICC line extending to the mid SVC level is unchanged. Additional external support equipment artifact.  There is no pneumothorax. No new consolidation or effusion.      Impression: 1. Endotracheal tube tip extends into the proximal right mainstem bronchus and should be retracted a distance of about 4 cm for positioning in the more proximal trachea. 2. Enteric tube tip most likely at the level of the  distal esophagus. Advancement on the order of 8 cm recommended. 3. Repeat frontal chest including the upper abdomen recommended upon repositioning of the support equipment to document appropriate positioning. Findings and recommendations personally discussed by telephone with Tina, the ICU nurse caring for the patient, at 1232 hours 1/18/2024  This report was finalized on 1/18/2024 12:32 PM by Dr. Huy Hilario MD.      Adult Transthoracic Echo Complete w/ Color, Spectral and Contrast if Necessary Per Protocol    Result Date: 1/18/2024  Narrative:   Left ventricular systolic function is normal. Left ventricular ejection fraction appears to be 56 - 60%.   Left ventricular diastolic function was normal.   : The study is technically suboptimal for diagnosis. The quality of the study is limited due to patient body habitus, an uncooperative patient and breast implants. Image enhancer was not used for this study due to inability to consent.     XR Chest 1 View    Result Date: 1/18/2024  Narrative: XR CHEST 1 VW-: 1/18/2024 9:19 AM  INDICATION: Respiratory failure. Tobacco abuse. RSV infection.  COMPARISON:  1/17/2024.  FINDINGS: Single Portable AP view(s) of the chest. Right-sided PICC line extends to the mid SVC level, unchanged. Stable cardiac silhouette. The vascularity is unremarkable. Pulmonary hyperinflation. No new consolidation or effusion. No pneumothorax. Old healed granulomatous disease and probable mild atelectasis/scarring in the lung bases. External support equipment artifact.      Impression:  1. Right-sided PICC line unchanged. No pneumothorax. 2. Pulmonary hyperinflation. No new effusion or dense consolidation.  This report was finalized on 1/18/2024 9:35 AM by Dr. Huy Hilario MD.      CT Chest With Contrast Diagnostic    Result Date: 1/17/2024  Narrative: CT Chest W INDICATION:  Acute respiratory failure with hypoxia. COPD. TECHNIQUE: CT of the chest with IV contrast. Coronal and sagittal  reconstructions were obtained.  Radiation dose reduction techniques included automated exposure control or exposure modulation based on body size. Count of known CT and cardiac nuc med studies performed in previous 12 months: 1.  COMPARISON:  CT chest 5/31/2023 FINDINGS: Examination motion degraded. Diffuse hyperinflation with patchy hyperlucency compatible with centrilobular emphysema. Scattered subpleural reticular changes but no focal consolidation. Minimal tree-in-bud opacity posterior right upper lobe may represent a small area of pneumonitis. No effusions. Trachea and bronchi unremarkable. Calcified left lower lobe granuloma. Bandlike scarring or atelectasis left lower lobe. Heart size within normal limits. Aorta and pulmonary vessels unremarkable. No central mass or adenopathy. Upper abdomen unremarkable. Osseous structures unremarkable. Step-off artifact from patient motion seen within the sternum. Bilateral breast implants without visible complication. Right upper extremity venous catheter terminating in mid SVC.     Impression: Moderate emphysema with minimal tree-in-bud airspace opacity posterior segment right upper lobe could represent an area of pneumonitis but no focal consolidation. Signer Name: Niels Walter MD Signed: 1/17/2024 2:03 PM [facility]    XR Chest 1 View    Result Date: 1/17/2024  Narrative: XR CHEST 1 VW-, 1/17/2024 12:16 PM  HISTORY: PICC line placement.  COMPARISON: *  Chest 1/17/2024  FINDINGS: Single frontal view(s) of the chest. The lungs are clear. No pleural effusions. No pneumothorax. Heart size is normal. Interval placement of a right-sided PICC line with tip projecting over the lower SVC. Mediastinal contours are within normal limits. Pulmonary vasculature is normal. No acute osseous abnormality.      Impression: Interval placement of a right-sided PICC line with tip projecting over the lower SVC. No pneumothorax. No other acute chest findings.   This report was finalized on 1/17/2024 12:18 PM by Dr. Blaise Vaughn MD.      XR Chest 1 View    Result Date: 1/17/2024  Narrative: CHEST X-RAY, 1/17/2024    HISTORY: 48-year-old female in the ED with shortness of air, decreased oxygen saturation. History of COPD. TECHNIQUE: AP portable upright chest x-ray. COMPARISON: *  Chest x-ray, 3/27/2023. CT chest, 5/31/2023. FINDINGS: Pulmonary hyperinflation compatible with COPD. The lungs appear clear. No visible pulmonary infiltrate or pleural effusion. Heart size and pulmonary vascularity are normal.     Impression: COPD. No active disease. Signer Name: Danny Davis MD Signed: 1/17/2024 5:06 AM [facility]     Allergies:  is allergic to prednisone.    Marcelo  No medications noted per report, reviewed by me    Discharge Medications:     Discharge Medications        New Medications        Instructions Start Date   acetaminophen 325 MG tablet  Commonly known as: TYLENOL   650 mg, Oral, Every 4 Hours PRN      dextrose 40 % gel  Commonly known as: GLUTOSE   15 g, Oral, Every 15 Minutes PRN      glucagon 1 MG injection  Commonly known as: GLUCAGEN   1 mg, Intramuscular, Every 15 Minutes PRN      Insulin Aspart 100 UNIT/ML injection  Commonly known as: novoLOG   2-7 Units, Subcutaneous, 4 Times Daily Before Meals & Nightly      insulin detemir 100 UNIT/ML injection  Commonly known as: LEVEMIR   15 Units, Subcutaneous, Daily, Until steroids are completed      lisinopril 20 MG tablet  Commonly known as: PRINIVIL,ZESTRIL   20 mg, Oral, Every 24 Hours Scheduled      nystatin 100,000 unit/mL suspension  Commonly known as: MYCOSTATIN   500,000 Units, Swish & Spit, 4 Times Daily      ondansetron ODT 4 MG disintegrating tablet  Commonly known as: ZOFRAN-ODT   4 mg, Translingual, Every 6 Hours PRN      predniSONE 20 MG tablet  Commonly known as: DELTASONE   40 mg, Oral, Daily With Breakfast             Changes to Medications        Instructions Start  Date   nicotine 21 MG/24HR patch  Commonly known as: NICODERM CQ  What changed: Another medication with the same name was added. Make sure you understand how and when to take each.   1 patch, Transdermal, Every 24 Hours      nicotine 21 MG/24HR patch  Commonly known as: NICODERM CQ  What changed: You were already taking a medication with the same name, and this prescription was added. Make sure you understand how and when to take each.   1 patch, Transdermal, Every 24 Hours, Apply Patch to Clean, Dry, Hairless Area Daily Remove Old Patch Before Applying New Patch Rotate Patch Site Daily May Remove Patch at Bedtime to Prevent Insomnia Follow Other Instructions on Package             Continue These Medications        Instructions Start Date   albuterol sulfate  (90 Base) MCG/ACT inhaler  Commonly known as: Proventil HFA   Inhale 2 puffs into the lungs Every 4 (Four) Hours As Needed for Wheezing.      aspirin 81 MG EC tablet   81 mg, Oral, Daily      budesonide-formoterol 160-4.5 MCG/ACT inhaler  Commonly known as: SYMBICORT   2 puffs, Inhalation, 2 Times Daily - RT      clopidogrel 75 MG tablet  Commonly known as: Plavix   75 mg, Oral, Daily      fluticasone 50 MCG/ACT nasal spray  Commonly known as: FLONASE   1 spray, Nasal, Daily PRN      guaiFENesin 600 MG 12 hr tablet  Commonly known as: MUCINEX   1,200 mg, Oral, 2 Times Daily      ipratropium-albuterol 0.5-2.5 mg/3 ml nebulizer  Commonly known as: DUO-NEB   3 mL, Nebulization, Every 4 Hours PRN      metoprolol tartrate 50 MG tablet  Commonly known as: LOPRESSOR   50 mg, Oral, Every 12 Hours Scheduled      simvastatin 40 MG tablet  Commonly known as: ZOCOR   40 mg, Oral, Every Evening      venlafaxine XR 75 MG 24 hr capsule  Commonly known as: EFFEXOR-XR   TAKE 1 CAPSULE BY MOUTH EVERY DAY             Stop These Medications      lisinopril-hydrochlorothiazide 20-12.5 MG per tablet  Commonly known as: PRINZIDE,ZESTORETIC     methylPREDNISolone 4 MG dose  pack  Commonly known as: MEDROL     vitamin D 1.25 MG (12578 UT) capsule capsule  Commonly known as: ERGOCALCIFEROL              Last Lab Results:   Lab Results (most recent)       Procedure Component Value Units Date/Time    Manual Differential [[id number]]  (Abnormal) Collected: 01/25/24 0504    Specimen: Blood Updated: 01/25/24 0551     Neutrophil % 57.0 %      Lymphocyte % 26.0 %      Monocyte % 9.0 %      Eosinophil % 1.0 %      Basophil % 1.0 %      Bands %  6.0 %      Neutrophils Absolute 13.82 10*3/mm3      Lymphocytes Absolute 5.70 10*3/mm3      Monocytes Absolute 1.97 10*3/mm3      Eosinophils Absolute 0.22 10*3/mm3      Basophils Absolute 0.22 10*3/mm3      RBC Morphology Normal     WBC Morphology Normal     Platelet Morphology Normal    CBC & Differential [[id number]]  (Abnormal) Collected: 01/25/24 0504    Specimen: Blood Updated: 01/25/24 0550    Narrative:      The following orders were created for panel order CBC & Differential.  Procedure                               Abnormality         Status                     ---------                               -----------         ------                     CBC Auto Differential[[id number]]        Abnormal            Final result               Scan Slide[[id number]]                                       Final result                 Please view results for these tests on the individual orders.    Scan Slide [[id number]] Collected: 01/25/24 0504    Specimen: Blood Updated: 01/25/24 0550     Scan Slide --     Comment: See Manual Differential Results       Comprehensive Metabolic Panel [[id number]]  (Abnormal) Collected: 01/25/24 0504    Specimen: Blood Updated: 01/25/24 0530     Glucose 127 mg/dL      BUN 27 mg/dL      Creatinine 0.61 mg/dL      Sodium 141 mmol/L      Potassium 4.1 mmol/L      Chloride 103 mmol/L      CO2 33.2 mmol/L      Calcium 8.6 mg/dL      Total Protein 5.5 g/dL      Albumin 3.3 g/dL      ALT (SGPT) 74 U/L      AST (SGOT) 24 U/L      Alkaline  Phosphatase 59 U/L      Total Bilirubin 0.5 mg/dL      Globulin 2.2 gm/dL      A/G Ratio 1.5 g/dL      BUN/Creatinine Ratio 44.3     Anion Gap 4.8 mmol/L      eGFR 109.1 mL/min/1.73     Narrative:      GFR Normal >60  Chronic Kidney Disease <60  Kidney Failure <15      CBC Auto Differential [[id number]]  (Abnormal) Collected: 01/25/24 0504    Specimen: Blood Updated: 01/25/24 0522     WBC 21.93 10*3/mm3      RBC 4.56 10*6/mm3      Hemoglobin 14.4 g/dL      Hematocrit 45.5 %      MCV 99.8 fL      MCH 31.6 pg      MCHC 31.6 g/dL      RDW 13.3 %      RDW-SD 48.5 fl      MPV 9.2 fL      Platelets 425 10*3/mm3      Neutrophil % 62.8 %      Lymphocyte % 21.7 %      Monocyte % 8.5 %      Eosinophil % 0.4 %      Basophil % 0.9 %      Immature Grans % 5.7 %      Neutrophils, Absolute 13.80 10*3/mm3      Lymphocytes, Absolute 4.76 10*3/mm3      Monocytes, Absolute 1.86 10*3/mm3      Eosinophils, Absolute 0.08 10*3/mm3      Basophils, Absolute 0.19 10*3/mm3      Immature Grans, Absolute 1.24 10*3/mm3      nRBC 0.0 /100 WBC     POC Glucose Once [[id number]]  (Abnormal) Collected: 01/24/24 2049    Specimen: Blood Updated: 01/24/24 2058     Glucose 136 mg/dL     POC Glucose Once [[id number]]  (Abnormal) Collected: 01/24/24 1656    Specimen: Blood Updated: 01/24/24 1702     Glucose 154 mg/dL     Comprehensive Metabolic Panel [[id number]]  (Abnormal) Collected: 01/23/24 0751    Specimen: Blood Updated: 01/23/24 0852     Glucose 162 mg/dL      BUN 22 mg/dL      Creatinine 0.62 mg/dL      Sodium 140 mmol/L      Potassium 3.6 mmol/L      Chloride 103 mmol/L      CO2 32.7 mmol/L      Calcium 9.0 mg/dL      Total Protein 5.6 g/dL      Albumin 3.5 g/dL      ALT (SGPT) 16 U/L      AST (SGOT) 17 U/L      Alkaline Phosphatase 55 U/L      Total Bilirubin 0.5 mg/dL      Globulin 2.1 gm/dL      A/G Ratio 1.7 g/dL      BUN/Creatinine Ratio 35.5     Anion Gap 4.3 mmol/L      eGFR 108.6 mL/min/1.73     Narrative:      GFR Normal >60  Chronic Kidney  Disease <60  Kidney Failure <15      Blood Gas, Arterial - [[id number]]  (Abnormal) Collected: 01/18/24 2130    Specimen: Arterial Blood Updated: 01/22/24 1801     Site Right Radial     Marquis's Test Positive     pH, Arterial 7.308 pH units      Comment: 84 Value below reference range        pCO2, Arterial 50.8 mm Hg      Comment: 83 Value above reference range        pO2, Arterial 117.0 mm Hg      Comment: 83 Value above reference range        HCO3, Arterial 25.5 mmol/L      Base Excess, Arterial -1.3 mmol/L      Comment: 84 Value below reference range        O2 Saturation, Arterial 98.9 %      Hemoglobin, Blood Gas 11.3 g/dL      Temperature 37.0     Barometric Pressure for Blood Gas 737 mmHg      Modality Ventilator     FIO2 30 %      Ventilator Mode VC/AC     Set Tidal Volume 500     Comment: Appended report. These results have been appended to a previously final verified report.        Set Mech Resp Rate 18     Comment: Appended report. These results have been appended to a previously final verified report.        Rate 18 Breaths/minute      Comment: Appended report. These results have been appended to a previously final verified report.        PEEP 5.0     Collected by [id number]     Comment: Meter: [id number]     :  [id number]        pCO2, Temperature Corrected 50.8 mm Hg      pH, Temp Corrected 7.308 pH Units      pO2, Temperature Corrected 117 mm Hg     Phosphorus [[id number]]  (Normal) Collected: 01/22/24 1539    Specimen: Blood Updated: 01/22/24 1610     Phosphorus 3.0 mg/dL     Blood Culture - Blood, Blood, PICC Line [[id number]]  (Normal) Collected: 01/17/24 1421    Specimen: Blood, PICC Line Updated: 01/22/24 1445     Blood Culture No growth at 5 days    Blood Gas, Arterial - [[id number]]  (Abnormal) Collected: 01/22/24 0710    Specimen: Arterial Blood Updated: 01/22/24 0714     Site Right Radial     Marquis's Test Positive     pH, Arterial 7.421 pH units      pCO2, Arterial 43.9 mm Hg      pO2,  Arterial 78.2 mm Hg      Comment: 84 Value below reference range        HCO3, Arterial 28.5 mmol/L      Comment: 83 Value above reference range        Base Excess, Arterial 3.5 mmol/L      Comment: 83 Value above reference range        O2 Saturation, Arterial 97.4 %      Hemoglobin, Blood Gas 11.5 g/dL      Temperature 37.0     Barometric Pressure for Blood Gas 749 mmHg      Modality Ventilator     FIO2 25 %      Ventilator Mode PC/AC     Set Mech Resp Rate 18.0     PEEP 5.0     PIP 22 cmH2O      Comment: Meter: [id number]     :  [id number]        Collected by [id number]     pCO2, Temperature Corrected 43.9 mm Hg      pH, Temp Corrected 7.421 pH Units      pO2, Temperature Corrected 78.2 mm Hg     Blood Culture - Blood, Arm, Right [[id number]]  (Normal) Collected: 01/17/24 0645    Specimen: Blood from Arm, Right Updated: 01/22/24 0701     Blood Culture No growth at 5 days    Renal Function Panel [[id number]]  (Abnormal) Collected: 01/22/24 0452    Specimen: Blood from Arm, Right Updated: 01/22/24 0536     Glucose 195 mg/dL      BUN 32 mg/dL      Creatinine 0.59 mg/dL      Sodium 144 mmol/L      Potassium 4.1 mmol/L      Chloride 109 mmol/L      CO2 32.4 mmol/L      Calcium 8.0 mg/dL      Albumin 3.1 g/dL      Phosphorus 1.8 mg/dL      Anion Gap 2.6 mmol/L      BUN/Creatinine Ratio 54.2     eGFR 110.0 mL/min/1.73     Narrative:      GFR Normal >60  Chronic Kidney Disease <60  Kidney Failure <15      Magnesium [[id number]]  (Abnormal) Collected: 01/22/24 0452    Specimen: Blood from Arm, Right Updated: 01/22/24 0531     Magnesium 2.7 mg/dL     CBC (No Diff) [[id number]]  (Abnormal) Collected: 01/22/24 0452    Specimen: Blood from Arm, Right Updated: 01/22/24 0511     WBC 9.12 10*3/mm3      RBC 3.56 10*6/mm3      Hemoglobin 11.4 g/dL      Hematocrit 36.4 %      .2 fL      MCH 32.0 pg      MCHC 31.3 g/dL      RDW 13.2 %      RDW-SD 49.8 fl      MPV 10.0 fL      Platelets 227 10*3/mm3     Phosphorus  [[id number]]  (Normal) Collected: 01/21/24 1453    Specimen: Blood Updated: 01/21/24 1533     Phosphorus 2.6 mg/dL     Hemoglobin A1c [[id number]]  (Abnormal) Collected: 01/21/24 0531    Specimen: Blood Updated: 01/21/24 1236     Hemoglobin A1C 5.80 %     Narrative:      Hemoglobin A1C Ranges:    Increased Risk for Diabetes  5.7% to 6.4%  Diabetes                     >= 6.5%  Diabetic Goal                < 7.0%    Basic Metabolic Panel [[id number]]  (Abnormal) Collected: 01/21/24 0531    Specimen: Blood Updated: 01/21/24 0557     Glucose 233 mg/dL      BUN 34 mg/dL      Creatinine 0.72 mg/dL      Sodium 148 mmol/L      Potassium 4.2 mmol/L      Chloride 109 mmol/L      CO2 39.5 mmol/L      Calcium 7.6 mg/dL      BUN/Creatinine Ratio 47.2     Anion Gap -0.5 mmol/L      eGFR 102.0 mL/min/1.73     Narrative:      GFR Normal >60  Chronic Kidney Disease <60  Kidney Failure <15      Magnesium [[id number]]  (Abnormal) Collected: 01/21/24 0531    Specimen: Blood Updated: 01/21/24 0557     Magnesium 2.9 mg/dL     CBC (No Diff) [[id number]]  (Abnormal) Collected: 01/21/24 0531    Specimen: Blood Updated: 01/21/24 0539     WBC 6.30 10*3/mm3      RBC 3.53 10*6/mm3      Hemoglobin 11.2 g/dL      Hematocrit 36.5 %      .4 fL      MCH 31.7 pg      MCHC 30.7 g/dL      RDW 13.9 %      RDW-SD 53.2 fl      MPV 9.8 fL      Platelets 198 10*3/mm3     Respiratory Culture - Sputum, Cough [[id number]] Collected: 01/18/24 1356    Specimen: Sputum from Cough Updated: 01/20/24 0911     Respiratory Culture Rare Normal respiratory gabe. No S. aureus or Pseudomonas aeruginosa detected. Final report.     Gram Stain Many (4+) WBCs seen      No Epithelial cells seen      Rare (1+) Gram negative bacilli    Procalcitonin [[id number]]  (Abnormal) Collected: 01/20/24 0532    Specimen: Blood Updated: 01/20/24 0625     Procalcitonin 3.87 ng/mL     Narrative:      As a Marker for Sepsis (Non-Neonates):    1. <0.5 ng/mL represents a low risk of severe  "sepsis and/or septic shock.  2. >2 ng/mL represents a high risk of severe sepsis and/or septic shock.    As a Marker for Lower Respiratory Tract Infections that require antibiotic therapy:    PCT on Admission    Antibiotic Therapy       6-12 Hrs later    >0.5                Strongly Recommended  >0.25 - <0.5        Recommended   0.1 - 0.25          Discouraged              Remeasure/reassess PCT  <0.1                Strongly Discouraged     Remeasure/reassess PCT    As 28 day mortality risk marker: \"Change in Procalcitonin Result\" (>80% or <=80%) if Day 0 (or Day 1) and Day 4 values are available. Refer to [email or web address]-[email or web address]-[email or web address]    Change in PCT <=80%  A decrease of PCT levels below or equal to 80% defines a positive change in PCT test result representing a higher risk for 28-day all-cause mortality of patients diagnosed with severe sepsis for septic shock.    Change in PCT >80%  A decrease of PCT levels of more than 80% defines a negative change in PCT result representing a lower risk for 28-day all-cause mortality of patients diagnosed with severe sepsis or septic shock.       Renal Function Panel [[id number]]  (Abnormal) Collected: 01/20/24 0532    Specimen: Blood Updated: 01/20/24 0602     Glucose 242 mg/dL      BUN 29 mg/dL      Creatinine 0.76 mg/dL      Sodium 143 mmol/L      Potassium 4.5 mmol/L      Chloride 110 mmol/L      CO2 30.9 mmol/L      Calcium 7.8 mg/dL      Albumin 3.2 g/dL      Phosphorus 2.2 mg/dL      Anion Gap 2.1 mmol/L      BUN/Creatinine Ratio 38.2     eGFR 95.6 mL/min/1.73     Narrative:      GFR Normal >60  Chronic Kidney Disease <60  Kidney Failure <15      CBC & Differential [[id number]]  (Abnormal) Collected: 01/19/24 0535    Specimen: Blood Updated: 01/19/24 0724    Narrative:      The following orders were created for panel order CBC & Differential.  Procedure                               Abnormality         Status                     ---------                            " "   -----------         ------                     CBC Auto Differential[[id number]]        Abnormal            Final result               Scan Slide[[id number]]                   Normal              Final result                 Please view results for these tests on the individual orders.    Scan Slide [[id number]]  (Normal) Collected: 01/19/24 0535    Specimen: Blood Updated: 01/19/24 0724     RBC Morphology Normal     WBC Morphology Normal     Platelet Morphology Normal    Procalcitonin [[id number]]  (Abnormal) Collected: 01/19/24 0535    Specimen: Blood Updated: 01/19/24 0612     Procalcitonin 10.84 ng/mL     Narrative:      As a Marker for Sepsis (Non-Neonates):    1. <0.5 ng/mL represents a low risk of severe sepsis and/or septic shock.  2. >2 ng/mL represents a high risk of severe sepsis and/or septic shock.    As a Marker for Lower Respiratory Tract Infections that require antibiotic therapy:    PCT on Admission    Antibiotic Therapy       6-12 Hrs later    >0.5                Strongly Recommended  >0.25 - <0.5        Recommended   0.1 - 0.25          Discouraged              Remeasure/reassess PCT  <0.1                Strongly Discouraged     Remeasure/reassess PCT    As 28 day mortality risk marker: \"Change in Procalcitonin Result\" (>80% or <=80%) if Day 0 (or Day 1) and Day 4 values are available. Refer to http://www.[email or web address]-[email or web address]-[email or web address]    Change in PCT <=80%  A decrease of PCT levels below or equal to 80% defines a positive change in PCT test result representing a higher risk for 28-day all-cause mortality of patients diagnosed with severe sepsis for septic shock.    Change in PCT >80%  A decrease of PCT levels of more than 80% defines a negative change in PCT result representing a lower risk for 28-day all-cause mortality of patients diagnosed with severe sepsis or septic shock.       CBC Auto Differential [[id number]]  (Abnormal) Collected: 01/19/24 0535    Specimen: Blood Updated: 01/19/24 0556     " WBC 7.47 10*3/mm3      RBC 3.55 10*6/mm3      Hemoglobin 11.4 g/dL      Hematocrit 37.6 %      .9 fL      MCH 32.1 pg      MCHC 30.3 g/dL      RDW 13.8 %      RDW-SD 54.5 fl      MPV 9.4 fL      Platelets 197 10*3/mm3      Neutrophil % 88.1 %      Lymphocyte % 5.5 %      Monocyte % 5.9 %      Eosinophil % 0.0 %      Basophil % 0.0 %      Immature Grans % 0.5 %      Neutrophils, Absolute 6.58 10*3/mm3      Lymphocytes, Absolute 0.41 10*3/mm3      Monocytes, Absolute 0.44 10*3/mm3      Eosinophils, Absolute 0.00 10*3/mm3      Basophils, Absolute 0.00 10*3/mm3      Immature Grans, Absolute 0.04 10*3/mm3     Blood Gas, Venous - [[id number]]  (Abnormal) Collected: 01/18/24 0730    Specimen: Venous Blood Updated: 01/18/24 0735     Site OTHER     pH, Venous 7.252 pH Units      Comment: 84 Value below reference range        pCO2, Venous 66.0 mm Hg      Comment: 83 Value above reference range        pO2, Venous 36.3 mm Hg      HCO3, Venous 29.1 mmol/L      Comment: 83 Value above reference range        Base Excess, Venous 0.6 mmol/L      O2 Saturation, Venous 66.4 %      Hemoglobin, Blood Gas 11.5 g/dL      Temperature 37.0     Barometric Pressure for Blood Gas 740 mmHg      Modality NIV     FIO2 30 %      Ventilator Mode BIPAP-ST     Set Mech Resp Rate 16.0     IPAP 18     Comment: Meter: [id number]     :  [id number]        EPA 6     Collected by REMY OLIVEIRA    Basic Metabolic Panel [[id number]]  (Abnormal) Collected: 01/18/24 0408    Specimen: Blood from Arm, Right Updated: 01/18/24 0505     Glucose 183 mg/dL      BUN 37 mg/dL      Creatinine 0.99 mg/dL      Sodium 137 mmol/L      Potassium 4.4 mmol/L      Chloride 103 mmol/L      CO2 28.8 mmol/L      Calcium 7.9 mg/dL      BUN/Creatinine Ratio 37.4     Anion Gap 5.2 mmol/L      eGFR 69.6 mL/min/1.73     Narrative:      GFR Normal >60  Chronic Kidney Disease <60  Kidney Failure <15      Blood Gas, Venous - [[id number]]  (Abnormal) Collected: 01/17/24 1552     Specimen: Venous Blood Updated: 01/17/24 1606     Site OTHER     pH, Venous 7.213 pH Units      Comment: 85 Value below critical limit        pCO2, Venous 69.5 mm Hg      Comment: 83 Value above reference range        pO2, Venous 32.5 mm Hg      HCO3, Venous 28.0 mmol/L      Base Excess, Venous -1.3 mmol/L      Comment: 84 Value below reference range        O2 Saturation, Venous 58.6 %      Hemoglobin, Blood Gas 12.5 g/dL      Temperature 37.0     Barometric Pressure for Blood Gas 742 mmHg      Modality NIV     FIO2 35 %      Ventilator Mode BIPAP-ST     Set Mech Resp Rate 16.0     PEEP 0.0     IPAP 18     Comment: Meter: [id number]     :  [id number]        EPAP 6     Notified Who RB AND V DR PAL     Notified By [id number]     Notified Time 01/17/2024 16:07     Collected by [id number]    Respiratory Panel PCR w/COVID-19(SARS-CoV-2) TERRY/ROSSI/KIKA/PAD/COR/RONAK In-House, NP Swab in UTM/VTM, 2 HR TAT - Swab, Nasopharynx [[id number]]  (Abnormal) Collected: 01/17/24 0639    Specimen: Swab from Nasopharynx Updated: 01/17/24 1150     ADENOVIRUS, PCR Not Detected     Coronavirus 229E Not Detected     Coronavirus HKU1 Not Detected     Coronavirus NL63 Not Detected     Coronavirus OC43 Not Detected     COVID19 Not Detected     Human Metapneumovirus Not Detected     Human Rhinovirus/Enterovirus Not Detected     Influenza A PCR Not Detected     Influenza B PCR Not Detected     Parainfluenza Virus 1 Not Detected     Parainfluenza Virus 2 Not Detected     Parainfluenza Virus 3 Not Detected     Parainfluenza Virus 4 Not Detected     RSV, PCR Detected     Bordetella pertussis pcr Not Detected     Bordetella parapertussis PCR Not Detected     Chlamydophila pneumoniae PCR Not Detected     Mycoplasma pneumo by PCR Not Detected    Narrative:      In the setting of a positive respiratory panel with a viral infection PLUS a negative procalcitonin without other underlying concern for bacterial infection, consider observing off  antibiotics or discontinuation of antibiotics and continue supportive care. If the respiratory panel is positive for atypical bacterial infection (Bordetella pertussis, Chlamydophila pneumoniae, or Mycoplasma pneumoniae), consider antibiotic de-escalation to target atypical bacterial infection.    High Sensitivity Troponin T 2Hr [[id number]]  (Normal) Collected: 01/17/24 0642    Specimen: Blood Updated: 01/17/24 0715     HS Troponin T 10 ng/L      Troponin T Delta 0 ng/L     Narrative:      High Sensitive Troponin T Reference Range:  <14.0 ng/L- Negative Female for AMI  <22.0 ng/L- Negative Male for AMI  >=14 - Abnormal Female indicating possible myocardial injury.  >=22 - Abnormal Male indicating possible myocardial injury.   Clinicians would have to utilize clinical acumen, EKG, Troponin, and serial changes to determine if it is an Acute Myocardial Infarction or myocardial injury due to an underlying chronic condition.         Lactic Acid, Plasma [[id number]]  (Normal) Collected: 01/17/24 0700    Specimen: Blood Updated: 01/17/24 0714     Lactate 1.5 mmol/L     BNP [[id number]]  (Normal) Collected: 01/17/24 0435    Specimen: Blood Updated: 01/17/24 0513     proBNP 520.1 pg/mL     Narrative:      This assay is used as an aid in the diagnosis of individuals suspected of having heart failure. It can be used as an aid in the diagnosis of acute decompensated heart failure (ADHF) in patients presenting with signs and symptoms of ADHF to the emergency department (ED). In addition, NT-proBNP of <300 pg/mL indicates ADHF is not likely.    Age Range Result Interpretation  NT-proBNP Concentration (pg/mL:      <50             Positive            >450                   Gray                 300-450                    Negative             <300    50-75           Positive            >900                  Gray                300-900                  Negative            <300      >75             Positive            >1800                   Gutierres                300-1800                  Negative            <300    High Sensitivity Troponin T [[id number]]  (Normal) Collected: 01/17/24 0435    Specimen: Blood Updated: 01/17/24 0513     HS Troponin T 10 ng/L     Narrative:      High Sensitive Troponin T Reference Range:  <14.0 ng/L- Negative Female for AMI  <22.0 ng/L- Negative Male for AMI  >=14 - Abnormal Female indicating possible myocardial injury.  >=22 - Abnormal Male indicating possible myocardial injury.   Clinicians would have to utilize clinical acumen, EKG, Troponin, and serial changes to determine if it is an Acute Myocardial Infarction or myocardial injury due to an underlying chronic condition.               Imaging Results (Most Recent)       Procedure Component Value Units Date/Time    XR Chest 1 View [[id number]] Resulted: 01/25/24 0653     Updated: 01/25/24 0654    XR Chest 1 View [[id number]] Collected: 01/20/24 0851     Updated: 01/20/24 0913    Narrative:      CR Chest 1 Vw     INDICATION:    Acute respiratory failure. ICU patient with intubation. COPD with acute exacerbation.    TECHNIQUE:   Portable upright chest    COMPARISON:    CT chest 1/17/2024.    FINDINGS:  Endotracheal tube in place distal tip 4.6 cm above the luis antonio. Nasogastric tube courses through the mediastinum with its distal tip and proximal port out of the field-of-view but well below the GE junction. Diffuse hyperinflation and hyperlucency  compatible with emphysema. No focal infiltrates. No effusions or pneumothorax. Heart and mediastinum unremarkable. Right upper and PICC line terminating mid SVC.      Impression:      Tubes and lines in satisfactory position. Moderate to severe emphysema.    Signer Name: Niels Walter MD   Signed: 1/20/2024 9:11 AM EST  [facility]    XR Chest 1 View [[id number]] Collected: 01/18/24 1355     Updated: 01/18/24 1400    Narrative:      XR CHEST 1 VW-: 1/18/2024 1:12 PM     INDICATION:   ET tube repositioning      COMPARISON:    1212 hours 1/18/2024.     FINDINGS:  Single Portable AP view(s) of the chest. The tip of the endotracheal  tube is now at the level of the posterior left fifth rib, at least 4.2  cm above the level of the luis antonio. Right-sided PICC line is unchanged.  Enteric tube tip at the level of the mid body stomach.     There is no pneumothorax in the field-of-view. Portions of the chest are  excluded from view. No new consolidation or effusion. Persistent  pulmonary hyperinflation.       Impression:         1. The tip of the endotracheal tube is in good position above the luis antonio  by at least a distance of 4.2 cm. No postprocedural pneumothorax.  2. Enteric tube and right-sided PICC line in position as described.  3. Pulmonary hyperinflation.     This report was finalized on 1/18/2024 1:58 PM by Dr. Huy Hilario MD.       XR Abdomen KUB [[id number]] Collected: 01/18/24 1335     Updated: 01/18/24 1340    Narrative:      XR ABDOMEN KUB-: 1/18/2024 1:11 PM     INDICATION:   Dobbhoff tube placement     COMPARISON:   1246 hours 1/18/2024.     FINDINGS:  AP radiograph of the lower chest and abdomen. The tip of the Dobbhoff  tube is at the level of the mid body stomach. Right-sided central line  is unchanged. The tip of the endotracheal tube is now about 5.7 cm above  the level of the luis antonio. Postop change left iliac vascular stent  placement. Lungs hyperinflated and otherwise clear. No pneumothorax or  effusion in the field-of-view..       Impression:         1. The tip of the enteric tube is at the level of the mid body stomach.     This report was finalized on 1/18/2024 1:38 PM by Dr. Huy Hilario MD.       XR Chest 1 View [[id number]] Collected: 01/18/24 1225     Updated: 01/18/24 1234    Narrative:      XR CHEST 1 VW-: 1/18/2024 12:09 PM     INDICATION:   Intubation. Endotracheal tube placement. Dobbhoff tube placement.     COMPARISON:    1/18/2024 at 0924 hours.     FINDINGS:  Single Portable AP view(s) of the  chest. The tip of the endotracheal  tube is about 1 cm from the luis antonio and partially extends into the right  mainstem bronchus. Retraction on the order of about 4 cm is recommended  for positioning in the more proximal trachea. There is an enteric tube  present. The tip is difficult to discriminate from the external tubing  artifact. The tip of the tube is felt to be at the level of the distal  esophagus. Suggest advancement of the tube a distance of about 8 cm with  a follow-up radiograph to document appropriate positioning in the  stomach prior to usage. It would be helpful if the external tubing could  be reposition so that it is not superimposed over the chest and abdomen  at the time of the repeat view. Right-sided PICC line extending to the  mid SVC level is unchanged. Additional external support equipment  artifact.     There is no pneumothorax. No new consolidation or effusion.       Impression:      1. Endotracheal tube tip extends into the proximal right mainstem  bronchus and should be retracted a distance of about 4 cm for  positioning in the more proximal trachea.  2. Enteric tube tip most likely at the level of the distal esophagus.  Advancement on the order of 8 cm recommended.  3. Repeat frontal chest including the upper abdomen recommended upon  repositioning of the support equipment to document appropriate  positioning. Findings and recommendations personally discussed by  telephone with Tina, the ICU nurse caring for the patient, at 1232  hours 1/18/2024     This report was finalized on 1/18/2024 12:32 PM by Dr. Huy Hilario MD.       XR Chest 1 View [[id number]] Collected: 01/18/24 0934     Updated: 01/18/24 0938    Narrative:      XR CHEST 1 VW-: 1/18/2024 9:19 AM     INDICATION:   Respiratory failure. Tobacco abuse. RSV infection.     COMPARISON:    1/17/2024.     FINDINGS:  Single Portable AP view(s) of the chest. Right-sided PICC line extends  to the mid SVC level, unchanged. Stable cardiac  silhouette. The  vascularity is unremarkable. Pulmonary hyperinflation. No new  consolidation or effusion. No pneumothorax. Old healed granulomatous  disease and probable mild atelectasis/scarring in the lung bases.  External support equipment artifact.       Impression:         1. Right-sided PICC line unchanged. No pneumothorax.  2. Pulmonary hyperinflation. No new effusion or dense consolidation.     This report was finalized on 1/18/2024 9:35 AM by Dr. Huy Hilario MD.       CT Chest With Contrast Diagnostic [[id number]] Collected: 01/17/24 1341     Updated: 01/17/24 1405    Narrative:      CT Chest W    INDICATION:    Acute respiratory failure with hypoxia. COPD.    TECHNIQUE:   CT of the chest with IV contrast. Coronal and sagittal reconstructions were obtained.  Radiation dose reduction techniques included automated exposure control or exposure modulation based on body size. Count of known CT and cardiac nuc med studies  performed in previous 12 months: 1.      COMPARISON:    CT chest 5/31/2023    FINDINGS: Examination motion degraded.  Diffuse hyperinflation with patchy hyperlucency compatible with centrilobular emphysema. Scattered subpleural reticular changes but no focal consolidation. Minimal tree-in-bud opacity posterior right upper lobe may represent a small area of pneumonitis.  No effusions. Trachea and bronchi unremarkable. Calcified left lower lobe granuloma. Bandlike scarring or atelectasis left lower lobe.    Heart size within normal limits. Aorta and pulmonary vessels unremarkable. No central mass or adenopathy. Upper abdomen unremarkable. Osseous structures unremarkable. Step-off artifact from patient motion seen within the sternum. Bilateral breast  implants without visible complication. Right upper extremity venous catheter terminating in mid SVC.      Impression:      Moderate emphysema with minimal tree-in-bud airspace opacity posterior segment right upper lobe could represent an area of  pneumonitis but no focal consolidation.    Signer Name: Niels Walter MD   Signed: 1/17/2024 2:03 PM EST  [facility]    XR Chest 1 View [[id number]] Collected: 01/17/24 1217     Updated: 01/17/24 1220    Narrative:      XR CHEST 1 VW-, 1/17/2024 12:16 PM     HISTORY:  PICC line placement.     COMPARISON:  *  Chest 1/17/2024     FINDINGS:  Single frontal view(s) of the chest. The lungs are clear. No pleural  effusions. No pneumothorax. Heart size is normal. Interval placement of  a right-sided PICC line with tip projecting over the lower SVC.  Mediastinal contours are within normal limits. Pulmonary vasculature is  normal. No acute osseous abnormality.        Impression:      Interval placement of a right-sided PICC line with tip projecting over  the lower SVC. No pneumothorax. No other acute chest findings.     This report was finalized on 1/17/2024 12:18 PM by Dr. Blaise Vaughn MD.       XR Chest 1 View [[id number]] Collected: 01/17/24 0449     Updated: 01/17/24 0508    Narrative:      CHEST X-RAY, 1/17/2024        HISTORY:  48-year-old female in the ED with shortness of air, decreased oxygen saturation. History of COPD.    TECHNIQUE:  AP portable upright chest x-ray.    COMPARISON:  *  Chest x-ray, 3/27/2023. CT chest, 5/31/2023.    FINDINGS:  Pulmonary hyperinflation compatible with COPD. The lungs appear clear. No visible pulmonary infiltrate or pleural effusion.    Heart size and pulmonary vascularity are normal.      Impression:      COPD. No active disease.        Signer Name: Danny Davis MD   Signed: 1/17/2024 5:06 AM EST  [facility]          PROCEDURES: NONE    Condition on Discharge:  stable    Physical Exam at Discharge  Vital Signs  Temp:  [97.5 °F (36.4 °C)-98.4 °F (36.9 °C)] 97.8 °F (36.6 °C)  Heart Rate:  [] 111  Resp:  [16-20] 20  BP: (116-143)/(68-99) 116/91  Body mass index is 21.83 kg/m².    Physical Exam  Vitals reviewed.    Constitutional:       General: She is not in acute distress.     Comments: Chronically ill appearance, appears much older than stated age   HENT:      Head: Normocephalic and atraumatic.      Mouth/Throat:      Mouth: Mucous membranes are moist.   Eyes:      Extraocular Movements: Extraocular movements intact.      Pupils: Pupils are equal, round, and reactive to light.   Cardiovascular:      Rate and Rhythm: Regular rhythm. Tachycardia present.   Pulmonary:      Effort: Pulmonary effort is normal.      Comments: diminished  Abdominal:      General: Abdomen is flat. Bowel sounds are normal. There is no distension.      Palpations: Abdomen is soft.      Tenderness: There is no abdominal tenderness. There is no guarding.   Musculoskeletal:         General: No swelling.   Skin:     General: Skin is warm and dry.      Capillary Refill: Capillary refill takes less than 2 seconds.      Findings: No erythema.   Neurological:      General: No focal deficit present.      Mental Status: She is alert and oriented to person, place, and time.   Psychiatric:         Mood and Affect: Mood normal.         Behavior: Behavior normal.       Discharge Disposition  SNF    Nurse:    As per facility    PT/OT:  Yes     Safety Evaluation:  Yes     DME  nebulizer    Discharge Diet:      Dietary Orders (From admission, onward)       Start     Ordered    01/23/24 0638  Diet: Regular/House Diet; Fluid Consistency: Thin (IDDSI 0)  Diet Effective Now        References:    Diet Order [facility]   Question Answer Comment   Diets: Regular/House Diet    Fluid Consistency: Thin (IDDSI 0)        01/23/24 0638                    Activity at Discharge:  As tolerated, no driving until released by pcp    Pre-discharge education  Smoking, Injectables, medications, follow up    Follow-up Appointments  No future appointments.  Additional Instructions for the Follow-ups that You Need to Schedule       Discharge Follow-up with PCP   As directed       Currently  "Documented PCP:    Talib Ceron MD    PCP Phone Number:    [phone]     Follow Up Details: 1 week        Discharge Follow-up with Specified Provider: Pulmonary; 1 Month   As directed      To: Pulmonary   Follow Up: 1 Month              Test Results Pending at Discharge: none    Zita Potter, APRN  01/25/24  07:20 EST    Time: Discharge over 30 min (if over 30 minutes give explanation as to why it took greater than 30 minutes)  Secondary to:   Coordination of care/follow up  Medication reconciliation  D/W patient and case management    \"Dictated utilizing Dragon dictation\"      "

## 2024-01-25 NOTE — PLAN OF CARE
Goal Outcome Evaluation:  Plan of Care Reviewed With: patient        Progress: improving  Outcome Evaluation: VSS, Tele: tachycardia, loose productive cough, room air, no bowel movements this shift, F/C d/c'd, up to the bedside commode with x2 assist, patient very weak when standing, voided 200 ml. tylenol given for a headache, patient ready to be discharged to [facility].

## 2024-01-25 NOTE — CASE MANAGEMENT/SOCIAL WORK
Continued Stay Note  SARAH Garcia     Patient Name: Valeria Rubi  MRN: [id number]  Today's Date: 1/25/2024    Admit Date: 1/17/2024    Plan: [facility], skilled   Discharge Plan       Row Name 01/25/24 0941       Plan    Plan [facility], skilled    Patient/Family in Agreement with Plan yes    Plan Comments CM received a VM from [facility] which stated that pre cert has been obtained for [facility] starting 1/25/2024. CM called Kailey at [facility] and updated her of such and she states they can accept any time today. CM updated MD of such. CM will follow.                   Discharge Codes    No documentation.                 Expected Discharge Date and Time       Expected Discharge Date Expected Discharge Time    Jan 25, 2024               Meme Lara RN

## 2024-01-25 NOTE — CASE MANAGEMENT/SOCIAL WORK
Case Management Discharge Note      Final Note: Discharged to [facility], skilled    Provided Post Acute Provider List?: Yes  Post Acute Provider List: Home Health, Inpatient Rehab  Provided Post Acute Provider Quality & Resource List?: Yes  Post Acute Provider Quality and Resource List: Home Health, Inpatient Rehab  Delivered To: Patient  Method of Delivery: In person    Selected Continued Care - Discharged on 1/25/2024 Admission date: 1/17/2024 - Discharge disposition: Rehab Facility or Unit (DC - External)      Destination Coordination complete.      Service Provider Selected Services Address Phone Fax Patient Preferred    [facility] [address] [place] [id number] [phone] --              Durable Medical Equipment    No services have been selected for the patient.                Dialysis/Infusion    No services have been selected for the patient.                Home Medical Care    No services have been selected for the patient.                Therapy    No services have been selected for the patient.                Community Resources    No services have been selected for the patient.                Community & DME    No services have been selected for the patient.                         Final Discharge Disposition Code: 03 - skilled nursing facility (SNF)

## 2024-01-25 NOTE — PAYOR COMM NOTE
[facility]    &  [facility]  [address]     [address] [place] [address]     [place] [address]  NPI: [id number]     NPI: [id number]  Tax ID: [id number]     Tax ID: [id number]    Thai Miller - [phone]  Utilization Review/Room Reservations  Phone: [phone], [phone], [phone], Kathy [phone], Sushma [phone] or [phone]  Fax: [phone]  Email: [email or web address]  Please call, fax back, or email with authorization or any questions! Thanks!      D/C SUMMARY  AUTH#[id number]         This fax contains any of the following:  Face Sheet, H&P, progress notes, consults, orders, meds, lab results, labor record, vitals, delivery worksheet, op note, d/c summary.  The information contained in this fax is confidential for the use of the Individual or entity named above. If the reader of this message is not the Intended recipient (or the employee or agent responsible to deliver it to the Intended recipient), you are hereby notified that any dissemination, distribution, or copy of this communication is prohibited. If you have received this communication in error, please notify us by collect telephone call and return the original message to us at the above address at our expense.  Valeria Rubi (48 y.o. Female)       Date of Birth   1975    Social Security Number       Address   [address] [place] [id number]    Home Phone       MRN   [id number]       Cheondoism   None    Marital Status   Single                            Admission Date   1/17/24    Admission Type   Emergency    Admitting Provider   Facundo Flanagan DO    Attending Provider       Department, Room/Bed   [facility] ICU, ICU2/1       Discharge Date   1/25/2024    Discharge Disposition   Rehab Facility or Unit (DC - External)    Discharge Destination                                 Attending Provider: (none)   Allergies:  "Prednisone    Isolation: Contact   Infection: RSV (01/17/24)   Code Status: CPR    Ht: 157 cm (61.81\")   Wt: 53.8 kg (118 lb 9.7 oz)    Admission Cmt: None   Principal Problem: Respiratory failure [J96.90]                   Active Insurance as of 1/17/2024       Primary Coverage       Payor Plan Insurance Group Employer/Plan Group    [employer]HE[employer] BLUE CROSS ANTHM[employer]IR BLUE CROSS BLUE [facility] PPO [id number]       Payor Plan Address Payor Plan Phone Number Payor Plan Fax Number Effective Dates    [address] [phone]  1/1/2021 - None Entered    [facility] [id number]         Subscriber Name Subscriber Birth Date Member ID       VALERIA RUBI 1975 [id number]                     Emergency Contacts        (Rel.) Home Phone Work Phone Mobile Phone    Harpreet Ruiz (Spouse) -- -- [phone]    Arjun Ruiz (Son) -- -- [phone]                 Discharge Summary        Zita Potter, CATIE at 01/25/24 3303       Attestation signed by Dagoberto Chilel MD at 01/25/24 8318    I have seen and examined Ms. Rubi, and I agree w/ the diagnoses and plan as documented.                  Valeria Rubi  1975  [id number]    Hospitalists Discharge Summary    Date of Admission: 1/17/2024  Date of Discharge:  1/25/2024    History of Present Illness from [facility] on admit:   \"The patient is a 48-year-old female who presented to the emergency department secondary to 3 days of increasing shortness of air, wheezing.  She notes that she was seen by her PCP approximately 2 weeks ago and was given antibiotics and steroids for acute bronchitis.  She reports that she did improve however worsened again over the weekend.  She had abstained from tobacco since May of last year however notes intermittent tobacco abuse including recently.  She denies any other sick symptoms such as fever, chills, rhinorrhea, nasal congestion, sore throat.  She is unaware of any sick contacts.  She has been using her nebulizer and her inhalers " "frequently throughout the day.  She notes using 2 L of oxygen as needed and using it in the last several days continuously.     At time of my exam, patient is extremely anxious, tachypneic, tachycardic and appears in acute distress.  She would like to remain on BiPAP if at all possible however is agreeable to intubation if that is necessary.  He has already received 4 DuoNebs and IV Solu-Medrol.  Some improvement after Ativan and metoprolol given.  Awaiting ICU bed.     Diagnostics in ER showed CXR unremarkable.  Significant lab includes WBC 13.15     She has a history of oxygen dependent COPD, hypertension, PAD, s/p bilateral lower extremity stents 2016 and carotid artery angioplasty, HLD, tobacco abuse, vitamin D deficiency, anxiety, chronic migraines, chronic cardiac stent occlusion, chronic macrocytosis.     She otherwise denies recent f/c/headache/rhinorrhea/nasal congestion/lightheadedness/syncopal sensation/n/v/d/chest pain/abdominal pain/change in bowel or bladder habits/no weight change/bloody emesis or bloody stools/change in medications or any other new concerns.\"     Primary Discharge diagnoses:  Acute hypoxic, hypercarbic respiratory failure secondary to acute RSV and bacterial pneumonia and AECOPD    Secondary Discharge Diagnoses:   Steroid induced leukocytosis  Steroid induced hyperglycemia A1c 5.8%  Electrolyte imbalance  Tachycardia  HTN  PAD, s/p LLE stent 2016, s/p carotid artery angioplasty  CAD/HLD with chronic cardiac stent occlusion  Chronic migraine headaches  Chronic cardiac stent occlusion  Anxiety  Tobacco abuse  Elevated ALT  Diarrhea  Chronic macrocytosis    Hospital Course Summary:   The patient was immediately placed on BiPAP in ER due to respiratory distress.  Patient requested to remain on BiPAP as long as possible but work of breathing remained elevated with poor air movement and she was subsequently intubated.  She was liberated from vent 1/22/2024 and has remained generally on " room air with occasional use of 2 L of oxygen when sats drop below 88%.  She has not utilized BiPAP since 1/23/2024.    She was followed in consultation by pulmonary.  IV steroids were weaned to oral.  Inhalers and nebulizers were weaned to Symbicort and as needed DuoNebs.  She received 6-day course of Unasyn then 3 days of azithromycin for pneumonia.    Hyperglycemia was managed with glucommander insulin dosing and should continue until steroids are completed    Tachycardia was managed with home dose metoprolol    She was extensively counseled regarding need for complete cessation of tobacco use.    PT/OT recommended STR as patient remains extremely weak from illness and hospitalization.    ALT was elevated on date of discharge, as was WBC and both should be repeated within 1 week.    F/U Pulmonary 4 weeks  F/U Talib Ceron MD 1 week    PCP  Patient Care Team:  Talib Ceron MD as PCP - General (Family Medicine)    Consults:   Consults       Date and Time Order Name Status Description    1/17/2024  5:05 PM Inpatient Pulmonology Consult Completed           Operations and Procedures Performed:     XR Chest 1 View    Result Date: 1/20/2024  Narrative: CR Chest 1 Vw INDICATION:  Acute respiratory failure. ICU patient with intubation. COPD with acute exacerbation. TECHNIQUE: Portable upright chest COMPARISON:  CT chest 1/17/2024. FINDINGS: Endotracheal tube in place distal tip 4.6 cm above the luis antonio. Nasogastric tube courses through the mediastinum with its distal tip and proximal port out of the field-of-view but well below the GE junction. Diffuse hyperinflation and hyperlucency compatible with emphysema. No focal infiltrates. No effusions or pneumothorax. Heart and mediastinum unremarkable. Right upper and PICC line terminating mid SVC.     Impression: Tubes and lines in satisfactory position. Moderate to severe emphysema. Signer Name: Niels Walter MD Signed: 1/20/2024 9:11 AM EST [facility]    XR Chest 1 View    Result Date: 1/18/2024  Narrative: XR CHEST 1 VW-: 1/18/2024 1:12 PM  INDICATION: ET tube repositioning  COMPARISON:  1212 hours 1/18/2024.  FINDINGS: Single Portable AP view(s) of the chest. The tip of the endotracheal tube is now at the level of the posterior left fifth rib, at least 4.2 cm above the level of the luis antonio. Right-sided PICC line is unchanged. Enteric tube tip at the level of the mid body stomach.  There is no pneumothorax in the field-of-view. Portions of the chest are excluded from view. No new consolidation or effusion. Persistent pulmonary hyperinflation.      Impression:  1. The tip of the endotracheal tube is in good position above the luis antonio by at least a distance of 4.2 cm. No postprocedural pneumothorax. 2. Enteric tube and right-sided PICC line in position as described. 3. Pulmonary hyperinflation.  This report was finalized on 1/18/2024 1:58 PM by Dr. Huy Hilario MD.      XR Abdomen KUB    Result Date: 1/18/2024  Narrative: XR ABDOMEN KUB-: 1/18/2024 1:11 PM  INDICATION: Dobbhoff tube placement  COMPARISON: 1246 hours 1/18/2024.  FINDINGS: AP radiograph of the lower chest and abdomen. The tip of the Dobbhoff tube is at the level of the mid body stomach. Right-sided central line is unchanged. The tip of the endotracheal tube is now about 5.7 cm above the level of the luis antonio. Postop change left iliac vascular stent placement. Lungs hyperinflated and otherwise clear. No pneumothorax or effusion in the field-of-view..      Impression:  1. The tip of the enteric tube is at the level of the mid body stomach.  This report was finalized on 1/18/2024 1:38 PM by Dr. Huy Hilario MD.      XR Chest 1 View    Result Date: 1/18/2024  Narrative: XR CHEST 1 VW-: 1/18/2024 12:09 PM  INDICATION: Intubation. Endotracheal tube placement. Dobbhoff tube placement.  COMPARISON:  1/18/2024 at 0924 hours.  FINDINGS: Single Portable AP view(s) of the chest. The tip of the  endotracheal tube is about 1 cm from the luis antonio and partially extends into the right mainstem bronchus. Retraction on the order of about 4 cm is recommended for positioning in the more proximal trachea. There is an enteric tube present. The tip is difficult to discriminate from the external tubing artifact. The tip of the tube is felt to be at the level of the distal esophagus. Suggest advancement of the tube a distance of about 8 cm with a follow-up radiograph to document appropriate positioning in the stomach prior to usage. It would be helpful if the external tubing could be reposition so that it is not superimposed over the chest and abdomen at the time of the repeat view. Right-sided PICC line extending to the mid SVC level is unchanged. Additional external support equipment artifact.  There is no pneumothorax. No new consolidation or effusion.      Impression: 1. Endotracheal tube tip extends into the proximal right mainstem bronchus and should be retracted a distance of about 4 cm for positioning in the more proximal trachea. 2. Enteric tube tip most likely at the level of the distal esophagus. Advancement on the order of 8 cm recommended. 3. Repeat frontal chest including the upper abdomen recommended upon repositioning of the support equipment to document appropriate positioning. Findings and recommendations personally discussed by telephone with Tina, the ICU nurse caring for the patient, at 1232 hours 1/18/2024  This report was finalized on 1/18/2024 12:32 PM by Dr. Huy Hilario MD.      Adult Transthoracic Echo Complete w/ Color, Spectral and Contrast if Necessary Per Protocol    Result Date: 1/18/2024  Narrative:   Left ventricular systolic function is normal. Left ventricular ejection fraction appears to be 56 - 60%.   Left ventricular diastolic function was normal.   : The study is technically suboptimal for diagnosis. The quality of the study is limited due to patient body habitus, an  uncooperative patient and breast implants. Image enhancer was not used for this study due to inability to consent.     XR Chest 1 View    Result Date: 1/18/2024  Narrative: XR CHEST 1 VW-: 1/18/2024 9:19 AM  INDICATION: Respiratory failure. Tobacco abuse. RSV infection.  COMPARISON:  1/17/2024.  FINDINGS: Single Portable AP view(s) of the chest. Right-sided PICC line extends to the mid SVC level, unchanged. Stable cardiac silhouette. The vascularity is unremarkable. Pulmonary hyperinflation. No new consolidation or effusion. No pneumothorax. Old healed granulomatous disease and probable mild atelectasis/scarring in the lung bases. External support equipment artifact.      Impression:  1. Right-sided PICC line unchanged. No pneumothorax. 2. Pulmonary hyperinflation. No new effusion or dense consolidation.  This report was finalized on 1/18/2024 9:35 AM by Dr. Huy Hilario MD.      CT Chest With Contrast Diagnostic    Result Date: 1/17/2024  Narrative: CT Chest W INDICATION:  Acute respiratory failure with hypoxia. COPD. TECHNIQUE: CT of the chest with IV contrast. Coronal and sagittal reconstructions were obtained.  Radiation dose reduction techniques included automated exposure control or exposure modulation based on body size. Count of known CT and cardiac nuc med studies performed in previous 12 months: 1.  COMPARISON:  CT chest 5/31/2023 FINDINGS: Examination motion degraded. Diffuse hyperinflation with patchy hyperlucency compatible with centrilobular emphysema. Scattered subpleural reticular changes but no focal consolidation. Minimal tree-in-bud opacity posterior right upper lobe may represent a small area of pneumonitis. No effusions. Trachea and bronchi unremarkable. Calcified left lower lobe granuloma. Bandlike scarring or atelectasis left lower lobe. Heart size within normal limits. Aorta and pulmonary vessels unremarkable. No central mass or adenopathy. Upper abdomen unremarkable. Osseous structures  unremarkable. Step-off artifact from patient motion seen within the sternum. Bilateral breast implants without visible complication. Right upper extremity venous catheter terminating in mid SVC.     Impression: Moderate emphysema with minimal tree-in-bud airspace opacity posterior segment right upper lobe could represent an area of pneumonitis but no focal consolidation. Signer Name: Niels Walter MD Signed: 1/17/2024 2:03 PM EST [facility]    XR Chest 1 View    Result Date: 1/17/2024  Narrative: XR CHEST 1 VW-, 1/17/2024 12:16 PM  HISTORY: PICC line placement.  COMPARISON: *  Chest 1/17/2024  FINDINGS: Single frontal view(s) of the chest. The lungs are clear. No pleural effusions. No pneumothorax. Heart size is normal. Interval placement of a right-sided PICC line with tip projecting over the lower SVC. Mediastinal contours are within normal limits. Pulmonary vasculature is normal. No acute osseous abnormality.      Impression: Interval placement of a right-sided PICC line with tip projecting over the lower SVC. No pneumothorax. No other acute chest findings.  This report was finalized on 1/17/2024 12:18 PM by Dr. Blaise Vaughn MD.      XR Chest 1 View    Result Date: 1/17/2024  Narrative: CHEST X-RAY, 1/17/2024    HISTORY: 48-year-old female in the ED with shortness of air, decreased oxygen saturation. History of COPD. TECHNIQUE: AP portable upright chest x-ray. COMPARISON: *  Chest x-ray, 3/27/2023. CT chest, 5/31/2023. FINDINGS: Pulmonary hyperinflation compatible with COPD. The lungs appear clear. No visible pulmonary infiltrate or pleural effusion. Heart size and pulmonary vascularity are normal.     Impression: COPD. No active disease. Signer Name: Danny Davis MD Signed: 1/17/2024 5:06 AM EST [facility]     Allergies:  is allergic to prednisone.    Marcelo  No medications noted per report, reviewed by me    Discharge Medications:     Discharge  Medications        New Medications        Instructions Start Date   acetaminophen 325 MG tablet  Commonly known as: TYLENOL   650 mg, Oral, Every 4 Hours PRN      dextrose 40 % gel  Commonly known as: GLUTOSE   15 g, Oral, Every 15 Minutes PRN      glucagon 1 MG injection  Commonly known as: GLUCAGEN   1 mg, Intramuscular, Every 15 Minutes PRN      Insulin Aspart 100 UNIT/ML injection  Commonly known as: novoLOG   2-7 Units, Subcutaneous, 4 Times Daily Before Meals & Nightly      insulin detemir 100 UNIT/ML injection  Commonly known as: LEVEMIR   15 Units, Subcutaneous, Daily, Until steroids are completed      lisinopril 20 MG tablet  Commonly known as: PRINIVIL,ZESTRIL   20 mg, Oral, Every 24 Hours Scheduled      nystatin 100,000 unit/mL suspension  Commonly known as: MYCOSTATIN   500,000 Units, Swish & Spit, 4 Times Daily      ondansetron ODT 4 MG disintegrating tablet  Commonly known as: ZOFRAN-ODT   4 mg, Translingual, Every 6 Hours PRN      predniSONE 20 MG tablet  Commonly known as: DELTASONE   40 mg, Oral, Daily With Breakfast             Changes to Medications        Instructions Start Date   nicotine 21 MG/24HR patch  Commonly known as: NICODERM CQ  What changed: Another medication with the same name was added. Make sure you understand how and when to take each.   1 patch, Transdermal, Every 24 Hours      nicotine 21 MG/24HR patch  Commonly known as: NICODERM CQ  What changed: You were already taking a medication with the same name, and this prescription was added. Make sure you understand how and when to take each.   1 patch, Transdermal, Every 24 Hours, Apply Patch to Clean, Dry, Hairless Area Daily Remove Old Patch Before Applying New Patch Rotate Patch Site Daily May Remove Patch at Bedtime to Prevent Insomnia Follow Other Instructions on Package             Continue These Medications        Instructions Start Date   albuterol sulfate  (90 Base) MCG/ACT inhaler  Commonly known as: Proventil HFA    Inhale 2 puffs into the lungs Every 4 (Four) Hours As Needed for Wheezing.      aspirin 81 MG EC tablet   81 mg, Oral, Daily      budesonide-formoterol 160-4.5 MCG/ACT inhaler  Commonly known as: SYMBICORT   2 puffs, Inhalation, 2 Times Daily - RT      clopidogrel 75 MG tablet  Commonly known as: Plavix   75 mg, Oral, Daily      fluticasone 50 MCG/ACT nasal spray  Commonly known as: FLONASE   1 spray, Nasal, Daily PRN      guaiFENesin 600 MG 12 hr tablet  Commonly known as: MUCINEX   1,200 mg, Oral, 2 Times Daily      ipratropium-albuterol 0.5-2.5 mg/3 ml nebulizer  Commonly known as: DUO-NEB   3 mL, Nebulization, Every 4 Hours PRN      metoprolol tartrate 50 MG tablet  Commonly known as: LOPRESSOR   50 mg, Oral, Every 12 Hours Scheduled      simvastatin 40 MG tablet  Commonly known as: ZOCOR   40 mg, Oral, Every Evening      venlafaxine XR 75 MG 24 hr capsule  Commonly known as: EFFEXOR-XR   TAKE 1 CAPSULE BY MOUTH EVERY DAY             Stop These Medications      lisinopril-hydrochlorothiazide 20-12.5 MG per tablet  Commonly known as: PRINZIDE,ZESTORETIC     methylPREDNISolone 4 MG dose pack  Commonly known as: MEDROL     vitamin D 1.25 MG (76778 UT) capsule capsule  Commonly known as: ERGOCALCIFEROL              Last Lab Results:   Lab Results (most recent)       Procedure Component Value Units Date/Time    Manual Differential [[id number]]  (Abnormal) Collected: 01/25/24 0504    Specimen: Blood Updated: 01/25/24 0551     Neutrophil % 57.0 %      Lymphocyte % 26.0 %      Monocyte % 9.0 %      Eosinophil % 1.0 %      Basophil % 1.0 %      Bands %  6.0 %      Neutrophils Absolute 13.82 10*3/mm3      Lymphocytes Absolute 5.70 10*3/mm3      Monocytes Absolute 1.97 10*3/mm3      Eosinophils Absolute 0.22 10*3/mm3      Basophils Absolute 0.22 10*3/mm3      RBC Morphology Normal     WBC Morphology Normal     Platelet Morphology Normal    CBC & Differential [[id number]]  (Abnormal) Collected: 01/25/24 0504    Specimen:  Blood Updated: 01/25/24 0550    Narrative:      The following orders were created for panel order CBC & Differential.  Procedure                               Abnormality         Status                     ---------                               -----------         ------                     CBC Auto Differential[[id number]]        Abnormal            Final result               Scan Slide[[id number]]                                       Final result                 Please view results for these tests on the individual orders.    Scan Slide [[id number]] Collected: 01/25/24 0504    Specimen: Blood Updated: 01/25/24 0550     Scan Slide --     Comment: See Manual Differential Results       Comprehensive Metabolic Panel [[id number]]  (Abnormal) Collected: 01/25/24 0504    Specimen: Blood Updated: 01/25/24 0530     Glucose 127 mg/dL      BUN 27 mg/dL      Creatinine 0.61 mg/dL      Sodium 141 mmol/L      Potassium 4.1 mmol/L      Chloride 103 mmol/L      CO2 33.2 mmol/L      Calcium 8.6 mg/dL      Total Protein 5.5 g/dL      Albumin 3.3 g/dL      ALT (SGPT) 74 U/L      AST (SGOT) 24 U/L      Alkaline Phosphatase 59 U/L      Total Bilirubin 0.5 mg/dL      Globulin 2.2 gm/dL      A/G Ratio 1.5 g/dL      BUN/Creatinine Ratio 44.3     Anion Gap 4.8 mmol/L      eGFR 109.1 mL/min/1.73     Narrative:      GFR Normal >60  Chronic Kidney Disease <60  Kidney Failure <15      CBC Auto Differential [[id number]]  (Abnormal) Collected: 01/25/24 0504    Specimen: Blood Updated: 01/25/24 0522     WBC 21.93 10*3/mm3      RBC 4.56 10*6/mm3      Hemoglobin 14.4 g/dL      Hematocrit 45.5 %      MCV 99.8 fL      MCH 31.6 pg      MCHC 31.6 g/dL      RDW 13.3 %      RDW-SD 48.5 fl      MPV 9.2 fL      Platelets 425 10*3/mm3      Neutrophil % 62.8 %      Lymphocyte % 21.7 %      Monocyte % 8.5 %      Eosinophil % 0.4 %      Basophil % 0.9 %      Immature Grans % 5.7 %      Neutrophils, Absolute 13.80 10*3/mm3      Lymphocytes, Absolute 4.76 10*3/mm3       Monocytes, Absolute 1.86 10*3/mm3      Eosinophils, Absolute 0.08 10*3/mm3      Basophils, Absolute 0.19 10*3/mm3      Immature Grans, Absolute 1.24 10*3/mm3      nRBC 0.0 /100 WBC     POC Glucose Once [[id number]]  (Abnormal) Collected: 01/24/24 2049    Specimen: Blood Updated: 01/24/24 2058     Glucose 136 mg/dL     POC Glucose Once [[id number]]  (Abnormal) Collected: 01/24/24 1656    Specimen: Blood Updated: 01/24/24 1702     Glucose 154 mg/dL     Comprehensive Metabolic Panel [[id number]]  (Abnormal) Collected: 01/23/24 0751    Specimen: Blood Updated: 01/23/24 0852     Glucose 162 mg/dL      BUN 22 mg/dL      Creatinine 0.62 mg/dL      Sodium 140 mmol/L      Potassium 3.6 mmol/L      Chloride 103 mmol/L      CO2 32.7 mmol/L      Calcium 9.0 mg/dL      Total Protein 5.6 g/dL      Albumin 3.5 g/dL      ALT (SGPT) 16 U/L      AST (SGOT) 17 U/L      Alkaline Phosphatase 55 U/L      Total Bilirubin 0.5 mg/dL      Globulin 2.1 gm/dL      A/G Ratio 1.7 g/dL      BUN/Creatinine Ratio 35.5     Anion Gap 4.3 mmol/L      eGFR 108.6 mL/min/1.73     Narrative:      GFR Normal >60  Chronic Kidney Disease <60  Kidney Failure <15      Blood Gas, Arterial - [[id number]]  (Abnormal) Collected: 01/18/24 2130    Specimen: Arterial Blood Updated: 01/22/24 1801     Site Right Radial     Marquis's Test Positive     pH, Arterial 7.308 pH units      Comment: 84 Value below reference range        pCO2, Arterial 50.8 mm Hg      Comment: 83 Value above reference range        pO2, Arterial 117.0 mm Hg      Comment: 83 Value above reference range        HCO3, Arterial 25.5 mmol/L      Base Excess, Arterial -1.3 mmol/L      Comment: 84 Value below reference range        O2 Saturation, Arterial 98.9 %      Hemoglobin, Blood Gas 11.3 g/dL      Temperature 37.0     Barometric Pressure for Blood Gas 737 mmHg      Modality Ventilator     FIO2 30 %      Ventilator Mode VC/AC     Set Tidal Volume 500     Comment: Appended report. These results have  been appended to a previously final verified report.        Set [facility] Resp Rate 18     Comment: Appended report. These results have been appended to a previously final verified report.        Rate 18 Breaths/minute      Comment: Appended report. These results have been appended to a previously final verified report.        PEEP 5.0     Collected by [id number]     Comment: Meter: [id number]     :  [id number]        pCO2, Temperature Corrected 50.8 mm Hg      pH, Temp Corrected 7.308 pH Units      pO2, Temperature Corrected 117 mm Hg     Phosphorus [[id number]]  (Normal) Collected: 01/22/24 1539    Specimen: Blood Updated: 01/22/24 1610     Phosphorus 3.0 mg/dL     Blood Culture - Blood, Blood, PICC Line [[id number]]  (Normal) Collected: 01/17/24 1421    Specimen: Blood, PICC Line Updated: 01/22/24 1445     Blood Culture No growth at 5 days    Blood Gas, Arterial - [[id number]]  (Abnormal) Collected: 01/22/24 0710    Specimen: Arterial Blood Updated: 01/22/24 0714     Site Right Radial     Marquis's Test Positive     pH, Arterial 7.421 pH units      pCO2, Arterial 43.9 mm Hg      pO2, Arterial 78.2 mm Hg      Comment: 84 Value below reference range        HCO3, Arterial 28.5 mmol/L      Comment: 83 Value above reference range        Base Excess, Arterial 3.5 mmol/L      Comment: 83 Value above reference range        O2 Saturation, Arterial 97.4 %      Hemoglobin, Blood Gas 11.5 g/dL      Temperature 37.0     Barometric Pressure for Blood Gas 749 mmHg      Modality Ventilator     FIO2 25 %      Ventilator Mode PC/AC     Set [facility] Resp Rate 18.0     PEEP 5.0     PIP 22 cmH2O      Comment: Meter: [id number]     :  [id number]        Collected by [id number]     pCO2, Temperature Corrected 43.9 mm Hg      pH, Temp Corrected 7.421 pH Units      pO2, Temperature Corrected 78.2 mm Hg     Blood Culture - Blood, Arm, Right [[id number]]  (Normal) Collected: 01/17/24 0645    Specimen: Blood from Arm, Right Updated:  01/22/24 0701     Blood Culture No growth at 5 days    Renal Function Panel [[id number]]  (Abnormal) Collected: 01/22/24 0452    Specimen: Blood from Arm, Right Updated: 01/22/24 0536     Glucose 195 mg/dL      BUN 32 mg/dL      Creatinine 0.59 mg/dL      Sodium 144 mmol/L      Potassium 4.1 mmol/L      Chloride 109 mmol/L      CO2 32.4 mmol/L      Calcium 8.0 mg/dL      Albumin 3.1 g/dL      Phosphorus 1.8 mg/dL      Anion Gap 2.6 mmol/L      BUN/Creatinine Ratio 54.2     eGFR 110.0 mL/min/1.73     Narrative:      GFR Normal >60  Chronic Kidney Disease <60  Kidney Failure <15      Magnesium [[id number]]  (Abnormal) Collected: 01/22/24 0452    Specimen: Blood from Arm, Right Updated: 01/22/24 0531     Magnesium 2.7 mg/dL     CBC (No Diff) [[id number]]  (Abnormal) Collected: 01/22/24 0452    Specimen: Blood from Arm, Right Updated: 01/22/24 0511     WBC 9.12 10*3/mm3      RBC 3.56 10*6/mm3      Hemoglobin 11.4 g/dL      Hematocrit 36.4 %      .2 fL      MCH 32.0 pg      MCHC 31.3 g/dL      RDW 13.2 %      RDW-SD 49.8 fl      MPV 10.0 fL      Platelets 227 10*3/mm3     Phosphorus [[id number]]  (Normal) Collected: 01/21/24 1453    Specimen: Blood Updated: 01/21/24 1533     Phosphorus 2.6 mg/dL     Hemoglobin A1c [[id number]]  (Abnormal) Collected: 01/21/24 0531    Specimen: Blood Updated: 01/21/24 1236     Hemoglobin A1C 5.80 %     Narrative:      Hemoglobin A1C Ranges:    Increased Risk for Diabetes  5.7% to 6.4%  Diabetes                     >= 6.5%  Diabetic Goal                < 7.0%    Basic Metabolic Panel [[id number]]  (Abnormal) Collected: 01/21/24 0531    Specimen: Blood Updated: 01/21/24 0557     Glucose 233 mg/dL      BUN 34 mg/dL      Creatinine 0.72 mg/dL      Sodium 148 mmol/L      Potassium 4.2 mmol/L      Chloride 109 mmol/L      CO2 39.5 mmol/L      Calcium 7.6 mg/dL      BUN/Creatinine Ratio 47.2     Anion Gap -0.5 mmol/L      eGFR 102.0 mL/min/1.73     Narrative:      GFR Normal >60  Chronic Kidney  "Disease <60  Kidney Failure <15      Magnesium [[id number]]  (Abnormal) Collected: 01/21/24 0531    Specimen: Blood Updated: 01/21/24 0557     Magnesium 2.9 mg/dL     CBC (No Diff) [[id number]]  (Abnormal) Collected: 01/21/24 0531    Specimen: Blood Updated: 01/21/24 0539     WBC 6.30 10*3/mm3      RBC 3.53 10*6/mm3      Hemoglobin 11.2 g/dL      Hematocrit 36.5 %      .4 fL      MCH 31.7 pg      MCHC 30.7 g/dL      RDW 13.9 %      RDW-SD 53.2 fl      MPV 9.8 fL      Platelets 198 10*3/mm3     Respiratory Culture - Sputum, Cough [[id number]] Collected: 01/18/24 1356    Specimen: Sputum from Cough Updated: 01/20/24 0911     Respiratory Culture Rare Normal respiratory gabe. No S. aureus or Pseudomonas aeruginosa detected. Final report.     Gram Stain Many (4+) WBCs seen      No Epithelial cells seen      Rare (1+) Gram negative bacilli    Procalcitonin [[id number]]  (Abnormal) Collected: 01/20/24 0532    Specimen: Blood Updated: 01/20/24 0625     Procalcitonin 3.87 ng/mL     Narrative:      As a Marker for Sepsis (Non-Neonates):    1. <0.5 ng/mL represents a low risk of severe sepsis and/or septic shock.  2. >2 ng/mL represents a high risk of severe sepsis and/or septic shock.    As a Marker for Lower Respiratory Tract Infections that require antibiotic therapy:    PCT on Admission    Antibiotic Therapy       6-12 Hrs later    >0.5                Strongly Recommended  >0.25 - <0.5        Recommended   0.1 - 0.25          Discouraged              Remeasure/reassess PCT  <0.1                Strongly Discouraged     Remeasure/reassess PCT    As 28 day mortality risk marker: \"Change in Procalcitonin Result\" (>80% or <=80%) if Day 0 (or Day 1) and Day 4 values are available. Refer to [email or web address]    Change in PCT <=80%  A decrease of PCT levels below or equal to 80% defines a positive change in PCT test result representing a higher risk for 28-day all-cause mortality of patients diagnosed with " severe sepsis for septic shock.    Change in PCT >80%  A decrease of PCT levels of more than 80% defines a negative change in PCT result representing a lower risk for 28-day all-cause mortality of patients diagnosed with severe sepsis or septic shock.       Renal Function Panel [[id number]]  (Abnormal) Collected: 01/20/24 0532    Specimen: Blood Updated: 01/20/24 0602     Glucose 242 mg/dL      BUN 29 mg/dL      Creatinine 0.76 mg/dL      Sodium 143 mmol/L      Potassium 4.5 mmol/L      Chloride 110 mmol/L      CO2 30.9 mmol/L      Calcium 7.8 mg/dL      Albumin 3.2 g/dL      Phosphorus 2.2 mg/dL      Anion Gap 2.1 mmol/L      BUN/Creatinine Ratio 38.2     eGFR 95.6 mL/min/1.73     Narrative:      GFR Normal >60  Chronic Kidney Disease <60  Kidney Failure <15      CBC & Differential [[id number]]  (Abnormal) Collected: 01/19/24 0535    Specimen: Blood Updated: 01/19/24 0724    Narrative:      The following orders were created for panel order CBC & Differential.  Procedure                               Abnormality         Status                     ---------                               -----------         ------                     CBC Auto Differential[[id number]]        Abnormal            Final result               Scan Slide[[id number]]                   Normal              Final result                 Please view results for these tests on the individual orders.    Scan Slide [[id number]]  (Normal) Collected: 01/19/24 0535    Specimen: Blood Updated: 01/19/24 0724     RBC Morphology Normal     WBC Morphology Normal     Platelet Morphology Normal    Procalcitonin [[id number]]  (Abnormal) Collected: 01/19/24 0535    Specimen: Blood Updated: 01/19/24 0612     Procalcitonin 10.84 ng/mL     Narrative:      As a Marker for Sepsis (Non-Neonates):    1. <0.5 ng/mL represents a low risk of severe sepsis and/or septic shock.  2. >2 ng/mL represents a high risk of severe sepsis and/or septic shock.    As a Marker for Lower  "Respiratory Tract Infections that require antibiotic therapy:    PCT on Admission    Antibiotic Therapy       6-12 Hrs later    >0.5                Strongly Recommended  >0.25 - <0.5        Recommended   0.1 - 0.25          Discouraged              Remeasure/reassess PCT  <0.1                Strongly Discouraged     Remeasure/reassess PCT    As 28 day mortality risk marker: \"Change in Procalcitonin Result\" (>80% or <=80%) if Day 0 (or Day 1) and Day 4 values are available. Refer to [email or web address]    Change in PCT <=80%  A decrease of PCT levels below or equal to 80% defines a positive change in PCT test result representing a higher risk for 28-day all-cause mortality of patients diagnosed with severe sepsis for septic shock.    Change in PCT >80%  A decrease of PCT levels of more than 80% defines a negative change in PCT result representing a lower risk for 28-day all-cause mortality of patients diagnosed with severe sepsis or septic shock.       CBC Auto Differential [[id number]]  (Abnormal) Collected: 01/19/24 0535    Specimen: Blood Updated: 01/19/24 0556     WBC 7.47 10*3/mm3      RBC 3.55 10*6/mm3      Hemoglobin 11.4 g/dL      Hematocrit 37.6 %      .9 fL      MCH 32.1 pg      MCHC 30.3 g/dL      RDW 13.8 %      RDW-SD 54.5 fl      MPV 9.4 fL      Platelets 197 10*3/mm3      Neutrophil % 88.1 %      Lymphocyte % 5.5 %      Monocyte % 5.9 %      Eosinophil % 0.0 %      Basophil % 0.0 %      Immature Grans % 0.5 %      Neutrophils, Absolute 6.58 10*3/mm3      Lymphocytes, Absolute 0.41 10*3/mm3      Monocytes, Absolute 0.44 10*3/mm3      Eosinophils, Absolute 0.00 10*3/mm3      Basophils, Absolute 0.00 10*3/mm3      Immature Grans, Absolute 0.04 10*3/mm3     Blood Gas, Venous - [[id number]]  (Abnormal) Collected: 01/18/24 0730    Specimen: Venous Blood Updated: 01/18/24 0735     Site OTHER     pH, Venous 7.252 pH Units      Comment: 84 Value below reference range        pCO2, Venous " 66.0 mm Hg      Comment: 83 Value above reference range        pO2, Venous 36.3 mm Hg      HCO3, Venous 29.1 mmol/L      Comment: 83 Value above reference range        Base Excess, Venous 0.6 mmol/L      O2 Saturation, Venous 66.4 %      Hemoglobin, Blood Gas 11.5 g/dL      Temperature 37.0     Barometric Pressure for Blood Gas 740 mmHg      Modality NIV     FIO2 30 %      Ventilator Mode BIPAP-ST     Set [facility] Resp Rate 16.0     IPAP 18     Comment: Meter: [id number]     :  [id number]        EPAP 6     Collected by REMY OLIVEIRA    Basic Metabolic Panel [[id number]]  (Abnormal) Collected: 01/18/24 0408    Specimen: Blood from Arm, Right Updated: 01/18/24 0505     Glucose 183 mg/dL      BUN 37 mg/dL      Creatinine 0.99 mg/dL      Sodium 137 mmol/L      Potassium 4.4 mmol/L      Chloride 103 mmol/L      CO2 28.8 mmol/L      Calcium 7.9 mg/dL      BUN/Creatinine Ratio 37.4     Anion Gap 5.2 mmol/L      eGFR 69.6 mL/min/1.73     Narrative:      GFR Normal >60  Chronic Kidney Disease <60  Kidney Failure <15      Blood Gas, Venous - [[id number]]  (Abnormal) Collected: 01/17/24 1555    Specimen: Venous Blood Updated: 01/17/24 1606     Site OTHER     pH, Venous 7.213 pH Units      Comment: 85 Value below critical limit        pCO2, Venous 69.5 mm Hg      Comment: 83 Value above reference range        pO2, Venous 32.5 mm Hg      HCO3, Venous 28.0 mmol/L      Base Excess, Venous -1.3 mmol/L      Comment: 84 Value below reference range        O2 Saturation, Venous 58.6 %      Hemoglobin, Blood Gas 12.5 g/dL      Temperature 37.0     Barometric Pressure for Blood Gas 742 mmHg      Modality NIV     FIO2 35 %      Ventilator Mode BIPAP-ST     Set [facility] Resp Rate 16.0     PEEP 0.0     IPAP 18     Comment: Meter: [id number]     :  [id number]        EPAP 6     Notified Who RB AND V DR PAL     Notified By [id number]     Notified Time 01/17/2024 16:07     Collected by [id number]    Respiratory Panel PCR  w/COVID-19(SARS-CoV-2) TERRY/ROSSI/KIKA/PAD/COR/RONAK In-House, NP Swab in UTM/VTM, 2 HR TAT - Swab, Nasopharynx [[id number]]  (Abnormal) Collected: 01/17/24 0639    Specimen: Swab from Nasopharynx Updated: 01/17/24 1150     ADENOVIRUS, PCR Not Detected     Coronavirus 229E Not Detected     Coronavirus HKU1 Not Detected     Coronavirus NL63 Not Detected     Coronavirus OC43 Not Detected     COVID19 Not Detected     Human Metapneumovirus Not Detected     Human Rhinovirus/Enterovirus Not Detected     Influenza A PCR Not Detected     Influenza B PCR Not Detected     Parainfluenza Virus 1 Not Detected     Parainfluenza Virus 2 Not Detected     Parainfluenza Virus 3 Not Detected     Parainfluenza Virus 4 Not Detected     RSV, PCR Detected     Bordetella pertussis pcr Not Detected     Bordetella parapertussis PCR Not Detected     Chlamydophila pneumoniae PCR Not Detected     Mycoplasma pneumo by PCR Not Detected    Narrative:      In the setting of a positive respiratory panel with a viral infection PLUS a negative procalcitonin without other underlying concern for bacterial infection, consider observing off antibiotics or discontinuation of antibiotics and continue supportive care. If the respiratory panel is positive for atypical bacterial infection (Bordetella pertussis, Chlamydophila pneumoniae, or Mycoplasma pneumoniae), consider antibiotic de-escalation to target atypical bacterial infection.    High Sensitivity Troponin T 2Hr [[id number]]  (Normal) Collected: 01/17/24 0642    Specimen: Blood Updated: 01/17/24 0715     HS Troponin T 10 ng/L      Troponin T Delta 0 ng/L     Narrative:      High Sensitive Troponin T Reference Range:  <14.0 ng/L- Negative Female for AMI  <22.0 ng/L- Negative Male for AMI  >=14 - Abnormal Female indicating possible myocardial injury.  >=22 - Abnormal Male indicating possible myocardial injury.   Clinicians would have to utilize clinical acumen, EKG, Troponin, and serial changes to determine if  it is an Acute Myocardial Infarction or myocardial injury due to an underlying chronic condition.         Lactic Acid, Plasma [[id number]]  (Normal) Collected: 01/17/24 0700    Specimen: Blood Updated: 01/17/24 0714     Lactate 1.5 mmol/L     BNP [[id number]]  (Normal) Collected: 01/17/24 0435    Specimen: Blood Updated: 01/17/24 0513     proBNP 520.1 pg/mL     Narrative:      This assay is used as an aid in the diagnosis of individuals suspected of having heart failure. It can be used as an aid in the diagnosis of acute decompensated heart failure (ADHF) in patients presenting with signs and symptoms of ADHF to the emergency department (ED). In addition, NT-proBNP of <300 pg/mL indicates ADHF is not likely.    Age Range Result Interpretation  NT-proBNP Concentration (pg/mL:      <50             Positive            >450                   Gray                 300-450                    Negative             <300    50-75           Positive            >900                  Gray                300-900                  Negative            <300      >75             Positive            >1800                  Gray                300-1800                  Negative            <300    High Sensitivity Troponin T [[id number]]  (Normal) Collected: 01/17/24 0435    Specimen: Blood Updated: 01/17/24 0513     HS Troponin T 10 ng/L     Narrative:      High Sensitive Troponin T Reference Range:  <14.0 ng/L- Negative Female for AMI  <22.0 ng/L- Negative Male for AMI  >=14 - Abnormal Female indicating possible myocardial injury.  >=22 - Abnormal Male indicating possible myocardial injury.   Clinicians would have to utilize clinical acumen, EKG, Troponin, and serial changes to determine if it is an Acute Myocardial Infarction or myocardial injury due to an underlying chronic condition.               Imaging Results (Most Recent)       Procedure Component Value Units Date/Time    XR Chest 1 View [[id number]] Resulted: 01/25/24 0653      Updated: 01/25/24 0654    XR Chest 1 View [[id number]] Collected: 01/20/24 0851     Updated: 01/20/24 0913    Narrative:      CR Chest 1 Vw     INDICATION:    Acute respiratory failure. ICU patient with intubation. COPD with acute exacerbation.    TECHNIQUE:   Portable upright chest    COMPARISON:    CT chest 1/17/2024.    FINDINGS:  Endotracheal tube in place distal tip 4.6 cm above the luis antonio. Nasogastric tube courses through the mediastinum with its distal tip and proximal port out of the field-of-view but well below the GE junction. Diffuse hyperinflation and hyperlucency  compatible with emphysema. No focal infiltrates. No effusions or pneumothorax. Heart and mediastinum unremarkable. Right upper and PICC line terminating mid SVC.      Impression:      Tubes and lines in satisfactory position. Moderate to severe emphysema.    Signer Name: Niels Walter MD   Signed: 1/20/2024 9:11 AM EST  [facility]    XR Chest 1 View [[id number]] Collected: 01/18/24 1355     Updated: 01/18/24 1400    Narrative:      XR CHEST 1 VW-: 1/18/2024 1:12 PM     INDICATION:   ET tube repositioning     COMPARISON:    1212 hours 1/18/2024.     FINDINGS:  Single Portable AP view(s) of the chest. The tip of the endotracheal  tube is now at the level of the posterior left fifth rib, at least 4.2  cm above the level of the luis antonio. Right-sided PICC line is unchanged.  Enteric tube tip at the level of the mid body stomach.     There is no pneumothorax in the field-of-view. Portions of the chest are  excluded from view. No new consolidation or effusion. Persistent  pulmonary hyperinflation.       Impression:         1. The tip of the endotracheal tube is in good position above the luis antonio  by at least a distance of 4.2 cm. No postprocedural pneumothorax.  2. Enteric tube and right-sided PICC line in position as described.  3. Pulmonary hyperinflation.     This report was finalized on 1/18/2024 1:58 PM by Dr. Huy Hilario,  MD.       XR Abdomen KUB [[id number]] Collected: 01/18/24 1335     Updated: 01/18/24 1340    Narrative:      XR ABDOMEN KUB-: 1/18/2024 1:11 PM     INDICATION:   Dobbhoff tube placement     COMPARISON:   1246 hours 1/18/2024.     FINDINGS:  AP radiograph of the lower chest and abdomen. The tip of the Dobbhoff  tube is at the level of the mid body stomach. Right-sided central line  is unchanged. The tip of the endotracheal tube is now about 5.7 cm above  the level of the luis antonio. Postop change left iliac vascular stent  placement. Lungs hyperinflated and otherwise clear. No pneumothorax or  effusion in the field-of-view..       Impression:         1. The tip of the enteric tube is at the level of the mid body stomach.     This report was finalized on 1/18/2024 1:38 PM by Dr. Huy Hilraio MD.       XR Chest 1 View [[id number]] Collected: 01/18/24 1225     Updated: 01/18/24 1234    Narrative:      XR CHEST 1 VW-: 1/18/2024 12:09 PM     INDICATION:   Intubation. Endotracheal tube placement. Dobbhoff tube placement.     COMPARISON:    1/18/2024 at 0924 hours.     FINDINGS:  Single Portable AP view(s) of the chest. The tip of the endotracheal  tube is about 1 cm from the luis antonio and partially extends into the right  mainstem bronchus. Retraction on the order of about 4 cm is recommended  for positioning in the more proximal trachea. There is an enteric tube  present. The tip is difficult to discriminate from the external tubing  artifact. The tip of the tube is felt to be at the level of the distal  esophagus. Suggest advancement of the tube a distance of about 8 cm with  a follow-up radiograph to document appropriate positioning in the  stomach prior to usage. It would be helpful if the external tubing could  be reposition so that it is not superimposed over the chest and abdomen  at the time of the repeat view. Right-sided PICC line extending to the  mid SVC level is unchanged. Additional external support  equipment  artifact.     There is no pneumothorax. No new consolidation or effusion.       Impression:      1. Endotracheal tube tip extends into the proximal right mainstem  bronchus and should be retracted a distance of about 4 cm for  positioning in the more proximal trachea.  2. Enteric tube tip most likely at the level of the distal esophagus.  Advancement on the order of 8 cm recommended.  3. Repeat frontal chest including the upper abdomen recommended upon  repositioning of the support equipment to document appropriate  positioning. Findings and recommendations personally discussed by  telephone with Tina, the ICU nurse caring for the patient, at 1232  hours 1/18/2024     This report was finalized on 1/18/2024 12:32 PM by Dr. Huy Hilario MD.       XR Chest 1 View [[id number]] Collected: 01/18/24 0934     Updated: 01/18/24 0938    Narrative:      XR CHEST 1 VW-: 1/18/2024 9:19 AM     INDICATION:   Respiratory failure. Tobacco abuse. RSV infection.     COMPARISON:    1/17/2024.     FINDINGS:  Single Portable AP view(s) of the chest. Right-sided PICC line extends  to the mid SVC level, unchanged. Stable cardiac silhouette. The  vascularity is unremarkable. Pulmonary hyperinflation. No new  consolidation or effusion. No pneumothorax. Old healed granulomatous  disease and probable mild atelectasis/scarring in the lung bases.  External support equipment artifact.       Impression:         1. Right-sided PICC line unchanged. No pneumothorax.  2. Pulmonary hyperinflation. No new effusion or dense consolidation.     This report was finalized on 1/18/2024 9:35 AM by Dr. Huy Hilario MD.       CT Chest With Contrast Diagnostic [[id number]] Collected: 01/17/24 1341     Updated: 01/17/24 1405    Narrative:      CT Chest W    INDICATION:    Acute respiratory failure with hypoxia. COPD.    TECHNIQUE:   CT of the chest with IV contrast. Coronal and sagittal reconstructions were obtained.  Radiation dose reduction  techniques included automated exposure control or exposure modulation based on body size. Count of known CT and cardiac nuc med studies  performed in previous 12 months: 1.      COMPARISON:    CT chest 5/31/2023    FINDINGS: Examination motion degraded.  Diffuse hyperinflation with patchy hyperlucency compatible with centrilobular emphysema. Scattered subpleural reticular changes but no focal consolidation. Minimal tree-in-bud opacity posterior right upper lobe may represent a small area of pneumonitis.  No effusions. Trachea and bronchi unremarkable. Calcified left lower lobe granuloma. Bandlike scarring or atelectasis left lower lobe.    Heart size within normal limits. Aorta and pulmonary vessels unremarkable. No central mass or adenopathy. Upper abdomen unremarkable. Osseous structures unremarkable. Step-off artifact from patient motion seen within the sternum. Bilateral breast  implants without visible complication. Right upper extremity venous catheter terminating in mid SVC.      Impression:      Moderate emphysema with minimal tree-in-bud airspace opacity posterior segment right upper lobe could represent an area of pneumonitis but no focal consolidation.    Signer Name: Niels Walter MD   Signed: 1/17/2024 2:03 PM EST  [facility]    XR Chest 1 View [[id number]] Collected: 01/17/24 1217     Updated: 01/17/24 1220    Narrative:      XR CHEST 1 VW-, 1/17/2024 12:16 PM     HISTORY:  PICC line placement.     COMPARISON:  *  Chest 1/17/2024     FINDINGS:  Single frontal view(s) of the chest. The lungs are clear. No pleural  effusions. No pneumothorax. Heart size is normal. Interval placement of  a right-sided PICC line with tip projecting over the lower SVC.  Mediastinal contours are within normal limits. Pulmonary vasculature is  normal. No acute osseous abnormality.        Impression:      Interval placement of a right-sided PICC line with tip projecting over  the lower SVC. No  pneumothorax. No other acute chest findings.     This report was finalized on 1/17/2024 12:18 PM by Dr. Blaise Vaughn MD.       XR Chest 1 View [[id number]] Collected: 01/17/24 0449     Updated: 01/17/24 0508    Narrative:      CHEST X-RAY, 1/17/2024        HISTORY:  48-year-old female in the ED with shortness of air, decreased oxygen saturation. History of COPD.    TECHNIQUE:  AP portable upright chest x-ray.    COMPARISON:  *  Chest x-ray, 3/27/2023. CT chest, 5/31/2023.    FINDINGS:  Pulmonary hyperinflation compatible with COPD. The lungs appear clear. No visible pulmonary infiltrate or pleural effusion.    Heart size and pulmonary vascularity are normal.      Impression:      COPD. No active disease.        Signer Name: Danny Davis MD   Signed: 1/17/2024 5:06 AM EST  [facility]          PROCEDURES: NONE    Condition on Discharge:  stable    Physical Exam at Discharge  Vital Signs  Temp:  [97.5 °F (36.4 °C)-98.4 °F (36.9 °C)] 97.8 °F (36.6 °C)  Heart Rate:  [] 111  Resp:  [16-20] 20  BP: (116-143)/(68-99) 116/91  Body mass index is 21.83 kg/m².    Physical Exam  Vitals reviewed.   Constitutional:       General: She is not in acute distress.     Comments: Chronically ill appearance, appears much older than stated age   HENT:      Head: Normocephalic and atraumatic.      Mouth/Throat:      Mouth: Mucous membranes are moist.   Eyes:      Extraocular Movements: Extraocular movements intact.      Pupils: Pupils are equal, round, and reactive to light.   Cardiovascular:      Rate and Rhythm: Regular rhythm. Tachycardia present.   Pulmonary:      Effort: Pulmonary effort is normal.      Comments: diminished  Abdominal:      General: Abdomen is flat. Bowel sounds are normal. There is no distension.      Palpations: Abdomen is soft.      Tenderness: There is no abdominal tenderness. There is no guarding.   Musculoskeletal:         General: No swelling.   Skin:     General: Skin is  "warm and dry.      Capillary Refill: Capillary refill takes less than 2 seconds.      Findings: No erythema.   Neurological:      General: No focal deficit present.      Mental Status: She is alert and oriented to person, place, and time.   Psychiatric:         Mood and Affect: Mood normal.         Behavior: Behavior normal.       Discharge Disposition  SNF    Nurse:    As per facility    PT/OT:  Yes     Safety Evaluation:  Yes     DME  nebulizer    Discharge Diet:      Dietary Orders (From admission, onward)       Start     Ordered    01/23/24 0638  Diet: Regular/House Diet; Fluid Consistency: Thin (IDDSI 0)  Diet Effective Now        References:    Diet Order Crosswalk   Question Answer Comment   Diets: Regular/House Diet    Fluid Consistency: Thin (IDDSI 0)        01/23/24 0638                    Activity at Discharge:  As tolerated, no driving until released by pcp    Pre-discharge education  Smoking, Injectables, medications, follow up    Follow-up Appointments  No future appointments.  Additional Instructions for the Follow-ups that You Need to Schedule       Discharge Follow-up with PCP   As directed       Currently Documented PCP:    Talib Ceron MD    PCP Phone Number:    [phone]     Follow Up Details: 1 week        Discharge Follow-up with Specified Provider: Pulmonary; 1 Month   As directed      To: Pulmonary   Follow Up: 1 Month              Test Results Pending at Discharge: none    Zita Potter, APRN  01/25/24  07:20 EST    Time: Discharge over 30 min (if over 30 minutes give explanation as to why it took greater than 30 minutes)  Secondary to:   Coordination of care/follow up  Medication reconciliation  D/W patient and case management    \"Dictated utilizing Dragon dictation\"        Electronically signed by Dagoberto Chilel MD at 01/25/24 1103       "

## 2024-04-16 NOTE — PROGRESS NOTES
"Hospitalist Team      Patient Care Team:  Talib Ceron MD as PCP - General (Family Medicine)      Chief Complaint:  Follow-up Acute H/H Respiratory Failure due to Acute RSV    Subjective    No acute events overnight.  On SBT this morning.  Tolerating TF.    Objective    Vital Signs  Temp:  [98.2 °F (36.8 °C)-100 °F (37.8 °C)] 98.4 °F (36.9 °C)  Heart Rate:  [] 84  Resp:  [17-23] 18  BP: (125-170)/(68-87) 145/73  FiO2 (%):  [25 %-30 %] 25 %  Oxygen Therapy  SpO2: 97 %  Pulse Oximetry Type: Continuous  Device (Oxygen Therapy): ventilator  Flow (L/min): 6  Oxygen Concentration (%): 25  ETCO2 (mmHg): 35 mmHg}    Flowsheet Rows      Flowsheet Row First Filed Value   Admission Height 157.5 cm (62\") Documented at 01/17/2024 0423   Admission Weight 57.2 kg (126 lb) Documented at 01/17/2024 0423            Physical Exam:    General: Appears older than stated age.  HEENT: PERRL. EOMI.  Lungs: Breath sounds are diminished throughout all fields.  No accessory use noted.  CV: Regular rate and rhythm.  No murmurs appreciated.  Radial pulses are 2+ and symmetric.  Abdomen: Soft.  Bowel sounds are diminished.  MSK: No C/C/E.  No asymmetry of the BLE.  Skin: No suspicious rash.  Neuro: Normal muscle tone BUE.  Psych: GCS10T    Results Review:     I reviewed the patient's new clinical results.    Lab Results (last 24 hours)       Procedure Component Value Units Date/Time    Blood Gas, Arterial - [[id number]]  (Abnormal) Collected: 01/22/24 0710    Specimen: Arterial Blood Updated: 01/22/24 0714     Site Right Radial     Marquis's Test Positive     pH, Arterial 7.421 pH units      pCO2, Arterial 43.9 mm Hg      pO2, Arterial 78.2 mm Hg      Comment: 84 Value below reference range        HCO3, Arterial 28.5 mmol/L      Comment: 83 Value above reference range        Base Excess, Arterial 3.5 mmol/L      Comment: 83 Value above reference range        O2 Saturation, Arterial 97.4 %      Hemoglobin, Blood Gas 11.5 g/dL      " Temperature 37.0     Barometric Pressure for Blood Gas 749 mmHg      Modality Ventilator     FIO2 25 %      Ventilator Mode PC/AC     Set [facility] Resp Rate 18.0     PEEP 5.0     PIP 22 cmH2O      Comment: Meter: [id number]     :  [id number]        Collected by [id number]     pCO2, Temperature Corrected 43.9 mm Hg      pH, Temp Corrected 7.421 pH Units      pO2, Temperature Corrected 78.2 mm Hg     Blood Culture - Blood, Arm, Right [[id number]]  (Normal) Collected: 01/17/24 0645    Specimen: Blood from Arm, Right Updated: 01/22/24 0701     Blood Culture No growth at 5 days    Renal Function Panel [[id number]]  (Abnormal) Collected: 01/22/24 0452    Specimen: Blood from Arm, Right Updated: 01/22/24 0536     Glucose 195 mg/dL      BUN 32 mg/dL      Creatinine 0.59 mg/dL      Sodium 144 mmol/L      Potassium 4.1 mmol/L      Chloride 109 mmol/L      CO2 32.4 mmol/L      Calcium 8.0 mg/dL      Albumin 3.1 g/dL      Phosphorus 1.8 mg/dL      Anion Gap 2.6 mmol/L      BUN/Creatinine Ratio 54.2     eGFR 110.0 mL/min/1.73     Narrative:      GFR Normal >60  Chronic Kidney Disease <60  Kidney Failure <15      Magnesium [[id number]]  (Abnormal) Collected: 01/22/24 0452    Specimen: Blood from Arm, Right Updated: 01/22/24 0531     Magnesium 2.7 mg/dL     CBC (No Diff) [[id number]]  (Abnormal) Collected: 01/22/24 0452    Specimen: Blood from Arm, Right Updated: 01/22/24 0511     WBC 9.12 10*3/mm3      RBC 3.56 10*6/mm3      Hemoglobin 11.4 g/dL      Hematocrit 36.4 %      .2 fL      MCH 32.0 pg      MCHC 31.3 g/dL      RDW 13.2 %      RDW-SD 49.8 fl      MPV 10.0 fL      Platelets 227 10*3/mm3     POC Glucose Once [[id number]]  (Abnormal) Collected: 01/22/24 0452    Specimen: Blood Updated: 01/22/24 0458     Glucose 199 mg/dL     POC Glucose Once [[id number]]  (Abnormal) Collected: 01/21/24 2336    Specimen: Blood Updated: 01/21/24 2342     Glucose 162 mg/dL     POC Glucose Once [[id number]]  (Abnormal) Collected: 01/21/24  1817    Specimen: Blood Updated: 01/21/24 1833     Glucose 210 mg/dL     Phosphorus [[id number]]  (Normal) Collected: 01/21/24 1453    Specimen: Blood Updated: 01/21/24 1533     Phosphorus 2.6 mg/dL     Blood Culture - Blood, Blood, PICC Line [[id number]]  (Normal) Collected: 01/17/24 1421    Specimen: Blood, PICC Line Updated: 01/21/24 1445     Blood Culture No growth at 4 days    Hemoglobin A1c [[id number]]  (Abnormal) Collected: 01/21/24 0531    Specimen: Blood Updated: 01/21/24 1236     Hemoglobin A1C 5.80 %     Narrative:      Hemoglobin A1C Ranges:    Increased Risk for Diabetes  5.7% to 6.4%  Diabetes                     >= 6.5%  Diabetic Goal                < 7.0%    POC Glucose Once [[id number]]  (Abnormal) Collected: 01/21/24 1140    Specimen: Blood Updated: 01/21/24 1146     Glucose 205 mg/dL     Blood Gas, Arterial - [[id number]]  (Abnormal) Collected: 01/21/24 1035    Specimen: Arterial Blood Updated: 01/21/24 1044     Site Left Radial     Marquis's Test N/A     pH, Arterial 7.383 pH units      pCO2, Arterial 53.4 mm Hg      Comment: 83 Value above reference range        pO2, Arterial 72.0 mm Hg      Comment: 84 Value below reference range        HCO3, Arterial 31.7 mmol/L      Comment: 83 Value above reference range        Base Excess, Arterial 5.4 mmol/L      Comment: 83 Value above reference range        O2 Saturation, Arterial 95.5 %      Hemoglobin, Blood Gas 12.1 g/dL      Temperature 37.0     Barometric Pressure for Blood Gas 756 mmHg      Modality Ventilator     FIO2 25 %      Ventilator Mode PC/AC     Set [facility] Resp Rate 18.0     Collected by [id number]     Comment: Meter: [id number]     :  [id number]        pCO2, Temperature Corrected 53.4 mm Hg      pH, Temp Corrected 7.383 pH Units      pO2, Temperature Corrected 72.0 mm Hg             Imaging Results (Last 24 Hours)       ** No results found for the last 24 hours. **              Medication Review:   I have reviewed the patient's current  medication list    Current Facility-Administered Medications:     acetaminophen (TYLENOL) tablet 650 mg, 650 mg, Oral, Q4H PRN, 650 mg at 01/20/24 2057 **OR** acetaminophen (TYLENOL) suppository 650 mg, 650 mg, Rectal, Q4H PRN, Zita Potter APRN    acetaZOLAMIDE (DIAMOX) injection 250 mg, 250 mg, Intravenous, Daily, Mikala Lopes MD, 250 mg at 01/22/24 0806    albuterol (PROVENTIL) nebulizer solution 0.083% 2.5 mg/3mL, 2.5 mg, Nebulization, Q2H PRN, Facundo Flanagan DO, 2.5 mg at 01/20/24 1342    ampicillin-sulbactam (UNASYN) 1.5 g in sodium chloride 0.9 % 100 mL IVPB-MBP, 1.5 g, Intravenous, Q6H, Yazmin Gloria DO, Stopped at 01/22/24 0339    aspirin EC tablet 81 mg, 81 mg, Oral, Daily, Zita Potter APRN, 81 mg at 01/22/24 0807    atorvastatin (LIPITOR) tablet 20 mg, 20 mg, Oral, Daily, Zita Potter APRN, 20 mg at 01/22/24 0807    sennosides-docusate (PERICOLACE) 8.6-50 MG per tablet 2 tablet, 2 tablet, Oral, BID PRN **AND** polyethylene glycol (MIRALAX) packet 17 g, 17 g, Oral, Daily PRN **AND** bisacodyl (DULCOLAX) EC tablet 5 mg, 5 mg, Oral, Daily PRN **AND** bisacodyl (DULCOLAX) suppository 10 mg, 10 mg, Rectal, Daily PRN, Zita Potter APRN    sennosides-docusate (PERICOLACE) 8.6-50 MG per tablet 2 tablet, 2 tablet, Oral, BID, 2 tablet at 01/20/24 0812 **AND** polyethylene glycol (MIRALAX) packet 17 g, 17 g, Oral, Daily PRN **AND** bisacodyl (DULCOLAX) EC tablet 5 mg, 5 mg, Oral, Daily PRN **AND** bisacodyl (DULCOLAX) suppository 10 mg, 10 mg, Rectal, Daily PRN, Dagoberto Chilel MD    Calcium Replacement - Follow Nurse / BPA Driven Protocol, , Does not apply, PRN, Zita Potter APRN    chlorhexidine (PERIDEX) 0.12 % solution 15 mL, 15 mL, Mouth/Throat, Q12H, Dagoberto Chilel MD, 15 mL at 01/22/24 0807    clopidogrel (PLAVIX) tablet 75 mg, 75 mg, Oral, Daily, Zita Potter APRN, 75 mg at 01/22/24 0807    dexmedetomidine (PRECEDEX) 400 mcg in 100 mL NS  infusion, 0.2-1.5 mcg/kg/hr, Intravenous, Titrated, Facundo Flanagan DO, Last Rate: 8.6 mL/hr at 01/22/24 0746, 0.6 mcg/kg/hr at 01/22/24 0746    dextrose (D50W) (25 g/50 mL) IV injection 25 g, 25 g, Intravenous, Q15 Min PRN, Facundo Flanagan DO    dextrose (D50W) (25 g/50 mL) IV injection 25 g, 25 g, Intravenous, Q15 Min PRN, Mikala Lopes MD    dextrose (GLUTOSE) oral gel 15 g, 15 g, Oral, Q15 Min PRN, Mikala Lopes MD    doxycycline (VIBRAMYCIN) 100 mg in sodium chloride 0.9 % 100 mL IVPB, 100 mg, Intravenous, Q12H, Yazmin Gloria DO, Last Rate: 0 mL/hr at 01/21/24 2130, 100 mg at 01/22/24 0807    Enoxaparin Sodium (LOVENOX) syringe 40 mg, 40 mg, Subcutaneous, Q24H, Zita Potter APRN, 40 mg at 01/22/24 0807    fentaNYL citrate (PF) (SUBLIMAZE) injection 50 mcg, 50 mcg, Intravenous, Q30 Min PRN, Yazmin Gloria DO, 50 mcg at 01/21/24 0330    fluticasone (FLONASE) 50 MCG/ACT nasal spray 1 spray, 1 spray, Nasal, Daily PRN, Zita Potter APRN    glucagon (GLUCAGEN) injection 1 mg, 1 mg, Intramuscular, Q15 Min PRN, Mikala Lpoes MD    guaifenesin (ROBITUSSIN) 100 MG/5ML liquid 400 mg, 400 mg, Oral, TID, Facundo Flanagan DO, 400 mg at 01/22/24 0807    hydrALAZINE (APRESOLINE) injection 10 mg, 10 mg, Intravenous, Q6H PRN, Ravinder Estrella DO    Insulin Aspart (novoLOG) injection 2-7 Units, 2-7 Units, Subcutaneous, Q6H, Facundo Flanagan DO, 2 Units at 01/22/24 0506    insulin detemir (LEVEMIR) injection 15 Units, 15 Units, Subcutaneous, Daily, Mikala Lopes MD, 15 Units at 01/22/24 0808    ipratropium-albuterol (DUO-NEB) nebulizer solution 3 mL, 3 mL, Nebulization, Q4H - RT, Zita Potter APRN, 3 mL at 01/22/24 0706    ipratropium-albuterol (DUO-NEB) nebulizer solution 3 mL, 3 mL, Nebulization, Q4H PRN, Zita Potter APRN    Magnesium Standard Dose Replacement - Follow Nurse / BPA Driven Protocol, , Does not apply, PRN, Zita Potter, APRN     methylPREDNISolone sodium succinate (SOLU-Medrol) injection 40 mg, 40 mg, Intravenous, Q6H, Mikala Lopes MD, 40 mg at 01/22/24 0253    metoprolol tartrate (LOPRESSOR) injection 5 mg, 5 mg, Intravenous, Q5 Min PRN, Facundo Flanagan DO, 5 mg at 01/17/24 1022    metoprolol tartrate (LOPRESSOR) tablet 50 mg, 50 mg, Nasogastric, Q12H, Ravinder Estrella DO, 50 mg at 01/22/24 0807    nicotine (NICODERM CQ) 21 MG/24HR patch 1 patch, 1 patch, Transdermal, Q24H, Facundo Flanagan DO, 1 patch at 01/22/24 0807    nitroglycerin (NITROSTAT) SL tablet 0.4 mg, 0.4 mg, Sublingual, Q5 Min PRN, Zita Potter APRN    ondansetron ODT (ZOFRAN-ODT) disintegrating tablet 4 mg, 4 mg, Oral, Q6H PRN **OR** ondansetron (ZOFRAN) injection 4 mg, 4 mg, Intravenous, Q6H PRN, Zita Potter, APRN    pantoprazole (PROTONIX) injection 40 mg, 40 mg, Intravenous, Q24H, Dagoberto Chilel MD, 40 mg at 01/22/24 0806    Pharmacy to Dose enoxaparin (LOVENOX), , Does not apply, Continuous PRN, Zita Potter, APRN    Phosphorus Replacement - Follow Nurse / BPA Driven Protocol, , Does not apply, PRN, Zita Potter, APRN    Potassium Replacement - Follow Nurse / BPA Driven Protocol, , Does not apply, PRN, Zita Potter, APRN    propofol (DIPRIVAN) infusion 10 mg/mL 100 mL, 5-50 mcg/kg/min, Intravenous, Titrated, Dagoberto Chilel MD, Stopped at 01/22/24 0735    [COMPLETED] Insert Peripheral IV, , , Once **AND** sodium chloride 0.9 % flush 10 mL, 10 mL, Intravenous, PRN, Mark Blanca MD    sodium chloride 0.9 % flush 10 mL, 10 mL, Intravenous, PRN, Zita Potter R, APRN    sodium chloride 0.9 % flush 10 mL, 10 mL, Intravenous, Q12H, Facundo Flanagan DO, 10 mL at 01/22/24 0809    sodium chloride 0.9 % flush 10 mL, 10 mL, Intravenous, Q12H, Facundo Flanagan DO, 10 mL at 01/22/24 0809    sodium chloride 0.9 % flush 10 mL, 10 mL, Intravenous, PRN, Facundo Flanagan DO, 10 mL at 01/21/24 1450    sodium chloride 0.9 %  flush 20 mL, 20 mL, Intravenous, PRN, Facundo Flanagan R, DO    sodium chloride 0.9 % infusion 40 mL, 40 mL, Intravenous, PRN, Zita Potter APRN, 40 mL at 01/21/24 2022    sodium phosphates 15 mmol in sodium chloride 0.9 % 100 mL infusion, 15 mmol, Intravenous, Once, Facundo Flanagan DO, 15 mmol at 01/22/24 0737    venlafaxine XR (EFFEXOR-XR) 24 hr capsule 75 mg, 75 mg, Oral, Daily, Zita Potter, APRN, 75 mg at 01/22/24 0807      Assessment & Plan     Acute Hypoxic/Hypercapnic Respiratory Failure due to AECOPD: Will continue SBT.  Appears much better than when I saw her last week.  Discussed w/ Pulmonary.  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  Procalcitonin trending down on abx therapy so will continue but change to Azithromycin.  Hypernatremia: Resolved.  Continue free water flushes.  Steroid-induced Hyperglycemia: A well-known side effect and NOT a complication.  Continue current insulin regimen.  Essential Hypertension: Near goal on exam.  Trend is good.  Continue current regimen.  CAD/PAD: No acute issues on current regimen.  Anxiety: No acute issues.  Home regimen continued.  Tobacco Abuse: Continue Nicoderm.  Counseling when off the vent.    Plan for disposition: Predicated on hospital course.    Dagoberto Chilel MD  01/22/24  08:10 EST   16-Apr-2024 06:29

## 2024-04-25 ENCOUNTER — TRANSCRIBE ORDERS (OUTPATIENT)
Dept: ADMINISTRATIVE | Facility: HOSPITAL | Age: 49
End: 2024-04-25
Payer: COMMERCIAL

## 2024-04-25 ENCOUNTER — HOSPITAL ENCOUNTER (OUTPATIENT)
Dept: GENERAL RADIOLOGY | Facility: HOSPITAL | Age: 49
Discharge: HOME | End: 2024-04-25
Admitting: INTERNAL MEDICINE
Payer: COMMERCIAL

## 2024-04-25 DIAGNOSIS — M25.552 LEFT HIP PAIN: ICD-10-CM

## 2024-04-25 DIAGNOSIS — M25.552 LEFT HIP PAIN: Primary | ICD-10-CM

## 2024-04-25 PROCEDURE — 73502 X-RAY EXAM HIP UNI 2-3 VIEWS: CPT

## 2024-06-05 NOTE — PROGRESS NOTES
"Hospital Medicine Team    LOS 2 days      Patient Care Team:  Talib Ceron MD as PCP - General (Family Medicine)      Subjective       Chief Complaint:  f/u resp failure    Subjective    Intubated and sedated today. D/w nurse and she reports no new issues     Objective       Vital Signs  Temp:  [96.5 °F (35.8 °C)-98.2 °F (36.8 °C)] 98.1 °F (36.7 °C)  Heart Rate:  [80-98] 86  Resp:  [17-28] 18  BP: ()/() 163/87  FiO2 (%):  [25 %-100 %] 28 %  Oxygen Therapy  SpO2: 99 %  Pulse Oximetry Type: Continuous  Device (Oxygen Therapy): ventilator  Flow (L/min): 6  Oxygen Concentration (%): 28  ETCO2 (mmHg): 34 mmHg  Flowsheet Rows      Flowsheet Row First Filed Value   Admission Height 157.5 cm (62\") Documented at 01/17/2024 0423   Admission Weight 57.2 kg (126 lb) Documented at 01/17/2024 0423                Physical Exam:  Physical Exam  Vitals reviewed.   HENT:      Mouth/Throat:      Comments: Et tube in place  Cardiovascular:      Rate and Rhythm: Normal rate.   Pulmonary:      Breath sounds: Examination of the right-lower field reveals decreased breath sounds. Examination of the left-lower field reveals decreased breath sounds. Decreased breath sounds present.   Abdominal:      General: There is no distension.   Neurological:      Comments: Sedated on vent            Results Review:    I reviewed the patient's new clinical results.    [x]  Laboratory  [x]  Microbiology  [x]  Radiology  []  EKG/Telemetry   []  Cardiology/Vascular   []  Pathology  []  Old records  []  Other:      X-rays, labs reviewed personally by physician.    Medication Review:   I have reviewed the patient's current medication list    Scheduled Meds  ampicillin-sulbactam, 1.5 g, Intravenous, Q6H  aspirin, 81 mg, Oral, Daily  atorvastatin, 20 mg, Oral, Daily  chlorhexidine, 15 mL, Mouth/Throat, Q12H  clopidogrel, 75 mg, Oral, Daily  doxycycline, 100 mg, Intravenous, Q12H  enoxaparin, 40 mg, Subcutaneous, Q24H  guaiFENesin, 1,200 mg, " Oral, BID  ipratropium-albuterol, 3 mL, Nebulization, Q4H - RT  methylPREDNISolone sodium succinate, 60 mg, Intravenous, Q6H  nicotine, 1 patch, Transdermal, Q24H  pantoprazole, 40 mg, Intravenous, Q24H  senna-docusate sodium, 2 tablet, Oral, BID  sodium chloride, 10 mL, Intravenous, Q12H  sodium chloride, 10 mL, Intravenous, Q12H  sodium chloride, 10 mL, Intravenous, Q12H  sodium chloride, 10 mL, Intravenous, Q12H  sodium chloride, 4 mL, Nebulization, BID - RT  venlafaxine XR, 75 mg, Oral, Daily        Meds Infusions  dexmedetomidine, 0.2-1.5 mcg/kg/hr, Last Rate: 1.3 mcg/kg/hr (01/19/24 0907)  Pharmacy to Dose enoxaparin (LOVENOX),   propofol, 5-50 mcg/kg/min, Last Rate: 45 mcg/kg/min (01/19/24 0907)  sodium chloride 0.9 % with KCl 20 mEq, 150 mL/hr, Last Rate: 150 mL/hr (01/19/24 0906)        Meds PRN    acetaminophen **OR** acetaminophen    senna-docusate sodium **AND** polyethylene glycol **AND** bisacodyl **AND** bisacodyl    senna-docusate sodium **AND** polyethylene glycol **AND** bisacodyl **AND** bisacodyl    Calcium Replacement - Follow Nurse / BPA Driven Protocol    fentaNYL citrate (PF)    fluticasone    ipratropium-albuterol    LORazepam    Magnesium Standard Dose Replacement - Follow Nurse / BPA Driven Protocol    metoprolol tartrate    nitroglycerin    ondansetron ODT **OR** ondansetron    Pharmacy to Dose enoxaparin (LOVENOX)    Phosphorus Replacement - Follow Nurse / BPA Driven Protocol    Potassium Replacement - Follow Nurse / BPA Driven Protocol    [COMPLETED] Insert Peripheral IV **AND** sodium chloride    sodium chloride    sodium chloride    sodium chloride    sodium chloride        Assessment / Plan       Active Hospital Problems:  Active Hospital Problems    Diagnosis  POA    **Respiratory failure [J96.90]  Yes      Resolved Hospital Problems   No resolved problems to display.     Acute Hypoxic/Hypercapnic Respiratory Failure d/t RSV pneumonia w/ suspected superimposed GN bacterial PNA  :  -intubated 1/18/24  -continue vent per pulm  -procal improving  -conitnue IV steroids  -continue Unasyn and doxycycline for now  -pulm following appreciate recs     Hypophosphatemia  -follow and replace per protocol   -follow other lytes in am     Essential Hypertension  -elevated today. Add back home metoprolol via ng tube. Prn hydralazine     PAD/CAD:   -chronic and stable, continue DAPT and statin as able     Anxiety   -chronic and currently sedated on vent     Chronic Migraines:   -chronic    Tobacco Abuse   - on cessation as appropriate.  On Nicoderm.    Nutrition  -dietician following add TF per their recs today    DVT ppx-lovenox      Plan for disposition:defer for now to remain in ICU    Electronically signed by Ravinder Estrella DO, 01/19/24, 10:45 EST.      Note disclaimer: At [facility], we believe that sharing information builds trust and better relationships. You are receiving this note because you recently visited [facility]. It is possible you will see health information before a provider has talked with you about it. This kind of information can be easy to misunderstand. To help you fully understand what it means for your health, we urge you to discuss this note with your provider.      Low Acute Suicide Risk

## 2024-07-29 NOTE — OUTREACH NOTE
COPD/PN Week 3 Survey    Flowsheet Row Responses   [facility] patient discharged from? [place]   Does the patient have one of the following disease processes/diagnoses(primary or secondary)? COPD   Week 3 attempt successful? Yes   Call start time 1416   Call end time 1426   Discharge diagnosis COPD   Meds reviewed with patient/caregiver? Yes   Is the patient taking all medications as directed (includes completed medication regime)? Yes   Does the patient have a primary care provider?  No   PCP Nursing Intervention --  [Pt has a list from the hospital ]   Has the patient kept scheduled appointments due by today? N/A   Pulse Ox monitoring Intermittent   O2 Sat comments 97% on sitting with 2 L of O2    What is the patient's perception of their health status since discharge? Improving   Is the patient able to teach back COPD zones? Yes   Patient reports what zone on this call? Yellow Zone   Yellow Zone Reports having a bad day or a COPD flare, I have less energy for my daily activities, More swelling in ankles   Yellow interventions Continue taking daily medications, Use quick relief inhaler, Use oxygen as prescribed, Get plenty of rest, Call provider immediately if symptoms don't improve: they may indicate that an adjustment in medication or oxygen therapy is needed   Week 3 call completed? Yes   Revoked No further contact(revokes)-requires comment   Is the patient interested in additional calls from an ambulatory ?  NOTE:  applies to high risk patients requiring additional follow-up. Yes          Noris OSHEA - Registered Nurse   Can't see referral to sign

## 2024-10-07 ENCOUNTER — TRANSCRIBE ORDERS (OUTPATIENT)
Dept: ADMINISTRATIVE | Facility: HOSPITAL | Age: 49
End: 2024-10-07
Payer: COMMERCIAL

## 2024-10-07 DIAGNOSIS — Z78.0 ASYMPTOMATIC MENOPAUSAL STATE: Primary | ICD-10-CM

## 2024-10-07 DIAGNOSIS — Z12.31 ENCOUNTER FOR SCREENING MAMMOGRAM FOR MALIGNANT NEOPLASM OF BREAST: ICD-10-CM

## 2024-10-18 ENCOUNTER — APPOINTMENT (OUTPATIENT)
Dept: MAMMOGRAPHY | Facility: HOSPITAL | Age: 49
End: 2024-10-18
Payer: COMMERCIAL

## 2024-10-18 ENCOUNTER — APPOINTMENT (OUTPATIENT)
Dept: BONE DENSITY | Facility: HOSPITAL | Age: 49
End: 2024-10-18
Payer: COMMERCIAL

## 2024-10-18 DIAGNOSIS — Z78.0 ASYMPTOMATIC MENOPAUSAL STATE: ICD-10-CM

## 2024-10-18 PROCEDURE — 77080 DXA BONE DENSITY AXIAL: CPT

## 2024-11-05 ENCOUNTER — TRANSCRIBE ORDERS (OUTPATIENT)
Dept: ADMINISTRATIVE | Facility: HOSPITAL | Age: 49
End: 2024-11-05
Payer: COMMERCIAL

## 2024-11-05 DIAGNOSIS — Z12.31 VISIT FOR SCREENING MAMMOGRAM: Primary | ICD-10-CM

## 2024-12-30 ENCOUNTER — APPOINTMENT (OUTPATIENT)
Dept: CT IMAGING | Facility: HOSPITAL | Age: 49
End: 2024-12-30
Payer: COMMERCIAL

## 2024-12-30 ENCOUNTER — HOSPITAL ENCOUNTER (OUTPATIENT)
Facility: HOSPITAL | Age: 49
Setting detail: OBSERVATION
LOS: 2 days | Discharge: HOME | End: 2025-01-01
Attending: STUDENT IN AN ORGANIZED HEALTH CARE EDUCATION/TRAINING PROGRAM | Admitting: HOSPITALIST
Payer: COMMERCIAL

## 2024-12-30 DIAGNOSIS — R09.02 HYPOXIA: ICD-10-CM

## 2024-12-30 DIAGNOSIS — J44.1 COPD WITH ACUTE EXACERBATION: Primary | ICD-10-CM

## 2024-12-30 DIAGNOSIS — J18.9 PNEUMONIA DUE TO INFECTIOUS ORGANISM, UNSPECIFIED LATERALITY, UNSPECIFIED PART OF LUNG: ICD-10-CM

## 2024-12-30 PROBLEM — E11.9 DM2 (DIABETES MELLITUS, TYPE 2): Status: ACTIVE | Noted: 2024-12-30

## 2024-12-30 PROBLEM — Z86.79 HISTORY OF CORONARY ARTERY DISEASE: Status: ACTIVE | Noted: 2024-12-30

## 2024-12-30 PROBLEM — J96.01 ACUTE HYPOXEMIC RESPIRATORY FAILURE: Status: ACTIVE | Noted: 2024-12-30

## 2024-12-30 PROBLEM — D75.89 MACROCYTOSIS: Status: ACTIVE | Noted: 2024-12-30

## 2024-12-30 LAB
ALBUMIN SERPL-MCNC: 3.8 G/DL (ref 3.5–5.2)
ALBUMIN/GLOB SERPL: 1.1 G/DL
ALP SERPL-CCNC: 93 U/L (ref 39–117)
ALT SERPL W P-5'-P-CCNC: 5 U/L (ref 1–33)
ANION GAP SERPL CALCULATED.3IONS-SCNC: 10.7 MMOL/L (ref 5–15)
ARTERIAL PATENCY WRIST A: POSITIVE
AST SERPL-CCNC: 11 U/L (ref 1–32)
ATMOSPHERIC PRESS: 733 MMHG
BASE EXCESS BLDA CALC-SCNC: 9.2 MMOL/L (ref 0–2)
BASOPHILS # BLD AUTO: 0.03 10*3/MM3 (ref 0–0.2)
BASOPHILS NFR BLD AUTO: 0.3 % (ref 0–1.5)
BDY SITE: ABNORMAL
BILIRUB SERPL-MCNC: 0.3 MG/DL (ref 0–1.2)
BODY TEMPERATURE: 37
BUN SERPL-MCNC: 6 MG/DL (ref 6–20)
BUN/CREAT SERPL: 8 (ref 7–25)
CALCIUM SPEC-SCNC: 9.1 MG/DL (ref 8.6–10.5)
CHLORIDE SERPL-SCNC: 97 MMOL/L (ref 98–107)
CO2 SERPL-SCNC: 33.3 MMOL/L (ref 22–29)
CREAT SERPL-MCNC: 0.75 MG/DL (ref 0.57–1)
D-LACTATE SERPL-SCNC: 0.9 MMOL/L (ref 0.5–2)
DEPRECATED RDW RBC AUTO: 51.8 FL (ref 37–54)
EGFRCR SERPLBLD CKD-EPI 2021: 96.5 ML/MIN/1.73
EOSINOPHIL # BLD AUTO: 0.05 10*3/MM3 (ref 0–0.4)
EOSINOPHIL NFR BLD AUTO: 0.5 % (ref 0.3–6.2)
ERYTHROCYTE [DISTWIDTH] IN BLOOD BY AUTOMATED COUNT: 13.4 % (ref 12.3–15.4)
FLUAV RNA RESP QL NAA+PROBE: NOT DETECTED
FLUBV RNA RESP QL NAA+PROBE: NOT DETECTED
GAS FLOW AIRWAY: 1 LPM
GLOBULIN UR ELPH-MCNC: 3.5 GM/DL
GLUCOSE SERPL-MCNC: 126 MG/DL (ref 65–99)
HCO3 BLDA-SCNC: 36.6 MMOL/L (ref 20–26)
HCT VFR BLD AUTO: 43.5 % (ref 34–46.6)
HGB BLD-MCNC: 13.7 G/DL (ref 12–15.9)
HGB BLDA-MCNC: 13 G/DL (ref 13.5–17.5)
HOLD SPECIMEN: NORMAL
HOLD SPECIMEN: NORMAL
IMM GRANULOCYTES # BLD AUTO: 0.04 10*3/MM3 (ref 0–0.05)
IMM GRANULOCYTES NFR BLD AUTO: 0.4 % (ref 0–0.5)
LYMPHOCYTES # BLD AUTO: 1.43 10*3/MM3 (ref 0.7–3.1)
LYMPHOCYTES NFR BLD AUTO: 15 % (ref 19.6–45.3)
Lab: ABNORMAL
MCH RBC QN AUTO: 32.6 PG (ref 26.6–33)
MCHC RBC AUTO-ENTMCNC: 31.5 G/DL (ref 31.5–35.7)
MCV RBC AUTO: 103.6 FL (ref 79–97)
MODALITY: ABNORMAL
MONOCYTES # BLD AUTO: 1.25 10*3/MM3 (ref 0.1–0.9)
MONOCYTES NFR BLD AUTO: 13.1 % (ref 5–12)
NEUTROPHILS NFR BLD AUTO: 6.74 10*3/MM3 (ref 1.7–7)
NEUTROPHILS NFR BLD AUTO: 70.7 % (ref 42.7–76)
NOTIFIED BY: ABNORMAL
NRBC BLD AUTO-RTO: 0 /100 WBC (ref 0–0.2)
NT-PROBNP SERPL-MCNC: 83.6 PG/ML (ref 0–900)
PCO2 BLDA: 62.3 MM HG (ref 35–45)
PCO2 TEMP ADJ BLD: 62.3 MM HG (ref 35–45)
PH BLDA: 7.38 PH UNITS (ref 7.35–7.45)
PH, TEMP CORRECTED: 7.38 PH UNITS (ref 7.35–7.45)
PLATELET # BLD AUTO: 278 10*3/MM3 (ref 140–450)
PMV BLD AUTO: 8.7 FL (ref 6–12)
PO2 BLDA: 53.5 MM HG (ref 83–108)
PO2 TEMP ADJ BLD: 53.5 MM HG (ref 83–108)
POTASSIUM SERPL-SCNC: 4.1 MMOL/L (ref 3.5–5.2)
PROT SERPL-MCNC: 7.3 G/DL (ref 6–8.5)
RBC # BLD AUTO: 4.2 10*6/MM3 (ref 3.77–5.28)
RSV RNA RESP QL NAA+PROBE: NOT DETECTED
SAO2 % BLDCOA: 89.2 % (ref 94–99)
SARS-COV-2 RNA RESP QL NAA+PROBE: NOT DETECTED
SODIUM SERPL-SCNC: 141 MMOL/L (ref 136–145)
TROPONIN T SERPL HS-MCNC: 12 NG/L
WBC NRBC COR # BLD AUTO: 9.54 10*3/MM3 (ref 3.4–10.8)
WHOLE BLOOD HOLD COAG: NORMAL
WHOLE BLOOD HOLD SPECIMEN: NORMAL

## 2024-12-30 PROCEDURE — 87449 NOS EACH ORGANISM AG IA: CPT | Performed by: INTERNAL MEDICINE

## 2024-12-30 PROCEDURE — 25010000002 CEFTRIAXONE SODIUM 1 G RECONSTITUTED SOLUTION 1 EACH VIAL: Performed by: STUDENT IN AN ORGANIZED HEALTH CARE EDUCATION/TRAINING PROGRAM

## 2024-12-30 PROCEDURE — 83605 ASSAY OF LACTIC ACID: CPT | Performed by: STUDENT IN AN ORGANIZED HEALTH CARE EDUCATION/TRAINING PROGRAM

## 2024-12-30 PROCEDURE — G0378 HOSPITAL OBSERVATION PER HR: HCPCS

## 2024-12-30 PROCEDURE — 0202U NFCT DS 22 TRGT SARS-COV-2: CPT | Performed by: INTERNAL MEDICINE

## 2024-12-30 PROCEDURE — 36600 WITHDRAWAL OF ARTERIAL BLOOD: CPT

## 2024-12-30 PROCEDURE — 87040 BLOOD CULTURE FOR BACTERIA: CPT | Performed by: STUDENT IN AN ORGANIZED HEALTH CARE EDUCATION/TRAINING PROGRAM

## 2024-12-30 PROCEDURE — 82607 VITAMIN B-12: CPT | Performed by: INTERNAL MEDICINE

## 2024-12-30 PROCEDURE — 80050 GENERAL HEALTH PANEL: CPT | Performed by: INTERNAL MEDICINE

## 2024-12-30 PROCEDURE — 84484 ASSAY OF TROPONIN QUANT: CPT | Performed by: STUDENT IN AN ORGANIZED HEALTH CARE EDUCATION/TRAINING PROGRAM

## 2024-12-30 PROCEDURE — 36415 COLL VENOUS BLD VENIPUNCTURE: CPT

## 2024-12-30 PROCEDURE — 83036 HEMOGLOBIN GLYCOSYLATED A1C: CPT | Performed by: INTERNAL MEDICINE

## 2024-12-30 PROCEDURE — 25510000001 IOPAMIDOL PER 1 ML: Performed by: STUDENT IN AN ORGANIZED HEALTH CARE EDUCATION/TRAINING PROGRAM

## 2024-12-30 PROCEDURE — 93005 ELECTROCARDIOGRAM TRACING: CPT | Performed by: STUDENT IN AN ORGANIZED HEALTH CARE EDUCATION/TRAINING PROGRAM

## 2024-12-30 PROCEDURE — 25010000002 METHYLPREDNISOLONE PER 125 MG: Performed by: PHYSICIAN ASSISTANT

## 2024-12-30 PROCEDURE — 82746 ASSAY OF FOLIC ACID SERUM: CPT | Performed by: INTERNAL MEDICINE

## 2024-12-30 PROCEDURE — 94640 AIRWAY INHALATION TREATMENT: CPT

## 2024-12-30 PROCEDURE — 87637 SARSCOV2&INF A&B&RSV AMP PRB: CPT | Performed by: STUDENT IN AN ORGANIZED HEALTH CARE EDUCATION/TRAINING PROGRAM

## 2024-12-30 PROCEDURE — 83880 ASSAY OF NATRIURETIC PEPTIDE: CPT | Performed by: STUDENT IN AN ORGANIZED HEALTH CARE EDUCATION/TRAINING PROGRAM

## 2024-12-30 PROCEDURE — 71275 CT ANGIOGRAPHY CHEST: CPT

## 2024-12-30 PROCEDURE — 82803 BLOOD GASES ANY COMBINATION: CPT

## 2024-12-30 PROCEDURE — 99285 EMERGENCY DEPT VISIT HI MDM: CPT | Performed by: STUDENT IN AN ORGANIZED HEALTH CARE EDUCATION/TRAINING PROGRAM

## 2024-12-30 PROCEDURE — 96365 THER/PROPH/DIAG IV INF INIT: CPT

## 2024-12-30 PROCEDURE — 93010 ELECTROCARDIOGRAM REPORT: CPT | Performed by: INTERNAL MEDICINE

## 2024-12-30 PROCEDURE — 99223 1ST HOSP IP/OBS HIGH 75: CPT | Performed by: INTERNAL MEDICINE

## 2024-12-30 PROCEDURE — 96375 TX/PRO/DX INJ NEW DRUG ADDON: CPT

## 2024-12-30 RX ORDER — SODIUM CHLORIDE 0.9 % (FLUSH) 0.9 %
10 SYRINGE (ML) INJECTION AS NEEDED
Status: DISCONTINUED | OUTPATIENT
Start: 2024-12-30 | End: 2025-01-01 | Stop reason: HOSPADM

## 2024-12-30 RX ORDER — METHYLPREDNISOLONE SODIUM SUCCINATE 40 MG/ML
40 INJECTION, POWDER, LYOPHILIZED, FOR SOLUTION INTRAMUSCULAR; INTRAVENOUS EVERY 8 HOURS
Status: DISCONTINUED | OUTPATIENT
Start: 2024-12-30 | End: 2025-01-01

## 2024-12-30 RX ORDER — IPRATROPIUM BROMIDE AND ALBUTEROL SULFATE 2.5; .5 MG/3ML; MG/3ML
3 SOLUTION RESPIRATORY (INHALATION) ONCE
Status: COMPLETED | OUTPATIENT
Start: 2024-12-30 | End: 2024-12-30

## 2024-12-30 RX ORDER — ENOXAPARIN SODIUM 100 MG/ML
30 INJECTION SUBCUTANEOUS DAILY
Status: DISCONTINUED | OUTPATIENT
Start: 2024-12-31 | End: 2025-01-01 | Stop reason: HOSPADM

## 2024-12-30 RX ORDER — NICOTINE 21 MG/24HR
1 PATCH, TRANSDERMAL 24 HOURS TRANSDERMAL
Status: DISCONTINUED | OUTPATIENT
Start: 2024-12-31 | End: 2025-01-01 | Stop reason: HOSPADM

## 2024-12-30 RX ORDER — ACETAMINOPHEN 325 MG/1
650 TABLET ORAL EVERY 4 HOURS PRN
Status: DISCONTINUED | OUTPATIENT
Start: 2024-12-30 | End: 2024-12-30 | Stop reason: SDUPTHER

## 2024-12-30 RX ORDER — ACETAMINOPHEN 650 MG/1
650 SUPPOSITORY RECTAL EVERY 4 HOURS PRN
Status: DISCONTINUED | OUTPATIENT
Start: 2024-12-30 | End: 2025-01-01 | Stop reason: HOSPADM

## 2024-12-30 RX ORDER — IPRATROPIUM BROMIDE AND ALBUTEROL SULFATE 2.5; .5 MG/3ML; MG/3ML
3 SOLUTION RESPIRATORY (INHALATION)
Status: DISCONTINUED | OUTPATIENT
Start: 2024-12-31 | End: 2025-01-01 | Stop reason: HOSPADM

## 2024-12-30 RX ORDER — NICOTINE POLACRILEX 4 MG
15 LOZENGE BUCCAL
Status: DISCONTINUED | OUTPATIENT
Start: 2024-12-30 | End: 2025-01-01 | Stop reason: HOSPADM

## 2024-12-30 RX ORDER — ASPIRIN 81 MG/1
81 TABLET ORAL DAILY
Status: DISCONTINUED | OUTPATIENT
Start: 2024-12-31 | End: 2025-01-01 | Stop reason: HOSPADM

## 2024-12-30 RX ORDER — DOXYCYCLINE 100 MG/1
100 CAPSULE ORAL EVERY 12 HOURS SCHEDULED
Status: DISCONTINUED | OUTPATIENT
Start: 2024-12-31 | End: 2024-12-31

## 2024-12-30 RX ORDER — GUAIFENESIN 600 MG/1
1200 TABLET, EXTENDED RELEASE ORAL 2 TIMES DAILY
Status: DISCONTINUED | OUTPATIENT
Start: 2024-12-31 | End: 2025-01-01 | Stop reason: HOSPADM

## 2024-12-30 RX ORDER — LORAZEPAM 0.5 MG/1
0.5 TABLET ORAL EVERY 8 HOURS PRN
Status: DISCONTINUED | OUTPATIENT
Start: 2024-12-30 | End: 2025-01-01 | Stop reason: HOSPADM

## 2024-12-30 RX ORDER — DOXYCYCLINE 100 MG/1
100 CAPSULE ORAL ONCE
Status: COMPLETED | OUTPATIENT
Start: 2024-12-30 | End: 2024-12-30

## 2024-12-30 RX ORDER — ALBUTEROL SULFATE 90 UG/1
2 INHALANT RESPIRATORY (INHALATION) EVERY 4 HOURS PRN
Status: DISCONTINUED | OUTPATIENT
Start: 2024-12-30 | End: 2025-01-01 | Stop reason: HOSPADM

## 2024-12-30 RX ORDER — ACETAMINOPHEN 160 MG/5ML
650 SOLUTION ORAL EVERY 4 HOURS PRN
Status: DISCONTINUED | OUTPATIENT
Start: 2024-12-30 | End: 2025-01-01 | Stop reason: HOSPADM

## 2024-12-30 RX ORDER — ACETAMINOPHEN 325 MG/1
650 TABLET ORAL EVERY 4 HOURS PRN
Status: DISCONTINUED | OUTPATIENT
Start: 2024-12-30 | End: 2025-01-01 | Stop reason: HOSPADM

## 2024-12-30 RX ORDER — FLUTICASONE PROPIONATE 50 MCG
1 SPRAY, SUSPENSION (ML) NASAL DAILY
Status: DISCONTINUED | OUTPATIENT
Start: 2024-12-31 | End: 2025-01-01 | Stop reason: HOSPADM

## 2024-12-30 RX ORDER — SODIUM CHLORIDE 9 MG/ML
40 INJECTION, SOLUTION INTRAVENOUS AS NEEDED
Status: DISCONTINUED | OUTPATIENT
Start: 2024-12-30 | End: 2025-01-01 | Stop reason: HOSPADM

## 2024-12-30 RX ORDER — FAMOTIDINE 20 MG/1
40 TABLET, FILM COATED ORAL DAILY
Status: DISCONTINUED | OUTPATIENT
Start: 2024-12-31 | End: 2025-01-01 | Stop reason: HOSPADM

## 2024-12-30 RX ORDER — SODIUM CHLORIDE 0.9 % (FLUSH) 0.9 %
10 SYRINGE (ML) INJECTION EVERY 12 HOURS SCHEDULED
Status: DISCONTINUED | OUTPATIENT
Start: 2024-12-31 | End: 2025-01-01 | Stop reason: HOSPADM

## 2024-12-30 RX ORDER — BISACODYL 10 MG
10 SUPPOSITORY, RECTAL RECTAL DAILY PRN
Status: DISCONTINUED | OUTPATIENT
Start: 2024-12-30 | End: 2025-01-01 | Stop reason: HOSPADM

## 2024-12-30 RX ORDER — HYDROCODONE BITARTRATE AND ACETAMINOPHEN 5; 325 MG/1; MG/1
1 TABLET ORAL EVERY 6 HOURS PRN
Status: DISCONTINUED | OUTPATIENT
Start: 2024-12-30 | End: 2025-01-01 | Stop reason: HOSPADM

## 2024-12-30 RX ORDER — POLYETHYLENE GLYCOL 3350 17 G/17G
17 POWDER, FOR SOLUTION ORAL DAILY PRN
Status: DISCONTINUED | OUTPATIENT
Start: 2024-12-30 | End: 2025-01-01 | Stop reason: HOSPADM

## 2024-12-30 RX ORDER — CLOPIDOGREL BISULFATE 75 MG/1
75 TABLET ORAL DAILY
Status: DISCONTINUED | OUTPATIENT
Start: 2024-12-31 | End: 2025-01-01 | Stop reason: HOSPADM

## 2024-12-30 RX ORDER — ACETAMINOPHEN 500 MG
1000 TABLET ORAL ONCE
Status: COMPLETED | OUTPATIENT
Start: 2024-12-30 | End: 2024-12-30

## 2024-12-30 RX ORDER — ONDANSETRON 2 MG/ML
4 INJECTION INTRAMUSCULAR; INTRAVENOUS EVERY 6 HOURS PRN
Status: DISCONTINUED | OUTPATIENT
Start: 2024-12-30 | End: 2025-01-01 | Stop reason: HOSPADM

## 2024-12-30 RX ORDER — IBUPROFEN 600 MG/1
1 TABLET ORAL
Status: DISCONTINUED | OUTPATIENT
Start: 2024-12-30 | End: 2025-01-01 | Stop reason: HOSPADM

## 2024-12-30 RX ORDER — NITROGLYCERIN 0.4 MG/1
0.4 TABLET SUBLINGUAL
Status: DISCONTINUED | OUTPATIENT
Start: 2024-12-30 | End: 2024-12-31

## 2024-12-30 RX ORDER — ATORVASTATIN CALCIUM 20 MG/1
20 TABLET, FILM COATED ORAL DAILY
Status: DISCONTINUED | OUTPATIENT
Start: 2024-12-31 | End: 2025-01-01 | Stop reason: HOSPADM

## 2024-12-30 RX ORDER — LISINOPRIL 20 MG/1
20 TABLET ORAL
Status: DISCONTINUED | OUTPATIENT
Start: 2024-12-31 | End: 2025-01-01 | Stop reason: HOSPADM

## 2024-12-30 RX ORDER — METOPROLOL TARTRATE 50 MG
50 TABLET ORAL EVERY 12 HOURS SCHEDULED
Status: DISCONTINUED | OUTPATIENT
Start: 2024-12-31 | End: 2025-01-01 | Stop reason: HOSPADM

## 2024-12-30 RX ORDER — ACETAMINOPHEN 160 MG/5ML
650 SOLUTION ORAL EVERY 4 HOURS PRN
Status: DISCONTINUED | OUTPATIENT
Start: 2024-12-30 | End: 2024-12-30 | Stop reason: SDUPTHER

## 2024-12-30 RX ORDER — DEXTROSE MONOHYDRATE 25 G/50ML
25 INJECTION, SOLUTION INTRAVENOUS
Status: DISCONTINUED | OUTPATIENT
Start: 2024-12-30 | End: 2025-01-01 | Stop reason: HOSPADM

## 2024-12-30 RX ORDER — IOPAMIDOL 755 MG/ML
100 INJECTION, SOLUTION INTRAVASCULAR
Status: COMPLETED | OUTPATIENT
Start: 2024-12-30 | End: 2024-12-30

## 2024-12-30 RX ORDER — BUDESONIDE AND FORMOTEROL FUMARATE DIHYDRATE 160; 4.5 UG/1; UG/1
2 AEROSOL RESPIRATORY (INHALATION)
Status: DISCONTINUED | OUTPATIENT
Start: 2024-12-31 | End: 2025-01-01 | Stop reason: HOSPADM

## 2024-12-30 RX ORDER — ACETAMINOPHEN 650 MG/1
650 SUPPOSITORY RECTAL EVERY 4 HOURS PRN
Status: DISCONTINUED | OUTPATIENT
Start: 2024-12-30 | End: 2024-12-30 | Stop reason: SDUPTHER

## 2024-12-30 RX ORDER — AMOXICILLIN 250 MG
2 CAPSULE ORAL 2 TIMES DAILY
Status: DISCONTINUED | OUTPATIENT
Start: 2024-12-31 | End: 2025-01-01 | Stop reason: HOSPADM

## 2024-12-30 RX ORDER — BISACODYL 5 MG/1
5 TABLET, DELAYED RELEASE ORAL DAILY PRN
Status: DISCONTINUED | OUTPATIENT
Start: 2024-12-30 | End: 2025-01-01 | Stop reason: HOSPADM

## 2024-12-30 RX ORDER — ONDANSETRON 4 MG/1
4 TABLET, ORALLY DISINTEGRATING ORAL EVERY 6 HOURS PRN
Status: DISCONTINUED | OUTPATIENT
Start: 2024-12-30 | End: 2025-01-01 | Stop reason: HOSPADM

## 2024-12-30 RX ORDER — METHYLPREDNISOLONE SODIUM SUCCINATE 125 MG/2ML
125 INJECTION, POWDER, LYOPHILIZED, FOR SOLUTION INTRAMUSCULAR; INTRAVENOUS ONCE
Status: COMPLETED | OUTPATIENT
Start: 2024-12-30 | End: 2024-12-30

## 2024-12-30 RX ORDER — INSULIN LISPRO 100 [IU]/ML
2-9 INJECTION, SOLUTION INTRAVENOUS; SUBCUTANEOUS
Status: DISCONTINUED | OUTPATIENT
Start: 2024-12-31 | End: 2025-01-01 | Stop reason: HOSPADM

## 2024-12-30 RX ADMIN — DOXYCYCLINE 100 MG: 100 CAPSULE ORAL at 18:27

## 2024-12-30 RX ADMIN — METHYLPREDNISOLONE SODIUM SUCCINATE 125 MG: 125 INJECTION, POWDER, FOR SOLUTION INTRAMUSCULAR; INTRAVENOUS at 18:26

## 2024-12-30 RX ADMIN — ACETAMINOPHEN 1000 MG: 500 TABLET ORAL at 20:03

## 2024-12-30 RX ADMIN — IPRATROPIUM BROMIDE AND ALBUTEROL SULFATE 3 ML: .5; 3 SOLUTION RESPIRATORY (INHALATION) at 18:48

## 2024-12-30 RX ADMIN — CEFTRIAXONE SODIUM 1000 MG: 1 INJECTION, POWDER, FOR SOLUTION INTRAVENOUS at 20:23

## 2024-12-30 RX ADMIN — IOPAMIDOL 100 ML: 755 INJECTION, SOLUTION INTRAVENOUS at 20:56

## 2024-12-31 ENCOUNTER — APPOINTMENT (OUTPATIENT)
Dept: GENERAL RADIOLOGY | Facility: HOSPITAL | Age: 49
End: 2024-12-31
Payer: COMMERCIAL

## 2024-12-31 LAB
ANION GAP SERPL CALCULATED.3IONS-SCNC: 12 MMOL/L (ref 5–15)
B PARAPERT DNA SPEC QL NAA+PROBE: NOT DETECTED
B PERT DNA SPEC QL NAA+PROBE: NOT DETECTED
BACTERIA SPEC RESP CULT: NORMAL
BASOPHILS # BLD AUTO: 0.02 10*3/MM3 (ref 0–0.2)
BASOPHILS NFR BLD AUTO: 0.3 % (ref 0–1.5)
BUN SERPL-MCNC: 10 MG/DL (ref 6–20)
BUN/CREAT SERPL: 14.9 (ref 7–25)
C PNEUM DNA NPH QL NAA+NON-PROBE: NOT DETECTED
CALCIUM SPEC-SCNC: 9 MG/DL (ref 8.6–10.5)
CHLORIDE SERPL-SCNC: 100 MMOL/L (ref 98–107)
CO2 SERPL-SCNC: 32 MMOL/L (ref 22–29)
CORTIS SERPL-MCNC: 8.37 MCG/DL
CREAT SERPL-MCNC: 0.67 MG/DL (ref 0.57–1)
DEPRECATED RDW RBC AUTO: 51.8 FL (ref 37–54)
EGFRCR SERPLBLD CKD-EPI 2021: 106 ML/MIN/1.73
EOSINOPHIL # BLD AUTO: 0 10*3/MM3 (ref 0–0.4)
EOSINOPHIL NFR BLD AUTO: 0 % (ref 0.3–6.2)
ERYTHROCYTE [DISTWIDTH] IN BLOOD BY AUTOMATED COUNT: 13.3 % (ref 12.3–15.4)
FLUAV SUBTYP SPEC NAA+PROBE: NOT DETECTED
FLUBV RNA ISLT QL NAA+PROBE: NOT DETECTED
FOLATE SERPL-MCNC: 7.65 NG/ML (ref 4.78–24.2)
GLUCOSE BLDC GLUCOMTR-MCNC: 144 MG/DL (ref 70–130)
GLUCOSE BLDC GLUCOMTR-MCNC: 178 MG/DL (ref 70–130)
GLUCOSE BLDC GLUCOMTR-MCNC: 185 MG/DL (ref 70–130)
GLUCOSE BLDC GLUCOMTR-MCNC: 206 MG/DL (ref 70–130)
GLUCOSE SERPL-MCNC: 191 MG/DL (ref 65–99)
GRAM STN SPEC: NORMAL
HADV DNA SPEC NAA+PROBE: NOT DETECTED
HBA1C MFR BLD: 5.68 % (ref 4.8–5.6)
HCOV 229E RNA SPEC QL NAA+PROBE: NOT DETECTED
HCOV HKU1 RNA SPEC QL NAA+PROBE: NOT DETECTED
HCOV NL63 RNA SPEC QL NAA+PROBE: NOT DETECTED
HCOV OC43 RNA SPEC QL NAA+PROBE: NOT DETECTED
HCT VFR BLD AUTO: 42.7 % (ref 34–46.6)
HGB BLD-MCNC: 13.1 G/DL (ref 12–15.9)
HMPV RNA NPH QL NAA+NON-PROBE: NOT DETECTED
HPIV1 RNA ISLT QL NAA+PROBE: NOT DETECTED
HPIV2 RNA SPEC QL NAA+PROBE: NOT DETECTED
HPIV3 RNA NPH QL NAA+PROBE: NOT DETECTED
HPIV4 P GENE NPH QL NAA+PROBE: NOT DETECTED
IMM GRANULOCYTES # BLD AUTO: 0.03 10*3/MM3 (ref 0–0.05)
IMM GRANULOCYTES NFR BLD AUTO: 0.4 % (ref 0–0.5)
L PNEUMO1 AG UR QL IA: NEGATIVE
LYMPHOCYTES # BLD AUTO: 0.66 10*3/MM3 (ref 0.7–3.1)
LYMPHOCYTES NFR BLD AUTO: 8.9 % (ref 19.6–45.3)
M PNEUMO IGG SER IA-ACNC: NOT DETECTED
MCH RBC QN AUTO: 32.1 PG (ref 26.6–33)
MCHC RBC AUTO-ENTMCNC: 30.7 G/DL (ref 31.5–35.7)
MCV RBC AUTO: 104.7 FL (ref 79–97)
MONOCYTES # BLD AUTO: 0.2 10*3/MM3 (ref 0.1–0.9)
MONOCYTES NFR BLD AUTO: 2.7 % (ref 5–12)
NEUTROPHILS NFR BLD AUTO: 6.48 10*3/MM3 (ref 1.7–7)
NEUTROPHILS NFR BLD AUTO: 87.7 % (ref 42.7–76)
NRBC BLD AUTO-RTO: 0 /100 WBC (ref 0–0.2)
PLATELET # BLD AUTO: 259 10*3/MM3 (ref 140–450)
PMV BLD AUTO: 9.2 FL (ref 6–12)
POTASSIUM SERPL-SCNC: 4.1 MMOL/L (ref 3.5–5.2)
PROCALCITONIN SERPL-MCNC: 0.11 NG/ML (ref 0–0.25)
QT INTERVAL: 310 MS
QTC INTERVAL: 435 MS
RBC # BLD AUTO: 4.08 10*6/MM3 (ref 3.77–5.28)
RHINOVIRUS RNA SPEC NAA+PROBE: DETECTED
RSV RNA NPH QL NAA+NON-PROBE: NOT DETECTED
S PNEUM AG SPEC QL LA: NEGATIVE
SARS-COV-2 RNA NPH QL NAA+NON-PROBE: NOT DETECTED
SODIUM SERPL-SCNC: 144 MMOL/L (ref 136–145)
TSH SERPL DL<=0.05 MIU/L-ACNC: 0.52 UIU/ML (ref 0.27–4.2)
VIT B12 BLD-MCNC: 300 PG/ML (ref 211–946)
WBC NRBC COR # BLD AUTO: 7.39 10*3/MM3 (ref 3.4–10.8)

## 2024-12-31 PROCEDURE — 94664 DEMO&/EVAL PT USE INHALER: CPT

## 2024-12-31 PROCEDURE — 84145 PROCALCITONIN (PCT): CPT | Performed by: HOSPITALIST

## 2024-12-31 PROCEDURE — G0378 HOSPITAL OBSERVATION PER HR: HCPCS

## 2024-12-31 PROCEDURE — 71101 X-RAY EXAM UNILAT RIBS/CHEST: CPT

## 2024-12-31 PROCEDURE — 92610 EVALUATE SWALLOWING FUNCTION: CPT

## 2024-12-31 PROCEDURE — 63710000001 INSULIN LISPRO (HUMAN) PER 5 UNITS: Performed by: INTERNAL MEDICINE

## 2024-12-31 PROCEDURE — 25010000002 METHYLPREDNISOLONE PER 40 MG: Performed by: INTERNAL MEDICINE

## 2024-12-31 PROCEDURE — 85025 COMPLETE CBC W/AUTO DIFF WBC: CPT | Performed by: INTERNAL MEDICINE

## 2024-12-31 PROCEDURE — 25010000002 ENOXAPARIN PER 10 MG: Performed by: INTERNAL MEDICINE

## 2024-12-31 PROCEDURE — 96372 THER/PROPH/DIAG INJ SC/IM: CPT

## 2024-12-31 PROCEDURE — 99232 SBSQ HOSP IP/OBS MODERATE 35: CPT | Performed by: HOSPITALIST

## 2024-12-31 PROCEDURE — 96376 TX/PRO/DX INJ SAME DRUG ADON: CPT

## 2024-12-31 PROCEDURE — 63710000001 INSULIN GLARGINE PER 5 UNITS: Performed by: INTERNAL MEDICINE

## 2024-12-31 PROCEDURE — 82948 REAGENT STRIP/BLOOD GLUCOSE: CPT

## 2024-12-31 PROCEDURE — 82533 TOTAL CORTISOL: CPT | Performed by: INTERNAL MEDICINE

## 2024-12-31 PROCEDURE — 87205 SMEAR GRAM STAIN: CPT | Performed by: INTERNAL MEDICINE

## 2024-12-31 PROCEDURE — 94799 UNLISTED PULMONARY SVC/PX: CPT

## 2024-12-31 PROCEDURE — 80048 BASIC METABOLIC PNL TOTAL CA: CPT | Performed by: INTERNAL MEDICINE

## 2024-12-31 RX ORDER — DOXYCYCLINE 100 MG/1
100 CAPSULE ORAL EVERY 12 HOURS
Status: DISCONTINUED | OUTPATIENT
Start: 2024-12-31 | End: 2024-12-31

## 2024-12-31 RX ORDER — CEFDINIR 300 MG/1
300 CAPSULE ORAL EVERY 12 HOURS SCHEDULED
Status: DISCONTINUED | OUTPATIENT
Start: 2024-12-31 | End: 2025-01-01 | Stop reason: HOSPADM

## 2024-12-31 RX ORDER — BUTALBITAL, ACETAMINOPHEN AND CAFFEINE 50; 325; 40 MG/1; MG/1; MG/1
1 TABLET ORAL EVERY 4 HOURS PRN
Status: DISCONTINUED | OUTPATIENT
Start: 2024-12-31 | End: 2025-01-01 | Stop reason: HOSPADM

## 2024-12-31 RX ORDER — BENZONATATE 100 MG/1
200 CAPSULE ORAL 3 TIMES DAILY PRN
Status: DISCONTINUED | OUTPATIENT
Start: 2024-12-31 | End: 2025-01-01 | Stop reason: HOSPADM

## 2024-12-31 RX ORDER — PROMETHAZINE HYDROCHLORIDE 12.5 MG/1
12.5 TABLET ORAL EVERY 6 HOURS PRN
Status: ON HOLD | COMMUNITY

## 2024-12-31 RX ADMIN — IPRATROPIUM BROMIDE AND ALBUTEROL SULFATE 3 ML: .5; 3 SOLUTION RESPIRATORY (INHALATION) at 19:44

## 2024-12-31 RX ADMIN — Medication 10 ML: at 01:12

## 2024-12-31 RX ADMIN — METHYLPREDNISOLONE SODIUM SUCCINATE 40 MG: 40 INJECTION INTRAMUSCULAR; INTRAVENOUS at 23:03

## 2024-12-31 RX ADMIN — IPRATROPIUM BROMIDE AND ALBUTEROL SULFATE 3 ML: .5; 3 SOLUTION RESPIRATORY (INHALATION) at 16:22

## 2024-12-31 RX ADMIN — FAMOTIDINE 40 MG: 20 TABLET, FILM COATED ORAL at 08:52

## 2024-12-31 RX ADMIN — ENOXAPARIN SODIUM 30 MG: 30 INJECTION SUBCUTANEOUS at 08:52

## 2024-12-31 RX ADMIN — BUTALBITAL, ACETAMINOPHEN, AND CAFFEINE 1 TABLET: 325; 50; 40 TABLET ORAL at 16:42

## 2024-12-31 RX ADMIN — GUAIFENESIN 1200 MG: 600 TABLET ORAL at 22:37

## 2024-12-31 RX ADMIN — IPRATROPIUM BROMIDE AND ALBUTEROL SULFATE 3 ML: .5; 3 SOLUTION RESPIRATORY (INHALATION) at 00:24

## 2024-12-31 RX ADMIN — ASPIRIN 81 MG: 81 TABLET, COATED ORAL at 08:53

## 2024-12-31 RX ADMIN — METHYLPREDNISOLONE SODIUM SUCCINATE 40 MG: 40 INJECTION INTRAMUSCULAR; INTRAVENOUS at 06:18

## 2024-12-31 RX ADMIN — GUAIFENESIN 1200 MG: 600 TABLET ORAL at 01:09

## 2024-12-31 RX ADMIN — NICOTINE 1 PATCH: 21 PATCH, EXTENDED RELEASE TRANSDERMAL at 08:53

## 2024-12-31 RX ADMIN — ACETAMINOPHEN 650 MG: 325 TABLET ORAL at 09:05

## 2024-12-31 RX ADMIN — BUTALBITAL, ACETAMINOPHEN, AND CAFFEINE 1 TABLET: 325; 50; 40 TABLET ORAL at 23:03

## 2024-12-31 RX ADMIN — SENNOSIDES AND DOCUSATE SODIUM 2 TABLET: 50; 8.6 TABLET ORAL at 22:38

## 2024-12-31 RX ADMIN — INSULIN LISPRO 4 UNITS: 100 INJECTION, SOLUTION INTRAVENOUS; SUBCUTANEOUS at 22:36

## 2024-12-31 RX ADMIN — CLOPIDOGREL BISULFATE 75 MG: 75 TABLET ORAL at 08:53

## 2024-12-31 RX ADMIN — INSULIN LISPRO 2 UNITS: 100 INJECTION, SOLUTION INTRAVENOUS; SUBCUTANEOUS at 09:05

## 2024-12-31 RX ADMIN — ATORVASTATIN CALCIUM 20 MG: 20 TABLET, FILM COATED ORAL at 08:53

## 2024-12-31 RX ADMIN — DOXYCYCLINE 100 MG: 100 CAPSULE ORAL at 06:18

## 2024-12-31 RX ADMIN — METHYLPREDNISOLONE SODIUM SUCCINATE 40 MG: 40 INJECTION INTRAMUSCULAR; INTRAVENOUS at 14:44

## 2024-12-31 RX ADMIN — INSULIN GLARGINE 15 UNITS: 100 INJECTION, SOLUTION SUBCUTANEOUS at 08:54

## 2024-12-31 RX ADMIN — CEFDINIR 300 MG: 300 CAPSULE ORAL at 22:38

## 2024-12-31 RX ADMIN — BENZONATATE 200 MG: 100 CAPSULE ORAL at 22:37

## 2024-12-31 RX ADMIN — INSULIN LISPRO 2 UNITS: 100 INJECTION, SOLUTION INTRAVENOUS; SUBCUTANEOUS at 17:35

## 2024-12-31 RX ADMIN — BUDESONIDE AND FORMOTEROL FUMARATE DIHYDRATE 2 PUFF: 160; 4.5 AEROSOL RESPIRATORY (INHALATION) at 19:48

## 2024-12-31 RX ADMIN — Medication 10 ML: at 08:55

## 2024-12-31 RX ADMIN — METOPROLOL TARTRATE 50 MG: 50 TABLET, FILM COATED ORAL at 01:10

## 2024-12-31 RX ADMIN — METOPROLOL TARTRATE 50 MG: 50 TABLET, FILM COATED ORAL at 22:38

## 2024-12-31 RX ADMIN — IPRATROPIUM BROMIDE AND ALBUTEROL SULFATE 3 ML: .5; 3 SOLUTION RESPIRATORY (INHALATION) at 07:42

## 2024-12-31 RX ADMIN — LISINOPRIL 20 MG: 20 TABLET ORAL at 08:53

## 2024-12-31 RX ADMIN — BUTALBITAL, ACETAMINOPHEN, AND CAFFEINE 1 TABLET: 325; 50; 40 TABLET ORAL at 12:29

## 2024-12-31 RX ADMIN — BUDESONIDE AND FORMOTEROL FUMARATE DIHYDRATE 2 PUFF: 160; 4.5 AEROSOL RESPIRATORY (INHALATION) at 07:52

## 2024-12-31 RX ADMIN — BUDESONIDE AND FORMOTEROL FUMARATE DIHYDRATE 2 PUFF: 160; 4.5 AEROSOL RESPIRATORY (INHALATION) at 00:24

## 2024-12-31 RX ADMIN — Medication 10 ML: at 23:08

## 2024-12-31 RX ADMIN — IPRATROPIUM BROMIDE AND ALBUTEROL SULFATE 3 ML: .5; 3 SOLUTION RESPIRATORY (INHALATION) at 11:55

## 2024-12-31 NOTE — PLAN OF CARE
Problem: Adult Inpatient Plan of Care  Goal: Plan of Care Review  Outcome: Progressing  Flowsheets (Taken 12/31/2024 0437)  Outcome Evaluation:   SLP: Pt presents with a functional oropharyngeal swallow w/no s/s of esophageal dysphagia. Normal oral prep (exception of increased prep for solids due to lack of dentition) of thins, regular solids and puree. Normal oral transit and clearance of all. Timely pharyngeal swallow w/no clinical s/s of aspiration or complaints of food hanging or sticking in her throat. Impressions: Functional oropharyngeal swallow   no suspected esophageal dysphagia. Recommendations: Continue with regular diet with thins, oral care BID and as needed   general aspiration precautions. No further instrumental evaluation indicated at this time. No further SLP services indicated at this time.  Plan of Care Reviewed With: patient

## 2024-12-31 NOTE — PAYOR COMM NOTE
"Valeria Garcia (49 y.o. Female)       Date of Birth   1975    Social Security Number       Address   [address] [place] [address]    Home Phone       MRN   [id number]       Holiness   None    Marital Status   Single                            Admission Date   12/30/24    Admission Type   Emergency    Admitting Provider   Dagoberto Chilel MD    Attending Provider   Dagoberto Chilel MD    Department, Room/Bed   [facility] SURG, 1417/1       Discharge Date       Discharge Disposition       Discharge Destination                                 Attending Provider: Dagoberto Chilel MD    Allergies: Prednisone    Isolation: Droplet   Infection: Rhinovirus  (12/31/24)   Code Status: CPR    Ht: 157.5 cm (62\")   Wt: 56.2 kg (123 lb 14.4 oz)    Admission Cmt: None   Principal Problem: Acute and chronic respiratory failure with hypoxia [J96.21]                   Active Insurance as of 12/30/2024       Primary Coverage       Payor Plan Insurance Group Employer/Plan Group    ANTHEM BLUE CROSS ANTHEM BLUE CROSS BLUE [employer] PPO [id number]       Payor Plan Address Payor Plan Phone Number Payor Plan Fax Number Effective Dates    [address] [phone]  1/1/2021 - None Entered    [facility] [address]         Subscriber Name Subscriber Birth Date Member ID       VALERIA GARCIA 1975 [id number]               Secondary Coverage       Payor Plan Insurance Group Employer/Plan Group    [place]        Payor Plan Address Payor Plan Phone Number Payor Plan Fax Number Effective Dates    [address]   6/1/2024 - None Entered    [facility] [id number]         Subscriber Name Subscriber Birth Date Member ID       VALERIA GARCIA 1975 [id number]                     Emergency Contacts        (Rel.) Home Phone Work Phone Mobile Phone    Harpreet Ruiz (Spouse) -- -- [phone]    Arjun Ruiz (Son) -- -- [phone]              Insurance Information  "                 ANTHEM BLUE CROSS/[facility] BLUE [facility] PPO Phone: [phone]    Subscriber: Valeria Rubi Subscriber#: [id number]    Group#: [id number] Precert#: --    Authorization#: -- Effective Date: --        [place] Phone: --    Subscriber: Valeria Rubi Subscriber#: [id number]    Group#: -- Precert#: --    Authorization#: -- Effective Date: --          Problem List             Codes Noted - Resolved       Hospital    Macrocytosis ICD-10-CM: D75.89  ICD-9-CM: 289.89 12/30/2024 - Present    DM2 (diabetes mellitus, type 2) ICD-10-CM: E11.9  ICD-9-CM: 250.00 12/30/2024 - Present    History of coronary artery disease ICD-10-CM: Z86.79  ICD-9-CM: V12.59 12/30/2024 - Present    Community acquired pneumonia, bilateral ICD-10-CM: J18.9  ICD-9-CM: 486 3/29/2021 - Present    * (Principal) Acute and chronic respiratory failure with hypoxia ICD-10-CM: J96.21  ICD-9-CM: 518.84, 799.02 3/29/2021 - Present    COPD with acute exacerbation ICD-10-CM: J44.1  ICD-9-CM: 491.21 12/16/2016 - Present    Peripheral vascular disease ICD-10-CM: I73.9  ICD-9-CM: 443.9 6/14/2016 - Present    Hyperlipidemia ICD-10-CM: E78.5  ICD-9-CM: 272.4 4/15/2016 - Present    Essential hypertension ICD-10-CM: I10  ICD-9-CM: 401.9 4/15/2016 - Present       Non-Hospital    Respiratory failure ICD-10-CM: J96.90  ICD-9-CM: 518.81 1/17/2024 - Present    Acute on chronic respiratory failure with hypercapnia ICD-10-CM: J96.22  ICD-9-CM: 518.84 12/5/2022 - Present    Acute respiratory failure with hypoxia and hypercapnia ICD-10-CM: J96.01, J96.02  ICD-9-CM: 518.81 7/27/2022 - Present    Headache, migraine ICD-10-CM: G43.909  ICD-9-CM: 346.90 3/9/2021 - Present    Pneumonia of left lower lobe due to infectious organism ICD-10-CM: J18.9  ICD-9-CM: 486 6/7/2018 - Present    Numbness and tingling of both feet ICD-10-CM: R20.0, R20.2  ICD-9-CM: 782.0 4/10/2018 - Present    Low vitamin D level ICD-10-CM:  R79.89  ICD-9-CM: 790.6 4/10/2018 - Present    Low vitamin B12 level ICD-10-CM: R79.89  ICD-9-CM: 790.6 4/10/2018 - Present    Anxiety and depression ICD-10-CM: F41.9, F32.A  ICD-9-CM: 300.00, 311 10/10/2017 - Present    Hyperglycemia ICD-10-CM: R73.9  ICD-9-CM: 790.29 4/25/2017 - Present    Menopausal symptoms ICD-10-CM: N95.1  ICD-9-CM: 627.2 4/25/2017 - Present    Surveillance for Depo-Provera contraception ICD-10-CM: Z30.42  ICD-9-CM: V25.49 4/25/2017 - Present    Coronary stent occlusion ICD-10-CM: T82.897A  ICD-9-CM: 996.72 Unknown - Present    Pain of toe of left foot ICD-10-CM: M79.675  ICD-9-CM: 729.5 6/14/2016 - Present    Abnormal femoral pulse ICD-10-CM: R09.89  ICD-9-CM: 785.9 6/14/2016 - Present    Radiculopathy ICD-10-CM: M54.10  ICD-9-CM: 729.2 5/26/2016 - Present    Lumbar degenerative disc disease ICD-10-CM: M51.369  ICD-9-CM: 722.52 5/26/2016 - Present    Pharyngeal dysphagia ICD-10-CM: R13.13  ICD-9-CM: 787.23 5/16/2016 - Present    Abnormal mammogram ICD-10-CM: R92.8  ICD-9-CM: 793.80 4/15/2016 - Present    Atopic rhinitis ICD-10-CM: J30.9  ICD-9-CM: 477.9 4/15/2016 - Present    Bronchitis ICD-10-CM: J40  ICD-9-CM: 490 4/15/2016 - Present    Chronic headache ICD-10-CM: R51.9, G89.29  ICD-9-CM: 784.0 4/15/2016 - Present    Pain of hand ICD-10-CM: M79.643  ICD-9-CM: 729.5 4/15/2016 - Present    Sprain of ankle ICD-10-CM: S93.409A  ICD-9-CM: 845.00 4/15/2016 - Present    Nausea ICD-10-CM: R11.0  ICD-9-CM: 787.02 4/15/2016 - Present    Pain in wrist ICD-10-CM: M25.539  ICD-9-CM: 719.43 4/15/2016 - Present    Wheezing ICD-10-CM: R06.2  ICD-9-CM: 786.07 4/15/2016 - Present    Panlobular emphysema ICD-10-CM: J43.1  ICD-9-CM: 492.8 4/15/2016 - Present    Tobacco use ICD-10-CM: Z72.0  ICD-9-CM: 305.1 4/15/2016 - Present        History & Physical        Polly Lucero MD at 12/30/24 [id number]              Patient Name:  Valeria Rubi  YOB: 1975  MRN:  [id number]  Admit Date:   12/30/2024  Patient Care Team:  Talib Ceron MD as PCP - General (Family Medicine)        Chief Complaint   Patient presents with    Cough     Cough, fatigue x1 week. Has been around sick family members      History Present Illness     A pleasant 49 years old female with a past history of chronic hypoxemic and hypercapnic respiratory failure/tobacco abuse/oxygen dependent COPD/type 2 diabetes/dysphagia and recurrent pneumonia/peripheral vascular disease/vitamin B12 deficiency/vitamin D deficiency/mood disorder/L-spine degenerative disc disease/dyslipidemia/hypertension/coronary artery disease who presents to the emergency department with 1 week history of progressive shortness of breath associated with progressive cough productive of yellowish to greenish sputum and increasing wheeze.  She does have bilateral atypical chest pain especially with the cough that is squeezing in nature.  No palpitation.  No hemoptysis.  No PND or orthopnea.  No ankle edema.  No fever or chills.  In the emergency department CT of the chest revealed COPD changes/bilateral multifocal pneumonia but no PE.  EKG revealed sinus tachycardia.  Rest of workup in the emergency department included negative COVID-19, influenza A and B, RSV PCR.  CBC was normal except an MCV of 103.6.  Troponin was normal.  Procalcitonin was normal.  CMP normal except a blood sugar of 126, chloride 97, CO2 of 33.3.  Lactic acid was normal.  Blood cultures obtained.        Review of Systems   Cardiovascular/respiratory.  As above.  GI.  No abdominal pain.  No choking with the food or the liquids.  No heartburn.  No dysphagia or odynophagia.  No diarrhea or constipation.  No melena.  No bleeding per rectum.  Positive on and off nausea and vomiting over the last 1 week  .  No dysuria or hematuria  CNS.  No headache.  No loss of consciousness.  No focal neurological symptoms    Personal History     Past Medical History:   Diagnosis Date    Anxiety     Arthritis      COPD (chronic obstructive pulmonary disease)     Coronary stent occlusion     CTS (carpal tunnel syndrome)     Depression     Dizziness     Headache     Hyperlipidemia     Hypertension     Migraine     Numbness and tingling     Pneumonia      Past Surgical History:   Procedure Laterality Date    ANGIOPLASTY ILIAC ARTERY Left 6/17/2016    Procedure: BILATERAL DUPLEX DIRECTED ACCESS, AIF BILATERAL RUNOFF, SELECTIVE CATHETERIZATION OF RIGHT EXTERNAL ILIAC WITH ANGIOPLASTY, LEFT COMMON ILIAC STENT PLACEMENT;  Surgeon: Jsoe Carlos Plascencia MD;  Location: [facility] 18/19;  Service:     BREAST SURGERY      BILAT IMPLANTS    CAROTID ARTERY ANGIOPLASTY       Family History   Problem Relation Age of Onset    Heart disease Father     Hypertension Father     Diabetes Father     Stroke Father     COPD Father     Heart failure Paternal Grandmother     Heart failure Paternal Grandfather     COPD Mother     COPD Maternal Aunt     COPD Maternal Uncle     COPD Paternal Uncle      Social History     Tobacco Use    Smoking status: Every Day     Current packs/day: 1.00     Average packs/day: 1 pack/day for 30.0 years (30.0 ttl pk-yrs)     Types: Cigarettes     Passive exposure: Current    Smokeless tobacco: Never   Vaping Use    Vaping status: Never Used   Substance Use Topics    Alcohol use: No    Drug use: No     No current facility-administered medications on file prior to encounter.     Current Outpatient Medications on File Prior to Encounter   Medication Sig Dispense Refill    acetaminophen (TYLENOL) 325 MG tablet Take 2 tablets by mouth Every 4 (Four) Hours As Needed for Fever (fever greater than 100.4 °F or headache).      albuterol sulfate HFA (Proventil HFA) 108 (90 Base) MCG/ACT inhaler Inhale 2 puffs into the lungs Every 4 (Four) Hours As Needed for Wheezing. 8.5 g 1    aspirin (aspirin) 81 MG EC tablet Take 1 tablet by mouth Daily. 90 tablet 3    budesonide-formoterol (SYMBICORT) 160-4.5 MCG/ACT inhaler Inhale 2 puffs  2 (Two) Times a Day.      clopidogrel (Plavix) 75 MG tablet Take 1 tablet by mouth Daily. 90 tablet 1    dextrose (GLUTOSE) 40 % gel Take 15 g by mouth Every 15 (Fifteen) Minutes As Needed for Low Blood Sugar (Blood sugar less than 70).      fluticasone (FLONASE) 50 MCG/ACT nasal spray 1 spray into the nostril(s) as directed by provider Daily As Needed.      glucagon (GLUCAGEN) 1 MG injection Inject 1 mg into the appropriate muscle as directed by prescriber Every 15 (Fifteen) Minutes As Needed (Blood Glucose Less Than 70).      guaiFENesin (MUCINEX) 600 MG 12 hr tablet Take 2 tablets by mouth 2 (Two) Times a Day. 40 tablet 0    Insulin Aspart (novoLOG) 100 UNIT/ML injection Inject 2-7 Units under the skin into the appropriate area as directed 4 (Four) Times a Day Before Meals & at Bedtime.      insulin detemir (LEVEMIR) 100 UNIT/ML injection Inject 15 Units under the skin into the appropriate area as directed Daily. Until steroids are completed      ipratropium-albuterol (DUO-NEB) 0.5-2.5 mg/3 ml nebulizer Take 3 mL by nebulization Every 4 (Four) Hours As Needed for Wheezing. 90 mL 1    lisinopril (PRINIVIL,ZESTRIL) 20 MG tablet Take 1 tablet by mouth Daily.      metoprolol tartrate (LOPRESSOR) 50 MG tablet Take 1 tablet by mouth Every 12 (Twelve) Hours. 60 tablet 1    nicotine (NICODERM CQ) 21 MG/24HR patch Place 1 patch on the skin as directed by provider Daily. 30 patch 0    nicotine (NICODERM CQ) 21 MG/24HR patch Place 1 patch on the skin as directed by provider Daily. Apply Patch to Clean, Dry, Hairless Area Daily  Remove Old Patch Before Applying New Patch  Rotate Patch Site Daily  May Remove Patch at Bedtime to Prevent Insomnia  Follow Other Instructions on Package      ondansetron ODT (ZOFRAN-ODT) 4 MG disintegrating tablet Place 1 tablet on the tongue Every 6 (Six) Hours As Needed for Nausea or Vomiting.      simvastatin (ZOCOR) 40 MG tablet Take 1 tablet by mouth Every Evening. 90 tablet 1    venlafaxine  XR (EFFEXOR-XR) 75 MG 24 hr capsule TAKE 1 CAPSULE BY MOUTH EVERY DAY 90 capsule 0     Allergies   Allergen Reactions    Prednisone Other (See Comments)     Rash in mouth       Objective   Objective     Vital Signs  Temp:  [100.1 °F (37.8 °C)] 100.1 °F (37.8 °C)  Heart Rate:  [107-126] 107  Resp:  [18-20] 20  BP: (132-143)/(53-82) 138/68  SpO2:  [80 %-97 %] 94 %  on  Flow (L/min) (Oxygen Therapy):  [1.5-2] 1.5;   Device (Oxygen Therapy): nasal cannula  Body mass index is 21.95 kg/m².    Physical Exam  General.  Elderly female.  She is alert and oriented x 4.  In mild respiratory distress.  In no acute pain.  Appears older than stated age.  Eyes.  Pupils equal round and reactive.  Intact extraocular musculature.  No pallor or jaundice.  Neck.  Supple.  No JVD.  No lymphadenopathy or thyromegaly.  Cardiovascular.  Tachycardia.  Regular rate and rhythm with no murmurs.  Chest.  Poor bilateral air entry with scattered bilateral rhonchi and bilateral inspiratory and expiratory wheezes  Abdomen.  Soft lax.  No tenderness.  No organomegaly.  No guarding or rebound  Extremities.  No clubbing/cyanosis/edema  CNS.  No acute focal neurological deficits.      Results Review:  I reviewed the patient's new clinical results.  I reviewed the patient's new imaging results and agree with the interpretation.  I reviewed the patient's other test results and agree with the interpretation  I personally viewed and interpreted the patient's EKG/Telemetry data  Discussed with ED provider.    Lab Results (last 24 hours)       Procedure Component Value Units Date/Time    COVID-19, FLU A/B, RSV PCR 1 HR TAT - Swab, Nasopharynx [[id number]]  (Normal) Collected: 12/30/24 1726    Specimen: Swab from Nasopharynx Updated: 12/30/24 1807     COVID19 Not Detected     Influenza A PCR Not Detected     Influenza B PCR Not Detected     RSV, PCR Not Detected    Narrative:      Fact sheet for providers: https://www.fda.gov/media/022245/download    Fact sheet  for patients: https://www.[employer]gov/media/899224/download    Test performed by PCR.    CBC & Differential [[id number]]  (Abnormal) Collected: 12/30/24 1732    Specimen: Blood Updated: 12/30/24 1737    Narrative:      The following orders were created for panel order CBC & Differential.  Procedure                               Abnormality         Status                     ---------                               -----------         ------                     CBC Auto Differential[[id number]]        Abnormal            Final result                 Please view results for these tests on the individual orders.    Comprehensive Metabolic Panel [[id number]]  (Abnormal) Collected: 12/30/24 1732    Specimen: Blood Updated: 12/30/24 1757     Glucose 126 mg/dL      BUN 6 mg/dL      Creatinine 0.75 mg/dL      Sodium 141 mmol/L      Potassium 4.1 mmol/L      Chloride 97 mmol/L      CO2 33.3 mmol/L      Calcium 9.1 mg/dL      Total Protein 7.3 g/dL      Albumin 3.8 g/dL      ALT (SGPT) 5 U/L      AST (SGOT) 11 U/L      Alkaline Phosphatase 93 U/L      Total Bilirubin 0.3 mg/dL      Globulin 3.5 gm/dL      A/G Ratio 1.1 g/dL      BUN/Creatinine Ratio 8.0     Anion Gap 10.7 mmol/L      eGFR 96.5 mL/min/1.73     Narrative:      GFR Categories in Chronic Kidney Disease (CKD)      GFR Category          GFR (mL/min/1.73)    Interpretation  G1                     90 or greater         Normal or high (1)  G2                      60-89                Mild decrease (1)  G3a                   45-59                Mild to moderate decrease  G3b                   30-44                Moderate to severe decrease  G4                    15-29                Severe decrease  G5                    14 or less           Kidney failure          (1)In the absence of evidence of kidney disease, neither GFR category G1 or G2 fulfill the criteria for CKD.    eGFR calculation 2021 CKD-EPI creatinine equation, which does not include race as a factor    BNP  [[id number]]  (Normal) Collected: 12/30/24 1732    Specimen: Blood Updated: 12/30/24 1755     proBNP 83.6 pg/mL     Narrative:      This assay is used as an aid in the diagnosis of individuals suspected of having heart failure. It can be used as an aid in the diagnosis of acute decompensated heart failure (ADHF) in patients presenting with signs and symptoms of ADHF to the emergency department (ED). In addition, NT-proBNP of <300 pg/mL indicates ADHF is not likely.    Age Range Result Interpretation  NT-proBNP Concentration (pg/mL:      <50             Positive            >450                   Gray                 300-450                    Negative             <300    50-75           Positive            >900                  Gray                300-900                  Negative            <300      >75             Positive            >1800                  Gray                300-1800                  Negative            <300    High Sensitivity Troponin T [[id number]]  (Normal) Collected: 12/30/24 1732    Specimen: Blood Updated: 12/30/24 1755     HS Troponin T 12 ng/L     Narrative:      High Sensitive Troponin T Reference Range:  <14.0 ng/L- Negative Female for AMI  <22.0 ng/L- Negative Male for AMI  >=14 - Abnormal Female indicating possible myocardial injury.  >=22 - Abnormal Male indicating possible myocardial injury.   Clinicians would have to utilize clinical acumen, EKG, Troponin, and serial changes to determine if it is an Acute Myocardial Infarction or myocardial injury due to an underlying chronic condition.         CBC Auto Differential [[id number]]  (Abnormal) Collected: 12/30/24 1732    Specimen: Blood Updated: 12/30/24 1737     WBC 9.54 10*3/mm3      RBC 4.20 10*6/mm3      Hemoglobin 13.7 g/dL      Hematocrit 43.5 %      .6 fL      MCH 32.6 pg      MCHC 31.5 g/dL      RDW 13.4 %      RDW-SD 51.8 fl      MPV 8.7 fL      Platelets 278 10*3/mm3      Neutrophil % 70.7 %      Lymphocyte % 15.0 %       Monocyte % 13.1 %      Eosinophil % 0.5 %      Basophil % 0.3 %      Immature Grans % 0.4 %      Neutrophils, Absolute 6.74 10*3/mm3      Lymphocytes, Absolute 1.43 10*3/mm3      Monocytes, Absolute 1.25 10*3/mm3      Eosinophils, Absolute 0.05 10*3/mm3      Basophils, Absolute 0.03 10*3/mm3      Immature Grans, Absolute 0.04 10*3/mm3      nRBC 0.0 /100 WBC     Lactic Acid, Plasma [[id number]]  (Normal) Collected: 12/30/24 2018    Specimen: Blood from Hand, Right Updated: 12/30/24 2039     Lactate 0.9 mmol/L     Blood Culture - Blood, Hand, Left [[id number]] Collected: 12/30/24 2021    Specimen: Blood from Hand, Left Updated: 12/30/24 2022    Blood Culture - Blood, Hand, Right [[id number]] Collected: 12/30/24 2021    Specimen: Blood from Hand, Right Updated: 12/30/24 2022            Imaging Results (Last 24 Hours)       Procedure Component Value Units Date/Time    CT Angiogram Chest [[id number]] Collected: 12/30/24 2138     Updated: 12/30/24 2151    Narrative:      CT ANGIOGRAM CHEST    Date of Exam: 12/30/2024 8:43 PM EST    Indication: chest pain, tachycardia, hypoxia.    Comparison: CXR 1/25/2024, CT chest 1/17/2024    Technique: CTA of the chest was performed after the uneventful intravenous administration of iodinated contrast. Reconstructed coronal and sagittal images were also obtained. In addition, a 3-D volume rendered image was created for interpretation.   Automated exposure control and iterative reconstruction methods were used.      Findings:  The pulmonary arteries are well opacified. There are no findings of PE.    Lung window images reveal 2.5 cm patchy opacity in the medial right upper lobe seen on series 5 image 62. A 1.5 cm mass or consolidation is seen in the right lower lobe on series 5 image 91. Pleural-based consolidation in the lateral right lower lobe   measures 6 cm x 1.5 cm. 1.5 cm x 1.5 cm area of consolidation is seen in the lateral left lower lobe.    Findings could represent multifocal  pneumonia. The patient has a significant smoking history. Findings are concerning for bronchogenic malignancy or lung cancer. Pulmonary consultation is recommended. Follow-up CT scan of the chest in 3 months is   recommended, if more aggressive evaluation is not undertaken.    Mediastinal windows reveal no mediastinal, hilar, or axillary adenopathy. Extensive coronary artery calcifications are evident.    Upper abdominal structures have an unremarkable appearance. Degenerative changes are seen in the thoracic spine.      Impression:      Impression:  CT scan of the chest with IV contrast demonstrating no findings of PE.    Multiple areas of consolidation and/or masses are seen in the lungs. Findings are consistent with multifocal pneumonia. The patient has a significant smoking history, with emphysema. Close follow-up is recommended with CT scan of the chest in 3 months.   Alternatively, a PET scan could be obtained.        Electronically Signed: Larry Berrios MD    12/30/2024 9:48 PM EST    Workstation ID: [id number]            Results for orders placed during the hospital encounter of 01/17/24    Adult Transthoracic Echo Complete w/ Color, Spectral and Contrast if Necessary Per Protocol    Interpretation Summary    Left ventricular systolic function is normal. Left ventricular ejection fraction appears to be 56 - 60%.    Left ventricular diastolic function was normal.    : The study is technically suboptimal for diagnosis. The quality of the study is limited due to patient body habitus, an uncooperative patient and breast implants. Image enhancer was not used for this study due to inability to consent.      ECG 12 Lead ED Triage Standing Order; SOA   Preliminary Result   HEART OGJD=934  bpm   RR Cvrhawqd=769  ms   CO Gmxosjzc=177  ms   P Horizontal Axis=2  deg   P Front Axis=75  deg   QRSD Interval=77  ms   QT Ewtugdfm=023  ms   XLhL=760  ms   QRS Axis=63  deg   T Wave Axis=85  deg   - OTHERWISE NORMAL ECG -    Sinus tachycardia   Date and Time of Study:2024-12-30 18:05:48               Assessment/Plan     Active Hospital Problems    Diagnosis  POA    **Acute and chronic respiratory failure with hypoxia [J96.21]  Yes    Macrocytosis [D75.89]  Yes    DM2 (diabetes mellitus, type 2) [E11.9]  Yes    History of coronary artery disease [Z86.79]  Not Applicable    Community acquired pneumonia, bilateral [J18.9]  Yes    COPD with acute exacerbation [J44.1]  Yes    Peripheral vascular disease [I73.9]  Yes    Hyperlipidemia [E78.5]  Yes    Essential hypertension [I10]  Yes      Resolved Hospital Problems   No resolved problems to display.           Acute on chronic hypoxemic respiratory failure secondary to bilateral multifocal pneumonia and COPD exacerbation in a patient with chronic hypoxemic and hypercapnic respiratory failure/COPD/tobacco abuse.  Lactic acid is normal.  Normal white blood cell count.  Normal proBNP and troponin.  CTA of the chest revealed no PE but positive multifocal pneumonia bilaterally and COPD changes.  Plan check blood and sputum cultures.  Check full respiratory PCR panel.  Continue Mucinex/Symbicort and albuterol metered-dose inhalers.  Initiate DuoNebs/IV Rocephin/p.o. doxycycline/incentive spirometry/IV Solu-Medrol..  Check arterial blood gas.  Titrate oxygen as needed.  Check urine Legionella and Streptococcus antigen.  Counseled against tobacco.  Nicotine patch will be employed.  Macrocytosis.  Patient does not consume alcohol.  There is no evidence of anemia.  She has a history of vitamin B12 deficiency.  Will check B12/folate/cortisol/TSH.  Type 2 diabetes.  Will continue the patient on her long-acting insulin.  Check A1c and place on sliding scale insulin.  History of peripheral vascular disease/dyslipidemia (carotid and lower extremity).  Patient is status post left iliac artery angioplasty and carotid angioplasty.  Asymptomatic with negative lower extremity wrist ischemia and negative CNS  examination.  Will continue aspirin/Plavix/statin.  Mood disorder.  Currently on no treatment.  Appears stable.  History of hypertension and coronary artery disease.  There is no clinical evidence of angina or congestive heart failure.  Good blood pressure control.  In sinus tachycardia.  Will continue metoprolol/lisinopril/aspirin/Plavix.  EKG with sinus tachycardia.  Her last echo was on January 2024 revealing a normal ejection fraction with normal diastolic function and trace MR.  L-spine degenerative disc disease.  Asymptomatic.  On no treatment.  VTE prophylaxis.  Lovenox.        Cussed my findings and plan of treatment with the patient/ER provider.    Code Status -full code.       Polly Lucero MD    12/30/24  22:51 EST     Electronically signed by Polly Lucero MD at 12/30/24 5101       Facility-Administered Medications as of 12/31/2024   Medication Dose Route Frequency Provider Last Rate Last Admin    acetaminophen (TYLENOL) tablet 650 mg  650 mg Oral Q4H PRN Polly Lucero MD        Or    acetaminophen (TYLENOL) 160 MG/5ML oral solution 650 mg  650 mg Oral Q4H PRN Polly Lucero MD        Or    acetaminophen (TYLENOL) suppository 650 mg  650 mg Rectal Q4H PRN Polly Lucero MD        [COMPLETED] acetaminophen (TYLENOL) tablet 1,000 mg  1,000 mg Oral Once Parrish Kessler MD   1,000 mg at 12/30/24 2003    acetaminophen (TYLENOL) tablet 650 mg  650 mg Oral Q4H PRN Polly Lucero MD   650 mg at 12/31/24 0905    albuterol sulfate HFA (PROVENTIL HFA;VENTOLIN HFA;PROAIR HFA) inhaler 2 puff  2 puff Inhalation Q4H PRN Polly Lucero MD        aspirin EC tablet 81 mg  81 mg Oral Daily Polly Lucero MD   81 mg at 12/31/24 0853    atorvastatin (LIPITOR) tablet 20 mg  20 mg Oral Daily Polly Lucero MD   20 mg at 12/31/24 0853    sennosides-docusate (PERICOLACE) 8.6-50 MG per tablet 2 tablet  2 tablet Oral BID Polly Lucero MD        And    polyethylene glycol (MIRALAX) packet 17 g   17 g Oral Daily PRN Polly Lucero MD        And    bisacodyl (DULCOLAX) EC tablet 5 mg  5 mg Oral Daily PRN Polly Lucero MD        And    bisacodyl (DULCOLAX) suppository 10 mg  10 mg Rectal Daily PRN Polly uLcero MD        budesonide-formoterol (SYMBICORT) 160-4.5 MCG/ACT inhaler 2 puff  2 puff Inhalation BID - RT Polly Lucero MD   2 puff at 12/31/24 0752    butalbital-acetaminophen-caffeine (FIORICET, ESGIC) -40 MG per tablet 1 tablet  1 tablet Oral Q4H PRN Dagoberto Chilel MD   1 tablet at 12/31/24 1229    Calcium Replacement - Follow Nurse / BPA Driven Protocol   Not Applicable PRN Polly Lucero MD        cefdinir (OMNICEF) capsule 300 mg  300 mg Oral Q12H Dagoberto Chilel MD        [COMPLETED] cefTRIAXone Sodium 1,000 mg in sodium chloride 0.9 % 100 mL ADV  1,000 mg Intravenous Once Parrish Kessler MD   Stopped at 12/30/24 2126    clopidogrel (PLAVIX) tablet 75 mg  75 mg Oral Daily Polly Lucero MD   75 mg at 12/31/24 0853    dextrose (D50W) (25 g/50 mL) IV injection 25 g  25 g Intravenous Q15 Min PRN Polly Lucero MD        dextrose (GLUTOSE) oral gel 15 g  15 g Oral Q15 Min PRN Polly Lucero MD        [COMPLETED] doxycycline (MONODOX) capsule 100 mg  100 mg Oral Once Opal Ledezma PA-C   100 mg at 12/30/24 1827    Enoxaparin Sodium (LOVENOX) syringe 30 mg  30 mg Subcutaneous Daily Polly Lucero MD   30 mg at 12/31/24 0852    famotidine (PEPCID) tablet 40 mg  40 mg Oral Daily Polly Lucero MD   40 mg at 12/31/24 0852    fluticasone (FLONASE) 50 MCG/ACT nasal spray 1 spray  1 spray Each Nare Daily Polly Lucero MD        glucagon (GLUCAGEN) injection 1 mg  1 mg Intramuscular Q15 Min PRN Polly Lucero MD        guaiFENesin (MUCINEX) 12 hr tablet 1,200 mg  1,200 mg Oral BID Polly Lucero MD   1,200 mg at 12/31/24 0109    HYDROcodone-acetaminophen (NORCO) 5-325 MG per tablet 1 tablet  1 tablet Oral Q6H PRN Polly Lucero MD        insulin  glargine (LANTUS, SEMGLEE) injection 15 Units  15 Units Subcutaneous Daily Polly Lucero MD   15 Units at 12/31/24 0854    Insulin Lispro (humaLOG) injection 2-9 Units  2-9 Units Subcutaneous 4x Daily AC & at Bedtime Polly Lucero MD   2 Units at 12/31/24 0905    [COMPLETED] iopamidol (ISOVUE-370) 76 % injection 100 mL  100 mL Intravenous Once in imaging Parrish Kessler MD   100 mL at 12/30/24 2056    [COMPLETED] ipratropium-albuterol (DUO-NEB) nebulizer solution 3 mL  3 mL Nebulization Once Opal Ledezma PA-C   3 mL at 12/30/24 1848    ipratropium-albuterol (DUO-NEB) nebulizer solution 3 mL  3 mL Nebulization 4x Daily - RT Polly Lucero MD   3 mL at 12/31/24 1155    lisinopril (PRINIVIL,ZESTRIL) tablet 20 mg  20 mg Oral Q24H Polly Lucero MD   20 mg at 12/31/24 0853    LORazepam (ATIVAN) tablet 0.5 mg  0.5 mg Oral Q8H PRN Polly Lucero MD        Magnesium Standard Dose Replacement - Follow Nurse / BPA Driven Protocol   Not Applicable PRN Polly Lucero MD        melatonin tablet 5 mg  5 mg Oral Nightly PRN Polly Lucero MD        [COMPLETED] methylPREDNISolone sodium succinate (SOLU-Medrol) injection 125 mg  125 mg Intravenous Once Opal Ledezma PA-C   125 mg at 12/30/24 1826    methylPREDNISolone sodium succinate (SOLU-Medrol) injection 40 mg  40 mg Intravenous Q8H Polly Lucero MD   40 mg at 12/31/24 1444    metoprolol tartrate (LOPRESSOR) tablet 50 mg  50 mg Oral Q12H Polly Lucero MD   50 mg at 12/31/24 0110    nicotine (NICODERM CQ) 21 MG/24HR patch 1 patch  1 patch Transdermal Q24H Polly Lucero MD   1 patch at 12/31/24 0853    ondansetron ODT (ZOFRAN-ODT) disintegrating tablet 4 mg  4 mg Oral Q6H PRN Polly Lucero MD        Or    ondansetron (ZOFRAN) injection 4 mg  4 mg Intravenous Q6H PRN Polly Lucero MD        Phosphorus Replacement - Follow Nurse / BPA Driven Protocol   Not Applicable Polly Henry MD        Potassium Replacement -  "Follow Nurse / BPA Driven Protocol   Not Applicable PRPolly Barnes MD        sodium chloride 0.9 % flush 10 mL  10 mL Intravenous EDDIEN Polly Lucero MD        sodium chloride 0.9 % flush 10 mL  10 mL Intravenous Q12H Polly Lucero MD   10 mL at 12/31/24 0855    sodium chloride 0.9 % flush 10 mL  10 mL Intravenous PRN Polly Lucero MD        sodium chloride 0.9 % infusion 40 mL  40 mL Intravenous EDDIEN Polly Lucero MD         Lab Results (last 72 hours)       Procedure Component Value Units Date/Time    Procalcitonin [[id number]]  (Normal) Collected: 12/31/24 0358    Specimen: Blood Updated: 12/31/24 1435     Procalcitonin 0.11 ng/mL     Narrative:      As a Marker for Sepsis (Non-Neonates):    1. <0.5 ng/mL represents a low risk of severe sepsis and/or septic shock.  2. >2 ng/mL represents a high risk of severe sepsis and/or septic shock.    As a Marker for Lower Respiratory Tract Infections that require antibiotic therapy:    PCT on Admission    Antibiotic Therapy       6-12 Hrs later    >0.5                Strongly Recommended  >0.25 - <0.5        Recommended   0.1 - 0.25          Discouraged              Remeasure/reassess PCT  <0.1                Strongly Discouraged     Remeasure/reassess PCT    As 28 day mortality risk marker: \"Change in Procalcitonin Result\" (>80% or <=80%) if Day 0 (or Day 1) and Day 4 values are available. Refer to http://www.[employer]-pct-calculator.com    Change in PCT <=80%  A decrease of PCT levels below or equal to 80% defines a positive change in PCT test result representing a higher risk for 28-day all-cause mortality of patients diagnosed with severe sepsis for septic shock.    Change in PCT >80%  A decrease of PCT levels of more than 80% defines a negative change in PCT result representing a lower risk for 28-day all-cause mortality of patients diagnosed with severe sepsis or septic shock.       Respiratory Culture - Sputum, Cough [[id number]] Collected: 12/31/24 " 0907    Specimen: Sputum from Cough Updated: 12/31/24 1233     Respiratory Culture Rejected     Gram Stain Few (2+) WBCs seen      Rare (1+) Epithelial cells seen      Few (2+) Mucous strands      Rare (1+) Mixed bacterial morphotypes seen on Gram Stain    Narrative:      Specimen rejected due to oropharyngeal contamination. Please reorder and recollect specimen if clinically necessary.    POC Glucose Once [[id number]]  (Abnormal) Collected: 12/31/24 1151    Specimen: Blood Updated: 12/31/24 1157     Glucose 144 mg/dL     Respiratory Panel PCR w/COVID-19(SARS-CoV-2) TERRY/ROSSI/KIKA/PAD/COR/RONAK In-House, NP Swab in UTM/VTM, 2 HR TAT - Swab, Nasopharynx [[id number]]  (Abnormal) Collected: 12/30/24 1726    Specimen: Swab from Nasopharynx Updated: 12/31/24 1138     ADENOVIRUS, PCR Not Detected     Coronavirus 229E Not Detected     Coronavirus HKU1 Not Detected     Coronavirus NL63 Not Detected     Coronavirus OC43 Not Detected     COVID19 Not Detected     Human Metapneumovirus Not Detected     Human Rhinovirus/Enterovirus Detected     Influenza A PCR Not Detected     Influenza B PCR Not Detected     Parainfluenza Virus 1 Not Detected     Parainfluenza Virus 2 Not Detected     Parainfluenza Virus 3 Not Detected     Parainfluenza Virus 4 Not Detected     RSV, PCR Not Detected     Bordetella pertussis pcr Not Detected     Bordetella parapertussis PCR Not Detected     Chlamydophila pneumoniae PCR Not Detected     Mycoplasma pneumo by PCR Not Detected    Narrative:      In the setting of a positive respiratory panel with a viral infection PLUS a negative procalcitonin without other underlying concern for bacterial infection, consider observing off antibiotics or discontinuation of antibiotics and continue supportive care. If the respiratory panel is positive for atypical bacterial infection (Bordetella pertussis, Chlamydophila pneumoniae, or Mycoplasma pneumoniae), consider antibiotic de-escalation to target atypical bacterial  infection.    Vitamin B12 & Folate [[id number]]  (Normal) Collected: 12/30/24 1732    Specimen: Blood Updated: 12/31/24 1124     Folate 7.65 ng/mL      Vitamin B-12 300 pg/mL     Narrative:      Results may be falsely increased if patient taking Biotin.      Cortisol [[id number]] Collected: 12/31/24 0358    Specimen: Blood Updated: 12/31/24 1114     Cortisol 8.37 mcg/dL     Narrative:      Cortisol Reference Ranges:    Cortisol 6AM - 10AM Range: 6.02-18.40 mcg/dl  Cortisol 4PM - 8PM Range: 2.68-10.50 mcg/dl      Results may be falsely increased if patient taking Biotin.      POC Glucose Once [[id number]]  (Abnormal) Collected: 12/31/24 0856    Specimen: Blood Updated: 12/31/24 0902     Glucose 185 mg/dL     Basic Metabolic Panel [[id number]]  (Abnormal) Collected: 12/31/24 0358    Specimen: Blood Updated: 12/31/24 0449     Glucose 191 mg/dL      BUN 10 mg/dL      Creatinine 0.67 mg/dL      Sodium 144 mmol/L      Potassium 4.1 mmol/L      Chloride 100 mmol/L      CO2 32.0 mmol/L      Calcium 9.0 mg/dL      BUN/Creatinine Ratio 14.9     Anion Gap 12.0 mmol/L      eGFR 106.0 mL/min/1.73     Narrative:      GFR Categories in Chronic Kidney Disease (CKD)      GFR Category          GFR (mL/min/1.73)    Interpretation  G1                     90 or greater         Normal or high (1)  G2                      60-89                Mild decrease (1)  G3a                   45-59                Mild to moderate decrease  G3b                   30-44                Moderate to severe decrease  G4                    15-29                Severe decrease  G5                    14 or less           Kidney failure          (1)In the absence of evidence of kidney disease, neither GFR category G1 or G2 fulfill the criteria for CKD.    eGFR calculation 2021 CKD-EPI creatinine equation, which does not include race as a factor    CBC & Differential [[id number]]  (Abnormal) Collected: 12/31/24 0358    Specimen: Blood Updated: 12/31/24 0424     Narrative:      The following orders were created for panel order CBC & Differential.  Procedure                               Abnormality         Status                     ---------                               -----------         ------                     CBC Auto Differential[[id number]]        Abnormal            Final result                 Please view results for these tests on the individual orders.    CBC Auto Differential [[id number]]  (Abnormal) Collected: 12/31/24 0358    Specimen: Blood Updated: 12/31/24 0424     WBC 7.39 10*3/mm3      RBC 4.08 10*6/mm3      Hemoglobin 13.1 g/dL      Hematocrit 42.7 %      .7 fL      MCH 32.1 pg      MCHC 30.7 g/dL      RDW 13.3 %      RDW-SD 51.8 fl      MPV 9.2 fL      Platelets 259 10*3/mm3      Neutrophil % 87.7 %      Lymphocyte % 8.9 %      Monocyte % 2.7 %      Eosinophil % 0.0 %      Basophil % 0.3 %      Immature Grans % 0.4 %      Neutrophils, Absolute 6.48 10*3/mm3      Lymphocytes, Absolute 0.66 10*3/mm3      Monocytes, Absolute 0.20 10*3/mm3      Eosinophils, Absolute 0.00 10*3/mm3      Basophils, Absolute 0.02 10*3/mm3      Immature Grans, Absolute 0.03 10*3/mm3      nRBC 0.0 /100 WBC     Hemoglobin A1c [[id number]]  (Abnormal) Collected: 12/30/24 1732    Specimen: Blood Updated: 12/31/24 0136     Hemoglobin A1C 5.68 %     Narrative:      Hemoglobin A1C Ranges:    Increased Risk for Diabetes  5.7% to 6.4%  Diabetes                     >= 6.5%  Diabetic Goal                < 7.0%    Legionella Antigen, Urine - Urine, Urine, Clean Catch [[id number]]  (Normal) Collected: 12/30/24 2347    Specimen: Urine, Clean Catch Updated: 12/31/24 0133     LEGIONELLA ANTIGEN, URINE Negative    S. Pneumo Ag Urine or CSF - Urine, Urine, Clean Catch [[id number]]  (Normal) Collected: 12/30/24 2347    Specimen: Urine, Clean Catch Updated: 12/31/24 0133     Strep Pneumo Ag Negative    TSH [[id number]]  (Normal) Collected: 12/30/24 1732    Specimen: Blood Updated: 12/31/24  0015     TSH 0.516 uIU/mL     Blood Gas, Arterial - [[id number]]  (Abnormal) Collected: 12/30/24 2253    Specimen: Arterial Blood Updated: 12/30/24 2305     Site Left Radial     Marquis's Test Positive     pH, Arterial 7.377 pH units      pCO2, Arterial 62.3 mm Hg      Comment: 83 Value above reference range        pO2, Arterial 53.5 mm Hg      Comment: 85 Value below critical limit        HCO3, Arterial 36.6 mmol/L      Comment: 83 Value above reference range        Base Excess, Arterial 9.2 mmol/L      Comment: 83 Value above reference range        O2 Saturation, Arterial 89.2 %      Comment: 84 Value below reference range        Hemoglobin, Blood Gas 13.0 g/dL      Comment: 84 Value below reference range        Temperature 37.0     Barometric Pressure for Blood Gas 733 mmHg      Modality Nasal Cannula     Flow Rate 1.0 lpm      Notified By [id number]     Collected by [id number]     Comment: Meter: [id number]     :  [id number]        pCO2, Temperature Corrected 62.3 mm Hg      pH, Temp Corrected 7.377 pH Units      pO2, Temperature Corrected 53.5 mm Hg     Lactic Acid, Plasma [[id number]]  (Normal) Collected: 12/30/24 2018    Specimen: Blood from Hand, Right Updated: 12/30/24 2039     Lactate 0.9 mmol/L     Blood Culture - Blood, Hand, Left [[id number]] Collected: 12/30/24 2021    Specimen: Blood from Hand, Left Updated: 12/30/24 2022    Blood Culture - Blood, Hand, Right [[id number]] Collected: 12/30/24 2021    Specimen: Blood from Hand, Right Updated: 12/30/24 2022    COVID-19, FLU A/B, RSV PCR 1 HR TAT - Swab, Nasopharynx [[id number]]  (Normal) Collected: 12/30/24 1726    Specimen: Swab from Nasopharynx Updated: 12/30/24 1807     COVID19 Not Detected     Influenza A PCR Not Detected     Influenza B PCR Not Detected     RSV, PCR Not Detected    Narrative:      Fact sheet for providers: https://www.fda.gov/media/808810/download    Fact sheet for patients: https://www.fda.gov/media/507715/download    Test performed  by PCR.    Comprehensive Metabolic Panel [[id number]]  (Abnormal) Collected: 12/30/24 1732    Specimen: Blood Updated: 12/30/24 1757     Glucose 126 mg/dL      BUN 6 mg/dL      Creatinine 0.75 mg/dL      Sodium 141 mmol/L      Potassium 4.1 mmol/L      Chloride 97 mmol/L      CO2 33.3 mmol/L      Calcium 9.1 mg/dL      Total Protein 7.3 g/dL      Albumin 3.8 g/dL      ALT (SGPT) 5 U/L      AST (SGOT) 11 U/L      Alkaline Phosphatase 93 U/L      Total Bilirubin 0.3 mg/dL      Globulin 3.5 gm/dL      A/G Ratio 1.1 g/dL      BUN/Creatinine Ratio 8.0     Anion Gap 10.7 mmol/L      eGFR 96.5 mL/min/1.73     Narrative:      GFR Categories in Chronic Kidney Disease (CKD)      GFR Category          GFR (mL/min/1.73)    Interpretation  G1                     90 or greater         Normal or high (1)  G2                      60-89                Mild decrease (1)  G3a                   45-59                Mild to moderate decrease  G3b                   30-44                Moderate to severe decrease  G4                    15-29                Severe decrease  G5                    14 or less           Kidney failure          (1)In the absence of evidence of kidney disease, neither GFR category G1 or G2 fulfill the criteria for CKD.    eGFR calculation 2021 CKD-EPI creatinine equation, which does not include race as a factor    BNP [[id number]]  (Normal) Collected: 12/30/24 1732    Specimen: Blood Updated: 12/30/24 1755     proBNP 83.6 pg/mL     Narrative:      This assay is used as an aid in the diagnosis of individuals suspected of having heart failure. It can be used as an aid in the diagnosis of acute decompensated heart failure (ADHF) in patients presenting with signs and symptoms of ADHF to the emergency department (ED). In addition, NT-proBNP of <300 pg/mL indicates ADHF is not likely.    Age Range Result Interpretation  NT-proBNP Concentration (pg/mL:      <50             Positive            >450                    Gray                 300-450                    Negative             <300    50-75           Positive            >900                  Gray                300-900                  Negative            <300      >75             Positive            >1800                  Gray                300-1800                  Negative            <300    High Sensitivity Troponin T [[id number]]  (Normal) Collected: 12/30/24 1732    Specimen: Blood Updated: 12/30/24 1755     HS Troponin T 12 ng/L     Narrative:      High Sensitive Troponin T Reference Range:  <14.0 ng/L- Negative Female for AMI  <22.0 ng/L- Negative Male for AMI  >=14 - Abnormal Female indicating possible myocardial injury.  >=22 - Abnormal Male indicating possible myocardial injury.   Clinicians would have to utilize clinical acumen, EKG, Troponin, and serial changes to determine if it is an Acute Myocardial Infarction or myocardial injury due to an underlying chronic condition.         [place] Draw [[id number]] Collected: 12/30/24 1732    Specimen: Blood Updated: 12/30/24 1745    Narrative:      The following orders were created for panel order [place] Draw.  Procedure                               Abnormality         Status                     ---------                               -----------         ------                     Green Top (Gel)[[id number]]                                  Final result               Lavender Top[[id number]]                                     Final result               Gold Top - SST[[id number]]                                   Final result               Light Blue Top[[id number]]                                   Final result                 Please view results for these tests on the individual orders.    Green Top (Gel) [[id number]] Collected: 12/30/24 1732    Specimen: Blood Updated: 12/30/24 1745     Extra Tube Hold for add-ons.     Comment: Auto resulted.       Lavender Top [[id number]] Collected: 12/30/24 1732    Specimen: Blood  Updated: 12/30/24 1745     Extra Tube hold for add-on     Comment: Auto resulted       Gold Top - SST [[id number]] Collected: 12/30/24 1732    Specimen: Blood Updated: 12/30/24 1745     Extra Tube Hold for add-ons.     Comment: Auto resulted.       Light Blue Top [[id number]] Collected: 12/30/24 1732    Specimen: Blood Updated: 12/30/24 1745     Extra Tube Hold for add-ons.     Comment: Auto resulted       CBC & Differential [[id number]]  (Abnormal) Collected: 12/30/24 1732    Specimen: Blood Updated: 12/30/24 1737    Narrative:      The following orders were created for panel order CBC & Differential.  Procedure                               Abnormality         Status                     ---------                               -----------         ------                     CBC Auto Differential[[id number]]        Abnormal            Final result                 Please view results for these tests on the individual orders.    CBC Auto Differential [[id number]]  (Abnormal) Collected: 12/30/24 1732    Specimen: Blood Updated: 12/30/24 1737     WBC 9.54 10*3/mm3      RBC 4.20 10*6/mm3      Hemoglobin 13.7 g/dL      Hematocrit 43.5 %      .6 fL      MCH 32.6 pg      MCHC 31.5 g/dL      RDW 13.4 %      RDW-SD 51.8 fl      MPV 8.7 fL      Platelets 278 10*3/mm3      Neutrophil % 70.7 %      Lymphocyte % 15.0 %      Monocyte % 13.1 %      Eosinophil % 0.5 %      Basophil % 0.3 %      Immature Grans % 0.4 %      Neutrophils, Absolute 6.74 10*3/mm3      Lymphocytes, Absolute 1.43 10*3/mm3      Monocytes, Absolute 1.25 10*3/mm3      Eosinophils, Absolute 0.05 10*3/mm3      Basophils, Absolute 0.03 10*3/mm3      Immature Grans, Absolute 0.04 10*3/mm3      nRBC 0.0 /100 WBC           Imaging Results (Last 72 Hours)       Procedure Component Value Units Date/Time    XR Ribs Right With PA Chest [[id number]] Collected: 12/31/24 1517     Updated: 12/31/24 1522    Narrative:      XR RIBS RIGHT W PA CHEST    Date of Exam: 12/31/2024  1:51 PM EST    Indication: Continued rib pain    Comparison: CT chest of 12/30/2024    Findings:  5 films. There are no acute or old rib fractures. There are patchy infiltrates in the lung bases which were identified on the CT exam and better visualized on that exam. Lung fields are well inflated. There are no effusions. The heart size is normal.      Impression:      Impression:  No abnormality of the right ribs. Patchy bibasilar infiltrates, may represent pneumonia. Please see CT chest exam report of 12/30/2024 for further description of abnormalities.      Electronically Signed: Noris Mireles MD    12/31/2024 3:19 PM EST    Workstation ID: [id number]    CT Angiogram Chest [[id number]] Collected: 12/30/24 2138     Updated: 12/30/24 2151    Narrative:      CT ANGIOGRAM CHEST    Date of Exam: 12/30/2024 8:43 PM EST    Indication: chest pain, tachycardia, hypoxia.    Comparison: CXR 1/25/2024, CT chest 1/17/2024    Technique: CTA of the chest was performed after the uneventful intravenous administration of iodinated contrast. Reconstructed coronal and sagittal images were also obtained. In addition, a 3-D volume rendered image was created for interpretation.   Automated exposure control and iterative reconstruction methods were used.      Findings:  The pulmonary arteries are well opacified. There are no findings of PE.    Lung window images reveal 2.5 cm patchy opacity in the medial right upper lobe seen on series 5 image 62. A 1.5 cm mass or consolidation is seen in the right lower lobe on series 5 image 91. Pleural-based consolidation in the lateral right lower lobe   measures 6 cm x 1.5 cm. 1.5 cm x 1.5 cm area of consolidation is seen in the lateral left lower lobe.    Findings could represent multifocal pneumonia. The patient has a significant smoking history. Findings are concerning for bronchogenic malignancy or lung cancer. Pulmonary consultation is recommended. Follow-up CT scan of the chest in 3 months is    recommended, if more aggressive evaluation is not undertaken.    Mediastinal windows reveal no mediastinal, hilar, or axillary adenopathy. Extensive coronary artery calcifications are evident.    Upper abdominal structures have an unremarkable appearance. Degenerative changes are seen in the thoracic spine.      Impression:      Impression:  CT scan of the chest with IV contrast demonstrating no findings of PE.    Multiple areas of consolidation and/or masses are seen in the lungs. Findings are consistent with multifocal pneumonia. The patient has a significant smoking history, with emphysema. Close follow-up is recommended with CT scan of the chest in 3 months.   Alternatively, a PET scan could be obtained.        Electronically Signed: Larry Berrios MD    12/30/2024 9:48 PM EST    Workstation ID: [id number]          ECG/EMG Results (last 72 hours)       Procedure Component Value Units Date/Time    ECG 12 Lead ED Triage Standing Order; SOA [[id number]] Collected: 12/30/24 1805     Updated: 12/31/24 0929     QT Interval 310 ms      QTC Interval 435 ms     Narrative:      HEART UAJU=039  bpm  RR Yjirdlxf=971  ms  NC Nfnuipzy=898  ms  P Horizontal Axis=2  deg  P Front Axis=75  deg  QRSD Interval=77  ms  QT Inkvphte=158  ms  SHmT=170  ms  QRS Axis=63  deg  T Wave Axis=85  deg  - OTHERWISE NORMAL ECG -  Sinus tachycardia  No change from prior tracing  Electronically Signed By: Edgar Farrar ([facility]) 2024-12-31 09:29:14  Date and Time of Study:2024-12-30 18:05:48          Orders (last 72 hrs)        Start     Ordered    12/31/24 2100  cefdinir (OMNICEF) capsule 300 mg  Every 12 Hours Scheduled         12/31/24 1445    12/31/24 1402  Procalcitonin  Once         12/31/24 1401    12/31/24 1354  XR Ribs Right With PA Chest  1 Time Imaging         12/31/24 1348    12/31/24 1349  XR Ribs 2 View Right  1 Time Imaging,   Status:  Canceled         12/31/24 1348    12/31/24 1158  POC Glucose Once  PROCEDURE ONCE        Comments:  Complete no more than 45 minutes prior to patient eating      12/31/24 1151    12/31/24 1037  Respiratory Panel PCR w/COVID-19(SARS-CoV-2) TERRY/ROSSI/KIKA/PAD/COR/RONAK In-House, NP Swab in UTM/VTM, 2 HR TAT - Swab, Nasopharynx  Once         12/31/24 1037    12/31/24 1015  butalbital-acetaminophen-caffeine (FIORICET, ESGIC) -40 MG per tablet 1 tablet  Every 4 Hours PRN         12/31/24 1034    12/31/24 0903  POC Glucose Once  PROCEDURE ONCE        Comments: Complete no more than 45 minutes prior to patient eating      12/31/24 0856    12/31/24 0900  aspirin EC tablet 81 mg  Daily         12/30/24 2338 12/31/24 0900  clopidogrel (PLAVIX) tablet 75 mg  Daily         12/30/24 2338 12/31/24 0900  fluticasone (FLONASE) 50 MCG/ACT nasal spray 1 spray  Daily         12/30/24 2338 12/31/24 0900  insulin glargine (LANTUS, SEMGLEE) injection 15 Units  Daily         12/30/24 2338 12/31/24 0900  lisinopril (PRINIVIL,ZESTRIL) tablet 20 mg  Every 24 Hours Scheduled         12/30/24 2338 12/31/24 0900  atorvastatin (LIPITOR) tablet 20 mg  Daily         12/30/24 2338 12/31/24 0900  Enoxaparin Sodium (LOVENOX) syringe 30 mg  Daily         12/30/24 2338 12/31/24 0900  famotidine (PEPCID) tablet 40 mg  Daily         12/30/24 2338 12/31/24 0900  nicotine (NICODERM CQ) 21 MG/24HR patch 1 patch  Every 24 Hours Scheduled         12/30/24 2248 12/31/24 0800  Oral Care  2 Times Daily       12/30/24 2338 12/31/24 0730  Insulin Lispro (humaLOG) injection 2-9 Units  4 Times Daily Before Meals & Nightly         12/30/24 2245 12/31/24 0700  POC Glucose 4x Daily Before Meals & at Bedtime  4 Times Daily Before Meals & at Bedtime      Comments: Complete no more than 45 minutes prior to patient eating      12/30/24 2245    12/31/24 0700  doxycycline (MONODOX) capsule 100 mg  Every 12 Hours,   Status:  Discontinued         12/31/24 0635    12/31/24 0600  Incentive Spirometry  Every 4 Hours While Awake       12/30/24  "2338 12/31/24 0600  Cortisol  Morning Draw         12/30/24 2338    12/31/24 0600  CBC & Differential  Morning Draw         12/30/24 2338 12/31/24 0600  Basic Metabolic Panel  Morning Draw         12/30/24 2338 12/31/24 0600  CBC Auto Differential  PROCEDURE ONCE         12/30/24 2338    12/31/24 0106  Hemoglobin A1c  Once         12/30/24 2245 12/31/24 0030  budesonide-formoterol (SYMBICORT) 160-4.5 MCG/ACT inhaler 2 puff  2 Times Daily - RT         12/30/24 2338 12/31/24 0030  guaiFENesin (MUCINEX) 12 hr tablet 1,200 mg  2 Times Daily         12/30/24 2338 12/31/24 0030  metoprolol tartrate (LOPRESSOR) tablet 50 mg  Every 12 Hours Scheduled         12/30/24 2338 12/31/24 0030  sodium chloride 0.9 % flush 10 mL  Every 12 Hours Scheduled         12/30/24 2338 12/31/24 0030  sennosides-docusate (PERICOLACE) 8.6-50 MG per tablet 2 tablet  2 Times Daily        Placed in \"And\" Linked Group    12/30/24 2338 12/31/24 0030  ipratropium-albuterol (DUO-NEB) nebulizer solution 3 mL  4 Times Daily - RT         12/30/24 2338 12/31/24 0030  cefTRIAXone (ROCEPHIN) 1,000 mg in sodium chloride 0.9 % 100 mL IVPB-VTB  Every 24 Hours,   Status:  Discontinued         12/30/24 2338 12/31/24 0030  doxycycline (MONODOX) capsule 100 mg  Every 12 Hours Scheduled,   Status:  Discontinued         12/30/24 2338    12/31/24 0000  Vital Signs  Every 4 Hours       12/30/24 2338    12/30/24 2352  TSH  Once         12/30/24 2338    12/30/24 2350  Vitamin B12 & Folate  Once         12/30/24 2338    12/30/24 2344  Respiratory Panel PCR w/COVID-19(SARS-CoV-2) TERRY/ROSSI/KIKA/PAD/[facility] In-House, NP Swab in UTM/VTM, 2 HR TAT - Swab, Nasopharynx  Once,   Status:  Canceled         12/30/24 2338    12/30/24 2339  Intake & Output  Every Shift       12/30/24 2338    12/30/24 2339  Weigh Patient  Once         12/30/24 2338    12/30/24 2339  Insert Peripheral IV  Once         12/30/24 2338    12/30/24 2339  Saline Lock & Maintain " "IV Access  Continuous,   Status:  Canceled         12/30/24 2338 12/30/24 2339  Continuous Cardiac Monitoring  Continuous,   Status:  Canceled        Comments: Follow Standing Orders As Outlined in Process Instructions (Open Order Report to View Full Instructions)    12/30/24 2338 12/30/24 2339  Maintain IV Access  Continuous,   Status:  Canceled         12/30/24 2338 12/30/24 2339  Telemetry - Place Orders & Notify Provider of Results When Patient Experiences Acute Chest Pain, Dysrhythmia or Respiratory Distress  Continuous,   Status:  Canceled        Comments: Open Order Report to View Parameters Requiring Provider Notification    12/30/24 2338 12/30/24 2339  May Be Off Telemetry for Tests  Continuous,   Status:  Canceled         12/30/24 2338 12/30/24 2339  Activity - Ad Lula  Until Discontinued         12/30/24 2338 12/30/24 2339  Notify Provider (With Default Parameters)  Continuous        Comments: Open Order Report to View Parameters Requiring Provider Notification    12/30/24 2338 12/30/24 2339  Diet: Cardiac, Diabetic; Healthy Heart (2-3 Na+); Consistent Carbohydrate; Fluid Consistency: Thin (IDDSI 0)  Diet Effective Now         12/30/24 2338    12/30/24 2339  Respiratory Culture - Sputum, Cough  Once         12/30/24 2338    12/30/24 2339  Legionella Antigen, Urine - Urine, Urine, Clean Catch  Once         12/30/24 2338 12/30/24 2339  S. Pneumo Ag Urine or CSF - Urine, Urine, Clean Catch  Once         12/30/24 2338 12/30/24 2339  SLP Consult: Eval & Treat Swallow Disorder, VFSS/FEES per SLP  Once        Comments: History of dysphagia and recurrent pneumonia    12/30/24 2338 12/30/24 2338  acetaminophen (TYLENOL) tablet 650 mg  Every 4 Hours PRN        Placed in \"Or\" Linked Group    12/30/24 2338 12/30/24 2338  acetaminophen (TYLENOL) 160 MG/5ML oral solution 650 mg  Every 4 Hours PRN        Placed in \"Or\" Linked Group    12/30/24 2338 12/30/24 2338  acetaminophen " "(TYLENOL) suppository 650 mg  Every 4 Hours PRN        Placed in \"Or\" Linked Group    12/30/24 2338    12/30/24 2338  acetaminophen (TYLENOL) tablet 650 mg  Every 4 Hours PRN,   Status:  Discontinued        Placed in \"Or\" Linked Group    12/30/24 2338    12/30/24 2338  acetaminophen (TYLENOL) 160 MG/5ML oral solution 650 mg  Every 4 Hours PRN,   Status:  Discontinued        Placed in \"Or\" Linked Group    12/30/24 2338    12/30/24 2338  acetaminophen (TYLENOL) suppository 650 mg  Every 4 Hours PRN,   Status:  Discontinued        Placed in \"Or\" Linked Group    12/30/24 2338    12/30/24 2338  polyethylene glycol (MIRALAX) packet 17 g  Daily PRN        Placed in \"And\" Linked Group    12/30/24 2338    12/30/24 2338  bisacodyl (DULCOLAX) EC tablet 5 mg  Daily PRN        Placed in \"And\" Linked Group    12/30/24 2338    12/30/24 2338  bisacodyl (DULCOLAX) suppository 10 mg  Daily PRN        Placed in \"And\" Linked Group    12/30/24 2338    12/30/24 2338  ondansetron ODT (ZOFRAN-ODT) disintegrating tablet 4 mg  Every 6 Hours PRN        Placed in \"Or\" Linked Group    12/30/24 2338    12/30/24 2338  ondansetron (ZOFRAN) injection 4 mg  Every 6 Hours PRN        Placed in \"Or\" Linked Group    12/30/24 2338    12/30/24 2338  acetaminophen (TYLENOL) tablet 650 mg  Every 4 Hours PRN         12/30/24 2338    12/30/24 2338  albuterol sulfate HFA (PROVENTIL HFA;VENTOLIN HFA;PROAIR HFA) inhaler 2 puff  Every 4 Hours PRN         12/30/24 2338    12/30/24 2338  sodium chloride 0.9 % flush 10 mL  As Needed         12/30/24 2338    12/30/24 2338  sodium chloride 0.9 % infusion 40 mL  As Needed         12/30/24 2338    12/30/24 2338  nitroglycerin (NITROSTAT) SL tablet 0.4 mg  Every 5 Minutes PRN,   Status:  Discontinued         12/30/24 2338    12/30/24 2338  Potassium Replacement - Follow Nurse / BPA Driven Protocol  As Needed         12/30/24 2338    12/30/24 2338  Magnesium Standard Dose Replacement - Follow Nurse / BPA Driven Protocol  " "As Needed         12/30/24 2338    12/30/24 2338  Phosphorus Replacement - Follow Nurse / BPA Driven Protocol  As Needed         12/30/24 2338    12/30/24 2338  Calcium Replacement - Follow Nurse / BPA Driven Protocol  As Needed         12/30/24 2338    12/30/24 2338  HYDROcodone-acetaminophen (NORCO) 5-325 MG per tablet 1 tablet  Every 6 Hours PRN         12/30/24 2338    12/30/24 2338  LORazepam (ATIVAN) tablet 0.5 mg  Every 8 Hours PRN         12/30/24 2338    12/30/24 2338  melatonin tablet 5 mg  Nightly PRN         12/30/24 2338    12/30/24 2301  Blood Gas, Arterial -  PROCEDURE ONCE         12/30/24 2253    12/30/24 2252  methylPREDNISolone sodium succinate (SOLU-Medrol) injection 40 mg  Every 8 Hours         12/30/24 2236 12/30/24 2244  dextrose (GLUTOSE) oral gel 15 g  Every 15 Minutes PRN         12/30/24 2245 12/30/24 2244  dextrose (D50W) (25 g/50 mL) IV injection 25 g  Every 15 Minutes PRN         12/30/24 2245    12/30/24 2244  glucagon (GLUCAGEN) injection 1 mg  Every 15 Minutes PRN         12/30/24 2245 12/30/24 2235  Blood Gas, Arterial -  Once         12/30/24 2234 12/30/24 2216  Code Status and Medical Interventions: CPR (Attempt to Resuscitate); Full Support  Continuous         12/30/24 2221 12/30/24 2215  Inpatient Admission  Once         12/30/24 2221 12/30/24 2112  iopamidol (ISOVUE-370) 76 % injection 100 mL  Once in Imaging         12/30/24 2056 12/30/24 2006  acetaminophen (TYLENOL) tablet 1,000 mg  Once         12/30/24 1950    12/30/24 2004  cefTRIAXone Sodium 1,000 mg in sodium chloride 0.9 % 100 mL ADV  Once         12/30/24 1949 12/30/24 1949  Lactic Acid, Plasma  Once         12/30/24 1949 12/30/24 1949  Blood Culture - Blood, Hand, Left  Once        Placed in \"And\" Linked Group    12/30/24 1949 12/30/24 1949  Blood Culture - Blood, Hand, Right  Once        Placed in \"And\" Linked Group    12/30/24 1949 12/30/24 1832  High Sensitivity Troponin T 1Hr  " PROCEDURE ONCE,   Status:  Canceled         12/30/24 1755    12/30/24 1827  methylPREDNISolone sodium succinate (SOLU-Medrol) injection 125 mg  Once         12/30/24 1811    12/30/24 1827  doxycycline (MONODOX) capsule 100 mg  Once         12/30/24 1811    12/30/24 1827  ipratropium-albuterol (DUO-NEB) nebulizer solution 3 mL  Once         12/30/24 1811    12/30/24 1810  CT Angiogram Chest  1 Time Imaging         12/30/24 1810    12/30/24 1732  NPO Diet NPO Type: Strict NPO  Diet Effective Now,   Status:  Canceled         12/30/24 1731    12/30/24 1732  Undress & Gown  Once         12/30/24 1731    12/30/24 1732  Cardiac Monitoring  Continuous,   Status:  Canceled        Comments: Follow Standing Orders As Outlined in Process Instructions (Open Order Report to View Full Instructions)    12/30/24 1731    12/30/24 1732  Continuous Pulse Oximetry  Continuous         12/30/24 1731    12/30/24 1732  Vital Signs  Per Hospital Policy         12/30/24 1731    12/30/24 1732  ECG 12 Lead ED Triage Standing Order; SOA  Once         12/30/24 1731    12/30/24 1732  XR Chest 2 View  1 Time Imaging,   Status:  Canceled         12/30/24 1731    12/30/24 1732  Insert Peripheral IV  Once         12/30/24 1731    12/30/24 1732  [place] Draw  Once         12/30/24 1731    12/30/24 1732  CBC & Differential  Once         12/30/24 1731    12/30/24 1732  Comprehensive Metabolic Panel  Once         12/30/24 1731    12/30/24 1732  BNP  Once         12/30/24 1731    12/30/24 1732  High Sensitivity Troponin T  Once         12/30/24 1731    12/30/24 1732  Green Top (Gel)  PROCEDURE ONCE         12/30/24 1732    12/30/24 1732  Lavender Top  PROCEDURE ONCE         12/30/24 1732    12/30/24 1732  Gold Top - SST  PROCEDURE ONCE         12/30/24 1732    12/30/24 1732  Light Blue Top  PROCEDURE ONCE         12/30/24 1732    12/30/24 1732  CBC Auto Differential  PROCEDURE ONCE         12/30/24 1732    12/30/24 1731  Oxygen Therapy- Nasal Cannula;  "Titrate 1-6 LPM Per SpO2; 90 - 95%  Continuous PRN,   Status:  Canceled       12/30/24 1731    12/30/24 1731  sodium chloride 0.9 % flush 10 mL  As Needed         12/30/24 1731    12/30/24 1724  COVID-19, FLU A/B, RSV PCR 1 HR TAT - Swab, Nasopharynx  Once         12/30/24 1724    Unscheduled  Follow Hypoglycemia Standing Orders For Blood Glucose <70 & Notify Provider of Treatment  As Needed      Comments: Follow Hypoglycemia Orders As Outlined in Process Instructions (Open Order Report to View Full Instructions)  Notify Provider Any Time Hypoglycemia Treatment is Administered    12/30/24 2245    --  promethazine (PHENERGAN) 12.5 MG tablet  Every 6 Hours PRN         12/31/24 0012                  Operative/Procedure Notes (last 72 hours)  Notes from 12/28/24 1600 through 12/31/24 1600   No notes of this type exist for this encounter.          Physician Progress Notes (last 72 hours)        Dagoberto Chilel MD at 12/31/24 0941          Hospitalist Team      Patient Care Team:  Talib Ceron MD as PCP - General (Family Medicine)        Chief Complaint:  Follow-up AECOPD w/ Hypoxia    Subjective    Sloughs reports she feels well this morning however she still has some right sided pain.  She feels as though she broke some ribs on the right side from coughing.  She reports she can tell when she is coming down with pneumonia.  She denies chest pain.  She is getting up to the bathroom without issues.  She is tolerating p.o.    Objective    Vital Signs  Temp:  [97.3 °F (36.3 °C)-100.1 °F (37.8 °C)] 97.3 °F (36.3 °C)  Heart Rate:  [] 68  Resp:  [18-24] 20  BP: (128-146)/(53-82) 128/66  Oxygen Therapy  SpO2: 97 %  Pulse Oximetry Type: Intermittent  Device (Oxygen Therapy): nasal cannula  Flow (L/min) (Oxygen Therapy): 2}    Flowsheet Rows      Flowsheet Row First Filed Value   Admission Height 157.5 cm (62\") Documented at 12/30/2024 1703   Admission Weight 54.4 kg (120 lb) Documented at 12/30/2024 1703      "     Physical Exam:    General: Appears older than stated age in no acute distress.  Lungs: Breath sounds are coarse.  Appreciate no wheeze.  Respirations are nonlabored.  CV: Regular rate and rhythm.  No murmurs appreciated.  Radial and pedal pulses are 2+ and symmetric.  Abdomen: Soft and nontender with active bowel sounds.  MSK: No clubbing, cyanosis, or edema.  Neuro: Cranial nerves II through XII are grossly intact.  Psych: Normal affect.  [place] x 3.    Results Review:     I reviewed the patient's new clinical results.    Lab Results (last 24 hours)       Procedure Component Value Units Date/Time    Respiratory Culture - Sputum, Cough [[id number]] Collected: 12/31/24 0907    Specimen: Sputum from Cough Updated: 12/31/24 0909    POC Glucose Once [[id number]]  (Abnormal) Collected: 12/31/24 0856    Specimen: Blood Updated: 12/31/24 0902     Glucose 185 mg/dL     Basic Metabolic Panel [[id number]]  (Abnormal) Collected: 12/31/24 0358    Specimen: Blood Updated: 12/31/24 0449     Glucose 191 mg/dL      BUN 10 mg/dL      Creatinine 0.67 mg/dL      Sodium 144 mmol/L      Potassium 4.1 mmol/L      Chloride 100 mmol/L      CO2 32.0 mmol/L      Calcium 9.0 mg/dL      BUN/Creatinine Ratio 14.9     Anion Gap 12.0 mmol/L      eGFR 106.0 mL/min/1.73     Narrative:      GFR Categories in Chronic Kidney Disease (CKD)      GFR Category          GFR (mL/min/1.73)    Interpretation  G1                     90 or greater         Normal or high (1)  G2                      60-89                Mild decrease (1)  G3a                   45-59                Mild to moderate decrease  G3b                   30-44                Moderate to severe decrease  G4                    15-29                Severe decrease  G5                    14 or less           Kidney failure          (1)In the absence of evidence of kidney disease, neither GFR category G1 or G2 fulfill the criteria for CKD.    eGFR calculation 2021 CKD-EPI creatinine  equation, which does not include race as a factor    CBC & Differential [[id number]]  (Abnormal) Collected: 12/31/24 0358    Specimen: Blood Updated: 12/31/24 0424    Narrative:      The following orders were created for panel order CBC & Differential.  Procedure                               Abnormality         Status                     ---------                               -----------         ------                     CBC Auto Differential[[id number]]        Abnormal            Final result                 Please view results for these tests on the individual orders.    CBC Auto Differential [[id number]]  (Abnormal) Collected: 12/31/24 0358    Specimen: Blood Updated: 12/31/24 0424     WBC 7.39 10*3/mm3      RBC 4.08 10*6/mm3      Hemoglobin 13.1 g/dL      Hematocrit 42.7 %      .7 fL      MCH 32.1 pg      MCHC 30.7 g/dL      RDW 13.3 %      RDW-SD 51.8 fl      MPV 9.2 fL      Platelets 259 10*3/mm3      Neutrophil % 87.7 %      Lymphocyte % 8.9 %      Monocyte % 2.7 %      Eosinophil % 0.0 %      Basophil % 0.3 %      Immature Grans % 0.4 %      Neutrophils, Absolute 6.48 10*3/mm3      Lymphocytes, Absolute 0.66 10*3/mm3      Monocytes, Absolute 0.20 10*3/mm3      Eosinophils, Absolute 0.00 10*3/mm3      Basophils, Absolute 0.02 10*3/mm3      Immature Grans, Absolute 0.03 10*3/mm3      nRBC 0.0 /100 WBC     Cortisol [[id number]] Collected: 12/31/24 0358    Specimen: Blood Updated: 12/31/24 0416    Hemoglobin A1c [[id number]]  (Abnormal) Collected: 12/30/24 1732    Specimen: Blood Updated: 12/31/24 0136     Hemoglobin A1C 5.68 %     Narrative:      Hemoglobin A1C Ranges:    Increased Risk for Diabetes  5.7% to 6.4%  Diabetes                     >= 6.5%  Diabetic Goal                < 7.0%    Legionella Antigen, Urine - Urine, Urine, Clean Catch [[id number]]  (Normal) Collected: 12/30/24 9167    Specimen: Urine, Clean Catch Updated: 12/31/24 0133     LEGIONELLA ANTIGEN, URINE Negative    S. Pneumo Ag Urine or  CSF - Urine, Urine, Clean Catch [[id number]]  (Normal) Collected: 12/30/24 2347    Specimen: Urine, Clean Catch Updated: 12/31/24 0133     Strep Pneumo Ag Negative    TSH [[id number]]  (Normal) Collected: 12/30/24 1732    Specimen: Blood Updated: 12/31/24 0015     TSH 0.516 uIU/mL     Vitamin B12 & Folate [[id number]] Collected: 12/30/24 1732    Specimen: Blood Updated: 12/30/24 2350    Respiratory Panel PCR w/COVID-19(SARS-CoV-2) TERRY/ROSSI/KIKA/PAD/COR/RONAK In-House, NP Swab in UTM/VTM, 2 HR TAT - Swab, Nasopharynx [[id number]] Collected: 12/30/24 1726    Specimen: Swab from Nasopharynx Updated: 12/30/24 2344    Blood Gas, Arterial - [[id number]]  (Abnormal) Collected: 12/30/24 2253    Specimen: Arterial Blood Updated: 12/30/24 2305     Site Left Radial     Marquis's Test Positive     pH, Arterial 7.377 pH units      pCO2, Arterial 62.3 mm Hg      Comment: 83 Value above reference range        pO2, Arterial 53.5 mm Hg      Comment: 85 Value below critical limit        HCO3, Arterial 36.6 mmol/L      Comment: 83 Value above reference range        Base Excess, Arterial 9.2 mmol/L      Comment: 83 Value above reference range        O2 Saturation, Arterial 89.2 %      Comment: 84 Value below reference range        Hemoglobin, Blood Gas 13.0 g/dL      Comment: 84 Value below reference range        Temperature 37.0     Barometric Pressure for Blood Gas 733 mmHg      Modality Nasal Cannula     Flow Rate 1.0 lpm      Notified By [id number]     Collected by [id number]     Comment: Meter: [id number]     :  [id number]        pCO2, Temperature Corrected 62.3 mm Hg      pH, Temp Corrected 7.377 pH Units      pO2, Temperature Corrected 53.5 mm Hg     Lactic Acid, Plasma [[id number]]  (Normal) Collected: 12/30/24 2018    Specimen: Blood from Hand, Right Updated: 12/30/24 2039     Lactate 0.9 mmol/L     Blood Culture - Blood, Hand, Left [[id number]] Collected: 12/30/24 2021    Specimen: Blood from Hand, Left Updated: 12/30/24 2022    Blood  Culture - Blood, Hand, Right [[id number]] Collected: 12/30/24 2021    Specimen: Blood from Hand, Right Updated: 12/30/24 2022    COVID-19, FLU A/B, RSV PCR 1 HR TAT - Swab, Nasopharynx [[id number]]  (Normal) Collected: 12/30/24 1726    Specimen: Swab from Nasopharynx Updated: 12/30/24 1807     COVID19 Not Detected     Influenza A PCR Not Detected     Influenza B PCR Not Detected     RSV, PCR Not Detected    Narrative:      Fact sheet for providers: https://www.fda.gov/media/404109/download    Fact sheet for patients: https://www.fda.gov/media/990531/download    Test performed by PCR.    Comprehensive Metabolic Panel [[id number]]  (Abnormal) Collected: 12/30/24 1732    Specimen: Blood Updated: 12/30/24 1757     Glucose 126 mg/dL      BUN 6 mg/dL      Creatinine 0.75 mg/dL      Sodium 141 mmol/L      Potassium 4.1 mmol/L      Chloride 97 mmol/L      CO2 33.3 mmol/L      Calcium 9.1 mg/dL      Total Protein 7.3 g/dL      Albumin 3.8 g/dL      ALT (SGPT) 5 U/L      AST (SGOT) 11 U/L      Alkaline Phosphatase 93 U/L      Total Bilirubin 0.3 mg/dL      Globulin 3.5 gm/dL      A/G Ratio 1.1 g/dL      BUN/Creatinine Ratio 8.0     Anion Gap 10.7 mmol/L      eGFR 96.5 mL/min/1.73     Narrative:      GFR Categories in Chronic Kidney Disease (CKD)      GFR Category          GFR (mL/min/1.73)    Interpretation  G1                     90 or greater         Normal or high (1)  G2                      60-89                Mild decrease (1)  G3a                   45-59                Mild to moderate decrease  G3b                   30-44                Moderate to severe decrease  G4                    15-29                Severe decrease  G5                    14 or less           Kidney failure          (1)In the absence of evidence of kidney disease, neither GFR category G1 or G2 fulfill the criteria for CKD.    eGFR calculation 2021 CKD-EPI creatinine equation, which does not include race as a factor    BNP [[id number]]  (Normal)  Collected: 12/30/24 1732    Specimen: Blood Updated: 12/30/24 1755     proBNP 83.6 pg/mL     Narrative:      This assay is used as an aid in the diagnosis of individuals suspected of having heart failure. It can be used as an aid in the diagnosis of acute decompensated heart failure (ADHF) in patients presenting with signs and symptoms of ADHF to the emergency department (ED). In addition, NT-proBNP of <300 pg/mL indicates ADHF is not likely.    Age Range Result Interpretation  NT-proBNP Concentration (pg/mL:      <50             Positive            >450                   Gray                 300-450                    Negative             <300    50-75           Positive            >900                  Gray                300-900                  Negative            <300      >75             Positive            >1800                  Gray                300-1800                  Negative            <300    High Sensitivity Troponin T [[id number]]  (Normal) Collected: 12/30/24 1732    Specimen: Blood Updated: 12/30/24 1755     HS Troponin T 12 ng/L     Narrative:      High Sensitive Troponin T Reference Range:  <14.0 ng/L- Negative Female for AMI  <22.0 ng/L- Negative Male for AMI  >=14 - Abnormal Female indicating possible myocardial injury.  >=22 - Abnormal Male indicating possible myocardial injury.   Clinicians would have to utilize clinical acumen, EKG, Troponin, and serial changes to determine if it is an Acute Myocardial Infarction or myocardial injury due to an underlying chronic condition.         [place] Draw [[id number]] Collected: 12/30/24 1732    Specimen: Blood Updated: 12/30/24 1745    Narrative:      The following orders were created for panel order [place] Draw.  Procedure                               Abnormality         Status                     ---------                               -----------         ------                     Green Top (Gel)[[id number]]                                  Final  result               Lavender Top[[id number]]                                     Final result               Gold Top - SST[[id number]]                                   Final result               Light Blue Top[[id number]]                                   Final result                 Please view results for these tests on the individual orders.    Green Top (Gel) [[id number]] Collected: 12/30/24 1732    Specimen: Blood Updated: 12/30/24 1745     Extra Tube Hold for add-ons.     Comment: Auto resulted.       Lavender Top [[id number]] Collected: 12/30/24 1732    Specimen: Blood Updated: 12/30/24 1745     Extra Tube hold for add-on     Comment: Auto resulted       Gold Top - SST [[id number]] Collected: 12/30/24 1732    Specimen: Blood Updated: 12/30/24 1745     Extra Tube Hold for add-ons.     Comment: Auto resulted.       Light Blue Top [[id number]] Collected: 12/30/24 1732    Specimen: Blood Updated: 12/30/24 1745     Extra Tube Hold for add-ons.     Comment: Auto resulted       CBC & Differential [[id number]]  (Abnormal) Collected: 12/30/24 1732    Specimen: Blood Updated: 12/30/24 1737    Narrative:      The following orders were created for panel order CBC & Differential.  Procedure                               Abnormality         Status                     ---------                               -----------         ------                     CBC Auto Differential[[id number]]        Abnormal            Final result                 Please view results for these tests on the individual orders.    CBC Auto Differential [[id number]]  (Abnormal) Collected: 12/30/24 1732    Specimen: Blood Updated: 12/30/24 1737     WBC 9.54 10*3/mm3      RBC 4.20 10*6/mm3      Hemoglobin 13.7 g/dL      Hematocrit 43.5 %      .6 fL      MCH 32.6 pg      MCHC 31.5 g/dL      RDW 13.4 %      RDW-SD 51.8 fl      MPV 8.7 fL      Platelets 278 10*3/mm3      Neutrophil % 70.7 %      Lymphocyte % 15.0 %      Monocyte % 13.1 %      Eosinophil %  0.5 %      Basophil % 0.3 %      Immature Grans % 0.4 %      Neutrophils, Absolute 6.74 10*3/mm3      Lymphocytes, Absolute 1.43 10*3/mm3      Monocytes, Absolute 1.25 10*3/mm3      Eosinophils, Absolute 0.05 10*3/mm3      Basophils, Absolute 0.03 10*3/mm3      Immature Grans, Absolute 0.04 10*3/mm3      nRBC 0.0 /100 WBC             Imaging Results (Last 24 Hours)       Procedure Component Value Units Date/Time    CT Angiogram Chest [[id number]] Collected: 12/30/24 2138     Updated: 12/30/24 2151    Narrative:      CT ANGIOGRAM CHEST    Date of Exam: 12/30/2024 8:43 PM EST    Indication: chest pain, tachycardia, hypoxia.    Comparison: CXR 1/25/2024, CT chest 1/17/2024    Technique: CTA of the chest was performed after the uneventful intravenous administration of iodinated contrast. Reconstructed coronal and sagittal images were also obtained. In addition, a 3-D volume rendered image was created for interpretation.   Automated exposure control and iterative reconstruction methods were used.      Findings:  The pulmonary arteries are well opacified. There are no findings of PE.    Lung window images reveal 2.5 cm patchy opacity in the medial right upper lobe seen on series 5 image 62. A 1.5 cm mass or consolidation is seen in the right lower lobe on series 5 image 91. Pleural-based consolidation in the lateral right lower lobe   measures 6 cm x 1.5 cm. 1.5 cm x 1.5 cm area of consolidation is seen in the lateral left lower lobe.    Findings could represent multifocal pneumonia. The patient has a significant smoking history. Findings are concerning for bronchogenic malignancy or lung cancer. Pulmonary consultation is recommended. Follow-up CT scan of the chest in 3 months is   recommended, if more aggressive evaluation is not undertaken.    Mediastinal windows reveal no mediastinal, hilar, or axillary adenopathy. Extensive coronary artery calcifications are evident.    Upper abdominal structures have an unremarkable  appearance. Degenerative changes are seen in the thoracic spine.      Impression:      Impression:  CT scan of the chest with IV contrast demonstrating no findings of PE.    Multiple areas of consolidation and/or masses are seen in the lungs. Findings are consistent with multifocal pneumonia. The patient has a significant smoking history, with emphysema. Close follow-up is recommended with CT scan of the chest in 3 months.   Alternatively, a PET scan could be obtained.        Electronically Signed: Larry Berrios MD    12/30/2024 9:48 PM EST    Workstation ID: [id number]            X-ray reviewed personally by physician.      Medication Review:   I have reviewed the patient's current medication list    Current Facility-Administered Medications:     acetaminophen (TYLENOL) tablet 650 mg, 650 mg, Oral, Q4H PRN **OR** acetaminophen (TYLENOL) 160 MG/5ML oral solution 650 mg, 650 mg, Oral, Q4H PRN **OR** acetaminophen (TYLENOL) suppository 650 mg, 650 mg, Rectal, Q4H PRN, Polly Lucero MD    acetaminophen (TYLENOL) tablet 650 mg, 650 mg, Oral, Q4H PRN, Polly Lucero MD, 650 mg at 12/31/24 0905    albuterol sulfate HFA (PROVENTIL HFA;VENTOLIN HFA;PROAIR HFA) inhaler 2 puff, 2 puff, Inhalation, Q4H PRN, Polly Lucero MD    aspirin EC tablet 81 mg, 81 mg, Oral, Daily, Polly Lucero MD, 81 mg at 12/31/24 0853    atorvastatin (LIPITOR) tablet 20 mg, 20 mg, Oral, Daily, Polly Lucero MD, 20 mg at 12/31/24 0853    sennosides-docusate (PERICOLACE) 8.6-50 MG per tablet 2 tablet, 2 tablet, Oral, BID **AND** polyethylene glycol (MIRALAX) packet 17 g, 17 g, Oral, Daily PRN **AND** bisacodyl (DULCOLAX) EC tablet 5 mg, 5 mg, Oral, Daily PRN **AND** bisacodyl (DULCOLAX) suppository 10 mg, 10 mg, Rectal, Daily PRN, Polly Lucero MD    budesonide-formoterol (SYMBICORT) 160-4.5 MCG/ACT inhaler 2 puff, 2 puff, Inhalation, BID - RT, Polly Lucero MD, 2 puff at 12/31/24 8179    Calcium Replacement - Follow Nurse /  BPA Driven Protocol, , Not Applicable, PRN, Polly Lucero MD    cefTRIAXone (ROCEPHIN) 1,000 mg in sodium chloride 0.9 % 100 mL IVPB-VTB, 1,000 mg, Intravenous, Q24H, Polly Lucero MD    clopidogrel (PLAVIX) tablet 75 mg, 75 mg, Oral, Daily, Polly Lucero MD, 75 mg at 12/31/24 0853    dextrose (D50W) (25 g/50 mL) IV injection 25 g, 25 g, Intravenous, Q15 Min PRN, Polly Lucero MD    dextrose (GLUTOSE) oral gel 15 g, 15 g, Oral, Q15 Min PRN, Polly Lucero MD    doxycycline (MONODOX) capsule 100 mg, 100 mg, Oral, Q12H, Opal Ledezma PA-C    Enoxaparin Sodium (LOVENOX) syringe 30 mg, 30 mg, Subcutaneous, Daily, Polly Lucero MD, 30 mg at 12/31/24 0852    famotidine (PEPCID) tablet 40 mg, 40 mg, Oral, Daily, Polly Lucero MD, 40 mg at 12/31/24 0852    fluticasone (FLONASE) 50 MCG/ACT nasal spray 1 spray, 1 spray, Each Nare, Daily, Polly Lucero MD    glucagon (GLUCAGEN) injection 1 mg, 1 mg, Intramuscular, Q15 Min PRN, Polly Lucero MD    guaiFENesin (MUCINEX) 12 hr tablet 1,200 mg, 1,200 mg, Oral, BID, Polly Lucero MD, 1,200 mg at 12/31/24 0109    HYDROcodone-acetaminophen (NORCO) 5-325 MG per tablet 1 tablet, 1 tablet, Oral, Q6H PRN, Polly Lucero MD    insulin glargine (LANTUS, SEMGLEE) injection 15 Units, 15 Units, Subcutaneous, Daily, Polly Lucero MD, 15 Units at 12/31/24 0854    Insulin Lispro (humaLOG) injection 2-9 Units, 2-9 Units, Subcutaneous, 4x Daily AC & at Bedtime, Polly Lucero MD, 2 Units at 12/31/24 0905    ipratropium-albuterol (DUO-NEB) nebulizer solution 3 mL, 3 mL, Nebulization, 4x Daily - RT, Polly Lucero MD, 3 mL at 12/31/24 0742    lisinopril (PRINIVIL,ZESTRIL) tablet 20 mg, 20 mg, Oral, Q24H, Polly Lucero MD, 20 mg at 12/31/24 0853    LORazepam (ATIVAN) tablet 0.5 mg, 0.5 mg, Oral, Q8H PRN, Polly Lucero MD    Magnesium Standard Dose Replacement - Follow Nurse / BPA Driven Protocol, , Not Applicable, PRN, Polly Lucero  MD ALL[facility]    melatonin tablet 5 mg, 5 mg, Oral, Nightly PRN, Polly Lucero MD    methylPREDNISolone sodium succinate (SOLU-Medrol) injection 40 mg, 40 mg, Intravenous, Q8H, Polly Lucero MD, 40 mg at 12/31/24 0618    metoprolol tartrate (LOPRESSOR) tablet 50 mg, 50 mg, Oral, Q12H, Polly Lucero MD, 50 mg at 12/31/24 0110    nicotine (NICODERM CQ) 21 MG/24HR patch 1 patch, 1 patch, Transdermal, Q24H, Polly Lucero MD, 1 patch at 12/31/24 0853    nitroglycerin (NITROSTAT) SL tablet 0.4 mg, 0.4 mg, Sublingual, Q5 Min PRN, Polly Lucero MD    ondansetron ODT (ZOFRAN-ODT) disintegrating tablet 4 mg, 4 mg, Oral, Q6H PRN **OR** ondansetron (ZOFRAN) injection 4 mg, 4 mg, Intravenous, Q6H PRN, Polly Lucero MD    Phosphorus Replacement - Follow Nurse / BPA Driven Protocol, , Not Applicable, PRNic LOJA Samer H, MD    Potassium Replacement - Follow Nurse / BPA Driven Protocol, , Not Applicable, Nic GONZALEZ Samer H, MD    sodium chloride 0.9 % flush 10 mL, 10 mL, Intravenous, PRNNic Samer H, MD    sodium chloride 0.9 % flush 10 mL, 10 mL, Intravenous, Q12H, Polly Lucero MD, 10 mL at 12/31/24 0855    sodium chloride 0.9 % flush 10 mL, 10 mL, Intravenous, Nic GONZALEZ Samer H, MD    sodium chloride 0.9 % infusion 40 mL, 40 mL, Intravenous, Nic GONZALEZ Samer H, MD      Assessment & Plan     Acute exacerbation of COPD with hypoxia: It is unclear if she wears her oxygen full-time or just at night.  Patient reports she only wears it at night.  However, her CT of her chest may suggest otherwise.  Viral panel was positive for rhinovirus.  Check procalcitonin.  Urinary antigens were negative.  Likely can begin de-escalating antibiotic therapy as well as weaning O2.  Continue nebulized bronchodilators.  Diabetes mellitus type 2: A1c great at 5.68% last in our records was 5.8%.  Bedside glucose levels are up a little so we will continue current insulin regimen.  Macrocytosis: Cortisol and TSH levels  were fine as are B12 and folate.  Reticulocytes?  Will add.  Essential hypertension: Blood pressure near goal on exam.  Continue to monitor trend on current regimen.  History of coronary artery disease: No reported chest pain.  Continue beta-blocker as well as dual antiplatelet therapy.  Tobacco abuse: Continue NicoDerm.    Plan for disposition: Predicated on hospital course.    Dagoberto Chilel MD  12/31/24  09:41 EST    Electronically signed by Dagobetro Chilel MD at 12/31/24 1401       Consult Notes (last 72 hours)  Notes from 12/28/24 1601 through 12/31/24 1601   No notes of this type exist for this encounter.

## 2024-12-31 NOTE — PROGRESS NOTES
"Hospitalist Team      Patient Care Team:  Talib Ceron MD as PCP - General (Family Medicine)        Chief Complaint:  Follow-up AECOPD w/ Hypoxia    Subjective    Sloughs reports she feels well this morning however she still has some right sided pain.  She feels as though she broke some ribs on the right side from coughing.  She reports she can tell when she is coming down with pneumonia.  She denies chest pain.  She is getting up to the bathroom without issues.  She is tolerating p.o.    Objective    Vital Signs  Temp:  [97.3 °F (36.3 °C)-100.1 °F (37.8 °C)] 97.3 °F (36.3 °C)  Heart Rate:  [] 68  Resp:  [18-24] 20  BP: (128-146)/(53-82) 128/66  Oxygen Therapy  SpO2: 97 %  Pulse Oximetry Type: Intermittent  Device (Oxygen Therapy): nasal cannula  Flow (L/min) (Oxygen Therapy): 2}    Flowsheet Rows      Flowsheet Row First Filed Value   Admission Height 157.5 cm (62\") Documented at 12/30/2024 1703   Admission Weight 54.4 kg (120 lb) Documented at 12/30/2024 1703          Physical Exam:    General: Appears older than stated age in no acute distress.  Lungs: Breath sounds are coarse.  Appreciate no wheeze.  Respirations are nonlabored.  CV: Regular rate and rhythm.  No murmurs appreciated.  Radial and pedal pulses are 2+ and symmetric.  Abdomen: Soft and nontender with active bowel sounds.  MSK: No clubbing, cyanosis, or edema.  Neuro: Cranial nerves II through XII are grossly intact.  Psych: Normal affect.  [place] x 3.    Results Review:     I reviewed the patient's new clinical results.    Lab Results (last 24 hours)       Procedure Component Value Units Date/Time    Respiratory Culture - Sputum, Cough [[id number]] Collected: 12/31/24 0907    Specimen: Sputum from Cough Updated: 12/31/24 0909    POC Glucose Once [[id number]]  (Abnormal) Collected: 12/31/24 0856    Specimen: Blood Updated: 12/31/24 0902     Glucose 185 mg/dL     Basic Metabolic Panel [[id number]]  (Abnormal) Collected: 12/31/24 0358    " Specimen: Blood Updated: 12/31/24 0449     Glucose 191 mg/dL      BUN 10 mg/dL      Creatinine 0.67 mg/dL      Sodium 144 mmol/L      Potassium 4.1 mmol/L      Chloride 100 mmol/L      CO2 32.0 mmol/L      Calcium 9.0 mg/dL      BUN/Creatinine Ratio 14.9     Anion Gap 12.0 mmol/L      eGFR 106.0 mL/min/1.73     Narrative:      GFR Categories in Chronic Kidney Disease (CKD)      GFR Category          GFR (mL/min/1.73)    Interpretation  G1                     90 or greater         Normal or high (1)  G2                      60-89                Mild decrease (1)  G3a                   45-59                Mild to moderate decrease  G3b                   30-44                Moderate to severe decrease  G4                    15-29                Severe decrease  G5                    14 or less           Kidney failure          (1)In the absence of evidence of kidney disease, neither GFR category G1 or G2 fulfill the criteria for CKD.    eGFR calculation 2021 CKD-EPI creatinine equation, which does not include race as a factor    CBC & Differential [[id number]]  (Abnormal) Collected: 12/31/24 0358    Specimen: Blood Updated: 12/31/24 0424    Narrative:      The following orders were created for panel order CBC & Differential.  Procedure                               Abnormality         Status                     ---------                               -----------         ------                     CBC Auto Differential[[id number]]        Abnormal            Final result                 Please view results for these tests on the individual orders.    CBC Auto Differential [[id number]]  (Abnormal) Collected: 12/31/24 0358    Specimen: Blood Updated: 12/31/24 0424     WBC 7.39 10*3/mm3      RBC 4.08 10*6/mm3      Hemoglobin 13.1 g/dL      Hematocrit 42.7 %      .7 fL      MCH 32.1 pg      MCHC 30.7 g/dL      RDW 13.3 %      RDW-SD 51.8 fl      MPV 9.2 fL      Platelets 259 10*3/mm3      Neutrophil % 87.7 %       Lymphocyte % 8.9 %      Monocyte % 2.7 %      Eosinophil % 0.0 %      Basophil % 0.3 %      Immature Grans % 0.4 %      Neutrophils, Absolute 6.48 10*3/mm3      Lymphocytes, Absolute 0.66 10*3/mm3      Monocytes, Absolute 0.20 10*3/mm3      Eosinophils, Absolute 0.00 10*3/mm3      Basophils, Absolute 0.02 10*3/mm3      Immature Grans, Absolute 0.03 10*3/mm3      nRBC 0.0 /100 WBC     Cortisol [[id number]] Collected: 12/31/24 0358    Specimen: Blood Updated: 12/31/24 0416    Hemoglobin A1c [[id number]]  (Abnormal) Collected: 12/30/24 1732    Specimen: Blood Updated: 12/31/24 0136     Hemoglobin A1C 5.68 %     Narrative:      Hemoglobin A1C Ranges:    Increased Risk for Diabetes  5.7% to 6.4%  Diabetes                     >= 6.5%  Diabetic Goal                < 7.0%    Legionella Antigen, Urine - Urine, Urine, Clean Catch [[id number]]  (Normal) Collected: 12/30/24 2347    Specimen: Urine, Clean Catch Updated: 12/31/24 0133     LEGIONELLA ANTIGEN, URINE Negative    S. Pneumo Ag Urine or CSF - Urine, Urine, Clean Catch [[id number]]  (Normal) Collected: 12/30/24 2347    Specimen: Urine, Clean Catch Updated: 12/31/24 0133     Strep Pneumo Ag Negative    TSH [[id number]]  (Normal) Collected: 12/30/24 1732    Specimen: Blood Updated: 12/31/24 0015     TSH 0.516 uIU/mL     Vitamin B12 & Folate [[id number]] Collected: 12/30/24 1732    Specimen: Blood Updated: 12/30/24 2350    Respiratory Panel PCR w/COVID-19(SARS-CoV-2) TERRY/ROSSI/KIKA/PAD/COR/RONAK In-House, NP Swab in UTM/VTM, 2 HR TAT - Swab, Nasopharynx [[id number]] Collected: 12/30/24 1726    Specimen: Swab from Nasopharynx Updated: 12/30/24 2344    Blood Gas, Arterial - [[id number]]  (Abnormal) Collected: 12/30/24 2253    Specimen: Arterial Blood Updated: 12/30/24 2305     Site Left Radial     Marquis's Test Positive     pH, Arterial 7.377 pH units      pCO2, Arterial 62.3 mm Hg      Comment: 83 Value above reference range        pO2, Arterial 53.5 mm Hg      Comment: 85 Value below  critical limit        HCO3, Arterial 36.6 mmol/L      Comment: 83 Value above reference range        Base Excess, Arterial 9.2 mmol/L      Comment: 83 Value above reference range        O2 Saturation, Arterial 89.2 %      Comment: 84 Value below reference range        Hemoglobin, Blood Gas 13.0 g/dL      Comment: 84 Value below reference range        Temperature 37.0     Barometric Pressure for Blood Gas 733 mmHg      Modality Nasal Cannula     Flow Rate 1.0 lpm      Notified By [id number]     Collected by [id number]     Comment: Meter: [id number]     :  [id number]        pCO2, Temperature Corrected 62.3 mm Hg      pH, Temp Corrected 7.377 pH Units      pO2, Temperature Corrected 53.5 mm Hg     Lactic Acid, Plasma [[id number]]  (Normal) Collected: 12/30/24 2018    Specimen: Blood from Hand, Right Updated: 12/30/24 2039     Lactate 0.9 mmol/L     Blood Culture - Blood, Hand, Left [[id number]] Collected: 12/30/24 2021    Specimen: Blood from Hand, Left Updated: 12/30/24 2022    Blood Culture - Blood, Hand, Right [[id number]] Collected: 12/30/24 2021    Specimen: Blood from Hand, Right Updated: 12/30/24 2022    COVID-19, FLU A/B, RSV PCR 1 HR TAT - Swab, Nasopharynx [[id number]]  (Normal) Collected: 12/30/24 1726    Specimen: Swab from Nasopharynx Updated: 12/30/24 1807     COVID19 Not Detected     Influenza A PCR Not Detected     Influenza B PCR Not Detected     RSV, PCR Not Detected    Narrative:      Fact sheet for providers: https://www.fda.gov/media/284803/download    Fact sheet for patients: https://www.fda.gov/media/248579/download    Test performed by PCR.    Comprehensive Metabolic Panel [[id number]]  (Abnormal) Collected: 12/30/24 1732    Specimen: Blood Updated: 12/30/24 1757     Glucose 126 mg/dL      BUN 6 mg/dL      Creatinine 0.75 mg/dL      Sodium 141 mmol/L      Potassium 4.1 mmol/L      Chloride 97 mmol/L      CO2 33.3 mmol/L      Calcium 9.1 mg/dL      Total Protein 7.3 g/dL      Albumin 3.8 g/dL       ALT (SGPT) 5 U/L      AST (SGOT) 11 U/L      Alkaline Phosphatase 93 U/L      Total Bilirubin 0.3 mg/dL      Globulin 3.5 gm/dL      A/G Ratio 1.1 g/dL      BUN/Creatinine Ratio 8.0     Anion Gap 10.7 mmol/L      eGFR 96.5 mL/min/1.73     Narrative:      GFR Categories in Chronic Kidney Disease (CKD)      GFR Category          GFR (mL/min/1.73)    Interpretation  G1                     90 or greater         Normal or high (1)  G2                      60-89                Mild decrease (1)  G3a                   45-59                Mild to moderate decrease  G3b                   30-44                Moderate to severe decrease  G4                    15-29                Severe decrease  G5                    14 or less           Kidney failure          (1)In the absence of evidence of kidney disease, neither GFR category G1 or G2 fulfill the criteria for CKD.    eGFR calculation 2021 CKD-EPI creatinine equation, which does not include race as a factor    BNP [[id number]]  (Normal) Collected: 12/30/24 1732    Specimen: Blood Updated: 12/30/24 1755     proBNP 83.6 pg/mL     Narrative:      This assay is used as an aid in the diagnosis of individuals suspected of having heart failure. It can be used as an aid in the diagnosis of acute decompensated heart failure (ADHF) in patients presenting with signs and symptoms of ADHF to the emergency department (ED). In addition, NT-proBNP of <300 pg/mL indicates ADHF is not likely.    Age Range Result Interpretation  NT-proBNP Concentration (pg/mL:      <50             Positive            >450                   Gray                 300-450                    Negative             <300    50-75           Positive            >900                  Gray                300-900                  Negative            <300      >75             Positive            >1800                  Gray                300-1800                  Negative            <300    High Sensitivity Troponin T  [[id number]]  (Normal) Collected: 12/30/24 1732    Specimen: Blood Updated: 12/30/24 1755     HS Troponin T 12 ng/L     Narrative:      High Sensitive Troponin T Reference Range:  <14.0 ng/L- Negative Female for AMI  <22.0 ng/L- Negative Male for AMI  >=14 - Abnormal Female indicating possible myocardial injury.  >=22 - Abnormal Male indicating possible myocardial injury.   Clinicians would have to utilize clinical acumen, EKG, Troponin, and serial changes to determine if it is an Acute Myocardial Infarction or myocardial injury due to an underlying chronic condition.         [place] Draw [[id number]] Collected: 12/30/24 1732    Specimen: Blood Updated: 12/30/24 1745    Narrative:      The following orders were created for panel order [place] Draw.  Procedure                               Abnormality         Status                     ---------                               -----------         ------                     Green Top (Gel)[[id number]]                                  Final result               Lavender Top[[id number]]                                     Final result               Gold Top - SST[[id number]]                                   Final result               Light Blue Top[[id number]]                                   Final result                 Please view results for these tests on the individual orders.    Green Top (Gel) [[id number]] Collected: 12/30/24 1732    Specimen: Blood Updated: 12/30/24 1745     Extra Tube Hold for add-ons.     Comment: Auto resulted.       Lavender Top [[id number]] Collected: 12/30/24 1732    Specimen: Blood Updated: 12/30/24 1745     Extra Tube hold for add-on     Comment: Auto resulted       Gold Top - SST [[id number]] Collected: 12/30/24 1732    Specimen: Blood Updated: 12/30/24 1745     Extra Tube Hold for add-ons.     Comment: Auto resulted.       Light Blue Top [[id number]] Collected: 12/30/24 1732    Specimen: Blood Updated: 12/30/24 1745     Extra Tube Hold for  add-ons.     Comment: Auto resulted       CBC & Differential [[id number]]  (Abnormal) Collected: 12/30/24 1732    Specimen: Blood Updated: 12/30/24 1737    Narrative:      The following orders were created for panel order CBC & Differential.  Procedure                               Abnormality         Status                     ---------                               -----------         ------                     CBC Auto Differential[[id number]]        Abnormal            Final result                 Please view results for these tests on the individual orders.    CBC Auto Differential [[id number]]  (Abnormal) Collected: 12/30/24 1732    Specimen: Blood Updated: 12/30/24 1737     WBC 9.54 10*3/mm3      RBC 4.20 10*6/mm3      Hemoglobin 13.7 g/dL      Hematocrit 43.5 %      .6 fL      MCH 32.6 pg      MCHC 31.5 g/dL      RDW 13.4 %      RDW-SD 51.8 fl      MPV 8.7 fL      Platelets 278 10*3/mm3      Neutrophil % 70.7 %      Lymphocyte % 15.0 %      Monocyte % 13.1 %      Eosinophil % 0.5 %      Basophil % 0.3 %      Immature Grans % 0.4 %      Neutrophils, Absolute 6.74 10*3/mm3      Lymphocytes, Absolute 1.43 10*3/mm3      Monocytes, Absolute 1.25 10*3/mm3      Eosinophils, Absolute 0.05 10*3/mm3      Basophils, Absolute 0.03 10*3/mm3      Immature Grans, Absolute 0.04 10*3/mm3      nRBC 0.0 /100 WBC             Imaging Results (Last 24 Hours)       Procedure Component Value Units Date/Time    CT Angiogram Chest [[id number]] Collected: 12/30/24 2138     Updated: 12/30/24 2151    Narrative:      CT ANGIOGRAM CHEST    Date of Exam: 12/30/2024 8:43 PM EST    Indication: chest pain, tachycardia, hypoxia.    Comparison: CXR 1/25/2024, CT chest 1/17/2024    Technique: CTA of the chest was performed after the uneventful intravenous administration of iodinated contrast. Reconstructed coronal and sagittal images were also obtained. In addition, a 3-D volume rendered image was created for interpretation.   Automated  exposure control and iterative reconstruction methods were used.      Findings:  The pulmonary arteries are well opacified. There are no findings of PE.    Lung window images reveal 2.5 cm patchy opacity in the medial right upper lobe seen on series 5 image 62. A 1.5 cm mass or consolidation is seen in the right lower lobe on series 5 image 91. Pleural-based consolidation in the lateral right lower lobe   measures 6 cm x 1.5 cm. 1.5 cm x 1.5 cm area of consolidation is seen in the lateral left lower lobe.    Findings could represent multifocal pneumonia. The patient has a significant smoking history. Findings are concerning for bronchogenic malignancy or lung cancer. Pulmonary consultation is recommended. Follow-up CT scan of the chest in 3 months is   recommended, if more aggressive evaluation is not undertaken.    Mediastinal windows reveal no mediastinal, hilar, or axillary adenopathy. Extensive coronary artery calcifications are evident.    Upper abdominal structures have an unremarkable appearance. Degenerative changes are seen in the thoracic spine.      Impression:      Impression:  CT scan of the chest with IV contrast demonstrating no findings of PE.    Multiple areas of consolidation and/or masses are seen in the lungs. Findings are consistent with multifocal pneumonia. The patient has a significant smoking history, with emphysema. Close follow-up is recommended with CT scan of the chest in 3 months.   Alternatively, a PET scan could be obtained.        Electronically Signed: Larry Berrios MD    12/30/2024 9:48 PM EST    Workstation ID: [id number]            X-ray reviewed personally by physician.      Medication Review:   I have reviewed the patient's current medication list    Current Facility-Administered Medications:     acetaminophen (TYLENOL) tablet 650 mg, 650 mg, Oral, Q4H PRN **OR** acetaminophen (TYLENOL) 160 MG/5ML oral solution 650 mg, 650 mg, Oral, Q4H PRN **OR** acetaminophen (TYLENOL)  suppository 650 mg, 650 mg, Rectal, Q4H PRN, Polly Lucero MD    acetaminophen (TYLENOL) tablet 650 mg, 650 mg, Oral, Q4H PRN, Polly Lucero MD, 650 mg at 12/31/24 0905    albuterol sulfate HFA (PROVENTIL HFA;VENTOLIN HFA;PROAIR HFA) inhaler 2 puff, 2 puff, Inhalation, Q4H PRN, Polly Lucero MD    aspirin EC tablet 81 mg, 81 mg, Oral, Daily, Polly Lucero MD, 81 mg at 12/31/24 0853    atorvastatin (LIPITOR) tablet 20 mg, 20 mg, Oral, Daily, Polly Lucero MD, 20 mg at 12/31/24 0853    sennosides-docusate (PERICOLACE) 8.6-50 MG per tablet 2 tablet, 2 tablet, Oral, BID **AND** polyethylene glycol (MIRALAX) packet 17 g, 17 g, Oral, Daily PRN **AND** bisacodyl (DULCOLAX) EC tablet 5 mg, 5 mg, Oral, Daily PRN **AND** bisacodyl (DULCOLAX) suppository 10 mg, 10 mg, Rectal, Daily PRN, Polly Lucero MD    budesonide-formoterol (SYMBICORT) 160-4.5 MCG/ACT inhaler 2 puff, 2 puff, Inhalation, BID - RT, Polly Lucero MD, 2 puff at 12/31/24 0752    Calcium Replacement - Follow Nurse / BPA Driven Protocol, , Not Applicable, PRN, Polly Lucero MD    cefTRIAXone (ROCEPHIN) 1,000 mg in sodium chloride 0.9 % 100 mL IVPB-VTB, 1,000 mg, Intravenous, Q24H, Polly Lucero MD    clopidogrel (PLAVIX) tablet 75 mg, 75 mg, Oral, Daily, Polly Lucero MD, 75 mg at 12/31/24 0853    dextrose (D50W) (25 g/50 mL) IV injection 25 g, 25 g, Intravenous, Q15 Min PRN, Polly Lucero MD    dextrose (GLUTOSE) oral gel 15 g, 15 g, Oral, Q15 Min PRN, Polly Lucero MD    doxycycline (MONODOX) capsule 100 mg, 100 mg, Oral, Q12H, Opal Ledezma PA-C    Enoxaparin Sodium (LOVENOX) syringe 30 mg, 30 mg, Subcutaneous, Daily, Polly Lucero MD, 30 mg at 12/31/24 0852    famotidine (PEPCID) tablet 40 mg, 40 mg, Oral, Daily, Polly Lucero MD, 40 mg at 12/31/24 0852    fluticasone (FLONASE) 50 MCG/ACT nasal spray 1 spray, 1 spray, Each Nare, Daily, Polly Lucero MD    glucagon (GLUCAGEN) injection 1 mg, 1  mg, Intramuscular, Q15 Min PRN, Polly Lucero MD    guaiFENesin (MUCINEX) 12 hr tablet 1,200 mg, 1,200 mg, Oral, BID, Polly Lucero MD, 1,200 mg at 12/31/24 0109    HYDROcodone-acetaminophen (NORCO) 5-325 MG per tablet 1 tablet, 1 tablet, Oral, Q6H PRN, Polly Lucero MD    insulin glargine (LANTUS, SEMGLEE) injection 15 Units, 15 Units, Subcutaneous, Daily, Polly Lucero MD, 15 Units at 12/31/24 0854    Insulin Lispro (humaLOG) injection 2-9 Units, 2-9 Units, Subcutaneous, 4x Daily AC & at Bedtime, Polly Lucero MD, 2 Units at 12/31/24 0905    ipratropium-albuterol (DUO-NEB) nebulizer solution 3 mL, 3 mL, Nebulization, 4x Daily - RT, Polly Lucero MD, 3 mL at 12/31/24 0742    lisinopril (PRINIVIL,ZESTRIL) tablet 20 mg, 20 mg, Oral, Q24H, Polly Lucero MD, 20 mg at 12/31/24 0853    LORazepam (ATIVAN) tablet 0.5 mg, 0.5 mg, Oral, Q8H PRN, Polly Lucero MD    Magnesium Standard Dose Replacement - Follow Nurse / BPA Driven Protocol, , Not Applicable, PRN, Polly Lucero MD    melatonin tablet 5 mg, 5 mg, Oral, Nightly PRN, Polly Lucero MD    methylPREDNISolone sodium succinate (SOLU-Medrol) injection 40 mg, 40 mg, Intravenous, Q8H, Polly Lucero MD, 40 mg at 12/31/24 0618    metoprolol tartrate (LOPRESSOR) tablet 50 mg, 50 mg, Oral, Q12H, Polly Lucero MD, 50 mg at 12/31/24 0110    nicotine (NICODERM CQ) 21 MG/24HR patch 1 patch, 1 patch, Transdermal, Q24H, Polly Lucero MD, 1 patch at 12/31/24 0853    nitroglycerin (NITROSTAT) SL tablet 0.4 mg, 0.4 mg, Sublingual, Q5 Min PRN, Polly Lucero MD    ondansetron ODT (ZOFRAN-ODT) disintegrating tablet 4 mg, 4 mg, Oral, Q6H PRN **OR** ondansetron (ZOFRAN) injection 4 mg, 4 mg, Intravenous, Q6H PRN, Polly Lucero MD    Phosphorus Replacement - Follow Nurse / BPA Driven Protocol, , Not Applicable, PRN, Polly Lucero MD    Potassium Replacement - Follow Nurse / BPA Driven Protocol, , Not Applicable, PRN, Nic,  Polly VALADEZ MD    sodium chloride 0.9 % flush 10 mL, 10 mL, Intravenous, PRN, Polly Lucero MD    sodium chloride 0.9 % flush 10 mL, 10 mL, Intravenous, Q12H, Polly Lucero MD, 10 mL at 12/31/24 0855    sodium chloride 0.9 % flush 10 mL, 10 mL, Intravenous, PRPURVI, Polly Lucero MD    sodium chloride 0.9 % infusion 40 mL, 40 mL, Intravenous, PRN, Polly Lucero MD      Assessment & Plan     Acute exacerbation of COPD with hypoxia: It is unclear if she wears her oxygen full-time or just at night.  Patient reports she only wears it at night.  However, her CT of her chest may suggest otherwise.  Viral panel was positive for rhinovirus.  Check procalcitonin.  Urinary antigens were negative.  Likely can begin de-escalating antibiotic therapy as well as weaning O2.  Continue nebulized bronchodilators.  Diabetes mellitus type 2: A1c great at 5.68% last in our records was 5.8%.  Bedside glucose levels are up a little so we will continue current insulin regimen.  Macrocytosis: Cortisol and TSH levels were fine as are B12 and folate.  Reticulocytes?  Will add.  Essential hypertension: Blood pressure near goal on exam.  Continue to monitor trend on current regimen.  History of coronary artery disease: No reported chest pain.  Continue beta-blocker as well as dual antiplatelet therapy.  Tobacco abuse: Continue NicoDerm.    Plan for disposition: Predicated on hospital course.    Dagoberto Chilel MD  12/31/24  09:41 EST

## 2024-12-31 NOTE — H&P
Patient Name:  Valeria Rubi  YOB: 1975  MRN:  [id number]  Admit Date:  12/30/2024  Patient Care Team:  Talib Ceron MD as PCP - General (Family Medicine)        Chief Complaint   Patient presents with    Cough     Cough, fatigue x1 week. Has been around sick family members      History Present Illness     A pleasant 49 years old female with a past history of chronic hypoxemic and hypercapnic respiratory failure/tobacco abuse/oxygen dependent COPD/type 2 diabetes/dysphagia and recurrent pneumonia/peripheral vascular disease/vitamin B12 deficiency/vitamin D deficiency/mood disorder/L-spine degenerative disc disease/dyslipidemia/hypertension/coronary artery disease who presents to the emergency department with 1 week history of progressive shortness of breath associated with progressive cough productive of yellowish to greenish sputum and increasing wheeze.  She does have bilateral atypical chest pain especially with the cough that is squeezing in nature.  No palpitation.  No hemoptysis.  No PND or orthopnea.  No ankle edema.  No fever or chills.  In the emergency department CT of the chest revealed COPD changes/bilateral multifocal pneumonia but no PE.  EKG revealed sinus tachycardia.  Rest of workup in the emergency department included negative COVID-19, influenza A and B, RSV PCR.  CBC was normal except an MCV of 103.6.  Troponin was normal.  Procalcitonin was normal.  CMP normal except a blood sugar of 126, chloride 97, CO2 of 33.3.  Lactic acid was normal.  Blood cultures obtained.        Review of Systems   Cardiovascular/respiratory.  As above.  GI.  No abdominal pain.  No choking with the food or the liquids.  No heartburn.  No dysphagia or odynophagia.  No diarrhea or constipation.  No melena.  No bleeding per rectum.  Positive on and off nausea and vomiting over the last 1 week  .  No dysuria or hematuria  CNS.  No headache.  No loss of consciousness.  No focal neurological  symptoms    Personal History     Past Medical History:   Diagnosis Date    Anxiety     Arthritis     COPD (chronic obstructive pulmonary disease)     Coronary stent occlusion     CTS (carpal tunnel syndrome)     Depression     Dizziness     Headache     Hyperlipidemia     Hypertension     Migraine     Numbness and tingling     Pneumonia      Past Surgical History:   Procedure Laterality Date    ANGIOPLASTY ILIAC ARTERY Left 6/17/2016    Procedure: BILATERAL DUPLEX DIRECTED ACCESS, AIF BILATERAL RUNOFF, SELECTIVE CATHETERIZATION OF RIGHT EXTERNAL ILIAC WITH ANGIOPLASTY, LEFT COMMON ILIAC STENT PLACEMENT;  Surgeon: Jose Carlos Plascencia MD;  Location: [facility] 18/19;  Service:     BREAST SURGERY      BILAT IMPLANTS    CAROTID ARTERY ANGIOPLASTY       Family History   Problem Relation Age of Onset    Heart disease Father     Hypertension Father     Diabetes Father     Stroke Father     COPD Father     Heart failure Paternal Grandmother     Heart failure Paternal Grandfather     COPD Mother     COPD Maternal Aunt     COPD Maternal Uncle     COPD Paternal Uncle      Social History     Tobacco Use    Smoking status: Every Day     Current packs/day: 1.00     Average packs/day: 1 pack/day for 30.0 years (30.0 ttl pk-yrs)     Types: Cigarettes     Passive exposure: Current    Smokeless tobacco: Never   Vaping Use    Vaping status: Never Used   Substance Use Topics    Alcohol use: No    Drug use: No     No current facility-administered medications on file prior to encounter.     Current Outpatient Medications on File Prior to Encounter   Medication Sig Dispense Refill    acetaminophen (TYLENOL) 325 MG tablet Take 2 tablets by mouth Every 4 (Four) Hours As Needed for Fever (fever greater than 100.4 °F or headache).      albuterol sulfate HFA (Proventil HFA) 108 (90 Base) MCG/ACT inhaler Inhale 2 puffs into the lungs Every 4 (Four) Hours As Needed for Wheezing. 8.5 g 1    aspirin (aspirin) 81 MG EC tablet Take 1 tablet by  mouth Daily. 90 tablet 3    budesonide-formoterol (SYMBICORT) 160-4.5 MCG/ACT inhaler Inhale 2 puffs 2 (Two) Times a Day.      clopidogrel (Plavix) 75 MG tablet Take 1 tablet by mouth Daily. 90 tablet 1    dextrose (GLUTOSE) 40 % gel Take 15 g by mouth Every 15 (Fifteen) Minutes As Needed for Low Blood Sugar (Blood sugar less than 70).      fluticasone (FLONASE) 50 MCG/ACT nasal spray 1 spray into the nostril(s) as directed by provider Daily As Needed.      glucagon (GLUCAGEN) 1 MG injection Inject 1 mg into the appropriate muscle as directed by prescriber Every 15 (Fifteen) Minutes As Needed (Blood Glucose Less Than 70).      guaiFENesin (MUCINEX) 600 MG 12 hr tablet Take 2 tablets by mouth 2 (Two) Times a Day. 40 tablet 0    Insulin Aspart (novoLOG) 100 UNIT/ML injection Inject 2-7 Units under the skin into the appropriate area as directed 4 (Four) Times a Day Before Meals & at Bedtime.      insulin detemir (LEVEMIR) 100 UNIT/ML injection Inject 15 Units under the skin into the appropriate area as directed Daily. Until steroids are completed      ipratropium-albuterol (DUO-NEB) 0.5-2.5 mg/3 ml nebulizer Take 3 mL by nebulization Every 4 (Four) Hours As Needed for Wheezing. 90 mL 1    lisinopril (PRINIVIL,ZESTRIL) 20 MG tablet Take 1 tablet by mouth Daily.      metoprolol tartrate (LOPRESSOR) 50 MG tablet Take 1 tablet by mouth Every 12 (Twelve) Hours. 60 tablet 1    nicotine (NICODERM CQ) 21 MG/24HR patch Place 1 patch on the skin as directed by provider Daily. 30 patch 0    nicotine (NICODERM CQ) 21 MG/24HR patch Place 1 patch on the skin as directed by provider Daily. Apply Patch to Clean, Dry, Hairless Area Daily  Remove Old Patch Before Applying New Patch  Rotate Patch Site Daily  May Remove Patch at Bedtime to Prevent Insomnia  Follow Other Instructions on Package      ondansetron ODT (ZOFRAN-ODT) 4 MG disintegrating tablet Place 1 tablet on the tongue Every 6 (Six) Hours As Needed for Nausea or Vomiting.       simvastatin (ZOCOR) 40 MG tablet Take 1 tablet by mouth Every Evening. 90 tablet 1    venlafaxine XR (EFFEXOR-XR) 75 MG 24 hr capsule TAKE 1 CAPSULE BY MOUTH EVERY DAY 90 capsule 0     Allergies   Allergen Reactions    Prednisone Other (See Comments)     Rash in mouth       Objective    Objective     Vital Signs  Temp:  [100.1 °F (37.8 °C)] 100.1 °F (37.8 °C)  Heart Rate:  [107-126] 107  Resp:  [18-20] 20  BP: (132-143)/(53-82) 138/68  SpO2:  [80 %-97 %] 94 %  on  Flow (L/min) (Oxygen Therapy):  [1.5-2] 1.5;   Device (Oxygen Therapy): nasal cannula  Body mass index is 21.95 kg/m².    Physical Exam  General.  Elderly female.  She is alert and oriented x 4.  In mild respiratory distress.  In no acute pain.  Appears older than stated age.  Eyes.  Pupils equal round and reactive.  Intact extraocular musculature.  No pallor or jaundice.  Neck.  Supple.  No JVD.  No lymphadenopathy or thyromegaly.  Cardiovascular.  Tachycardia.  Regular rate and rhythm with no murmurs.  Chest.  Poor bilateral air entry with scattered bilateral rhonchi and bilateral inspiratory and expiratory wheezes  Abdomen.  Soft lax.  No tenderness.  No organomegaly.  No guarding or rebound  Extremities.  No clubbing/cyanosis/edema  CNS.  No acute focal neurological deficits.      Results Review:  I reviewed the patient's new clinical results.  I reviewed the patient's new imaging results and agree with the interpretation.  I reviewed the patient's other test results and agree with the interpretation  I personally viewed and interpreted the patient's EKG/Telemetry data  Discussed with ED provider.    Lab Results (last 24 hours)       Procedure Component Value Units Date/Time    COVID-19, FLU A/B, RSV PCR 1 HR TAT - Swab, Nasopharynx [[id number]]  (Normal) Collected: 12/30/24 1726    Specimen: Swab from Nasopharynx Updated: 12/30/24 1807     COVID19 Not Detected     Influenza A PCR Not Detected     Influenza B PCR Not Detected     RSV, PCR Not  Detected    Narrative:      Fact sheet for providers: https://www.fda.gov/media/094450/download    Fact sheet for patients: https://www.fda.gov/media/360262/download    Test performed by PCR.    CBC & Differential [[id number]]  (Abnormal) Collected: 12/30/24 1732    Specimen: Blood Updated: 12/30/24 1737    Narrative:      The following orders were created for panel order CBC & Differential.  Procedure                               Abnormality         Status                     ---------                               -----------         ------                     CBC Auto Differential[[id number]]        Abnormal            Final result                 Please view results for these tests on the individual orders.    Comprehensive Metabolic Panel [[id number]]  (Abnormal) Collected: 12/30/24 1732    Specimen: Blood Updated: 12/30/24 1757     Glucose 126 mg/dL      BUN 6 mg/dL      Creatinine 0.75 mg/dL      Sodium 141 mmol/L      Potassium 4.1 mmol/L      Chloride 97 mmol/L      CO2 33.3 mmol/L      Calcium 9.1 mg/dL      Total Protein 7.3 g/dL      Albumin 3.8 g/dL      ALT (SGPT) 5 U/L      AST (SGOT) 11 U/L      Alkaline Phosphatase 93 U/L      Total Bilirubin 0.3 mg/dL      Globulin 3.5 gm/dL      A/G Ratio 1.1 g/dL      BUN/Creatinine Ratio 8.0     Anion Gap 10.7 mmol/L      eGFR 96.5 mL/min/1.73     Narrative:      GFR Categories in Chronic Kidney Disease (CKD)      GFR Category          GFR (mL/min/1.73)    Interpretation  G1                     90 or greater         Normal or high (1)  G2                      60-89                Mild decrease (1)  G3a                   45-59                Mild to moderate decrease  G3b                   30-44                Moderate to severe decrease  G4                    15-29                Severe decrease  G5                    14 or less           Kidney failure          (1)In the absence of evidence of kidney disease, neither GFR category G1 or G2 fulfill the criteria for  CKD.    eGFR calculation 2021 CKD-EPI creatinine equation, which does not include race as a factor    BNP [[id number]]  (Normal) Collected: 12/30/24 1732    Specimen: Blood Updated: 12/30/24 1755     proBNP 83.6 pg/mL     Narrative:      This assay is used as an aid in the diagnosis of individuals suspected of having heart failure. It can be used as an aid in the diagnosis of acute decompensated heart failure (ADHF) in patients presenting with signs and symptoms of ADHF to the emergency department (ED). In addition, NT-proBNP of <300 pg/mL indicates ADHF is not likely.    Age Range Result Interpretation  NT-proBNP Concentration (pg/mL:      <50             Positive            >450                   Gray                 300-450                    Negative             <300    50-75           Positive            >900                  Gray                300-900                  Negative            <300      >75             Positive            >1800                  Gray                300-1800                  Negative            <300    High Sensitivity Troponin T [[id number]]  (Normal) Collected: 12/30/24 1732    Specimen: Blood Updated: 12/30/24 1755     HS Troponin T 12 ng/L     Narrative:      High Sensitive Troponin T Reference Range:  <14.0 ng/L- Negative Female for AMI  <22.0 ng/L- Negative Male for AMI  >=14 - Abnormal Female indicating possible myocardial injury.  >=22 - Abnormal Male indicating possible myocardial injury.   Clinicians would have to utilize clinical acumen, EKG, Troponin, and serial changes to determine if it is an Acute Myocardial Infarction or myocardial injury due to an underlying chronic condition.         CBC Auto Differential [[id number]]  (Abnormal) Collected: 12/30/24 1732    Specimen: Blood Updated: 12/30/24 1737     WBC 9.54 10*3/mm3      RBC 4.20 10*6/mm3      Hemoglobin 13.7 g/dL      Hematocrit 43.5 %      .6 fL      MCH 32.6 pg      MCHC 31.5 g/dL      RDW 13.4 %      RDW-SD  51.8 fl      MPV 8.7 fL      Platelets 278 10*3/mm3      Neutrophil % 70.7 %      Lymphocyte % 15.0 %      Monocyte % 13.1 %      Eosinophil % 0.5 %      Basophil % 0.3 %      Immature Grans % 0.4 %      Neutrophils, Absolute 6.74 10*3/mm3      Lymphocytes, Absolute 1.43 10*3/mm3      Monocytes, Absolute 1.25 10*3/mm3      Eosinophils, Absolute 0.05 10*3/mm3      Basophils, Absolute 0.03 10*3/mm3      Immature Grans, Absolute 0.04 10*3/mm3      nRBC 0.0 /100 WBC     Lactic Acid, Plasma [[id number]]  (Normal) Collected: 12/30/24 2018    Specimen: Blood from Hand, Right Updated: 12/30/24 2039     Lactate 0.9 mmol/L     Blood Culture - Blood, Hand, Left [[id number]] Collected: 12/30/24 2021    Specimen: Blood from Hand, Left Updated: 12/30/24 2022    Blood Culture - Blood, Hand, Right [[id number]] Collected: 12/30/24 2021    Specimen: Blood from Hand, Right Updated: 12/30/24 2022            Imaging Results (Last 24 Hours)       Procedure Component Value Units Date/Time    CT Angiogram Chest [[id number]] Collected: 12/30/24 2138     Updated: 12/30/24 2151    Narrative:      CT ANGIOGRAM CHEST    Date of Exam: 12/30/2024 8:43 PM EST    Indication: chest pain, tachycardia, hypoxia.    Comparison: CXR 1/25/2024, CT chest 1/17/2024    Technique: CTA of the chest was performed after the uneventful intravenous administration of iodinated contrast. Reconstructed coronal and sagittal images were also obtained. In addition, a 3-D volume rendered image was created for interpretation.   Automated exposure control and iterative reconstruction methods were used.      Findings:  The pulmonary arteries are well opacified. There are no findings of PE.    Lung window images reveal 2.5 cm patchy opacity in the medial right upper lobe seen on series 5 image 62. A 1.5 cm mass or consolidation is seen in the right lower lobe on series 5 image 91. Pleural-based consolidation in the lateral right lower lobe   measures 6 cm x 1.5 cm. 1.5 cm x 1.5  cm area of consolidation is seen in the lateral left lower lobe.    Findings could represent multifocal pneumonia. The patient has a significant smoking history. Findings are concerning for bronchogenic malignancy or lung cancer. Pulmonary consultation is recommended. Follow-up CT scan of the chest in 3 months is   recommended, if more aggressive evaluation is not undertaken.    Mediastinal windows reveal no mediastinal, hilar, or axillary adenopathy. Extensive coronary artery calcifications are evident.    Upper abdominal structures have an unremarkable appearance. Degenerative changes are seen in the thoracic spine.      Impression:      Impression:  CT scan of the chest with IV contrast demonstrating no findings of PE.    Multiple areas of consolidation and/or masses are seen in the lungs. Findings are consistent with multifocal pneumonia. The patient has a significant smoking history, with emphysema. Close follow-up is recommended with CT scan of the chest in 3 months.   Alternatively, a PET scan could be obtained.        Electronically Signed: Larry Berrios MD    12/30/2024 9:48 PM EST    Workstation ID: [id number]            Results for orders placed during the hospital encounter of 01/17/24    Adult Transthoracic Echo Complete w/ Color, Spectral and Contrast if Necessary Per Protocol    Interpretation Summary    Left ventricular systolic function is normal. Left ventricular ejection fraction appears to be 56 - 60%.    Left ventricular diastolic function was normal.    : The study is technically suboptimal for diagnosis. The quality of the study is limited due to patient body habitus, an uncooperative patient and breast implants. Image enhancer was not used for this study due to inability to consent.      ECG 12 Lead ED Triage Standing Order; SOA   Preliminary Result   HEART QMYQ=423  bpm   RR Lantfjeh=613  ms   DE Zlkoafgd=278  ms   P Horizontal Axis=2  deg   P Front Axis=75  deg   QRSD Interval=77  ms   QT  Azbzioyx=806  ms   FIsG=761  ms   QRS Axis=63  deg   T Wave Axis=85  deg   - OTHERWISE NORMAL ECG -   Sinus tachycardia   Date and Time of Study:2024-12-30 18:05:48               Assessment/Plan     Active Hospital Problems    Diagnosis  POA    **Acute and chronic respiratory failure with hypoxia [J96.21]  Yes    Macrocytosis [D75.89]  Yes    DM2 (diabetes mellitus, type 2) [E11.9]  Yes    History of coronary artery disease [Z86.79]  Not Applicable    Community acquired pneumonia, bilateral [J18.9]  Yes    COPD with acute exacerbation [J44.1]  Yes    Peripheral vascular disease [I73.9]  Yes    Hyperlipidemia [E78.5]  Yes    Essential hypertension [I10]  Yes      Resolved Hospital Problems   No resolved problems to display.           Acute on chronic hypoxemic respiratory failure secondary to bilateral multifocal pneumonia and COPD exacerbation in a patient with chronic hypoxemic and hypercapnic respiratory failure/COPD/tobacco abuse.  Lactic acid is normal.  Normal white blood cell count.  Normal proBNP and troponin.  CTA of the chest revealed no PE but positive multifocal pneumonia bilaterally and COPD changes.  Plan check blood and sputum cultures.  Check full respiratory PCR panel.  Continue Mucinex/Symbicort and albuterol metered-dose inhalers.  Initiate DuoNebs/IV Rocephin/p.o. doxycycline/incentive spirometry/IV Solu-Medrol..  Check arterial blood gas.  Titrate oxygen as needed.  Check urine Legionella and Streptococcus antigen.  Counseled against tobacco.  Nicotine patch will be employed.  Macrocytosis.  Patient does not consume alcohol.  There is no evidence of anemia.  She has a history of vitamin B12 deficiency.  Will check B12/folate/cortisol/TSH.  Type 2 diabetes.  Will continue the patient on her long-acting insulin.  Check A1c and place on sliding scale insulin.  History of peripheral vascular disease/dyslipidemia (carotid and lower extremity).  Patient is status post left iliac artery angioplasty and  carotid angioplasty.  Asymptomatic with negative lower extremity wrist ischemia and negative CNS examination.  Will continue aspirin/Plavix/statin.  Mood disorder.  Currently on no treatment.  Appears stable.  History of hypertension and coronary artery disease.  There is no clinical evidence of angina or congestive heart failure.  Good blood pressure control.  In sinus tachycardia.  Will continue metoprolol/lisinopril/aspirin/Plavix.  EKG with sinus tachycardia.  Her last echo was on January 2024 revealing a normal ejection fraction with normal diastolic function and trace MR.  L-spine degenerative disc disease.  Asymptomatic.  On no treatment.  VTE prophylaxis.  Lovenox.        Cussed my findings and plan of treatment with the patient/ER provider.    Code Status -full code.       Polly Lucero MD    12/30/24  22:51 EST

## 2024-12-31 NOTE — CASE MANAGEMENT/SOCIAL WORK
Continued Stay Note  SARAH Huff     Patient Name: Valeria Rubi  MRN: [id number]  Today's Date: 12/31/2024    Admit Date: 12/30/2024    Plan: plan home with s/o   Discharge Plan       Row Name 12/31/24 1531       Plan    Plan plan home with s/o    Patient/Family in Agreement with Plan yes    Plan Comments Spoke with patient at bedside, face sheet verified. Patient lives with her s/o in a home. She is independent of ADLs including driving.  She typically does not use any DME for mobility, but has access to cane, rw, bsc. She uses 02 from [employer] - her baseline is 2L continuou, however she does not always use 02 during the day.She has not used HH in the past. She has been to [facility] previously. She has a living will. She sees Dr Ceron as PCP. She uses [facility] and denies issues obtaining medications. She states her [employer] should be listed as primary insurance - spoke with Lisa/Registration who informs, medicaid is always secondary insurnace. Patient states she will need somenew 02 tubing and portable tanks from [employer] at dc. CM will follow and assist with dc needs.                   Discharge Codes    No documentation.                       Kasi Dawson RN

## 2024-12-31 NOTE — ED PROVIDER NOTES
EMERGENCY DEPARTMENT ENCOUNTER    The entirety of this note was dictated by Lacy Ledezma PA-C    Room Number: 07/07    History is provided by the patient, no translation services needed    HPI:    Chief complaint: shortness of breath     Narrative: Pt is a 49 y.o. female with PMH of COPD on nasal cannula oxygen, HTN, anxiety, migraines who presents complaining of shortness of breath and cough.  Patient reports she has been sick for the past week she has had increased coughing, fatigue family members at home have been ill.  Reports that she is having a very productive cough with sputum.  No blood in the sputum.  States that she becomes very short of breath anytime she moves or tries to walk around.  She has been using her nebulizers at home without relief.      PMD: Talib Ceron MD    REVIEW OF SYSTEMS  Review of Systems  Complete review of systems performed otherwise negative except pertinent positives per HPI.    PAST MEDICAL HISTORY  Active Ambulatory Problems     Diagnosis Date Noted    Abnormal mammogram 04/15/2016    Atopic rhinitis 04/15/2016    Bronchitis 04/15/2016    Chronic headache 04/15/2016    Pain of hand 04/15/2016    Hyperlipidemia 04/15/2016    Essential hypertension 04/15/2016    Sprain of ankle 04/15/2016    Nausea 04/15/2016    Pain in wrist 04/15/2016    Wheezing 04/15/2016    Panlobular emphysema 04/15/2016    Tobacco use 04/15/2016    Pharyngeal dysphagia 05/16/2016    Radiculopathy 05/26/2016    Lumbar degenerative disc disease 05/26/2016    Peripheral artery disease 06/14/2016    Pain of toe of left foot 06/14/2016    Abnormal femoral pulse 06/14/2016    Coronary stent occlusion     Hyperglycemia 04/25/2017    Menopausal symptoms 04/25/2017    Surveillance for Depo-Provera contraception 04/25/2017    Anxiety and depression 10/10/2017    Numbness and tingling of both feet 04/10/2018    Low vitamin D level 04/10/2018    Low vitamin B12 level 04/10/2018    Pneumonia of left lower lobe due  to infectious organism 06/07/2018    Headache, migraine 03/09/2021    Pneumonia of both lower lobes due to infectious organism 03/29/2021    Acute on chronic respiratory failure with hypoxia 03/29/2021    Acute respiratory failure with hypoxia and hypercapnia 07/27/2022    Acute on chronic respiratory failure with hypercapnia 12/05/2022    Respiratory failure 01/17/2024     Resolved Ambulatory Problems     Diagnosis Date Noted    COPD exacerbation 12/16/2016    Numbness in feet 03/09/2021     Past Medical History:   Diagnosis Date    Anxiety     Arthritis     COPD (chronic obstructive pulmonary disease)     CTS (carpal tunnel syndrome)     Depression     Dizziness     Headache     Hypertension     Migraine     Numbness and tingling     Pneumonia        PAST SURGICAL HISTORY  Past Surgical History:   Procedure Laterality Date    ANGIOPLASTY ILIAC ARTERY Left 6/17/2016    Procedure: BILATERAL DUPLEX DIRECTED ACCESS, AIF BILATERAL RUNOFF, SELECTIVE CATHETERIZATION OF RIGHT EXTERNAL ILIAC WITH ANGIOPLASTY, LEFT COMMON ILIAC STENT PLACEMENT;  Surgeon: Jose Carlos Plascencia MD;  Location: [facility] 18/19;  Service:     BREAST SURGERY      BILAT IMPLANTS    CAROTID ARTERY ANGIOPLASTY         FAMILY HISTORY  Family History   Problem Relation Age of Onset    Heart disease Father     Hypertension Father     Diabetes Father     Stroke Father     COPD Father     Heart failure Paternal Grandmother     Heart failure Paternal Grandfather     COPD Mother     COPD Maternal Aunt     COPD Maternal Uncle     COPD Paternal Uncle        SOCIAL HISTORY  Social History     Socioeconomic History    Marital status: Single   Tobacco Use    Smoking status: Every Day     Current packs/day: 1.00     Average packs/day: 1 pack/day for 30.0 years (30.0 ttl pk-yrs)     Types: Cigarettes     Passive exposure: Current    Smokeless tobacco: Never   Vaping Use    Vaping status: Never Used   Substance and Sexual Activity    Alcohol use: No    Drug use:  No    Sexual activity: Defer       ALLERGIES  Prednisone      Current Facility-Administered Medications:     sodium chloride 0.9 % flush 10 mL, 10 mL, Intravenous, PRN, Parrish Kessler MD    Current Outpatient Medications:     acetaminophen (TYLENOL) 325 MG tablet, Take 2 tablets by mouth Every 4 (Four) Hours As Needed for Fever (fever greater than 100.4 °F or headache)., Disp: , Rfl:     albuterol sulfate HFA (Proventil HFA) 108 (90 Base) MCG/ACT inhaler, Inhale 2 puffs into the lungs Every 4 (Four) Hours As Needed for Wheezing., Disp: 8.5 g, Rfl: 1    aspirin (aspirin) 81 MG EC tablet, Take 1 tablet by mouth Daily., Disp: 90 tablet, Rfl: 3    budesonide-formoterol (SYMBICORT) 160-4.5 MCG/ACT inhaler, Inhale 2 puffs 2 (Two) Times a Day., Disp: , Rfl:     clopidogrel (Plavix) 75 MG tablet, Take 1 tablet by mouth Daily., Disp: 90 tablet, Rfl: 1    dextrose (GLUTOSE) 40 % gel, Take 15 g by mouth Every 15 (Fifteen) Minutes As Needed for Low Blood Sugar (Blood sugar less than 70)., Disp: , Rfl:     fluticasone (FLONASE) 50 MCG/ACT nasal spray, 1 spray into the nostril(s) as directed by provider Daily As Needed., Disp: , Rfl:     glucagon (GLUCAGEN) 1 MG injection, Inject 1 mg into the appropriate muscle as directed by prescriber Every 15 (Fifteen) Minutes As Needed (Blood Glucose Less Than 70)., Disp: , Rfl:     guaiFENesin (MUCINEX) 600 MG 12 hr tablet, Take 2 tablets by mouth 2 (Two) Times a Day., Disp: 40 tablet, Rfl: 0    Insulin Aspart (novoLOG) 100 UNIT/ML injection, Inject 2-7 Units under the skin into the appropriate area as directed 4 (Four) Times a Day Before Meals & at Bedtime., Disp: , Rfl:     insulin detemir (LEVEMIR) 100 UNIT/ML injection, Inject 15 Units under the skin into the appropriate area as directed Daily. Until steroids are completed, Disp: , Rfl:     ipratropium-albuterol (DUO-NEB) 0.5-2.5 mg/3 ml nebulizer, Take 3 mL by nebulization Every 4 (Four) Hours As Needed for Wheezing., Disp: 90 mL,  Rfl: 1    lisinopril (PRINIVIL,ZESTRIL) 20 MG tablet, Take 1 tablet by mouth Daily., Disp: , Rfl:     metoprolol tartrate (LOPRESSOR) 50 MG tablet, Take 1 tablet by mouth Every 12 (Twelve) Hours., Disp: 60 tablet, Rfl: 1    nicotine (NICODERM CQ) 21 MG/24HR patch, Place 1 patch on the skin as directed by provider Daily., Disp: 30 patch, Rfl: 0    nicotine (NICODERM CQ) 21 MG/24HR patch, Place 1 patch on the skin as directed by provider Daily. Apply Patch to Clean, Dry, Hairless Area Daily Remove Old Patch Before Applying New Patch Rotate Patch Site Daily May Remove Patch at Bedtime to Prevent Insomnia Follow Other Instructions on Package, Disp: , Rfl:     ondansetron ODT (ZOFRAN-ODT) 4 MG disintegrating tablet, Place 1 tablet on the tongue Every 6 (Six) Hours As Needed for Nausea or Vomiting., Disp: , Rfl:     simvastatin (ZOCOR) 40 MG tablet, Take 1 tablet by mouth Every Evening., Disp: 90 tablet, Rfl: 1    venlafaxine XR (EFFEXOR-XR) 75 MG 24 hr capsule, TAKE 1 CAPSULE BY MOUTH EVERY DAY, Disp: 90 capsule, Rfl: 0    PHYSICAL EXAM  ED Triage Vitals [12/30/24 1703]   Temp Heart Rate Resp BP SpO2   100.1 °F (37.8 °C) (!) 126 18 132/53 (!) 80 %      Temp src Heart Rate Source Patient Position BP Location FiO2 (%)   -- -- -- -- --       Physical Exam  Vitals and nursing note reviewed.   Constitutional:       Appearance: Normal appearance. She is normal weight. She is ill-appearing. She is not toxic-appearing.   HENT:      Head: Normocephalic and atraumatic.      Nose: Nose normal.      Mouth/Throat:      Mouth: Mucous membranes are moist.   Eyes:      Extraocular Movements: Extraocular movements intact.      Conjunctiva/sclera: Conjunctivae normal.      Pupils: Pupils are equal, round, and reactive to light.   Cardiovascular:      Rate and Rhythm: Regular rhythm. Tachycardia present.   Pulmonary:      Breath sounds: No stridor. Wheezing present.      Comments: Increased respiratory effort  Abdominal:      General:  Abdomen is flat.      Palpations: Abdomen is soft.      Tenderness: There is no abdominal tenderness.   Musculoskeletal:         General: Normal range of motion.      Cervical back: Normal range of motion. No tenderness.   Skin:     General: Skin is warm.      Capillary Refill: Capillary refill takes less than 2 seconds.      Coloration: Skin is not pale.   Neurological:      General: No focal deficit present.      Mental Status: She is alert and oriented to person, place, and time.   Psychiatric:         Mood and Affect: Mood normal.           LAB RESULTS  Lab Results (last 24 hours)       Procedure Component Value Units Date/Time    COVID-19, FLU A/B, RSV PCR 1 HR TAT - Swab, Nasopharynx [[id number]]  (Normal) Collected: 12/30/24 1726    Specimen: Swab from Nasopharynx Updated: 12/30/24 1807     COVID19 Not Detected     Influenza A PCR Not Detected     Influenza B PCR Not Detected     RSV, PCR Not Detected    Narrative:      Fact sheet for providers: https://www.fda.gov/media/588008/download    Fact sheet for patients: https://www.fda.gov/media/236011/download    Test performed by PCR.    CBC & Differential [[id number]]  (Abnormal) Collected: 12/30/24 1732    Specimen: Blood Updated: 12/30/24 1737    Narrative:      The following orders were created for panel order CBC & Differential.  Procedure                               Abnormality         Status                     ---------                               -----------         ------                     CBC Auto Differential[[id number]]        Abnormal            Final result                 Please view results for these tests on the individual orders.    Comprehensive Metabolic Panel [[id number]]  (Abnormal) Collected: 12/30/24 1732    Specimen: Blood Updated: 12/30/24 1757     Glucose 126 mg/dL      BUN 6 mg/dL      Creatinine 0.75 mg/dL      Sodium 141 mmol/L      Potassium 4.1 mmol/L      Chloride 97 mmol/L      CO2 33.3 mmol/L      Calcium 9.1 mg/dL      Total  Protein 7.3 g/dL      Albumin 3.8 g/dL      ALT (SGPT) 5 U/L      AST (SGOT) 11 U/L      Alkaline Phosphatase 93 U/L      Total Bilirubin 0.3 mg/dL      Globulin 3.5 gm/dL      A/G Ratio 1.1 g/dL      BUN/Creatinine Ratio 8.0     Anion Gap 10.7 mmol/L      eGFR 96.5 mL/min/1.73     Narrative:      GFR Categories in Chronic Kidney Disease (CKD)      GFR Category          GFR (mL/min/1.73)    Interpretation  G1                     90 or greater         Normal or high (1)  G2                      60-89                Mild decrease (1)  G3a                   45-59                Mild to moderate decrease  G3b                   30-44                Moderate to severe decrease  G4                    15-29                Severe decrease  G5                    14 or less           Kidney failure          (1)In the absence of evidence of kidney disease, neither GFR category G1 or G2 fulfill the criteria for CKD.    eGFR calculation 2021 CKD-EPI creatinine equation, which does not include race as a factor    BNP [[id number]]  (Normal) Collected: 12/30/24 1732    Specimen: Blood Updated: 12/30/24 1755     proBNP 83.6 pg/mL     Narrative:      This assay is used as an aid in the diagnosis of individuals suspected of having heart failure. It can be used as an aid in the diagnosis of acute decompensated heart failure (ADHF) in patients presenting with signs and symptoms of ADHF to the emergency department (ED). In addition, NT-proBNP of <300 pg/mL indicates ADHF is not likely.    Age Range Result Interpretation  NT-proBNP Concentration (pg/mL:      <50             Positive            >450                   Gray                 300-450                    Negative             <300    50-75           Positive            >900                  Gray                300-900                  Negative            <300      >75             Positive            >1800                  Gray                300-1800                  Negative             <300    High Sensitivity Troponin T [[id number]]  (Normal) Collected: 12/30/24 1732    Specimen: Blood Updated: 12/30/24 1755     HS Troponin T 12 ng/L     Narrative:      High Sensitive Troponin T Reference Range:  <14.0 ng/L- Negative Female for AMI  <22.0 ng/L- Negative Male for AMI  >=14 - Abnormal Female indicating possible myocardial injury.  >=22 - Abnormal Male indicating possible myocardial injury.   Clinicians would have to utilize clinical acumen, EKG, Troponin, and serial changes to determine if it is an Acute Myocardial Infarction or myocardial injury due to an underlying chronic condition.         CBC Auto Differential [[id number]]  (Abnormal) Collected: 12/30/24 1732    Specimen: Blood Updated: 12/30/24 1737     WBC 9.54 10*3/mm3      RBC 4.20 10*6/mm3      Hemoglobin 13.7 g/dL      Hematocrit 43.5 %      .6 fL      MCH 32.6 pg      MCHC 31.5 g/dL      RDW 13.4 %      RDW-SD 51.8 fl      MPV 8.7 fL      Platelets 278 10*3/mm3      Neutrophil % 70.7 %      Lymphocyte % 15.0 %      Monocyte % 13.1 %      Eosinophil % 0.5 %      Basophil % 0.3 %      Immature Grans % 0.4 %      Neutrophils, Absolute 6.74 10*3/mm3      Lymphocytes, Absolute 1.43 10*3/mm3      Monocytes, Absolute 1.25 10*3/mm3      Eosinophils, Absolute 0.05 10*3/mm3      Basophils, Absolute 0.03 10*3/mm3      Immature Grans, Absolute 0.04 10*3/mm3      nRBC 0.0 /100 WBC     Lactic Acid, Plasma [[id number]]  (Normal) Collected: 12/30/24 2018    Specimen: Blood from Hand, Right Updated: 12/30/24 2039     Lactate 0.9 mmol/L     Blood Culture - Blood, Hand, Left [[id number]] Collected: 12/30/24 2021    Specimen: Blood from Hand, Left Updated: 12/30/24 2022    Blood Culture - Blood, Hand, Right [[id number]] Collected: 12/30/24 2021    Specimen: Blood from Hand, Right Updated: 12/30/24 2022              I ordered the above labs and reviewed the results    RADIOLOGY  CT Angiogram Chest    Result Date: 12/30/2024  CT ANGIOGRAM CHEST Date of  Exam: 12/30/2024 8:43 PM EST Indication: chest pain, tachycardia, hypoxia. Comparison: CXR 1/25/2024, CT chest 1/17/2024 Technique: CTA of the chest was performed after the uneventful intravenous administration of iodinated contrast. Reconstructed coronal and sagittal images were also obtained. In addition, a 3-D volume rendered image was created for interpretation. Automated exposure control and iterative reconstruction methods were used. Findings: The pulmonary arteries are well opacified. There are no findings of PE. Lung window images reveal 2.5 cm patchy opacity in the medial right upper lobe seen on series 5 image 62. A 1.5 cm mass or consolidation is seen in the right lower lobe on series 5 image 91. Pleural-based consolidation in the lateral right lower lobe measures 6 cm x 1.5 cm. 1.5 cm x 1.5 cm area of consolidation is seen in the lateral left lower lobe. Findings could represent multifocal pneumonia. The patient has a significant smoking history. Findings are concerning for bronchogenic malignancy or lung cancer. Pulmonary consultation is recommended. Follow-up CT scan of the chest in 3 months is recommended, if more aggressive evaluation is not undertaken. Mediastinal windows reveal no mediastinal, hilar, or axillary adenopathy. Extensive coronary artery calcifications are evident. Upper abdominal structures have an unremarkable appearance. Degenerative changes are seen in the thoracic spine.     Impression: CT scan of the chest with IV contrast demonstrating no findings of PE. Multiple areas of consolidation and/or masses are seen in the lungs. Findings are consistent with multifocal pneumonia. The patient has a significant smoking history, with emphysema. Close follow-up is recommended with CT scan of the chest in 3 months. Alternatively, a PET scan could be obtained. Electronically Signed: Larry Berrios MD  12/30/2024 9:48 PM EST  Workstation ID: [id number]     I ordered the above radiologic testing  and reviewed the results    PROCEDURES  Procedures      PROGRESS AND CONSULTS  ED Course as of 12/30/24 2204   Mon Dec 30, 2024   1950 Signout at 8 pm for hypoxemia likely secondary to COPD in a patient who is wheezing.  Exposed to sick family members.  Pending treatment for community-acquired pneumonia, she is get breathing treatments and steroids on board as well as pending a CT angiogram of her chest [AK]   2155 CT Angiogram Chest [AK]      ED Course User Index  [AK] Parrish Kessler MD           MEDICAL DECISION MAKING    MDM  49-year-old female with history of COPD presenting for shortness of breath and cough.  Patient has been living with others who are sick.  On arrival she is alert, oriented but does have some respiratory distress.  Patient chronically wears home oxygen and has been requiring it all the time lately.  Her heart rate is 113, temp of 100.1 °F blood pressure is 132/53 and 94% on 2 L.  EKG reveals sinus tachycardia.  No ischemic findings on my evaluation.  No ST segment elevation or depression.  CBC reveals no leukocytosis, hemoglobin is 13.  CMP is otherwise unremarkable.  Viral panel is negative.  Troponin not elevated.  CT scan of the chest will be performed due to patient's productive cough with fever and increased O2 requirement.  She was given a DuoNeb which did slightly improve her symptoms.  At time of shift change patient is still waiting on CT of the chest to be performed.  Patient care handed to Dr. Kessler.       Reevaluation at 10:04 PM, I reevaluated the history and at bedside, she had increased oxygen requirements of 2 L via nasal cannula, she has multifocal pneumonia and notified her of plan to admit her for antibiotics and oxygen  Electronically signed by Parrish Kessler MD, 12/30/24, 10:04 PM EST.      DIAGNOSIS  Final diagnoses:   COPD with acute exacerbation   Hypoxia   Pneumonia due to infectious organism, unspecified laterality, unspecified part of lung       Latest  Documented Vital Signs:  As of 22:04 EST  BP- 143/82 HR- 110 Temp- 100.1 °F (37.8 °C) O2 sat- 93%    DISPOSITION    Discussed pertinent findings with the patient/family.  Patient/Family voiced understanding of need to follow-up for recheck and further testing as needed.  Return to the Emergency Department warnings were given.         Medication List      No changes were made to your prescriptions during this visit.                   Dictated utilizing Dragon dictation     Opal Ledezma PA-C  12/30/24 1953       Parrish Kessler MD  12/30/24 2209

## 2024-12-31 NOTE — PLAN OF CARE
Goal Outcome Evaluation:  Plan of Care Reviewed With: patient        Progress: no change  Outcome Evaluation: New ER admit, remians on O2 NC, Tele, otherwise VSS, rested well, tolerating diet, RVP pending

## 2024-12-31 NOTE — THERAPY DISCHARGE NOTE
Acute Care - Speech Language Pathology   Swallow Initial Evaluation/Discharge  [facility]     Patient Name: Valeria Rubi  : 1975  MRN: [id number]  Today's Date: 2024               Admit Date: 2024    Visit Dx:    ICD-10-CM ICD-9-CM   1. COPD with acute exacerbation  J44.1 491.21   2. Hypoxia  R09.02 799.02   3. Pneumonia due to infectious organism, unspecified laterality, unspecified part of lung  J18.9 486     Patient Active Problem List   Diagnosis    Abnormal mammogram    Atopic rhinitis    Bronchitis    Chronic headache    Pain of hand    Hyperlipidemia    Essential hypertension    Sprain of ankle    Nausea    Pain in wrist    Wheezing    Panlobular emphysema    Tobacco use    Pharyngeal dysphagia    Radiculopathy    Lumbar degenerative disc disease    Peripheral vascular disease    Pain of toe of left foot    Abnormal femoral pulse    Coronary stent occlusion    COPD with acute exacerbation    Hyperglycemia    Menopausal symptoms    Surveillance for Depo-Provera contraception    Anxiety and depression    Numbness and tingling of both feet    Low vitamin D level    Low vitamin B12 level    Pneumonia of left lower lobe due to infectious organism    Headache, migraine    Community acquired pneumonia, bilateral    Acute and chronic respiratory failure with hypoxia    Acute respiratory failure with hypoxia and hypercapnia    Acute on chronic respiratory failure with hypercapnia    Respiratory failure    Macrocytosis    DM2 (diabetes mellitus, type 2)    History of coronary artery disease     Past Medical History:   Diagnosis Date    Anxiety     Arthritis     COPD (chronic obstructive pulmonary disease)     Coronary stent occlusion     CTS (carpal tunnel syndrome)     Depression     Dizziness     Headache     Hyperlipidemia     Hypertension     Migraine     Numbness and tingling     Pneumonia      Past Surgical History:   Procedure Laterality Date    ANGIOPLASTY ILIAC ARTERY Left 2016     Procedure: BILATERAL DUPLEX DIRECTED ACCESS, AIF BILATERAL RUNOFF, SELECTIVE CATHETERIZATION OF RIGHT EXTERNAL ILIAC WITH ANGIOPLASTY, LEFT COMMON ILIAC STENT PLACEMENT;  Surgeon: Jose Carlos Plascencia MD;  Location: [facility] 18/19;  Service:     BREAST SURGERY      BILAT IMPLANTS    CAROTID ARTERY ANGIOPLASTY         SLP Recommendation and Plan  SLP Swallowing Diagnosis: swallow WFL/no suspected pharyngeal impairment (12/31/24 0755)  SLP Diet Recommendation: regular textures, thin liquids (12/31/24 0755)     Monitor for Signs of Aspiration: no, notify SLP if any concerns (12/31/24 0755)  Recommended Diagnostics: No further SLP services recommended (no instrumental evaluation indicated at this time) (12/31/24 0755)  Swallow Criteria for Skilled Therapeutic Interventions Met: no problems identified which require skilled intervention (12/31/24 0755)  Anticipated Discharge Disposition (SLP): No further SLP services warranted, home (12/31/24 0755)     Therapy Frequency (Swallow): evaluation only (12/31/24 0755)              Anticipated Discharge Disposition (SLP): No further SLP services warranted, home (12/31/24 0755)  Patient/Family Concerns, Anticipated Discharge Disposition (SLP): No current concerns reported per pt at this time. (12/31/24 0755)                         Outcome Evaluation: SLP: Pt presents with a functional oropharyngeal swallow w/no s/s of esophageal dysphagia. Normal oral prep (exception of increased prep for solids due to lack of dentition) of thins, regular solids and puree. Normal oral transit and clearance of all. Timely pharyngeal swallow w/no clinical s/s of aspiration or complaints of food hanging or sticking in her throat. Impressions: Functional oropharyngeal swallow; no suspected esophageal dysphagia. Recommendations: Continue with regular diet with thins, oral care BID and as needed; general aspiration precautions. No further instrumental evaluation indicated at this time. No further  SLP services indicated at this time. (12/31/24 8615)    SWALLOW EVALUATION (Last 72 Hours)       SLP Adult Swallow Evaluation       Row Name 12/31/24 0752                   Rehab Evaluation    Document Type discharge evaluation/summary  -AD        Subjective Information no complaints  -AD        Patient Observations alert;cooperative  -AD        Patient/Family/Caregiver Comments/Observations Pt was seen sitting upright in bed, alert and cooperative. Agreeable to evaluation.  -AD        Patient Effort good  -AD        Symptoms Noted During/After Treatment none  -AD           General Information    Patient Profile Reviewed yes  -AD        Pertinent History Of Current Problem Pt is a 48 y/o female referred for a swallow evaluation w/admission for acute on chronic respiratory failure w/hypoxia. CT angio with no PE but multifocal opacities. Pt reports recent illness within family and reports had 24 hour virus with vomiting. She reports she has no difficulty swallowing and no esophageal concerns such as reflux of heartburn. Hx is significant for  chronic hypoxemic and hypercapnic respiratory failure/tobacco abuse/oxygen dependent COPD/type 2 diabetes/dysphagia and recurrent pneumonia/peripheral vascular disease/vitamin B12 deficiency/vitamin D deficiency/mood disorder/L-spine degenerative disc disease/dyslipidemia/hypertension/coronary artery disease.  -AD        Current Method of Nutrition regular textures;thin liquids  cardiac, consistent carbs  -AD        Precautions/Limitations, Vision WFL  -AD        Precautions/Limitations, Hearing WFL  -AD        Prior Level of Function-Communication WFL  -AD        Prior Level of Function-Swallowing no diet consistency restrictions;safe, efficient swallowing in all situations;regular textures;thin liquids  per pt. occasionally difficulty chewing some foods due to lack of dentition and difficulty eating at times with dentures.  -AD        Plans/Goals Discussed with patient;agreed  upon  -AD        Barriers to Rehab none identified  -AD        Patient's Goals for Discharge return home;return to all previous roles/activities  -AD           Pain    Pre/Posttreatment Pain Comment No current pain reported. States has had some headaches recently but no current pain indicated.  -AD           Oral Motor Structure and Function    Oral Lesions or Structural Abnormalities and/or variants none; has tongue piercing to midline, cent of tongue  -AD        Dentition Assessment edentulous;has dentures but does not use  does not use denture typically  -AD        Secretion Management WNL/WFL  -AD        Mucosal Quality moist, healthy  -AD        Volitional Swallow WFL  -AD        Volitional Cough WFL  -AD           Oral Musculature and Cranial Nerve Assessment    Oral Motor General Assessment WFL  -AD           General Eating/Swallowing Observations    Respiratory Support Currently in Use nasal cannula  -AD        O2 Liters 2L  -AD        Eating/Swallowing Skills self-fed;appropriate self-feeding skills observed  -AD        Positioning During Eating upright 90 degree;upright in bed  sitting up in bed w/legs crossed  -AD        Utensils Used spoon;cup  -AD        Consistencies Trialed regular textures;pureed;thin liquids  -AD           Respiratory    Respiratory Status WFL;during swallowing/eating  -AD           Clinical Swallow Eval    Oral Prep Phase WFL  -AD        Oral Transit WFL  -AD        Oral Residue WFL  -AD        Pharyngeal Phase no overt signs/symptoms of pharyngeal impairment  -AD        Esophageal Phase unremarkable  -AD        Clinical Swallow Evaluation Summary Pt presents with a functional oropharyngeal swallow w/no s/s of esophageal dysphagia. Normal oral prep (exception of increased prep for solids due to lack of dentition) of thins, regular solids and puree. Normal oral transit and clearance of all. Timely pharyngeal swallow w/no clinical s/s of aspiration or complaints of food hanging or  sticking in her throat.  -AD           SLP Evaluation Clinical Impression    SLP Swallowing Diagnosis swallow WFL/no suspected pharyngeal impairment  -AD        Functional Impact no impact on function  -AD        Swallow Criteria for Skilled Therapeutic Interventions Met no problems identified which require skilled intervention  -AD           Recommendations    Therapy Frequency (Swallow) evaluation only  -AD        SLP Diet Recommendation regular textures;thin liquids  -AD        Recommended Diagnostics No further SLP services recommended  no instrumental evaluation indicated at this time  -AD        Recommended Precautions and Strategies general aspiration precautions  -AD        Oral Care Recommendations Oral Care BID/PRN;Toothbrush  -AD        SLP Rec. for Method of Medication Administration as tolerated  -AD        Monitor for Signs of Aspiration no;notify SLP if any concerns  -AD        Anticipated Discharge Disposition (SLP) No further SLP services warranted;home  -AD        Patient/Family Concerns, Anticipated Discharge Disposition (SLP) No current concerns reported per pt at this time.  -AD                  User Key  (r) = Recorded By, (t) = Taken By, (c) = Cosigned By      Initials Name Effective Dates    AD Shirlene Garza, MS CCC-SLP 06/16/21 -                     EDUCATION  The patient has been educated in the following areas:   Dysphagia (Swallowing Impairment). Pt verbalizes understanding of results and in agreement with no further evaluation or therapy. Agreeable to continue regular diet with thins.        Time Calculation:    Time Calculation- SLP       Row Name 12/31/24 0836             Time Calculation- SLP    SLP Start Time 0755  -AD      SLP Stop Time 0822  -AD      SLP Time Calculation (min) 27 min  -AD      Total Timed Code Minutes- SLP 0 minute(s)  -AD      SLP Non-Billable Time (min) 0 min  -AD      SLP Received On 12/31/24  -AD         Untimed Charges    SLP Eval/Re-eval  ST Eval Oral  Pharyng Swallow - 47308  -AD      [id number] Eval Oral Pharyng Swallow Minutes 27  -AD         Total Minutes    Untimed Charges Total Minutes 27  -AD       Total Minutes 27  -AD                User Key  (r) = Recorded By, (t) = Taken By, (c) = Cosigned By      Initials Name Provider Type    AD Shirlene Garza, MS CCC-SLP Speech and Language Pathologist                    Therapy Charges for Today       Code Description Service Date Service Provider Modifiers Qty    [id number]  ST EVAL ORAL PHARYNG SWALLOW 2 12/31/2024 Shirlene Garza MS CCC-SLP GN 1                 SLP Discharge Summary  Anticipated Discharge Disposition (SLP): No further SLP services warranted, home    Shirlene Garza MS CCC-SLP  12/31/2024

## 2025-01-01 ENCOUNTER — READMISSION MANAGEMENT (OUTPATIENT)
Dept: CALL CENTER | Facility: HOSPITAL | Age: 50
End: 2025-01-01
Payer: COMMERCIAL

## 2025-01-01 VITALS
DIASTOLIC BLOOD PRESSURE: 66 MMHG | TEMPERATURE: 98 F | SYSTOLIC BLOOD PRESSURE: 109 MMHG | BODY MASS INDEX: 22.8 KG/M2 | HEIGHT: 62 IN | HEART RATE: 72 BPM | RESPIRATION RATE: 20 BRPM | WEIGHT: 123.9 LBS | OXYGEN SATURATION: 98 %

## 2025-01-01 PROBLEM — B34.8 RHINOVIRUS INFECTION: Status: ACTIVE | Noted: 2025-01-01

## 2025-01-01 LAB
ANION GAP SERPL CALCULATED.3IONS-SCNC: 10.4 MMOL/L (ref 5–15)
BASOPHILS # BLD AUTO: 0 10*3/MM3 (ref 0–0.2)
BASOPHILS NFR BLD AUTO: 0 % (ref 0–1.5)
BUN SERPL-MCNC: 16 MG/DL (ref 6–20)
BUN/CREAT SERPL: 24.2 (ref 7–25)
CALCIUM SPEC-SCNC: 8.8 MG/DL (ref 8.6–10.5)
CHLORIDE SERPL-SCNC: 101 MMOL/L (ref 98–107)
CO2 SERPL-SCNC: 30.6 MMOL/L (ref 22–29)
CREAT SERPL-MCNC: 0.66 MG/DL (ref 0.57–1)
DEPRECATED RDW RBC AUTO: 51.2 FL (ref 37–54)
EGFRCR SERPLBLD CKD-EPI 2021: 106.4 ML/MIN/1.73
EOSINOPHIL # BLD AUTO: 0.01 10*3/MM3 (ref 0–0.4)
EOSINOPHIL NFR BLD AUTO: 0.1 % (ref 0.3–6.2)
ERYTHROCYTE [DISTWIDTH] IN BLOOD BY AUTOMATED COUNT: 13 % (ref 12.3–15.4)
GLUCOSE BLDC GLUCOMTR-MCNC: 132 MG/DL (ref 70–130)
GLUCOSE BLDC GLUCOMTR-MCNC: 145 MG/DL (ref 70–130)
GLUCOSE SERPL-MCNC: 141 MG/DL (ref 65–99)
HCT VFR BLD AUTO: 40.5 % (ref 34–46.6)
HGB BLD-MCNC: 12.2 G/DL (ref 12–15.9)
IMM GRANULOCYTES # BLD AUTO: 0.06 10*3/MM3 (ref 0–0.05)
IMM GRANULOCYTES NFR BLD AUTO: 0.4 % (ref 0–0.5)
LYMPHOCYTES # BLD AUTO: 1.23 10*3/MM3 (ref 0.7–3.1)
LYMPHOCYTES NFR BLD AUTO: 8.2 % (ref 19.6–45.3)
MCH RBC QN AUTO: 31.9 PG (ref 26.6–33)
MCHC RBC AUTO-ENTMCNC: 30.1 G/DL (ref 31.5–35.7)
MCV RBC AUTO: 106 FL (ref 79–97)
MONOCYTES # BLD AUTO: 0.73 10*3/MM3 (ref 0.1–0.9)
MONOCYTES NFR BLD AUTO: 4.9 % (ref 5–12)
NEUTROPHILS NFR BLD AUTO: 12.91 10*3/MM3 (ref 1.7–7)
NEUTROPHILS NFR BLD AUTO: 86.4 % (ref 42.7–76)
PLATELET # BLD AUTO: 290 10*3/MM3 (ref 140–450)
PMV BLD AUTO: 9.4 FL (ref 6–12)
POTASSIUM SERPL-SCNC: 4.9 MMOL/L (ref 3.5–5.2)
PROCALCITONIN SERPL-MCNC: 0.06 NG/ML (ref 0–0.25)
RBC # BLD AUTO: 3.82 10*6/MM3 (ref 3.77–5.28)
SODIUM SERPL-SCNC: 142 MMOL/L (ref 136–145)
WBC NRBC COR # BLD AUTO: 14.94 10*3/MM3 (ref 3.4–10.8)

## 2025-01-01 PROCEDURE — 96372 THER/PROPH/DIAG INJ SC/IM: CPT

## 2025-01-01 PROCEDURE — 85025 COMPLETE CBC W/AUTO DIFF WBC: CPT | Performed by: HOSPITALIST

## 2025-01-01 PROCEDURE — 25010000002 METHYLPREDNISOLONE PER 40 MG: Performed by: INTERNAL MEDICINE

## 2025-01-01 PROCEDURE — 94664 DEMO&/EVAL PT USE INHALER: CPT

## 2025-01-01 PROCEDURE — 84145 PROCALCITONIN (PCT): CPT | Performed by: HOSPITALIST

## 2025-01-01 PROCEDURE — 99238 HOSP IP/OBS DSCHRG MGMT 30/<: CPT | Performed by: HOSPITALIST

## 2025-01-01 PROCEDURE — 94799 UNLISTED PULMONARY SVC/PX: CPT

## 2025-01-01 PROCEDURE — 96376 TX/PRO/DX INJ SAME DRUG ADON: CPT

## 2025-01-01 PROCEDURE — 80048 BASIC METABOLIC PNL TOTAL CA: CPT | Performed by: HOSPITALIST

## 2025-01-01 PROCEDURE — 63710000001 INSULIN GLARGINE PER 5 UNITS: Performed by: INTERNAL MEDICINE

## 2025-01-01 PROCEDURE — 82948 REAGENT STRIP/BLOOD GLUCOSE: CPT

## 2025-01-01 PROCEDURE — G0378 HOSPITAL OBSERVATION PER HR: HCPCS

## 2025-01-01 PROCEDURE — 25010000002 ENOXAPARIN PER 10 MG: Performed by: INTERNAL MEDICINE

## 2025-01-01 RX ORDER — NYSTATIN 100000 [USP'U]/ML
5 SUSPENSION ORAL 4 TIMES DAILY
Status: DISCONTINUED | OUTPATIENT
Start: 2025-01-01 | End: 2025-01-01 | Stop reason: HOSPADM

## 2025-01-01 RX ORDER — NYSTATIN 100000 [USP'U]/ML
5 SUSPENSION ORAL 4 TIMES DAILY
Qty: 200 ML | Refills: 0 | Status: SHIPPED | OUTPATIENT
Start: 2025-01-01 | End: 2025-01-01

## 2025-01-01 RX ORDER — CEFDINIR 300 MG/1
300 CAPSULE ORAL EVERY 12 HOURS SCHEDULED
Qty: 9 CAPSULE | Refills: 0 | Status: SHIPPED | OUTPATIENT
Start: 2025-01-01 | End: 2025-01-06

## 2025-01-01 RX ORDER — PREDNISONE 20 MG/1
TABLET ORAL
Qty: 23 TABLET | Refills: 0 | Status: SHIPPED | OUTPATIENT
Start: 2025-01-01 | End: 2025-01-12

## 2025-01-01 RX ORDER — NYSTATIN 100000 [USP'U]/ML
SUSPENSION ORAL
Status: COMPLETED
Start: 2025-01-01 | End: 2025-01-01

## 2025-01-01 RX ORDER — CEFDINIR 300 MG/1
300 CAPSULE ORAL EVERY 12 HOURS SCHEDULED
Qty: 9 CAPSULE | Refills: 0 | Status: SHIPPED | OUTPATIENT
Start: 2025-01-01 | End: 2025-01-01

## 2025-01-01 RX ORDER — BENZONATATE 200 MG/1
200 CAPSULE ORAL 3 TIMES DAILY PRN
Qty: 30 CAPSULE | Refills: 0 | Status: SHIPPED | OUTPATIENT
Start: 2025-01-01 | End: 2025-01-01

## 2025-01-01 RX ORDER — NYSTATIN 100000 [USP'U]/ML
5 SUSPENSION ORAL 4 TIMES DAILY
Qty: 473 ML | Refills: 0 | Status: ON HOLD | OUTPATIENT
Start: 2025-01-01

## 2025-01-01 RX ORDER — BENZONATATE 200 MG/1
200 CAPSULE ORAL 3 TIMES DAILY PRN
Qty: 30 CAPSULE | Refills: 0 | Status: ON HOLD | OUTPATIENT
Start: 2025-01-01

## 2025-01-01 RX ORDER — METHYLPREDNISOLONE 4 MG/1
TABLET ORAL
Qty: 21 TABLET | Refills: 0 | Status: SHIPPED | OUTPATIENT
Start: 2025-01-01 | End: 2025-01-01 | Stop reason: HOSPADM

## 2025-01-01 RX ADMIN — GUAIFENESIN 1200 MG: 600 TABLET ORAL at 09:52

## 2025-01-01 RX ADMIN — IPRATROPIUM BROMIDE AND ALBUTEROL SULFATE 3 ML: .5; 3 SOLUTION RESPIRATORY (INHALATION) at 07:59

## 2025-01-01 RX ADMIN — IPRATROPIUM BROMIDE AND ALBUTEROL SULFATE 3 ML: .5; 3 SOLUTION RESPIRATORY (INHALATION) at 12:03

## 2025-01-01 RX ADMIN — CEFDINIR 300 MG: 300 CAPSULE ORAL at 09:53

## 2025-01-01 RX ADMIN — NYSTATIN 500000 UNITS: 100000 SUSPENSION ORAL at 12:51

## 2025-01-01 RX ADMIN — Medication 10 ML: at 09:54

## 2025-01-01 RX ADMIN — NICOTINE 1 PATCH: 21 PATCH, EXTENDED RELEASE TRANSDERMAL at 09:51

## 2025-01-01 RX ADMIN — ENOXAPARIN SODIUM 30 MG: 30 INJECTION SUBCUTANEOUS at 09:53

## 2025-01-01 RX ADMIN — NYSTATIN 500000 UNITS: 100000 SUSPENSION ORAL at 09:52

## 2025-01-01 RX ADMIN — ASPIRIN 81 MG: 81 TABLET, COATED ORAL at 09:52

## 2025-01-01 RX ADMIN — BUDESONIDE AND FORMOTEROL FUMARATE DIHYDRATE 2 PUFF: 160; 4.5 AEROSOL RESPIRATORY (INHALATION) at 08:11

## 2025-01-01 RX ADMIN — ATORVASTATIN CALCIUM 20 MG: 20 TABLET, FILM COATED ORAL at 09:52

## 2025-01-01 RX ADMIN — METHYLPREDNISOLONE SODIUM SUCCINATE 40 MG: 40 INJECTION INTRAMUSCULAR; INTRAVENOUS at 06:58

## 2025-01-01 RX ADMIN — BUTALBITAL, ACETAMINOPHEN, AND CAFFEINE 1 TABLET: 325; 50; 40 TABLET ORAL at 06:58

## 2025-01-01 RX ADMIN — LISINOPRIL 20 MG: 20 TABLET ORAL at 09:53

## 2025-01-01 RX ADMIN — NYSTATIN 500000 UNITS: 100000 SUSPENSION ORAL at 01:02

## 2025-01-01 RX ADMIN — FAMOTIDINE 40 MG: 20 TABLET, FILM COATED ORAL at 09:52

## 2025-01-01 RX ADMIN — INSULIN GLARGINE 15 UNITS: 100 INJECTION, SOLUTION SUBCUTANEOUS at 09:53

## 2025-01-01 RX ADMIN — CLOPIDOGREL BISULFATE 75 MG: 75 TABLET ORAL at 09:52

## 2025-01-01 RX ADMIN — Medication 5 MG: at 01:03

## 2025-01-01 RX ADMIN — METOPROLOL TARTRATE 50 MG: 50 TABLET, FILM COATED ORAL at 09:53

## 2025-01-01 NOTE — PLAN OF CARE
Goal Outcome Evaluation:  Plan of Care Reviewed With: patient           Outcome Evaluation: Headache treated. Up in chair for dinner. Xray of ribs negative.

## 2025-01-01 NOTE — PLAN OF CARE
Goal Outcome Evaluation:  Plan of Care Reviewed With: patient        Progress: improving  Outcome Evaluation: VSS, O2  liters per NC, po omnicef, IV solumedrol, c/o mouth soreness; scheduled nystatin given, encouraged to use IS, possible discharge home today.

## 2025-01-01 NOTE — PLAN OF CARE
Problem: Adult Inpatient Plan of Care  Goal: Plan of Care Review  Outcome: Adequate for Care Transition  Goal: Patient-Specific Goal (Individualized)  Outcome: Adequate for Care Transition  Goal: Absence of Hospital-Acquired Illness or Injury  Outcome: Adequate for Care Transition  Goal: Optimal Comfort and Wellbeing  Outcome: Adequate for Care Transition  Goal: Readiness for Transition of Care  Outcome: Adequate for Care Transition     Problem: ARDS (Acute Respiratory Distress Syndrome)  Goal: Effective Oxygenation  Outcome: Adequate for Care Transition     Problem: Comorbidity Management  Goal: Blood Pressure in Desired Range  Outcome: Adequate for Care Transition     Problem: Fall Injury Risk  Goal: Absence of Fall and Fall-Related Injury  Outcome: Adequate for Care Transition   Goal Outcome Evaluation:

## 2025-01-01 NOTE — DISCHARGE SUMMARY
"Valeria Rubi  1975  [id number]    Hospitalists Discharge Summary    Date of Admission: 12/30/2024  Date of Discharge:  1/1/2025    History of Present Illness from [facility] on admit:   \"A pleasant 49 years old female with a past history of chronic hypoxemic and hypercapnic respiratory failure/tobacco abuse/oxygen dependent COPD/type 2 diabetes/dysphagia and recurrent pneumonia/peripheral vascular disease/vitamin B12 deficiency/vitamin D deficiency/mood disorder/L-spine degenerative disc disease/dyslipidemia/hypertension/coronary artery disease who presents to the emergency department with 1 week history of progressive shortness of breath associated with progressive cough productive of yellowish to greenish sputum and increasing wheeze. She does have bilateral atypical chest pain especially with the cough that is squeezing in nature. No palpitation. No hemoptysis. No PND or orthopnea. No ankle edema. No fever or chills. In the emergency department CT of the chest revealed COPD changes/bilateral multifocal pneumonia but no PE. EKG revealed sinus tachycardia. Rest of workup in the emergency department included negative COVID-19, influenza A and B, RSV PCR. CBC was normal except an MCV of 103.6. Troponin was normal. Procalcitonin was normal. CMP normal except a blood sugar of 126, chloride 97, CO2 of 33.3. Lactic acid was normal. Blood cultures obtained.\"    Primary Discharge diagnoses:  A/C hypoxemic respiratory failure (80% sats on RA in ER)  AECOPD and bilateral multifocal viral pneumonia secondary to rhinovirus infection    Secondary Discharge Diagnoses:   DM2   Macrocytosis   HTN  CAD  Tobacco abuse     Hospital Course Summary:   The patient was initially managed with IV antibiotics, then changed to Omnicef as procalcitonin remained normal.  IV solumedrol de-escalated to oral methylprednisolone. She remained on 2 L of oxygen. Right sided rib pain with coughing-CTA did not show any rib fractures.   B12 was low " normal at 300  She remained on her chronic 2 L oxygen    *With CTA concerning for bronchogenic malignancy or lung cancer with recommendation for pulmonary and f/u CT in 3 months  2.5 cm patchy opacity medial RUL  1.5 cm mass/consolidation RLL  Pleural based consolidation RLL 6 cm x 1.5 cm  Lateral left LL consolidation 1.5 cm x 1.5 cm  Patient will be referred to pulmonary outpatient ASAP and f/u with pcp within 1 week for further referrals    F/U Talib Ceron MD 1 week  F/U pulmonary first available    PCP  Patient Care Team:  Talib Ceron MD as PCP - General (Family Medicine)    Consults:   Consults       No orders found from 12/1/2024 to 12/31/2024.          Operations and Procedures Performed:     XR Ribs Right With PA Chest    Result Date: 12/31/2024  Narrative: XR RIBS RIGHT W PA CHEST Date of Exam: 12/31/2024 1:51 PM EST Indication: Continued rib pain Comparison: CT chest of 12/30/2024 Findings: 5 films. There are no acute or old rib fractures. There are patchy infiltrates in the lung bases which were identified on the CT exam and better visualized on that exam. Lung fields are well inflated. There are no effusions. The heart size is normal.     Impression: Impression: No abnormality of the right ribs. Patchy bibasilar infiltrates, may represent pneumonia. Please see CT chest exam report of 12/30/2024 for further description of abnormalities. Electronically Signed: Noris Mireles MD  12/31/2024 3:19 PM EST  Workstation ID: [id number]    CT Angiogram Chest    Result Date: 12/30/2024  Narrative: CT ANGIOGRAM CHEST Date of Exam: 12/30/2024 8:43 PM EST Indication: chest pain, tachycardia, hypoxia. Comparison: CXR 1/25/2024, CT chest 1/17/2024 Technique: CTA of the chest was performed after the uneventful intravenous administration of iodinated contrast. Reconstructed coronal and sagittal images were also obtained. In addition, a 3-D volume rendered image was created for interpretation. Automated  exposure control and iterative reconstruction methods were used. Findings: The pulmonary arteries are well opacified. There are no findings of PE. Lung window images reveal 2.5 cm patchy opacity in the medial right upper lobe seen on series 5 image 62. A 1.5 cm mass or consolidation is seen in the right lower lobe on series 5 image 91. Pleural-based consolidation in the lateral right lower lobe measures 6 cm x 1.5 cm. 1.5 cm x 1.5 cm area of consolidation is seen in the lateral left lower lobe. Findings could represent multifocal pneumonia. The patient has a significant smoking history. Findings are concerning for bronchogenic malignancy or lung cancer. Pulmonary consultation is recommended. Follow-up CT scan of the chest in 3 months is recommended, if more aggressive evaluation is not undertaken. Mediastinal windows reveal no mediastinal, hilar, or axillary adenopathy. Extensive coronary artery calcifications are evident. Upper abdominal structures have an unremarkable appearance. Degenerative changes are seen in the thoracic spine.     Impression: Impression: CT scan of the chest with IV contrast demonstrating no findings of PE. Multiple areas of consolidation and/or masses are seen in the lungs. Findings are consistent with multifocal pneumonia. The patient has a significant smoking history, with emphysema. Close follow-up is recommended with CT scan of the chest in 3 months. Alternatively, a PET scan could be obtained. Electronically Signed: Larry Berrios MD  12/30/2024 9:48 PM EST  Workstation ID: [id number]     Allergies:  is allergic to prednisone.    Marcelo  Reviewed     Discharge Medications:     Discharge Medications        New Medications        Instructions Start Date   benzonatate 200 MG capsule  Commonly known as: TESSALON   200 mg, Oral, 3 Times Daily PRN      cefdinir 300 MG capsule  Commonly known as: OMNICEF   300 mg, Oral, Every 12 Hours Scheduled      methylPREDNISolone 4 MG dose pack  Commonly  known as: MEDROL   Take as directed on package instructions.      nystatin 100,000 unit/mL suspension  Commonly known as: MYCOSTATIN   500,000 Units, Oral, 4 Times Daily             Continue These Medications        Instructions Start Date   acetaminophen 325 MG tablet  Commonly known as: TYLENOL   650 mg, Oral, Every 4 Hours PRN      albuterol sulfate  (90 Base) MCG/ACT inhaler  Commonly known as: Proventil HFA   Inhale 2 puffs into the lungs Every 4 (Four) Hours As Needed for Wheezing.      aspirin 81 MG EC tablet   81 mg, Oral, Daily      budesonide-formoterol 160-4.5 MCG/ACT inhaler  Commonly known as: SYMBICORT   2 puffs, 2 Times Daily - RT      clopidogrel 75 MG tablet  Commonly known as: Plavix   75 mg, Oral, Daily      fluticasone 50 MCG/ACT nasal spray  Commonly known as: FLONASE   1 spray, Daily PRN      guaiFENesin 600 MG 12 hr tablet  Commonly known as: MUCINEX   1,200 mg, Oral, 2 Times Daily      insulin detemir 100 UNIT/ML injection  Commonly known as: LEVEMIR   15 Units, Subcutaneous, Daily, Until steroids are completed      ipratropium-albuterol 0.5-2.5 mg/3 ml nebulizer  Commonly known as: DUO-NEB   3 mL, Nebulization, Every 4 Hours PRN      lisinopril 20 MG tablet  Commonly known as: PRINIVIL,ZESTRIL   20 mg, Oral, Every 24 Hours Scheduled      metoprolol tartrate 50 MG tablet  Commonly known as: LOPRESSOR   50 mg, Oral, Every 12 Hours Scheduled      nicotine 21 MG/24HR patch  Commonly known as: NICODERM CQ   1 patch, Transdermal, Every 24 Hours      promethazine 12.5 MG tablet  Commonly known as: PHENERGAN   12.5 mg, Every 6 Hours PRN      simvastatin 40 MG tablet  Commonly known as: ZOCOR   40 mg, Oral, Every Evening      venlafaxine XR 75 MG 24 hr capsule  Commonly known as: EFFEXOR-XR   TAKE 1 CAPSULE BY MOUTH EVERY DAY               Last Lab Results:   Lab Results (most recent)       Procedure Component Value Units Date/Time    Blood Culture - Blood, Hand, Left [[id number]]  (Normal)  "Collected: 12/30/24 2021    Specimen: Blood from Hand, Left Updated: 12/31/24 2030     Blood Culture No growth at 24 hours    Blood Culture - Blood, Hand, Right [[id number]]  (Normal) Collected: 12/30/24 2021    Specimen: Blood from Hand, Right Updated: 12/31/24 2030     Blood Culture No growth at 24 hours    POC Glucose Once [[id number]]  (Abnormal) Collected: 12/31/24 2022    Specimen: Blood Updated: 12/31/24 2029     Glucose 206 mg/dL     POC Glucose Once [[id number]]  (Abnormal) Collected: 12/31/24 1630    Specimen: Blood Updated: 12/31/24 1637     Glucose 178 mg/dL     Procalcitonin [[id number]]  (Normal) Collected: 12/31/24 0358    Specimen: Blood Updated: 12/31/24 1435     Procalcitonin 0.11 ng/mL     Narrative:      As a Marker for Sepsis (Non-Neonates):    1. <0.5 ng/mL represents a low risk of severe sepsis and/or septic shock.  2. >2 ng/mL represents a high risk of severe sepsis and/or septic shock.    As a Marker for Lower Respiratory Tract Infections that require antibiotic therapy:    PCT on Admission    Antibiotic Therapy       6-12 Hrs later    >0.5                Strongly Recommended  >0.25 - <0.5        Recommended   0.1 - 0.25          Discouraged              Remeasure/reassess PCT  <0.1                Strongly Discouraged     Remeasure/reassess PCT    As 28 day mortality risk marker: \"Change in Procalcitonin Result\" (>80% or <=80%) if Day 0 (or Day 1) and Day 4 values are available. Refer to http://www.[facility]-pct-calculator.com    Change in PCT <=80%  A decrease of PCT levels below or equal to 80% defines a positive change in PCT test result representing a higher risk for 28-day all-cause mortality of patients diagnosed with severe sepsis for septic shock.    Change in PCT >80%  A decrease of PCT levels of more than 80% defines a negative change in PCT result representing a lower risk for 28-day all-cause mortality of patients diagnosed with severe sepsis or septic shock.       Respiratory Culture - " Sputum, Cough [[id number]] Collected: 12/31/24 0907    Specimen: Sputum from Cough Updated: 12/31/24 1233     Respiratory Culture Rejected     Gram Stain Few (2+) WBCs seen      Rare (1+) Epithelial cells seen      Few (2+) Mucous strands      Rare (1+) Mixed bacterial morphotypes seen on Gram Stain    Narrative:      Specimen rejected due to oropharyngeal contamination. Please reorder and recollect specimen if clinically necessary.    Respiratory Panel PCR w/COVID-19(SARS-CoV-2) TERRY/ROSSI/KIKA/PAD/COR/RONKA In-House, NP Swab in UTM/VTM, 2 HR TAT - Swab, Nasopharynx [[id number]]  (Abnormal) Collected: 12/30/24 1726    Specimen: Swab from Nasopharynx Updated: 12/31/24 1138     ADENOVIRUS, PCR Not Detected     Coronavirus 229E Not Detected     Coronavirus HKU1 Not Detected     Coronavirus NL63 Not Detected     Coronavirus OC43 Not Detected     COVID19 Not Detected     Human Metapneumovirus Not Detected     Human Rhinovirus/Enterovirus Detected     Influenza A PCR Not Detected     Influenza B PCR Not Detected     Parainfluenza Virus 1 Not Detected     Parainfluenza Virus 2 Not Detected     Parainfluenza Virus 3 Not Detected     Parainfluenza Virus 4 Not Detected     RSV, PCR Not Detected     Bordetella pertussis pcr Not Detected     Bordetella parapertussis PCR Not Detected     Chlamydophila pneumoniae PCR Not Detected     Mycoplasma pneumo by PCR Not Detected    Narrative:      In the setting of a positive respiratory panel with a viral infection PLUS a negative procalcitonin without other underlying concern for bacterial infection, consider observing off antibiotics or discontinuation of antibiotics and continue supportive care. If the respiratory panel is positive for atypical bacterial infection (Bordetella pertussis, Chlamydophila pneumoniae, or Mycoplasma pneumoniae), consider antibiotic de-escalation to target atypical bacterial infection.    Vitamin B12 & Folate [[id number]]  (Normal) Collected: 12/30/24 1739     Specimen: Blood Updated: 12/31/24 1124     Folate 7.65 ng/mL      Vitamin B-12 300 pg/mL     Narrative:      Results may be falsely increased if patient taking Biotin.      Cortisol [[id number]] Collected: 12/31/24 0358    Specimen: Blood Updated: 12/31/24 1114     Cortisol 8.37 mcg/dL     Narrative:      Cortisol Reference Ranges:    Cortisol 6AM - 10AM Range: 6.02-18.40 mcg/dl  Cortisol 4PM - 8PM Range: 2.68-10.50 mcg/dl      Results may be falsely increased if patient taking Biotin.      Basic Metabolic Panel [[id number]]  (Abnormal) Collected: 12/31/24 0358    Specimen: Blood Updated: 12/31/24 0449     Glucose 191 mg/dL      BUN 10 mg/dL      Creatinine 0.67 mg/dL      Sodium 144 mmol/L      Potassium 4.1 mmol/L      Chloride 100 mmol/L      CO2 32.0 mmol/L      Calcium 9.0 mg/dL      BUN/Creatinine Ratio 14.9     Anion Gap 12.0 mmol/L      eGFR 106.0 mL/min/1.73     Narrative:      GFR Categories in Chronic Kidney Disease (CKD)      GFR Category          GFR (mL/min/1.73)    Interpretation  G1                     90 or greater         Normal or high (1)  G2                      60-89                Mild decrease (1)  G3a                   45-59                Mild to moderate decrease  G3b                   30-44                Moderate to severe decrease  G4                    15-29                Severe decrease  G5                    14 or less           Kidney failure          (1)In the absence of evidence of kidney disease, neither GFR category G1 or G2 fulfill the criteria for CKD.    eGFR calculation 2021 CKD-EPI creatinine equation, which does not include race as a factor    CBC & Differential [[id number]]  (Abnormal) Collected: 12/31/24 0358    Specimen: Blood Updated: 12/31/24 0424    Narrative:      The following orders were created for panel order CBC & Differential.  Procedure                               Abnormality         Status                     ---------                                -----------         ------                     CBC Auto Differential[[id number]]        Abnormal            Final result                 Please view results for these tests on the individual orders.    CBC Auto Differential [[id number]]  (Abnormal) Collected: 12/31/24 0358    Specimen: Blood Updated: 12/31/24 0424     WBC 7.39 10*3/mm3      RBC 4.08 10*6/mm3      Hemoglobin 13.1 g/dL      Hematocrit 42.7 %      .7 fL      MCH 32.1 pg      MCHC 30.7 g/dL      RDW 13.3 %      RDW-SD 51.8 fl      MPV 9.2 fL      Platelets 259 10*3/mm3      Neutrophil % 87.7 %      Lymphocyte % 8.9 %      Monocyte % 2.7 %      Eosinophil % 0.0 %      Basophil % 0.3 %      Immature Grans % 0.4 %      Neutrophils, Absolute 6.48 10*3/mm3      Lymphocytes, Absolute 0.66 10*3/mm3      Monocytes, Absolute 0.20 10*3/mm3      Eosinophils, Absolute 0.00 10*3/mm3      Basophils, Absolute 0.02 10*3/mm3      Immature Grans, Absolute 0.03 10*3/mm3      nRBC 0.0 /100 WBC     Hemoglobin A1c [[id number]]  (Abnormal) Collected: 12/30/24 1732    Specimen: Blood Updated: 12/31/24 0136     Hemoglobin A1C 5.68 %     Narrative:      Hemoglobin A1C Ranges:    Increased Risk for Diabetes  5.7% to 6.4%  Diabetes                     >= 6.5%  Diabetic Goal                < 7.0%    Legionella Antigen, Urine - Urine, Urine, Clean Catch [[id number]]  (Normal) Collected: 12/30/24 2347    Specimen: Urine, Clean Catch Updated: 12/31/24 0133     LEGIONELLA ANTIGEN, URINE Negative    S. Pneumo Ag Urine or CSF - Urine, Urine, Clean Catch [[id number]]  (Normal) Collected: 12/30/24 2347    Specimen: Urine, Clean Catch Updated: 12/31/24 0133     Strep Pneumo Ag Negative    TSH [[id number]]  (Normal) Collected: 12/30/24 1732    Specimen: Blood Updated: 12/31/24 0015     TSH 0.516 uIU/mL     Blood Gas, Arterial - [[id number]]  (Abnormal) Collected: 12/30/24 2253    Specimen: Arterial Blood Updated: 12/30/24 2305     Site Left Radial     Marquis's Test Positive     pH, Arterial  7.377 pH units      pCO2, Arterial 62.3 mm Hg      Comment: 83 Value above reference range        pO2, Arterial 53.5 mm Hg      Comment: 85 Value below critical limit        HCO3, Arterial 36.6 mmol/L      Comment: 83 Value above reference range        Base Excess, Arterial 9.2 mmol/L      Comment: 83 Value above reference range        O2 Saturation, Arterial 89.2 %      Comment: 84 Value below reference range        Hemoglobin, Blood Gas 13.0 g/dL      Comment: 84 Value below reference range        Temperature 37.0     Barometric Pressure for Blood Gas 733 mmHg      Modality Nasal Cannula     Flow Rate 1.0 lpm      Notified By [id number]     Collected by [id number]     Comment: Meter: [id number]     :  [id number]        pCO2, Temperature Corrected 62.3 mm Hg      pH, Temp Corrected 7.377 pH Units      pO2, Temperature Corrected 53.5 mm Hg     Lactic Acid, Plasma [[id number]]  (Normal) Collected: 12/30/24 2018    Specimen: Blood from Hand, Right Updated: 12/30/24 2039     Lactate 0.9 mmol/L     COVID-19, FLU A/B, RSV PCR 1 HR TAT - Swab, Nasopharynx [[id number]]  (Normal) Collected: 12/30/24 1726    Specimen: Swab from Nasopharynx Updated: 12/30/24 1807     COVID19 Not Detected     Influenza A PCR Not Detected     Influenza B PCR Not Detected     RSV, PCR Not Detected    Narrative:      Fact sheet for providers: https://www.fda.gov/media/033385/download    Fact sheet for patients: https://www.fda.gov/media/644597/download    Test performed by PCR.    Comprehensive Metabolic Panel [[id number]]  (Abnormal) Collected: 12/30/24 1732    Specimen: Blood Updated: 12/30/24 1757     Glucose 126 mg/dL      BUN 6 mg/dL      Creatinine 0.75 mg/dL      Sodium 141 mmol/L      Potassium 4.1 mmol/L      Chloride 97 mmol/L      CO2 33.3 mmol/L      Calcium 9.1 mg/dL      Total Protein 7.3 g/dL      Albumin 3.8 g/dL      ALT (SGPT) 5 U/L      AST (SGOT) 11 U/L      Alkaline Phosphatase 93 U/L      Total Bilirubin 0.3 mg/dL       Globulin 3.5 gm/dL      A/G Ratio 1.1 g/dL      BUN/Creatinine Ratio 8.0     Anion Gap 10.7 mmol/L      eGFR 96.5 mL/min/1.73     Narrative:      GFR Categories in Chronic Kidney Disease (CKD)      GFR Category          GFR (mL/min/1.73)    Interpretation  G1                     90 or greater         Normal or high (1)  G2                      60-89                Mild decrease (1)  G3a                   45-59                Mild to moderate decrease  G3b                   30-44                Moderate to severe decrease  G4                    15-29                Severe decrease  G5                    14 or less           Kidney failure          (1)In the absence of evidence of kidney disease, neither GFR category G1 or G2 fulfill the criteria for CKD.    eGFR calculation 2021 CKD-EPI creatinine equation, which does not include race as a factor    BNP [[id number]]  (Normal) Collected: 12/30/24 1732    Specimen: Blood Updated: 12/30/24 1755     proBNP 83.6 pg/mL     Narrative:      This assay is used as an aid in the diagnosis of individuals suspected of having heart failure. It can be used as an aid in the diagnosis of acute decompensated heart failure (ADHF) in patients presenting with signs and symptoms of ADHF to the emergency department (ED). In addition, NT-proBNP of <300 pg/mL indicates ADHF is not likely.    Age Range Result Interpretation  NT-proBNP Concentration (pg/mL:      <50             Positive            >450                   Gray                 300-450                    Negative             <300    50-75           Positive            >900                  Gray                300-900                  Negative            <300      >75             Positive            >1800                  Gray                300-1800                  Negative            <300    High Sensitivity Troponin T [[id number]]  (Normal) Collected: 12/30/24 1732    Specimen: Blood Updated: 12/30/24 1755     HS Troponin T 12  ng/L     Narrative:      High Sensitive Troponin T Reference Range:  <14.0 ng/L- Negative Female for AMI  <22.0 ng/L- Negative Male for AMI  >=14 - Abnormal Female indicating possible myocardial injury.  >=22 - Abnormal Male indicating possible myocardial injury.   Clinicians would have to utilize clinical acumen, EKG, Troponin, and serial changes to determine if it is an Acute Myocardial Infarction or myocardial injury due to an underlying chronic condition.         [place] Draw [[id number]] Collected: 12/30/24 1732    Specimen: Blood Updated: 12/30/24 1745    Narrative:      The following orders were created for panel order [place] Draw.  Procedure                               Abnormality         Status                     ---------                               -----------         ------                     Green Top (Gel)[[id number]]                                  Final result               Lavender Top[[id number]]                                     Final result               Gold Top - SST[[id number]]                                   Final result               Light Blue Top[[id number]]                                   Final result                 Please view results for these tests on the individual orders.    Green Top (Gel) [[id number]] Collected: 12/30/24 1732    Specimen: Blood Updated: 12/30/24 1745     Extra Tube Hold for add-ons.     Comment: Auto resulted.       Lavender Top [[id number]] Collected: 12/30/24 1732    Specimen: Blood Updated: 12/30/24 1745     Extra Tube hold for add-on     Comment: Auto resulted       Gold Top - SST [[id number]] Collected: 12/30/24 1732    Specimen: Blood Updated: 12/30/24 1745     Extra Tube Hold for add-ons.     Comment: Auto resulted.       Light Blue Top [[id number]] Collected: 12/30/24 1732    Specimen: Blood Updated: 12/30/24 1745     Extra Tube Hold for add-ons.     Comment: Auto resulted       CBC & Differential [[id number]]  (Abnormal) Collected: 12/30/24 1732     Specimen: Blood Updated: 12/30/24 1737    Narrative:      The following orders were created for panel order CBC & Differential.  Procedure                               Abnormality         Status                     ---------                               -----------         ------                     CBC Auto Differential[[id number]]        Abnormal            Final result                 Please view results for these tests on the individual orders.    CBC Auto Differential [[id number]]  (Abnormal) Collected: 12/30/24 1732    Specimen: Blood Updated: 12/30/24 1737     WBC 9.54 10*3/mm3      RBC 4.20 10*6/mm3      Hemoglobin 13.7 g/dL      Hematocrit 43.5 %      .6 fL      MCH 32.6 pg      MCHC 31.5 g/dL      RDW 13.4 %      RDW-SD 51.8 fl      MPV 8.7 fL      Platelets 278 10*3/mm3      Neutrophil % 70.7 %      Lymphocyte % 15.0 %      Monocyte % 13.1 %      Eosinophil % 0.5 %      Basophil % 0.3 %      Immature Grans % 0.4 %      Neutrophils, Absolute 6.74 10*3/mm3      Lymphocytes, Absolute 1.43 10*3/mm3      Monocytes, Absolute 1.25 10*3/mm3      Eosinophils, Absolute 0.05 10*3/mm3      Basophils, Absolute 0.03 10*3/mm3      Immature Grans, Absolute 0.04 10*3/mm3      nRBC 0.0 /100 WBC           Imaging Results (Most Recent)       Procedure Component Value Units Date/Time    XR Ribs Right With PA Chest [[id number]] Collected: 12/31/24 1517     Updated: 12/31/24 1522    Narrative:      XR RIBS RIGHT W PA CHEST    Date of Exam: 12/31/2024 1:51 PM EST    Indication: Continued rib pain    Comparison: CT chest of 12/30/2024    Findings:  5 films. There are no acute or old rib fractures. There are patchy infiltrates in the lung bases which were identified on the CT exam and better visualized on that exam. Lung fields are well inflated. There are no effusions. The heart size is normal.      Impression:      Impression:  No abnormality of the right ribs. Patchy bibasilar infiltrates, may represent pneumonia. Please  see CT chest exam report of 12/30/2024 for further description of abnormalities.      Electronically Signed: Noris Mireles MD    12/31/2024 3:19 PM EST    Workstation ID: [id number]    CT Angiogram Chest [[id number]] Collected: 12/30/24 2138     Updated: 12/30/24 2151    Narrative:      CT ANGIOGRAM CHEST    Date of Exam: 12/30/2024 8:43 PM EST    Indication: chest pain, tachycardia, hypoxia.    Comparison: CXR 1/25/2024, CT chest 1/17/2024    Technique: CTA of the chest was performed after the uneventful intravenous administration of iodinated contrast. Reconstructed coronal and sagittal images were also obtained. In addition, a 3-D volume rendered image was created for interpretation.   Automated exposure control and iterative reconstruction methods were used.      Findings:  The pulmonary arteries are well opacified. There are no findings of PE.    Lung window images reveal 2.5 cm patchy opacity in the medial right upper lobe seen on series 5 image 62. A 1.5 cm mass or consolidation is seen in the right lower lobe on series 5 image 91. Pleural-based consolidation in the lateral right lower lobe   measures 6 cm x 1.5 cm. 1.5 cm x 1.5 cm area of consolidation is seen in the lateral left lower lobe.    Findings could represent multifocal pneumonia. The patient has a significant smoking history. Findings are concerning for bronchogenic malignancy or lung cancer. Pulmonary consultation is recommended. Follow-up CT scan of the chest in 3 months is   recommended, if more aggressive evaluation is not undertaken.    Mediastinal windows reveal no mediastinal, hilar, or axillary adenopathy. Extensive coronary artery calcifications are evident.    Upper abdominal structures have an unremarkable appearance. Degenerative changes are seen in the thoracic spine.      Impression:      Impression:  CT scan of the chest with IV contrast demonstrating no findings of PE.    Multiple areas of consolidation and/or masses are seen in the  lungs. Findings are consistent with multifocal pneumonia. The patient has a significant smoking history, with emphysema. Close follow-up is recommended with CT scan of the chest in 3 months.   Alternatively, a PET scan could be obtained.        Electronically Signed: Larry Berrios MD    12/30/2024 9:48 PM EST    Workstation ID: [id number]          PROCEDURES: NONE    Condition on Discharge:  stable    Physical Exam at Discharge  Vital Signs  Temp:  [97.3 °F (36.3 °C)-98.1 °F (36.7 °C)] 97.3 °F (36.3 °C)  Heart Rate:  [68-89] 76  Resp:  [16-22] 18  BP: (112-144)/(55-70) 133/70  Body mass index is 22.66 kg/m².    Physical Exam      Discharge Disposition  Home    Visiting Nurse:    No     Home PT/OT:  No     Home Safety Evaluation:  No     DME  None new    Discharge Diet:      Dietary Orders (From admission, onward)       Start     Ordered    12/30/24 2339  Diet: Cardiac, Diabetic; Healthy Heart (2-3 Na+); Consistent Carbohydrate; Fluid Consistency: Thin (IDDSI 0)  Diet Effective Now        References:    Diet Order Definitions   Question Answer Comment   Diets: Cardiac    Diets: Diabetic    Cardiac Diet: Healthy Heart (2-3 Na+)    Diabetic Diet: Consistent Carbohydrate    Fluid Consistency: Thin (IDDSI 0)        12/30/24 7277                    Activity at Discharge:  As tolerated    Pre-discharge education  Medications, follow up    Follow-up Appointments  Future Appointments   Date Time Provider Department Center   1/15/2025 11:40 AM [facility] 1 [facility]     Additional Instructions for the Follow-ups that You Need to Schedule       Discharge Follow-up with PCP   As directed       Currently Documented PCP:    Talib Ceron MD    PCP Phone Number:    [phone]     Follow Up Details: 1 week        Discharge Follow-up with Specified Provider: Pulmonary   As directed      To: Pulmonary   Follow Up Details: ASAP                Test Results Pending at Discharge: to be f/u by Talib Ceron MD   Pending  "Labs       Order Current Status    Blood Culture - Blood, Hand, Left Preliminary result    Blood Culture - Blood, Hand, Right Preliminary result             Zita Potter, APRN  01/01/25  01:24 EST    Time: Discharge 30 min (if over 30 minutes give explanation as to why it took greater than 30 minutes)    \"Dictated utilizing Dragon dictation\"      "

## 2025-01-01 NOTE — CASE MANAGEMENT/SOCIAL WORK
Continued Stay Note  SARAH Huff     Patient Name: Valeria Rubi  MRN: [id number]  Today's Date: 1/1/2025    Admit Date: 12/30/2024    Plan: Plan home with s/o   Discharge Plan       Row Name 01/01/25 1105       Plan    Plan Plan home with s/o    Patient/Family in Agreement with Plan yes    Plan Comments Patient had discussed with CM the need for new 02 tubing and addtional portable 02 tanks from [employer] - CM unable to contact [employer] liaison on a holiday. Contact # for [employer] added to AVS and Esther RN updated to instruct patient to call for needed supplies after dc.                   Discharge Codes    No documentation.                       Kasi Dawson RN

## 2025-01-04 LAB
BACTERIA SPEC AEROBE CULT: NORMAL
BACTERIA SPEC AEROBE CULT: NORMAL

## 2025-01-06 ENCOUNTER — READMISSION MANAGEMENT (OUTPATIENT)
Dept: CALL CENTER | Facility: HOSPITAL | Age: 50
End: 2025-01-06
Payer: COMMERCIAL

## 2025-01-06 NOTE — OUTREACH NOTE
COPD/PN Week 1 Survey      Flowsheet Row Responses   [facility] patient discharged from? [place]   Does the patient have one of the following disease processes/diagnoses(primary or secondary)? COPD   Week 1 attempt successful? Yes   Call start time 1255   Call end time 1306   Discharge diagnosis Acute and chronic respiratory failure with hypoxia, COPD with acute exacerbation   Meds reviewed with patient/caregiver? Yes   Is the patient having any side effects they believe may be caused by any medication additions or changes? No   Does the patient have all medications ordered at discharge? Yes   Is the patient taking all medications as directed (includes completed medication regime)? Yes   Does the patient have a primary care provider?  Yes   Does the patient have an appointment with their PCP or specialist within 7 days of discharge? No   What is preventing the patient from scheduling follow up appointments within 7 days of discharge? Haven't had time   Nursing Interventions Educated patient on importance of making appointment, Advised patient to make appointment   DME comments Home O2 in place, 2L continuously but pt reports that she does not always wear the O2.   Pulse Ox monitoring Intermittent   Pulse Ox device source Patient   O2 Sat comments sitting with O2 during call O2 sats 97% ,    O2 Sat: education provided Sat levels, Monitoring frequency, When to seek care   Comments Pt c/o ongoing increased SOA w/exertion that resolves with rest. Pt reports that she is tolerating po intake WNL.Patient remains somewhat fatigued, per her report. Pt denies chest pain, fever or chills.   Did the patient receive a copy of their discharge instructions? Yes   Nursing interventions Reviewed instructions with patient   What is the patient's perception of their health status since discharge? Returned to baseline/stable   Nursing Interventions Nurse provided patient education   If the patient is a current smoker, are  they able to teach back resources for cessation? --  [Pt continues to smoke daily, using chantix and patches.]   Is the patient/caregiver able to teach back the hierarchy of who to call/visit for symptoms/problems? PCP, Specialist, Home health nurse, Urgent Care, ED, 911 Yes   Is the patient/caregiver able to teach back signs and symptoms of worsening condition: Fever/chills, Shortness of breath, Chest pain   Is the patient/caregiver able to teach back importance of completing antibiotic course of treatment? Yes   Week 1 call completed? Yes   Revoked No further contact(revokes)-requires comment   Call end time 4047            Sherrie MARTINEZ - Registered Nurse

## 2025-01-16 ENCOUNTER — APPOINTMENT (OUTPATIENT)
Dept: GENERAL RADIOLOGY | Facility: HOSPITAL | Age: 50
End: 2025-01-16
Payer: COMMERCIAL

## 2025-01-16 ENCOUNTER — HOSPITAL ENCOUNTER (EMERGENCY)
Facility: HOSPITAL | Age: 50
Discharge: HOME | End: 2025-01-17
Attending: STUDENT IN AN ORGANIZED HEALTH CARE EDUCATION/TRAINING PROGRAM
Payer: COMMERCIAL

## 2025-01-16 VITALS
HEIGHT: 62 IN | OXYGEN SATURATION: 92 % | WEIGHT: 123 LBS | SYSTOLIC BLOOD PRESSURE: 154 MMHG | HEART RATE: 134 BPM | TEMPERATURE: 98.4 F | RESPIRATION RATE: 22 BRPM | BODY MASS INDEX: 22.63 KG/M2 | DIASTOLIC BLOOD PRESSURE: 83 MMHG

## 2025-01-16 DIAGNOSIS — J43.9 PULMONARY EMPHYSEMA, UNSPECIFIED EMPHYSEMA TYPE: ICD-10-CM

## 2025-01-16 DIAGNOSIS — J06.9 VIRAL URI: Primary | ICD-10-CM

## 2025-01-16 PROCEDURE — 71045 X-RAY EXAM CHEST 1 VIEW: CPT

## 2025-01-16 PROCEDURE — 99283 EMERGENCY DEPT VISIT LOW MDM: CPT | Performed by: STUDENT IN AN ORGANIZED HEALTH CARE EDUCATION/TRAINING PROGRAM

## 2025-01-16 PROCEDURE — 25010000002 KETOROLAC TROMETHAMINE PER 15 MG: Performed by: STUDENT IN AN ORGANIZED HEALTH CARE EDUCATION/TRAINING PROGRAM

## 2025-01-16 PROCEDURE — 96372 THER/PROPH/DIAG INJ SC/IM: CPT

## 2025-01-16 PROCEDURE — 87637 SARSCOV2&INF A&B&RSV AMP PRB: CPT | Performed by: STUDENT IN AN ORGANIZED HEALTH CARE EDUCATION/TRAINING PROGRAM

## 2025-01-16 RX ORDER — KETOROLAC TROMETHAMINE 30 MG/ML
30 INJECTION, SOLUTION INTRAMUSCULAR; INTRAVENOUS ONCE
Status: COMPLETED | OUTPATIENT
Start: 2025-01-16 | End: 2025-01-16

## 2025-01-16 RX ORDER — GUAIFENESIN 600 MG/1
1200 TABLET, EXTENDED RELEASE ORAL ONCE
Status: COMPLETED | OUTPATIENT
Start: 2025-01-16 | End: 2025-01-16

## 2025-01-16 RX ORDER — BENZONATATE 100 MG/1
100 CAPSULE ORAL ONCE
Status: COMPLETED | OUTPATIENT
Start: 2025-01-16 | End: 2025-01-16

## 2025-01-16 RX ORDER — ACETAMINOPHEN 500 MG
1000 TABLET ORAL ONCE
Status: COMPLETED | OUTPATIENT
Start: 2025-01-16 | End: 2025-01-16

## 2025-01-16 RX ADMIN — ACETAMINOPHEN 1000 MG: 500 TABLET, FILM COATED ORAL at 23:25

## 2025-01-16 RX ADMIN — GUAIFENESIN 1200 MG: 600 TABLET ORAL at 23:25

## 2025-01-16 RX ADMIN — BENZONATATE 100 MG: 100 CAPSULE ORAL at 23:25

## 2025-01-16 RX ADMIN — KETOROLAC TROMETHAMINE 30 MG: 30 INJECTION, SOLUTION INTRAMUSCULAR; INTRAVENOUS at 23:24

## 2025-01-16 NOTE — Clinical Note
[facility] EMERGENCY DEPARTMENT  [address]  [place] [address]  Phone: [phone]    Valeria Rubi was seen and treated in our emergency department on 1/16/2025.  She may return to work on 01/19/2025.         Thank you for choosing [facility].    Ray Montilla MD

## 2025-01-16 NOTE — Clinical Note
[facility] EMERGENCY DEPARTMENT  [address]  [place] [address]  Phone: [phone]    Valeria Rubi was seen and treated in our emergency department on 1/16/2025.  She may return to work on 01/19/2025.         Thank you for choosing [facility].    Ray Montilla MD

## 2025-01-16 NOTE — Clinical Note
[facility] EMERGENCY DEPARTMENT  [address]  [place] [address]  Phone: [phone]    Valeria Rubi was seen and treated in our emergency department on 1/16/2025.  She may return to work on 01/19/2025.         Thank you for choosing [facility].    Ray Montilla MD

## 2025-01-16 NOTE — Clinical Note
[facility] EMERGENCY DEPARTMENT  [address]  [place] [address]  Phone: [phone]    Valeria Rubi was seen and treated in our emergency department on 1/16/2025.  She may return to work on 01/19/2025.         Thank you for choosing [facility].    Ray Montilla MD

## 2025-01-17 LAB
FLUAV RNA RESP QL NAA+PROBE: NOT DETECTED
FLUBV RNA RESP QL NAA+PROBE: NOT DETECTED
RSV RNA RESP QL NAA+PROBE: NOT DETECTED
SARS-COV-2 RNA RESP QL NAA+PROBE: NOT DETECTED

## 2025-01-17 PROCEDURE — 25010000002 METHYLPREDNISOLONE PER 125 MG: Performed by: STUDENT IN AN ORGANIZED HEALTH CARE EDUCATION/TRAINING PROGRAM

## 2025-01-17 PROCEDURE — 96372 THER/PROPH/DIAG INJ SC/IM: CPT

## 2025-01-17 RX ORDER — METHYLPREDNISOLONE SODIUM SUCCINATE 125 MG/2ML
125 INJECTION, POWDER, LYOPHILIZED, FOR SOLUTION INTRAMUSCULAR; INTRAVENOUS ONCE
Status: COMPLETED | OUTPATIENT
Start: 2025-01-17 | End: 2025-01-17

## 2025-01-17 RX ORDER — ACETAMINOPHEN 500 MG
1000 TABLET ORAL EVERY 6 HOURS PRN
Qty: 30 TABLET | Refills: 0 | Status: SHIPPED | OUTPATIENT
Start: 2025-01-17 | End: 2025-01-24

## 2025-01-17 RX ORDER — METHYLPREDNISOLONE SODIUM SUCCINATE 125 MG/2ML
125 INJECTION, POWDER, LYOPHILIZED, FOR SOLUTION INTRAMUSCULAR; INTRAVENOUS ONCE
Status: DISCONTINUED | OUTPATIENT
Start: 2025-01-17 | End: 2025-01-17

## 2025-01-17 RX ORDER — PREDNISOLONE SODIUM PHOSPHATE 15 MG/5ML
1 SOLUTION ORAL DAILY
Qty: 83.5 ML | Refills: 0 | Status: SHIPPED | OUTPATIENT
Start: 2025-01-17 | End: 2025-01-23 | Stop reason: HOSPADM

## 2025-01-17 RX ORDER — MELOXICAM 7.5 MG/1
15 TABLET ORAL DAILY
Qty: 14 TABLET | Refills: 0 | Status: ON HOLD | OUTPATIENT
Start: 2025-01-17 | End: 2025-01-21

## 2025-01-17 RX ORDER — METHYLPREDNISOLONE SODIUM SUCCINATE 40 MG/ML
125 INJECTION, POWDER, LYOPHILIZED, FOR SOLUTION INTRAMUSCULAR; INTRAVENOUS ONCE
Status: DISCONTINUED | OUTPATIENT
Start: 2025-01-17 | End: 2025-01-17

## 2025-01-17 RX ADMIN — METHYLPREDNISOLONE SODIUM SUCCINATE 125 MG: 125 INJECTION, POWDER, FOR SOLUTION INTRAMUSCULAR; INTRAVENOUS at 00:39

## 2025-01-17 NOTE — ED PROVIDER NOTES
Subjective   History of Present Illness  49 f hx anxiety, PAD, severe copd on 2 L NC presents to ed w/ cc of 1 wk of dry cough, associated sx of back pain with coughing, occurring in context of rest. Denies f/c, n/v/d/c, diaphoresis, chest pain. Vitals are => 98 after calming down, exam well-appearing female ,ctab, resting comfortably, no peripheral edema.   Review of Systems    Past Medical History:   Diagnosis Date    Anxiety     Arthritis     COPD (chronic obstructive pulmonary disease)     Coronary stent occlusion     CTS (carpal tunnel syndrome)     Depression     Dizziness     Headache     Hyperlipidemia     Hypertension     Migraine     Numbness and tingling     Pneumonia        Allergies   Allergen Reactions    Prednisone Other (See Comments)     Rash in mouth       Past Surgical History:   Procedure Laterality Date    ANGIOPLASTY ILIAC ARTERY Left 6/17/2016    Procedure: BILATERAL DUPLEX DIRECTED ACCESS, AIF BILATERAL RUNOFF, SELECTIVE CATHETERIZATION OF RIGHT EXTERNAL ILIAC WITH ANGIOPLASTY, LEFT COMMON ILIAC STENT PLACEMENT;  Surgeon: Jose Carlos Plascencia MD;  Location: [facility] 18/19;  Service:     BREAST SURGERY      BILAT IMPLANTS    CAROTID ARTERY ANGIOPLASTY         Family History   Problem Relation Age of Onset    Heart disease Father     Hypertension Father     Diabetes Father     Stroke Father     COPD Father     Heart failure Paternal Grandmother     Heart failure Paternal Grandfather     COPD Mother     COPD Maternal Aunt     COPD Maternal Uncle     COPD Paternal Uncle        Social History     Socioeconomic History    Marital status: Single   Tobacco Use    Smoking status: Every Day     Current packs/day: 1.00     Average packs/day: 1 pack/day for 30.0 years (30.0 ttl pk-yrs)     Types: Cigarettes     Passive exposure: Current    Smokeless tobacco: Never   Vaping Use    Vaping status: Never Used   Substance and Sexual Activity    Alcohol use: No    Drug use: No    Sexual activity:  Defer           Objective   Physical Exam    Procedures           ED Course                                                       Medical Decision Making    49 f hx anxiety, PAD, severe copd on 2 L NC presents to ed w/ cc of 1 wk of dry cough, associated sx of back pain with coughing, occurring in context of rest. Denies f/c, n/v/d/c, diaphoresis, chest pain. Vitals are => 98 after calming down, exam well-appearing female ,ctab, resting comfortably, no peripheral edema.     Concern for uri vs pna; eval with viral swabs ;cxr    Cxr clear; dc home w/ tylenol po, meloxicam po, continue home copd meds    Pulm fu    Hst pt  Dsp home  Final diagnoses:   Viral URI   Pulmonary emphysema, unspecified emphysema type       ED Disposition  ED Disposition       ED Disposition   Discharge    Condition   Stable    Comment   --               Talib Ceron MD  [address] [place] [address] [id number] [phone]    In 2 days      -  FOLLOW-UP CARE (2 DAYS):  Horacio Hussein MD - Pulmonologist  [facility] Pulmonology Provider  [address] [place], [place] [address] [phone]             Medication List        New Prescriptions      meloxicam 7.5 MG tablet  Commonly known as: MOBIC  Take 2 tablets by mouth Daily for 7 days.     prednisoLONE sodium phosphate 15 MG/5ML solution  Commonly known as: ORAPRED  Take 16.7 mL by mouth Daily for 5 days.            Changed      acetaminophen 500 MG tablet  Commonly known as: TYLENOL  Take 2 tablets by mouth Every 6 (Six) Hours As Needed for Mild Pain for up to 7 days.  What changed:   medication strength  how much to take  when to take this  reasons to take this               Where to Get Your Medications        These medications were sent to [facility]/pharmacy #[id number] - [place], [place] - [address] [facility] - [phone]  - [phone]   [address] [facility] [address]      Phone: [phone]   acetaminophen 500 MG tablet  meloxicam 7.5 MG  tablet  prednisoLONE sodium phosphate 15 MG/5ML solution            Ray Montilla MD  01/17/25 0026       Ray Montilla MD  01/17/25 0229

## 2025-01-18 ENCOUNTER — HOSPITAL ENCOUNTER (INPATIENT)
Facility: HOSPITAL | Age: 50
LOS: 4 days | Discharge: HOME | End: 2025-01-23
Attending: EMERGENCY MEDICINE | Admitting: STUDENT IN AN ORGANIZED HEALTH CARE EDUCATION/TRAINING PROGRAM
Payer: COMMERCIAL

## 2025-01-18 DIAGNOSIS — J96.02 ACUTE RESPIRATORY FAILURE WITH HYPOXIA AND HYPERCARBIA: ICD-10-CM

## 2025-01-18 DIAGNOSIS — J96.01 ACUTE RESPIRATORY FAILURE WITH HYPOXIA AND HYPERCARBIA: ICD-10-CM

## 2025-01-18 DIAGNOSIS — J44.1 COPD WITH ACUTE EXACERBATION: Primary | ICD-10-CM

## 2025-01-18 PROCEDURE — 36415 COLL VENOUS BLD VENIPUNCTURE: CPT

## 2025-01-18 PROCEDURE — 99285 EMERGENCY DEPT VISIT HI MDM: CPT | Performed by: EMERGENCY MEDICINE

## 2025-01-19 ENCOUNTER — APPOINTMENT (OUTPATIENT)
Dept: GENERAL RADIOLOGY | Facility: HOSPITAL | Age: 50
End: 2025-01-19
Payer: COMMERCIAL

## 2025-01-19 PROBLEM — J96.22 ACUTE ON CHRONIC RESPIRATORY FAILURE WITH HYPOXIA AND HYPERCAPNIA: Status: ACTIVE | Noted: 2025-01-19

## 2025-01-19 PROBLEM — J96.21 ACUTE ON CHRONIC RESPIRATORY FAILURE WITH HYPOXIA AND HYPERCAPNIA: Status: ACTIVE | Noted: 2025-01-19

## 2025-01-19 PROBLEM — J44.1 COPD EXACERBATION: Status: ACTIVE | Noted: 2025-01-19

## 2025-01-19 LAB
ALBUMIN SERPL-MCNC: 3.5 G/DL (ref 3.5–5.2)
ALBUMIN SERPL-MCNC: 3.7 G/DL (ref 3.5–5.2)
ALBUMIN/GLOB SERPL: 1.1 G/DL
ALBUMIN/GLOB SERPL: 1.1 G/DL
ALP SERPL-CCNC: 100 U/L (ref 39–117)
ALP SERPL-CCNC: 108 U/L (ref 39–117)
ALT SERPL W P-5'-P-CCNC: 11 U/L (ref 1–33)
ALT SERPL W P-5'-P-CCNC: 9 U/L (ref 1–33)
ANION GAP SERPL CALCULATED.3IONS-SCNC: 6.6 MMOL/L (ref 5–15)
ANION GAP SERPL CALCULATED.3IONS-SCNC: 9.8 MMOL/L (ref 5–15)
ARTERIAL PATENCY WRIST A: POSITIVE
ARTERIAL PATENCY WRIST A: POSITIVE
AST SERPL-CCNC: 15 U/L (ref 1–32)
AST SERPL-CCNC: 19 U/L (ref 1–32)
ATMOSPHERIC PRESS: 740 MMHG
ATMOSPHERIC PRESS: 740 MMHG
B PARAPERT DNA SPEC QL NAA+PROBE: NOT DETECTED
B PERT DNA SPEC QL NAA+PROBE: NOT DETECTED
BACTERIA UR QL AUTO: ABNORMAL /HPF
BASE EXCESS BLDA CALC-SCNC: 11 MMOL/L (ref 0–2)
BASE EXCESS BLDA CALC-SCNC: ABNORMAL MMOL/L
BASOPHILS # BLD AUTO: 0.02 10*3/MM3 (ref 0–0.2)
BASOPHILS NFR BLD AUTO: 0.2 % (ref 0–1.5)
BDY SITE: ABNORMAL
BDY SITE: ABNORMAL
BILIRUB SERPL-MCNC: 0.2 MG/DL (ref 0–1.2)
BILIRUB SERPL-MCNC: 0.3 MG/DL (ref 0–1.2)
BILIRUB UR QL STRIP: NEGATIVE
BODY TEMPERATURE: 37
BODY TEMPERATURE: 37
BUN SERPL-MCNC: 13 MG/DL (ref 6–20)
BUN SERPL-MCNC: 14 MG/DL (ref 6–20)
BUN/CREAT SERPL: 24.1 (ref 7–25)
BUN/CREAT SERPL: 28.3 (ref 7–25)
C PNEUM DNA NPH QL NAA+NON-PROBE: NOT DETECTED
CALCIUM SPEC-SCNC: 8.9 MG/DL (ref 8.6–10.5)
CALCIUM SPEC-SCNC: 9 MG/DL (ref 8.6–10.5)
CHLORIDE SERPL-SCNC: 100 MMOL/L (ref 98–107)
CHLORIDE SERPL-SCNC: 100 MMOL/L (ref 98–107)
CLARITY UR: CLEAR
CO2 SERPL-SCNC: 31.2 MMOL/L (ref 22–29)
CO2 SERPL-SCNC: 35.4 MMOL/L (ref 22–29)
COLOR UR: YELLOW
CREAT SERPL-MCNC: 0.46 MG/DL (ref 0.57–1)
CREAT SERPL-MCNC: 0.58 MG/DL (ref 0.57–1)
D-LACTATE SERPL-SCNC: 1.1 MMOL/L (ref 0.5–2)
DEPRECATED RDW RBC AUTO: 54.7 FL (ref 37–54)
DEPRECATED RDW RBC AUTO: 55.9 FL (ref 37–54)
EGFRCR SERPLBLD CKD-EPI 2021: 109.7 ML/MIN/1.73
EGFRCR SERPLBLD CKD-EPI 2021: 116 ML/MIN/1.73
EOSINOPHIL # BLD AUTO: 0 10*3/MM3 (ref 0–0.4)
EOSINOPHIL NFR BLD AUTO: 0 % (ref 0.3–6.2)
EPAP: 8
ERYTHROCYTE [DISTWIDTH] IN BLOOD BY AUTOMATED COUNT: 13.7 % (ref 12.3–15.4)
ERYTHROCYTE [DISTWIDTH] IN BLOOD BY AUTOMATED COUNT: 13.7 % (ref 12.3–15.4)
FINE GRAN CASTS URNS QL MICRO: ABNORMAL /LPF
FLUAV RNA RESP QL NAA+PROBE: NOT DETECTED
FLUAV SUBTYP SPEC NAA+PROBE: NOT DETECTED
FLUBV RNA ISLT QL NAA+PROBE: NOT DETECTED
FLUBV RNA RESP QL NAA+PROBE: NOT DETECTED
GEN 5 1HR TROPONIN T REFLEX: 8 NG/L
GLOBULIN UR ELPH-MCNC: 3.1 GM/DL
GLOBULIN UR ELPH-MCNC: 3.3 GM/DL
GLUCOSE BLDC GLUCOMTR-MCNC: 147 MG/DL (ref 70–130)
GLUCOSE BLDC GLUCOMTR-MCNC: 169 MG/DL (ref 70–130)
GLUCOSE SERPL-MCNC: 172 MG/DL (ref 65–99)
GLUCOSE SERPL-MCNC: 206 MG/DL (ref 65–99)
GLUCOSE UR STRIP-MCNC: NEGATIVE MG/DL
HADV DNA SPEC NAA+PROBE: NOT DETECTED
HCO3 BLDA-SCNC: 40.2 MMOL/L (ref 20–26)
HCO3 BLDA-SCNC: ABNORMAL MMOL/L
HCOV 229E RNA SPEC QL NAA+PROBE: NOT DETECTED
HCOV HKU1 RNA SPEC QL NAA+PROBE: NOT DETECTED
HCOV NL63 RNA SPEC QL NAA+PROBE: NOT DETECTED
HCOV OC43 RNA SPEC QL NAA+PROBE: NOT DETECTED
HCT VFR BLD AUTO: 41.9 % (ref 34–46.6)
HCT VFR BLD AUTO: 43.5 % (ref 34–46.6)
HGB BLD-MCNC: 12.2 G/DL (ref 12–15.9)
HGB BLD-MCNC: 13 G/DL (ref 12–15.9)
HGB BLDA-MCNC: 12.7 G/DL (ref 13.5–17.5)
HGB BLDA-MCNC: 13.5 G/DL (ref 13.5–17.5)
HGB UR QL STRIP.AUTO: ABNORMAL
HMPV RNA NPH QL NAA+NON-PROBE: NOT DETECTED
HPIV1 RNA ISLT QL NAA+PROBE: NOT DETECTED
HPIV2 RNA SPEC QL NAA+PROBE: NOT DETECTED
HPIV3 RNA NPH QL NAA+PROBE: NOT DETECTED
HPIV4 P GENE NPH QL NAA+PROBE: NOT DETECTED
HYALINE CASTS UR QL AUTO: ABNORMAL /LPF
IMM GRANULOCYTES # BLD AUTO: 0.05 10*3/MM3 (ref 0–0.05)
IMM GRANULOCYTES NFR BLD AUTO: 0.4 % (ref 0–0.5)
INHALED O2 CONCENTRATION: 35 %
INHALED O2 CONCENTRATION: 40 %
IPAP: 17
KETONES UR QL STRIP: NEGATIVE
LEUKOCYTE ESTERASE UR QL STRIP.AUTO: NEGATIVE
LYMPHOCYTES # BLD AUTO: 0.25 10*3/MM3 (ref 0.7–3.1)
LYMPHOCYTES NFR BLD AUTO: 2 % (ref 19.6–45.3)
Lab: ABNORMAL
Lab: ABNORMAL
M PNEUMO IGG SER IA-ACNC: NOT DETECTED
MAGNESIUM SERPL-MCNC: 3.2 MG/DL (ref 1.6–2.6)
MCH RBC QN AUTO: 31.6 PG (ref 26.6–33)
MCH RBC QN AUTO: 32.3 PG (ref 26.6–33)
MCHC RBC AUTO-ENTMCNC: 29.1 G/DL (ref 31.5–35.7)
MCHC RBC AUTO-ENTMCNC: 29.9 G/DL (ref 31.5–35.7)
MCV RBC AUTO: 107.9 FL (ref 79–97)
MCV RBC AUTO: 108.5 FL (ref 79–97)
MODALITY: ABNORMAL
MODALITY: ABNORMAL
MONOCYTES # BLD AUTO: 0.73 10*3/MM3 (ref 0.1–0.9)
MONOCYTES NFR BLD AUTO: 5.9 % (ref 5–12)
MRSA DNA SPEC QL NAA+PROBE: NORMAL
NEUTROPHILS NFR BLD AUTO: 11.23 10*3/MM3 (ref 1.7–7)
NEUTROPHILS NFR BLD AUTO: 91.5 % (ref 42.7–76)
NITRITE UR QL STRIP: NEGATIVE
NOTIFIED BY: ABNORMAL
NOTIFIED BY: ABNORMAL
NRBC BLD AUTO-RTO: 0 /100 WBC (ref 0–0.2)
NT-PROBNP SERPL-MCNC: 325 PG/ML (ref 0–900)
PAW @ PEAK INSP FLOW SETTING VENT: 25 CMH2O
PCO2 BLDA: 77.8 MM HG (ref 35–45)
PCO2 BLDA: >98.3 MM HG (ref 35–45)
PCO2 TEMP ADJ BLD: 77.8 MM HG (ref 35–45)
PEEP RESPIRATORY: 5 CM[H2O]
PH BLDA: 7.19 PH UNITS (ref 7.35–7.45)
PH BLDA: 7.32 PH UNITS (ref 7.35–7.45)
PH UR STRIP.AUTO: 5.5 [PH] (ref 4.5–8)
PH, TEMP CORRECTED: 7.19 PH UNITS (ref 7.35–7.45)
PH, TEMP CORRECTED: 7.32 PH UNITS (ref 7.35–7.45)
PLATELET # BLD AUTO: 258 10*3/MM3 (ref 140–450)
PLATELET # BLD AUTO: 319 10*3/MM3 (ref 140–450)
PMV BLD AUTO: 9 FL (ref 6–12)
PMV BLD AUTO: 9.1 FL (ref 6–12)
PO2 BLD: 207 MM[HG] (ref 0–500)
PO2 BLD: 220 MM[HG] (ref 0–500)
PO2 BLDA: 72.6 MM HG (ref 83–108)
PO2 BLDA: 88.1 MM HG (ref 83–108)
PO2 TEMP ADJ BLD: 72.6 MM HG (ref 83–108)
PO2 TEMP ADJ BLD: 88.1 MM HG (ref 83–108)
POTASSIUM SERPL-SCNC: 4.9 MMOL/L (ref 3.5–5.2)
POTASSIUM SERPL-SCNC: 5 MMOL/L (ref 3.5–5.2)
PROCALCITONIN SERPL-MCNC: 2.91 NG/ML (ref 0–0.25)
PROT SERPL-MCNC: 6.6 G/DL (ref 6–8.5)
PROT SERPL-MCNC: 7 G/DL (ref 6–8.5)
PROT UR QL STRIP: ABNORMAL
RBC # BLD AUTO: 3.86 10*6/MM3 (ref 3.77–5.28)
RBC # BLD AUTO: 4.03 10*6/MM3 (ref 3.77–5.28)
RBC # UR STRIP: ABNORMAL /HPF
REF LAB TEST METHOD: ABNORMAL
RHINOVIRUS RNA SPEC NAA+PROBE: NOT DETECTED
RSV RNA NPH QL NAA+NON-PROBE: NOT DETECTED
RSV RNA RESP QL NAA+PROBE: NOT DETECTED
SAO2 % BLDCOA: 94.4 % (ref 94–99)
SAO2 % BLDCOA: 98.4 % (ref 94–99)
SARS-COV-2 RNA NPH QL NAA+NON-PROBE: NOT DETECTED
SARS-COV-2 RNA RESP QL NAA+PROBE: NOT DETECTED
SET MECH RESP RATE: 16
SET MECH RESP RATE: 20
SODIUM SERPL-SCNC: 141 MMOL/L (ref 136–145)
SODIUM SERPL-SCNC: 142 MMOL/L (ref 136–145)
SP GR UR STRIP: 1.03 (ref 1–1.03)
SQUAMOUS #/AREA URNS HPF: ABNORMAL /HPF
TROPONIN T NUMERIC DELTA: -5 NG/L
TROPONIN T SERPL HS-MCNC: 13 NG/L
UROBILINOGEN UR QL STRIP: ABNORMAL
VENTILATOR MODE: ABNORMAL
VENTILATOR MODE: ABNORMAL
VT ON VENT VENT: 369 ML
VT ON VENT VENT: 450 ML
WBC # UR STRIP: ABNORMAL /HPF
WBC NRBC COR # BLD AUTO: 11.88 10*3/MM3 (ref 3.4–10.8)
WBC NRBC COR # BLD AUTO: 12.28 10*3/MM3 (ref 3.4–10.8)

## 2025-01-19 PROCEDURE — 25010000002 METHYLPREDNISOLONE PER 125 MG: Performed by: EMERGENCY MEDICINE

## 2025-01-19 PROCEDURE — 94799 UNLISTED PULMONARY SVC/PX: CPT

## 2025-01-19 PROCEDURE — 25010000002 CEFEPIME PER 500 MG: Performed by: INTERNAL MEDICINE

## 2025-01-19 PROCEDURE — 83880 ASSAY OF NATRIURETIC PEPTIDE: CPT | Performed by: EMERGENCY MEDICINE

## 2025-01-19 PROCEDURE — 93010 ELECTROCARDIOGRAM REPORT: CPT | Performed by: INTERNAL MEDICINE

## 2025-01-19 PROCEDURE — 83735 ASSAY OF MAGNESIUM: CPT | Performed by: EMERGENCY MEDICINE

## 2025-01-19 PROCEDURE — 87185 SC STD ENZYME DETCJ PER NZM: CPT | Performed by: STUDENT IN AN ORGANIZED HEALTH CARE EDUCATION/TRAINING PROGRAM

## 2025-01-19 PROCEDURE — 82803 BLOOD GASES ANY COMBINATION: CPT

## 2025-01-19 PROCEDURE — 71045 X-RAY EXAM CHEST 1 VIEW: CPT

## 2025-01-19 PROCEDURE — 63710000001 INSULIN GLARGINE PER 5 UNITS: Performed by: STUDENT IN AN ORGANIZED HEALTH CARE EDUCATION/TRAINING PROGRAM

## 2025-01-19 PROCEDURE — 63710000001 INSULIN LISPRO (HUMAN) PER 5 UNITS: Performed by: STUDENT IN AN ORGANIZED HEALTH CARE EDUCATION/TRAINING PROGRAM

## 2025-01-19 PROCEDURE — 25810000003 SODIUM CHLORIDE 0.9 % SOLUTION: Performed by: STUDENT IN AN ORGANIZED HEALTH CARE EDUCATION/TRAINING PROGRAM

## 2025-01-19 PROCEDURE — 0BH17EZ INSERTION OF ENDOTRACHEAL AIRWAY INTO TRACHEA, VIA NATURAL OR ARTIFICIAL OPENING: ICD-10-PCS | Performed by: FAMILY MEDICINE

## 2025-01-19 PROCEDURE — 25010000002 METHYLPREDNISOLONE PER 125 MG: Performed by: STUDENT IN AN ORGANIZED HEALTH CARE EDUCATION/TRAINING PROGRAM

## 2025-01-19 PROCEDURE — 25010000002 PROPOFOL 10 MG/ML EMULSION: Performed by: STUDENT IN AN ORGANIZED HEALTH CARE EDUCATION/TRAINING PROGRAM

## 2025-01-19 PROCEDURE — 25810000003 LACTATED RINGERS SOLUTION: Performed by: EMERGENCY MEDICINE

## 2025-01-19 PROCEDURE — 87641 MR-STAPH DNA AMP PROBE: CPT | Performed by: INTERNAL MEDICINE

## 2025-01-19 PROCEDURE — 25010000002 FENTANYL CITRATE (PF) 2500 MCG/50ML SOLUTION: Performed by: STUDENT IN AN ORGANIZED HEALTH CARE EDUCATION/TRAINING PROGRAM

## 2025-01-19 PROCEDURE — 94002 VENT MGMT INPAT INIT DAY: CPT

## 2025-01-19 PROCEDURE — 25010000002 DIAZEPAM PER 5 MG: Performed by: EMERGENCY MEDICINE

## 2025-01-19 PROCEDURE — 87077 CULTURE AEROBIC IDENTIFY: CPT | Performed by: STUDENT IN AN ORGANIZED HEALTH CARE EDUCATION/TRAINING PROGRAM

## 2025-01-19 PROCEDURE — 85025 COMPLETE CBC W/AUTO DIFF WBC: CPT | Performed by: EMERGENCY MEDICINE

## 2025-01-19 PROCEDURE — 93005 ELECTROCARDIOGRAM TRACING: CPT | Performed by: EMERGENCY MEDICINE

## 2025-01-19 PROCEDURE — 82948 REAGENT STRIP/BLOOD GLUCOSE: CPT

## 2025-01-19 PROCEDURE — 80053 COMPREHEN METABOLIC PANEL: CPT | Performed by: EMERGENCY MEDICINE

## 2025-01-19 PROCEDURE — 25010000002 SUCCINYLCHOLINE PER 20 MG

## 2025-01-19 PROCEDURE — 25010000002 ENOXAPARIN PER 10 MG: Performed by: STUDENT IN AN ORGANIZED HEALTH CARE EDUCATION/TRAINING PROGRAM

## 2025-01-19 PROCEDURE — 25010000002 VANCOMYCIN HCL 1.25 G RECONSTITUTED SOLUTION 1 EACH VIAL: Performed by: INTERNAL MEDICINE

## 2025-01-19 PROCEDURE — 94660 CPAP INITIATION&MGMT: CPT

## 2025-01-19 PROCEDURE — 25010000002 METHYLPREDNISOLONE PER 40 MG: Performed by: INTERNAL MEDICINE

## 2025-01-19 PROCEDURE — 25810000003 SODIUM CHLORIDE 0.9 % SOLUTION 250 ML FLEX CONT: Performed by: INTERNAL MEDICINE

## 2025-01-19 PROCEDURE — 85027 COMPLETE CBC AUTOMATED: CPT | Performed by: STUDENT IN AN ORGANIZED HEALTH CARE EDUCATION/TRAINING PROGRAM

## 2025-01-19 PROCEDURE — 84145 PROCALCITONIN (PCT): CPT | Performed by: STUDENT IN AN ORGANIZED HEALTH CARE EDUCATION/TRAINING PROGRAM

## 2025-01-19 PROCEDURE — 0202U NFCT DS 22 TRGT SARS-COV-2: CPT | Performed by: STUDENT IN AN ORGANIZED HEALTH CARE EDUCATION/TRAINING PROGRAM

## 2025-01-19 PROCEDURE — 80053 COMPREHEN METABOLIC PANEL: CPT | Performed by: STUDENT IN AN ORGANIZED HEALTH CARE EDUCATION/TRAINING PROGRAM

## 2025-01-19 PROCEDURE — 81001 URINALYSIS AUTO W/SCOPE: CPT | Performed by: STUDENT IN AN ORGANIZED HEALTH CARE EDUCATION/TRAINING PROGRAM

## 2025-01-19 PROCEDURE — 25810000003 SODIUM CHLORIDE 0.9 % SOLUTION: Performed by: INTERNAL MEDICINE

## 2025-01-19 PROCEDURE — 87637 SARSCOV2&INF A&B&RSV AMP PRB: CPT | Performed by: EMERGENCY MEDICINE

## 2025-01-19 PROCEDURE — 87040 BLOOD CULTURE FOR BACTERIA: CPT | Performed by: EMERGENCY MEDICINE

## 2025-01-19 PROCEDURE — 99291 CRITICAL CARE FIRST HOUR: CPT | Performed by: STUDENT IN AN ORGANIZED HEALTH CARE EDUCATION/TRAINING PROGRAM

## 2025-01-19 PROCEDURE — 83605 ASSAY OF LACTIC ACID: CPT | Performed by: EMERGENCY MEDICINE

## 2025-01-19 PROCEDURE — 94640 AIRWAY INHALATION TREATMENT: CPT

## 2025-01-19 PROCEDURE — 36600 WITHDRAWAL OF ARTERIAL BLOOD: CPT

## 2025-01-19 PROCEDURE — 25010000002 MAGNESIUM SULFATE 2 GM/50ML SOLUTION: Performed by: EMERGENCY MEDICINE

## 2025-01-19 PROCEDURE — 25010000002 PROPOFOL 10 MG/ML EMULSION

## 2025-01-19 PROCEDURE — 87070 CULTURE OTHR SPECIMN AEROBIC: CPT | Performed by: STUDENT IN AN ORGANIZED HEALTH CARE EDUCATION/TRAINING PROGRAM

## 2025-01-19 PROCEDURE — 84484 ASSAY OF TROPONIN QUANT: CPT | Performed by: EMERGENCY MEDICINE

## 2025-01-19 PROCEDURE — 25010000002 FENTANYL CITRATE (PF) 50 MCG/ML SOLUTION: Performed by: INTERNAL MEDICINE

## 2025-01-19 PROCEDURE — 87205 SMEAR GRAM STAIN: CPT | Performed by: STUDENT IN AN ORGANIZED HEALTH CARE EDUCATION/TRAINING PROGRAM

## 2025-01-19 PROCEDURE — 5A1945Z RESPIRATORY VENTILATION, 24-96 CONSECUTIVE HOURS: ICD-10-PCS | Performed by: FAMILY MEDICINE

## 2025-01-19 RX ORDER — PROPOFOL 10 MG/ML
VIAL (ML) INTRAVENOUS
Status: COMPLETED
Start: 2025-01-19 | End: 2025-01-19

## 2025-01-19 RX ORDER — SUCCINYLCHOLINE CHLORIDE 20 MG/ML
INJECTION INTRAMUSCULAR; INTRAVENOUS
Status: COMPLETED
Start: 2025-01-19 | End: 2025-01-19

## 2025-01-19 RX ORDER — VENLAFAXINE HYDROCHLORIDE 37.5 MG/1
75 CAPSULE, EXTENDED RELEASE ORAL DAILY
Status: DISCONTINUED | OUTPATIENT
Start: 2025-01-19 | End: 2025-01-23 | Stop reason: HOSPADM

## 2025-01-19 RX ORDER — ENOXAPARIN SODIUM 100 MG/ML
40 INJECTION SUBCUTANEOUS DAILY
Status: DISCONTINUED | OUTPATIENT
Start: 2025-01-19 | End: 2025-01-23 | Stop reason: HOSPADM

## 2025-01-19 RX ORDER — CLOPIDOGREL BISULFATE 75 MG/1
75 TABLET ORAL DAILY
Status: DISCONTINUED | OUTPATIENT
Start: 2025-01-19 | End: 2025-01-23 | Stop reason: HOSPADM

## 2025-01-19 RX ORDER — METHYLPREDNISOLONE SODIUM SUCCINATE 125 MG/2ML
60 INJECTION, POWDER, LYOPHILIZED, FOR SOLUTION INTRAMUSCULAR; INTRAVENOUS EVERY 8 HOURS
Status: DISCONTINUED | OUTPATIENT
Start: 2025-01-19 | End: 2025-01-19

## 2025-01-19 RX ORDER — INSULIN LISPRO 100 [IU]/ML
2-7 INJECTION, SOLUTION INTRAVENOUS; SUBCUTANEOUS EVERY 6 HOURS
Status: DISCONTINUED | OUTPATIENT
Start: 2025-01-19 | End: 2025-01-20

## 2025-01-19 RX ORDER — NICOTINE POLACRILEX 4 MG
15 LOZENGE BUCCAL
Status: DISCONTINUED | OUTPATIENT
Start: 2025-01-19 | End: 2025-01-23 | Stop reason: HOSPADM

## 2025-01-19 RX ORDER — NICOTINE 21 MG/24HR
1 PATCH, TRANSDERMAL 24 HOURS TRANSDERMAL EVERY 24 HOURS
Status: DISCONTINUED | OUTPATIENT
Start: 2025-01-19 | End: 2025-01-23 | Stop reason: HOSPADM

## 2025-01-19 RX ORDER — DEXTROSE MONOHYDRATE 25 G/50ML
25 INJECTION, SOLUTION INTRAVENOUS
Status: DISCONTINUED | OUTPATIENT
Start: 2025-01-19 | End: 2025-01-23 | Stop reason: HOSPADM

## 2025-01-19 RX ORDER — ATORVASTATIN CALCIUM 20 MG/1
20 TABLET, FILM COATED ORAL DAILY
Status: DISCONTINUED | OUTPATIENT
Start: 2025-01-19 | End: 2025-01-23 | Stop reason: HOSPADM

## 2025-01-19 RX ORDER — ASPIRIN 81 MG/1
81 TABLET ORAL DAILY
Status: DISCONTINUED | OUTPATIENT
Start: 2025-01-19 | End: 2025-01-23 | Stop reason: HOSPADM

## 2025-01-19 RX ORDER — IPRATROPIUM BROMIDE AND ALBUTEROL SULFATE 2.5; .5 MG/3ML; MG/3ML
3 SOLUTION RESPIRATORY (INHALATION)
Status: DISCONTINUED | OUTPATIENT
Start: 2025-01-19 | End: 2025-01-23

## 2025-01-19 RX ORDER — DEXMEDETOMIDINE HYDROCHLORIDE 4 UG/ML
.2-1.5 INJECTION, SOLUTION INTRAVENOUS
Status: DISCONTINUED | OUTPATIENT
Start: 2025-01-19 | End: 2025-01-22

## 2025-01-19 RX ORDER — IBUPROFEN 600 MG/1
1 TABLET ORAL
Status: DISCONTINUED | OUTPATIENT
Start: 2025-01-19 | End: 2025-01-23 | Stop reason: HOSPADM

## 2025-01-19 RX ORDER — SODIUM CHLORIDE 0.9 % (FLUSH) 0.9 %
10 SYRINGE (ML) INJECTION AS NEEDED
Status: DISCONTINUED | OUTPATIENT
Start: 2025-01-19 | End: 2025-01-23 | Stop reason: HOSPADM

## 2025-01-19 RX ORDER — METHYLPREDNISOLONE SODIUM SUCCINATE 125 MG/2ML
125 INJECTION, POWDER, LYOPHILIZED, FOR SOLUTION INTRAMUSCULAR; INTRAVENOUS ONCE
Status: COMPLETED | OUTPATIENT
Start: 2025-01-19 | End: 2025-01-19

## 2025-01-19 RX ORDER — IPRATROPIUM BROMIDE AND ALBUTEROL SULFATE 2.5; .5 MG/3ML; MG/3ML
3 SOLUTION RESPIRATORY (INHALATION) ONCE
Status: COMPLETED | OUTPATIENT
Start: 2025-01-19 | End: 2025-01-19

## 2025-01-19 RX ORDER — METOPROLOL TARTRATE 50 MG
50 TABLET ORAL EVERY 12 HOURS SCHEDULED
Status: DISCONTINUED | OUTPATIENT
Start: 2025-01-19 | End: 2025-01-19

## 2025-01-19 RX ORDER — ETOMIDATE 2 MG/ML
INJECTION INTRAVENOUS
Status: COMPLETED
Start: 2025-01-19 | End: 2025-01-19

## 2025-01-19 RX ORDER — FENTANYL CITRATE 50 UG/ML
75 INJECTION, SOLUTION INTRAMUSCULAR; INTRAVENOUS
Status: DISCONTINUED | OUTPATIENT
Start: 2025-01-19 | End: 2025-01-22

## 2025-01-19 RX ORDER — SODIUM CHLORIDE 9 MG/ML
75 INJECTION, SOLUTION INTRAVENOUS CONTINUOUS
Status: ACTIVE | OUTPATIENT
Start: 2025-01-19 | End: 2025-01-20

## 2025-01-19 RX ORDER — MAGNESIUM SULFATE HEPTAHYDRATE 40 MG/ML
2 INJECTION, SOLUTION INTRAVENOUS ONCE
Status: COMPLETED | OUTPATIENT
Start: 2025-01-19 | End: 2025-01-19

## 2025-01-19 RX ORDER — FAMOTIDINE 10 MG/ML
20 INJECTION, SOLUTION INTRAVENOUS 2 TIMES DAILY
Status: DISCONTINUED | OUTPATIENT
Start: 2025-01-19 | End: 2025-01-23

## 2025-01-19 RX ORDER — ACETAMINOPHEN 500 MG
500 TABLET ORAL EVERY 6 HOURS PRN
Status: DISCONTINUED | OUTPATIENT
Start: 2025-01-19 | End: 2025-01-23 | Stop reason: HOSPADM

## 2025-01-19 RX ORDER — ALBUTEROL SULFATE 0.83 MG/ML
2.5 SOLUTION RESPIRATORY (INHALATION)
Status: COMPLETED | OUTPATIENT
Start: 2025-01-19 | End: 2025-01-19

## 2025-01-19 RX ORDER — DIAZEPAM 10 MG/2ML
5 INJECTION, SOLUTION INTRAMUSCULAR; INTRAVENOUS ONCE
Status: COMPLETED | OUTPATIENT
Start: 2025-01-19 | End: 2025-01-19

## 2025-01-19 RX ORDER — METHYLPREDNISOLONE SODIUM SUCCINATE 40 MG/ML
40 INJECTION, POWDER, LYOPHILIZED, FOR SOLUTION INTRAMUSCULAR; INTRAVENOUS EVERY 12 HOURS
Status: DISCONTINUED | OUTPATIENT
Start: 2025-01-19 | End: 2025-01-22

## 2025-01-19 RX ADMIN — METHYLPREDNISOLONE SODIUM SUCCINATE 40 MG: 40 INJECTION, POWDER, FOR SOLUTION INTRAMUSCULAR; INTRAVENOUS at 20:40

## 2025-01-19 RX ADMIN — PROPOFOL INJECTABLE EMULSION 5 MCG/KG/MIN: 10 INJECTION, EMULSION INTRAVENOUS at 01:55

## 2025-01-19 RX ADMIN — IPRATROPIUM BROMIDE AND ALBUTEROL SULFATE 3 ML: .5; 3 SOLUTION RESPIRATORY (INHALATION) at 00:08

## 2025-01-19 RX ADMIN — Medication 10 ML: at 08:40

## 2025-01-19 RX ADMIN — MUPIROCIN 1 APPLICATION: 20 OINTMENT TOPICAL at 20:41

## 2025-01-19 RX ADMIN — PROPOFOL INJECTABLE EMULSION 50 MCG/KG/MIN: 10 INJECTION, EMULSION INTRAVENOUS at 11:35

## 2025-01-19 RX ADMIN — CEFEPIME 2000 MG: 2 INJECTION, POWDER, FOR SOLUTION INTRAVENOUS at 14:40

## 2025-01-19 RX ADMIN — IPRATROPIUM BROMIDE AND ALBUTEROL SULFATE 3 ML: .5; 3 SOLUTION RESPIRATORY (INHALATION) at 20:10

## 2025-01-19 RX ADMIN — SODIUM CHLORIDE 75 ML/HR: 9 INJECTION, SOLUTION INTRAVENOUS at 21:35

## 2025-01-19 RX ADMIN — INSULIN LISPRO 2 UNITS: 100 INJECTION, SOLUTION INTRAVENOUS; SUBCUTANEOUS at 18:08

## 2025-01-19 RX ADMIN — INSULIN GLARGINE 7 UNITS: 100 INJECTION, SOLUTION SUBCUTANEOUS at 09:02

## 2025-01-19 RX ADMIN — FAMOTIDINE 20 MG: 10 INJECTION, SOLUTION INTRAVENOUS at 20:40

## 2025-01-19 RX ADMIN — NICOTINE 1 PATCH: 21 PATCH, EXTENDED RELEASE TRANSDERMAL at 05:40

## 2025-01-19 RX ADMIN — PROPOFOL INJECTABLE EMULSION 35 MCG/KG/MIN: 10 INJECTION, EMULSION INTRAVENOUS at 23:49

## 2025-01-19 RX ADMIN — ETOMIDATE 20 MG: 2 INJECTION, SOLUTION INTRAVENOUS at 01:36

## 2025-01-19 RX ADMIN — FENTANYL CITRATE 50 MCG/HR: 50 INJECTION, SOLUTION INTRAMUSCULAR; INTRAVENOUS at 06:26

## 2025-01-19 RX ADMIN — CEFEPIME 2000 MG: 2 INJECTION, POWDER, FOR SOLUTION INTRAVENOUS at 21:08

## 2025-01-19 RX ADMIN — ALBUTEROL SULFATE 2.5 MG: 2.5 SOLUTION RESPIRATORY (INHALATION) at 00:19

## 2025-01-19 RX ADMIN — PROPOFOL INJECTABLE EMULSION 50 MCG/KG/MIN: 10 INJECTION, EMULSION INTRAVENOUS at 05:27

## 2025-01-19 RX ADMIN — Medication 10 ML: at 15:42

## 2025-01-19 RX ADMIN — IPRATROPIUM BROMIDE AND ALBUTEROL SULFATE 3 ML: .5; 3 SOLUTION RESPIRATORY (INHALATION) at 15:32

## 2025-01-19 RX ADMIN — Medication 10 ML: at 10:28

## 2025-01-19 RX ADMIN — METOROPROLOL TARTRATE 5 MG: 5 INJECTION, SOLUTION INTRAVENOUS at 10:28

## 2025-01-19 RX ADMIN — DEXMEDETOMIDINE HYDROCHLORIDE 0.2 MCG/KG/HR: 4 INJECTION, SOLUTION INTRAVENOUS at 14:37

## 2025-01-19 RX ADMIN — METHYLPREDNISOLONE SODIUM SUCCINATE 125 MG: 125 INJECTION, POWDER, FOR SOLUTION INTRAMUSCULAR; INTRAVENOUS at 00:11

## 2025-01-19 RX ADMIN — DIAZEPAM 5 MG: 5 INJECTION INTRAMUSCULAR; INTRAVENOUS at 00:11

## 2025-01-19 RX ADMIN — PROPOFOL INJECTABLE EMULSION 40 MCG/KG/MIN: 10 INJECTION, EMULSION INTRAVENOUS at 17:10

## 2025-01-19 RX ADMIN — ALBUTEROL SULFATE 2.5 MG: 2.5 SOLUTION RESPIRATORY (INHALATION) at 00:20

## 2025-01-19 RX ADMIN — FENTANYL CITRATE 75 MCG: 50 INJECTION, SOLUTION INTRAMUSCULAR; INTRAVENOUS at 16:58

## 2025-01-19 RX ADMIN — SODIUM CHLORIDE, SODIUM LACTATE, POTASSIUM CHLORIDE, CALCIUM CHLORIDE 1000 ML: 20; 30; 600; 310 INJECTION, SOLUTION INTRAVENOUS at 00:37

## 2025-01-19 RX ADMIN — MAGNESIUM SULFATE IN WATER FOR 2 G: 40 INJECTION INTRAVENOUS at 00:11

## 2025-01-19 RX ADMIN — VANCOMYCIN HYDROCHLORIDE 1250 MG: 1.25 INJECTION, POWDER, LYOPHILIZED, FOR SOLUTION INTRAVENOUS at 14:44

## 2025-01-19 RX ADMIN — SODIUM CHLORIDE 500 ML: 9 INJECTION, SOLUTION INTRAVENOUS at 17:59

## 2025-01-19 RX ADMIN — ENOXAPARIN SODIUM 40 MG: 40 INJECTION SUBCUTANEOUS at 08:36

## 2025-01-19 RX ADMIN — FAMOTIDINE 20 MG: 10 INJECTION, SOLUTION INTRAVENOUS at 14:44

## 2025-01-19 RX ADMIN — SUCCINYLCHOLINE CHLORIDE 100 MG: 20 INJECTION, SOLUTION INTRAMUSCULAR; INTRAVENOUS at 01:37

## 2025-01-19 RX ADMIN — SODIUM CHLORIDE 75 ML/HR: 9 INJECTION, SOLUTION INTRAVENOUS at 16:57

## 2025-01-19 RX ADMIN — METHYLPREDNISOLONE SODIUM SUCCINATE 60 MG: 125 INJECTION, POWDER, FOR SOLUTION INTRAMUSCULAR; INTRAVENOUS at 08:40

## 2025-01-19 NOTE — ED NOTES
0134- room was set up for intubation. Dr. Solares; Abdirashid, TEE; TEE Gonzalez; roman Mathis at bedside.     0138- Dr. Solares removed BIPAP and began intubation.    0139- pt was successfully intubated with a 7.5 ET tube, 20 at the teeth, and the tube was secured on the right side of the mouth

## 2025-01-19 NOTE — PLAN OF CARE
Goal Outcome Evaluation:  Plan of Care Reviewed With: patient        Progress: improving  Outcome Evaluation: doing well on vent awakens at times and can follow commands. is able to communicate pain at times. Bp low with sedation. Low uop, 500 ml bolus given.   On propofol weaning down, precedex started today. IV fent pushes for pain. Monitor for hypotension with sedation.

## 2025-01-19 NOTE — PROGRESS NOTES
"Hospital Medicine Team    LOS 0 days      Patient Care Team:  Talib Ceron MD as PCP - General (Family Medicine)      Subjective       Chief Complaint:  f/u shortness of breath     Subjective    Remains intubated and sedated. No new changes per nurse.     Objective       Vital Signs  Temp:  [97.6 °F (36.4 °C)-98.2 °F (36.8 °C)] 97.6 °F (36.4 °C)  Heart Rate:  [] 80  Resp:  [18-30] 19  BP: ()/() 93/58  FiO2 (%):  [35 %-50 %] 35 %  Oxygen Therapy  SpO2: 96 %  Pulse Oximetry Type: Continuous  Device (Oxygen Therapy): ventilator  Flow (L/min) (Oxygen Therapy): 4  Oxygen Concentration (%): 40  ETCO2 (mmHg): 39 mmHg  Flowsheet Rows      Flowsheet Row First Filed Value   Admission Height 157.5 cm (62.01\") Documented at 01/19/2025 0001   Admission Weight 55.8 kg (123 lb) Documented at 01/19/2025 0001                Physical Exam:  Physical Exam  Vitals reviewed.           Results Review:    I reviewed the patient's new clinical results.    [x]  Laboratory  [x]  Microbiology  []  Radiology  []  EKG/Telemetry   []  Cardiology/Vascular   []  Pathology  []  Old records  []  Other:      X-rays, labs reviewed personally by physician.    Medication Review:   I have reviewed the patient's current medication list    Scheduled Meds  aspirin, 81 mg, Oral, Daily  atorvastatin, 20 mg, Oral, Daily  cefepime, 2,000 mg, Intravenous, Once  cefepime, 2,000 mg, Intravenous, Q8H  clopidogrel, 75 mg, Oral, Daily  enoxaparin, 40 mg, Subcutaneous, Daily  Famotidine (PF), 20 mg, Intravenous, BID  insulin glargine, 7 Units, Subcutaneous, Daily  insulin lispro, 2-7 Units, Subcutaneous, Q6H  ipratropium-albuterol, 3 mL, Nebulization, 4x Daily - RT  methylPREDNISolone sodium succinate, 40 mg, Intravenous, Q12H  metoprolol tartrate, 5 mg, Intravenous, Q8H  nicotine, 1 patch, Transdermal, Q24H  [START ON 1/20/2025] vancomycin, 1,000 mg, Intravenous, Q12H  vancomycin, 1,250 mg, Intravenous, Once  [Held by provider] venlafaxine XR, " 75 mg, Oral, Daily        Meds Infusions  dexmedetomidine, 0.2-1.5 mcg/kg/hr  Pharmacy to Dose Cefepime,   Pharmacy to dose vancomycin,   propofol, 5-50 mcg/kg/min, Last Rate: 40 mcg/kg/min (01/19/25 1323)        Meds PRN  •  acetaminophen  •  dextrose  •  dextrose  •  fentaNYL citrate (PF)  •  glucagon (human recombinant)  •  Pharmacy to Dose Cefepime  •  Pharmacy to dose vancomycin  •  [COMPLETED] Insert Peripheral IV **AND** sodium chloride        Assessment / Plan       Active Hospital Problems:  Active Hospital Problems    Diagnosis  POA   • **COPD exacerbation [J44.1]  Yes   • Acute on chronic respiratory failure with hypoxia and hypercapnia [J96.21, J96.22]  Yes      Resolved Hospital Problems   No resolved problems to display.         Reviewd o.n noted  -agree with pulm to broaden abx  -SBT tomorrow   -continue other care for now        Plan for disposition:defer intubated     Electronically signed by Ravinder Estrella DO, 01/19/25, 13:46 EST.        Note disclaimer: At [facility], we believe that sharing information builds trust and better relationships. You are receiving this note because you recently visited [facility]. It is possible you will see health information before a provider has talked with you about it. This kind of information can be easy to misunderstand. To help you fully understand what it means for your health, we urge you to discuss this note with your provider.

## 2025-01-19 NOTE — CONSULTS
Group: [facility]         CONSULT NOTE    Patient Identification:  Valeria Rubi  49 y.o.  female  1975  [id number]            Requesting physician: Dr. Cleopatra Weller    Reason for Consultation: Intensive care management of patient with acute respiratory failure    CC: Shortness of breath    History of Present Illness:  49-year-old female who presents with shortness of breath.  She is currently intubated and mechanically ventilated and cannot provide any history.  She is usually on oxygen at home and has a history of recurrent pneumonia, persistent tobacco abuse.  She presents to the ER complaining of shortness of breath, severe, rapidly worsening over the past 48 hours.  Was seen 2 days ago in the emergency room and this time returned feeling much worse.  Upon presentation they tried noninvasive ventilation with BiPAP but the patient's blood gases showed severe hypercapnia and the patient gave consent for intubation.  Chart reviewed, history reviewed.  The patient had bilateral RSV pneumonia in December and was discharged recently on January 2, 2025.  Since then she has gradually been worsening at home again, now was positive for acute rhinovirus.      Review of Systems   Unable to perform ROS: Intubated       Past Medical History:  Past Medical History:   Diagnosis Date    Anxiety     Arthritis     COPD (chronic obstructive pulmonary disease)     Coronary stent occlusion     CTS (carpal tunnel syndrome)     Depression     Dizziness     Headache     Hyperlipidemia     Hypertension     Migraine     Numbness and tingling     Pneumonia        Past Surgical History:  Past Surgical History:   Procedure Laterality Date    ANGIOPLASTY ILIAC ARTERY Left 6/17/2016    Procedure: BILATERAL DUPLEX DIRECTED ACCESS, AIF BILATERAL RUNOFF, SELECTIVE CATHETERIZATION OF RIGHT EXTERNAL ILIAC WITH ANGIOPLASTY, LEFT COMMON ILIAC STENT PLACEMENT;  Surgeon: Jose Carlos Plascencia MD;  Location: [facility] OR 18/19;   Service:     BREAST SURGERY      BILAT IMPLANTS    CAROTID ARTERY ANGIOPLASTY          Home Meds:  Medications Prior to Admission   Medication Sig Dispense Refill Last Dose/Taking    acetaminophen (TYLENOL) 500 MG tablet Take 2 tablets by mouth Every 6 (Six) Hours As Needed for Mild Pain for up to 7 days. 30 tablet 0     albuterol sulfate HFA (Proventil HFA) 108 (90 Base) MCG/ACT inhaler Inhale 2 puffs into the lungs Every 4 (Four) Hours As Needed for Wheezing. 8.5 g 1     aspirin (aspirin) 81 MG EC tablet Take 1 tablet by mouth Daily. 90 tablet 3     benzonatate (TESSALON) 200 MG capsule Take 1 capsule by mouth 3 (Three) Times a Day As Needed for Cough. 30 capsule 0     budesonide-formoterol (SYMBICORT) 160-4.5 MCG/ACT inhaler Inhale 2 puffs 2 (Two) Times a Day.       clopidogrel (Plavix) 75 MG tablet Take 1 tablet by mouth Daily. 90 tablet 1     fluticasone (FLONASE) 50 MCG/ACT nasal spray Administer 1 spray into the nostril(s) as directed by provider Daily As Needed for Rhinitis or Allergies.       guaiFENesin (MUCINEX) 600 MG 12 hr tablet Take 2 tablets by mouth 2 (Two) Times a Day. 40 tablet 0     insulin detemir (LEVEMIR) 100 UNIT/ML injection Inject 15 Units under the skin into the appropriate area as directed Daily. Until steroids are completed       ipratropium-albuterol (DUO-NEB) 0.5-2.5 mg/3 ml nebulizer Take 3 mL by nebulization Every 4 (Four) Hours As Needed for Wheezing. 90 mL 1     lisinopril (PRINIVIL,ZESTRIL) 20 MG tablet Take 1 tablet by mouth Daily.       meloxicam (MOBIC) 7.5 MG tablet Take 2 tablets by mouth Daily for 7 days. 14 tablet 0     metoprolol tartrate (LOPRESSOR) 50 MG tablet Take 1 tablet by mouth Every 12 (Twelve) Hours. 60 tablet 1     nicotine (NICODERM CQ) 21 MG/24HR patch Place 1 patch on the skin as directed by provider Daily. 30 patch 0     nystatin (MYCOSTATIN) 100,000 unit/mL suspension Take 5 mL by mouth 4 (Four) Times a Day. 473 mL 0     prednisoLONE sodium phosphate  "(ORAPRED) 15 MG/5ML solution Take 16.7 mL by mouth Daily for 5 days. 83.5 mL 0     promethazine (PHENERGAN) 12.5 MG tablet Take 1 tablet by mouth Every 6 (Six) Hours As Needed for Nausea or Vomiting.       simvastatin (ZOCOR) 40 MG tablet Take 1 tablet by mouth Every Evening. 90 tablet 1     venlafaxine XR (EFFEXOR-XR) 75 MG 24 hr capsule TAKE 1 CAPSULE BY MOUTH EVERY DAY (Patient taking differently: Take 1 capsule by mouth Daily.) 90 capsule 0        Allergies:  Allergies   Allergen Reactions    Prednisone Other (See Comments)     Rash in mouth       Social History:   Social History     Socioeconomic History    Marital status: Single   Tobacco Use    Smoking status: Every Day     Current packs/day: 1.00     Average packs/day: 1 pack/day for 30.0 years (30.0 ttl pk-yrs)     Types: Cigarettes     Passive exposure: Current    Smokeless tobacco: Never   Vaping Use    Vaping status: Never Used   Substance and Sexual Activity    Alcohol use: No    Drug use: No    Sexual activity: Defer       Family History:  Family History   Problem Relation Age of Onset    Heart disease Father     Hypertension Father     Diabetes Father     Stroke Father     COPD Father     Heart failure Paternal Grandmother     Heart failure Paternal Grandfather     COPD Mother     COPD Maternal Aunt     COPD Maternal Uncle     COPD Paternal Uncle        Physical Exam:  BP 90/54   Pulse 79   Temp 98.2 °F (36.8 °C) (Axillary)   Resp 20   Ht 157.5 cm (62.01\")   Wt 55.8 kg (123 lb)   LMP  (LMP Unknown)   SpO2 97%   BMI 22.49 kg/m²  Body mass index is 22.49 kg/m². 97% 55.8 kg (123 lb)  Physical Exam  Constitutional:       Appearance: She is ill-appearing.   HENT:      Right Ear: External ear normal.      Left Ear: External ear normal.      Nose: Nose normal.      Mouth/Throat:      Comments: Oral exam shows endotracheal tube in good position  Eyes:      Conjunctiva/sclera: Conjunctivae normal.   Neck:      Thyroid: No thyromegaly.      Vascular: No " JVD.      Trachea: No tracheal deviation.   Cardiovascular:      Rate and Rhythm: Normal rate and regular rhythm.      Heart sounds: Normal heart sounds. No murmur heard.  Pulmonary:      Effort: Pulmonary effort is normal.      Breath sounds: Wheezing and rhonchi present.   Abdominal:      Palpations: Abdomen is soft.      Tenderness: There is no abdominal tenderness. There is no rebound.      Comments: Cannot palpate liver or spleen enlargement   Musculoskeletal:         General: No deformity.      Cervical back: Neck supple. No rigidity.   Skin:     General: Skin is warm.      Findings: No rash.      Comments: No palpable nodules   Neurological:      Comments: Intubated and sedated   Psychiatric:      Comments: Intubated and sedated         LABS:  COVID19   Date Value Ref Range Status   01/19/2025 Not Detected Not Detected - Ref. Range Final       Lab Results   Component Value Date    CALCIUM 9.0 01/19/2025    PHOS 3.0 01/22/2024     Results from last 7 days   Lab Units 01/19/25  0545 01/19/25  0204 01/19/25  0122   MAGNESIUM mg/dL  --   --  3.2*   SODIUM mmol/L 142  --  141   POTASSIUM mmol/L 4.9  --  5.0   CHLORIDE mmol/L 100  --  100   CO2 mmol/L 35.4*  --  31.2*   BUN mg/dL 14  --  13   CREATININE mg/dL 0.58  --  0.46*   GLUCOSE mg/dL 172*  --  206*   CALCIUM mg/dL 9.0  --  8.9   WBC 10*3/mm3 11.88* 12.28*  --    HEMOGLOBIN g/dL 12.2 13.0  --    PLATELETS 10*3/mm3 258 319  --    ALT (SGPT) U/L 11  --  9   AST (SGOT) U/L 19  --  15   PROBNP pg/mL  --   --  325.0   PROCALCITONIN ng/mL 2.91*  --   --      Lab Results   Component Value Date    CKTOTAL 333 (H) 04/02/2021    TROPONINT 8 01/19/2025     Results from last 7 days   Lab Units 01/19/25  0234 01/19/25  0122   HSTROP T ng/L 8 13         Results from last 7 days   Lab Units 01/19/25  0545 01/19/25  0018   PROCALCITONIN ng/mL 2.91*  --    LACTATE mmol/L  --  1.1     Results from last 7 days   Lab Units 01/19/25  0703 01/19/25  0115   PH, ARTERIAL pH units  7.321* 7.190*   PCO2, ARTERIAL mm Hg 77.8* >98.3*   PO2 ART mm Hg 88.1 72.6*   O2 SATURATION ART % 98.4 94.4   MODALITY  Ventilator NIV     Results from last 7 days   Lab Units 01/19/25  0102   INFLUENZA B PCR  Not Detected   RSV, PCR  Not Detected             Lab Results   Component Value Date    TSH 0.516 12/30/2024     Estimated Creatinine Clearance: 101.1 mL/min (by C-G formula based on SCr of 0.58 mg/dL).  Results from last 7 days   Lab Units 01/19/25  0236   NITRITE UA  Negative   WBC UA /HPF None Seen   BACTERIA UA /HPF None Seen   SQUAM EPITHEL UA /HPF 3-6*        Imaging: I personally visualized the images of portable chest x-ray showing hyperinflated lungs but no visible infiltrates or opacities.  Endotracheal tube is in good position      Assessment:  Acute on chronic hypercapnic and hypoxic respiratory failure  Acute rhinovirus bronchitis causing COPD exacerbation  COPD exacerbation  Recent RSV pneumonia within the past 30 days  Ongoing tobacco abuse  Sepsis present on admission  Hyperglycemia  Diabetes type 2      Recommendations:  Acutely ill, critically ill patient.  Continue adjusting mechanical ventilation based on blood gases and chest x-ray findings.  Anticipate she would be extubated in the next 24 to 48 hours.  Ongoing tobacco abuse and now acute rhinovirus bronchitis are the main triggers for exacerbation of her COPD.  She is usually on oxygen at home and recently had RSV pneumonia 3 weeks ago.  She presents now with elevated procalcitonin and leukocytosis.  Unsure if she actually has true sepsis but lactic acid was normal.  We will start covering empirically for healthcare associated pneumonia with vancomycin and cefepime.  Send blood cultures, urine culture and sputum culture.  Monitor temperature curve, white count trend and procalcitonin levels, and de-escalate antibiotics within the next 48 hours depending on microbiology results.  Stop fentanyl drip, start as needed IV fentanyl pushes for  analgesia.  Use Precedex for sedation, stop propofol.  Give DuoNeb bronchodilators 4 times a day via ventilator circuit.  Give IV Solu-Medrol for COPD exacerbation.  Insulin sliding scale and Accu-Cheks every 6 hours to treat hyperglycemia    Total critical care time 35 minutes      Patient was placed in face mask upon entering room and kept mask on throughout our encounter. I wore full protective equipment throughout this patient encounter including a face mask, gown and gloves. Hand hygiene was performed before donning protective equipment and after removal when leaving the room.    Horacio Hussein MD  1/19/2025  11:50 EST      Much of this encounter note is an electronic transcription/translation of spoken language to printed text using Dragon Software.

## 2025-01-19 NOTE — H&P
[facility] HOSPITALIST     Talib Ceron MD    CHIEF COMPLAINT: Shortness of breath    HISTORY OF PRESENT ILLNESS:    Valeria Rubi is a 48 y/o F with oxygen dependent COPD, recurrent PNA, persistent tobacco use and prior hypoxemic/hypercapnic respiratory failure requiring intubation who presented to the ER this evening with several days of shortness of breath and cough not alleviated by home breathing treatments. She was seen in this ED 2 days ago for same symptoms but chest imaging did not show abnormalities, viral panel returned negative and vitals remained stable she she was discharged home with tylenol and mobic.     I obtained pt's history from pt's spouse over the phone as well as ER physician and respiratory therapist.     She re-presented to the ER today due to persistent worsening of her respiratory distress and was found to be tachycardic, tachypneic in the ER on presentation. There was concern that she was not moving hardly any air with significant wheezing so she was placed on BiPAP, however after ~40min therapy ABG obtained showed undetectably high pCO2 levels (>98.3) and decision was made with patient's consent to intubate. After intubation, pt's end tidal Co2 returned almost 120. Other labs obtained in ER were fairly unremarkable with leukocytosis to 12.2.     It appears she was recently admitted at the end of Dec 2024 for hypoxemic respiratory failure due to b/l viral multifocal PNA 2/2 RSV infection and discharged on 1/2/2025. Per patient's spouse, she recovered from that over time but in the last few days she started to get sick again with more dyspnea and cough at home. He tells me she has very advanced COPD and is still smoking, and confirms that she has been intubated for respiratory failure in the past.       Past Medical History:   Diagnosis Date    Anxiety     Arthritis     COPD (chronic obstructive pulmonary disease)     Coronary stent occlusion     CTS (carpal tunnel  "syndrome)     Depression     Dizziness     Headache     Hyperlipidemia     Hypertension     Migraine     Numbness and tingling     Pneumonia      Past Surgical History:   Procedure Laterality Date    ANGIOPLASTY ILIAC ARTERY Left 6/17/2016    Procedure: BILATERAL DUPLEX DIRECTED ACCESS, AIF BILATERAL RUNOFF, SELECTIVE CATHETERIZATION OF RIGHT EXTERNAL ILIAC WITH ANGIOPLASTY, LEFT COMMON ILIAC STENT PLACEMENT;  Surgeon: Jose Carlos Plascencia MD;  Location: [facility] 18/19;  Service:     BREAST SURGERY      BILAT IMPLANTS    CAROTID ARTERY ANGIOPLASTY       Family History   Problem Relation Age of Onset    Heart disease Father     Hypertension Father     Diabetes Father     Stroke Father     COPD Father     Heart failure Paternal Grandmother     Heart failure Paternal Grandfather     COPD Mother     COPD Maternal Aunt     COPD Maternal Uncle     COPD Paternal Uncle      Social History     Tobacco Use    Smoking status: Every Day     Current packs/day: 1.00     Average packs/day: 1 pack/day for 30.0 years (30.0 ttl pk-yrs)     Types: Cigarettes     Passive exposure: Current    Smokeless tobacco: Never   Vaping Use    Vaping status: Never Used   Substance Use Topics    Alcohol use: No    Drug use: No     (Not in a hospital admission)    Allergies:  Prednisone    REVIEW OF SYSTEMS:  UTO as pt is intubated and sedated    Vital Signs  Temp:  [98.2 °F (36.8 °C)] 98.2 °F (36.8 °C)  Heart Rate:  [112-137] 112  Resp:  [30] 30  BP: ()/() 113/96  Oxygen Therapy  SpO2: 98 %  Pulse Oximetry Type: Continuous  Device (Oxygen Therapy): ventilator  Flow (L/min) (Oxygen Therapy): 4  Oxygen Concentration (%): 35}  Body mass index is 22.49 kg/m².  Flowsheet Rows      Flowsheet Row First Filed Value   Admission Height 157.5 cm (62.01\") Documented at 01/19/2025 0001   Admission Weight 55.8 kg (123 lb) Documented at 01/19/2025 0001               Physical Exam:  Physical Exam  Vitals and nursing note reviewed.   Constitutional:  "      Comments: Intubated and sedated   HENT:      Head: Normocephalic and atraumatic.   Cardiovascular:      Rate and Rhythm: Tachycardia present.   Pulmonary:      Comments: Mechanical breath sounds  Musculoskeletal:         General: No deformity or signs of injury.   Neurological:      Comments: Unable to assess due to pt condition   Psychiatric:      Comments: Unable to assess due to pt condition           Results Review:    I reviewed the patient's new clinical results.  Lab Results (most recent)       Procedure Component Value Units Date/Time    High Sensitivity Troponin T 1Hr [[id number]] Collected: 01/19/25 0234    Specimen: Blood Updated: 01/19/25 0241    Respiratory Culture - Sputum, ET Suction [[id number]] Collected: 01/19/25 0235    Specimen: Sputum from ET Suction Updated: 01/19/25 0241    Urinalysis With Microscopic If Indicated (No Culture) - Indwelling Urethral Catheter [[id number]] Collected: 01/19/25 0236    Specimen: Urine from Indwelling Urethral Catheter Updated: 01/19/25 0241    Blood Gas, Arterial - [[id number]]  (Abnormal) Collected: 01/19/25 0115    Specimen: Arterial Blood Updated: 01/19/25 0217     Site Right Radial     Marquis's Test Positive     pH, Arterial 7.190 pH units      Comment: 85 Value below critical limit        pCO2, Arterial >98.3 mm Hg      Comment: 93 Value above reportable range > 98.3        pO2, Arterial 72.6 mm Hg      Comment: 84 Value below reference range        HCO3, Arterial --     Comment: Result not reportable        Base Excess, Arterial --     Comment: Result not reportable        O2 Saturation, Arterial 94.4 %      Hemoglobin, Blood Gas 13.5 g/dL      Comment: 84 Value below reference range        Temperature 37.0     Barometric Pressure for Blood Gas 740 mmHg      Modality NIV     FIO2 35 %      Ventilator Mode BIPAP-AVAPS     Set Tidal Volume 450     Set Mech Resp Rate 16.0     IPAP 17     Comment: Meter: [id number]     :  [id number]        EPAP 8      Notified By [id number]     Collected by [id number]     pH, Temp Corrected 7.190 pH Units      pO2, Temperature Corrected 72.6 mm Hg      PO2/FIO2 207    CBC & Differential [[id number]]  (Abnormal) Collected: 01/19/25 0204    Specimen: Blood Updated: 01/19/25 0207    Narrative:      The following orders were created for panel order CBC & Differential.  Procedure                               Abnormality         Status                     ---------                               -----------         ------                     CBC Auto Differential[[id number]]        Abnormal            Final result               Scan Slide[[id number]]                                                                    Please view results for these tests on the individual orders.    CBC Auto Differential [[id number]]  (Abnormal) Collected: 01/19/25 0204    Specimen: Blood Updated: 01/19/25 0206     WBC 12.28 10*3/mm3      RBC 4.03 10*6/mm3      Hemoglobin 13.0 g/dL      Hematocrit 43.5 %      .9 fL      MCH 32.3 pg      MCHC 29.9 g/dL      RDW 13.7 %      RDW-SD 54.7 fl      MPV 9.1 fL      Platelets 319 10*3/mm3      Neutrophil % 91.5 %      Lymphocyte % 2.0 %      Monocyte % 5.9 %      Eosinophil % 0.0 %      Basophil % 0.2 %      Immature Grans % 0.4 %      Neutrophils, Absolute 11.23 10*3/mm3      Lymphocytes, Absolute 0.25 10*3/mm3      Monocytes, Absolute 0.73 10*3/mm3      Eosinophils, Absolute 0.00 10*3/mm3      Basophils, Absolute 0.02 10*3/mm3      Immature Grans, Absolute 0.05 10*3/mm3      nRBC 0.0 /100 WBC     Comprehensive Metabolic Panel [[id number]]  (Abnormal) Collected: 01/19/25 0122    Specimen: Blood from Arm, Right Updated: 01/19/25 0155     Glucose 206 mg/dL      BUN 13 mg/dL      Creatinine 0.46 mg/dL      Sodium 141 mmol/L      Potassium 5.0 mmol/L      Chloride 100 mmol/L      CO2 31.2 mmol/L      Calcium 8.9 mg/dL      Total Protein 7.0 g/dL      Albumin 3.7 g/dL      ALT (SGPT) 9 U/L      AST (SGOT) 15 U/L       Alkaline Phosphatase 108 U/L      Total Bilirubin 0.2 mg/dL      Globulin 3.3 gm/dL      A/G Ratio 1.1 g/dL      BUN/Creatinine Ratio 28.3     Anion Gap 9.8 mmol/L      eGFR 116.0 mL/min/1.73     Narrative:      GFR Categories in Chronic Kidney Disease (CKD)      GFR Category          GFR (mL/min/1.73)    Interpretation  G1                     90 or greater         Normal or high (1)  G2                      60-89                Mild decrease (1)  G3a                   45-59                Mild to moderate decrease  G3b                   30-44                Moderate to severe decrease  G4                    15-29                Severe decrease  G5                    14 or less           Kidney failure          (1)In the absence of evidence of kidney disease, neither GFR category G1 or G2 fulfill the criteria for CKD.    eGFR calculation 2021 CKD-EPI creatinine equation, which does not include race as a factor    Magnesium [[id number]]  (Abnormal) Collected: 01/19/25 0122    Specimen: Blood from Arm, Right Updated: 01/19/25 0143     Magnesium 3.2 mg/dL     High Sensitivity Troponin T [[id number]]  (Normal) Collected: 01/19/25 0122    Specimen: Blood from Arm, Right Updated: 01/19/25 0143     HS Troponin T 13 ng/L     Narrative:      High Sensitive Troponin T Reference Range:  <14.0 ng/L- Negative Female for AMI  <22.0 ng/L- Negative Male for AMI  >=14 - Abnormal Female indicating possible myocardial injury.  >=22 - Abnormal Male indicating possible myocardial injury.   Clinicians would have to utilize clinical acumen, EKG, Troponin, and serial changes to determine if it is an Acute Myocardial Infarction or myocardial injury due to an underlying chronic condition.         BNP [[id number]]  (Normal) Collected: 01/19/25 0122    Specimen: Blood from Arm, Right Updated: 01/19/25 0143     proBNP 325.0 pg/mL     Narrative:      This assay is used as an aid in the diagnosis of individuals suspected of having heart failure. It  can be used as an aid in the diagnosis of acute decompensated heart failure (ADHF) in patients presenting with signs and symptoms of ADHF to the emergency department (ED). In addition, NT-proBNP of <300 pg/mL indicates ADHF is not likely.    Age Range Result Interpretation  NT-proBNP Concentration (pg/mL:      <50             Positive            >450                   Gray                 300-450                    Negative             <300    50-75           Positive            >900                  Gray                300-900                  Negative            <300      >75             Positive            >1800                  Gray                300-1800                  Negative            <300    COVID-19, FLU A/B, RSV PCR 1 HR TAT - Swab, Nasopharynx [[id number]]  (Normal) Collected: 01/19/25 0102    Specimen: Swab from Nasopharynx Updated: 01/19/25 0121     COVID19 Not Detected     Influenza A PCR Not Detected     Influenza B PCR Not Detected     RSV, PCR Not Detected    Narrative:      Fact sheet for providers: https://www.fda.gov/media/203311/download    Fact sheet for patients: https://www.fda.gov/media/597770/download    Test performed by PCR.    Lactic Acid, Plasma [[id number]]  (Normal) Collected: 01/19/25 0018    Specimen: Blood Updated: 01/19/25 0100     Lactate 1.1 mmol/L             Imaging Results (Most Recent)       Procedure Component Value Units Date/Time    XR Chest 1 View [[id number]] Collected: 01/19/25 0154     Updated: 01/19/25 0158    Narrative:      XR CHEST 1 VW    Date of Exam: 1/19/2025 1:40 AM EST    Indication: post intubation    Comparison: 1/19/2025.    Findings:  Endotracheal tube tip in the mid trachea. There are no airspace consolidations. No pleural fluid. No pneumothorax. The pulmonary vasculature appears within normal limits. The cardiac and mediastinal silhouette appear unremarkable. No acute osseous   abnormality identified.      Impression:      Impression:  No acute  cardiopulmonary process. Endotracheal tube tip in the mid trachea.    Electronically Signed: Lianna Wiseman MD    1/19/2025 1:55 AM EST    Workstation ID: [id number]    XR Chest 1 View [[id number]] Collected: 01/19/25 0055     Updated: 01/19/25 0059    Narrative:      XR CHEST 1 VW    Date of Exam: 1/19/2025 12:42 AM EST    Indication: cough    Comparison: 1/16/2025, 12/31/2024.    Findings:  There are no airspace consolidations. No pleural fluid. No pneumothorax. The pulmonary vasculature appears within normal limits. The cardiac and mediastinal silhouette appear unremarkable. No acute osseous abnormality identified.      Impression:      Impression:  No acute cardiopulmonary process.    Electronically Signed: Lianna Wiseman MD    1/19/2025 12:56 AM EST    Workstation ID: [id number]          reviewed    ECG/EMG Results (most recent)       Procedure Component Value Units Date/Time    ECG 12 Lead Dyspnea [[id number]] Collected: 01/19/25 0026     Updated: 01/19/25 0028     QT Interval 296 ms      QTC Interval 440 ms     Narrative:      HEART BBJQ=519  bpm  RR Rmoqtwob=939  ms  MS Swlbktsb=821  ms  P Horizontal Axis=26  deg  P Front Axis=84  deg  QRSD Interval=75  ms  QT Azbqqqzt=662  ms  LJaQ=508  ms  QRS Axis=77  deg  T Wave Axis=77  deg  - ABNORMAL ECG -  Sinus tachycardia  Multiform ventricular premature complexes  Borderline ST depression, diffuse leads  Date and Time of Study:2025-01-19 00:26:26          reviewed    Assessment & Plan     Acute on chronic hypoxemic hypercapnic respiratory failure-  Acute exacerbation of COPD-  Alarmingly high pCO2 levels on presentation here, failed BiPAP therapy and intubated in ER. Unclear exact etiology of this acute episode and why she was so extremely hypercapnic, but I suspect either severe COPD exacerbation or acute viral illness or both.   Will send RVP now (short panel with COVID/flu/RSV negative so far), repeat ABG and chest x-ray in the morning. There is no evidence of bacterial  PNA, PTX, or other obvious abnormality on chest x-ray performed in ER.   Appreciate RT assistance with vent mgmt and optimization. Continue prop/fent for sedation, plan for sedation vacation and SBT daily.     GOLD Class 3E (likely Class 4E now)-  FEV1 was 31% in 2018 based on last set of PFTs at which time she was deemed to be severe disease with evidence of severe air trapping and hyperinflation. If she has been continuously smoking and disease has progressed in the last 7 years, she is likely end stage COPD at this point.     Concern for lung/bronchogenic malignancy on CTA-  CTA obtained last admission due to rib pain from cough revealed the following findings:  2.5 cm patchy opacity medial RUL  1.5 cm mass/consolidation RLL  Pleural based consolidation RLL 6 cm x 1.5 cm  Lateral left LL consolidation 1.5 cm x 1.5 cm  3 month CT vs PET was recommended with outpatient pulm referral asap for further evaluation.    Chronic tobacco dependence- continue home nicotine patch therapy    Type 2 DM- Half home long acting to 7U daily + low SSI here.     HTN- hold home ACE for now, restart if needed. Resume home BB.     CAD-   PVD-  Hyperlipidemia-  Continue home ASA, statin, plavix    Mood d/o- holding home effexor 24hr capsule (can't give ER via OG/NG tube)    Dvt pxx- lovenox    I discussed the patient's findings and my recommendations with patient and spouse Harpreet Ruiz.     Cleopatra Weller MD  01/19/25  02:45 EST    Time: Critical care 60 min

## 2025-01-19 NOTE — ED PROVIDER NOTES
Subjective   History of Present Illness  Patient presents via EMS complaining of shortness of breath.  Patient was seen few days ago and diagnosed with viral illness.  Patient sent home on steroids and advised to do breathing treatments.  Patient's been doing this but says she is continued to get worse.  Patient says she has had a persistent cough but is nonproductive.  Patient says her whole chest and back hurt when she coughs.  Patient denies any recent trauma or injury.  No fevers, syncope, or rash.  Patient denies any swelling of her hands or feet.  EMS did do a breathing treatment prior to arrival.      Review of Systems   All other systems reviewed and are negative.      Past Medical History:   Diagnosis Date    Anxiety     Arthritis     COPD (chronic obstructive pulmonary disease)     Coronary stent occlusion     CTS (carpal tunnel syndrome)     Depression     Dizziness     Headache     Hyperlipidemia     Hypertension     Migraine     Numbness and tingling     Pneumonia        Allergies   Allergen Reactions    Prednisone Other (See Comments)     Rash in mouth       Past Surgical History:   Procedure Laterality Date    ANGIOPLASTY ILIAC ARTERY Left 6/17/2016    Procedure: BILATERAL DUPLEX DIRECTED ACCESS, AIF BILATERAL RUNOFF, SELECTIVE CATHETERIZATION OF RIGHT EXTERNAL ILIAC WITH ANGIOPLASTY, LEFT COMMON ILIAC STENT PLACEMENT;  Surgeon: Jose Carlos Plascencia MD;  Location: [facility] 18/19;  Service:     BREAST SURGERY      BILAT IMPLANTS    CAROTID ARTERY ANGIOPLASTY         Family History   Problem Relation Age of Onset    Heart disease Father     Hypertension Father     Diabetes Father     Stroke Father     COPD Father     Heart failure Paternal Grandmother     Heart failure Paternal Grandfather     COPD Mother     COPD Maternal Aunt     COPD Maternal Uncle     COPD Paternal Uncle        Social History     Socioeconomic History    Marital status: Single   Tobacco Use    Smoking status: Every Day      Current packs/day: 1.00     Average packs/day: 1 pack/day for 30.0 years (30.0 ttl pk-yrs)     Types: Cigarettes     Passive exposure: Current    Smokeless tobacco: Never   Vaping Use    Vaping status: Never Used   Substance and Sexual Activity    Alcohol use: No    Drug use: No    Sexual activity: Defer           Objective   Physical Exam  Vitals and nursing note reviewed.   Constitutional:       Comments: Patient sitting in bed in obvious respiratory distress.  Patient communicating in 2-3 word phrases   HENT:      Head: Normocephalic.      Mouth/Throat:      Pharynx: Oropharynx is clear.   Cardiovascular:      Rate and Rhythm: Regular rhythm. Tachycardia present.      Heart sounds: Normal heart sounds.   Pulmonary:      Effort: Tachypnea present.      Breath sounds: No stridor. Examination of the right-upper field reveals wheezing. Examination of the left-upper field reveals wheezing. Examination of the right-middle field reveals decreased breath sounds. Examination of the left-middle field reveals decreased breath sounds. Examination of the right-lower field reveals decreased breath sounds. Examination of the left-lower field reveals decreased breath sounds. Decreased breath sounds and wheezing present. No rhonchi or rales.   Chest:      Chest wall: No tenderness or crepitus.   Abdominal:      Palpations: Abdomen is soft.      Tenderness: There is no abdominal tenderness.   Musculoskeletal:      Cervical back: Neck supple.      Right lower leg: No edema.      Left lower leg: No edema.   Skin:     General: Skin is warm and dry.      Capillary Refill: Capillary refill takes 2 to 3 seconds.   Neurological:      Mental Status: She is alert and oriented to person, place, and time.   Psychiatric:         Mood and Affect: Mood is anxious.         Procedures           ED Course  ED Course as of 01/19/25 0226   Sun Jan 19, 2025   0025 EKG-rate of 131, sinus tachycardia, normal axis, no acute ST elevation or depression  but moderate amount of artifact and unstable baseline present.  When compared to EKG from 12/30/2024 it is generally unchanged. [AW]      ED Course User Index  [AW] Avni Solares MD                                                       Medical Decision Making  Ddx COPD, pneumonia, pneumothorax, heart failure, pulmonary edema, pleural effusion, ACS, electrolyte abnormality, hypoxia, hypercarbia    XR Chest 1 View    Result Date: 1/19/2025  Impression: No acute cardiopulmonary process. Endotracheal tube tip in the mid trachea. Electronically Signed: Lianna Wiseman MD  1/19/2025 1:55 AM EST  Workstation ID: [id number]    XR Chest 1 View    Result Date: 1/19/2025  Impression: No acute cardiopulmonary process. Electronically Signed: Lianna Wiseman MD  1/19/2025 12:56 AM EST  Workstation ID: [id number]    Labs Reviewed  MAGNESIUM - Abnormal; Notable for the following components:     Magnesium                     3.2 (*)             All other components within normal limits  BLOOD GAS, ARTERIAL - Abnormal; Notable for the following components:     pH, Arterial                  7.190 (*)               pCO2, Arterial                >98.3 (*)               pO2, Arterial                 72.6 (*)               pH, Temp Corrected            7.190 (*)               pO2, Temperature Corrected    72.6 (*)            All other components within normal limits  COMPREHENSIVE METABOLIC PANEL - Abnormal; Notable for the following components:     Glucose                       206 (*)                Creatinine                    0.46 (*)               CO2                           31.2 (*)               BUN/Creatinine Ratio          28.3 (*)            All other components within normal limits         Narrative: GFR Categories in Chronic Kidney Disease (CKD)                                      GFR Category          GFR (mL/min/1.73)    Interpretation                  G1                     90 or greater         Normal or high (1)                   G2                      60-89                Mild decrease (1)                  G3a                   45-59                Mild to moderate decrease                  G3b                   30-44                Moderate to severe decrease                  G4                    15-29                Severe decrease                  G5                    14 or less           Kidney failure                                          (1)In the absence of evidence of kidney disease, neither GFR category G1 or G2 fulfill the criteria for CKD.                                    eGFR calculation 2021 CKD-EPI creatinine equation, which does not include race as a factor  CBC WITH AUTO DIFFERENTIAL - Abnormal; Notable for the following components:     WBC                           12.28 (*)               MCV                           107.9 (*)               MCHC                          29.9 (*)               RDW-SD                        54.7 (*)               Neutrophil %                  91.5 (*)               Lymphocyte %                  2.0 (*)                Eosinophil %                  0.0 (*)                Neutrophils, Absolute         11.23 (*)               Lymphocytes, Absolute         0.25 (*)            All other components within normal limits  COVID-19/FLUA&B/RSV, NP SWAB IN TRANSPORT MEDIA 1 HR TAT - Normal         Narrative: Fact sheet for providers: https://www.fda.gov/media/891737/download                    Fact sheet for patients: https://www.fda.gov/media/385342/download                                    Test performed by PCR.  LACTIC ACID, PLASMA - Normal  TROPONIN - Normal         Narrative: High Sensitive Troponin T Reference Range:                  <14.0 ng/L- Negative Female for AMI                  <22.0 ng/L- Negative Male for AMI                  >=14 - Abnormal Female indicating possible myocardial injury.                  >=22 - Abnormal Male indicating possible myocardial injury.                    Clinicians would have to utilize clinical acumen, EKG, Troponin, and serial changes to determine if it is an Acute Myocardial Infarction or myocardial injury due to an underlying chronic condition.                                       BNP (IN-HOUSE) - Normal         Narrative: This assay is used as an aid in the diagnosis of individuals suspected of having heart failure. It can be used as an aid in the diagnosis of acute decompensated heart failure (ADHF) in patients presenting with signs and symptoms of ADHF to the emergency department (ED). In addition, NT-proBNP of <300 pg/mL indicates ADHF is not likely.                                    Age Range Result Interpretation  NT-proBNP Concentration (pg/mL:                                                      <50             Positive            >450                                   Gray                 300-450                                    Negative             <300                                    50-75           Positive            >900                                  Gutierres                300-900                                  Negative            <300                                                      >75             Positive            >1800                                  Gutierres                300-1800                                  Negative            <300  BLOOD CULTURE  BLOOD CULTURE  BLOOD GAS, ARTERIAL  HIGH SENSITIVITIY TROPONIN T 1HR  CBC AND DIFFERENTIAL      Problems Addressed:  Acute respiratory failure with hypoxia and hypercarbia: complicated acute illness or injury  COPD with acute exacerbation: complicated acute illness or injury    Amount and/or Complexity of Data Reviewed  Labs: ordered.  Radiology: ordered.  ECG/medicine tests: ordered.    Risk  Prescription drug management.  Decision regarding hospitalization.        Final diagnoses:   COPD with acute exacerbation   Acute respiratory failure with hypoxia and hypercarbia        ED Disposition  ED Disposition       ED Disposition   Decision to Admit    Condition   --    Comment   Level of Care: Critical Care [6]   Diagnosis: COPD exacerbation [725124]   Admitting Physician: CINDY ALVARADO [[id number]]   Attending Physician: CINDY ALVARADO [[id number]]                 No follow-up provider specified.       Medication List      No changes were made to your prescriptions during this visit.            Avni Solares MD  01/19/25 0221       Avni Solares MD  01/19/25 0226

## 2025-01-19 NOTE — ED NOTES
This RN asked the patient before she was sedated and intubated if she had any family that she wanted me to call. Pt stated to call her son, Arjun, verified phone number with patient. Attempted to call son at this time, no answer and unable to leave voicemail due to full mailbox.

## 2025-01-19 NOTE — PROGRESS NOTES
"Pharmacy Antimicrobial Dosing Service    Subjective:  Valeria Rubi is a 49 y.o.female admitted with aeCOPD. Pharmacy has been consulted to dose Vancomycin for possible PNA.      Assessment/Plan    1. Day #1 Vancomycin: Goal -600 mcg*h/mL. Will give vancomycin 1250 mg (~22 mg/kg ABW) loading dose today. Will initiate vancomycin 1000 mg (18 mg/kg ABW) IV q12h for a predicted AUC of 507 mcg*hr/mL. MRSA nares ordered and vancomycin ordered x 48h. If patient remains on vancomycin > 48 hours would recommend a trough.    2. Day #1 Cefepime: 2 gm IV q8h for estCrCl > 60 mL/min    Will continue to monitor drug levels, renal function, culture and sensitivities, and patient clinical status.       Objective:  Relevant clinical data and objective history reviewed:  157.5 cm (62.01\")   55.8 kg (123 lb)   Ideal body weight: 50.1 kg (110 lb 7.9 oz)  Adjusted ideal body weight: 52.4 kg (115 lb 7.9 oz)  Body mass index is 22.49 kg/m².        Results from last 7 days   Lab Units 01/19/25  0545 01/19/25  0122   CREATININE mg/dL 0.58 0.46*     Estimated Creatinine Clearance: 101.1 mL/min (by C-G formula based on SCr of 0.58 mg/dL).  I/O last 3 completed shifts:  In: 1050 [I.V.:50; IV Piggyback:1000]  Out: 375 [Urine:375]    Results from last 7 days   Lab Units 01/19/25  0545 01/19/25  0204   WBC 10*3/mm3 11.88* 12.28*     Temperature    01/19/25 0400 01/19/25 0700 01/19/25 1131   Temp: 98.2 °F (36.8 °C) 98.2 °F (36.8 °C) 97.6 °F (36.4 °C)     Baseline culture/source/susceptibility:  Microbiology Results (last 10 days)       Procedure Component Value - Date/Time    COVID-19, FLU A/B, RSV PCR 1 HR TAT - Swab, Nasopharynx [[id number]]  (Normal) Collected: 01/19/25 0102    Lab Status: Final result Specimen: Swab from Nasopharynx Updated: 01/19/25 0121     COVID19 Not Detected     Influenza A PCR Not Detected     Influenza B PCR Not Detected     RSV, PCR Not Detected    Narrative:      Fact sheet for providers: " https://www.fda.gov/media/792258/download    Fact sheet for patients: https://www.fda.gov/media/269272/download    Test performed by PCR.    COVID-19, FLU A/B, RSV PCR 1 HR TAT - Swab, Nasopharynx [[id number]]  (Normal) Collected: 01/16/25 1812    Lab Status: Final result Specimen: Swab from Nasopharynx Updated: 01/17/25 0015     COVID19 Not Detected     Influenza A PCR Not Detected     Influenza B PCR Not Detected     RSV, PCR Not Detected    Narrative:      Fact sheet for providers: https://www.fda.gov/media/508166/download    Fact sheet for patients: https://www.fda.gov/media/596835/download    Test performed by PCR.            Macy Rodriguez, Monique  01/19/25 12:18 EST

## 2025-01-20 ENCOUNTER — APPOINTMENT (OUTPATIENT)
Dept: GENERAL RADIOLOGY | Facility: HOSPITAL | Age: 50
End: 2025-01-20
Payer: COMMERCIAL

## 2025-01-20 LAB
ANION GAP SERPL CALCULATED.3IONS-SCNC: 9.3 MMOL/L (ref 5–15)
BUN SERPL-MCNC: 27 MG/DL (ref 6–20)
BUN/CREAT SERPL: 39.1 (ref 7–25)
CALCIUM SPEC-SCNC: 8.6 MG/DL (ref 8.6–10.5)
CHLORIDE SERPL-SCNC: 105 MMOL/L (ref 98–107)
CO2 SERPL-SCNC: 28.7 MMOL/L (ref 22–29)
CREAT SERPL-MCNC: 0.69 MG/DL (ref 0.57–1)
DEPRECATED RDW RBC AUTO: 53.7 FL (ref 37–54)
EGFRCR SERPLBLD CKD-EPI 2021: 105.2 ML/MIN/1.73
ERYTHROCYTE [DISTWIDTH] IN BLOOD BY AUTOMATED COUNT: 13.6 % (ref 12.3–15.4)
GLUCOSE BLDC GLUCOMTR-MCNC: 141 MG/DL (ref 70–130)
GLUCOSE BLDC GLUCOMTR-MCNC: 147 MG/DL (ref 70–130)
GLUCOSE BLDC GLUCOMTR-MCNC: 229 MG/DL (ref 70–130)
GLUCOSE SERPL-MCNC: 138 MG/DL (ref 65–99)
HCT VFR BLD AUTO: 39 % (ref 34–46.6)
HGB BLD-MCNC: 11.8 G/DL (ref 12–15.9)
MAGNESIUM SERPL-MCNC: 2.7 MG/DL (ref 1.6–2.6)
MCH RBC QN AUTO: 31.9 PG (ref 26.6–33)
MCHC RBC AUTO-ENTMCNC: 30.3 G/DL (ref 31.5–35.7)
MCV RBC AUTO: 105.4 FL (ref 79–97)
PHOSPHATE SERPL-MCNC: 3.1 MG/DL (ref 2.5–4.5)
PLATELET # BLD AUTO: 211 10*3/MM3 (ref 140–450)
PMV BLD AUTO: 9.4 FL (ref 6–12)
POTASSIUM SERPL-SCNC: 4.8 MMOL/L (ref 3.5–5.2)
PROCALCITONIN SERPL-MCNC: 1.57 NG/ML (ref 0–0.25)
QT INTERVAL: 296 MS
QTC INTERVAL: 440 MS
RBC # BLD AUTO: 3.7 10*6/MM3 (ref 3.77–5.28)
SODIUM SERPL-SCNC: 143 MMOL/L (ref 136–145)
WBC NRBC COR # BLD AUTO: 12.81 10*3/MM3 (ref 3.4–10.8)

## 2025-01-20 PROCEDURE — 25810000003 SODIUM CHLORIDE 0.9 % SOLUTION 250 ML FLEX CONT: Performed by: INTERNAL MEDICINE

## 2025-01-20 PROCEDURE — 63710000001 INSULIN GLARGINE PER 5 UNITS: Performed by: STUDENT IN AN ORGANIZED HEALTH CARE EDUCATION/TRAINING PROGRAM

## 2025-01-20 PROCEDURE — 84100 ASSAY OF PHOSPHORUS: CPT | Performed by: INTERNAL MEDICINE

## 2025-01-20 PROCEDURE — 94761 N-INVAS EAR/PLS OXIMETRY MLT: CPT

## 2025-01-20 PROCEDURE — 82948 REAGENT STRIP/BLOOD GLUCOSE: CPT

## 2025-01-20 PROCEDURE — 94003 VENT MGMT INPAT SUBQ DAY: CPT

## 2025-01-20 PROCEDURE — 85027 COMPLETE CBC AUTOMATED: CPT | Performed by: INTERNAL MEDICINE

## 2025-01-20 PROCEDURE — 25010000002 CEFEPIME PER 500 MG: Performed by: INTERNAL MEDICINE

## 2025-01-20 PROCEDURE — 71045 X-RAY EXAM CHEST 1 VIEW: CPT

## 2025-01-20 PROCEDURE — 83735 ASSAY OF MAGNESIUM: CPT | Performed by: INTERNAL MEDICINE

## 2025-01-20 PROCEDURE — 94799 UNLISTED PULMONARY SVC/PX: CPT

## 2025-01-20 PROCEDURE — 80048 BASIC METABOLIC PNL TOTAL CA: CPT | Performed by: INTERNAL MEDICINE

## 2025-01-20 PROCEDURE — 25010000002 METHYLPREDNISOLONE PER 40 MG: Performed by: INTERNAL MEDICINE

## 2025-01-20 PROCEDURE — 99233 SBSQ HOSP IP/OBS HIGH 50: CPT | Performed by: INTERNAL MEDICINE

## 2025-01-20 PROCEDURE — 25010000002 PROPOFOL 10 MG/ML EMULSION: Performed by: STUDENT IN AN ORGANIZED HEALTH CARE EDUCATION/TRAINING PROGRAM

## 2025-01-20 PROCEDURE — 25010000002 VANCOMYCIN 1 G RECONSTITUTED SOLUTION 1 EACH VIAL: Performed by: INTERNAL MEDICINE

## 2025-01-20 PROCEDURE — 84145 PROCALCITONIN (PCT): CPT | Performed by: INTERNAL MEDICINE

## 2025-01-20 PROCEDURE — 25010000002 ENOXAPARIN PER 10 MG: Performed by: STUDENT IN AN ORGANIZED HEALTH CARE EDUCATION/TRAINING PROGRAM

## 2025-01-20 RX ORDER — CHLORHEXIDINE GLUCONATE ORAL RINSE 1.2 MG/ML
15 SOLUTION DENTAL EVERY 12 HOURS SCHEDULED
Status: DISCONTINUED | OUTPATIENT
Start: 2025-01-20 | End: 2025-01-21

## 2025-01-20 RX ORDER — INSULIN LISPRO 100 [IU]/ML
2-7 INJECTION, SOLUTION INTRAVENOUS; SUBCUTANEOUS EVERY 6 HOURS SCHEDULED
Status: DISCONTINUED | OUTPATIENT
Start: 2025-01-21 | End: 2025-01-21

## 2025-01-20 RX ADMIN — INSULIN GLARGINE 7 UNITS: 100 INJECTION, SOLUTION SUBCUTANEOUS at 10:04

## 2025-01-20 RX ADMIN — CHLORHEXIDINE GLUCONATE 0.12% ORAL RINSE 15 ML: 1.2 LIQUID ORAL at 20:00

## 2025-01-20 RX ADMIN — DEXMEDETOMIDINE HYDROCHLORIDE 0.8 MCG/KG/HR: 4 INJECTION, SOLUTION INTRAVENOUS at 05:48

## 2025-01-20 RX ADMIN — METOPROLOL TARTRATE 2.5 MG: 5 INJECTION INTRAVENOUS at 09:53

## 2025-01-20 RX ADMIN — DEXMEDETOMIDINE HYDROCHLORIDE 1 MCG/KG/HR: 4 INJECTION, SOLUTION INTRAVENOUS at 20:00

## 2025-01-20 RX ADMIN — METOPROLOL TARTRATE 2.5 MG: 5 INJECTION INTRAVENOUS at 18:18

## 2025-01-20 RX ADMIN — SODIUM CHLORIDE 1000 MG: 900 INJECTION, SOLUTION INTRAVENOUS at 00:47

## 2025-01-20 RX ADMIN — ENOXAPARIN SODIUM 40 MG: 40 INJECTION SUBCUTANEOUS at 10:04

## 2025-01-20 RX ADMIN — IPRATROPIUM BROMIDE AND ALBUTEROL SULFATE 3 ML: .5; 3 SOLUTION RESPIRATORY (INHALATION) at 20:30

## 2025-01-20 RX ADMIN — MUPIROCIN 1 APPLICATION: 20 OINTMENT TOPICAL at 10:05

## 2025-01-20 RX ADMIN — METHYLPREDNISOLONE SODIUM SUCCINATE 40 MG: 40 INJECTION, POWDER, FOR SOLUTION INTRAMUSCULAR; INTRAVENOUS at 09:53

## 2025-01-20 RX ADMIN — PROPOFOL INJECTABLE EMULSION 5 MCG/KG/MIN: 10 INJECTION, EMULSION INTRAVENOUS at 12:39

## 2025-01-20 RX ADMIN — DEXMEDETOMIDINE HYDROCHLORIDE 1 MCG/KG/HR: 4 INJECTION, SOLUTION INTRAVENOUS at 14:12

## 2025-01-20 RX ADMIN — CEFEPIME 2000 MG: 2 INJECTION, POWDER, FOR SOLUTION INTRAVENOUS at 05:45

## 2025-01-20 RX ADMIN — FAMOTIDINE 20 MG: 10 INJECTION, SOLUTION INTRAVENOUS at 20:00

## 2025-01-20 RX ADMIN — IPRATROPIUM BROMIDE AND ALBUTEROL SULFATE 3 ML: .5; 3 SOLUTION RESPIRATORY (INHALATION) at 07:45

## 2025-01-20 RX ADMIN — IPRATROPIUM BROMIDE AND ALBUTEROL SULFATE 3 ML: .5; 3 SOLUTION RESPIRATORY (INHALATION) at 15:35

## 2025-01-20 RX ADMIN — PROPOFOL INJECTABLE EMULSION 25 MCG/KG/MIN: 10 INJECTION, EMULSION INTRAVENOUS at 21:40

## 2025-01-20 RX ADMIN — MUPIROCIN 1 APPLICATION: 20 OINTMENT TOPICAL at 20:00

## 2025-01-20 RX ADMIN — IPRATROPIUM BROMIDE AND ALBUTEROL SULFATE 3 ML: .5; 3 SOLUTION RESPIRATORY (INHALATION) at 11:21

## 2025-01-20 RX ADMIN — CHLORHEXIDINE GLUCONATE 0.12% ORAL RINSE 15 ML: 1.2 LIQUID ORAL at 10:29

## 2025-01-20 RX ADMIN — CEFEPIME 2000 MG: 2 INJECTION, POWDER, FOR SOLUTION INTRAVENOUS at 21:41

## 2025-01-20 RX ADMIN — METOPROLOL TARTRATE 2.5 MG: 5 INJECTION INTRAVENOUS at 03:11

## 2025-01-20 RX ADMIN — FAMOTIDINE 20 MG: 10 INJECTION, SOLUTION INTRAVENOUS at 09:53

## 2025-01-20 RX ADMIN — CEFEPIME 2000 MG: 2 INJECTION, POWDER, FOR SOLUTION INTRAVENOUS at 14:12

## 2025-01-20 RX ADMIN — NICOTINE 1 PATCH: 21 PATCH, EXTENDED RELEASE TRANSDERMAL at 03:16

## 2025-01-20 RX ADMIN — METHYLPREDNISOLONE SODIUM SUCCINATE 40 MG: 40 INJECTION, POWDER, FOR SOLUTION INTRAMUSCULAR; INTRAVENOUS at 20:00

## 2025-01-20 NOTE — PROGRESS NOTES
[facility] INPATIENT PROGRESS NOTE         [facility] ICU    2025      PATIENT IDENTIFICATION:  Name: Valeria Rubi ADMIT: 2025   : 1975  PCP: Talib Ceron MD    MRN: [id number] LOS: 1 days   AGE/SEX: 49 y.o. female  ROOM: ICU2/1                     LOS 1    Reason for visit: critical care       SUBJECTIVE:      Sedated on ventilator.  Not breathing over the ventilator no blood gas since yesterday morning.  Likely respiratory alkalosis repeat blood gas now and make appropriate changes based on results.  Discussed with nursing staff at bedside.I am seeing the patient for the first time today.  All patient problems are new to me.      Objective   OBJECTIVE:    Vital Sign Min/Max for last 24 hours  Temp  Min: 97.6 °F (36.4 °C)  Max: 98.6 °F (37 °C)   BP  Min: 76/59  Max: 166/83   Pulse  Min: 52  Max: 103   Resp  Min: 19  Max: 23   SpO2  Min: 94 %  Max: 100 %   No data recorded   No data recorded    Vitals:    25 0500 25 0523 25 0600 25 0700   BP: 162/78  147/76 166/83   BP Location:    Left arm   Patient Position:    Lying   Pulse: 52  62 54   Resp: 23      Temp:    98.2 °F (36.8 °C)   TempSrc:    Axillary   SpO2: 99% 100% 97% 98%   Weight:       Height:                25  0001   Weight: 55.8 kg (123 lb)       Body mass index is 22.49 kg/m².        Mode: PC/AC  FiO2 (%):  [32 %-36 %] 35 %  S RR:  [20] 20  PEEP/CPAP (cm H2O):  [5 cm H20] 5 cm H20  MAP (cm H2O):  [12-14] 12           FiO2 (%): 35 %     Body mass index is 22.49 kg/m².    Intake/Output Summary (Last 24 hours) at 2025 0742  Last data filed at 2025 0548  Gross per 24 hour   Intake 850 ml   Output 525 ml   Net 325 ml         Exam:  GEN:  No distress, appears stated age. Sedated on vent   EYES:   PERRL, anicteric sclerae  ENT:    External ears/nose normal, OP clear  NECK:  No adenopathy, midline trachea  LUNGS: Normal chest on inspection, palpation and auscultation  CV:  Normal S1S2,  Note created-please let me know if headache, dizziness or other s/s reoccur without murmur  ABD:  Nontender, nondistended, no hepatosplenomegaly, +BS  EXT:  No edema.  No cyanosis or clubbing.  No mottling and normal cap refill.    Assessment     Scheduled meds:  aspirin, 81 mg, Oral, Daily  atorvastatin, 20 mg, Oral, Daily  cefepime, 2,000 mg, Intravenous, Q8H  clopidogrel, 75 mg, Oral, Daily  enoxaparin, 40 mg, Subcutaneous, Daily  Famotidine (PF), 20 mg, Intravenous, BID  insulin glargine, 7 Units, Subcutaneous, Daily  insulin lispro, 2-7 Units, Subcutaneous, Q6H  ipratropium-albuterol, 3 mL, Nebulization, 4x Daily - RT  methylPREDNISolone sodium succinate, 40 mg, Intravenous, Q12H  metoprolol tartrate, 2.5 mg, Intravenous, Q8H  mupirocin, 1 Application, Each Nare, BID  nicotine, 1 patch, Transdermal, Q24H  vancomycin, 1,000 mg, Intravenous, Q12H  [Held by provider] venlafaxine XR, 75 mg, Oral, Daily      IV meds:                      dexmedetomidine, 0.2-1.5 mcg/kg/hr, Last Rate: 0.8 mcg/kg/hr (01/20/25 0548)  Pharmacy to Dose Cefepime,   Pharmacy to dose vancomycin,   propofol, 5-50 mcg/kg/min, Last Rate: 25 mcg/kg/min (01/20/25 0528)  sodium chloride, 75 mL/hr, Last Rate: 75 mL/hr (01/19/25 2135)      Data Review:  Results from last 7 days   Lab Units 01/20/25  0539 01/19/25  0545 01/19/25  0122   SODIUM mmol/L 143 142 141   POTASSIUM mmol/L 4.8 4.9 5.0   CHLORIDE mmol/L 105 100 100   CO2 mmol/L 28.7 35.4* 31.2*   BUN mg/dL 27* 14 13   CREATININE mg/dL 0.69 0.58 0.46*   GLUCOSE mg/dL 138* 172* 206*   CALCIUM mg/dL 8.6 9.0 8.9         Estimated Creatinine Clearance: 85 mL/min (by C-G formula based on SCr of 0.69 mg/dL).  Results from last 7 days   Lab Units 01/20/25  0539 01/19/25  0545 01/19/25  0204   WBC 10*3/mm3 12.81* 11.88* 12.28*   HEMOGLOBIN g/dL 11.8* 12.2 13.0   PLATELETS 10*3/mm3 211 258 319         Results from last 7 days   Lab Units 01/19/25  0545 01/19/25  0122   ALT (SGPT) U/L 11 9   AST (SGOT) U/L 19 15     Results from last 7 days   Lab Units 01/19/25  0703  01/19/25  0115   PH, ARTERIAL pH units 7.321* 7.190*   PO2 ART mm Hg 88.1 72.6*   PCO2, ARTERIAL mm Hg 77.8* >98.3*   HCO3 ART mmol/L 40.2*  --      Results from last 7 days   Lab Units 01/20/25  0539 01/19/25  0545 01/19/25  0018   PROCALCITONIN ng/mL 1.57* 2.91*  --    LACTATE mmol/L  --   --  1.1         Glucose   Date/Time Value Ref Range Status   01/20/2025 0042 229 (H) 70 - 130 mg/dL Final   01/19/2025 1805 169 (H) 70 - 130 mg/dL Final   01/19/2025 0856 147 (H) 70 - 130 mg/dL Final         Imaging reviewed  Chest x-ray 1/20 reviewed          Microbiology reviewed  RVP negative  MRSA screen negative  Gram-positive bacilli blood cultures from 1/19          Active Hospital Problems    Diagnosis  POA    **COPD exacerbation [J44.1]  Yes    Acute on chronic respiratory failure with hypoxia and hypercapnia [J96.21, J96.22]  Yes      Resolved Hospital Problems   No resolved problems to display.         ASSESSMENT:  Acute on chronic hypercapnic and hypoxic respiratory failure  Acute rhinovirus bronchitis causing COPD exacerbation  COPD exacerbation  Recent RSV pneumonia within the past 30 days  Ongoing tobacco abuse  Sepsis present on admission  Hyperglycemia  Diabetes type 2      PLAN:  Repeating blood gas will make appropriate ventilator changes based on results.  Add ventilator order bundle set.  May be over ventilating at this point.  Plan for sedation vacation and spontaneous breathing trials.  Discussed with nursing staff at bedside.  Discontinue vancomycin.  Continue cefepime.  Extend cefepime.  Antibiotics only ordered for 48 hours initially.  Control blood pressure.  Control glucose.  DVT/ulcer prophylaxis.        CCT: 35 min    Silverio Church MD  Pulmonary and Critical Care Medicine  [facility], [facility]  1/20/2025    07:42 EST

## 2025-01-20 NOTE — PROGRESS NOTES
"Hospital Medicine Team    LOS 1 days      Patient Care Team:  Talib Ceron MD as PCP - General (Family Medicine)      Subjective       Chief Complaint:  f/u shortness of breath     Subjective    Seen this am and was intubated sedated. No issues after d/w nurse.    Objective       Vital Signs  Temp:  [97.6 °F (36.4 °C)-98.6 °F (37 °C)] 98.2 °F (36.8 °C)  Heart Rate:  [51-96] 67  Resp:  [19-23] 20  BP: ()/(47-83) 168/79  FiO2 (%):  [27 %-35 %] 27 %  Oxygen Therapy  SpO2: 97 %  Pulse Oximetry Type: Continuous  Device (Oxygen Therapy): ventilator  Flow (L/min) (Oxygen Therapy): 4  Oxygen Concentration (%): 35  ETCO2 (mmHg): 30 mmHg  Flowsheet Rows      Flowsheet Row First Filed Value   Admission Height 157.5 cm (62.01\") Documented at 01/19/2025 0001   Admission Weight 55.8 kg (123 lb) Documented at 01/19/2025 0001                Physical Exam:  Physical Exam  Vitals reviewed.   Constitutional:       Appearance: She is ill-appearing.   Cardiovascular:      Rate and Rhythm: Normal rate.   Pulmonary:      Breath sounds: Decreased breath sounds present.   Abdominal:      Tenderness: There is no abdominal tenderness.   Neurological:      Comments: Sedated on vent            Results Review:    I reviewed the patient's new clinical results.    [x]  Laboratory  [x]  Microbiology  []  Radiology  []  EKG/Telemetry   []  Cardiology/Vascular   []  Pathology  []  Old records  []  Other:      X-rays, labs reviewed personally by physician.    Medication Review:   I have reviewed the patient's current medication list    Scheduled Meds  aspirin, 81 mg, Oral, Daily  atorvastatin, 20 mg, Oral, Daily  cefepime, 2,000 mg, Intravenous, Q8H  chlorhexidine, 15 mL, Mouth/Throat, Q12H  clopidogrel, 75 mg, Oral, Daily  enoxaparin, 40 mg, Subcutaneous, Daily  Famotidine (PF), 20 mg, Intravenous, BID  insulin glargine, 7 Units, Subcutaneous, Daily  insulin lispro, 2-7 Units, Subcutaneous, Q6H  ipratropium-albuterol, 3 mL, Nebulization, " "4x Daily - RT  methylPREDNISolone sodium succinate, 40 mg, Intravenous, Q12H  metoprolol tartrate, 2.5 mg, Intravenous, Q8H  mupirocin, 1 Application, Each Nare, BID  nicotine, 1 patch, Transdermal, Q24H  [Held by provider] venlafaxine XR, 75 mg, Oral, Daily        Meds Infusions  dexmedetomidine, 0.2-1.5 mcg/kg/hr, Last Rate: 0.8 mcg/kg/hr (01/20/25 2933)  Pharmacy to Dose Cefepime,   Pharmacy to dose vancomycin,   propofol, 5-50 mcg/kg/min, Last Rate: 5 mcg/kg/min (01/20/25 1029)  sodium chloride, 75 mL/hr, Last Rate: 75 mL/hr (01/19/25 1948)        Meds PRN  •  acetaminophen  •  dextrose  •  dextrose  •  fentaNYL citrate (PF)  •  glucagon (human recombinant)  •  Pharmacy to Dose Cefepime  •  Pharmacy to dose vancomycin  •  [COMPLETED] Insert Peripheral IV **AND** sodium chloride        Assessment / Plan       Active Hospital Problems:  Active Hospital Problems    Diagnosis  POA   • **COPD exacerbation [J44.1]  Yes   • Acute on chronic respiratory failure with hypoxia and hypercapnia [J96.21, J96.22]  Yes      Resolved Hospital Problems   No resolved problems to display.     Acute on chronic hypoxemic hypercapnic respiratory failure-  Acute exacerbation of COPD-  -Intubated 1/19  -Currently undergoing daily SBT  -BCx 1/19: 1 out of 4 with some growth follow-up final  -Continue vancomycin and cefepime for now  -Continue IV steroids  -Continue pulmonary toilet  -Pulmonology following appreciate assistance         Concern for lung/bronchogenic malignancy on CTA-  CTA obtained last admission due to rib pain from cough revealed the following findings:  \"2.5 cm patchy opacity medial RUL  1.5 cm mass/consolidation RLL  Pleural based consolidation RLL 6 cm x 1.5 cm  Lateral left LL consolidation 1.5 cm x 1.5 cm\"  -Continue to recommend 3 month CT vs PET was recommended with outpatient pulm referral asap for further evaluation.     Chronic tobacco dependence  - continue home nicotine patch therapy     Type 2 DM  -Continue " basal and SSI  Follow and adjust with Accu-Cheks-     HTN  -Chronic hold home ACE for now  -Continue IV metoprolol while intubated and transition to p.o.     CAD-   PVD-  Hyperlipidemia-  -Continue home ASA, statin, plavix     Mood d/o- holding home effexor 24hr capsule (can't give ER via OG/NG tube)    High risk remains intubated  Plan for disposition:defer intubated     Electronically signed by Ravinder Estrella DO, 01/20/25, 11:06 EST.        Note disclaimer: At [facility], we believe that sharing information builds trust and better relationships. You are receiving this note because you recently visited [facility]. It is possible you will see health information before a provider has talked with you about it. This kind of information can be easy to misunderstand. To help you fully understand what it means for your health, we urge you to discuss this note with your provider.      Type Of Previous Surgery (Optional- Ie Mohs Surgery): Mohs

## 2025-01-20 NOTE — PLAN OF CARE
Goal Outcome Evaluation:   Pt calm on precedx and propofool, slow wean on propofol for AM weaning trial, vss

## 2025-01-20 NOTE — PROGRESS NOTES
RT EQUIPMENT DEVICE RELATED - SKIN ASSESSMENT    RT Medical Equipment/Device:  ETT Melvin/Anchorfast    Skin Assessment: Lips: Intact    Device Skin Pressure Protection: Skin-to-device areas padded: ETT Melvin (Commercial)    Nurse Notification: No    Delfina Concepcion, RRT

## 2025-01-20 NOTE — CASE MANAGEMENT/SOCIAL WORK
Continued Stay Note  SARAH Huff     Patient Name: Valeria Rubi  MRN: [id number]  Today's Date: 1/20/2025    Admit Date: 1/18/2025    Plan: to be determined   Discharge Plan       Row Name 01/20/25 1502       Plan    Plan to be determined    Patient/Family in Agreement with Plan yes    Plan Comments Patient is currently seddated/intubated. Spoke with patients  at bedside. Face sheet verified. Patient is typically independent of ADLs including driving. She uses nebulizer and 02. She has used HH in the past, but he does not recall the agency. She has been to [place] for short term rehab in the past. She sees Dr Ceron as PCP. She uses [facility] and there are no issues obtaining medications. CM # placed on white board, will follow for dc needs.                   Discharge Codes    No documentation.                       Kasi Dawson RN

## 2025-01-20 NOTE — PROGRESS NOTES
Sedation vacation/SBT attempted.  Pt became very agitated/coughing/SOA with sats dropping, requiring FiO2 to be temporarily increased.  RN at bedside and sedation increased.  Will attempt again in a.m.   Dr Estrella aware

## 2025-01-21 ENCOUNTER — APPOINTMENT (OUTPATIENT)
Dept: GENERAL RADIOLOGY | Facility: HOSPITAL | Age: 50
End: 2025-01-21
Payer: COMMERCIAL

## 2025-01-21 LAB
ANION GAP SERPL CALCULATED.3IONS-SCNC: 10.3 MMOL/L (ref 5–15)
ARTERIAL PATENCY WRIST A: ABNORMAL
ARTERIAL PATENCY WRIST A: POSITIVE
ATMOSPHERIC PRESS: 755 MMHG
ATMOSPHERIC PRESS: 755 MMHG
BACTERIA SPEC AEROBE CULT: ABNORMAL
BACTERIA SPEC RESP CULT: ABNORMAL
BACTERIA SPEC RESP CULT: ABNORMAL
BASE EXCESS BLDA CALC-SCNC: 4.5 MMOL/L (ref 0–2)
BASE EXCESS BLDA CALC-SCNC: 5.3 MMOL/L (ref 0–2)
BDY SITE: ABNORMAL
BDY SITE: ABNORMAL
BODY TEMPERATURE: 37
BODY TEMPERATURE: 37
BUN SERPL-MCNC: 26 MG/DL (ref 6–20)
BUN/CREAT SERPL: 40.6 (ref 7–25)
CALCIUM SPEC-SCNC: 8.7 MG/DL (ref 8.6–10.5)
CHLORIDE SERPL-SCNC: 103 MMOL/L (ref 98–107)
CO2 SERPL-SCNC: 25.7 MMOL/L (ref 22–29)
CREAT SERPL-MCNC: 0.64 MG/DL (ref 0.57–1)
DEPRECATED RDW RBC AUTO: 49.2 FL (ref 37–54)
EGFRCR SERPLBLD CKD-EPI 2021: 107.1 ML/MIN/1.73
ERYTHROCYTE [DISTWIDTH] IN BLOOD BY AUTOMATED COUNT: 13.4 % (ref 12.3–15.4)
GLUCOSE BLDC GLUCOMTR-MCNC: 119 MG/DL (ref 70–130)
GLUCOSE BLDC GLUCOMTR-MCNC: 147 MG/DL (ref 70–130)
GLUCOSE BLDC GLUCOMTR-MCNC: 149 MG/DL (ref 70–130)
GLUCOSE BLDC GLUCOMTR-MCNC: 151 MG/DL (ref 70–130)
GLUCOSE BLDC GLUCOMTR-MCNC: 154 MG/DL (ref 70–130)
GLUCOSE SERPL-MCNC: 153 MG/DL (ref 65–99)
GRAM STN SPEC: ABNORMAL
HCO3 BLDA-SCNC: 27 MMOL/L (ref 20–26)
HCO3 BLDA-SCNC: 28.8 MMOL/L (ref 20–26)
HCT VFR BLD AUTO: 39.6 % (ref 34–46.6)
HGB BLD-MCNC: 12.5 G/DL (ref 12–15.9)
HGB BLDA-MCNC: 13 G/DL (ref 13.5–17.5)
HGB BLDA-MCNC: 13 G/DL (ref 13.5–17.5)
INHALED O2 CONCENTRATION: 30 %
INHALED O2 CONCENTRATION: 30 %
ISOLATED FROM: ABNORMAL
Lab: ABNORMAL
Lab: ABNORMAL
MCH RBC QN AUTO: 31.2 PG (ref 26.6–33)
MCHC RBC AUTO-ENTMCNC: 31.6 G/DL (ref 31.5–35.7)
MCV RBC AUTO: 98.8 FL (ref 79–97)
MODALITY: ABNORMAL
MODALITY: ABNORMAL
PCO2 BLDA: 30 MM HG (ref 35–45)
PCO2 BLDA: 40.6 MM HG (ref 35–45)
PCO2 TEMP ADJ BLD: 30 MM HG (ref 35–45)
PCO2 TEMP ADJ BLD: 40.6 MM HG (ref 35–45)
PEEP RESPIRATORY: 5 CM[H2O]
PEEP RESPIRATORY: 5 CM[H2O]
PH BLDA: 7.46 PH UNITS (ref 7.35–7.45)
PH BLDA: 7.56 PH UNITS (ref 7.35–7.45)
PH, TEMP CORRECTED: 7.46 PH UNITS (ref 7.35–7.45)
PH, TEMP CORRECTED: 7.56 PH UNITS (ref 7.35–7.45)
PLATELET # BLD AUTO: 204 10*3/MM3 (ref 140–450)
PMV BLD AUTO: 9.5 FL (ref 6–12)
PO2 BLD: 218 MM[HG] (ref 0–500)
PO2 BLD: 304 MM[HG] (ref 0–500)
PO2 BLDA: 65.4 MM HG (ref 83–108)
PO2 BLDA: 91.2 MM HG (ref 83–108)
PO2 TEMP ADJ BLD: 65.4 MM HG (ref 83–108)
PO2 TEMP ADJ BLD: 91.2 MM HG (ref 83–108)
POTASSIUM SERPL-SCNC: 4.4 MMOL/L (ref 3.5–5.2)
PSV: 10 CMH2O
RBC # BLD AUTO: 4.01 10*6/MM3 (ref 3.77–5.28)
SAO2 % BLDCOA: 93.4 % (ref 94–99)
SAO2 % BLDCOA: 98.7 % (ref 94–99)
SET MECH RESP RATE: 20
SODIUM SERPL-SCNC: 139 MMOL/L (ref 136–145)
VENTILATOR MODE: ABNORMAL
VENTILATOR MODE: ABNORMAL
WBC NRBC COR # BLD AUTO: 12.11 10*3/MM3 (ref 3.4–10.8)

## 2025-01-21 PROCEDURE — 94799 UNLISTED PULMONARY SVC/PX: CPT

## 2025-01-21 PROCEDURE — 71045 X-RAY EXAM CHEST 1 VIEW: CPT

## 2025-01-21 PROCEDURE — 36600 WITHDRAWAL OF ARTERIAL BLOOD: CPT

## 2025-01-21 PROCEDURE — 92610 EVALUATE SWALLOWING FUNCTION: CPT

## 2025-01-21 PROCEDURE — 25010000002 ONDANSETRON PER 1 MG: Performed by: INTERNAL MEDICINE

## 2025-01-21 PROCEDURE — 80048 BASIC METABOLIC PNL TOTAL CA: CPT | Performed by: INTERNAL MEDICINE

## 2025-01-21 PROCEDURE — 94003 VENT MGMT INPAT SUBQ DAY: CPT

## 2025-01-21 PROCEDURE — 25010000002 ENOXAPARIN PER 10 MG: Performed by: STUDENT IN AN ORGANIZED HEALTH CARE EDUCATION/TRAINING PROGRAM

## 2025-01-21 PROCEDURE — 25010000002 CEFEPIME PER 500 MG: Performed by: INTERNAL MEDICINE

## 2025-01-21 PROCEDURE — 82803 BLOOD GASES ANY COMBINATION: CPT

## 2025-01-21 PROCEDURE — 82948 REAGENT STRIP/BLOOD GLUCOSE: CPT

## 2025-01-21 PROCEDURE — 94664 DEMO&/EVAL PT USE INHALER: CPT

## 2025-01-21 PROCEDURE — 63710000001 INSULIN GLARGINE PER 5 UNITS: Performed by: STUDENT IN AN ORGANIZED HEALTH CARE EDUCATION/TRAINING PROGRAM

## 2025-01-21 PROCEDURE — 25010000002 METHYLPREDNISOLONE PER 40 MG: Performed by: INTERNAL MEDICINE

## 2025-01-21 PROCEDURE — 94761 N-INVAS EAR/PLS OXIMETRY MLT: CPT

## 2025-01-21 PROCEDURE — 99232 SBSQ HOSP IP/OBS MODERATE 35: CPT | Performed by: INTERNAL MEDICINE

## 2025-01-21 PROCEDURE — 85027 COMPLETE CBC AUTOMATED: CPT | Performed by: INTERNAL MEDICINE

## 2025-01-21 PROCEDURE — 25010000002 FENTANYL CITRATE (PF) 50 MCG/ML SOLUTION: Performed by: INTERNAL MEDICINE

## 2025-01-21 PROCEDURE — 25010000002 PROCHLORPERAZINE 10 MG/2ML SOLUTION: Performed by: INTERNAL MEDICINE

## 2025-01-21 PROCEDURE — 25010000002 PROPOFOL 10 MG/ML EMULSION: Performed by: STUDENT IN AN ORGANIZED HEALTH CARE EDUCATION/TRAINING PROGRAM

## 2025-01-21 RX ORDER — IPRATROPIUM BROMIDE AND ALBUTEROL SULFATE 2.5; .5 MG/3ML; MG/3ML
3 SOLUTION RESPIRATORY (INHALATION)
Status: DISCONTINUED | OUTPATIENT
Start: 2025-01-21 | End: 2025-01-23 | Stop reason: HOSPADM

## 2025-01-21 RX ORDER — INSULIN LISPRO 100 [IU]/ML
2-7 INJECTION, SOLUTION INTRAVENOUS; SUBCUTANEOUS
Status: DISCONTINUED | OUTPATIENT
Start: 2025-01-21 | End: 2025-01-23 | Stop reason: HOSPADM

## 2025-01-21 RX ORDER — PROCHLORPERAZINE EDISYLATE 5 MG/ML
10 INJECTION INTRAMUSCULAR; INTRAVENOUS ONCE
Status: COMPLETED | OUTPATIENT
Start: 2025-01-21 | End: 2025-01-21

## 2025-01-21 RX ORDER — ONDANSETRON 2 MG/ML
4 INJECTION INTRAMUSCULAR; INTRAVENOUS EVERY 6 HOURS PRN
Status: DISCONTINUED | OUTPATIENT
Start: 2025-01-21 | End: 2025-01-23 | Stop reason: HOSPADM

## 2025-01-21 RX ORDER — METOPROLOL TARTRATE 50 MG
50 TABLET ORAL EVERY 12 HOURS SCHEDULED
Status: DISCONTINUED | OUTPATIENT
Start: 2025-01-21 | End: 2025-01-23 | Stop reason: HOSPADM

## 2025-01-21 RX ORDER — BUTALBITAL, ACETAMINOPHEN AND CAFFEINE 50; 325; 40 MG/1; MG/1; MG/1
1 TABLET ORAL EVERY 4 HOURS PRN
Status: DISCONTINUED | OUTPATIENT
Start: 2025-01-21 | End: 2025-01-23 | Stop reason: HOSPADM

## 2025-01-21 RX ADMIN — ATORVASTATIN CALCIUM 20 MG: 20 TABLET, FILM COATED ORAL at 14:32

## 2025-01-21 RX ADMIN — AMOXICILLIN AND CLAVULANATE POTASSIUM 1 TABLET: 875; 125 TABLET, FILM COATED ORAL at 20:13

## 2025-01-21 RX ADMIN — ASPIRIN 81 MG: 81 TABLET, COATED ORAL at 14:32

## 2025-01-21 RX ADMIN — PROPOFOL INJECTABLE EMULSION 15 MCG/KG/MIN: 10 INJECTION, EMULSION INTRAVENOUS at 06:40

## 2025-01-21 RX ADMIN — FENTANYL CITRATE 75 MCG: 50 INJECTION, SOLUTION INTRAMUSCULAR; INTRAVENOUS at 00:52

## 2025-01-21 RX ADMIN — FAMOTIDINE 20 MG: 10 INJECTION, SOLUTION INTRAVENOUS at 20:13

## 2025-01-21 RX ADMIN — IPRATROPIUM BROMIDE AND ALBUTEROL SULFATE 3 ML: .5; 3 SOLUTION RESPIRATORY (INHALATION) at 07:16

## 2025-01-21 RX ADMIN — CEFEPIME 2000 MG: 2 INJECTION, POWDER, FOR SOLUTION INTRAVENOUS at 05:31

## 2025-01-21 RX ADMIN — DEXMEDETOMIDINE HYDROCHLORIDE 1 MCG/KG/HR: 4 INJECTION, SOLUTION INTRAVENOUS at 02:33

## 2025-01-21 RX ADMIN — METOPROLOL TARTRATE 50 MG: 50 TABLET, FILM COATED ORAL at 14:31

## 2025-01-21 RX ADMIN — INSULIN GLARGINE 7 UNITS: 100 INJECTION, SOLUTION SUBCUTANEOUS at 08:15

## 2025-01-21 RX ADMIN — IPRATROPIUM BROMIDE AND ALBUTEROL SULFATE 3 ML: .5; 3 SOLUTION RESPIRATORY (INHALATION) at 20:44

## 2025-01-21 RX ADMIN — METOPROLOL TARTRATE 2.5 MG: 5 INJECTION INTRAVENOUS at 02:32

## 2025-01-21 RX ADMIN — CEFEPIME 2000 MG: 2 INJECTION, POWDER, FOR SOLUTION INTRAVENOUS at 14:10

## 2025-01-21 RX ADMIN — ACETAMINOPHEN 500 MG: 500 TABLET, FILM COATED ORAL at 17:41

## 2025-01-21 RX ADMIN — MUPIROCIN 1 APPLICATION: 20 OINTMENT TOPICAL at 08:15

## 2025-01-21 RX ADMIN — CLOPIDOGREL BISULFATE 75 MG: 75 TABLET ORAL at 14:33

## 2025-01-21 RX ADMIN — METOPROLOL TARTRATE 50 MG: 50 TABLET, FILM COATED ORAL at 20:13

## 2025-01-21 RX ADMIN — METHYLPREDNISOLONE SODIUM SUCCINATE 40 MG: 40 INJECTION, POWDER, FOR SOLUTION INTRAMUSCULAR; INTRAVENOUS at 20:13

## 2025-01-21 RX ADMIN — METHYLPREDNISOLONE SODIUM SUCCINATE 40 MG: 40 INJECTION, POWDER, FOR SOLUTION INTRAMUSCULAR; INTRAVENOUS at 07:31

## 2025-01-21 RX ADMIN — MUPIROCIN 1 APPLICATION: 20 OINTMENT TOPICAL at 20:14

## 2025-01-21 RX ADMIN — PROCHLORPERAZINE EDISYLATE 10 MG: 5 INJECTION INTRAMUSCULAR; INTRAVENOUS at 21:59

## 2025-01-21 RX ADMIN — FENTANYL CITRATE 75 MCG: 50 INJECTION, SOLUTION INTRAMUSCULAR; INTRAVENOUS at 06:33

## 2025-01-21 RX ADMIN — FENTANYL CITRATE 75 MCG: 50 INJECTION, SOLUTION INTRAMUSCULAR; INTRAVENOUS at 10:50

## 2025-01-21 RX ADMIN — IPRATROPIUM BROMIDE AND ALBUTEROL SULFATE 3 ML: .5; 3 SOLUTION RESPIRATORY (INHALATION) at 11:13

## 2025-01-21 RX ADMIN — ENOXAPARIN SODIUM 40 MG: 40 INJECTION SUBCUTANEOUS at 08:15

## 2025-01-21 RX ADMIN — CHLORHEXIDINE GLUCONATE 0.12% ORAL RINSE 15 ML: 1.2 LIQUID ORAL at 08:06

## 2025-01-21 RX ADMIN — BUTALBITAL, ACETAMINOPHEN, AND CAFFEINE 1 TABLET: 325; 50; 40 TABLET ORAL at 20:13

## 2025-01-21 RX ADMIN — VENLAFAXINE HYDROCHLORIDE 75 MG: 37.5 CAPSULE, EXTENDED RELEASE ORAL at 14:54

## 2025-01-21 RX ADMIN — NICOTINE 1 PATCH: 21 PATCH, EXTENDED RELEASE TRANSDERMAL at 03:38

## 2025-01-21 RX ADMIN — IPRATROPIUM BROMIDE AND ALBUTEROL SULFATE 3 ML: .5; 3 SOLUTION RESPIRATORY (INHALATION) at 23:23

## 2025-01-21 RX ADMIN — FAMOTIDINE 20 MG: 10 INJECTION, SOLUTION INTRAVENOUS at 08:06

## 2025-01-21 RX ADMIN — BUTALBITAL, ACETAMINOPHEN, AND CAFFEINE 1 TABLET: 325; 50; 40 TABLET ORAL at 16:15

## 2025-01-21 RX ADMIN — ONDANSETRON 4 MG: 2 INJECTION INTRAMUSCULAR; INTRAVENOUS at 16:15

## 2025-01-21 NOTE — PLAN OF CARE
Problem: Adult Inpatient Plan of Care  Goal: Plan of Care Review  Outcome: Progressing  Flowsheets (Taken 1/21/2025 0407)  Progress: no change  Outcome Evaluation: pt remains intubated/sedated overnight. IV Steroids and ABX continue. Plan for SBT in am. Labs pending.  Plan of Care Reviewed With: patient   Goal Outcome Evaluation:  Plan of Care Reviewed With: patient        Progress: no change  Outcome Evaluation: pt remains intubated/sedated overnight. IV Steroids and ABX continue. Plan for SBT in am. Labs pending.                             Problem: Noninvasive Ventilation Acute  Goal: Effective Unassisted Ventilation and Oxygenation  Outcome: Unable to Meet

## 2025-01-21 NOTE — SIGNIFICANT NOTE
RT EQUIPMENT DEVICE RELATED - SKIN ASSESSMENT    RT Medical Equipment/Device:  ETT Melvin/Anchorfast    Skin Assessment: Chin: Intact and Lips: Intact    Device Skin Pressure Protection:     Nurse Notification: No    Rylan Kaiser, RRT

## 2025-01-21 NOTE — PROGRESS NOTES
"Hospital Medicine Team    LOS 2 days      Patient Care Team:  Talib Ceron MD as PCP - General (Family Medicine)      Subjective       Chief Complaint:  f/u shortness of breath     Subjective    Seen this am on SBT and was comfortable following commands. No new issues per the nurse.     Objective       Vital Signs  Temp:  [98.1 °F (36.7 °C)-99 °F (37.2 °C)] 98.4 °F (36.9 °C)  Heart Rate:  [] 112  Resp:  [20-22] 22  BP: (137-168)/(71-91) 143/73  FiO2 (%):  [27 %-28 %] 27 %  Oxygen Therapy  SpO2: 93 %  Pulse Oximetry Type: Continuous  Device (Oxygen Therapy): nasal cannula  Flow (L/min) (Oxygen Therapy): 2  Oxygen Concentration (%): 30  ETCO2 (mmHg): 27 mmHg  Flowsheet Rows      Flowsheet Row First Filed Value   Admission Height 157.5 cm (62.01\") Documented at 01/19/2025 0001   Admission Weight 55.8 kg (123 lb) Documented at 01/19/2025 0001                Physical Exam:  Physical Exam  Vitals reviewed.   Constitutional:       Appearance: She is ill-appearing.   Cardiovascular:      Rate and Rhythm: Normal rate.   Pulmonary:      Breath sounds: Decreased breath sounds present.   Abdominal:      Tenderness: There is no abdominal tenderness.   Neurological:      Comments:  on vent but following commands            Results Review:    I reviewed the patient's new clinical results.    [x]  Laboratory  [x]  Microbiology  []  Radiology  []  EKG/Telemetry   []  Cardiology/Vascular   []  Pathology  []  Old records  []  Other:      X-rays, labs reviewed personally by physician.    Medication Review:   I have reviewed the patient's current medication list    Scheduled Meds  aspirin, 81 mg, Oral, Daily  atorvastatin, 20 mg, Oral, Daily  cefepime, 2,000 mg, Intravenous, Q8H  chlorhexidine, 15 mL, Mouth/Throat, Q12H  clopidogrel, 75 mg, Oral, Daily  enoxaparin, 40 mg, Subcutaneous, Daily  Famotidine (PF), 20 mg, Intravenous, BID  insulin glargine, 7 Units, Subcutaneous, Daily  insulin lispro, 2-7 Units, Subcutaneous, " "Q6H  ipratropium-albuterol, 3 mL, Nebulization, 4x Daily - RT  methylPREDNISolone sodium succinate, 40 mg, Intravenous, Q12H  metoprolol tartrate, 2.5 mg, Intravenous, Q8H  mupirocin, 1 Application, Each Nare, BID  nicotine, 1 patch, Transdermal, Q24H  [Held by provider] venlafaxine XR, 75 mg, Oral, Daily        Meds Infusions  dexmedetomidine, 0.2-1.5 mcg/kg/hr, Last Rate: 0.8 mcg/kg/hr (01/21/25 0851)  Pharmacy to Dose Cefepime,   propofol, 5-50 mcg/kg/min, Last Rate: Stopped (01/21/25 0720)        Meds PRN    acetaminophen    dextrose    dextrose    fentaNYL citrate (PF)    glucagon (human recombinant)    ipratropium-albuterol    Pharmacy to Dose Cefepime    [COMPLETED] Insert Peripheral IV **AND** sodium chloride        Assessment / Plan       Active Hospital Problems:  Active Hospital Problems    Diagnosis  POA    **COPD exacerbation [J44.1]  Yes    Acute on chronic respiratory failure with hypoxia and hypercapnia [J96.21, J96.22]  Yes      Resolved Hospital Problems   No resolved problems to display.     Acute on chronic hypoxemic hypercapnic respiratory failure-  Acute exacerbation of COPD-  -Intubated 1/19, extubated 1/21  -BCx 1/19: 1 out of 4 with bacillus spp likely contaminant  -stop  vancomycin MRSA nare neg  -continue  cefepime for now  -Continue IV steroids  -Continue pulmonary toilet  -Pulmonology following appreciate assistance         Concern for lung/bronchogenic malignancy on CTA-  CTA obtained last admission due to rib pain from cough revealed the following findings:  \"2.5 cm patchy opacity medial RUL  1.5 cm mass/consolidation RLL  Pleural based consolidation RLL 6 cm x 1.5 cm  Lateral left LL consolidation 1.5 cm x 1.5 cm\"  -Continue to recommend 3 month CT vs PET and will need katya close f/u in pulm office per their note      Chronic tobacco dependence  - continue home nicotine patch therapy     Type 2 DM  -Continue basal and SSI  Follow and adjust with Accu-Cheks-     HTN  -Chronic hold home " ACE for now  -Continue IV metoprolol while intubated and transition to p.o.     CAD-   PVD-  Hyperlipidemia-  -Continue home ASA, statin, plavix     Mood d/o-   -resume home effexor 24hr capsule (can't give ER via OG/NG tube)    Remains high risk   Plan for disposition:defer intubated     Electronically signed by Ravinder Estrella DO, 01/21/25, 09:09 EST.        Note disclaimer: At [facility], we believe that sharing information builds trust and better relationships. You are receiving this note because you recently visited [facility]. It is possible you will see health information before a provider has talked with you about it. This kind of information can be easy to misunderstand. To help you fully understand what it means for your health, we urge you to discuss this note with your provider.

## 2025-01-21 NOTE — PROGRESS NOTES
Adult Nutrition  Assessment/PES    Patient Name:  Valeria Rubi  YOB: 1975  MRN: [id number]  Admit Date:  1/18/2025    Assessment Date:  1/21/2025    Comments:  MST     Adv diet as indicated post extubation    Will cont to follow and monitor.      Reason for Assessment       Row Name 01/21/25 1052          Reason for Assessment    Reason For Assessment identified at risk by screening criteria     Diagnosis pulmonary disease;infection/sepsis  Rhinovirus, Acute on chronic resp failure, Malignancy concern hx DM     Identified At Risk by Screening Criteria MST SCORE 2+                    Nutrition/Diet History       Row Name 01/21/25 1053          Nutrition/Diet History    Typical Intake (Food/Fluid/EN/PN) Pt extubated this am. Receiving care at visit                    Labs/Tests/Procedures/Meds       Row Name 01/21/25 1054          Labs/Procedures/Meds    Lab Results Reviewed reviewed     Lab Results Comments BUn 26 H, glu 154        Diagnostic Tests/Procedures    Diagnostic Test/Procedure Reviewed reviewed        Medications    Pertinent Medications Reviewed reviewed     Pertinent Medications Comments solumedrol, lantus                      Estimated/Assessed Needs - Anthropometrics       Row Name 01/21/25 1054          Anthropometrics    Calculation Weight 55.8 kg (123 lb 0.3 oz)        Estimated/Assessed Needs    Additional Documentation Estimated Calorie Needs (Group);Fluid Requirements (Group);Protein Requirements (Group)        Estimated Calorie Needs    Estimated Calorie Require (kcal/day) 1917-0864 kcal ( mifflin 1.2-1.4)     Estimated Calorie Need Method [place]-St Jeor        Protein Requirements    Est Protein Requirement Amount (gms/kg) 1.0 gm protein  56-67 gm pro (1-1.2 gm/kg pro)        Fluid Requirements    Estimated Fluid Requirement Method RDA Method  3190-6453 ml                    Nutrition Prescription Ordered       Row Name 01/21/25 1055          Nutrition Prescription PO     Current PO Diet NPO                    Evaluation of Received Nutrient/Fluid Intake       Row Name 01/21/25 1055          Fluid Intake Evaluation    Parenteral Fluid (mL) 462  IV        PO Evaluation    Number of Days PO Intake Evaluated Insufficient Data                       Problem/Interventions:   Problem 1       Row Name 01/21/25 1056          Nutrition Diagnoses Problem 1    Problem 1 Other (comment)  Inadequate energy intake related to acute hypoxic resp failure as evidenced by recent vent/extubation                          Intervention Goal       Row Name 01/21/25 1056          Intervention Goal    General Meet nutritional needs for age/condition     PO Establish PO;PO intake (%)     PO Intake % 50 %                    Nutrition Intervention       Row Name 01/21/25 1056          Nutrition Intervention    RD/Tech Action Follow Tx progress                      Education/Evaluation       Row Name 01/21/25 1056          Education    Education Education not appropriate at this time        Monitor/Evaluation    Monitor Per protocol;I&O;PO intake;Pertinent labs;Weight                     Electronically signed by:  Lila Hilton RD  01/21/25 10:57 EST

## 2025-01-21 NOTE — PLAN OF CARE
Goal Outcome Evaluation:  Plan of Care Reviewed With: patient        Progress: improving  Outcome Evaluation: Extubated to 2L NC this morning, awake, alert and appropriate. Up to the chair asst. X1 this afternoon. Productive cough noted with yellow/green sputum. Has c/o a migraine with nausea most of the day- treated with PRN Fioricet, Zofran, and Tylenol. Reports frequent migraines since 11yo. Resumed PO meds after swallow eval, placed on a soft to chew diet per ST. Changed to PO antibiotics. Removed f/c per pt request, urinates per BSC frequently. VSS.

## 2025-01-21 NOTE — PLAN OF CARE
Goal Outcome Evaluation:                                            Problem: Noninvasive Ventilation Acute  Goal: Effective Unassisted Ventilation and Oxygenation  Outcome: Unable to Meet

## 2025-01-21 NOTE — PROGRESS NOTES
[facility] INPATIENT PROGRESS NOTE         [facility] ICU    2025      PATIENT IDENTIFICATION:  Name: Valeria Rubi ADMIT: 2025   : 1975  PCP: Talib Ceron MD    MRN: [id number] LOS: 2 days   AGE/SEX: 49 y.o. female  ROOM: ICU2/1                     LOS 2    Reason for visit: critical care       SUBJECTIVE:      Sedated on ventilator.  Blood gas and breathing trials ordered yesterday never performed.  ABG this morning shows respiratory alkalosis from over ventilation.  Decreasing respiratory rate to 12 and will give time for equilibration then plan for sedation vacation and spontaneous breathing trials today.  They are ordered as to be performed daily.      Objective   OBJECTIVE:    Vital Sign Min/Max for last 24 hours  Temp  Min: 98.1 °F (36.7 °C)  Max: 99 °F (37.2 °C)   BP  Min: 137/76  Max: 168/90   Pulse  Min: 62  Max: 112   Resp  Min: 20  Max: 22   SpO2  Min: 92 %  Max: 100 %   No data recorded   No data recorded    Vitals:    25 0600 25 0630 25 0700 25 0800   BP: 144/71 153/79 159/87 143/73   BP Location:    Left arm   Patient Position:    Lying   Pulse: 68 68 67 112   Resp:    22   Temp:    98.4 °F (36.9 °C)   TempSrc:    Axillary   SpO2: 97% 97% 97% 92%   Weight:       Height:                25  0001   Weight: 55.8 kg (123 lb)       Body mass index is 22.49 kg/m².        Mode: PC/AC  FiO2 (%):  [27 %-28 %] 27 %  S RR:  [20] 20  PEEP/CPAP (cm H2O):  [5 cm H20] 5 cm H20  HI SUP:  [10 cm H20] 10 cm H20  MAP (cm H2O):  [8.6-12] 8.6           FiO2 (%): 27 %     Body mass index is 22.49 kg/m².    Intake/Output Summary (Last 24 hours) at 2025 0854  Last data filed at 2025 0639  Gross per 24 hour   Intake 662.01 ml   Output 886 ml   Net -223.99 ml         Exam:  GEN:  No distress, appears stated age. Sedated on vent   EYES:   PERRL, anicteric sclerae  ENT:    External ears/nose normal, OP clear  NECK:  No adenopathy, midline  trachea  LUNGS: Normal chest on inspection, palpation and auscultation  CV:  Normal S1S2, without murmur  ABD:  Nontender, nondistended, no hepatosplenomegaly, +BS  EXT:  No edema.  No cyanosis or clubbing.  No mottling and normal cap refill.    Assessment     Scheduled meds:  aspirin, 81 mg, Oral, Daily  atorvastatin, 20 mg, Oral, Daily  cefepime, 2,000 mg, Intravenous, Q8H  chlorhexidine, 15 mL, Mouth/Throat, Q12H  clopidogrel, 75 mg, Oral, Daily  enoxaparin, 40 mg, Subcutaneous, Daily  Famotidine (PF), 20 mg, Intravenous, BID  insulin glargine, 7 Units, Subcutaneous, Daily  insulin lispro, 2-7 Units, Subcutaneous, Q6H  ipratropium-albuterol, 3 mL, Nebulization, 4x Daily - RT  methylPREDNISolone sodium succinate, 40 mg, Intravenous, Q12H  metoprolol tartrate, 2.5 mg, Intravenous, Q8H  mupirocin, 1 Application, Each Nare, BID  nicotine, 1 patch, Transdermal, Q24H  [Held by provider] venlafaxine XR, 75 mg, Oral, Daily      IV meds:                      dexmedetomidine, 0.2-1.5 mcg/kg/hr, Last Rate: 0.8 mcg/kg/hr (01/21/25 0851)  Pharmacy to Dose Cefepime,   propofol, 5-50 mcg/kg/min, Last Rate: Stopped (01/21/25 0720)      Data Review:  Results from last 7 days   Lab Units 01/21/25  0508 01/20/25  0539 01/19/25  0545 01/19/25  0122   SODIUM mmol/L 139 143 142 141   POTASSIUM mmol/L 4.4 4.8 4.9 5.0   CHLORIDE mmol/L 103 105 100 100   CO2 mmol/L 25.7 28.7 35.4* 31.2*   BUN mg/dL 26* 27* 14 13   CREATININE mg/dL 0.64 0.69 0.58 0.46*   GLUCOSE mg/dL 153* 138* 172* 206*   CALCIUM mg/dL 8.7 8.6 9.0 8.9         Estimated Creatinine Clearance: 91.6 mL/min (by C-G formula based on SCr of 0.64 mg/dL).  Results from last 7 days   Lab Units 01/21/25  0508 01/20/25  0539 01/19/25  0545 01/19/25  0204   WBC 10*3/mm3 12.11* 12.81* 11.88* 12.28*   HEMOGLOBIN g/dL 12.5 11.8* 12.2 13.0   PLATELETS 10*3/mm3 204 211 258 319         Results from last 7 days   Lab Units 01/19/25  0545 01/19/25  0122   ALT (SGPT) U/L 11 9   AST (SGOT) U/L  19 15     Results from last 7 days   Lab Units 01/21/25  0820 01/21/25  0535 01/19/25  0703 01/19/25  0115   PH, ARTERIAL pH units 7.460* 7.562* 7.321* 7.190*   PO2 ART mm Hg 65.4* 91.2 88.1 72.6*   PCO2, ARTERIAL mm Hg 40.6 30.0* 77.8* >98.3*   HCO3 ART mmol/L 28.8* 27.0* 40.2*  --      Results from last 7 days   Lab Units 01/20/25  0539 01/19/25  0545 01/19/25  0018   PROCALCITONIN ng/mL 1.57* 2.91*  --    LACTATE mmol/L  --   --  1.1         Glucose   Date/Time Value Ref Range Status   01/21/2025 0506 151 (H) 70 - 130 mg/dL Final   01/21/2025 0017 154 (H) 70 - 130 mg/dL Final   01/20/2025 1802 141 (H) 70 - 130 mg/dL Final   01/20/2025 1244 147 (H) 70 - 130 mg/dL Final   01/20/2025 0042 229 (H) 70 - 130 mg/dL Final   01/19/2025 1805 169 (H) 70 - 130 mg/dL Final   01/19/2025 0856 147 (H) 70 - 130 mg/dL Final         Imaging reviewed  Chest x-ray 1/21 reviewed          Microbiology reviewed  RVP negative  MRSA screen negative  Gram-positive bacilli blood cultures from 1/19          Active Hospital Problems    Diagnosis  POA    **COPD exacerbation [J44.1]  Yes    Acute on chronic respiratory failure with hypoxia and hypercapnia [J96.21, J96.22]  Yes      Resolved Hospital Problems   No resolved problems to display.         ASSESSMENT:  Acute on chronic hypercapnic and hypoxic respiratory failure  Acute rhinovirus bronchitis causing COPD exacerbation  COPD exacerbation  Recent RSV pneumonia within the past 30 days  Ongoing tobacco abuse  Sepsis present on admission  Hyperglycemia  Diabetes type 2      PLAN:  Repeat blood gas and parameters performed today look good and plan to extubate.  Discussed with respiratory therapy.  Discontinued vancomycin.  Continue antibiotics.  Repeat CT chest for follow-up post treatment for nodular infiltrates.  Could potentially be neoplastic process underlying.  Close follow-up in our office postdischarge recommended.  Control blood pressure.  Control glucose.  DVT/ulcer  prophylaxis.        CCT: 32 min    Silverio Church MD  Pulmonary and Critical Care Medicine  [facility], [facility]  1/21/2025    08:54 EST

## 2025-01-21 NOTE — THERAPY EVALUATION
Acute Care - Speech Language Pathology   Swallow Initial Evaluation  [place]     Patient Name: Valeria Rubi  : 1975  MRN: [id number]  Today's Date: 2025               Admit Date: 2025    Visit Dx:     ICD-10-CM ICD-9-CM   1. COPD with acute exacerbation  J44.1 491.21   2. Acute respiratory failure with hypoxia and hypercarbia  J96.01 518.81    J96.02      Patient Active Problem List   Diagnosis    Abnormal mammogram    Atopic rhinitis    Bronchitis    Chronic headache    Pain of hand    Hyperlipidemia    Essential hypertension    Sprain of ankle    Nausea    Pain in wrist    Wheezing    Panlobular emphysema    Tobacco use    Pharyngeal dysphagia    Radiculopathy    Lumbar degenerative disc disease    Peripheral vascular disease    Pain of toe of left foot    Abnormal femoral pulse    Coronary stent occlusion    COPD with acute exacerbation    Hyperglycemia    Menopausal symptoms    Surveillance for Depo-Provera contraception    Anxiety and depression    Numbness and tingling of both feet    Low vitamin D level    Low vitamin B12 level    Pneumonia of left lower lobe due to infectious organism    Headache, migraine    Community acquired pneumonia, bilateral    Acute and chronic respiratory failure with hypoxia    Acute respiratory failure with hypoxia and hypercapnia    Acute on chronic respiratory failure with hypercapnia    Respiratory failure    Macrocytosis    DM2 (diabetes mellitus, type 2)    History of coronary artery disease    Rhinovirus infection    COPD exacerbation    Acute on chronic respiratory failure with hypoxia and hypercapnia     Past Medical History:   Diagnosis Date    Anxiety     Arthritis     COPD (chronic obstructive pulmonary disease)     Coronary stent occlusion     CTS (carpal tunnel syndrome)     Depression     Dizziness     Headache     Hyperlipidemia     Hypertension     Migraine     Numbness and tingling     Pneumonia      Past Surgical History:   Procedure  Laterality Date    ANGIOPLASTY ILIAC ARTERY Left 6/17/2016    Procedure: BILATERAL DUPLEX DIRECTED ACCESS, AIF BILATERAL RUNOFF, SELECTIVE CATHETERIZATION OF RIGHT EXTERNAL ILIAC WITH ANGIOPLASTY, LEFT COMMON ILIAC STENT PLACEMENT;  Surgeon: Jose Carlos Plascencia MD;  Location: [facility] 18/19;  Service:     BREAST SURGERY      BILAT IMPLANTS    CAROTID ARTERY ANGIOPLASTY         SLP Recommendation and Plan  SLP Swallowing Diagnosis: swallow WFL/no suspected pharyngeal impairment (01/21/25 1337)  SLP Diet Recommendation: soft to chew textures, whole, thin liquids (01/21/25 1337)  Recommended Precautions and Strategies: upright posture during/after eating, small bites of food and sips of liquid, general aspiration precautions (01/21/25 1337)  SLP Rec. for Method of Medication Administration: meds whole, with thin liquids, as tolerated (01/21/25 1337)     Monitor for Signs of Aspiration: no, notify SLP if any concerns (01/21/25 1337)  Recommended Diagnostics: reassess via clinical swallow evaluation, other (see comments) (meal assessment) (01/21/25 1337)  Swallow Criteria for Skilled Therapeutic Interventions Met: demonstrates skilled criteria, other (see comments) (f/u full meal assessment for tolerance of recommended diet.) (01/21/25 1337)  Anticipated Discharge Disposition (SLP): home (01/21/25 1337)  Rehab Potential/Prognosis, Swallowing: good, to achieve stated therapy goals (01/21/25 1337)  Therapy Frequency (Swallow): 1 day per week, 2 days per week (01/21/25 1337)  Predicted Duration Therapy Intervention (Days): 2 days (01/21/25 1337)  Oral Care Recommendations: Oral Care BID/PRN, Toothbrush (01/21/25 1337)                  Patient/Family Concerns, Anticipated Discharge Disposition (SLP): No current concerns reported at this time (01/21/25 1337)                     Outcome Evaluation: SLP: Pt demonstrates a functional oropharyngeal swallow with functional oral prep, transit and clearance of ice chips, thins  by cup and straw and solids. Does take small bites of regular solid and increased prep time due to lack of dentition. Normal appearing pharyngeal timing and no suspected pharyngeal deficits. Pt does cough intermittently does not appear to be associated with po intake and cough/voice w/o changes before, during and after po trials. Impressions: Functional oropharyngeal swallow. Recommendations: Soft whole diet with thins, meds whole as tolerated, aspiration precautions. SLP to follow up for one visit for meal assessment for tolerance of recommended diet.      SWALLOW EVALUATION (Last 72 Hours)       SLP Adult Swallow Evaluation       Row Name 01/21/25 8135                   Rehab Evaluation    Document Type evaluation  -AD        Subjective Information no complaints  -AD        Patient Observations alert;cooperative  -AD        Patient/Family/Caregiver Comments/Observations Pt seen sitting upright in bed, alert and cooperative.  -AD        Patient Effort good  -AD        Symptoms Noted During/After Treatment none  -AD        Oral Care teeth brushed - suction toothbrush;other (see comments)  antiseptic solution rinse provided  -AD           General Information    Patient Profile Reviewed yes  -AD        Pertinent History Of Current Problem Pt is a 48 y/o female admitted with acute on chronic hypercapnic and hypoxic respiratory failure. She required intubation and found to be positive for rhinovirus. Intubated 1/19/25 and extubated 1/21/25. Swallow evaluation ordered post extubation. Prior evaluation on 12/31/24 with regular diet and thins. No instrumental swallow eval ordered at that time.  -AD        Current Method of Nutrition NPO  -AD        Precautions/Limitations, Vision WFL  -AD        Precautions/Limitations, Hearing WFL  -AD        Prior Level of Function-Communication WFL  -AD        Prior Level of Function-Swallowing no diet consistency restrictions;safe, efficient swallowing in all situations  -AD         Plans/Goals Discussed with patient;agreed upon  -AD        Barriers to Rehab none identified  -AD        Patient's Goals for Discharge patient did not state  -AD           Pain    Pre/Posttreatment Pain Comment No pain reported or indicated  -AD           Oral Motor Structure and Function    Oral Lesions or Structural Abnormalities and/or variants Pt with tongue piercing to the midline, middle of tongue.  -AD        Dentition Assessment edentulous;has dentures but does not use;other (see comments)  reports she can only eat pretzels with her dentures  -AD        Secretion Management WNL/WFL  -AD        Mucosal Quality moist, healthy;other (see comments)  did have mild coating, but able to clear with tooth brushing.  -AD        Volitional Swallow WFL  -AD        Volitional Cough non-productive  strong cough  -AD           Oral Musculature and Cranial Nerve Assessment    Oral Motor General Assessment vocal impairment  -AD        Vocal Impairment, Detail. Cranial Nerve X (Vagus) vocal quality abnormality (see comments)  mild hoarseness  -AD        Oral Motor, Comment No gross deficits of ROM or strength of jaw, lips, tongue, palate. Mild vocal hoarseness noted and felt to be related to recent intubation/extubation.  -AD           General Eating/Swallowing Observations    Respiratory Support Currently in Use nasal cannula  -AD        O2 Liters 2L  -AD        Eating/Swallowing Skills self-fed;appropriate self-feeding skills observed  -AD        Positioning During Eating upright 90 degree;upright in bed  sitting upright  -AD        Utensils Used spoon;cup;straw  -AD        Consistencies Trialed regular textures;pureed;ice chips;thin liquids  -AD           Respiratory    Respiratory Status WFL;during swallowing/eating  -AD           Clinical Swallow Eval    Oral Prep Phase WFL  -AD        Oral Transit WFL  -AD        Oral Residue WFL  -AD        Pharyngeal Phase no overt signs/symptoms of pharyngeal impairment  -AD         Esophageal Phase unremarkable  -AD        Clinical Swallow Evaluation Summary Pt demonstrates a functional oropharyngeal swallow with functional oral prep, transit and clearance of ice chips, thins by cup and straw and solids. Does take small bites of regular solid and increased prep time due to lack of dentition. Normal appearing pharyngeal timing and no suspected pharyngeal deficits. Pt does cough intermittently does not appear to be associated with po intake and cough/voice w/o changes before, during and after po trials.  -AD           SLP Evaluation Clinical Impression    SLP Swallowing Diagnosis swallow WFL/no suspected pharyngeal impairment  -AD        Functional Impact no impact on function  -AD        Rehab Potential/Prognosis, Swallowing good, to achieve stated therapy goals  -AD        Swallow Criteria for Skilled Therapeutic Interventions Met demonstrates skilled criteria;other (see comments)  f/u full meal assessment for tolerance of recommended diet.  -AD           Recommendations    Therapy Frequency (Swallow) 1 day per week;2 days per week  -AD        Predicted Duration Therapy Intervention (Days) 2 days  -AD        SLP Diet Recommendation soft to chew textures;whole;thin liquids  -AD        Recommended Diagnostics reassess via clinical swallow evaluation;other (see comments)  meal assessment  -AD        Recommended Precautions and Strategies upright posture during/after eating;small bites of food and sips of liquid;general aspiration precautions  -AD        Oral Care Recommendations Oral Care BID/PRN;Toothbrush  -AD        SLP Rec. for Method of Medication Administration meds whole;with thin liquids;as tolerated  -AD        Monitor for Signs of Aspiration no;notify SLP if any concerns  -AD        Anticipated Discharge Disposition (SLP) home  -AD        Patient/Family Concerns, Anticipated Discharge Disposition (SLP) No current concerns reported at this time  -AD           Swallow Goals (SLP)     Swallow LTGs Patient will demonstrate functional swallow for  -AD        Swallow STGs diet tolerance goal selection (SLP)  -AD        Diet Tolerance Goal Selection (SLP) Swallow Short Term Goal 1  -AD           (LTG) Patient will demonstrate functional swallow for    Diet Texture (Demonstrate functional swallow) soft to chew (whole) textures  -AD        Liquid viscosity (Demonstrate functional swallow) thin liquids  -AD        Story (Demonstrate functional swallow) independently (over 90% accuracy)  -AD        Time Frame (Demonstrate functional swallow) other (see comments)  1-2 days  -AD        Progress/Outcomes (Demonstrate functional swallow) new goal  -AD           (STG) Swallow 1    (STG) Swallow 1 Pt will demonstrate tolerance of the least restrictive diet with adequate oral prep, transit and clearance and no clinical s/s of aspiration or complications such as aspiration pneumonia.  -AD        Story (Swallow Short Term Goal 1) independently (over 90% accuracy)  -AD        Time Frame (Swallow Short Term Goal 1) other (see comments)  2 days  -AD        Progress/Outcomes (Swallow Short Term Goal 1) new goal  -AD                  User Key  (r) = Recorded By, (t) = Taken By, (c) = Cosigned By      Initials Name Effective Dates    AD Shirlene Garza, MS CCC-SLP 06/16/21 -                     EDUCATION  The patient has been educated in the following areas:   Dysphagia (Swallowing Impairment) Modified Diet Instruction.  Pt agreeable to soft whole diet. Verbalizes understanding of evaluation and results. Lita OLIVEIRA also verbalizes understanding. Diet orders received from Dr. Estrella.       SLP GOALS       Row Name 01/21/25 1337             (LTG) Patient will demonstrate functional swallow for    Diet Texture (Demonstrate functional swallow) soft to chew (whole) textures  -AD      Liquid viscosity (Demonstrate functional swallow) thin liquids  -AD      Story (Demonstrate functional swallow)  independently (over 90% accuracy)  -AD      Time Frame (Demonstrate functional swallow) other (see comments)  1-2 days  -AD      Progress/Outcomes (Demonstrate functional swallow) new goal  -AD         (STG) Swallow 1    (STG) Swallow 1 Pt will demonstrate tolerance of the least restrictive diet with adequate oral prep, transit and clearance and no clinical s/s of aspiration or complications such as aspiration pneumonia.  -AD      [place] (Swallow Short Term Goal 1) independently (over 90% accuracy)  -AD      Time Frame (Swallow Short Term Goal 1) other (see comments)  2 days  -AD      Progress/Outcomes (Swallow Short Term Goal 1) new goal  -AD                User Key  (r) = Recorded By, (t) = Taken By, (c) = Cosigned By      Initials Name Provider Type    Shirlene Carrasquillo MS CCC-SLP Speech and Language Pathologist                         Time Calculation:    Time Calculation- SLP       Row Name 01/21/25 1556             Time Calculation- SLP    SLP Start Time 1337  -AD      SLP Stop Time 1414  -AD      SLP Time Calculation (min) 37 min  -AD      Total Timed Code Minutes- SLP 0 minute(s)  -AD      SLP Non-Billable Time (min) 0 min  -AD      SLP Received On 01/21/25  -AD         Untimed Charges    SLP Eval/Re-eval  ST Eval Oral Pharyng Swallow - 84744  -AD      [id number] Eval Oral Pharyng Swallow Minutes 37  -AD         Total Minutes    Untimed Charges Total Minutes 37  -AD       Total Minutes 37  -AD                User Key  (r) = Recorded By, (t) = Taken By, (c) = Cosigned By      Initials Name Provider Type    Shirlene Carrasquillo MS CCC-SLP Speech and Language Pathologist                    Therapy Charges for Today       Code Description Service Date Service Provider Modifiers Qty    [id number]  ST EVAL ORAL PHARYNG SWALLOW 2 1/21/2025 Shirlene Garza MS CCC-SLP GN 1                 Shirlene Garza MS CCC-SLP  1/21/2025

## 2025-01-21 NOTE — PLAN OF CARE
Goal Outcome Evaluation:  Plan of Care Reviewed With: patient           Outcome Evaluation: SLP: Pt demonstrates a functional oropharyngeal swallow with functional oral prep, transit and clearance of ice chips, thins by cup and straw and solids. Does take small bites of regular solid and increased prep time due to lack of dentition. Normal appearing pharyngeal timing and no suspected pharyngeal deficits. Pt does cough intermittently does not appear to be associated with po intake and cough/voice w/o changes before, during and after po trials. Impressions: Functional oropharyngeal swallow. Recommendations: Soft whole diet with thins, meds whole as tolerated, aspiration precautions. SLP to follow up for one visit for meal assessment for tolerance of recommended diet.    Anticipated Discharge Disposition (SLP): home          SLP Swallowing Diagnosis: swallow WFL/no suspected pharyngeal impairment (01/21/25 7099)

## 2025-01-22 ENCOUNTER — APPOINTMENT (OUTPATIENT)
Dept: GENERAL RADIOLOGY | Facility: HOSPITAL | Age: 50
End: 2025-01-22
Payer: COMMERCIAL

## 2025-01-22 LAB
ANION GAP SERPL CALCULATED.3IONS-SCNC: 9 MMOL/L (ref 5–15)
BUN SERPL-MCNC: 25 MG/DL (ref 6–20)
BUN/CREAT SERPL: 48.1 (ref 7–25)
CALCIUM SPEC-SCNC: 8.7 MG/DL (ref 8.6–10.5)
CHLORIDE SERPL-SCNC: 102 MMOL/L (ref 98–107)
CO2 SERPL-SCNC: 29 MMOL/L (ref 22–29)
CREAT SERPL-MCNC: 0.52 MG/DL (ref 0.57–1)
EGFRCR SERPLBLD CKD-EPI 2021: 112.6 ML/MIN/1.73
GLUCOSE BLDC GLUCOMTR-MCNC: 109 MG/DL (ref 70–130)
GLUCOSE BLDC GLUCOMTR-MCNC: 144 MG/DL (ref 70–130)
GLUCOSE BLDC GLUCOMTR-MCNC: 204 MG/DL (ref 70–130)
GLUCOSE SERPL-MCNC: 137 MG/DL (ref 65–99)
POTASSIUM SERPL-SCNC: 4.5 MMOL/L (ref 3.5–5.2)
SODIUM SERPL-SCNC: 140 MMOL/L (ref 136–145)

## 2025-01-22 PROCEDURE — 94664 DEMO&/EVAL PT USE INHALER: CPT

## 2025-01-22 PROCEDURE — 94799 UNLISTED PULMONARY SVC/PX: CPT

## 2025-01-22 PROCEDURE — 80048 BASIC METABOLIC PNL TOTAL CA: CPT | Performed by: INTERNAL MEDICINE

## 2025-01-22 PROCEDURE — 82948 REAGENT STRIP/BLOOD GLUCOSE: CPT

## 2025-01-22 PROCEDURE — 25010000002 ONDANSETRON PER 1 MG: Performed by: INTERNAL MEDICINE

## 2025-01-22 PROCEDURE — 63710000001 PREDNISONE PER 5 MG: Performed by: INTERNAL MEDICINE

## 2025-01-22 PROCEDURE — 63710000001 INSULIN LISPRO (HUMAN) PER 5 UNITS: Performed by: INTERNAL MEDICINE

## 2025-01-22 PROCEDURE — 94761 N-INVAS EAR/PLS OXIMETRY MLT: CPT

## 2025-01-22 PROCEDURE — 25010000002 ENOXAPARIN PER 10 MG: Performed by: STUDENT IN AN ORGANIZED HEALTH CARE EDUCATION/TRAINING PROGRAM

## 2025-01-22 PROCEDURE — 99232 SBSQ HOSP IP/OBS MODERATE 35: CPT | Performed by: INTERNAL MEDICINE

## 2025-01-22 PROCEDURE — 63710000001 INSULIN GLARGINE PER 5 UNITS: Performed by: STUDENT IN AN ORGANIZED HEALTH CARE EDUCATION/TRAINING PROGRAM

## 2025-01-22 RX ORDER — PREDNISONE 10 MG/1
40 TABLET ORAL
Status: DISCONTINUED | OUTPATIENT
Start: 2025-01-22 | End: 2025-01-23 | Stop reason: HOSPADM

## 2025-01-22 RX ORDER — POLYETHYLENE GLYCOL 3350 17 G/17G
17 POWDER, FOR SOLUTION ORAL DAILY PRN
Status: DISCONTINUED | OUTPATIENT
Start: 2025-01-22 | End: 2025-01-23 | Stop reason: HOSPADM

## 2025-01-22 RX ADMIN — AMOXICILLIN AND CLAVULANATE POTASSIUM 1 TABLET: 875; 125 TABLET, FILM COATED ORAL at 20:35

## 2025-01-22 RX ADMIN — MUPIROCIN 1 APPLICATION: 20 OINTMENT TOPICAL at 08:56

## 2025-01-22 RX ADMIN — VENLAFAXINE HYDROCHLORIDE 75 MG: 37.5 CAPSULE, EXTENDED RELEASE ORAL at 08:55

## 2025-01-22 RX ADMIN — ENOXAPARIN SODIUM 40 MG: 40 INJECTION SUBCUTANEOUS at 08:55

## 2025-01-22 RX ADMIN — IPRATROPIUM BROMIDE AND ALBUTEROL SULFATE 3 ML: .5; 3 SOLUTION RESPIRATORY (INHALATION) at 07:21

## 2025-01-22 RX ADMIN — BUTALBITAL, ACETAMINOPHEN, AND CAFFEINE 1 TABLET: 325; 50; 40 TABLET ORAL at 00:08

## 2025-01-22 RX ADMIN — FAMOTIDINE 20 MG: 10 INJECTION, SOLUTION INTRAVENOUS at 09:30

## 2025-01-22 RX ADMIN — METOPROLOL TARTRATE 50 MG: 50 TABLET, FILM COATED ORAL at 08:56

## 2025-01-22 RX ADMIN — ATORVASTATIN CALCIUM 20 MG: 20 TABLET, FILM COATED ORAL at 08:56

## 2025-01-22 RX ADMIN — INSULIN LISPRO 3 UNITS: 100 INJECTION, SOLUTION INTRAVENOUS; SUBCUTANEOUS at 18:52

## 2025-01-22 RX ADMIN — IPRATROPIUM BROMIDE AND ALBUTEROL SULFATE 3 ML: .5; 3 SOLUTION RESPIRATORY (INHALATION) at 10:58

## 2025-01-22 RX ADMIN — ASPIRIN 81 MG: 81 TABLET, COATED ORAL at 08:56

## 2025-01-22 RX ADMIN — AMOXICILLIN AND CLAVULANATE POTASSIUM 1 TABLET: 875; 125 TABLET, FILM COATED ORAL at 08:55

## 2025-01-22 RX ADMIN — IPRATROPIUM BROMIDE AND ALBUTEROL SULFATE 3 ML: .5; 3 SOLUTION RESPIRATORY (INHALATION) at 15:31

## 2025-01-22 RX ADMIN — CLOPIDOGREL BISULFATE 75 MG: 75 TABLET ORAL at 08:56

## 2025-01-22 RX ADMIN — PREDNISONE 40 MG: 10 TABLET ORAL at 08:56

## 2025-01-22 RX ADMIN — IPRATROPIUM BROMIDE AND ALBUTEROL SULFATE 3 ML: .5; 3 SOLUTION RESPIRATORY (INHALATION) at 20:06

## 2025-01-22 RX ADMIN — METOPROLOL TARTRATE 50 MG: 50 TABLET, FILM COATED ORAL at 20:35

## 2025-01-22 RX ADMIN — INSULIN GLARGINE 7 UNITS: 100 INJECTION, SOLUTION SUBCUTANEOUS at 08:56

## 2025-01-22 RX ADMIN — FAMOTIDINE 20 MG: 10 INJECTION, SOLUTION INTRAVENOUS at 20:36

## 2025-01-22 RX ADMIN — ONDANSETRON 4 MG: 2 INJECTION INTRAMUSCULAR; INTRAVENOUS at 00:08

## 2025-01-22 RX ADMIN — MUPIROCIN 1 APPLICATION: 20 OINTMENT TOPICAL at 20:36

## 2025-01-22 RX ADMIN — BUTALBITAL, ACETAMINOPHEN, AND CAFFEINE 1 TABLET: 325; 50; 40 TABLET ORAL at 20:35

## 2025-01-22 RX ADMIN — NICOTINE 1 PATCH: 21 PATCH, EXTENDED RELEASE TRANSDERMAL at 03:21

## 2025-01-22 RX ADMIN — BUTALBITAL, ACETAMINOPHEN, AND CAFFEINE 1 TABLET: 325; 50; 40 TABLET ORAL at 12:22

## 2025-01-22 NOTE — SIGNIFICANT NOTE
01/22/25 0905   Rehab Evaluation   Session Not Performed patient unavailable for treatment   Evaluation Not Performed, Comment Pt had already eaten breakfast. No concerns reported per pt or RN. Stable respiratory status at this time. Will f/u later.

## 2025-01-22 NOTE — PROGRESS NOTES
Adult Nutrition  Assessment/PES    Patient Name:  Valeria Rubi  YOB: 1975  MRN: [id number]  Admit Date:  1/18/2025    Assessment Date:  1/22/2025    Comments:  follow-up NFPE    Pt doesn't meet criteria for malnutrition at this time.    Declines Boost    Will add vanilla milkshake for lunch today per pt pref to provide extra nutrition that is easy to consume    Will cont to follow and monitor.      Reason for Assessment       Row Name 01/22/25 1106          Reason for Assessment    Reason For Assessment follow-up protocol     Diagnosis pulmonary disease;infection/sepsis  Acute on chronic resp failure, Malignancy concern hx DM     Identified At Risk by Screening Criteria MST SCORE 2+                    Nutrition/Diet History       Row Name 01/22/25 1106          Nutrition/Diet History    Typical Intake (Food/Fluid/EN/PN) Spoke w pt & RN/student in room. Pt reports appetite fine up until admit. reports small amount wt loss. denies issue chew/swallow. denies n/v. reports am meal didn't taste right. not big fan of pro but know she needs to eat even at home so she tries. she is mobile and does her own meals.                      Physical Findings       Row Name 01/22/25 1107          Physical Findings    Overall Physical Appearance Nutrition exam complete, no significant losses noted                      Nutrition Prescription Ordered       Row Name 01/22/25 1107          Nutrition Prescription PO    Current PO Diet Soft Texture     Texture Whole foods     Fluid Consistency Thin                         Problem/Interventions:   Problem 1       Row Name 01/22/25 110          Nutrition Diagnoses Problem 1    Problem 1 Other (comment)  Inadequate energy intake related to acute hypoxic resp failure as evidenced by recent vent/extubation                          Intervention Goal       Row Name 01/22/25 1101          Intervention Goal    General Provide information regarding MNT for treatment/condition      PO Establish PO;PO intake (%)     PO Intake % 50 %                    Nutrition Intervention       Row Name 01/22/25 1108          Nutrition Intervention    RD/Tech Action Encourage intake;Recommend/ordered;Supplement offered/refused;Follow Tx progress     Recommended/Ordered Supplement                      Education/Evaluation       Row Name 01/22/25 1108          Education    Education Other (comment)  Edu on nutrition exam for losses. Edu on easy pro. Edu on supplement.        Monitor/Evaluation    Monitor Per protocol;I&O;PO intake;Pertinent labs;Weight;Symptoms     Education Follow-up Other (comment)  verbalized understanding, declines boost agrees to reg vanilla shake today                     Electronically signed by:  Lila Hilton RD  01/22/25 11:09 EST

## 2025-01-22 NOTE — PLAN OF CARE
Goal Outcome Evaluation:  Plan of Care Reviewed With: patient        Progress: improving  Outcome Evaluation: Pt rested intermittently overnight. Continued to c/o migraine pain and mild nausea overnight; medicated per MAR. Pt up to BSC frequently overnight. VSS. am labs pending.

## 2025-01-22 NOTE — PROGRESS NOTES
[facility] INPATIENT PROGRESS NOTE         [facility] ICU    2025      PATIENT IDENTIFICATION:  Name: Valeria Rubi ADMIT: 2025   : 1975  PCP: Talib Ceron MD    MRN: [id number] LOS: 3 days   AGE/SEX: 49 y.o. female  ROOM: ICU2                     LOS 3    Reason for visit: critical care       SUBJECTIVE:      Successfully extubated yesterday.  Says that she feels that she is breathing much better.  Good air movement on auscultation and no wheezing.  Saturations 99% at time of visit on 2 L supplemental oxygen which was the amount of oxygen she uses at home at baseline.      Objective   OBJECTIVE:    Vital Sign Min/Max for last 24 hours  Temp  Min: 97.4 °F (36.3 °C)  Max: 98.4 °F (36.9 °C)   BP  Min: 111/67  Max: 153/85   Pulse  Min: 60  Max: 112   Resp  Min: 18  Max: 22   SpO2  Min: 89 %  Max: 100 %   No data recorded   No data recorded    Vitals:    25 0300 25 0400 25 0500 25 0600   BP: 144/80 153/85 146/81 145/87   BP Location: Left arm Left arm     Patient Position: Lying Lying     Pulse: 65 64 61 69   Resp: 18      Temp: 97.4 °F (36.3 °C)      TempSrc: Oral      SpO2: 98% 98% 98% 98%   Weight:       Height:                25  0001   Weight: 55.8 kg (123 lb)       Body mass index is 22.49 kg/m².                    FiO2 (%): 27 %     Body mass index is 22.49 kg/m².    Intake/Output Summary (Last 24 hours) at 2025 0726  Last data filed at 2025 0634  Gross per 24 hour   Intake 720 ml   Output 1550 ml   Net -830 ml         Exam:  GEN:  No distress, appears stated age.   EYES:   PERRL, anicteric sclerae  ENT:    External ears/nose normal, OP clear  NECK:  No adenopathy, midline trachea  LUNGS: Normal chest on inspection, palpation and auscultation  CV:  Normal S1S2, without murmur  ABD:  Nontender, nondistended, no hepatosplenomegaly, +BS  EXT:  No edema.  No cyanosis or clubbing.  No mottling and normal cap refill.    Assessment      Scheduled meds:  amoxicillin-clavulanate, 1 tablet, Oral, Q12H  aspirin, 81 mg, Oral, Daily  atorvastatin, 20 mg, Oral, Daily  clopidogrel, 75 mg, Oral, Daily  enoxaparin, 40 mg, Subcutaneous, Daily  Famotidine (PF), 20 mg, Intravenous, BID  insulin glargine, 7 Units, Subcutaneous, Daily  insulin lispro, 2-7 Units, Subcutaneous, 4x Daily With Meals & Nightly  ipratropium-albuterol, 3 mL, Nebulization, 4x Daily - RT  methylPREDNISolone sodium succinate, 40 mg, Intravenous, Q12H  metoprolol tartrate, 50 mg, Oral, Q12H  mupirocin, 1 Application, Each Nare, BID  nicotine, 1 patch, Transdermal, Q24H  venlafaxine XR, 75 mg, Oral, Daily      IV meds:                      dexmedetomidine, 0.2-1.5 mcg/kg/hr, Last Rate: Stopped (01/21/25 1131)      Data Review:  Results from last 7 days   Lab Units 01/22/25  0336 01/21/25  0508 01/20/25  0539 01/19/25  0545 01/19/25  0122   SODIUM mmol/L 140 139 143 142 141   POTASSIUM mmol/L 4.5 4.4 4.8 4.9 5.0   CHLORIDE mmol/L 102 103 105 100 100   CO2 mmol/L 29.0 25.7 28.7 35.4* 31.2*   BUN mg/dL 25* 26* 27* 14 13   CREATININE mg/dL 0.52* 0.64 0.69 0.58 0.46*   GLUCOSE mg/dL 137* 153* 138* 172* 206*   CALCIUM mg/dL 8.7 8.7 8.6 9.0 8.9         Estimated Creatinine Clearance: 112.7 mL/min (A) (by C-G formula based on SCr of 0.52 mg/dL (L)).  Results from last 7 days   Lab Units 01/21/25  0508 01/20/25  0539 01/19/25  0545 01/19/25  0204   WBC 10*3/mm3 12.11* 12.81* 11.88* 12.28*   HEMOGLOBIN g/dL 12.5 11.8* 12.2 13.0   PLATELETS 10*3/mm3 204 211 258 319         Results from last 7 days   Lab Units 01/19/25  0545 01/19/25  0122   ALT (SGPT) U/L 11 9   AST (SGOT) U/L 19 15     Results from last 7 days   Lab Units 01/21/25  0820 01/21/25  0535 01/19/25  0703 01/19/25  0115   PH, ARTERIAL pH units 7.460* 7.562* 7.321* 7.190*   PO2 ART mm Hg 65.4* 91.2 88.1 72.6*   PCO2, ARTERIAL mm Hg 40.6 30.0* 77.8* >98.3*   HCO3 ART mmol/L 28.8* 27.0* 40.2*  --      Results from last 7 days   Lab Units  01/20/25  0539 01/19/25  0545 01/19/25  0018   PROCALCITONIN ng/mL 1.57* 2.91*  --    LACTATE mmol/L  --   --  1.1         Glucose   Date/Time Value Ref Range Status   01/21/2025 2011 119 70 - 130 mg/dL Final   01/21/2025 1733 147 (H) 70 - 130 mg/dL Final   01/21/2025 1238 149 (H) 70 - 130 mg/dL Final   01/21/2025 0506 151 (H) 70 - 130 mg/dL Final   01/21/2025 0017 154 (H) 70 - 130 mg/dL Final   01/20/2025 1802 141 (H) 70 - 130 mg/dL Final   01/20/2025 1244 147 (H) 70 - 130 mg/dL Final         Imaging reviewed  Chest x-ray 1/21 reviewed          Microbiology reviewed  RVP negative  MRSA screen negative  Gram-positive bacilli blood cultures from 1/19          Active Hospital Problems    Diagnosis  POA    **COPD exacerbation [J44.1]  Yes    Acute on chronic respiratory failure with hypoxia and hypercapnia [J96.21, J96.22]  Yes      Resolved Hospital Problems   No resolved problems to display.         ASSESSMENT:  Acute on chronic hypercapnic and hypoxic respiratory failure  Acute rhinovirus bronchitis causing COPD exacerbation  COPD exacerbation  Recent RSV pneumonia within the past 30 days  Ongoing tobacco abuse  Sepsis present on admission  Hyperglycemia  Diabetes type 2      PLAN:  Successfully extubated and doing well.  Bronchodilators and steroid taper changing to po.  Wean oxygen as able.    Continue antibiotics.    Repeat CT chest for follow-up post treatment for nodular infiltrates.  Could potentially be neoplastic process underlying.  Close follow-up in our office postdischarge recommended.  Will follow peripherally while here and set up out pt f/u in our office. Please call with questions.         Silverio Church MD  Pulmonary and Critical Care Medicine  [facility], [facility]  1/22/2025    07:26 EST

## 2025-01-22 NOTE — PROGRESS NOTES
"Hospital Medicine Team    LOS 3 days      Patient Care Team:  Talib Ceron MD as PCP - General (Family Medicine)      Subjective       Chief Complaint:  f/u shortness of breath     Subjective    Extubated yesterday reports he is mildly short of breath today but overall improved she is slightly anxious    Objective       Vital Signs  Temp:  [97.4 °F (36.3 °C)-98.4 °F (36.9 °C)] 98.1 °F (36.7 °C)  Heart Rate:  [] 74  Resp:  [18-24] 24  BP: (111-153)/(67-96) 151/85  Oxygen Therapy  SpO2: 98 %  Pulse Oximetry Type: Continuous  Device (Oxygen Therapy): nasal cannula  Flow (L/min) (Oxygen Therapy): 2  Oxygen Concentration (%): 30  ETCO2 (mmHg): 27 mmHg  Flowsheet Rows      Flowsheet Row First Filed Value   Admission Height 157.5 cm (62.01\") Documented at 01/19/2025 0001   Admission Weight 55.8 kg (123 lb) Documented at 01/19/2025 0001                Physical Exam:  Physical Exam  Vitals reviewed.   Constitutional:       General: She is not in acute distress.     Appearance: She is not ill-appearing.   Cardiovascular:      Rate and Rhythm: Normal rate.   Pulmonary:      Breath sounds: Decreased breath sounds and wheezing present.   Abdominal:      Tenderness: There is no abdominal tenderness.   Neurological:      Mental Status: Mental status is at baseline.   Psychiatric:         Mood and Affect: Mood is anxious.           Results Review:    I reviewed the patient's new clinical results.    [x]  Laboratory  [x]  Microbiology  []  Radiology  []  EKG/Telemetry   []  Cardiology/Vascular   []  Pathology  []  Old records  []  Other:      X-rays, labs reviewed personally by physician.    Medication Review:   I have reviewed the patient's current medication list    Scheduled Meds  amoxicillin-clavulanate, 1 tablet, Oral, Q12H  aspirin, 81 mg, Oral, Daily  atorvastatin, 20 mg, Oral, Daily  clopidogrel, 75 mg, Oral, Daily  enoxaparin, 40 mg, Subcutaneous, Daily  Famotidine (PF), 20 mg, Intravenous, BID  insulin " "glargine, 7 Units, Subcutaneous, Daily  insulin lispro, 2-7 Units, Subcutaneous, 4x Daily With Meals & Nightly  ipratropium-albuterol, 3 mL, Nebulization, 4x Daily - RT  metoprolol tartrate, 50 mg, Oral, Q12H  mupirocin, 1 Application, Each Nare, BID  nicotine, 1 patch, Transdermal, Q24H  predniSONE, 40 mg, Oral, Daily With Breakfast  venlafaxine XR, 75 mg, Oral, Daily        Meds Infusions  dexmedetomidine, 0.2-1.5 mcg/kg/hr, Last Rate: Stopped (01/21/25 1131)        Meds PRN    acetaminophen    butalbital-acetaminophen-caffeine    dextrose    dextrose    glucagon (human recombinant)    ipratropium-albuterol    ondansetron    [COMPLETED] Insert Peripheral IV **AND** sodium chloride        Assessment / Plan       Active Hospital Problems:  Active Hospital Problems    Diagnosis  POA    **COPD exacerbation [J44.1]  Yes    Acute on chronic respiratory failure with hypoxia and hypercapnia [J96.21, J96.22]  Yes      Resolved Hospital Problems   No resolved problems to display.     Acute on chronic hypoxemic hypercapnic respiratory failure-  Acute exacerbation of COPD-  -Intubated 1/19, extubated 1/21  -BCx 1/19: 1 out of 4 with bacillus spp likely contaminant  -stop  vancomycin MRSA nare neg  -Sputum culture grew Moraxella catarrhalis  -Changed to Augmentin based on sensitivities to complete course  -Continue IV steroids  -Continue pulmonary toilet  -Pulmonology followed: Follow-up with him in office will need repeat CT scan as noted above         Concern for lung/bronchogenic malignancy on CTA-  CTA obtained last admission due to rib pain from cough revealed the following findings:  \"2.5 cm patchy opacity medial RUL  1.5 cm mass/consolidation RLL  Pleural based consolidation RLL 6 cm x 1.5 cm  Lateral left LL consolidation 1.5 cm x 1.5 cm\"  -Continue to recommend close outpatient CT posttreatment for nodular infiltrates vs PET and will need katya close f/u in pulm office per their note      Chronic tobacco dependence  - " continue home nicotine patch therapy     Type 2 DM  -Continue basal and SSI  Follow and adjust with Accu-Cheks-     HTN  -Chronic hold home ACE for now  -Continue IV metoprolol while intubated and transition to p.o.     CAD-   PVD-  Hyperlipidemia-  -Continue home ASA, statin, plavix     Mood d/o-   -resume home effexor 24hr capsule (can't give ER via OG/NG tube)      Plan for disposition: Can downgrade to [facility] today.  If remains stable possible discharge 24 hrs    Electronically signed by Ravinder Estrella DO, 01/22/25, 11:32 EST.        Note disclaimer: At [facility], we believe that sharing information builds trust and better relationships. You are receiving this note because you recently visited [facility]. It is possible you will see health information before a provider has talked with you about it. This kind of information can be easy to misunderstand. To help you fully understand what it means for your health, we urge you to discuss this note with your provider.

## 2025-01-22 NOTE — SIGNIFICANT NOTE
01/22/25 1245   Rehab Evaluation   Session Not Performed patient unavailable for treatment;other (see comments)   Evaluation Not Performed, Comment Already eaten lunch. Discussed with RN, Reed, no noted concerns with swallow during meal. No new respriratory concerns and down to 2L O2 via NC. Will check tomorrow.

## 2025-01-23 ENCOUNTER — READMISSION MANAGEMENT (OUTPATIENT)
Dept: CALL CENTER | Facility: HOSPITAL | Age: 50
End: 2025-01-23
Payer: COMMERCIAL

## 2025-01-23 VITALS
SYSTOLIC BLOOD PRESSURE: 159 MMHG | DIASTOLIC BLOOD PRESSURE: 79 MMHG | RESPIRATION RATE: 24 BRPM | BODY MASS INDEX: 22.63 KG/M2 | WEIGHT: 123 LBS | HEIGHT: 62 IN | HEART RATE: 63 BPM | TEMPERATURE: 97.5 F | OXYGEN SATURATION: 99 %

## 2025-01-23 LAB
GLUCOSE BLDC GLUCOMTR-MCNC: 100 MG/DL (ref 70–130)
GLUCOSE BLDC GLUCOMTR-MCNC: 92 MG/DL (ref 70–130)

## 2025-01-23 PROCEDURE — 94664 DEMO&/EVAL PT USE INHALER: CPT

## 2025-01-23 PROCEDURE — 63710000001 PREDNISONE PER 5 MG: Performed by: INTERNAL MEDICINE

## 2025-01-23 PROCEDURE — 25010000002 ENOXAPARIN PER 10 MG: Performed by: STUDENT IN AN ORGANIZED HEALTH CARE EDUCATION/TRAINING PROGRAM

## 2025-01-23 PROCEDURE — 63710000001 INSULIN GLARGINE PER 5 UNITS: Performed by: STUDENT IN AN ORGANIZED HEALTH CARE EDUCATION/TRAINING PROGRAM

## 2025-01-23 PROCEDURE — 94799 UNLISTED PULMONARY SVC/PX: CPT

## 2025-01-23 PROCEDURE — 82948 REAGENT STRIP/BLOOD GLUCOSE: CPT

## 2025-01-23 PROCEDURE — 99239 HOSP IP/OBS DSCHRG MGMT >30: CPT | Performed by: FAMILY MEDICINE

## 2025-01-23 RX ORDER — FAMOTIDINE 20 MG/1
20 TABLET, FILM COATED ORAL
Qty: 14 TABLET | Refills: 0 | Status: SHIPPED | OUTPATIENT
Start: 2025-01-23 | End: 2025-01-30

## 2025-01-23 RX ORDER — PREDNISONE 20 MG/1
TABLET ORAL
Qty: 6 TABLET | Refills: 0 | Status: SHIPPED | OUTPATIENT
Start: 2025-01-24 | End: 2025-01-30

## 2025-01-23 RX ORDER — FAMOTIDINE 20 MG/1
20 TABLET, FILM COATED ORAL
Status: DISCONTINUED | OUTPATIENT
Start: 2025-01-23 | End: 2025-01-23 | Stop reason: HOSPADM

## 2025-01-23 RX ORDER — LOPERAMIDE HYDROCHLORIDE 2 MG/1
2 CAPSULE ORAL 4 TIMES DAILY PRN
Status: DISCONTINUED | OUTPATIENT
Start: 2025-01-23 | End: 2025-01-23 | Stop reason: HOSPADM

## 2025-01-23 RX ORDER — BUDESONIDE AND FORMOTEROL FUMARATE DIHYDRATE 160; 4.5 UG/1; UG/1
2 AEROSOL RESPIRATORY (INHALATION)
Status: DISCONTINUED | OUTPATIENT
Start: 2025-01-23 | End: 2025-01-23 | Stop reason: HOSPADM

## 2025-01-23 RX ORDER — LOPERAMIDE HYDROCHLORIDE 2 MG/1
2 CAPSULE ORAL 4 TIMES DAILY PRN
Qty: 10 CAPSULE | Refills: 0 | Status: SHIPPED | OUTPATIENT
Start: 2025-01-23

## 2025-01-23 RX ADMIN — METOPROLOL TARTRATE 50 MG: 50 TABLET, FILM COATED ORAL at 08:16

## 2025-01-23 RX ADMIN — LOPERAMIDE HYDROCHLORIDE 2 MG: 2 CAPSULE ORAL at 09:37

## 2025-01-23 RX ADMIN — VENLAFAXINE HYDROCHLORIDE 75 MG: 37.5 CAPSULE, EXTENDED RELEASE ORAL at 08:16

## 2025-01-23 RX ADMIN — IPRATROPIUM BROMIDE AND ALBUTEROL SULFATE 3 ML: .5; 3 SOLUTION RESPIRATORY (INHALATION) at 07:35

## 2025-01-23 RX ADMIN — MUPIROCIN 1 APPLICATION: 20 OINTMENT TOPICAL at 08:16

## 2025-01-23 RX ADMIN — CLOPIDOGREL BISULFATE 75 MG: 75 TABLET ORAL at 08:16

## 2025-01-23 RX ADMIN — IPRATROPIUM BROMIDE AND ALBUTEROL SULFATE 3 ML: .5; 3 SOLUTION RESPIRATORY (INHALATION) at 10:57

## 2025-01-23 RX ADMIN — BUDESONIDE AND FORMOTEROL FUMARATE DIHYDRATE 2 PUFF: 160; 4.5 AEROSOL RESPIRATORY (INHALATION) at 10:51

## 2025-01-23 RX ADMIN — ATORVASTATIN CALCIUM 20 MG: 20 TABLET, FILM COATED ORAL at 08:16

## 2025-01-23 RX ADMIN — NICOTINE 1 PATCH: 21 PATCH, EXTENDED RELEASE TRANSDERMAL at 03:43

## 2025-01-23 RX ADMIN — ASPIRIN 81 MG: 81 TABLET, COATED ORAL at 08:16

## 2025-01-23 RX ADMIN — INSULIN GLARGINE 7 UNITS: 100 INJECTION, SOLUTION SUBCUTANEOUS at 08:16

## 2025-01-23 RX ADMIN — FAMOTIDINE 20 MG: 10 INJECTION, SOLUTION INTRAVENOUS at 08:16

## 2025-01-23 RX ADMIN — AMOXICILLIN AND CLAVULANATE POTASSIUM 1 TABLET: 875; 125 TABLET, FILM COATED ORAL at 08:16

## 2025-01-23 RX ADMIN — ENOXAPARIN SODIUM 40 MG: 40 INJECTION SUBCUTANEOUS at 08:15

## 2025-01-23 RX ADMIN — PREDNISONE 40 MG: 10 TABLET ORAL at 08:15

## 2025-01-23 NOTE — CASE MANAGEMENT/SOCIAL WORK
Case Management Discharge Note      Final Note: dc home         Selected Continued Care - Discharged on 1/23/2025 Admission date: 1/18/2025 - Discharge disposition: Home or Self Care      Destination    No services have been selected for the patient.                Durable Medical Equipment    No services have been selected for the patient.                Dialysis/Infusion    No services have been selected for the patient.                Home Medical Care    No services have been selected for the patient.                Therapy    No services have been selected for the patient.                Community Resources    No services have been selected for the patient.                Community & DME    No services have been selected for the patient.                    Selected Continued Care - Prior Encounters Includes continued care and service providers with selected services from prior encounters from 10/20/2024 to 1/23/2025      Discharged on 1/1/2025 Admission date: 12/30/2024 - Discharge disposition: Home or Self Care      Durable Medical Equipment       Service Provider Services Address Phone Fax Patient Preferred    [facility] [address], [place] [id number] [phone] --                               Final Discharge Disposition Code: 01 - home or self-care

## 2025-01-23 NOTE — SIGNIFICANT NOTE
01/23/25 0928   Rehab Evaluation   Session Not Performed patient/family declined treatment   Evaluation Not Performed, Comment Pt state her stomach is hurting and declines PO at this time. RN aware.

## 2025-01-23 NOTE — DISCHARGE SUMMARY
"Valeria Rubi  1975  [id number]    Hospitalists Discharge Summary    Date of Admission: 1/18/2025  Date of Discharge:  1/23/2025    History of Present Illness from [facility] on admit:      50 y/o female with hx of COPD and chronic hypoxic respiratory failure on 2 L NC, was admitted to [facility] on 1/19 for acute on chronic hypoxic and hypercapnic respiratory failure.  She was placed on BIPAP initially in the ED, but failed to improve, and CO2 was elevated >90, so she was intubated.  See below for details of her hospitalization:      Acute on chronic hypoxemic hypercapnic respiratory failure-  Acute exacerbation of COPD-  -Intubated 1/19, extubated 1/21  -BCx 1/19: 1 out of 4 with bacillus spp likely contaminant  -s/p empiric  vancomycin; d/colin as MRSA nare neg  -Sputum culture grew Moraxella catarrhalis  -Changed antibiotics to Augmentin; d/c with Rx for Augmentin to complete course   -s/p IV steroids; transition to oral steroid taper   -doing better today; sating well on home rate of 2 L NC  -Pulmonology followed: Follow-up with him in office will need repeat CT scan (see below)          Concern for lung/bronchogenic malignancy on CTA-  CTA obtained last admission due to rib pain from cough revealed the following findings:  \"2.5 cm patchy opacity medial RUL  1.5 cm mass/consolidation RLL  Pleural based consolidation RLL 6 cm x 1.5 cm  Lateral left LL consolidation 1.5 cm x 1.5 cm\"  Patient will need close outpatient follow-up with Pulmonologist for repeat imaging and potentially further work-up of this possible lung mass; recommend f/u in 2-3 weeks or next available appointment      Chronic tobacco dependence  - continue home nicotine patch therapy  -encouraged cessation      Type 2 DM  -Continue home basal insulin  Follow up with PCP as an outpatient to monitor      HTN  -resume home lisinopril and metoprolol      CAD-   PVD-  Hyperlipidemia-  -Continue home ASA, statin, plavix     Mood Disorder "   -continue home Effexor      Patient is doing well, and medically stable for discharge home today.      PCP  Patient Care Team:  Talib Ceron MD as PCP - General (Family Medicine)    Consults:   Consults       Date and Time Order Name Status Description    1/19/2025  8:34 AM Inpatient Pulmonology Consult Completed             Operations and Procedures Performed:       XR Chest 1 View    Result Date: 1/21/2025  Narrative: XR CHEST 1 VW Date of Exam: 1/21/2025 5:40 AM EST Indication: Vent VENT Comparison: 1/20/2025 Findings: Endotracheal tube overlies mid thoracic trachea. Unchanged cardiomediastinal silhouette. No new focal airspace consolidation, pleural effusion, or pneumothorax. No acute bone abnormality.     Impression: Impression: No significant interval change. Electronically Signed: Donal Mullen MD  1/21/2025 8:26 AM EST  Workstation ID: [id number]    XR Chest 1 View    Result Date: 1/20/2025  Narrative: XR CHEST 1 VW Date of Exam: 1/20/2025 6:02 AM EST Indication: Respiratory failure Respiratory failure Comparison: 1/19/2025 at 0628 hours Findings: The endotracheal tube is stable in position. No new infiltrates or pleural effusions are seen. The cardiac silhouette and mediastinum are stable.     Impression: Impression: 1.Stable positioning of the endotracheal tube. 2.No new infiltrate or pleural effusion is seen. Electronically Signed: Deric Morales MD  1/20/2025 8:17 AM EST  Workstation ID: [id number]    XR Chest 1 View    Result Date: 1/19/2025  Narrative: XR CHEST 1 VW Date of Exam: 1/19/2025 6:24 AM EST Indication: Follow up chest x-ray for Roderick morning 1/19 7am Comparison: AP chest x-ray 1/19/2025 at 1:44 a.m., right rib series 12/31/2024, CT chest 12/30/2024, AP chest x-ray 1/25/2024 Findings: Tip of the endotracheal tube terminates in the midthoracic trachea. Lungs are well expanded. There are stable mild patchy opacities in both lung bases. No pneumothorax or large pleural effusion is seen.  Heart size is normal.     Impression: Impression: 1.Stable mild patchy bibasilar opacities, concerning for pneumonia when correlated with 12/30/2024 chest CT. 2.Tip of the endotracheal tube terminates in the midthoracic trachea. Electronically Signed: Irena Poole  1/19/2025 11:38 AM EST  Workstation ID: [id number]    XR Chest 1 View    Result Date: 1/19/2025  Narrative: XR CHEST 1 VW Date of Exam: 1/19/2025 1:40 AM EST Indication: post intubation Comparison: 1/19/2025. Findings: Endotracheal tube tip in the mid trachea. There are no airspace consolidations. No pleural fluid. No pneumothorax. The pulmonary vasculature appears within normal limits. The cardiac and mediastinal silhouette appear unremarkable. No acute osseous abnormality identified.     Impression: Impression: No acute cardiopulmonary process. Endotracheal tube tip in the mid trachea. Electronically Signed: Lianna Wiseman MD  1/19/2025 1:55 AM EST  Workstation ID: [id number]    XR Chest 1 View    Result Date: 1/19/2025  Narrative: XR CHEST 1 VW Date of Exam: 1/19/2025 12:42 AM EST Indication: cough Comparison: 1/16/2025, 12/31/2024. Findings: There are no airspace consolidations. No pleural fluid. No pneumothorax. The pulmonary vasculature appears within normal limits. The cardiac and mediastinal silhouette appear unremarkable. No acute osseous abnormality identified.     Impression: Impression: No acute cardiopulmonary process. Electronically Signed: Lianna Wiseman MD  1/19/2025 12:56 AM EST  Workstation ID: [id number]    XR Chest 1 View    Result Date: 1/17/2025  Narrative: XR CHEST 1 VW Date of Exam: 1/16/2025 11:41 PM EST Indication: cough Comparison: 12/31/2024 and chest CT 12/30/2024. Findings: There is no pneumothorax, pleural effusion or focal airspace consolidation. Heart size and pulmonary vasculature appear within normal limits. Regional bones appear intact. Stable sequela of emphysema.     Impression: Impression: Emphysema without acute  cardiopulmonary abnormality. Electronically Signed: Donal Mix MD  1/17/2025 12:18 AM EST  Workstation ID: [id number]    XR Ribs Right With PA Chest    Result Date: 12/31/2024  Narrative: XR RIBS RIGHT W PA CHEST Date of Exam: 12/31/2024 1:51 PM EST Indication: Continued rib pain Comparison: CT chest of 12/30/2024 Findings: 5 films. There are no acute or old rib fractures. There are patchy infiltrates in the lung bases which were identified on the CT exam and better visualized on that exam. Lung fields are well inflated. There are no effusions. The heart size is normal.     Impression: Impression: No abnormality of the right ribs. Patchy bibasilar infiltrates, may represent pneumonia. Please see CT chest exam report of 12/30/2024 for further description of abnormalities. Electronically Signed: Noris Mireles MD  12/31/2024 3:19 PM EST  Workstation ID: [id number]    CT Angiogram Chest    Result Date: 12/30/2024  Narrative: CT ANGIOGRAM CHEST Date of Exam: 12/30/2024 8:43 PM EST Indication: chest pain, tachycardia, hypoxia. Comparison: CXR 1/25/2024, CT chest 1/17/2024 Technique: CTA of the chest was performed after the uneventful intravenous administration of iodinated contrast. Reconstructed coronal and sagittal images were also obtained. In addition, a 3-D volume rendered image was created for interpretation. Automated exposure control and iterative reconstruction methods were used. Findings: The pulmonary arteries are well opacified. There are no findings of PE. Lung window images reveal 2.5 cm patchy opacity in the medial right upper lobe seen on series 5 image 62. A 1.5 cm mass or consolidation is seen in the right lower lobe on series 5 image 91. Pleural-based consolidation in the lateral right lower lobe measures 6 cm x 1.5 cm. 1.5 cm x 1.5 cm area of consolidation is seen in the lateral left lower lobe. Findings could represent multifocal pneumonia. The patient has a significant smoking history. Findings are  concerning for bronchogenic malignancy or lung cancer. Pulmonary consultation is recommended. Follow-up CT scan of the chest in 3 months is recommended, if more aggressive evaluation is not undertaken. Mediastinal windows reveal no mediastinal, hilar, or axillary adenopathy. Extensive coronary artery calcifications are evident. Upper abdominal structures have an unremarkable appearance. Degenerative changes are seen in the thoracic spine.     Impression: Impression: CT scan of the chest with IV contrast demonstrating no findings of PE. Multiple areas of consolidation and/or masses are seen in the lungs. Findings are consistent with multifocal pneumonia. The patient has a significant smoking history, with emphysema. Close follow-up is recommended with CT scan of the chest in 3 months. Alternatively, a PET scan could be obtained. Electronically Signed: Larry Berrios MD  12/30/2024 9:48 PM EST  Workstation ID: [id number]     Allergies:  has no active allergies.    Marcelo      Discharge Medications:     Discharge Medications        New Medications        Instructions Start Date   amoxicillin-clavulanate 875-125 MG per tablet  Commonly known as: AUGMENTIN   1 tablet, Oral, Every 12 Hours Scheduled      famotidine 20 MG tablet  Commonly known as: PEPCID   20 mg, Oral, 2 Times Daily Before Meals      loperamide 2 MG capsule  Commonly known as: IMODIUM   2 mg, Oral, 4 Times Daily PRN      predniSONE 20 MG tablet  Commonly known as: DELTASONE   Take 1.5 tablets by mouth Daily With Breakfast for 2 days, THEN 1 tablet Daily With Breakfast for 2 days, THEN 0.5 tablets Daily With Breakfast for 2 days.   Start Date: January 24, 2025            Continue These Medications        Instructions Start Date   acetaminophen 500 MG tablet  Commonly known as: TYLENOL   1,000 mg, Oral, Every 6 Hours PRN      aspirin 81 MG EC tablet   81 mg, Oral, Daily      budesonide-formoterol 160-4.5 MCG/ACT inhaler  Commonly known as: SYMBICORT   2  puffs, 2 Times Daily - RT      clopidogrel 75 MG tablet  Commonly known as: Plavix   75 mg, Oral, Daily      fluticasone 50 MCG/ACT nasal spray  Commonly known as: FLONASE   1 spray, Daily PRN      insulin detemir 100 UNIT/ML injection  Commonly known as: LEVEMIR   15 Units, Subcutaneous, Daily, Until steroids are completed      ipratropium-albuterol 0.5-2.5 mg/3 ml nebulizer  Commonly known as: DUO-NEB   3 mL, Nebulization, Every 4 Hours PRN      lisinopril 20 MG tablet  Commonly known as: PRINIVIL,ZESTRIL   20 mg, Oral, Every 24 Hours Scheduled      metoprolol tartrate 50 MG tablet  Commonly known as: LOPRESSOR   50 mg, Oral, Every 12 Hours Scheduled      nicotine 21 MG/24HR patch  Commonly known as: NICODERM CQ   1 patch, Transdermal, Every 24 Hours      simvastatin 40 MG tablet  Commonly known as: ZOCOR   40 mg, Oral, Every Evening      venlafaxine XR 75 MG 24 hr capsule  Commonly known as: EFFEXOR-XR   TAKE 1 CAPSULE BY MOUTH EVERY DAY             Stop These Medications      albuterol sulfate  (90 Base) MCG/ACT inhaler  Commonly known as: Proventil HFA     benzonatate 200 MG capsule  Commonly known as: TESSALON     guaiFENesin 600 MG 12 hr tablet  Commonly known as: MUCINEX     nystatin 100,000 unit/mL suspension  Commonly known as: MYCOSTATIN     prednisoLONE sodium phosphate 15 MG/5ML solution  Commonly known as: ORAPRED     promethazine 12.5 MG tablet  Commonly known as: PHENERGAN              Last Lab Results:   Lab Results (most recent)       Procedure Component Value Units Date/Time    Blood Culture - Blood, Arm, Right [[id number]]  (Normal) Collected: 01/19/25 0018    Specimen: Blood from Arm, Right Updated: 01/23/25 1300     Blood Culture No growth at 4 days    POC Glucose Once [[id number]]  (Normal) Collected: 01/23/25 1111    Specimen: Blood Updated: 01/23/25 1120     Glucose 100 mg/dL     POC Glucose Once [[id number]]  (Normal) Collected: 01/23/25 0814    Specimen: Blood Updated: 01/23/25 0820      Glucose 92 mg/dL     Basic Metabolic Panel [[id number]]  (Abnormal) Collected: 01/22/25 0336    Specimen: Blood from Arm, Right Updated: 01/22/25 0410     Glucose 137 mg/dL      BUN 25 mg/dL      Creatinine 0.52 mg/dL      Sodium 140 mmol/L      Potassium 4.5 mmol/L      Chloride 102 mmol/L      CO2 29.0 mmol/L      Calcium 8.7 mg/dL      BUN/Creatinine Ratio 48.1     Anion Gap 9.0 mmol/L      eGFR 112.6 mL/min/1.73     Narrative:      GFR Categories in Chronic Kidney Disease (CKD)      GFR Category          GFR (mL/min/1.73)    Interpretation  G1                     90 or greater         Normal or high (1)  G2                      60-89                Mild decrease (1)  G3a                   45-59                Mild to moderate decrease  G3b                   30-44                Moderate to severe decrease  G4                    15-29                Severe decrease  G5                    14 or less           Kidney failure          (1)In the absence of evidence of kidney disease, neither GFR category G1 or G2 fulfill the criteria for CKD.    eGFR calculation 2021 CKD-EPI creatinine equation, which does not include race as a factor    Respiratory Culture - Sputum, ET Suction [[id number]]  (Abnormal) Collected: 01/19/25 0235    Specimen: Sputum from ET Suction Updated: 01/21/25 1226     Respiratory Culture Heavy growth (4+) Moraxella catarrhalis     Comment: This organism is Beta-lactamase positive and is predictably susceptible to ampicillin-sulbactam or amoxicillin-clavulanate.         Scant growth (1+) Normal Respiratory July     Gram Stain Many (4+) WBCs observed - predominantly neutrophils      Many (4+) Gram negative cocci    Blood Gas, Arterial - [[id number]]  (Abnormal) Collected: 01/21/25 0820    Specimen: Arterial Blood Updated: 01/21/25 0830     Site Right Radial     Marquis's Test Positive     pH, Arterial 7.460 pH units      Comment: 83 Value above reference range        pCO2, Arterial 40.6 mm Hg       pO2, Arterial 65.4 mm Hg      Comment: 84 Value below reference range        HCO3, Arterial 28.8 mmol/L      Comment: 83 Value above reference range        Base Excess, Arterial 4.5 mmol/L      Comment: 83 Value above reference range        O2 Saturation, Arterial 93.4 %      Comment: 84 Value below reference range        Hemoglobin, Blood Gas 13.0 g/dL      Comment: 84 Value below reference range        Temperature 37.0     Barometric Pressure for Blood Gas 755 mmHg      Modality Ventilator     FIO2 30 %      Ventilator Mode PS     PEEP 5.0     PSV 10.0 cmH2O      Collected by [id number]     Comment: Meter: [id number]     :  [id number]        pCO2, Temperature Corrected 40.6 mm Hg      pH, Temp Corrected 7.460 pH Units      pO2, Temperature Corrected 65.4 mm Hg      PO2/FIO2 218    Blood Culture - Blood, Arm, Left [[id number]]  (Abnormal) Collected: 01/19/25 0018    Specimen: Blood from Arm, Left Updated: 01/21/25 0733     Blood Culture Bacillus species     Comment:   By laboratory criteria, this organism is considered a contaminant. Clinical correlation is recommended. Most Bacillus species are susceptible to vancomycin, doxycycline, fluoroquinolones, and aminoglycosides.        Isolated from Aerobic Bottle     Gram Stain Aerobic Bottle Gram positive bacilli    Narrative:      Probable contaminant requires clinical correlation, susceptibility not performed unless requested by physician.    Blood culture does not meet the specified criteria for PCR identification.  If pregnant, immunocompromised, or clinical concern for meningitis, call lab to run BCID for Listeria monocytogenes.    Blood Gas, Arterial - [[id number]]  (Abnormal) Collected: 01/21/25 0535    Specimen: Arterial Blood Updated: 01/21/25 0542     Site Left Brachial     Marquis's Test N/A     pH, Arterial 7.562 pH units      Comment: 83 Value above reference range        pCO2, Arterial 30.0 mm Hg      Comment: 84 Value below reference range         pO2, Arterial 91.2 mm Hg      HCO3, Arterial 27.0 mmol/L      Comment: 83 Value above reference range        Base Excess, Arterial 5.3 mmol/L      Comment: 83 Value above reference range        O2 Saturation, Arterial 98.7 %      Hemoglobin, Blood Gas 13.0 g/dL      Comment: 84 Value below reference range        Temperature 37.0     Barometric Pressure for Blood Gas 755 mmHg      Modality Ventilator     FIO2 30 %      Ventilator Mode PC/AC     Set Mech Resp Rate 20.0     PEEP 5.0     Collected by [id number]     Comment: Meter: [id number]     :  [id number]        pCO2, Temperature Corrected 30.0 mm Hg      pH, Temp Corrected 7.562 pH Units      pO2, Temperature Corrected 91.2 mm Hg      PO2/FIO2 304    Basic Metabolic Panel [[id number]]  (Abnormal) Collected: 01/21/25 0508    Specimen: Blood Updated: 01/21/25 0540     Glucose 153 mg/dL      BUN 26 mg/dL      Creatinine 0.64 mg/dL      Sodium 139 mmol/L      Potassium 4.4 mmol/L      Comment: Specimen hemolyzed.  Result may be falsely elevated.        Chloride 103 mmol/L      CO2 25.7 mmol/L      Calcium 8.7 mg/dL      BUN/Creatinine Ratio 40.6     Anion Gap 10.3 mmol/L      eGFR 107.1 mL/min/1.73     Narrative:      GFR Categories in Chronic Kidney Disease (CKD)      GFR Category          GFR (mL/min/1.73)    Interpretation  G1                     90 or greater         Normal or high (1)  G2                      60-89                Mild decrease (1)  G3a                   45-59                Mild to moderate decrease  G3b                   30-44                Moderate to severe decrease  G4                    15-29                Severe decrease  G5                    14 or less           Kidney failure          (1)In the absence of evidence of kidney disease, neither GFR category G1 or G2 fulfill the criteria for CKD.    eGFR calculation 2021 CKD-EPI creatinine equation, which does not include race as a factor    CBC (No Diff) [[id number]]  (Abnormal)  "Collected: 01/21/25 0508    Specimen: Blood Updated: 01/21/25 0521     WBC 12.11 10*3/mm3      RBC 4.01 10*6/mm3      Hemoglobin 12.5 g/dL      Hematocrit 39.6 %      MCV 98.8 fL      MCH 31.2 pg      MCHC 31.6 g/dL      RDW 13.4 %      RDW-SD 49.2 fl      MPV 9.5 fL      Platelets 204 10*3/mm3     Procalcitonin [[id number]]  (Abnormal) Collected: 01/20/25 0539    Specimen: Blood Updated: 01/20/25 0610     Procalcitonin 1.57 ng/mL     Narrative:      As a Marker for Sepsis (Non-Neonates):    1. <0.5 ng/mL represents a low risk of severe sepsis and/or septic shock.  2. >2 ng/mL represents a high risk of severe sepsis and/or septic shock.    As a Marker for Lower Respiratory Tract Infections that require antibiotic therapy:    PCT on Admission    Antibiotic Therapy       6-12 Hrs later    >0.5                Strongly Recommended  >0.25 - <0.5        Recommended   0.1 - 0.25          Discouraged              Remeasure/reassess PCT  <0.1                Strongly Discouraged     Remeasure/reassess PCT    As 28 day mortality risk marker: \"Change in Procalcitonin Result\" (>80% or <=80%) if Day 0 (or Day 1) and Day 4 values are available. Refer to http://www.[email or web address]-pct-calculator.com    Change in PCT <=80%  A decrease of PCT levels below or equal to 80% defines a positive change in PCT test result representing a higher risk for 28-day all-cause mortality of patients diagnosed with severe sepsis for septic shock.    Change in PCT >80%  A decrease of PCT levels of more than 80% defines a negative change in PCT result representing a lower risk for 28-day all-cause mortality of patients diagnosed with severe sepsis or septic shock.       Magnesium [[id number]]  (Abnormal) Collected: 01/20/25 0539    Specimen: Blood Updated: 01/20/25 0601     Magnesium 2.7 mg/dL     Phosphorus [[id number]]  (Normal) Collected: 01/20/25 0539    Specimen: Blood Updated: 01/20/25 0601     Phosphorus 3.1 mg/dL     CBC (No Diff) [[id number]]  (Abnormal) " Collected: 01/20/25 0539    Specimen: Blood Updated: 01/20/25 0549     WBC 12.81 10*3/mm3      RBC 3.70 10*6/mm3      Hemoglobin 11.8 g/dL      Hematocrit 39.0 %      .4 fL      MCH 31.9 pg      MCHC 30.3 g/dL      RDW 13.6 %      RDW-SD 53.7 fl      MPV 9.4 fL      Platelets 211 10*3/mm3     MRSA Screen, PCR (Inpatient) - Swab, Nares [[id number]]  (Normal) Collected: 01/19/25 1346    Specimen: Swab from Nares Updated: 01/19/25 1814     MRSA PCR No MRSA Detected    Narrative:      The negative predictive value of this diagnostic test is high and should only be used to consider de-escalating anti-MRSA therapy. A positive result may indicate colonization with MRSA and must be correlated clinically.    Respiratory Panel PCR w/COVID-19(SARS-CoV-2) TERRY/ROSSI/KIKA/PAD/COR/RONAK In-House, NP Swab in UTM/VTM, 2 HR TAT - Swab, Nasopharynx [[id number]]  (Normal) Collected: 01/19/25 1346    Specimen: Swab from Nasopharynx Updated: 01/19/25 1814     ADENOVIRUS, PCR Not Detected     Coronavirus 229E Not Detected     Coronavirus HKU1 Not Detected     Coronavirus NL63 Not Detected     Coronavirus OC43 Not Detected     COVID19 Not Detected     Human Metapneumovirus Not Detected     Human Rhinovirus/Enterovirus Not Detected     Influenza A PCR Not Detected     Influenza B PCR Not Detected     Parainfluenza Virus 1 Not Detected     Parainfluenza Virus 2 Not Detected     Parainfluenza Virus 3 Not Detected     Parainfluenza Virus 4 Not Detected     RSV, PCR Not Detected     Bordetella pertussis pcr Not Detected     Bordetella parapertussis PCR Not Detected     Chlamydophila pneumoniae PCR Not Detected     Mycoplasma pneumo by PCR Not Detected    Narrative:      In the setting of a positive respiratory panel with a viral infection PLUS a negative procalcitonin without other underlying concern for bacterial infection, consider observing off antibiotics or discontinuation of antibiotics and continue supportive care. If the respiratory  "panel is positive for atypical bacterial infection (Bordetella pertussis, Chlamydophila pneumoniae, or Mycoplasma pneumoniae), consider antibiotic de-escalation to target atypical bacterial infection.    Procalcitonin [[id number]]  (Abnormal) Collected: 01/19/25 0545    Specimen: Blood Updated: 01/19/25 0637     Procalcitonin 2.91 ng/mL     Narrative:      As a Marker for Sepsis (Non-Neonates):    1. <0.5 ng/mL represents a low risk of severe sepsis and/or septic shock.  2. >2 ng/mL represents a high risk of severe sepsis and/or septic shock.    As a Marker for Lower Respiratory Tract Infections that require antibiotic therapy:    PCT on Admission    Antibiotic Therapy       6-12 Hrs later    >0.5                Strongly Recommended  >0.25 - <0.5        Recommended   0.1 - 0.25          Discouraged              Remeasure/reassess PCT  <0.1                Strongly Discouraged     Remeasure/reassess PCT    As 28 day mortality risk marker: \"Change in Procalcitonin Result\" (>80% or <=80%) if Day 0 (or Day 1) and Day 4 values are available. Refer to http://www.[facility]pct[facility]cal[facility]    Change in PCT <=80%  A decrease of PCT levels below or equal to 80% defines a positive change in PCT test result representing a higher risk for 28-day all-cause mortality of patients diagnosed with severe sepsis for septic shock.    Change in PCT >80%  A decrease of PCT levels of more than 80% defines a negative change in PCT result representing a lower risk for 28-day all-cause mortality of patients diagnosed with severe sepsis or septic shock.       Comprehensive Metabolic Panel [[id number]]  (Abnormal) Collected: 01/19/25 0545    Specimen: Blood Updated: 01/19/25 0634     Glucose 172 mg/dL      BUN 14 mg/dL      Creatinine 0.58 mg/dL      Sodium 142 mmol/L      Potassium 4.9 mmol/L      Chloride 100 mmol/L      CO2 35.4 mmol/L      Calcium 9.0 mg/dL      Total Protein 6.6 g/dL      Albumin 3.5 g/dL      ALT (SGPT) 11 U/L      AST " (SGOT) 19 U/L      Alkaline Phosphatase 100 U/L      Total Bilirubin 0.3 mg/dL      Globulin 3.1 gm/dL      A/G Ratio 1.1 g/dL      BUN/Creatinine Ratio 24.1     Anion Gap 6.6 mmol/L      eGFR 109.7 mL/min/1.73     Narrative:      GFR Categories in Chronic Kidney Disease (CKD)      GFR Category          GFR (mL/min/1.73)    Interpretation  G1                     90 or greater         Normal or high (1)  G2                      60-89                Mild decrease (1)  G3a                   45-59                Mild to moderate decrease  G3b                   30-44                Moderate to severe decrease  G4                    15-29                Severe decrease  G5                    14 or less           Kidney failure          (1)In the absence of evidence of kidney disease, neither GFR category G1 or G2 fulfill the criteria for CKD.    eGFR calculation 2021 CKD-EPI creatinine equation, which does not include race as a factor    High Sensitivity Troponin T 1Hr [[id number]]  (Normal) Collected: 01/19/25 0234    Specimen: Blood Updated: 01/19/25 0258     HS Troponin T 8 ng/L      Troponin T Numeric Delta -5 ng/L     Narrative:      High Sensitive Troponin T Reference Range:  <14.0 ng/L- Negative Female for AMI  <22.0 ng/L- Negative Male for AMI  >=14 - Abnormal Female indicating possible myocardial injury.  >=22 - Abnormal Male indicating possible myocardial injury.   Clinicians would have to utilize clinical acumen, EKG, Troponin, and serial changes to determine if it is an Acute Myocardial Infarction or myocardial injury due to an underlying chronic condition.         Urinalysis, Microscopic Only - Indwelling Urethral Catheter [[id number]]  (Abnormal) Collected: 01/19/25 0236    Specimen: Urine from Indwelling Urethral Catheter Updated: 01/19/25 0249     RBC, UA 6-10 /HPF      WBC, UA None Seen /HPF      Bacteria, UA None Seen /HPF      Squamous Epithelial Cells, UA 3-6 /HPF      Hyaline Casts, UA None Seen /LPF       Fine Granular Casts, UA 0-2 /LPF      Methodology Manual Light Microscopy    Urinalysis With Microscopic If Indicated (No Culture) - Indwelling Urethral Catheter [[id number]]  (Abnormal) Collected: 01/19/25 0236    Specimen: Urine from Indwelling Urethral Catheter Updated: 01/19/25 0249     Color, UA Yellow     Appearance, UA Clear     pH, UA 5.5     Specific Gravity, UA 1.031     Comment: Result obtained by Refractometer        Glucose, UA Negative     Ketones, UA Negative     Bilirubin, UA Negative     Blood, UA Trace     Protein,  mg/dL (2+)     Leuk Esterase, UA Negative     Nitrite, UA Negative     Urobilinogen, UA 0.2 E.U./dL    CBC & Differential [[id number]]  (Abnormal) Collected: 01/19/25 0204    Specimen: Blood Updated: 01/19/25 0207    Narrative:      The following orders were created for panel order CBC & Differential.  Procedure                               Abnormality         Status                     ---------                               -----------         ------                     CBC Auto Differential[[id number]]        Abnormal            Final result               Scan Slide[[id number]]                                                                    Please view results for these tests on the individual orders.    CBC Auto Differential [[id number]]  (Abnormal) Collected: 01/19/25 0204    Specimen: Blood Updated: 01/19/25 0206     WBC 12.28 10*3/mm3      RBC 4.03 10*6/mm3      Hemoglobin 13.0 g/dL      Hematocrit 43.5 %      .9 fL      MCH 32.3 pg      MCHC 29.9 g/dL      RDW 13.7 %      RDW-SD 54.7 fl      MPV 9.1 fL      Platelets 319 10*3/mm3      Neutrophil % 91.5 %      Lymphocyte % 2.0 %      Monocyte % 5.9 %      Eosinophil % 0.0 %      Basophil % 0.2 %      Immature Grans % 0.4 %      Neutrophils, Absolute 11.23 10*3/mm3      Lymphocytes, Absolute 0.25 10*3/mm3      Monocytes, Absolute 0.73 10*3/mm3      Eosinophils, Absolute 0.00 10*3/mm3      Basophils, Absolute 0.02  10*3/mm3      Immature Grans, Absolute 0.05 10*3/mm3      nRBC 0.0 /100 WBC     Comprehensive Metabolic Panel [[id number]]  (Abnormal) Collected: 01/19/25 0122    Specimen: Blood from Arm, Right Updated: 01/19/25 0155     Glucose 206 mg/dL      BUN 13 mg/dL      Creatinine 0.46 mg/dL      Sodium 141 mmol/L      Potassium 5.0 mmol/L      Chloride 100 mmol/L      CO2 31.2 mmol/L      Calcium 8.9 mg/dL      Total Protein 7.0 g/dL      Albumin 3.7 g/dL      ALT (SGPT) 9 U/L      AST (SGOT) 15 U/L      Alkaline Phosphatase 108 U/L      Total Bilirubin 0.2 mg/dL      Globulin 3.3 gm/dL      A/G Ratio 1.1 g/dL      BUN/Creatinine Ratio 28.3     Anion Gap 9.8 mmol/L      eGFR 116.0 mL/min/1.73     Narrative:      GFR Categories in Chronic Kidney Disease (CKD)      GFR Category          GFR (mL/min/1.73)    Interpretation  G1                     90 or greater         Normal or high (1)  G2                      60-89                Mild decrease (1)  G3a                   45-59                Mild to moderate decrease  G3b                   30-44                Moderate to severe decrease  G4                    15-29                Severe decrease  G5                    14 or less           Kidney failure          (1)In the absence of evidence of kidney disease, neither GFR category G1 or G2 fulfill the criteria for CKD.    eGFR calculation 2021 CKD-EPI creatinine equation, which does not include race as a factor    Magnesium [[id number]]  (Abnormal) Collected: 01/19/25 0122    Specimen: Blood from Arm, Right Updated: 01/19/25 0143     Magnesium 3.2 mg/dL     High Sensitivity Troponin T [[id number]]  (Normal) Collected: 01/19/25 0122    Specimen: Blood from Arm, Right Updated: 01/19/25 0143     HS Troponin T 13 ng/L     Narrative:      High Sensitive Troponin T Reference Range:  <14.0 ng/L- Negative Female for AMI  <22.0 ng/L- Negative Male for AMI  >=14 - Abnormal Female indicating possible myocardial injury.  >=22 - Abnormal  Male indicating possible myocardial injury.   Clinicians would have to utilize clinical acumen, EKG, Troponin, and serial changes to determine if it is an Acute Myocardial Infarction or myocardial injury due to an underlying chronic condition.         BNP [[id number]]  (Normal) Collected: 01/19/25 0122    Specimen: Blood from Arm, Right Updated: 01/19/25 0143     proBNP 325.0 pg/mL     Narrative:      This assay is used as an aid in the diagnosis of individuals suspected of having heart failure. It can be used as an aid in the diagnosis of acute decompensated heart failure (ADHF) in patients presenting with signs and symptoms of ADHF to the emergency department (ED). In addition, NT-proBNP of <300 pg/mL indicates ADHF is not likely.    Age Range Result Interpretation  NT-proBNP Concentration (pg/mL:      <50             Positive            >450                   Gray                 300-450                    Negative             <300    50-75           Positive            >900                  Gray                300-900                  Negative            <300      >75             Positive            >1800                  Gray                300-1800                  Negative            <300    COVID-19, FLU A/B, RSV PCR 1 HR TAT - Swab, Nasopharynx [[id number]]  (Normal) Collected: 01/19/25 0102    Specimen: Swab from Nasopharynx Updated: 01/19/25 0121     COVID19 Not Detected     Influenza A PCR Not Detected     Influenza B PCR Not Detected     RSV, PCR Not Detected    Narrative:      Fact sheet for providers: https://www.fda.gov/media/245573/download    Fact sheet for patients: https://www.fda.gov/media/653570/download    Test performed by PCR.    Lactic Acid, Plasma [[id number]]  (Normal) Collected: 01/19/25 0018    Specimen: Blood Updated: 01/19/25 0100     Lactate 1.1 mmol/L           Imaging Results (Most Recent)       Procedure Component Value Units Date/Time    XR Chest 1 View [[id number]] Collected:  01/21/25 0825     Updated: 01/21/25 0829    Narrative:      XR CHEST 1 VW    Date of Exam: 1/21/2025 5:40 AM EST    Indication: Vent  VENT    Comparison: 1/20/2025    Findings:  Endotracheal tube overlies mid thoracic trachea. Unchanged cardiomediastinal silhouette. No new focal airspace consolidation, pleural effusion, or pneumothorax. No acute bone abnormality.      Impression:      Impression:  No significant interval change.      Electronically Signed: Donal Mullen MD    1/21/2025 8:26 AM EST    Workstation ID: [id number]    XR Chest 1 View [[id number]] Collected: 01/20/25 0815     Updated: 01/20/25 0820    Narrative:      XR CHEST 1 VW    Date of Exam: 1/20/2025 6:02 AM EST    Indication: Respiratory failure  Respiratory failure    Comparison: 1/19/2025 at 0628 hours    Findings:  The endotracheal tube is stable in position. No new infiltrates or pleural effusions are seen. The cardiac silhouette and mediastinum are stable.      Impression:      Impression:  1.Stable positioning of the endotracheal tube.  2.No new infiltrate or pleural effusion is seen.        Electronically Signed: Deric Morales MD    1/20/2025 8:17 AM EST    Workstation ID: [id number]    XR Chest 1 View [[id number]] Collected: 01/19/25 1134     Updated: 01/19/25 1141    Narrative:      XR CHEST 1 VW    Date of Exam: 1/19/2025 6:24 AM EST    Indication: Follow up chest x-ray for Roderick morning 1/19 7am    Comparison: AP chest x-ray 1/19/2025 at 1:44 a.m., right rib series 12/31/2024, CT chest 12/30/2024, AP chest x-ray 1/25/2024    Findings:  Tip of the endotracheal tube terminates in the midthoracic trachea. Lungs are well expanded. There are stable mild patchy opacities in both lung bases. No pneumothorax or large pleural effusion is seen. Heart size is normal.      Impression:      Impression:  1.Stable mild patchy bibasilar opacities, concerning for pneumonia when correlated with 12/30/2024 chest CT.  2.Tip of the endotracheal tube terminates  in the midthoracic trachea.        Electronically Signed: Irenaruben Poole    1/19/2025 11:38 AM EST    Workstation ID: [id number]    XR Chest 1 View [[id number]] Collected: 01/19/25 0154     Updated: 01/19/25 0158    Narrative:      XR CHEST 1 VW    Date of Exam: 1/19/2025 1:40 AM EST    Indication: post intubation    Comparison: 1/19/2025.    Findings:  Endotracheal tube tip in the mid trachea. There are no airspace consolidations. No pleural fluid. No pneumothorax. The pulmonary vasculature appears within normal limits. The cardiac and mediastinal silhouette appear unremarkable. No acute osseous   abnormality identified.      Impression:      Impression:  No acute cardiopulmonary process. Endotracheal tube tip in the mid trachea.    Electronically Signed: Lianna Wiseman MD    1/19/2025 1:55 AM EST    Workstation ID: [id number]    XR Chest 1 View [[id number]] Collected: 01/19/25 0055     Updated: 01/19/25 0059    Narrative:      XR CHEST 1 VW    Date of Exam: 1/19/2025 12:42 AM EST    Indication: cough    Comparison: 1/16/2025, 12/31/2024.    Findings:  There are no airspace consolidations. No pleural fluid. No pneumothorax. The pulmonary vasculature appears within normal limits. The cardiac and mediastinal silhouette appear unremarkable. No acute osseous abnormality identified.      Impression:      Impression:  No acute cardiopulmonary process.    Electronically Signed: Lianna Wiseman MD    1/19/2025 12:56 AM EST    Workstation ID: [id number]            PROCEDURES      Condition on Discharge:  Stable, Improved.     Physical Exam at Discharge  Vital Signs  Temp:  [97.5 °F (36.4 °C)-98 °F (36.7 °C)] 97.5 °F (36.4 °C)  Heart Rate:  [59-87] 63  Resp:  [18-24] 24  BP: (157-161)/(79-92) 159/79    Physical Exam  General: Chronically ill appearing; sitting up in bed; coughing  CV: RRR, S1-S2  Lungs: Coarse breath sounds bilaterally; no wheezing  Abdomen: soft, NT, ND     Discharge Disposition  Home       Discharge Diet:      Dietary  Orders (From admission, onward)       Start     Ordered    01/21/25 1537  Diet: Regular/House; Texture: Soft to Chew (NDD 3); Soft to Chew: Whole Meat; Fluid Consistency: Thin (IDDSI 0)  Diet Effective Now        References:    Diet Order Definitions   Question Answer Comment   Diets: Regular/House    Texture: Soft to Chew (NDD 3)    Soft to Chew: Whole Meat    Fluid Consistency: Thin (IDDSI 0)        01/21/25 1537                    Activity at Discharge:  As tolerated    Pre-discharge education        Follow-up Appointments  No future appointments.  Additional Instructions for the Follow-ups that You Need to Schedule       Discharge Follow-up with PCP   As directed       Currently Documented PCP:    Talib Ceron MD    PCP Phone Number:    [phone]     Follow Up Details: In 1-2 weeks        Discharge Follow-up with Specified Provider: Follow-up with Pulmonologist Dr Horacio Hussein MD in 2-3 weeks; call office at [phone] to make appointment   As directed      To: Follow-up with Pulmonologist Dr Horacio Hussein MD in 2-3 weeks; call office at [phone] to make appointment                Test Results Pending at Discharge  Pending Labs       Order Current Status    Blood Culture - Blood, Arm, Right Preliminary result            Total discharge took 38 minutes to complete      At [facility], we believe that sharing information builds trust and better relationships. You are receiving this note because you recently visited [facility]. It is possible you will see health information before a provider has talked with you about it. This kind of information can be easy to misunderstand. To help you fully understand what it means for your health, we urge you to discuss this note with your provider.    Rylan Sutton,   01/23/25  13:09 EST

## 2025-01-23 NOTE — DISCHARGE INSTR - APPOINTMENTS
Follow-up appointment with Dr. Ceron on Monday February 3, 2025 at 1050 AM [phone]    Follow-up appointment with Dr. Hussein in the [place] office on Wednesday March 5, 2025 at 1 PM [phone]

## 2025-01-23 NOTE — PLAN OF CARE
Goal Outcome Evaluation:           Progress: improving  Outcome Evaluation: pt resting majority of the night. up with stand by assist to the restroom. prn medication given for migraine pain, vss, on 2L NC, plan to discharge home in the AM.

## 2025-01-23 NOTE — SIGNIFICANT NOTE
01/23/25 0840   Rehab Evaluation   Session Not Performed patient/family declined treatment   Evaluation Not Performed, Comment Pt declined breakfast at this time. Will check back later.

## 2025-01-23 NOTE — PROGRESS NOTES
[facility] INPATIENT PROGRESS NOTE         [facility] ICU    2025      PATIENT IDENTIFICATION:  Name: Valeria Rubi ADMIT: 2025   : 1975  PCP: Talib Ceron MD    MRN: [id number] LOS: 4 days   AGE/SEX: 49 y.o. female  ROOM: ICU2/1                     LOS 4    Reason for visit: critical care       SUBJECTIVE:      No new pulmonary issues noted overnight.        Objective   OBJECTIVE:    Vital Sign Min/Max for last 24 hours  Temp  Min: 97.5 °F (36.4 °C)  Max: 98 °F (36.7 °C)   BP  Min: 134/73  Max: 161/86   Pulse  Min: 63  Max: 87   Resp  Min: 18  Max: 24   SpO2  Min: 92 %  Max: 100 %   No data recorded   No data recorded    Vitals:    25 0009 25 0342 25 0735 25 0744   BP: 158/92 161/86 159/79    BP Location: Left arm Left arm Left arm    Patient Position: Lying Lying Lying    Pulse: 63 70 72 78   Resp: 18 18 20 20   Temp: 98 °F (36.7 °C) 97.9 °F (36.6 °C) 97.5 °F (36.4 °C)    TempSrc: Oral Oral Oral    SpO2: 100% 100% 99%    Weight:       Height:                25  0001   Weight: 55.8 kg (123 lb)       Body mass index is 22.49 kg/m².                    FiO2 (%): 27 %     Body mass index is 22.49 kg/m².    Intake/Output Summary (Last 24 hours) at 2025 0905  Last data filed at 2025 0400  Gross per 24 hour   Intake --   Output 250 ml   Net -250 ml         Exam:  GEN:  No distress, appears stated age.   NECK:  No adenopathy, midline trachea  LUNGS: Nonlabored    Assessment     Scheduled meds:  amoxicillin-clavulanate, 1 tablet, Oral, Q12H  aspirin, 81 mg, Oral, Daily  atorvastatin, 20 mg, Oral, Daily  clopidogrel, 75 mg, Oral, Daily  enoxaparin, 40 mg, Subcutaneous, Daily  Famotidine (PF), 20 mg, Intravenous, BID  insulin glargine, 7 Units, Subcutaneous, Daily  insulin lispro, 2-7 Units, Subcutaneous, 4x Daily With Meals & Nightly  ipratropium-albuterol, 3 mL, Nebulization, 4x Daily - RT  metoprolol tartrate, 50 mg, Oral, Q12H  mupirocin, 1  Application, Each Nare, BID  nicotine, 1 patch, Transdermal, Q24H  predniSONE, 40 mg, Oral, Daily With Breakfast  venlafaxine XR, 75 mg, Oral, Daily      IV meds:                           Data Review:  Results from last 7 days   Lab Units 01/22/25  0336 01/21/25  0508 01/20/25  0539 01/19/25  0545 01/19/25  0122   SODIUM mmol/L 140 139 143 142 141   POTASSIUM mmol/L 4.5 4.4 4.8 4.9 5.0   CHLORIDE mmol/L 102 103 105 100 100   CO2 mmol/L 29.0 25.7 28.7 35.4* 31.2*   BUN mg/dL 25* 26* 27* 14 13   CREATININE mg/dL 0.52* 0.64 0.69 0.58 0.46*   GLUCOSE mg/dL 137* 153* 138* 172* 206*   CALCIUM mg/dL 8.7 8.7 8.6 9.0 8.9         Estimated Creatinine Clearance: 112.7 mL/min (A) (by C-G formula based on SCr of 0.52 mg/dL (L)).  Results from last 7 days   Lab Units 01/21/25  0508 01/20/25  0539 01/19/25  0545 01/19/25  0204   WBC 10*3/mm3 12.11* 12.81* 11.88* 12.28*   HEMOGLOBIN g/dL 12.5 11.8* 12.2 13.0   PLATELETS 10*3/mm3 204 211 258 319         Results from last 7 days   Lab Units 01/19/25  0545 01/19/25  0122   ALT (SGPT) U/L 11 9   AST (SGOT) U/L 19 15     Results from last 7 days   Lab Units 01/21/25  0820 01/21/25  0535 01/19/25  0703 01/19/25  0115   PH, ARTERIAL pH units 7.460* 7.562* 7.321* 7.190*   PO2 ART mm Hg 65.4* 91.2 88.1 72.6*   PCO2, ARTERIAL mm Hg 40.6 30.0* 77.8* >98.3*   HCO3 ART mmol/L 28.8* 27.0* 40.2*  --      Results from last 7 days   Lab Units 01/20/25  0539 01/19/25  0545 01/19/25  0018   PROCALCITONIN ng/mL 1.57* 2.91*  --    LACTATE mmol/L  --   --  1.1         Glucose   Date/Time Value Ref Range Status   01/23/2025 0814 92 70 - 130 mg/dL Final   01/22/2025 1846 204 (H) 70 - 130 mg/dL Final   01/22/2025 1212 144 (H) 70 - 130 mg/dL Final   01/22/2025 0831 109 70 - 130 mg/dL Final   01/21/2025 2011 119 70 - 130 mg/dL Final   01/21/2025 1733 147 (H) 70 - 130 mg/dL Final   01/21/2025 1238 149 (H) 70 - 130 mg/dL Final         Imaging reviewed  Chest x-ray 1/21 reviewed          Microbiology  reviewed  RVP negative  MRSA screen negative  Gram-positive bacilli blood cultures from 1/19          Active Hospital Problems    Diagnosis  POA    **COPD exacerbation [J44.1]  Yes    Acute on chronic respiratory failure with hypoxia and hypercapnia [J96.21, J96.22]  Yes      Resolved Hospital Problems   No resolved problems to display.         ASSESSMENT:  Acute on chronic hypercapnic and hypoxic respiratory failure  Acute rhinovirus bronchitis causing COPD exacerbation  COPD exacerbation  Recent RSV pneumonia within the past 30 days  Ongoing tobacco abuse  Sepsis present on admission  Hyperglycemia  Diabetes type 2      PLAN:    Bronchodilators and steroid taper changing to po.  Wean oxygen as able.    No objection to discharge.  Repeat CT chest for follow-up post treatment for nodular infiltrates.  Could potentially be neoplastic process underlying.  Close follow-up in our office postdischarge recommended.  Will see as needed.        Silverio Church MD  Pulmonary and Critical Care Medicine  [facility], [facility]  1/23/2025    09:05 EST

## 2025-01-24 LAB
ARTERIAL PATENCY WRIST A: ABNORMAL
ATMOSPHERIC PRESS: ABNORMAL MM[HG]
BACTERIA SPEC AEROBE CULT: NORMAL
BASE EXCESS BLDA CALC-SCNC: ABNORMAL MMOL/L
BDY SITE: ABNORMAL
BODY TEMPERATURE: ABNORMAL
GAS FLOW AIRWAY: ABNORMAL L/MIN
HCO3 BLDA-SCNC: ABNORMAL MMOL/L
HGB BLDA-MCNC: ABNORMAL G/DL
INHALED O2 CONCENTRATION: ABNORMAL %
Lab: ABNORMAL
MODALITY: ABNORMAL
PCO2 BLDA: ABNORMAL MM[HG]
PCO2 TEMP ADJ BLD: ABNORMAL MM[HG]
PH BLDA: ABNORMAL [PH]
PH, TEMP CORRECTED: ABNORMAL
PO2 BLD: ABNORMAL MM[HG]
PO2 BLDA: ABNORMAL MM[HG]
PO2 TEMP ADJ BLD: ABNORMAL MM[HG]
SAO2 % BLDCOA: 93.8 % (ref 94–99)

## 2025-01-24 PROCEDURE — 36600 WITHDRAWAL OF ARTERIAL BLOOD: CPT

## 2025-01-24 PROCEDURE — 82803 BLOOD GASES ANY COMBINATION: CPT

## 2025-01-24 NOTE — OUTREACH NOTE
Prep Survey      Flowsheet Row Responses   [facility] facility patient discharged from? [place]   Is LACE score < 7 ? No   Eligibility Readm Mgmt   Discharge diagnosis COPD exacerbation   Does the patient have one of the following disease processes/diagnoses(primary or secondary)? COPD   Does the patient have Home health ordered? No   Is there a DME ordered? No   Prep survey completed? Yes            HENNY OSHEA - Registered Nurse

## 2025-01-27 ENCOUNTER — READMISSION MANAGEMENT (OUTPATIENT)
Dept: CALL CENTER | Facility: HOSPITAL | Age: 50
End: 2025-01-27
Payer: COMMERCIAL

## 2025-01-27 NOTE — OUTREACH NOTE
COPD/PN Week 1 Survey      Flowsheet Row Responses   [facility] patient discharged from? [place]   Does the patient have one of the following disease processes/diagnoses(primary or secondary)? COPD   Week 1 attempt successful? Yes   Call start time 1619   Call end time 1630   Discharge diagnosis COPD exacerbation   Meds reviewed with patient/caregiver? Yes   Is the patient having any side effects they believe may be caused by any medication additions or changes? No   Does the patient have all medications ordered at discharge? Yes   Is the patient taking all medications as directed (includes completed medication regime)? Yes   Does the patient have a primary care provider?  Yes   Does the patient have an appointment with their PCP or specialist within 7 days of discharge? No   What is preventing the patient from scheduling follow up appointments within 7 days of discharge? Haven't had time   Nursing Interventions Advised patient to make appointment   Has the patient kept scheduled appointments due by today? N/A   Has home health visited the patient within 72 hours of discharge? N/A   DME comments home oxygen, wears 2L   Pulse Ox monitoring Intermittent   Pulse Ox device source Patient   O2 Sat comments Pt reports has not checked Sats over the weekend   O2 Sat: education provided Sat levels   Psychosocial issues? No   Did the patient receive a copy of their discharge instructions? Yes   Nursing interventions Reviewed instructions with patient   What is the patient's perception of their health status since discharge? Improving  [still sob with exertion]   Nursing Interventions Nurse provided patient education   Is the patient/caregiver able to teach back the hierarchy of who to call/visit for symptoms/problems? PCP, Specialist, Home health nurse, Urgent Care, ED, 911 Yes   Patient reports what zone on this call? Green Zone   Green Zone Reports doing well   Green Zone interventions: Take daily medications, Use  oxygen as prescribed, At all times avoid cigarette smoking, vaping and inhaled irritants   Week 1 call completed? Yes   Graduated Yes   Is the patient interested in additional calls from an ambulatory ? No   Would this patient benefit from a Referral to [facility] Social Work? No   Wrap up additional comments Pt reports doing well, no needs at this time   Call end time [phone]            Starr [facility] - Registered Nurse

## 2025-03-06 ENCOUNTER — TRANSCRIBE ORDERS (OUTPATIENT)
Dept: CT IMAGING | Facility: HOSPITAL | Age: 50
End: 2025-03-06
Payer: COMMERCIAL

## 2025-03-06 ENCOUNTER — TRANSCRIBE ORDERS (OUTPATIENT)
Dept: PULMONOLOGY | Facility: HOSPITAL | Age: 50
End: 2025-03-06
Payer: COMMERCIAL

## 2025-03-06 DIAGNOSIS — R91.8 LUNG NODULES: Primary | ICD-10-CM

## 2025-03-06 DIAGNOSIS — J44.9 CHRONIC OBSTRUCTIVE PULMONARY DISEASE, UNSPECIFIED COPD TYPE: Primary | ICD-10-CM

## 2025-03-24 ENCOUNTER — HOSPITAL ENCOUNTER (OUTPATIENT)
Dept: CT IMAGING | Facility: HOSPITAL | Age: 50
Discharge: HOME | End: 2025-03-24
Admitting: INTERNAL MEDICINE
Payer: COMMERCIAL

## 2025-03-24 DIAGNOSIS — R91.8 LUNG NODULES: ICD-10-CM

## 2025-03-24 PROCEDURE — 71250 CT THORAX DX C-: CPT

## 2025-04-14 ENCOUNTER — HOSPITAL ENCOUNTER (OUTPATIENT)
Dept: PULMONOLOGY | Facility: HOSPITAL | Age: 50
Discharge: HOME | End: 2025-04-14
Payer: COMMERCIAL

## 2025-04-14 VITALS — HEART RATE: 100 BPM | RESPIRATION RATE: 20 BRPM | OXYGEN SATURATION: 94 %

## 2025-04-14 DIAGNOSIS — J44.9 CHRONIC OBSTRUCTIVE PULMONARY DISEASE, UNSPECIFIED COPD TYPE: ICD-10-CM

## 2025-04-14 LAB
BDY SITE: NORMAL
HGB BLDA-MCNC: 14.4 G/DL (ref 13.5–17.5)
Lab: NORMAL

## 2025-04-14 PROCEDURE — 82820 HEMOGLOBIN-OXYGEN AFFINITY: CPT

## 2025-04-14 PROCEDURE — 94060 EVALUATION OF WHEEZING: CPT

## 2025-04-14 PROCEDURE — 94729 DIFFUSING CAPACITY: CPT

## 2025-04-14 PROCEDURE — 94726 PLETHYSMOGRAPHY LUNG VOLUMES: CPT

## 2025-04-14 RX ORDER — ALBUTEROL SULFATE 0.83 MG/ML
2.5 SOLUTION RESPIRATORY (INHALATION) ONCE
Status: COMPLETED | OUTPATIENT
Start: 2025-04-14 | End: 2025-04-14

## 2025-04-14 RX ADMIN — ALBUTEROL SULFATE 2.5 MG: 2.5 SOLUTION RESPIRATORY (INHALATION) at 13:40

## 2025-05-01 ENCOUNTER — TRANSCRIBE ORDERS (OUTPATIENT)
Dept: CT IMAGING | Facility: HOSPITAL | Age: 50
End: 2025-05-01
Payer: COMMERCIAL

## 2025-05-01 DIAGNOSIS — J18.9 PNEUMONIA DUE TO INFECTIOUS ORGANISM, UNSPECIFIED LATERALITY, UNSPECIFIED PART OF LUNG: Primary | ICD-10-CM

## 2025-05-13 ENCOUNTER — TRANSCRIBE ORDERS (OUTPATIENT)
Age: 50
End: 2025-05-13
Payer: COMMERCIAL

## 2025-05-13 DIAGNOSIS — I70.0 ATHEROSCLEROSIS OF AORTA: ICD-10-CM

## 2025-05-13 DIAGNOSIS — I73.9 PVD (PERIPHERAL VASCULAR DISEASE): Primary | ICD-10-CM

## 2025-07-14 ENCOUNTER — TELEPHONE (OUTPATIENT)
Age: 50
End: 2025-07-14
Payer: COMMERCIAL

## 2025-07-31 ENCOUNTER — HOSPITAL ENCOUNTER (OUTPATIENT)
Dept: GENERAL RADIOLOGY | Facility: HOSPITAL | Age: 50
Discharge: HOME | End: 2025-07-31
Admitting: INTERNAL MEDICINE
Payer: COMMERCIAL

## 2025-07-31 ENCOUNTER — TRANSCRIBE ORDERS (OUTPATIENT)
Dept: ADMINISTRATIVE | Facility: HOSPITAL | Age: 50
End: 2025-07-31
Payer: COMMERCIAL

## 2025-07-31 DIAGNOSIS — M54.59 WEIGHT LIFTER'S BACK: ICD-10-CM

## 2025-07-31 DIAGNOSIS — M54.59 WEIGHT LIFTER'S BACK: Primary | ICD-10-CM

## 2025-07-31 PROCEDURE — 72100 X-RAY EXAM L-S SPINE 2/3 VWS: CPT

## 2025-08-21 ENCOUNTER — APPOINTMENT (OUTPATIENT)
Dept: GENERAL RADIOLOGY | Facility: HOSPITAL | Age: 50
End: 2025-08-21
Payer: COMMERCIAL

## 2025-08-21 ENCOUNTER — HOSPITAL ENCOUNTER (EMERGENCY)
Facility: HOSPITAL | Age: 50
Discharge: HOME | End: 2025-08-21
Attending: EMERGENCY MEDICINE
Payer: COMMERCIAL